# Patient Record
Sex: FEMALE | Race: WHITE | Employment: OTHER | URBAN - NONMETROPOLITAN AREA
[De-identification: names, ages, dates, MRNs, and addresses within clinical notes are randomized per-mention and may not be internally consistent; named-entity substitution may affect disease eponyms.]

---

## 2017-07-06 ENCOUNTER — HOSPITAL ENCOUNTER (OUTPATIENT)
Dept: NON INVASIVE DIAGNOSTICS | Age: 82
Discharge: HOME | End: 2017-07-06
Payer: MEDICARE

## 2017-07-06 DIAGNOSIS — I73.9 CLAUDICATION (HCC): Primary | ICD-10-CM

## 2017-07-06 PROCEDURE — 93923 UPR/LXTR ART STDY 3+ LVLS: CPT

## 2017-07-22 RX ORDER — SIMVASTATIN 20 MG
20 TABLET ORAL NIGHTLY
COMMUNITY

## 2017-07-22 RX ORDER — LEVOTHYROXINE SODIUM 112 UG/1
112 TABLET ORAL DAILY
COMMUNITY

## 2017-07-22 RX ORDER — TRIAMTERENE AND HYDROCHLOROTHIAZIDE 37.5; 25 MG/1; MG/1
1 CAPSULE ORAL EVERY MORNING
COMMUNITY
End: 2019-12-12

## 2017-07-22 ASSESSMENT — PAIN DESCRIPTION - LOCATION: LOCATION: LEG

## 2017-07-22 ASSESSMENT — PAIN SCALES - GENERAL: PAINLEVEL_OUTOF10: 8

## 2017-07-22 ASSESSMENT — PAIN DESCRIPTION - ORIENTATION: ORIENTATION: RIGHT

## 2017-07-22 ASSESSMENT — PAIN DESCRIPTION - PAIN TYPE: TYPE: ACUTE PAIN

## 2017-07-23 ENCOUNTER — HOSPITAL ENCOUNTER (INPATIENT)
Age: 82
LOS: 3 days | Discharge: HOME | DRG: 301 | End: 2017-07-26
Attending: EMERGENCY MEDICINE | Admitting: FAMILY MEDICINE
Payer: MEDICARE

## 2017-07-23 DIAGNOSIS — I82.411 ACUTE DEEP VEIN THROMBOSIS (DVT) OF FEMORAL VEIN OF RIGHT LOWER EXTREMITY (HCC): Primary | ICD-10-CM

## 2017-07-23 DIAGNOSIS — N18.9 ACUTE ON CHRONIC KIDNEY FAILURE (HCC): ICD-10-CM

## 2017-07-23 DIAGNOSIS — N17.9 ACUTE ON CHRONIC KIDNEY FAILURE (HCC): ICD-10-CM

## 2017-07-23 LAB
ALBUMIN SERPL-MCNC: 4.2 G/DL (ref 3.5–5.2)
ALP BLD-CCNC: 60 U/L (ref 35–104)
ALT SERPL-CCNC: 15 U/L (ref 5–33)
ANION GAP SERPL CALCULATED.3IONS-SCNC: 15 MMOL/L (ref 7–19)
AST SERPL-CCNC: 19 U/L (ref 5–32)
BASOPHILS ABSOLUTE: 0.1 K/UL (ref 0–0.2)
BASOPHILS RELATIVE PERCENT: 1 % (ref 0–1)
BILIRUB SERPL-MCNC: 0.3 MG/DL (ref 0.2–1.2)
BUN BLDV-MCNC: 52 MG/DL (ref 8–23)
CALCIUM SERPL-MCNC: 9.6 MG/DL (ref 8.8–10.2)
CHLORIDE BLD-SCNC: 103 MMOL/L (ref 98–111)
CO2: 23 MMOL/L (ref 22–29)
CREAT SERPL-MCNC: 1.7 MG/DL (ref 0.5–0.9)
D DIMER: 2.18 NG/ML DDU (ref 0–0.48)
EOSINOPHILS ABSOLUTE: 0.6 K/UL (ref 0–0.6)
EOSINOPHILS RELATIVE PERCENT: 6.5 % (ref 0–5)
GFR NON-AFRICAN AMERICAN: 29
GLUCOSE BLD-MCNC: 113 MG/DL (ref 74–109)
HCT VFR BLD CALC: 30.8 % (ref 37–47)
HEMOGLOBIN: 10.1 G/DL (ref 12–16)
INR BLD: 0.91 (ref 0.88–1.18)
LYMPHOCYTES ABSOLUTE: 1 K/UL (ref 1.1–4.5)
LYMPHOCYTES RELATIVE PERCENT: 11.5 % (ref 20–40)
MCH RBC QN AUTO: 33.2 PG (ref 27–31)
MCHC RBC AUTO-ENTMCNC: 32.8 G/DL (ref 33–37)
MCV RBC AUTO: 101.3 FL (ref 81–99)
MONOCYTES ABSOLUTE: 0.8 K/UL (ref 0–0.9)
MONOCYTES RELATIVE PERCENT: 9.2 % (ref 0–10)
NEUTROPHILS ABSOLUTE: 6.2 K/UL (ref 1.5–7.5)
NEUTROPHILS RELATIVE PERCENT: 71 % (ref 50–65)
PDW BLD-RTO: 12.6 % (ref 11.5–14.5)
PLATELET # BLD: 292 K/UL (ref 130–400)
PMV BLD AUTO: 10.8 FL (ref 9.4–12.3)
POTASSIUM SERPL-SCNC: 4.4 MMOL/L (ref 3.5–5)
PRO-BNP: 81 PG/ML (ref 0–1800)
PROTHROMBIN TIME: 12.1 SEC (ref 12–14.6)
RBC # BLD: 3.04 M/UL (ref 4.2–5.4)
SODIUM BLD-SCNC: 141 MMOL/L (ref 136–145)
TOTAL PROTEIN: 7.4 G/DL (ref 6.6–8.7)
WBC # BLD: 8.7 K/UL (ref 4.8–10.8)

## 2017-07-23 PROCEDURE — 93971 EXTREMITY STUDY: CPT

## 2017-07-23 PROCEDURE — 1210000000 HC MED SURG R&B

## 2017-07-23 PROCEDURE — 83880 ASSAY OF NATRIURETIC PEPTIDE: CPT

## 2017-07-23 PROCEDURE — 99285 EMERGENCY DEPT VISIT HI MDM: CPT

## 2017-07-23 PROCEDURE — 2580000003 HC RX 258: Performed by: FAMILY MEDICINE

## 2017-07-23 PROCEDURE — 6360000002 HC RX W HCPCS: Performed by: EMERGENCY MEDICINE

## 2017-07-23 PROCEDURE — 85610 PROTHROMBIN TIME: CPT

## 2017-07-23 PROCEDURE — 85025 COMPLETE CBC W/AUTO DIFF WBC: CPT

## 2017-07-23 PROCEDURE — 80053 COMPREHEN METABOLIC PANEL: CPT

## 2017-07-23 PROCEDURE — 6370000000 HC RX 637 (ALT 250 FOR IP): Performed by: FAMILY MEDICINE

## 2017-07-23 PROCEDURE — 36415 COLL VENOUS BLD VENIPUNCTURE: CPT

## 2017-07-23 PROCEDURE — 85379 FIBRIN DEGRADATION QUANT: CPT

## 2017-07-23 PROCEDURE — 6370000000 HC RX 637 (ALT 250 FOR IP): Performed by: EMERGENCY MEDICINE

## 2017-07-23 PROCEDURE — 99284 EMERGENCY DEPT VISIT MOD MDM: CPT | Performed by: EMERGENCY MEDICINE

## 2017-07-23 RX ORDER — ACETAMINOPHEN 325 MG/1
650 TABLET ORAL EVERY 4 HOURS PRN
Status: DISCONTINUED | OUTPATIENT
Start: 2017-07-23 | End: 2017-07-26 | Stop reason: HOSPADM

## 2017-07-23 RX ORDER — WARFARIN SODIUM 5 MG/1
10 TABLET ORAL
Status: DISCONTINUED | OUTPATIENT
Start: 2017-07-23 | End: 2017-07-23

## 2017-07-23 RX ORDER — SODIUM CHLORIDE 0.9 % (FLUSH) 0.9 %
10 SYRINGE (ML) INJECTION EVERY 12 HOURS SCHEDULED
Status: DISCONTINUED | OUTPATIENT
Start: 2017-07-23 | End: 2017-07-26 | Stop reason: HOSPADM

## 2017-07-23 RX ORDER — SIMVASTATIN 20 MG
20 TABLET ORAL NIGHTLY
Status: DISCONTINUED | OUTPATIENT
Start: 2017-07-23 | End: 2017-07-26 | Stop reason: HOSPADM

## 2017-07-23 RX ORDER — WARFARIN SODIUM 5 MG/1
5 TABLET ORAL
Status: COMPLETED | OUTPATIENT
Start: 2017-07-23 | End: 2017-07-23

## 2017-07-23 RX ORDER — LEVOTHYROXINE SODIUM 0.1 MG/1
100 TABLET ORAL DAILY
Status: DISCONTINUED | OUTPATIENT
Start: 2017-07-23 | End: 2017-07-26 | Stop reason: HOSPADM

## 2017-07-23 RX ORDER — SODIUM CHLORIDE 0.9 % (FLUSH) 0.9 %
10 SYRINGE (ML) INJECTION PRN
Status: DISCONTINUED | OUTPATIENT
Start: 2017-07-23 | End: 2017-07-26 | Stop reason: HOSPADM

## 2017-07-23 RX ADMIN — LEVOTHYROXINE SODIUM 100 MCG: 100 TABLET ORAL at 10:22

## 2017-07-23 RX ADMIN — WARFARIN SODIUM 5 MG: 5 TABLET ORAL at 05:21

## 2017-07-23 RX ADMIN — SODIUM CHLORIDE: 9 INJECTION, SOLUTION INTRAVENOUS at 16:24

## 2017-07-23 RX ADMIN — SIMVASTATIN 20 MG: 20 TABLET, FILM COATED ORAL at 20:39

## 2017-07-23 RX ADMIN — SODIUM CHLORIDE: 9 INJECTION, SOLUTION INTRAVENOUS at 06:33

## 2017-07-23 RX ADMIN — ENOXAPARIN SODIUM 80 MG: 80 INJECTION SUBCUTANEOUS at 05:21

## 2017-07-23 RX ADMIN — WARFARIN SODIUM 5 MG: 5 TABLET ORAL at 10:22

## 2017-07-23 ASSESSMENT — ENCOUNTER SYMPTOMS
SHORTNESS OF BREATH: 0
CHEST TIGHTNESS: 0
ABDOMINAL PAIN: 0

## 2017-07-23 ASSESSMENT — PAIN SCALES - GENERAL: PAINLEVEL_OUTOF10: 3

## 2017-07-24 LAB
ANION GAP SERPL CALCULATED.3IONS-SCNC: 14 MMOL/L (ref 7–19)
BUN BLDV-MCNC: 37 MG/DL (ref 8–23)
CALCIUM SERPL-MCNC: 8.6 MG/DL (ref 8.8–10.2)
CHLORIDE BLD-SCNC: 108 MMOL/L (ref 98–111)
CO2: 19 MMOL/L (ref 22–29)
CREAT SERPL-MCNC: 1.5 MG/DL (ref 0.5–0.9)
GFR NON-AFRICAN AMERICAN: 33
GLUCOSE BLD-MCNC: 103 MG/DL (ref 74–109)
INR BLD: 1.09 (ref 0.88–1.18)
POTASSIUM SERPL-SCNC: 4 MMOL/L (ref 3.5–5)
PROTHROMBIN TIME: 14 SEC (ref 12–14.6)
SODIUM BLD-SCNC: 141 MMOL/L (ref 136–145)

## 2017-07-24 PROCEDURE — 1210000000 HC MED SURG R&B

## 2017-07-24 PROCEDURE — 6360000002 HC RX W HCPCS: Performed by: FAMILY MEDICINE

## 2017-07-24 PROCEDURE — 6370000000 HC RX 637 (ALT 250 FOR IP): Performed by: FAMILY MEDICINE

## 2017-07-24 PROCEDURE — 36415 COLL VENOUS BLD VENIPUNCTURE: CPT

## 2017-07-24 PROCEDURE — 85610 PROTHROMBIN TIME: CPT

## 2017-07-24 PROCEDURE — 2580000003 HC RX 258: Performed by: FAMILY MEDICINE

## 2017-07-24 PROCEDURE — 80048 BASIC METABOLIC PNL TOTAL CA: CPT

## 2017-07-24 RX ORDER — TRIAMTERENE AND HYDROCHLOROTHIAZIDE 37.5; 25 MG/1; MG/1
1 TABLET ORAL DAILY
Status: DISCONTINUED | OUTPATIENT
Start: 2017-07-24 | End: 2017-07-26 | Stop reason: HOSPADM

## 2017-07-24 RX ORDER — CLONIDINE HYDROCHLORIDE 0.1 MG/1
0.1 TABLET ORAL EVERY 4 HOURS PRN
Status: DISCONTINUED | OUTPATIENT
Start: 2017-07-24 | End: 2017-07-26 | Stop reason: HOSPADM

## 2017-07-24 RX ORDER — WARFARIN SODIUM 5 MG/1
10 TABLET ORAL
Status: COMPLETED | OUTPATIENT
Start: 2017-07-24 | End: 2017-07-24

## 2017-07-24 RX ORDER — SODIUM CHLORIDE 9 MG/ML
INJECTION, SOLUTION INTRAVENOUS CONTINUOUS
Status: DISCONTINUED | OUTPATIENT
Start: 2017-07-24 | End: 2017-07-26 | Stop reason: HOSPADM

## 2017-07-24 RX ADMIN — SIMVASTATIN 20 MG: 20 TABLET, FILM COATED ORAL at 21:01

## 2017-07-24 RX ADMIN — SODIUM CHLORIDE: 9 INJECTION, SOLUTION INTRAVENOUS at 14:16

## 2017-07-24 RX ADMIN — SODIUM CHLORIDE: 9 INJECTION, SOLUTION INTRAVENOUS at 00:33

## 2017-07-24 RX ADMIN — ENOXAPARIN SODIUM 80 MG: 80 INJECTION SUBCUTANEOUS at 06:09

## 2017-07-24 RX ADMIN — WARFARIN SODIUM 10 MG: 5 TABLET ORAL at 18:27

## 2017-07-24 RX ADMIN — TRIAMTERENE AND HYDROCHLOROTHIAZIDE 1 TABLET: 37.5; 25 TABLET ORAL at 19:45

## 2017-07-24 RX ADMIN — Medication 10 ML: at 09:12

## 2017-07-24 RX ADMIN — Medication 10 ML: at 21:02

## 2017-07-24 RX ADMIN — LEVOTHYROXINE SODIUM 100 MCG: 100 TABLET ORAL at 06:09

## 2017-07-25 LAB
INR BLD: 1.3 (ref 0.88–1.18)
PROTHROMBIN TIME: 16.2 SEC (ref 12–14.6)

## 2017-07-25 PROCEDURE — 6370000000 HC RX 637 (ALT 250 FOR IP): Performed by: FAMILY MEDICINE

## 2017-07-25 PROCEDURE — 6360000002 HC RX W HCPCS: Performed by: FAMILY MEDICINE

## 2017-07-25 PROCEDURE — 1210000000 HC MED SURG R&B

## 2017-07-25 PROCEDURE — 2580000003 HC RX 258: Performed by: FAMILY MEDICINE

## 2017-07-25 PROCEDURE — 36415 COLL VENOUS BLD VENIPUNCTURE: CPT

## 2017-07-25 PROCEDURE — 85610 PROTHROMBIN TIME: CPT

## 2017-07-25 RX ORDER — WARFARIN SODIUM 5 MG/1
10 TABLET ORAL
Status: COMPLETED | OUTPATIENT
Start: 2017-07-25 | End: 2017-07-25

## 2017-07-25 RX ADMIN — LEVOTHYROXINE SODIUM 100 MCG: 100 TABLET ORAL at 06:11

## 2017-07-25 RX ADMIN — SIMVASTATIN 20 MG: 20 TABLET, FILM COATED ORAL at 21:25

## 2017-07-25 RX ADMIN — Medication 10 ML: at 21:25

## 2017-07-25 RX ADMIN — ENOXAPARIN SODIUM 80 MG: 80 INJECTION SUBCUTANEOUS at 06:11

## 2017-07-25 RX ADMIN — WARFARIN SODIUM 10 MG: 5 TABLET ORAL at 16:38

## 2017-07-25 RX ADMIN — TRIAMTERENE AND HYDROCHLOROTHIAZIDE 1 TABLET: 37.5; 25 TABLET ORAL at 21:25

## 2017-07-25 RX ADMIN — Medication 10 ML: at 08:50

## 2017-07-26 VITALS
OXYGEN SATURATION: 95 % | TEMPERATURE: 98.5 F | HEART RATE: 77 BPM | HEIGHT: 63 IN | BODY MASS INDEX: 32.78 KG/M2 | SYSTOLIC BLOOD PRESSURE: 142 MMHG | DIASTOLIC BLOOD PRESSURE: 72 MMHG | RESPIRATION RATE: 16 BRPM | WEIGHT: 185 LBS

## 2017-07-26 LAB
INR BLD: 2.01 (ref 0.88–1.18)
PROTHROMBIN TIME: 22.9 SEC (ref 12–14.6)

## 2017-07-26 PROCEDURE — 36415 COLL VENOUS BLD VENIPUNCTURE: CPT

## 2017-07-26 PROCEDURE — 6360000002 HC RX W HCPCS: Performed by: FAMILY MEDICINE

## 2017-07-26 PROCEDURE — 2580000003 HC RX 258: Performed by: FAMILY MEDICINE

## 2017-07-26 PROCEDURE — 85610 PROTHROMBIN TIME: CPT

## 2017-07-26 PROCEDURE — 6370000000 HC RX 637 (ALT 250 FOR IP): Performed by: FAMILY MEDICINE

## 2017-07-26 RX ORDER — WARFARIN SODIUM 5 MG/1
10 TABLET ORAL
Status: DISCONTINUED | OUTPATIENT
Start: 2017-07-26 | End: 2017-07-26 | Stop reason: SDUPTHER

## 2017-07-26 RX ORDER — WARFARIN SODIUM 5 MG/1
10 TABLET ORAL
Status: COMPLETED | OUTPATIENT
Start: 2017-07-26 | End: 2017-07-26

## 2017-07-26 RX ADMIN — Medication 10 ML: at 09:10

## 2017-07-26 RX ADMIN — TRIAMTERENE AND HYDROCHLOROTHIAZIDE 1 TABLET: 37.5; 25 TABLET ORAL at 09:10

## 2017-07-26 RX ADMIN — ENOXAPARIN SODIUM 80 MG: 80 INJECTION SUBCUTANEOUS at 06:06

## 2017-07-26 RX ADMIN — ACETAMINOPHEN 650 MG: 325 TABLET, FILM COATED ORAL at 00:28

## 2017-07-26 RX ADMIN — LEVOTHYROXINE SODIUM 100 MCG: 100 TABLET ORAL at 06:06

## 2017-07-26 RX ADMIN — WARFARIN SODIUM 10 MG: 5 TABLET ORAL at 10:18

## 2017-07-26 ASSESSMENT — PAIN SCALES - GENERAL
PAINLEVEL_OUTOF10: 8
PAINLEVEL_OUTOF10: 3

## 2017-07-28 LAB
INR BLD: 3.51 (ref 0.88–1.18)
PROTHROMBIN TIME: 35.8 SEC (ref 12–14.6)

## 2017-08-02 LAB
INR BLD: 3.75 (ref 0.88–1.18)
PROTHROMBIN TIME: 37.7 SEC (ref 12–14.6)

## 2017-08-11 LAB
INR BLD: 3.08 (ref 0.88–1.18)
PROTHROMBIN TIME: 32.2 SEC (ref 12–14.6)

## 2017-08-25 LAB
INR BLD: 3.58 (ref 0.88–1.18)
PROTHROMBIN TIME: 36.3 SEC (ref 12–14.6)

## 2017-09-08 LAB
INR BLD: 3.15 (ref 0.88–1.18)
PROTHROMBIN TIME: 32.8 SEC (ref 12–14.6)

## 2017-09-28 ENCOUNTER — HOSPITAL ENCOUNTER (OUTPATIENT)
Dept: MRI IMAGING | Age: 82
Discharge: HOME | End: 2017-09-28
Payer: MEDICARE

## 2017-09-28 DIAGNOSIS — M54.5 LOW BACK PAIN, UNSPECIFIED BACK PAIN LATERALITY, UNSPECIFIED CHRONICITY, WITH SCIATICA PRESENCE UNSPECIFIED: ICD-10-CM

## 2017-09-28 PROCEDURE — 72148 MRI LUMBAR SPINE W/O DYE: CPT

## 2017-10-09 LAB
INR BLD: 3.26 (ref 0.88–1.18)
PROTHROMBIN TIME: 33.7 SEC (ref 12–14.6)

## 2017-11-14 LAB
INR BLD: 2 (ref 0.88–1.18)
PROTHROMBIN TIME: 22.8 SEC (ref 12–14.6)

## 2017-12-04 LAB
CHOLESTEROL, TOTAL: 181 MG/DL (ref 160–199)
HDLC SERPL-MCNC: 56 MG/DL (ref 65–121)
INR BLD: 2.02 (ref 0.88–1.18)
LDL CHOLESTEROL CALCULATED: 98 MG/DL
PROTHROMBIN TIME: 23 SEC (ref 12–14.6)
T4 TOTAL: 8.2 UG/DL (ref 4.5–11.7)
TRIGL SERPL-MCNC: 133 MG/DL (ref 0–149)
TSH SERPL DL<=0.05 MIU/L-ACNC: 2.62 UIU/ML (ref 0.27–4.2)

## 2017-12-08 ENCOUNTER — APPOINTMENT (OUTPATIENT)
Dept: CT IMAGING | Age: 82
End: 2017-12-08
Payer: MEDICARE

## 2017-12-08 ENCOUNTER — HOSPITAL ENCOUNTER (EMERGENCY)
Age: 82
Discharge: HOME | End: 2017-12-08
Attending: FAMILY MEDICINE
Payer: MEDICARE

## 2017-12-08 VITALS
TEMPERATURE: 98.1 F | HEART RATE: 71 BPM | DIASTOLIC BLOOD PRESSURE: 56 MMHG | RESPIRATION RATE: 20 BRPM | SYSTOLIC BLOOD PRESSURE: 123 MMHG | BODY MASS INDEX: 34.04 KG/M2 | HEIGHT: 62 IN | OXYGEN SATURATION: 91 % | WEIGHT: 185 LBS

## 2017-12-08 DIAGNOSIS — N20.1 URETEROLITHIASIS: ICD-10-CM

## 2017-12-08 DIAGNOSIS — R10.9 FLANK PAIN: Primary | ICD-10-CM

## 2017-12-08 LAB
ALBUMIN SERPL-MCNC: 4.6 G/DL (ref 3.5–5.2)
ALP BLD-CCNC: 64 U/L (ref 35–104)
ALT SERPL-CCNC: 13 U/L (ref 5–33)
ANION GAP SERPL CALCULATED.3IONS-SCNC: 16 MMOL/L (ref 7–19)
AST SERPL-CCNC: 17 U/L (ref 5–32)
BACTERIA: NEGATIVE /HPF
BASOPHILS ABSOLUTE: 0.1 K/UL (ref 0–0.2)
BASOPHILS RELATIVE PERCENT: 0.5 % (ref 0–1)
BILIRUB SERPL-MCNC: 0.4 MG/DL (ref 0.2–1.2)
BILIRUBIN URINE: NEGATIVE
BLOOD, URINE: ABNORMAL
BUN BLDV-MCNC: 49 MG/DL (ref 8–23)
CALCIUM SERPL-MCNC: 9.7 MG/DL (ref 8.8–10.2)
CHLORIDE BLD-SCNC: 104 MMOL/L (ref 98–111)
CLARITY: CLEAR
CO2: 22 MMOL/L (ref 22–29)
COLOR: YELLOW
CREAT SERPL-MCNC: 2 MG/DL (ref 0.5–0.9)
EOSINOPHILS ABSOLUTE: 0 K/UL (ref 0–0.6)
EOSINOPHILS RELATIVE PERCENT: 0.2 % (ref 0–5)
EPITHELIAL CELLS, UA: 2 /HPF (ref 0–5)
GFR NON-AFRICAN AMERICAN: 24
GLUCOSE BLD-MCNC: 126 MG/DL (ref 74–109)
GLUCOSE URINE: NEGATIVE MG/DL
HCT VFR BLD CALC: 34.6 % (ref 37–47)
HEMOGLOBIN: 10.8 G/DL (ref 12–16)
HYALINE CASTS: 2 /HPF (ref 0–8)
KETONES, URINE: NEGATIVE MG/DL
LEUKOCYTE ESTERASE, URINE: ABNORMAL
LIPASE: 58 U/L (ref 13–60)
LYMPHOCYTES ABSOLUTE: 0.6 K/UL (ref 1.1–4.5)
LYMPHOCYTES RELATIVE PERCENT: 5.5 % (ref 20–40)
MCH RBC QN AUTO: 31.8 PG (ref 27–31)
MCHC RBC AUTO-ENTMCNC: 31.2 G/DL (ref 33–37)
MCV RBC AUTO: 101.8 FL (ref 81–99)
MONOCYTES ABSOLUTE: 0.5 K/UL (ref 0–0.9)
MONOCYTES RELATIVE PERCENT: 4.6 % (ref 0–10)
NEUTROPHILS ABSOLUTE: 9.3 K/UL (ref 1.5–7.5)
NEUTROPHILS RELATIVE PERCENT: 88.6 % (ref 50–65)
NITRITE, URINE: NEGATIVE
PDW BLD-RTO: 12.6 % (ref 11.5–14.5)
PH UA: 7
PLATELET # BLD: 277 K/UL (ref 130–400)
PMV BLD AUTO: 10.8 FL (ref 9.4–12.3)
POTASSIUM SERPL-SCNC: 4.8 MMOL/L (ref 3.5–5)
PROTEIN UA: ABNORMAL MG/DL
RBC # BLD: 3.4 M/UL (ref 4.2–5.4)
RBC UA: 183 /HPF (ref 0–4)
SODIUM BLD-SCNC: 142 MMOL/L (ref 136–145)
SPECIFIC GRAVITY UA: 1.01
TOTAL PROTEIN: 7.1 G/DL (ref 6.6–8.7)
UROBILINOGEN, URINE: 0.2 E.U./DL
WBC # BLD: 10.5 K/UL (ref 4.8–10.8)
WBC UA: 7 /HPF (ref 0–5)

## 2017-12-08 PROCEDURE — 83690 ASSAY OF LIPASE: CPT

## 2017-12-08 PROCEDURE — 99284 EMERGENCY DEPT VISIT MOD MDM: CPT

## 2017-12-08 PROCEDURE — 87086 URINE CULTURE/COLONY COUNT: CPT

## 2017-12-08 PROCEDURE — 80053 COMPREHEN METABOLIC PANEL: CPT

## 2017-12-08 PROCEDURE — 36415 COLL VENOUS BLD VENIPUNCTURE: CPT

## 2017-12-08 PROCEDURE — 85025 COMPLETE CBC W/AUTO DIFF WBC: CPT

## 2017-12-08 PROCEDURE — 74176 CT ABD & PELVIS W/O CONTRAST: CPT

## 2017-12-08 PROCEDURE — 81001 URINALYSIS AUTO W/SCOPE: CPT

## 2017-12-08 PROCEDURE — 99283 EMERGENCY DEPT VISIT LOW MDM: CPT | Performed by: FAMILY MEDICINE

## 2017-12-08 RX ORDER — ACETAMINOPHEN 500 MG
650 TABLET ORAL EVERY 6 HOURS PRN
COMMUNITY
End: 2019-12-12

## 2017-12-08 RX ORDER — ONDANSETRON 2 MG/ML
4 INJECTION INTRAMUSCULAR; INTRAVENOUS ONCE
Status: DISCONTINUED | OUTPATIENT
Start: 2017-12-08 | End: 2017-12-08 | Stop reason: HOSPADM

## 2017-12-08 RX ORDER — WARFARIN SODIUM 5 MG/1
5 TABLET ORAL DAILY
COMMUNITY
End: 2020-12-22

## 2017-12-08 RX ORDER — SODIUM CHLORIDE 9 MG/ML
INJECTION, SOLUTION INTRAVENOUS CONTINUOUS
Status: DISCONTINUED | OUTPATIENT
Start: 2017-12-08 | End: 2017-12-08 | Stop reason: HOSPADM

## 2017-12-08 ASSESSMENT — ENCOUNTER SYMPTOMS
CONSTIPATION: 0
ABDOMINAL PAIN: 1
WHEEZING: 0
SHORTNESS OF BREATH: 0
SORE THROAT: 0
COUGH: 0
APNEA: 0
DIARRHEA: 0
CHEST TIGHTNESS: 0
BACK PAIN: 0
VOMITING: 0
ABDOMINAL DISTENTION: 0
TROUBLE SWALLOWING: 0

## 2017-12-08 ASSESSMENT — PAIN SCALES - GENERAL
PAINLEVEL_OUTOF10: 3
PAINLEVEL_OUTOF10: 8

## 2017-12-09 NOTE — ED PROVIDER NOTES
Previous Medications    ACETAMINOPHEN (TYLENOL) 500 MG TABLET    Take 650 mg by mouth every 6 hours as needed for Pain    LEVOTHYROXINE (SYNTHROID) 112 MCG TABLET    Take 112 mcg by mouth Daily    SIMVASTATIN (ZOCOR) 20 MG TABLET    Take 20 mg by mouth nightly    TRIAMTERENE-HYDROCHLOROTHIAZIDE (DYAZIDE) 37.5-25 MG PER CAPSULE    Take 1 capsule by mouth every morning    WARFARIN (COUMADIN) 5 MG TABLET    Take 5 mg by mouth       ALLERGIES     Penicillins    FAMILY HISTORY     History reviewed. No pertinent family history. SOCIAL HISTORY       Social History     Social History    Marital status:      Spouse name: N/A    Number of children: N/A    Years of education: N/A     Social History Main Topics    Smoking status: Never Smoker    Smokeless tobacco: Never Used    Alcohol use No    Drug use: No    Sexual activity: Not Asked     Other Topics Concern    None     Social History Narrative    None       SCREENINGS             PHYSICAL EXAM    (up to 7 for level 4, 8 or more for level 5)     ED Triage Vitals [12/08/17 1404]   BP Temp Temp Source Pulse Resp SpO2 Height Weight   (!) 154/95 97.1 °F (36.2 °C) Temporal 87 20 92 % 5' 2\" (1.575 m) 185 lb (83.9 kg)       Physical Exam   Constitutional: She is oriented to person, place, and time. She appears well-developed and well-nourished. HENT:   Head: Normocephalic and atraumatic. Eyes: Conjunctivae and EOM are normal. Pupils are equal, round, and reactive to light. Neck: Normal range of motion. Neck supple. No tracheal deviation present. Cardiovascular: Normal rate, regular rhythm, normal heart sounds and intact distal pulses. Pulmonary/Chest: Effort normal and breath sounds normal. No respiratory distress. She has no wheezes. She has no rales. Abdominal: Soft. Bowel sounds are normal. There is tenderness. There is no rebound and no guarding. Left flank tenderness   Musculoskeletal: Normal range of motion.    Neurological: She is

## 2017-12-10 LAB — URINE CULTURE, ROUTINE: NORMAL

## 2017-12-18 ENCOUNTER — OFFICE VISIT (OUTPATIENT)
Dept: UROLOGY | Age: 82
End: 2017-12-18
Payer: MEDICARE

## 2017-12-18 VITALS
TEMPERATURE: 97.7 F | HEART RATE: 88 BPM | WEIGHT: 190 LBS | HEIGHT: 62 IN | SYSTOLIC BLOOD PRESSURE: 135 MMHG | DIASTOLIC BLOOD PRESSURE: 76 MMHG | BODY MASS INDEX: 34.96 KG/M2

## 2017-12-18 DIAGNOSIS — N20.1 LEFT URETERAL STONE: Primary | ICD-10-CM

## 2017-12-18 LAB
BACTERIA URINE, POC: ABNORMAL
BILIRUBIN URINE: 0 MG/DL
BLOOD, URINE: NEGATIVE
CASTS URINE, POC: ABNORMAL
CLARITY: CLEAR
COLOR: YELLOW
CRYSTALS URINE, POC: ABNORMAL
EPI CELLS URINE, POC: ABNORMAL
GLUCOSE URINE: ABNORMAL
KETONES, URINE: NEGATIVE
LEUKOCYTE EST, POC: ABNORMAL
NITRITE, URINE: NEGATIVE
PH UA: 6 (ref 4.5–8)
PROTEIN UA: NEGATIVE
RBC URINE, POC: 0
SPECIFIC GRAVITY UA: 1.01 (ref 1–1.03)
UROBILINOGEN, URINE: NORMAL
WBC URINE, POC: 4
YEAST URINE, POC: ABNORMAL

## 2017-12-18 PROCEDURE — G8400 PT W/DXA NO RESULTS DOC: HCPCS | Performed by: UROLOGY

## 2017-12-18 PROCEDURE — 1123F ACP DISCUSS/DSCN MKR DOCD: CPT | Performed by: UROLOGY

## 2017-12-18 PROCEDURE — 1036F TOBACCO NON-USER: CPT | Performed by: UROLOGY

## 2017-12-18 PROCEDURE — G8484 FLU IMMUNIZE NO ADMIN: HCPCS | Performed by: UROLOGY

## 2017-12-18 PROCEDURE — G8417 CALC BMI ABV UP PARAM F/U: HCPCS | Performed by: UROLOGY

## 2017-12-18 PROCEDURE — G8427 DOCREV CUR MEDS BY ELIG CLIN: HCPCS | Performed by: UROLOGY

## 2017-12-18 PROCEDURE — 4040F PNEUMOC VAC/ADMIN/RCVD: CPT | Performed by: UROLOGY

## 2017-12-18 PROCEDURE — 99204 OFFICE O/P NEW MOD 45 MIN: CPT | Performed by: UROLOGY

## 2017-12-18 PROCEDURE — 81000 URINALYSIS NONAUTO W/SCOPE: CPT | Performed by: UROLOGY

## 2017-12-18 PROCEDURE — 1090F PRES/ABSN URINE INCON ASSESS: CPT | Performed by: UROLOGY

## 2017-12-18 ASSESSMENT — ENCOUNTER SYMPTOMS
NAUSEA: 0
WHEEZING: 0
BLURRED VISION: 0
SHORTNESS OF BREATH: 0
DOUBLE VISION: 0
SORE THROAT: 0
HEARTBURN: 0

## 2017-12-18 NOTE — LETTER
[facility]  [address] [place] [address], [address]  [address] [place] [address] Road  Phone: [phone]  Fax: [phone]    Duran Abdullahi MD        December 18, 2017     Bairon Lyon MD  [address] [place] [address] [address] [id number]      Patient: Pam Mcclelland   MR Number: [id number]   YOB: 1934   Date of Visit: 12/18/2017       Dear Provider: Thank you for referring Foster Ades to me for evaluation. Below are the relevant portions of my assessment and plan of care. If you have questions, please do not hesitate to call me. I look forward to following Sara Hernandez along with you.     Sincerely,        Duran Abdullahi MD

## 2017-12-18 NOTE — PROGRESS NOTES
Ilya Kim is a 80 y.o. female who presents today   Chief Complaint   Patient presents with    New Patient     I am here today because of a kidney stone on my left side. Ureteral calculus:  Patient is here today for a ureteral calculus which was first noted on 12/08/17, 10 days ago. Location: left UVJ  Size: 1 mm  Recently the ureteral calculus symptoms: are improving. She had symptoms of left flank pain and renal colic the day she presented to the emergency room by that night she is pain-free she's not sure whether she passed the stone she's not had any further pain since being seen in the emergency room on December 8  Current medical Rx for ureteral calculus: none  Flank pain? No, left  Hematuria? None  Passed recent calculus? Maybe, more likely she has she's now completely pain free  Personal History of Kidney Stones: no  Stone composition: unknown  Family History of Kidney Stones: no    Lab Results   Component Value Date    CALCIUM 9.7 12/08/2017     Lab Results   Component Value Date     12/08/2017    K 4.8 12/08/2017     12/08/2017    CO2 22 12/08/2017         Past Medical History:   Diagnosis Date    Arthritis     DVT (deep vein thrombosis) in pregnancy ([facility] [address]     Hyperlipidemia     Thyroid disease        Past Surgical History:   Procedure Laterality Date    TOE AMPUTATION         Current Outpatient Prescriptions   Medication Sig Dispense Refill    warfarin (COUMADIN) 5 MG tablet Take 5 mg by mouth      acetaminophen (TYLENOL) 500 MG tablet Take 650 mg by mouth every 6 hours as needed for Pain      levothyroxine (SYNTHROID) 112 MCG tablet Take 112 mcg by mouth Daily      simvastatin (ZOCOR) 20 MG tablet Take 20 mg by mouth nightly      triamterene-hydrochlorothiazide (DYAZIDE) 37.5-25 MG per capsule Take 1 capsule by mouth every morning       No current facility-administered medications for this visit.         Allergies   Allergen Reactions    Penicillins        Social tenderness. Lymphadenopathy:     She has no cervical adenopathy. Neurological: She is alert and oriented to person, place, and time. Skin: Skin is warm and dry. Psychiatric: She has a normal mood and affect. Her behavior is normal.   Vitals reviewed. DATA:  CBC:   Lab Results   Component Value Date    WBC 10.5 12/08/2017    RBC 3.40 12/08/2017    HGB 10.8 12/08/2017    HCT 34.6 12/08/2017    .8 12/08/2017    MCH 31.8 12/08/2017    MCHC 31.2 12/08/2017    RDW 12.6 12/08/2017     12/08/2017    MPV 10.8 12/08/2017     BMP:    Lab Results   Component Value Date     12/08/2017    K 4.8 12/08/2017     12/08/2017    CO2 22 12/08/2017    BUN 49 12/08/2017    LABALBU 4.6 12/08/2017    CREATININE 2.0 12/08/2017    CALCIUM 9.7 12/08/2017    LABGLOM 24 12/08/2017    GLUCOSE 126 12/08/2017     Results for orders placed or performed in visit on 12/18/17   POC URINE with Microscopic   Result Value Ref Range    Color, UA Yellow     Clarity, UA Clear Clear    Glucose, Ur neg     Bilirubin Urine 0 mg/dL    Ketones, Urine Negative     Specific Gravity, UA 1.015 1.005 - 1.030    Blood, Urine Negative     pH, UA 6.0 4.5 - 8.0    Protein, UA Negative Negative    Nitrite, Urine Negative     Leukocytes, UA moderate     Urobilinogen, Urine Normal     rbc urine, poc 0     wbc urine, poc 4     bacteria urine, poc 1+     yeast urine, poc      casts urine, poc      epi cells urine, poc      crystals urine, poc         IMAGING:   CT scan per stone protocol done on 12/08/17 shows left hydronephrosis the dilated ureter down to a 1 mm left UVJ calculus    1. Left ureteral stone  She more than likely has passed a stone as she is now completely pain-free and was a small tiny stone but with her creatinine being elevated I think we need to make sure side ordered a follow-up CT scan and we'll repeat her creatinine. Her she has some chronic kidney disease with a baseline creatinine around 1.5-1.7.  Her creatinine was up to 2.0 when she was in the ER we'll recheck this  - POC URINE with Microscopic  - CT Kidney WO Contrast; Future  - Basic Metabolic Panel; Future      Orders Placed This Encounter   Procedures    CT Kidney WO Contrast     Renal stone protocol     Standing Status:   Future     Standing Expiration Date:   12/18/2018     Scheduling Instructions:      Renal stone protocol     Order Specific Question:   Reason for exam:     Answer:   left UVJ calclulus follow up    Basic Metabolic Panel     In 2 weeks     Standing Status:   Future     Standing Expiration Date:   12/18/2018    POC URINE with Microscopic        Return in about 2 weeks (around 1/1/2018) for office visit after xray study.

## 2018-01-02 ENCOUNTER — HOSPITAL ENCOUNTER (OUTPATIENT)
Dept: CT IMAGING | Age: 83
Discharge: HOME | End: 2018-01-02
Payer: MEDICARE

## 2018-01-02 DIAGNOSIS — N20.1 LEFT URETERAL STONE: ICD-10-CM

## 2018-01-02 PROCEDURE — 74150 CT ABDOMEN W/O CONTRAST: CPT

## 2018-01-10 ENCOUNTER — OFFICE VISIT (OUTPATIENT)
Dept: UROLOGY | Age: 83
End: 2018-01-10
Payer: MEDICARE

## 2018-01-10 VITALS
SYSTOLIC BLOOD PRESSURE: 132 MMHG | TEMPERATURE: 97.7 F | HEIGHT: 62 IN | BODY MASS INDEX: 34.78 KG/M2 | DIASTOLIC BLOOD PRESSURE: 78 MMHG | HEART RATE: 74 BPM | WEIGHT: 189 LBS

## 2018-01-10 DIAGNOSIS — N20.1 LEFT URETERAL STONE: Primary | ICD-10-CM

## 2018-01-10 LAB
BACTERIA URINE, POC: NORMAL
BILIRUBIN URINE: 0 MG/DL
BLOOD, URINE: NEGATIVE
CASTS URINE, POC: NORMAL
CLARITY: NORMAL
COLOR: NORMAL
CRYSTALS URINE, POC: NORMAL
EPI CELLS URINE, POC: NORMAL
GLUCOSE URINE: NORMAL
INR BLD: 1.96 (ref 0.88–1.18)
KETONES, URINE: NEGATIVE
LEUKOCYTE EST, POC: NORMAL
NITRITE, URINE: NEGATIVE
PH UA: 6 (ref 4.5–8)
PROTEIN UA: NEGATIVE
PROTHROMBIN TIME: 22.4 SEC (ref 12–14.6)
RBC URINE, POC: NORMAL
SPECIFIC GRAVITY UA: 1.01 (ref 1–1.03)
UROBILINOGEN, URINE: NORMAL
WBC URINE, POC: NORMAL
YEAST URINE, POC: NORMAL

## 2018-01-10 PROCEDURE — G8484 FLU IMMUNIZE NO ADMIN: HCPCS | Performed by: UROLOGY

## 2018-01-10 PROCEDURE — G8400 PT W/DXA NO RESULTS DOC: HCPCS | Performed by: UROLOGY

## 2018-01-10 PROCEDURE — 81000 URINALYSIS NONAUTO W/SCOPE: CPT | Performed by: UROLOGY

## 2018-01-10 PROCEDURE — G8427 DOCREV CUR MEDS BY ELIG CLIN: HCPCS | Performed by: UROLOGY

## 2018-01-10 PROCEDURE — G8417 CALC BMI ABV UP PARAM F/U: HCPCS | Performed by: UROLOGY

## 2018-01-10 PROCEDURE — 4040F PNEUMOC VAC/ADMIN/RCVD: CPT | Performed by: UROLOGY

## 2018-01-10 PROCEDURE — 1036F TOBACCO NON-USER: CPT | Performed by: UROLOGY

## 2018-01-10 PROCEDURE — 1090F PRES/ABSN URINE INCON ASSESS: CPT | Performed by: UROLOGY

## 2018-01-10 PROCEDURE — 99213 OFFICE O/P EST LOW 20 MIN: CPT | Performed by: UROLOGY

## 2018-01-10 PROCEDURE — 1123F ACP DISCUSS/DSCN MKR DOCD: CPT | Performed by: UROLOGY

## 2018-01-10 ASSESSMENT — ENCOUNTER SYMPTOMS
EYE DISCHARGE: 0
HEARTBURN: 0
WHEEZING: 0
SHORTNESS OF BREATH: 0
EYE REDNESS: 0
NAUSEA: 0

## 2018-01-10 NOTE — LETTER
[id number] [facility]  [address] [place] [address], [address] [place] [address] Road  [facility] [address] [id number]  Phone: [phone]  Fax: [phone]    Josmeanuel Chowdhury MD        January 10, 2018     Lydia Ziegler MD  [facility] [address] [facility] [address]      Patient: Brianna Simmons   MR Number: [id number]   YOB: 1934   Date of Visit: 1/10/2018       Dear Provider: Thank you for referring Isabel Wallace to me for evaluation. Below are the relevant portions of my assessment and plan of care. If you have questions, please do not hesitate to call me. I look forward to following Laura Grimm along with you.     Sincerely,        Josemanuel Chowdhury MD

## 2018-01-10 NOTE — PROGRESS NOTES
Klaudia Field is a 80 y.o. female who presents today   Chief Complaint   Patient presents with    Follow-up     I am here today for my follow up on left ureteral stone after my ct scan.  Nephrolithiasis       Ureteral calculus:  Patient is here today for a ureteral calculus which was first noted on 12/08/17, 10 days ago. Location: left UVJ  Size: 1 mm  Recently the ureteral calculus symptoms: are improving. She had symptoms of left flank pain and renal colic the day she presented to the emergency room by that night she is pain-free she's not sure whether she passed the stone she's not had any further pain since being seen in the emergency room on December 8. She comes in now for follow-up CT scan. Because of her elevated creatinine up to 2 for repeat lab tests. Current medical Rx for ureteral calculus: none  Flank pain? No, left  Hematuria? None  Passed recent calculus? She probably has passed a stone.   Personal History of Kidney Stones: no  Stone composition: unknown  Family History of Kidney Stones: no    Lab Results   Component Value Date    CALCIUM 9.7 12/08/2017     Lab Results   Component Value Date     12/08/2017    K 4.8 12/08/2017     12/08/2017    CO2 22 12/08/2017             Past Medical History:   Diagnosis Date    Arthritis     DVT (deep vein thrombosis) in pregnancy ([facility] [address]     Hyperlipidemia     Thyroid disease        Past Surgical History:   Procedure Laterality Date    TOE AMPUTATION         Current Outpatient Prescriptions   Medication Sig Dispense Refill    warfarin (COUMADIN) 5 MG tablet Take 5 mg by mouth      acetaminophen (TYLENOL) 500 MG tablet Take 650 mg by mouth every 6 hours as needed for Pain      levothyroxine (SYNTHROID) 112 MCG tablet Take 112 mcg by mouth Daily      simvastatin (ZOCOR) 20 MG tablet Take 20 mg by mouth nightly      triamterene-hydrochlorothiazide (DYAZIDE) 37.5-25 MG per capsule Take 1 capsule by mouth every morning       No current

## 2018-02-02 ENCOUNTER — HOSPITAL ENCOUNTER (OUTPATIENT)
Dept: ULTRASOUND IMAGING | Age: 83
Discharge: HOME | End: 2018-02-02
Payer: MEDICARE

## 2018-02-02 DIAGNOSIS — N18.4 CHRONIC KIDNEY DISEASE, STAGE IV (SEVERE) (HCC): ICD-10-CM

## 2018-02-02 PROCEDURE — 76770 US EXAM ABDO BACK WALL COMP: CPT

## 2018-02-14 LAB
INR BLD: 1.49 (ref 0.88–1.18)
PROTHROMBIN TIME: 18 SEC (ref 12–14.6)

## 2018-03-15 LAB
INR BLD: 1.83 (ref 0.88–1.18)
PROTHROMBIN TIME: 21.2 SEC (ref 12–14.6)

## 2018-04-18 LAB
INR BLD: 2.1 (ref 0.88–1.18)
PROTHROMBIN TIME: 23.6 SEC (ref 12–14.6)

## 2018-05-08 ENCOUNTER — HOSPITAL ENCOUNTER (OUTPATIENT)
Dept: GENERAL RADIOLOGY | Facility: HOSPITAL | Age: 83
Discharge: HOME | End: 2018-05-08
Attending: INTERNAL MEDICINE | Admitting: INTERNAL MEDICINE

## 2018-05-08 ENCOUNTER — TRANSCRIBE ORDERS (OUTPATIENT)
Dept: GENERAL RADIOLOGY | Facility: HOSPITAL | Age: 83
End: 2018-05-08

## 2018-05-08 DIAGNOSIS — M25.561 RIGHT KNEE PAIN, UNSPECIFIED CHRONICITY: ICD-10-CM

## 2018-05-08 DIAGNOSIS — M25.561 RIGHT KNEE PAIN, UNSPECIFIED CHRONICITY: Primary | ICD-10-CM

## 2018-05-08 PROCEDURE — 73562 X-RAY EXAM OF KNEE 3: CPT

## 2018-05-16 LAB
INR BLD: 1.8 (ref 0.88–1.18)
PROTHROMBIN TIME: 20.9 SEC (ref 12–14.6)

## 2018-06-11 LAB
ALBUMIN SERPL-MCNC: 4 G/DL (ref 3.5–5.2)
ALP BLD-CCNC: 68 U/L (ref 35–104)
ALT SERPL-CCNC: 26 U/L (ref 5–33)
ANION GAP SERPL CALCULATED.3IONS-SCNC: 17 MMOL/L (ref 7–19)
AST SERPL-CCNC: 26 U/L (ref 5–32)
ATYPICAL LYMPHOCYTE RELATIVE PERCENT: 3 % (ref 0–8)
BANDED NEUTROPHILS RELATIVE PERCENT: 3 % (ref 0–5)
BASOPHILS ABSOLUTE: 0.1 K/UL (ref 0–0.2)
BASOPHILS MANUAL: 2 %
BASOPHILS RELATIVE PERCENT: 2 % (ref 0–1)
BILIRUB SERPL-MCNC: 0.4 MG/DL (ref 0.2–1.2)
BUN BLDV-MCNC: 50 MG/DL (ref 8–23)
CALCIUM SERPL-MCNC: 9.6 MG/DL (ref 8.8–10.2)
CHLORIDE BLD-SCNC: 101 MMOL/L (ref 98–111)
CO2: 21 MMOL/L (ref 22–29)
CREAT SERPL-MCNC: 2.2 MG/DL (ref 0.5–0.9)
EOSINOPHILS ABSOLUTE: 0.08 K/UL (ref 0–0.6)
EOSINOPHILS RELATIVE PERCENT: 3 % (ref 0–5)
GFR NON-AFRICAN AMERICAN: 21
GLUCOSE BLD-MCNC: 134 MG/DL (ref 74–109)
HCT VFR BLD CALC: 29.1 % (ref 37–47)
HEMOGLOBIN: 9.4 G/DL (ref 12–16)
INR BLD: 1.86 (ref 0.88–1.18)
LYMPHOCYTES ABSOLUTE: 0.6 K/UL (ref 1.1–4.5)
LYMPHOCYTES RELATIVE PERCENT: 19 % (ref 20–40)
MCH RBC QN AUTO: 32.3 PG (ref 27–31)
MCHC RBC AUTO-ENTMCNC: 32.3 G/DL (ref 33–37)
MCV RBC AUTO: 100 FL (ref 81–99)
MONOCYTES ABSOLUTE: 0.4 K/UL (ref 0–0.9)
MONOCYTES RELATIVE PERCENT: 16 % (ref 0–10)
NEUTROPHILS ABSOLUTE: 1.5 K/UL (ref 1.5–7.5)
NEUTROPHILS MANUAL: 54 %
NEUTROPHILS RELATIVE PERCENT: 54 % (ref 50–65)
PDW BLD-RTO: 13.5 % (ref 11.5–14.5)
PLATELET # BLD: 230 K/UL (ref 130–400)
PMV BLD AUTO: 11.4 FL (ref 9.4–12.3)
POTASSIUM SERPL-SCNC: 3.9 MMOL/L (ref 3.5–5)
PROTHROMBIN TIME: 21.4 SEC (ref 12–14.6)
RBC # BLD: 2.91 M/UL (ref 4.2–5.4)
RBC # BLD: NORMAL 10*6/UL
SODIUM BLD-SCNC: 139 MMOL/L (ref 136–145)
TOTAL PROTEIN: 6.8 G/DL (ref 6.6–8.7)
TSH SERPL DL<=0.05 MIU/L-ACNC: 0.49 UIU/ML (ref 0.27–4.2)
WBC # BLD: 2.7 K/UL (ref 4.8–10.8)

## 2018-07-11 LAB
INR BLD: 1.82 (ref 0.88–1.18)
PROTHROMBIN TIME: 21.1 SEC (ref 12–14.6)

## 2018-08-03 LAB
BASOPHILS ABSOLUTE: 0.1 K/UL (ref 0–0.2)
BASOPHILS RELATIVE PERCENT: 0.8 % (ref 0–1)
EOSINOPHILS ABSOLUTE: 0.1 K/UL (ref 0–0.6)
EOSINOPHILS RELATIVE PERCENT: 0.7 % (ref 0–5)
HCT VFR BLD CALC: 30.3 % (ref 37–47)
HEMOGLOBIN: 9.7 G/DL (ref 12–16)
INR BLD: 3.09 (ref 0.88–1.18)
LYMPHOCYTES ABSOLUTE: 1 K/UL (ref 1.1–4.5)
LYMPHOCYTES RELATIVE PERCENT: 10.7 % (ref 20–40)
MCH RBC QN AUTO: 32.4 PG (ref 27–31)
MCHC RBC AUTO-ENTMCNC: 32 G/DL (ref 33–37)
MCV RBC AUTO: 101.3 FL (ref 81–99)
MONOCYTES ABSOLUTE: 0.7 K/UL (ref 0–0.9)
MONOCYTES RELATIVE PERCENT: 7.6 % (ref 0–10)
NEUTROPHILS ABSOLUTE: 7.6 K/UL (ref 1.5–7.5)
NEUTROPHILS RELATIVE PERCENT: 78.9 % (ref 50–65)
PDW BLD-RTO: 13.5 % (ref 11.5–14.5)
PLATELET # BLD: 341 K/UL (ref 130–400)
PMV BLD AUTO: 11.8 FL (ref 9.4–12.3)
PROTHROMBIN TIME: 32.1 SEC (ref 12–14.6)
RBC # BLD: 2.99 M/UL (ref 4.2–5.4)
WBC # BLD: 9.6 K/UL (ref 4.8–10.8)

## 2018-08-31 ENCOUNTER — HOSPITAL ENCOUNTER (OUTPATIENT)
Dept: WOMENS IMAGING | Age: 83
Discharge: HOME | End: 2018-08-31
Payer: MEDICARE

## 2018-08-31 DIAGNOSIS — Z12.31 ENCOUNTER FOR SCREENING MAMMOGRAM FOR MALIGNANT NEOPLASM OF BREAST: ICD-10-CM

## 2018-08-31 PROCEDURE — 77063 BREAST TOMOSYNTHESIS BI: CPT

## 2018-09-04 ENCOUNTER — TELEPHONE (OUTPATIENT)
Dept: WOMENS IMAGING | Age: 83
End: 2018-09-04

## 2018-09-07 ENCOUNTER — HOSPITAL ENCOUNTER (OUTPATIENT)
Dept: ULTRASOUND IMAGING | Age: 83
Discharge: HOME | End: 2018-09-07
Payer: MEDICARE

## 2018-09-07 ENCOUNTER — HOSPITAL ENCOUNTER (OUTPATIENT)
Dept: WOMENS IMAGING | Age: 83
Discharge: HOME | End: 2018-09-07
Payer: MEDICARE

## 2018-09-07 DIAGNOSIS — R92.8 ABNORMAL MAMMOGRAM: ICD-10-CM

## 2018-09-07 PROCEDURE — G0279 TOMOSYNTHESIS, MAMMO: HCPCS

## 2018-09-07 PROCEDURE — 76642 ULTRASOUND BREAST LIMITED: CPT

## 2018-09-14 RX ORDER — WARFARIN SODIUM 5 MG/1
5 TABLET ORAL
COMMUNITY

## 2018-09-14 RX ORDER — TRIAMTERENE AND HYDROCHLOROTHIAZIDE 37.5; 25 MG/1; MG/1
CAPSULE ORAL
COMMUNITY

## 2018-09-14 RX ORDER — LEVOTHYROXINE SODIUM 112 UG/1
TABLET ORAL
COMMUNITY

## 2018-09-14 RX ORDER — SIMVASTATIN 20 MG
20 TABLET ORAL
COMMUNITY

## 2018-09-14 RX ORDER — ACETAMINOPHEN 500 MG
650 TABLET ORAL
COMMUNITY

## 2018-09-17 ENCOUNTER — OFFICE VISIT (OUTPATIENT)
Dept: GASTROENTEROLOGY | Facility: CLINIC | Age: 83
End: 2018-09-17

## 2018-09-17 VITALS
BODY MASS INDEX: 32.43 KG/M2 | DIASTOLIC BLOOD PRESSURE: 76 MMHG | OXYGEN SATURATION: 97 % | SYSTOLIC BLOOD PRESSURE: 130 MMHG | WEIGHT: 183 LBS | TEMPERATURE: 97 F | HEART RATE: 76 BPM | HEIGHT: 63 IN

## 2018-09-17 DIAGNOSIS — R19.5 HEME POSITIVE STOOL: Primary | ICD-10-CM

## 2018-09-17 DIAGNOSIS — Z79.01 ANTICOAGULATED: ICD-10-CM

## 2018-09-17 PROCEDURE — 99214 OFFICE O/P EST MOD 30 MIN: CPT | Performed by: NURSE PRACTITIONER

## 2018-09-17 RX ORDER — POTASSIUM CHLORIDE 600 MG/1
8 CAPSULE, EXTENDED RELEASE ORAL 2 TIMES DAILY
COMMUNITY

## 2018-09-17 RX ORDER — ALLOPURINOL 100 MG/1
100 TABLET ORAL DAILY
COMMUNITY

## 2018-09-17 NOTE — PROGRESS NOTES
Chief Complaint   Patient presents with   • Rectal Bleeding     had blood in stool had 1 positive card per dr. penn       Subjective     HPI     Stool found to be heme positive on further evaluation of anemia.  She denies visible blood in stool.  Stool was heme pos x 1 out of 3 (per pt).  Denies abdominal pain.  No N/V.  No weight loss.  No heartburn or indigestion.  Bowels described as being constipated.  Denies use of NSAIDs.  Currently followed by Dr Penn for anemia.    Labs 8/3/18 (Care Everywhere)  Hgb 9.7, .3,     Labs 12/8/2017 (Care Everywhere)  Hgb 10.8    CScope (Dr Shepard) 2016-diverticulosis (eval for iron def anemia)  Colonoscopy (unsure of physician) - apx 2000 - normal    Past Medical History:   Diagnosis Date   • Disease of thyroid gland    • Hyperlipidemia    • Hypertension        Past Surgical History:   Procedure Laterality Date   • COLONOSCOPY  04/26/2016    diverticulosis       Outpatient Prescriptions Marked as Taking for the 9/17/18 encounter (Office Visit) with Helder Do APRN   Medication Sig Dispense Refill   • allopurinol (ZYLOPRIM) 100 MG tablet Take 100 mg by mouth Daily.     • levothyroxine (SYNTHROID, LEVOTHROID) 112 MCG tablet Take  by mouth.     • potassium chloride (MICRO-K) 8 MEQ CR capsule Take 8 mEq by mouth 2 (Two) Times a Day.     • simvastatin (ZOCOR) 20 MG tablet Take 20 mg by mouth.     • triamterene-hydrochlorothiazide (DYAZIDE) 37.5-25 MG per capsule Take  by mouth.     • warfarin (COUMADIN) 5 MG tablet Take 5 mg by mouth.         Allergies   Allergen Reactions   • Penicillins Other (See Comments)       Social History     Social History   • Marital status:      Spouse name: N/A   • Number of children: N/A   • Years of education: N/A     Occupational History   • Not on file.     Social History Main Topics   • Smoking status: Never Smoker   • Smokeless tobacco: Never Used   • Alcohol use No   • Drug use: No   • Sexual activity: Not on file  "    Other Topics Concern   • Not on file     Social History Narrative   • No narrative on file       Family History   Problem Relation Age of Onset   • Colon cancer Brother    • Esophageal cancer Neg Hx        Review of Systems   Constitutional: Negative for fatigue, fever and unexpected weight change.   HENT: Negative for hearing loss, sore throat and voice change.    Eyes: Negative for visual disturbance.   Respiratory: Negative for cough, shortness of breath and wheezing.    Cardiovascular: Negative for chest pain and palpitations.   Gastrointestinal: Positive for blood in stool. Negative for abdominal pain and vomiting.   Endocrine: Negative for polydipsia and polyuria.   Genitourinary: Negative for difficulty urinating, dysuria, hematuria and urgency.   Musculoskeletal: Negative for joint swelling and myalgias.   Skin: Negative for color change, rash and wound.   Neurological: Negative for dizziness, tremors, seizures and syncope.   Hematological: Does not bruise/bleed easily.   Psychiatric/Behavioral: Negative for agitation and confusion. The patient is not nervous/anxious.        Objective     Vitals:    09/17/18 1346   BP: 130/76   Pulse: 76   Temp: 97 °F (36.1 °C)   SpO2: 97%   Weight: 83 kg (183 lb)   Height: 160 cm (63\")     Body mass index is 32.42 kg/m².    Physical Exam   Constitutional: She is oriented to person, place, and time. She appears well-developed and well-nourished. She is cooperative.   HENT:   Head: Normocephalic and atraumatic.   Eyes: Pupils are equal, round, and reactive to light. Conjunctivae are normal. No scleral icterus.   Neck: Normal range of motion. Neck supple. No JVD present. No thyroid mass and no thyromegaly present.   Cardiovascular: Normal rate, regular rhythm and normal heart sounds.  Exam reveals no gallop and no friction rub.    No murmur heard.  Pulmonary/Chest: Effort normal and breath sounds normal. No accessory muscle usage. No respiratory distress. She has no " wheezes. She has no rales.   Abdominal: Soft. Normal appearance and bowel sounds are normal. She exhibits no distension, no ascites and no mass. There is no hepatosplenomegaly. There is no tenderness. There is no rebound and no guarding.   Musculoskeletal: Normal range of motion. She exhibits no edema or tenderness.     Vascular Status -  Her right foot exhibits normal foot vasculature  and no edema. Her left foot exhibits normal foot vasculature  and no edema.  Lymphadenopathy:     She has no cervical adenopathy.   Neurological: She is alert and oriented to person, place, and time. She has normal strength. Gait normal.   Skin: Skin is warm, dry and intact. No rash noted.       Imaging Results (most recent)     None          Body mass index is 32.42 kg/m².    Assessment/Plan     Celine was seen today for rectal bleeding.    Diagnoses and all orders for this visit:    Heme positive stool  -     Case Request; Standing  -     Case Request    Anticoagulated    Other orders  -     Follow Anesthesia Guidelines / Standing Orders; Future  -     Implement Anesthesia Orders Day of Procedure; Standing  -     Obtain Informed Consent; Standing        ESOPHAGOGASTRODUODENOSCOPY WITH ANESTHESIA (N/A)    Pt has never had EGD, therefor will proceed with EGD  Discussed dr gallegos may recommend further eval with cscope pending EGD results, will defer need for cscope to Dr Redd    Patient is to hold their anticoagulation medication per the direction of their prescribing physician, Dr Martins. This is to prevent any risk or complication from bleeding intra and post procedure. If they develop bleeding post procedure they are to go the emergency department for further evaluation and treatment immediately    Advised pt to stop ASA, use of NSAIDs, Fish Oil, and MV 5 days prior to procedure, per Dr Gallegos protocol.  Tylenol based products are ok to take.  Pt verbalized understanding.    The risk of the endoscopy were discussed in detail.  We  discussed the risk of perforation (one out of 8790-3670, riskier with dilation), bleeding (one out of 500), and the rare risks of infection, adverse reaction to anesthesia, respiratory failure, cardiac failure including MI and adverse reaction to medications, etc.  We discussed consequences that could occur if a risk were to develop such as the need for hospitalization, blood transfusion, surgical intervention, medications, pain and disability and death.  Alternatives include not doing anything, or pursuing an UGI series which only offers a diagnosis with potential less accuracy compared to egd.  The patient verbalizes understanding and agrees to proceed.      Patient's Body mass index is 32.42 kg/m². BMI is above normal parameters. Recommendations include: no follow-up required.      There are no Patient Instructions on file for this visit.

## 2018-09-18 ENCOUNTER — TELEPHONE (OUTPATIENT)
Dept: GASTROENTEROLOGY | Age: 83
End: 2018-09-18

## 2018-09-18 ENCOUNTER — HOSPITAL ENCOUNTER (OUTPATIENT)
Dept: NON INVASIVE DIAGNOSTICS | Age: 83
Discharge: HOME | End: 2018-09-18
Payer: MEDICARE

## 2018-09-18 ENCOUNTER — OFFICE VISIT (OUTPATIENT)
Dept: GASTROENTEROLOGY | Age: 83
End: 2018-09-18
Payer: MEDICARE

## 2018-09-18 VITALS
WEIGHT: 182 LBS | HEART RATE: 76 BPM | BODY MASS INDEX: 32.25 KG/M2 | SYSTOLIC BLOOD PRESSURE: 125 MMHG | DIASTOLIC BLOOD PRESSURE: 60 MMHG | OXYGEN SATURATION: 95 % | HEIGHT: 63 IN

## 2018-09-18 DIAGNOSIS — R19.5 HEME POSITIVE STOOL: Primary | ICD-10-CM

## 2018-09-18 PROCEDURE — 99211 OFF/OP EST MAY X REQ PHY/QHP: CPT | Performed by: NURSE PRACTITIONER

## 2018-09-18 PROCEDURE — G8417 CALC BMI ABV UP PARAM F/U: HCPCS | Performed by: NURSE PRACTITIONER

## 2018-09-18 PROCEDURE — 93970 EXTREMITY STUDY: CPT

## 2018-09-18 PROCEDURE — G8427 DOCREV CUR MEDS BY ELIG CLIN: HCPCS | Performed by: NURSE PRACTITIONER

## 2018-09-18 RX ORDER — POTASSIUM CHLORIDE 600 MG/1
8 CAPSULE, EXTENDED RELEASE ORAL DAILY
COMMUNITY
End: 2018-09-18

## 2018-09-18 RX ORDER — ALLOPURINOL 100 MG/1
100 TABLET ORAL DAILY
COMMUNITY

## 2018-09-18 ASSESSMENT — ENCOUNTER SYMPTOMS
DIARRHEA: 0
VOMITING: 0
SHORTNESS OF BREATH: 0
CONSTIPATION: 1
ABDOMINAL PAIN: 0
ABDOMINAL DISTENTION: 0
BLOOD IN STOOL: 0
NAUSEA: 0
COUGH: 0
TROUBLE SWALLOWING: 0
ANAL BLEEDING: 0
BACK PAIN: 1
VOICE CHANGE: 0
RECTAL PAIN: 0

## 2018-09-25 ENCOUNTER — TELEPHONE (OUTPATIENT)
Dept: GASTROENTEROLOGY | Facility: CLINIC | Age: 83
End: 2018-09-25

## 2018-09-25 NOTE — TELEPHONE ENCOUNTER
Spoke with pt - She can stop coumadin 5 day prior to procedure and she will start lovenox 09/28/2018.    She verbally understood.     Recd letter from Dr. Martins

## 2018-09-26 LAB
ALBUMIN SERPL-MCNC: 4.2 G/DL (ref 3.5–5.2)
ANION GAP SERPL CALCULATED.3IONS-SCNC: 13 MMOL/L (ref 7–19)
BACTERIA: NEGATIVE /HPF
BASOPHILS ABSOLUTE: 0.1 K/UL (ref 0–0.2)
BASOPHILS RELATIVE PERCENT: 1.4 % (ref 0–1)
BILIRUBIN URINE: NEGATIVE
BLOOD, URINE: NEGATIVE
BUN BLDV-MCNC: 54 MG/DL (ref 8–23)
CALCIUM SERPL-MCNC: 10.6 MG/DL (ref 8.8–10.2)
CHLORIDE BLD-SCNC: 105 MMOL/L (ref 98–111)
CLARITY: CLEAR
CO2: 23 MMOL/L (ref 22–29)
COLOR: YELLOW
CREAT SERPL-MCNC: 1.8 MG/DL (ref 0.5–0.9)
CREATININE URINE: 108.9 MG/DL (ref 4.2–622)
EOSINOPHILS ABSOLUTE: 0.3 K/UL (ref 0–0.6)
EOSINOPHILS RELATIVE PERCENT: 5.9 % (ref 0–5)
EPITHELIAL CELLS, UA: 2 /HPF (ref 0–5)
GFR NON-AFRICAN AMERICAN: 27
GLUCOSE BLD-MCNC: 92 MG/DL (ref 74–109)
GLUCOSE URINE: NEGATIVE MG/DL
HCT VFR BLD CALC: 30.8 % (ref 37–47)
HEMOGLOBIN: 9.3 G/DL (ref 12–16)
HYALINE CASTS: 2 /HPF (ref 0–8)
KETONES, URINE: NEGATIVE MG/DL
LEUKOCYTE ESTERASE, URINE: ABNORMAL
LYMPHOCYTES ABSOLUTE: 1 K/UL (ref 1.1–4.5)
LYMPHOCYTES RELATIVE PERCENT: 16.9 % (ref 20–40)
MAGNESIUM: 2 MG/DL (ref 1.6–2.4)
MCH RBC QN AUTO: 33.1 PG (ref 27–31)
MCHC RBC AUTO-ENTMCNC: 30.2 G/DL (ref 33–37)
MCV RBC AUTO: 109.6 FL (ref 81–99)
MONOCYTES ABSOLUTE: 0.6 K/UL (ref 0–0.9)
MONOCYTES RELATIVE PERCENT: 10.9 % (ref 0–10)
NEUTROPHILS ABSOLUTE: 3.7 K/UL (ref 1.5–7.5)
NEUTROPHILS RELATIVE PERCENT: 64.2 % (ref 50–65)
NITRITE, URINE: NEGATIVE
PARATHYROID HORMONE INTACT: 179.1 PG/ML (ref 15–65)
PDW BLD-RTO: 13.2 % (ref 11.5–14.5)
PH UA: 6
PHOSPHORUS: 3.7 MG/DL (ref 2.5–4.5)
PLATELET # BLD: 205 K/UL (ref 130–400)
PMV BLD AUTO: 12.7 FL (ref 9.4–12.3)
POTASSIUM SERPL-SCNC: 4.8 MMOL/L (ref 3.5–5)
PROTEIN PROTEIN: 23 MG/DL (ref 15–45)
PROTEIN UA: NEGATIVE MG/DL
RBC # BLD: 2.81 M/UL (ref 4.2–5.4)
RBC UA: 14 /HPF (ref 0–4)
SODIUM BLD-SCNC: 141 MMOL/L (ref 136–145)
SPECIFIC GRAVITY UA: 1.02
URIC ACID, SERUM: 6.3 MG/DL (ref 2.4–5.7)
UROBILINOGEN, URINE: 0.2 E.U./DL
VITAMIN D 25-HYDROXY: 30.8 NG/ML
WBC # BLD: 5.8 K/UL (ref 4.8–10.8)
WBC UA: 24 /HPF (ref 0–5)

## 2018-10-01 ENCOUNTER — TELEPHONE (OUTPATIENT)
Dept: GASTROENTEROLOGY | Facility: CLINIC | Age: 83
End: 2018-10-01

## 2018-10-01 NOTE — TELEPHONE ENCOUNTER
patient called and stated she had stopped her coumadin on the 26th and started taking the Lovenox injectiones. Her scope is not till the 3rd .what should she do she has 2 days no coumadin or Lovenox

## 2018-10-03 ENCOUNTER — PREP FOR SURGERY (OUTPATIENT)
Dept: OTHER | Facility: HOSPITAL | Age: 83
End: 2018-10-03

## 2018-10-03 ENCOUNTER — ANESTHESIA (OUTPATIENT)
Dept: GASTROENTEROLOGY | Facility: HOSPITAL | Age: 83
End: 2018-10-03

## 2018-10-03 ENCOUNTER — TELEPHONE (OUTPATIENT)
Dept: GASTROENTEROLOGY | Facility: CLINIC | Age: 83
End: 2018-10-03

## 2018-10-03 ENCOUNTER — HOSPITAL ENCOUNTER (OUTPATIENT)
Facility: HOSPITAL | Age: 83
Setting detail: HOSPITAL OUTPATIENT SURGERY
Discharge: HOME | End: 2018-10-03
Attending: INTERNAL MEDICINE | Admitting: INTERNAL MEDICINE

## 2018-10-03 ENCOUNTER — ANESTHESIA EVENT (OUTPATIENT)
Dept: GASTROENTEROLOGY | Facility: HOSPITAL | Age: 83
End: 2018-10-03

## 2018-10-03 VITALS
TEMPERATURE: 97.2 F | OXYGEN SATURATION: 98 % | HEIGHT: 63 IN | WEIGHT: 180 LBS | BODY MASS INDEX: 31.89 KG/M2 | RESPIRATION RATE: 17 BRPM | DIASTOLIC BLOOD PRESSURE: 69 MMHG | SYSTOLIC BLOOD PRESSURE: 117 MMHG | HEART RATE: 64 BPM

## 2018-10-03 DIAGNOSIS — K62.5 RECTAL BLEED: Primary | ICD-10-CM

## 2018-10-03 DIAGNOSIS — R19.5 HEME POSITIVE STOOL: ICD-10-CM

## 2018-10-03 PROCEDURE — 25010000002 PROPOFOL 10 MG/ML EMULSION: Performed by: NURSE ANESTHETIST, CERTIFIED REGISTERED

## 2018-10-03 PROCEDURE — 43239 EGD BIOPSY SINGLE/MULTIPLE: CPT | Performed by: INTERNAL MEDICINE

## 2018-10-03 PROCEDURE — 87081 CULTURE SCREEN ONLY: CPT | Performed by: INTERNAL MEDICINE

## 2018-10-03 PROCEDURE — 88305 TISSUE EXAM BY PATHOLOGIST: CPT | Performed by: INTERNAL MEDICINE

## 2018-10-03 RX ORDER — SODIUM CHLORIDE 9 MG/ML
500 INJECTION, SOLUTION INTRAVENOUS CONTINUOUS PRN
Status: DISCONTINUED | OUTPATIENT
Start: 2018-10-03 | End: 2018-10-03 | Stop reason: HOSPADM

## 2018-10-03 RX ORDER — PNV NO.95/FERROUS FUM/FOLIC AC 28MG-0.8MG
TABLET ORAL
COMMUNITY

## 2018-10-03 RX ORDER — SODIUM CHLORIDE 0.9 % (FLUSH) 0.9 %
3 SYRINGE (ML) INJECTION AS NEEDED
Status: DISCONTINUED | OUTPATIENT
Start: 2018-10-03 | End: 2018-10-03 | Stop reason: HOSPADM

## 2018-10-03 RX ORDER — PROPOFOL 10 MG/ML
VIAL (ML) INTRAVENOUS AS NEEDED
Status: DISCONTINUED | OUTPATIENT
Start: 2018-10-03 | End: 2018-10-03 | Stop reason: SURG

## 2018-10-03 RX ORDER — LIDOCAINE HYDROCHLORIDE 20 MG/ML
INJECTION, SOLUTION INFILTRATION; PERINEURAL AS NEEDED
Status: DISCONTINUED | OUTPATIENT
Start: 2018-10-03 | End: 2018-10-03 | Stop reason: SURG

## 2018-10-03 RX ADMIN — LIDOCAINE HYDROCHLORIDE 100 MG: 20 INJECTION, SOLUTION INFILTRATION; PERINEURAL at 07:37

## 2018-10-03 RX ADMIN — LIDOCAINE HYDROCHLORIDE 0.5 ML: 10 INJECTION, SOLUTION EPIDURAL; INFILTRATION; INTRACAUDAL; PERINEURAL at 07:08

## 2018-10-03 RX ADMIN — SODIUM CHLORIDE 500 ML: 9 INJECTION, SOLUTION INTRAVENOUS at 07:08

## 2018-10-03 RX ADMIN — PROPOFOL 150 MG: 10 INJECTION, EMULSION INTRAVENOUS at 07:37

## 2018-10-03 NOTE — ANESTHESIA POSTPROCEDURE EVALUATION
"Patient: Celine Ratliff    Procedure Summary     Date:  10/03/18 Room / Location:  [facility] ENDOSCOPY 5 / [facility]    Anesthesia Start:  0732 Anesthesia Stop:  0744    Procedure:  ESOPHAGOGASTRODUODENOSCOPY WITH ANESTHESIA (N/A ) Diagnosis:       Heme positive stool      (Heme positive stool [R19.5])    Surgeon:  Stanton Shepard DO Provider:  Robby Krueger CRNA    Anesthesia Type:  general ASA Status:  3          Anesthesia Type: general  Last vitals  BP   117/69 (10/03/18 0800)   Temp   97.2 °F (36.2 °C) (10/03/18 0653)   Pulse   64 (10/03/18 0800)   Resp   17 (10/03/18 0800)     SpO2   98 % (10/03/18 0800)     Post Anesthesia Care and Evaluation    Patient location during evaluation: PACU  Patient participation: complete - patient participated  Level of consciousness: awake and alert  Pain score: 0  Pain management: adequate  Airway patency: patent  Anesthetic complications: No anesthetic complications  PONV Status: none  Cardiovascular status: acceptable  Respiratory status: acceptable  Hydration status: acceptable    Comments: Blood pressure 117/69, pulse 64, temperature 97.2 °F (36.2 °C), temperature source Temporal Artery , resp. rate 17, height 160 cm (63\"), weight 81.6 kg (180 lb), SpO2 98 %.    Pt discharged from PACU based on maurisio score >8      "

## 2018-10-03 NOTE — ANESTHESIA PREPROCEDURE EVALUATION
Anesthesia Evaluation     Patient summary reviewed   no history of anesthetic complications:  NPO Solid Status: > 8 hours             Airway   Mallampati: II  TM distance: >3 FB  Neck ROM: full  Dental      Pulmonary - negative pulmonary ROS   Cardiovascular     (+) hypertension, DVT, hyperlipidemia,       Neuro/Psych- negative ROS  GI/Hepatic/Renal/Endo    (+) obesity,   renal disease CRI, hypothyroidism,     Musculoskeletal     Abdominal    Substance History      OB/GYN          Other                        Anesthesia Plan    ASA 3     general     intravenous induction   Anesthetic plan, all risks, benefits, and alternatives have been provided, discussed and informed consent has been obtained with: patient.

## 2018-10-03 NOTE — H&P (VIEW-ONLY)
Chief Complaint   Patient presents with   • Rectal Bleeding     had blood in stool had 1 positive card per dr. penn       Subjective     HPI     Stool found to be heme positive on further evaluation of anemia.  She denies visible blood in stool.  Stool was heme pos x 1 out of 3 (per pt).  Denies abdominal pain.  No N/V.  No weight loss.  No heartburn or indigestion.  Bowels described as being constipated.  Denies use of NSAIDs.  Currently followed by Dr Penn for anemia.    Labs 8/3/18 (Care Everywhere)  Hgb 9.7, .3,     Labs 12/8/2017 (Care Everywhere)  Hgb 10.8    CScope (Dr Shepard) 2016-diverticulosis (eval for iron def anemia)  Colonoscopy (unsure of physician) - apx 2000 - normal    Past Medical History:   Diagnosis Date   • Disease of thyroid gland    • Hyperlipidemia    • Hypertension        Past Surgical History:   Procedure Laterality Date   • COLONOSCOPY  04/26/2016    diverticulosis       Outpatient Prescriptions Marked as Taking for the 9/17/18 encounter (Office Visit) with Helder Do APRN   Medication Sig Dispense Refill   • allopurinol (ZYLOPRIM) 100 MG tablet Take 100 mg by mouth Daily.     • levothyroxine (SYNTHROID, LEVOTHROID) 112 MCG tablet Take  by mouth.     • potassium chloride (MICRO-K) 8 MEQ CR capsule Take 8 mEq by mouth 2 (Two) Times a Day.     • simvastatin (ZOCOR) 20 MG tablet Take 20 mg by mouth.     • triamterene-hydrochlorothiazide (DYAZIDE) 37.5-25 MG per capsule Take  by mouth.     • warfarin (COUMADIN) 5 MG tablet Take 5 mg by mouth.         Allergies   Allergen Reactions   • Penicillins Other (See Comments)       Social History     Social History   • Marital status:      Spouse name: N/A   • Number of children: N/A   • Years of education: N/A     Occupational History   • Not on file.     Social History Main Topics   • Smoking status: Never Smoker   • Smokeless tobacco: Never Used   • Alcohol use No   • Drug use: No   • Sexual activity: Not on file  "    Other Topics Concern   • Not on file     Social History Narrative   • No narrative on file       Family History   Problem Relation Age of Onset   • Colon cancer Brother    • Esophageal cancer Neg Hx        Review of Systems   Constitutional: Negative for fatigue, fever and unexpected weight change.   HENT: Negative for hearing loss, sore throat and voice change.    Eyes: Negative for visual disturbance.   Respiratory: Negative for cough, shortness of breath and wheezing.    Cardiovascular: Negative for chest pain and palpitations.   Gastrointestinal: Positive for blood in stool. Negative for abdominal pain and vomiting.   Endocrine: Negative for polydipsia and polyuria.   Genitourinary: Negative for difficulty urinating, dysuria, hematuria and urgency.   Musculoskeletal: Negative for joint swelling and myalgias.   Skin: Negative for color change, rash and wound.   Neurological: Negative for dizziness, tremors, seizures and syncope.   Hematological: Does not bruise/bleed easily.   Psychiatric/Behavioral: Negative for agitation and confusion. The patient is not nervous/anxious.        Objective     Vitals:    09/17/18 1346   BP: 130/76   Pulse: 76   Temp: 97 °F (36.1 °C)   SpO2: 97%   Weight: 83 kg (183 lb)   Height: 160 cm (63\")     Body mass index is 32.42 kg/m².    Physical Exam   Constitutional: She is oriented to person, place, and time. She appears well-developed and well-nourished. She is cooperative.   HENT:   Head: Normocephalic and atraumatic.   Eyes: Pupils are equal, round, and reactive to light. Conjunctivae are normal. No scleral icterus.   Neck: Normal range of motion. Neck supple. No JVD present. No thyroid mass and no thyromegaly present.   Cardiovascular: Normal rate, regular rhythm and normal heart sounds.  Exam reveals no gallop and no friction rub.    No murmur heard.  Pulmonary/Chest: Effort normal and breath sounds normal. No accessory muscle usage. No respiratory distress. She has no " wheezes. She has no rales.   Abdominal: Soft. Normal appearance and bowel sounds are normal. She exhibits no distension, no ascites and no mass. There is no hepatosplenomegaly. There is no tenderness. There is no rebound and no guarding.   Musculoskeletal: Normal range of motion. She exhibits no edema or tenderness.     Vascular Status -  Her right foot exhibits normal foot vasculature  and no edema. Her left foot exhibits normal foot vasculature  and no edema.  Lymphadenopathy:     She has no cervical adenopathy.   Neurological: She is alert and oriented to person, place, and time. She has normal strength. Gait normal.   Skin: Skin is warm, dry and intact. No rash noted.       Imaging Results (most recent)     None          Body mass index is 32.42 kg/m².    Assessment/Plan     Celine was seen today for rectal bleeding.    Diagnoses and all orders for this visit:    Heme positive stool  -     Case Request; Standing  -     Case Request    Anticoagulated    Other orders  -     Follow Anesthesia Guidelines / Standing Orders; Future  -     Implement Anesthesia Orders Day of Procedure; Standing  -     Obtain Informed Consent; Standing        ESOPHAGOGASTRODUODENOSCOPY WITH ANESTHESIA (N/A)    Pt has never had EGD, therefor will proceed with EGD  Discussed dr gallegos may recommend further eval with cscope pending EGD results, will defer need for cscope to Dr Redd    Patient is to hold their anticoagulation medication per the direction of their prescribing physician, Dr Martins. This is to prevent any risk or complication from bleeding intra and post procedure. If they develop bleeding post procedure they are to go the emergency department for further evaluation and treatment immediately    Advised pt to stop ASA, use of NSAIDs, Fish Oil, and MV 5 days prior to procedure, per Dr Gallegos protocol.  Tylenol based products are ok to take.  Pt verbalized understanding.    The risk of the endoscopy were discussed in detail.  We  discussed the risk of perforation (one out of 5915-9700, riskier with dilation), bleeding (one out of 500), and the rare risks of infection, adverse reaction to anesthesia, respiratory failure, cardiac failure including MI and adverse reaction to medications, etc.  We discussed consequences that could occur if a risk were to develop such as the need for hospitalization, blood transfusion, surgical intervention, medications, pain and disability and death.  Alternatives include not doing anything, or pursuing an UGI series which only offers a diagnosis with potential less accuracy compared to egd.  The patient verbalizes understanding and agrees to proceed.      Patient's Body mass index is 32.42 kg/m². BMI is above normal parameters. Recommendations include: no follow-up required.      There are no Patient Instructions on file for this visit.

## 2018-10-04 ENCOUNTER — HOSPITAL ENCOUNTER (OUTPATIENT)
Facility: HOSPITAL | Age: 83
Setting detail: HOSPITAL OUTPATIENT SURGERY
Discharge: HOME | End: 2018-10-04
Attending: INTERNAL MEDICINE | Admitting: INTERNAL MEDICINE

## 2018-10-04 ENCOUNTER — ANESTHESIA (OUTPATIENT)
Dept: GASTROENTEROLOGY | Facility: HOSPITAL | Age: 83
End: 2018-10-04

## 2018-10-04 ENCOUNTER — ANESTHESIA EVENT (OUTPATIENT)
Dept: GASTROENTEROLOGY | Facility: HOSPITAL | Age: 83
End: 2018-10-04

## 2018-10-04 VITALS
BODY MASS INDEX: 31.71 KG/M2 | TEMPERATURE: 97.7 F | HEIGHT: 63 IN | OXYGEN SATURATION: 99 % | DIASTOLIC BLOOD PRESSURE: 78 MMHG | HEART RATE: 94 BPM | RESPIRATION RATE: 19 BRPM | WEIGHT: 179 LBS | SYSTOLIC BLOOD PRESSURE: 145 MMHG

## 2018-10-04 DIAGNOSIS — K62.5 RECTAL BLEED: ICD-10-CM

## 2018-10-04 LAB
CYTO UR: NORMAL
LAB AP CASE REPORT: NORMAL
PATH REPORT.FINAL DX SPEC: NORMAL
PATH REPORT.GROSS SPEC: NORMAL
UREASE TISS QL: NEGATIVE

## 2018-10-04 PROCEDURE — 25010000002 PROPOFOL 10 MG/ML EMULSION: Performed by: NURSE ANESTHETIST, CERTIFIED REGISTERED

## 2018-10-04 PROCEDURE — 45385 COLONOSCOPY W/LESION REMOVAL: CPT | Performed by: INTERNAL MEDICINE

## 2018-10-04 PROCEDURE — 88305 TISSUE EXAM BY PATHOLOGIST: CPT | Performed by: INTERNAL MEDICINE

## 2018-10-04 RX ORDER — SODIUM CHLORIDE 0.9 % (FLUSH) 0.9 %
3 SYRINGE (ML) INJECTION AS NEEDED
Status: DISCONTINUED | OUTPATIENT
Start: 2018-10-04 | End: 2018-10-04 | Stop reason: HOSPADM

## 2018-10-04 RX ORDER — SODIUM CHLORIDE 9 MG/ML
500 INJECTION, SOLUTION INTRAVENOUS CONTINUOUS PRN
Status: DISCONTINUED | OUTPATIENT
Start: 2018-10-04 | End: 2018-10-04 | Stop reason: HOSPADM

## 2018-10-04 RX ORDER — PROPOFOL 10 MG/ML
VIAL (ML) INTRAVENOUS AS NEEDED
Status: DISCONTINUED | OUTPATIENT
Start: 2018-10-04 | End: 2018-10-04 | Stop reason: SURG

## 2018-10-04 RX ORDER — LIDOCAINE HYDROCHLORIDE 20 MG/ML
INJECTION, SOLUTION INFILTRATION; PERINEURAL AS NEEDED
Status: DISCONTINUED | OUTPATIENT
Start: 2018-10-04 | End: 2018-10-04 | Stop reason: SURG

## 2018-10-04 RX ADMIN — LIDOCAINE HYDROCHLORIDE 50 MG: 20 INJECTION, SOLUTION INFILTRATION; PERINEURAL at 11:55

## 2018-10-04 RX ADMIN — SODIUM CHLORIDE 500 ML: 9 INJECTION, SOLUTION INTRAVENOUS at 11:37

## 2018-10-04 RX ADMIN — PROPOFOL 20 MG: 10 INJECTION, EMULSION INTRAVENOUS at 12:04

## 2018-10-04 RX ADMIN — PROPOFOL 50 MG: 10 INJECTION, EMULSION INTRAVENOUS at 11:55

## 2018-10-04 RX ADMIN — PROPOFOL 20 MG: 10 INJECTION, EMULSION INTRAVENOUS at 11:56

## 2018-10-04 RX ADMIN — PROPOFOL 20 MG: 10 INJECTION, EMULSION INTRAVENOUS at 11:59

## 2018-10-04 RX ADMIN — PROPOFOL 20 MG: 10 INJECTION, EMULSION INTRAVENOUS at 12:02

## 2018-10-04 RX ADMIN — LIDOCAINE HYDROCHLORIDE 0.5 ML: 10 INJECTION, SOLUTION EPIDURAL; INFILTRATION; INTRACAUDAL; PERINEURAL at 11:37

## 2018-10-04 RX ADMIN — PROPOFOL 20 MG: 10 INJECTION, EMULSION INTRAVENOUS at 12:03

## 2018-10-04 NOTE — ANESTHESIA POSTPROCEDURE EVALUATION
Patient: Celine Ratliff    Procedure Summary     Date:  10/04/18 Room / Location:  [facility] ENDOSCOPY 6 / [facility] END[facility]    Anesthesia Start:  1148 Anesthesia Stop:  1209    Procedure:  COLONOSCOPY WITH ANESTHESIA (N/A ) Diagnosis:       Rectal bleed      (Rectal bleed [K62.5])    Surgeon:  Stanton Shepard DO Provider:  Sailaja Nieves CRNA    Anesthesia Type:  general ASA Status:  3          Anesthesia Type: general  Last vitals  BP   143/75 (10/04/18 1110)   Temp   97.7 °F (36.5 °C) (10/04/18 1110)   Pulse   79 (10/04/18 1110)   Resp   20 (10/04/18 1110)     SpO2   97 % (10/04/18 1110)     Post Anesthesia Care and Evaluation    Patient location during evaluation: PHASE II  Patient participation: complete - patient participated  Level of consciousness: awake and awake and alert  Pain score: 0  Pain management: adequate  Airway patency: patent  Anesthetic complications: No anesthetic complications    Cardiovascular status: acceptable and stable  Respiratory status: acceptable and unassisted  Hydration status: acceptable

## 2018-10-04 NOTE — H&P (VIEW-ONLY)
Chief Complaint   Patient presents with   • Rectal Bleeding     had blood in stool had 1 positive card per dr. penn       Subjective     HPI     Stool found to be heme positive on further evaluation of anemia.  She denies visible blood in stool.  Stool was heme pos x 1 out of 3 (per pt).  Denies abdominal pain.  No N/V.  No weight loss.  No heartburn or indigestion.  Bowels described as being constipated.  Denies use of NSAIDs.  Currently followed by Dr Penn for anemia.    Labs 8/3/18 (Care Everywhere)  Hgb 9.7, .3,     Labs 12/8/2017 (Care Everywhere)  Hgb 10.8    CScope (Dr Shepard) 2016-diverticulosis (eval for iron def anemia)  Colonoscopy (unsure of physician) - apx 2000 - normal    Past Medical History:   Diagnosis Date   • Disease of thyroid gland    • Hyperlipidemia    • Hypertension        Past Surgical History:   Procedure Laterality Date   • COLONOSCOPY  04/26/2016    diverticulosis       Outpatient Prescriptions Marked as Taking for the 9/17/18 encounter (Office Visit) with Helder Do APRN   Medication Sig Dispense Refill   • allopurinol (ZYLOPRIM) 100 MG tablet Take 100 mg by mouth Daily.     • levothyroxine (SYNTHROID, LEVOTHROID) 112 MCG tablet Take  by mouth.     • potassium chloride (MICRO-K) 8 MEQ CR capsule Take 8 mEq by mouth 2 (Two) Times a Day.     • simvastatin (ZOCOR) 20 MG tablet Take 20 mg by mouth.     • triamterene-hydrochlorothiazide (DYAZIDE) 37.5-25 MG per capsule Take  by mouth.     • warfarin (COUMADIN) 5 MG tablet Take 5 mg by mouth.         Allergies   Allergen Reactions   • Penicillins Other (See Comments)       Social History     Social History   • Marital status:      Spouse name: N/A   • Number of children: N/A   • Years of education: N/A     Occupational History   • Not on file.     Social History Main Topics   • Smoking status: Never Smoker   • Smokeless tobacco: Never Used   • Alcohol use No   • Drug use: No   • Sexual activity: Not on file  "    Other Topics Concern   • Not on file     Social History Narrative   • No narrative on file       Family History   Problem Relation Age of Onset   • Colon cancer Brother    • Esophageal cancer Neg Hx        Review of Systems   Constitutional: Negative for fatigue, fever and unexpected weight change.   HENT: Negative for hearing loss, sore throat and voice change.    Eyes: Negative for visual disturbance.   Respiratory: Negative for cough, shortness of breath and wheezing.    Cardiovascular: Negative for chest pain and palpitations.   Gastrointestinal: Positive for blood in stool. Negative for abdominal pain and vomiting.   Endocrine: Negative for polydipsia and polyuria.   Genitourinary: Negative for difficulty urinating, dysuria, hematuria and urgency.   Musculoskeletal: Negative for joint swelling and myalgias.   Skin: Negative for color change, rash and wound.   Neurological: Negative for dizziness, tremors, seizures and syncope.   Hematological: Does not bruise/bleed easily.   Psychiatric/Behavioral: Negative for agitation and confusion. The patient is not nervous/anxious.        Objective     Vitals:    09/17/18 1346   BP: 130/76   Pulse: 76   Temp: 97 °F (36.1 °C)   SpO2: 97%   Weight: 83 kg (183 lb)   Height: 160 cm (63\")     Body mass index is 32.42 kg/m².    Physical Exam   Constitutional: She is oriented to person, place, and time. She appears well-developed and well-nourished. She is cooperative.   HENT:   Head: Normocephalic and atraumatic.   Eyes: Pupils are equal, round, and reactive to light. Conjunctivae are normal. No scleral icterus.   Neck: Normal range of motion. Neck supple. No JVD present. No thyroid mass and no thyromegaly present.   Cardiovascular: Normal rate, regular rhythm and normal heart sounds.  Exam reveals no gallop and no friction rub.    No murmur heard.  Pulmonary/Chest: Effort normal and breath sounds normal. No accessory muscle usage. No respiratory distress. She has no " wheezes. She has no rales.   Abdominal: Soft. Normal appearance and bowel sounds are normal. She exhibits no distension, no ascites and no mass. There is no hepatosplenomegaly. There is no tenderness. There is no rebound and no guarding.   Musculoskeletal: Normal range of motion. She exhibits no edema or tenderness.     Vascular Status -  Her right foot exhibits normal foot vasculature  and no edema. Her left foot exhibits normal foot vasculature  and no edema.  Lymphadenopathy:     She has no cervical adenopathy.   Neurological: She is alert and oriented to person, place, and time. She has normal strength. Gait normal.   Skin: Skin is warm, dry and intact. No rash noted.       Imaging Results (most recent)     None          Body mass index is 32.42 kg/m².    Assessment/Plan     Celine was seen today for rectal bleeding.    Diagnoses and all orders for this visit:    Heme positive stool  -     Case Request; Standing  -     Case Request    Anticoagulated    Other orders  -     Follow Anesthesia Guidelines / Standing Orders; Future  -     Implement Anesthesia Orders Day of Procedure; Standing  -     Obtain Informed Consent; Standing        ESOPHAGOGASTRODUODENOSCOPY WITH ANESTHESIA (N/A)    Pt has never had EGD, therefor will proceed with EGD  Discussed dr gallegos may recommend further eval with cscope pending EGD results, will defer need for cscope to Dr Redd    Patient is to hold their anticoagulation medication per the direction of their prescribing physician, Dr Martins. This is to prevent any risk or complication from bleeding intra and post procedure. If they develop bleeding post procedure they are to go the emergency department for further evaluation and treatment immediately    Advised pt to stop ASA, use of NSAIDs, Fish Oil, and MV 5 days prior to procedure, per Dr Gallegos protocol.  Tylenol based products are ok to take.  Pt verbalized understanding.    The risk of the endoscopy were discussed in detail.  We  discussed the risk of perforation (one out of 3911-8120, riskier with dilation), bleeding (one out of 500), and the rare risks of infection, adverse reaction to anesthesia, respiratory failure, cardiac failure including MI and adverse reaction to medications, etc.  We discussed consequences that could occur if a risk were to develop such as the need for hospitalization, blood transfusion, surgical intervention, medications, pain and disability and death.  Alternatives include not doing anything, or pursuing an UGI series which only offers a diagnosis with potential less accuracy compared to egd.  The patient verbalizes understanding and agrees to proceed.      Patient's Body mass index is 32.42 kg/m². BMI is above normal parameters. Recommendations include: no follow-up required.      There are no Patient Instructions on file for this visit.

## 2018-10-05 ENCOUNTER — TELEPHONE (OUTPATIENT)
Dept: GASTROENTEROLOGY | Facility: CLINIC | Age: 83
End: 2018-10-05

## 2018-10-05 LAB
CYTO UR: NORMAL
LAB AP CASE REPORT: NORMAL
LAB AP CLINICAL INFORMATION: NORMAL
PATH REPORT.FINAL DX SPEC: NORMAL
PATH REPORT.GROSS SPEC: NORMAL

## 2019-01-11 LAB
INR BLD: 1.5 (ref 0.88–1.18)
PROTHROMBIN TIME: 17.4 SEC (ref 12–14.6)

## 2019-03-05 LAB
ANION GAP SERPL CALCULATED.3IONS-SCNC: 17 MMOL/L (ref 7–19)
BUN BLDV-MCNC: 39 MG/DL (ref 8–23)
CALCIUM SERPL-MCNC: 10.1 MG/DL (ref 8.8–10.2)
CHLORIDE BLD-SCNC: 108 MMOL/L (ref 98–111)
CHOLESTEROL, TOTAL: 144 MG/DL (ref 160–199)
CO2: 21 MMOL/L (ref 22–29)
CREAT SERPL-MCNC: 1.7 MG/DL (ref 0.5–0.9)
GFR NON-AFRICAN AMERICAN: 29
GLUCOSE BLD-MCNC: 91 MG/DL (ref 74–109)
HDLC SERPL-MCNC: 49 MG/DL (ref 65–121)
INR BLD: 1.82 (ref 0.88–1.18)
LDL CHOLESTEROL CALCULATED: 72 MG/DL
POTASSIUM SERPL-SCNC: 4.4 MMOL/L (ref 3.5–5)
PROTHROMBIN TIME: 20.3 SEC (ref 12–14.6)
SODIUM BLD-SCNC: 146 MMOL/L (ref 136–145)
TRIGL SERPL-MCNC: 113 MG/DL (ref 0–149)
TSH SERPL DL<=0.05 MIU/L-ACNC: 1.35 UIU/ML (ref 0.27–4.2)

## 2019-05-17 DIAGNOSIS — N18.4 ANEMIA OF CHRONIC KIDNEY FAILURE, STAGE 4 (SEVERE) (HCC): ICD-10-CM

## 2019-05-17 DIAGNOSIS — D63.1 ANEMIA OF CHRONIC KIDNEY FAILURE, STAGE 4 (SEVERE) (HCC): ICD-10-CM

## 2019-05-24 LAB
INR BLD: 1.32 (ref 0.88–1.18)
PROTHROMBIN TIME: 15.7 SEC (ref 12–14.6)

## 2019-06-03 ENCOUNTER — HOSPITAL ENCOUNTER (OUTPATIENT)
Dept: INFUSION THERAPY | Age: 84
Discharge: HOME | End: 2019-06-03
Payer: MEDICARE

## 2019-06-03 DIAGNOSIS — D63.1 ANEMIA OF CHRONIC KIDNEY FAILURE, STAGE 4 (SEVERE) (HCC): Primary | ICD-10-CM

## 2019-06-03 DIAGNOSIS — N18.4 ANEMIA OF CHRONIC KIDNEY FAILURE, STAGE 4 (SEVERE) (HCC): Primary | ICD-10-CM

## 2019-06-03 PROCEDURE — 6360000002 HC RX W HCPCS: Performed by: NURSE PRACTITIONER

## 2019-06-03 PROCEDURE — 85025 COMPLETE CBC W/AUTO DIFF WBC: CPT

## 2019-06-03 PROCEDURE — 96372 THER/PROPH/DIAG INJ SC/IM: CPT

## 2019-07-01 ENCOUNTER — HOSPITAL ENCOUNTER (OUTPATIENT)
Dept: INFUSION THERAPY | Age: 84
Discharge: HOME | End: 2019-07-01
Payer: MEDICARE

## 2019-07-01 DIAGNOSIS — N18.4 ANEMIA OF CHRONIC KIDNEY FAILURE, STAGE 4 (SEVERE) (HCC): Primary | ICD-10-CM

## 2019-07-01 DIAGNOSIS — D63.1 ANEMIA OF CHRONIC KIDNEY FAILURE, STAGE 4 (SEVERE) (HCC): Primary | ICD-10-CM

## 2019-07-01 PROCEDURE — 96372 THER/PROPH/DIAG INJ SC/IM: CPT

## 2019-07-01 PROCEDURE — 6360000002 HC RX W HCPCS: Performed by: NURSE PRACTITIONER

## 2019-07-01 PROCEDURE — 85025 COMPLETE CBC W/AUTO DIFF WBC: CPT

## 2019-08-29 ENCOUNTER — HOSPITAL ENCOUNTER (OUTPATIENT)
Dept: INFUSION THERAPY | Age: 84
Discharge: HOME | End: 2019-08-29
Payer: MEDICARE

## 2019-08-29 ENCOUNTER — OFFICE VISIT (OUTPATIENT)
Dept: HEMATOLOGY | Age: 84
End: 2019-08-29
Payer: MEDICARE

## 2019-08-29 VITALS
HEART RATE: 59 BPM | SYSTOLIC BLOOD PRESSURE: 154 MMHG | HEIGHT: 63 IN | DIASTOLIC BLOOD PRESSURE: 84 MMHG | WEIGHT: 178.2 LBS | BODY MASS INDEX: 31.57 KG/M2 | OXYGEN SATURATION: 95 %

## 2019-08-29 DIAGNOSIS — D63.1 ANEMIA OF CHRONIC KIDNEY FAILURE, STAGE 4 (SEVERE) (HCC): Primary | ICD-10-CM

## 2019-08-29 DIAGNOSIS — Z87.898 H/O ABNORMAL MAMMOGRAM: ICD-10-CM

## 2019-08-29 DIAGNOSIS — N18.4 ANEMIA OF CHRONIC KIDNEY FAILURE, STAGE 4 (SEVERE) (HCC): ICD-10-CM

## 2019-08-29 DIAGNOSIS — D50.8 IRON DEFICIENCY ANEMIA SECONDARY TO INADEQUATE DIETARY IRON INTAKE: ICD-10-CM

## 2019-08-29 DIAGNOSIS — D63.1 ANEMIA OF CHRONIC KIDNEY FAILURE, STAGE 4 (SEVERE) (HCC): ICD-10-CM

## 2019-08-29 DIAGNOSIS — N18.4 ANEMIA OF CHRONIC KIDNEY FAILURE, STAGE 4 (SEVERE) (HCC): Primary | ICD-10-CM

## 2019-08-29 PROCEDURE — 99213 OFFICE O/P EST LOW 20 MIN: CPT | Performed by: NURSE PRACTITIONER

## 2019-08-29 PROCEDURE — 83550 IRON BINDING TEST: CPT

## 2019-08-29 PROCEDURE — 85025 COMPLETE CBC W/AUTO DIFF WBC: CPT

## 2019-08-29 PROCEDURE — 99211 OFF/OP EST MAY X REQ PHY/QHP: CPT

## 2019-08-29 PROCEDURE — 83540 ASSAY OF IRON: CPT

## 2019-08-29 PROCEDURE — 36415 COLL VENOUS BLD VENIPUNCTURE: CPT

## 2019-08-29 PROCEDURE — 80053 COMPREHEN METABOLIC PANEL: CPT

## 2019-08-29 PROCEDURE — 82728 ASSAY OF FERRITIN: CPT

## 2019-09-08 PROBLEM — Z87.898 H/O ABNORMAL MAMMOGRAM: Status: ACTIVE | Noted: 2019-09-08

## 2019-09-08 PROBLEM — D50.8 IRON DEFICIENCY ANEMIA SECONDARY TO INADEQUATE DIETARY IRON INTAKE: Status: ACTIVE | Noted: 2019-09-08

## 2019-09-08 ASSESSMENT — ENCOUNTER SYMPTOMS
SHORTNESS OF BREATH: 0
CONSTIPATION: 0
COUGH: 0
SORE THROAT: 0
EYE REDNESS: 0
NAUSEA: 0
EYE DISCHARGE: 0
DIARRHEA: 0
ABDOMINAL PAIN: 0
RESPIRATORY NEGATIVE: 1
WHEEZING: 0
EYE PAIN: 0
BACK PAIN: 0
VOMITING: 0
EYES NEGATIVE: 1
BLOOD IN STOOL: 0
GASTROINTESTINAL NEGATIVE: 1

## 2019-09-10 LAB
INR BLD: 1.46 (ref 0.88–1.18)
PROTHROMBIN TIME: 17.1 SEC (ref 12–14.6)

## 2019-09-11 DIAGNOSIS — Z87.898 H/O ABNORMAL MAMMOGRAM: ICD-10-CM

## 2019-09-11 DIAGNOSIS — D63.1 ANEMIA OF CHRONIC KIDNEY FAILURE, STAGE 4 (SEVERE) (HCC): Primary | ICD-10-CM

## 2019-09-11 DIAGNOSIS — Z12.31 ENCOUNTER FOR SCREENING MAMMOGRAM FOR MALIGNANT NEOPLASM OF BREAST: ICD-10-CM

## 2019-09-11 DIAGNOSIS — N18.4 ANEMIA OF CHRONIC KIDNEY FAILURE, STAGE 4 (SEVERE) (HCC): Primary | ICD-10-CM

## 2019-09-13 RX ORDER — FERROUS SULFATE 325(65) MG
TABLET ORAL
Qty: 30 TABLET | Refills: 0 | Status: SHIPPED | OUTPATIENT
Start: 2019-09-13 | End: 2019-10-13 | Stop reason: SDUPTHER

## 2019-09-20 ENCOUNTER — HOSPITAL ENCOUNTER (OUTPATIENT)
Dept: WOMENS IMAGING | Age: 84
Discharge: HOME | End: 2019-09-20
Payer: MEDICARE

## 2019-09-20 DIAGNOSIS — D63.1 ANEMIA OF CHRONIC KIDNEY FAILURE, STAGE 4 (SEVERE) (HCC): ICD-10-CM

## 2019-09-20 DIAGNOSIS — Z12.31 ENCOUNTER FOR SCREENING MAMMOGRAM FOR MALIGNANT NEOPLASM OF BREAST: ICD-10-CM

## 2019-09-20 DIAGNOSIS — N18.4 ANEMIA OF CHRONIC KIDNEY FAILURE, STAGE 4 (SEVERE) (HCC): ICD-10-CM

## 2019-09-20 PROCEDURE — 77063 BREAST TOMOSYNTHESIS BI: CPT

## 2019-09-30 ENCOUNTER — HOSPITAL ENCOUNTER (OUTPATIENT)
Dept: INFUSION THERAPY | Age: 84
Discharge: HOME | End: 2019-09-30
Payer: MEDICARE

## 2019-09-30 VITALS
DIASTOLIC BLOOD PRESSURE: 70 MMHG | BODY MASS INDEX: 33.29 KG/M2 | HEART RATE: 52 BPM | OXYGEN SATURATION: 97 % | HEIGHT: 62 IN | WEIGHT: 180.9 LBS | SYSTOLIC BLOOD PRESSURE: 142 MMHG

## 2019-09-30 DIAGNOSIS — N18.4 ANEMIA OF CHRONIC KIDNEY FAILURE, STAGE 4 (SEVERE) (HCC): Primary | ICD-10-CM

## 2019-09-30 DIAGNOSIS — D63.1 ANEMIA OF CHRONIC KIDNEY FAILURE, STAGE 4 (SEVERE) (HCC): Primary | ICD-10-CM

## 2019-09-30 PROCEDURE — 96372 THER/PROPH/DIAG INJ SC/IM: CPT

## 2019-09-30 PROCEDURE — 6360000002 HC RX W HCPCS: Performed by: NURSE PRACTITIONER

## 2019-09-30 PROCEDURE — 85025 COMPLETE CBC W/AUTO DIFF WBC: CPT

## 2019-10-14 RX ORDER — FERROUS SULFATE 325(65) MG
TABLET ORAL
Qty: 30 TABLET | Refills: 0 | Status: SHIPPED | OUTPATIENT
Start: 2019-10-14 | End: 2019-11-12 | Stop reason: SDUPTHER

## 2019-10-23 LAB
BASOPHILS ABSOLUTE: 0.1 K/UL (ref 0–0.2)
BASOPHILS RELATIVE PERCENT: 1.3 % (ref 0–1)
CHOLESTEROL, TOTAL: 195 MG/DL (ref 160–199)
EOSINOPHILS ABSOLUTE: 0.3 K/UL (ref 0–0.6)
EOSINOPHILS RELATIVE PERCENT: 6.1 % (ref 0–5)
HCT VFR BLD CALC: 34.6 % (ref 37–47)
HDLC SERPL-MCNC: 59 MG/DL (ref 65–121)
HEMOGLOBIN: 10.7 G/DL (ref 12–16)
IMMATURE GRANULOCYTES #: 0 K/UL
INR BLD: 1.59 (ref 0.88–1.18)
LDL CHOLESTEROL CALCULATED: 111 MG/DL
LYMPHOCYTES ABSOLUTE: 0.7 K/UL (ref 1.1–4.5)
LYMPHOCYTES RELATIVE PERCENT: 13.1 % (ref 20–40)
MCH RBC QN AUTO: 32.7 PG (ref 27–31)
MCHC RBC AUTO-ENTMCNC: 30.9 G/DL (ref 33–37)
MCV RBC AUTO: 105.8 FL (ref 81–99)
MONOCYTES ABSOLUTE: 0.5 K/UL (ref 0–0.9)
MONOCYTES RELATIVE PERCENT: 9.4 % (ref 0–10)
NEUTROPHILS ABSOLUTE: 3.8 K/UL (ref 1.5–7.5)
NEUTROPHILS RELATIVE PERCENT: 69.5 % (ref 50–65)
PDW BLD-RTO: 14.4 % (ref 11.5–14.5)
PLATELET # BLD: 323 K/UL (ref 130–400)
PMV BLD AUTO: 11.9 FL (ref 9.4–12.3)
PROTHROMBIN TIME: 18.3 SEC (ref 12–14.6)
RBC # BLD: 3.27 M/UL (ref 4.2–5.4)
TRIGL SERPL-MCNC: 123 MG/DL (ref 0–149)
WBC # BLD: 5.4 K/UL (ref 4.8–10.8)

## 2019-10-28 ENCOUNTER — HOSPITAL ENCOUNTER (OUTPATIENT)
Dept: INFUSION THERAPY | Age: 84
Discharge: HOME | End: 2019-10-28
Payer: MEDICARE

## 2019-10-28 VITALS
OXYGEN SATURATION: 96 % | DIASTOLIC BLOOD PRESSURE: 98 MMHG | SYSTOLIC BLOOD PRESSURE: 160 MMHG | HEIGHT: 62 IN | HEART RATE: 74 BPM | WEIGHT: 182.8 LBS | BODY MASS INDEX: 33.64 KG/M2

## 2019-10-28 DIAGNOSIS — N18.4 ANEMIA OF CHRONIC KIDNEY FAILURE, STAGE 4 (SEVERE) (HCC): Primary | ICD-10-CM

## 2019-10-28 DIAGNOSIS — D63.1 ANEMIA OF CHRONIC KIDNEY FAILURE, STAGE 4 (SEVERE) (HCC): Primary | ICD-10-CM

## 2019-10-28 PROCEDURE — 96372 THER/PROPH/DIAG INJ SC/IM: CPT

## 2019-10-28 PROCEDURE — 85025 COMPLETE CBC W/AUTO DIFF WBC: CPT

## 2019-10-28 PROCEDURE — 6360000002 HC RX W HCPCS: Performed by: NURSE PRACTITIONER

## 2019-10-28 RX ADMIN — EPOETIN ALFA-EPBX 40000 UNITS: 40000 INJECTION, SOLUTION INTRAVENOUS; SUBCUTANEOUS at 11:42

## 2019-11-12 ENCOUNTER — HOSPITAL ENCOUNTER (OUTPATIENT)
Dept: CT IMAGING | Age: 84
Discharge: HOME | End: 2019-11-12
Payer: MEDICARE

## 2019-11-12 DIAGNOSIS — M25.561 RIGHT KNEE PAIN, UNSPECIFIED CHRONICITY: ICD-10-CM

## 2019-11-12 PROCEDURE — 73700 CT LOWER EXTREMITY W/O DYE: CPT

## 2019-11-12 RX ORDER — FERROUS SULFATE 325(65) MG
TABLET ORAL
Qty: 30 TABLET | Refills: 0 | Status: SHIPPED | OUTPATIENT
Start: 2019-11-12 | End: 2019-11-26

## 2019-11-26 RX ORDER — FERROUS SULFATE 325(65) MG
TABLET ORAL
Qty: 30 TABLET | Refills: 0 | Status: SHIPPED | OUTPATIENT
Start: 2019-11-26 | End: 2019-12-12

## 2019-12-05 ENCOUNTER — OFFICE VISIT (OUTPATIENT)
Dept: HEMATOLOGY | Age: 84
End: 2019-12-05
Payer: MEDICARE

## 2019-12-05 ENCOUNTER — HOSPITAL ENCOUNTER (OUTPATIENT)
Dept: INFUSION THERAPY | Age: 84
Discharge: HOME | End: 2019-12-05
Payer: MEDICARE

## 2019-12-05 VITALS
DIASTOLIC BLOOD PRESSURE: 90 MMHG | WEIGHT: 183.1 LBS | HEIGHT: 62 IN | OXYGEN SATURATION: 96 % | HEART RATE: 66 BPM | SYSTOLIC BLOOD PRESSURE: 138 MMHG | BODY MASS INDEX: 33.69 KG/M2

## 2019-12-05 DIAGNOSIS — D63.1 ANEMIA OF CHRONIC KIDNEY FAILURE, STAGE 4 (SEVERE) (HCC): ICD-10-CM

## 2019-12-05 DIAGNOSIS — D50.8 IRON DEFICIENCY ANEMIA SECONDARY TO INADEQUATE DIETARY IRON INTAKE: ICD-10-CM

## 2019-12-05 DIAGNOSIS — N18.4 ANEMIA OF CHRONIC KIDNEY FAILURE, STAGE 4 (SEVERE) (HCC): ICD-10-CM

## 2019-12-05 DIAGNOSIS — D63.1 ANEMIA OF CHRONIC KIDNEY FAILURE, STAGE 4 (SEVERE) (HCC): Primary | ICD-10-CM

## 2019-12-05 DIAGNOSIS — N18.4 ANEMIA OF CHRONIC KIDNEY FAILURE, STAGE 4 (SEVERE) (HCC): Primary | ICD-10-CM

## 2019-12-05 PROCEDURE — G8427 DOCREV CUR MEDS BY ELIG CLIN: HCPCS | Performed by: NURSE PRACTITIONER

## 2019-12-05 PROCEDURE — G8417 CALC BMI ABV UP PARAM F/U: HCPCS | Performed by: NURSE PRACTITIONER

## 2019-12-05 PROCEDURE — 85025 COMPLETE CBC W/AUTO DIFF WBC: CPT

## 2019-12-05 PROCEDURE — 1036F TOBACCO NON-USER: CPT | Performed by: NURSE PRACTITIONER

## 2019-12-05 PROCEDURE — 1123F ACP DISCUSS/DSCN MKR DOCD: CPT | Performed by: NURSE PRACTITIONER

## 2019-12-05 PROCEDURE — 36415 COLL VENOUS BLD VENIPUNCTURE: CPT

## 2019-12-05 PROCEDURE — 1090F PRES/ABSN URINE INCON ASSESS: CPT | Performed by: NURSE PRACTITIONER

## 2019-12-05 PROCEDURE — 83540 ASSAY OF IRON: CPT

## 2019-12-05 PROCEDURE — 99212 OFFICE O/P EST SF 10 MIN: CPT | Performed by: NURSE PRACTITIONER

## 2019-12-05 PROCEDURE — 82728 ASSAY OF FERRITIN: CPT

## 2019-12-05 PROCEDURE — 80053 COMPREHEN METABOLIC PANEL: CPT

## 2019-12-05 PROCEDURE — G8400 PT W/DXA NO RESULTS DOC: HCPCS | Performed by: NURSE PRACTITIONER

## 2019-12-05 PROCEDURE — 83550 IRON BINDING TEST: CPT

## 2019-12-05 PROCEDURE — 4040F PNEUMOC VAC/ADMIN/RCVD: CPT | Performed by: NURSE PRACTITIONER

## 2019-12-05 PROCEDURE — G8484 FLU IMMUNIZE NO ADMIN: HCPCS | Performed by: NURSE PRACTITIONER

## 2019-12-05 ASSESSMENT — ENCOUNTER SYMPTOMS
GASTROINTESTINAL NEGATIVE: 1
COUGH: 0
CONSTIPATION: 0
ABDOMINAL PAIN: 0
EYE REDNESS: 0
EYE DISCHARGE: 0
WHEEZING: 0
SORE THROAT: 0
BLOOD IN STOOL: 0
NAUSEA: 0
EYES NEGATIVE: 1
VOMITING: 0
SHORTNESS OF BREATH: 0
RESPIRATORY NEGATIVE: 1
BACK PAIN: 0
DIARRHEA: 0
EYE PAIN: 0

## 2019-12-10 LAB
INR BLD: 1.65 (ref 0.88–1.18)
PROTHROMBIN TIME: 18.8 SEC (ref 12–14.6)

## 2019-12-12 ENCOUNTER — HOSPITAL ENCOUNTER (OUTPATIENT)
Dept: GENERAL RADIOLOGY | Age: 84
Discharge: HOME | End: 2019-12-12
Payer: MEDICARE

## 2019-12-12 ENCOUNTER — HOSPITAL ENCOUNTER (OUTPATIENT)
Dept: PREADMISSION TESTING | Age: 84
Discharge: HOME | End: 2019-12-16
Payer: MEDICARE

## 2019-12-12 VITALS — HEIGHT: 62 IN | WEIGHT: 183 LBS | BODY MASS INDEX: 33.68 KG/M2

## 2019-12-12 LAB
ABO/RH: NORMAL
ALBUMIN SERPL-MCNC: 4.3 G/DL (ref 3.5–5.2)
ALP BLD-CCNC: 76 U/L (ref 35–104)
ALT SERPL-CCNC: 16 U/L (ref 5–33)
ANION GAP SERPL CALCULATED.3IONS-SCNC: 13 MMOL/L (ref 7–19)
ANTIBODY SCREEN: NORMAL
APTT: 30.2 SEC (ref 26–36.2)
AST SERPL-CCNC: 22 U/L (ref 5–32)
BACTERIA: NEGATIVE /HPF
BASOPHILS ABSOLUTE: 0.1 K/UL (ref 0–0.2)
BASOPHILS RELATIVE PERCENT: 1.1 % (ref 0–1)
BILIRUB SERPL-MCNC: 0.4 MG/DL (ref 0.2–1.2)
BILIRUBIN URINE: NEGATIVE
BLOOD, URINE: NEGATIVE
BUN BLDV-MCNC: 30 MG/DL (ref 8–23)
CALCIUM SERPL-MCNC: 10 MG/DL (ref 8.8–10.2)
CHLORIDE BLD-SCNC: 105 MMOL/L (ref 98–111)
CLARITY: CLEAR
CO2: 23 MMOL/L (ref 22–29)
COLOR: YELLOW
CREAT SERPL-MCNC: 1.3 MG/DL (ref 0.5–0.9)
EOSINOPHILS ABSOLUTE: 0.4 K/UL (ref 0–0.6)
EOSINOPHILS RELATIVE PERCENT: 5.7 % (ref 0–5)
EPITHELIAL CELLS, UA: 2 /HPF (ref 0–5)
GFR NON-AFRICAN AMERICAN: 39
GLUCOSE BLD-MCNC: 87 MG/DL (ref 74–109)
GLUCOSE URINE: NEGATIVE MG/DL
HCT VFR BLD CALC: 34.7 % (ref 37–47)
HEMOGLOBIN: 10.7 G/DL (ref 12–16)
HYALINE CASTS: 1 /HPF (ref 0–8)
IMMATURE GRANULOCYTES #: 0 K/UL
INR BLD: 1.59 (ref 0.88–1.18)
KETONES, URINE: NEGATIVE MG/DL
LEUKOCYTE ESTERASE, URINE: ABNORMAL
LYMPHOCYTES ABSOLUTE: 0.8 K/UL (ref 1.1–4.5)
LYMPHOCYTES RELATIVE PERCENT: 10.7 % (ref 20–40)
MCH RBC QN AUTO: 32.7 PG (ref 27–31)
MCHC RBC AUTO-ENTMCNC: 30.8 G/DL (ref 33–37)
MCV RBC AUTO: 106.1 FL (ref 81–99)
MONOCYTES ABSOLUTE: 0.7 K/UL (ref 0–0.9)
MONOCYTES RELATIVE PERCENT: 9.2 % (ref 0–10)
NEUTROPHILS ABSOLUTE: 5.4 K/UL (ref 1.5–7.5)
NEUTROPHILS RELATIVE PERCENT: 72.8 % (ref 50–65)
NITRITE, URINE: NEGATIVE
PDW BLD-RTO: 13.7 % (ref 11.5–14.5)
PH UA: 6 (ref 5–8)
PLATELET # BLD: 285 K/UL (ref 130–400)
PMV BLD AUTO: 11.5 FL (ref 9.4–12.3)
POTASSIUM SERPL-SCNC: 3.9 MMOL/L (ref 3.5–5)
PROTEIN UA: NEGATIVE MG/DL
PROTHROMBIN TIME: 18.3 SEC (ref 12–14.6)
RBC # BLD: 3.27 M/UL (ref 4.2–5.4)
RBC UA: 1 /HPF (ref 0–4)
SODIUM BLD-SCNC: 141 MMOL/L (ref 136–145)
SPECIFIC GRAVITY UA: 1.02 (ref 1–1.03)
TOTAL PROTEIN: 7.4 G/DL (ref 6.6–8.7)
URINE REFLEX TO CULTURE: YES
UROBILINOGEN, URINE: 0.2 E.U./DL
WBC # BLD: 7.4 K/UL (ref 4.8–10.8)
WBC UA: 7 /HPF (ref 0–5)

## 2019-12-12 PROCEDURE — 85025 COMPLETE CBC W/AUTO DIFF WBC: CPT

## 2019-12-12 PROCEDURE — 80053 COMPREHEN METABOLIC PANEL: CPT

## 2019-12-12 PROCEDURE — 85730 THROMBOPLASTIN TIME PARTIAL: CPT

## 2019-12-12 PROCEDURE — 85610 PROTHROMBIN TIME: CPT

## 2019-12-12 PROCEDURE — 87086 URINE CULTURE/COLONY COUNT: CPT

## 2019-12-12 PROCEDURE — 81001 URINALYSIS AUTO W/SCOPE: CPT

## 2019-12-12 PROCEDURE — 86850 RBC ANTIBODY SCREEN: CPT

## 2019-12-12 PROCEDURE — 93005 ELECTROCARDIOGRAM TRACING: CPT | Performed by: ORTHOPAEDIC SURGERY

## 2019-12-12 PROCEDURE — 71046 X-RAY EXAM CHEST 2 VIEWS: CPT

## 2019-12-12 PROCEDURE — 87081 CULTURE SCREEN ONLY: CPT

## 2019-12-12 PROCEDURE — 86900 BLOOD TYPING SEROLOGIC ABO: CPT

## 2019-12-12 PROCEDURE — 86901 BLOOD TYPING SEROLOGIC RH(D): CPT

## 2019-12-12 RX ORDER — LISINOPRIL 5 MG/1
5 TABLET ORAL DAILY
COMMUNITY
End: 2020-03-05

## 2019-12-12 RX ORDER — CALCITRIOL 0.25 UG/1
0.25 CAPSULE, LIQUID FILLED ORAL DAILY
Status: ON HOLD | COMMUNITY
End: 2021-05-18

## 2019-12-12 RX ORDER — FERROUS SULFATE 325(65) MG
325 TABLET ORAL
COMMUNITY
End: 2019-12-27

## 2019-12-13 LAB
EKG P AXIS: 48 DEGREES
EKG P-R INTERVAL: 236 MS
EKG Q-T INTERVAL: 442 MS
EKG QRS DURATION: 114 MS
EKG QTC CALCULATION (BAZETT): 457 MS
EKG T AXIS: 95 DEGREES

## 2019-12-14 LAB
MRSA CULTURE ONLY: NORMAL
URINE CULTURE, ROUTINE: NORMAL

## 2019-12-18 LAB
BACTERIA: NEGATIVE /HPF
BILIRUBIN URINE: NEGATIVE
BLOOD, URINE: NEGATIVE
CLARITY: CLEAR
COLOR: YELLOW
EPITHELIAL CELLS, UA: 3 /HPF (ref 0–5)
GLUCOSE URINE: NEGATIVE MG/DL
HYALINE CASTS: 2 /HPF (ref 0–8)
KETONES, URINE: NEGATIVE MG/DL
LEUKOCYTE ESTERASE, URINE: ABNORMAL
NITRITE, URINE: NEGATIVE
PH UA: 5.5 (ref 5–8)
PROTEIN UA: NEGATIVE MG/DL
RBC UA: 1 /HPF (ref 0–4)
SPECIFIC GRAVITY UA: 1.02 (ref 1–1.03)
URINE REFLEX TO CULTURE: YES
UROBILINOGEN, URINE: 0.2 E.U./DL
WBC UA: 13 /HPF (ref 0–5)

## 2019-12-20 LAB — URINE CULTURE, ROUTINE: NORMAL

## 2019-12-27 ENCOUNTER — CLINICAL DOCUMENTATION (OUTPATIENT)
Dept: HEMATOLOGY | Age: 84
End: 2019-12-27

## 2019-12-27 RX ORDER — FERROUS SULFATE 325(65) MG
TABLET ORAL
Qty: 30 TABLET | Refills: 3 | Status: SHIPPED | OUTPATIENT
Start: 2019-12-27 | End: 2020-04-10

## 2019-12-28 ENCOUNTER — LAB (OUTPATIENT)
Dept: LAB | Facility: HOSPITAL | Age: 84
End: 2019-12-28

## 2019-12-28 ENCOUNTER — TRANSCRIBE ORDERS (OUTPATIENT)
Dept: ADMINISTRATIVE | Facility: HOSPITAL | Age: 84
End: 2019-12-28

## 2019-12-28 DIAGNOSIS — R05.9 COUGH: ICD-10-CM

## 2019-12-28 DIAGNOSIS — R05.9 COUGH: Primary | ICD-10-CM

## 2019-12-28 LAB
FLUAV AG NPH QL: NEGATIVE
FLUBV AG NPH QL IA: NEGATIVE

## 2019-12-28 PROCEDURE — 87804 INFLUENZA ASSAY W/OPTIC: CPT

## 2019-12-30 ENCOUNTER — HOSPITAL ENCOUNTER (OUTPATIENT)
Dept: INFUSION THERAPY | Age: 84
Discharge: HOME | End: 2019-12-30
Payer: MEDICARE

## 2019-12-30 VITALS
OXYGEN SATURATION: 97 % | HEIGHT: 62 IN | HEART RATE: 77 BPM | WEIGHT: 184.6 LBS | DIASTOLIC BLOOD PRESSURE: 76 MMHG | BODY MASS INDEX: 33.97 KG/M2 | SYSTOLIC BLOOD PRESSURE: 126 MMHG

## 2019-12-30 DIAGNOSIS — D63.1 ANEMIA OF CHRONIC KIDNEY FAILURE, STAGE 4 (SEVERE) (HCC): Primary | ICD-10-CM

## 2019-12-30 DIAGNOSIS — D50.8 IRON DEFICIENCY ANEMIA SECONDARY TO INADEQUATE DIETARY IRON INTAKE: ICD-10-CM

## 2019-12-30 DIAGNOSIS — N18.4 ANEMIA OF CHRONIC KIDNEY FAILURE, STAGE 4 (SEVERE) (HCC): Primary | ICD-10-CM

## 2019-12-30 PROCEDURE — 85025 COMPLETE CBC W/AUTO DIFF WBC: CPT

## 2019-12-30 PROCEDURE — 96372 THER/PROPH/DIAG INJ SC/IM: CPT

## 2019-12-30 PROCEDURE — 6360000002 HC RX W HCPCS: Performed by: NURSE PRACTITIONER

## 2019-12-30 RX ADMIN — EPOETIN ALFA-EPBX 40000 UNITS: 40000 INJECTION, SOLUTION INTRAVENOUS; SUBCUTANEOUS at 15:40

## 2020-01-03 ENCOUNTER — ANESTHESIA EVENT (OUTPATIENT)
Dept: OPERATING ROOM | Age: 85
End: 2020-01-03
Payer: MEDICARE

## 2020-01-03 ENCOUNTER — APPOINTMENT (OUTPATIENT)
Dept: GENERAL RADIOLOGY | Age: 85
End: 2020-01-03
Attending: ORTHOPAEDIC SURGERY
Payer: MEDICARE

## 2020-01-03 ENCOUNTER — HOSPITAL ENCOUNTER (OUTPATIENT)
Age: 85
Setting detail: OBSERVATION
LOS: 1 days | Discharge: HOME | End: 2020-01-05
Attending: ORTHOPAEDIC SURGERY | Admitting: ORTHOPAEDIC SURGERY
Payer: MEDICARE

## 2020-01-03 ENCOUNTER — ANESTHESIA (OUTPATIENT)
Dept: OPERATING ROOM | Age: 85
End: 2020-01-03
Payer: MEDICARE

## 2020-01-03 VITALS
SYSTOLIC BLOOD PRESSURE: 136 MMHG | TEMPERATURE: 96.3 F | DIASTOLIC BLOOD PRESSURE: 55 MMHG | RESPIRATION RATE: 1 BRPM | OXYGEN SATURATION: 89 %

## 2020-01-03 PROBLEM — M17.11 UNILATERAL PRIMARY OSTEOARTHRITIS, RIGHT KNEE: Status: ACTIVE | Noted: 2020-01-03

## 2020-01-03 PROBLEM — Z96.651 STATUS POST TOTAL RIGHT KNEE REPLACEMENT: Status: ACTIVE | Noted: 2020-01-03

## 2020-01-03 PROBLEM — M17.11 PRIMARY OSTEOARTHRITIS OF RIGHT KNEE: Status: ACTIVE | Noted: 2020-01-03

## 2020-01-03 LAB
ABO/RH: NORMAL
ANTIBODY SCREEN: NORMAL
INR BLD: 1.04 (ref 0.88–1.18)
PROTHROMBIN TIME: 13 SEC (ref 12–14.6)
REASON FOR REJECTION: NORMAL
REJECTED TEST: NORMAL

## 2020-01-03 PROCEDURE — 86900 BLOOD TYPING SEROLOGIC ABO: CPT

## 2020-01-03 PROCEDURE — 6370000000 HC RX 637 (ALT 250 FOR IP): Performed by: FAMILY MEDICINE

## 2020-01-03 PROCEDURE — G0378 HOSPITAL OBSERVATION PER HR: HCPCS

## 2020-01-03 PROCEDURE — 6360000002 HC RX W HCPCS: Performed by: NURSE ANESTHETIST, CERTIFIED REGISTERED

## 2020-01-03 PROCEDURE — 6360000002 HC RX W HCPCS: Performed by: ORTHOPAEDIC SURGERY

## 2020-01-03 PROCEDURE — 85610 PROTHROMBIN TIME: CPT

## 2020-01-03 PROCEDURE — C1713 ANCHOR/SCREW BN/BN,TIS/BN: HCPCS | Performed by: ORTHOPAEDIC SURGERY

## 2020-01-03 PROCEDURE — 2709999900 HC NON-CHARGEABLE SUPPLY: Performed by: ORTHOPAEDIC SURGERY

## 2020-01-03 PROCEDURE — 2500000003 HC RX 250 WO HCPCS: Performed by: ORTHOPAEDIC SURGERY

## 2020-01-03 PROCEDURE — 97161 PT EVAL LOW COMPLEX 20 MIN: CPT

## 2020-01-03 PROCEDURE — 2500000003 HC RX 250 WO HCPCS: Performed by: NURSE ANESTHETIST, CERTIFIED REGISTERED

## 2020-01-03 PROCEDURE — 2700000000 HC OXYGEN THERAPY PER DAY

## 2020-01-03 PROCEDURE — 6360000002 HC RX W HCPCS: Performed by: ANESTHESIOLOGY

## 2020-01-03 PROCEDURE — 6370000000 HC RX 637 (ALT 250 FOR IP): Performed by: ORTHOPAEDIC SURGERY

## 2020-01-03 PROCEDURE — 36415 COLL VENOUS BLD VENIPUNCTURE: CPT

## 2020-01-03 PROCEDURE — 86901 BLOOD TYPING SEROLOGIC RH(D): CPT

## 2020-01-03 PROCEDURE — 2580000003 HC RX 258: Performed by: ORTHOPAEDIC SURGERY

## 2020-01-03 PROCEDURE — 86850 RBC ANTIBODY SCREEN: CPT

## 2020-01-03 PROCEDURE — C1776 JOINT DEVICE (IMPLANTABLE): HCPCS | Performed by: ORTHOPAEDIC SURGERY

## 2020-01-03 PROCEDURE — 51701 INSERT BLADDER CATHETER: CPT

## 2020-01-03 PROCEDURE — 3600000015 HC SURGERY LEVEL 5 ADDTL 15MIN: Performed by: ORTHOPAEDIC SURGERY

## 2020-01-03 PROCEDURE — C9290 INJ, BUPIVACAINE LIPOSOME: HCPCS | Performed by: ORTHOPAEDIC SURGERY

## 2020-01-03 PROCEDURE — 3700000001 HC ADD 15 MINUTES (ANESTHESIA): Performed by: ORTHOPAEDIC SURGERY

## 2020-01-03 PROCEDURE — 7100000001 HC PACU RECOVERY - ADDTL 15 MIN: Performed by: ORTHOPAEDIC SURGERY

## 2020-01-03 PROCEDURE — 97116 GAIT TRAINING THERAPY: CPT

## 2020-01-03 PROCEDURE — 3700000000 HC ANESTHESIA ATTENDED CARE: Performed by: ORTHOPAEDIC SURGERY

## 2020-01-03 PROCEDURE — 64447 NJX AA&/STRD FEMORAL NRV IMG: CPT | Performed by: NURSE ANESTHETIST, CERTIFIED REGISTERED

## 2020-01-03 PROCEDURE — 3600000005 HC SURGERY LEVEL 5 BASE: Performed by: ORTHOPAEDIC SURGERY

## 2020-01-03 PROCEDURE — 51798 US URINE CAPACITY MEASURE: CPT

## 2020-01-03 PROCEDURE — 2780000010 HC IMPLANT OTHER: Performed by: ORTHOPAEDIC SURGERY

## 2020-01-03 PROCEDURE — 2580000003 HC RX 258: Performed by: ANESTHESIOLOGY

## 2020-01-03 PROCEDURE — 7100000000 HC PACU RECOVERY - FIRST 15 MIN: Performed by: ORTHOPAEDIC SURGERY

## 2020-01-03 PROCEDURE — 73560 X-RAY EXAM OF KNEE 1 OR 2: CPT

## 2020-01-03 DEVICE — IMPLANTABLE DEVICE: Type: IMPLANTABLE DEVICE | Site: KNEE | Status: FUNCTIONAL

## 2020-01-03 DEVICE — COMPONENT FEM KNEE TOT: Type: IMPLANTABLE DEVICE | Site: KNEE | Status: FUNCTIONAL

## 2020-01-03 DEVICE — COMPONENT TOT KNEE CR TIB ITOTAL: Type: IMPLANTABLE DEVICE | Site: KNEE | Status: FUNCTIONAL

## 2020-01-03 DEVICE — CEMENT BNE 40GM HI VISC RADPQ FOR REV SURG: Type: IMPLANTABLE DEVICE | Site: KNEE | Status: FUNCTIONAL

## 2020-01-03 DEVICE — KIT TOT KNEE REPL CVR FEM IMPL CR IPOLY ITOT: Type: IMPLANTABLE DEVICE | Site: KNEE | Status: FUNCTIONAL

## 2020-01-03 RX ORDER — MORPHINE SULFATE 4 MG/ML
4 INJECTION, SOLUTION INTRAMUSCULAR; INTRAVENOUS EVERY 5 MIN PRN
Status: DISCONTINUED | OUTPATIENT
Start: 2020-01-03 | End: 2020-01-03 | Stop reason: HOSPADM

## 2020-01-03 RX ORDER — LABETALOL 20 MG/4 ML (5 MG/ML) INTRAVENOUS SYRINGE
5 EVERY 10 MIN PRN
Status: DISCONTINUED | OUTPATIENT
Start: 2020-01-03 | End: 2020-01-03 | Stop reason: HOSPADM

## 2020-01-03 RX ORDER — CALCITRIOL 0.25 UG/1
0.25 CAPSULE, LIQUID FILLED ORAL DAILY
Status: DISCONTINUED | OUTPATIENT
Start: 2020-01-03 | End: 2020-01-05 | Stop reason: HOSPADM

## 2020-01-03 RX ORDER — MIDAZOLAM HYDROCHLORIDE 1 MG/ML
2 INJECTION INTRAMUSCULAR; INTRAVENOUS
Status: DISCONTINUED | OUTPATIENT
Start: 2020-01-03 | End: 2020-01-03 | Stop reason: HOSPADM

## 2020-01-03 RX ORDER — DIPHENHYDRAMINE HYDROCHLORIDE 50 MG/ML
12.5 INJECTION INTRAMUSCULAR; INTRAVENOUS
Status: DISCONTINUED | OUTPATIENT
Start: 2020-01-03 | End: 2020-01-03 | Stop reason: HOSPADM

## 2020-01-03 RX ORDER — PROMETHAZINE HYDROCHLORIDE 25 MG/ML
6.25 INJECTION, SOLUTION INTRAMUSCULAR; INTRAVENOUS
Status: DISCONTINUED | OUTPATIENT
Start: 2020-01-03 | End: 2020-01-03 | Stop reason: HOSPADM

## 2020-01-03 RX ORDER — ONDANSETRON 2 MG/ML
INJECTION INTRAMUSCULAR; INTRAVENOUS PRN
Status: DISCONTINUED | OUTPATIENT
Start: 2020-01-03 | End: 2020-01-03 | Stop reason: SDUPTHER

## 2020-01-03 RX ORDER — PROPOFOL 10 MG/ML
INJECTION, EMULSION INTRAVENOUS PRN
Status: DISCONTINUED | OUTPATIENT
Start: 2020-01-03 | End: 2020-01-03 | Stop reason: SDUPTHER

## 2020-01-03 RX ORDER — MIDAZOLAM HYDROCHLORIDE 1 MG/ML
2 INJECTION INTRAMUSCULAR; INTRAVENOUS ONCE
Status: COMPLETED | OUTPATIENT
Start: 2020-01-03 | End: 2020-01-03

## 2020-01-03 RX ORDER — BISACODYL 10 MG
10 SUPPOSITORY, RECTAL RECTAL DAILY PRN
Status: DISCONTINUED | OUTPATIENT
Start: 2020-01-03 | End: 2020-01-05 | Stop reason: HOSPADM

## 2020-01-03 RX ORDER — SODIUM CHLORIDE 9 MG/ML
INJECTION, SOLUTION INTRAVENOUS CONTINUOUS
Status: DISCONTINUED | OUTPATIENT
Start: 2020-01-03 | End: 2020-01-05 | Stop reason: HOSPADM

## 2020-01-03 RX ORDER — OXYCODONE HYDROCHLORIDE 5 MG/1
5 TABLET ORAL EVERY 4 HOURS PRN
Status: DISCONTINUED | OUTPATIENT
Start: 2020-01-03 | End: 2020-01-05 | Stop reason: HOSPADM

## 2020-01-03 RX ORDER — ENALAPRILAT 2.5 MG/2ML
1.25 INJECTION INTRAVENOUS
Status: DISCONTINUED | OUTPATIENT
Start: 2020-01-03 | End: 2020-01-03 | Stop reason: HOSPADM

## 2020-01-03 RX ORDER — GLYCOPYRROLATE 1 MG/5 ML
SYRINGE (ML) INTRAVENOUS PRN
Status: DISCONTINUED | OUTPATIENT
Start: 2020-01-03 | End: 2020-01-03 | Stop reason: SDUPTHER

## 2020-01-03 RX ORDER — ONDANSETRON 2 MG/ML
4 INJECTION INTRAMUSCULAR; INTRAVENOUS EVERY 6 HOURS PRN
Status: DISCONTINUED | OUTPATIENT
Start: 2020-01-03 | End: 2020-01-05 | Stop reason: HOSPADM

## 2020-01-03 RX ORDER — CYCLOBENZAPRINE HCL 5 MG
5 TABLET ORAL 3 TIMES DAILY PRN
Qty: 30 TABLET | Refills: 0 | Status: SHIPPED | OUTPATIENT
Start: 2020-01-03 | End: 2020-01-13

## 2020-01-03 RX ORDER — LIDOCAINE HYDROCHLORIDE 10 MG/ML
INJECTION, SOLUTION INFILTRATION; PERINEURAL PRN
Status: DISCONTINUED | OUTPATIENT
Start: 2020-01-03 | End: 2020-01-03 | Stop reason: SDUPTHER

## 2020-01-03 RX ORDER — ACETAMINOPHEN 325 MG/1
650 TABLET ORAL EVERY 6 HOURS
Status: DISCONTINUED | OUTPATIENT
Start: 2020-01-03 | End: 2020-01-05 | Stop reason: HOSPADM

## 2020-01-03 RX ORDER — FENTANYL CITRATE 50 UG/ML
25 INJECTION, SOLUTION INTRAMUSCULAR; INTRAVENOUS
Status: DISCONTINUED | OUTPATIENT
Start: 2020-01-03 | End: 2020-01-03 | Stop reason: HOSPADM

## 2020-01-03 RX ORDER — LABETALOL 20 MG/4 ML (5 MG/ML) INTRAVENOUS SYRINGE
PRN
Status: DISCONTINUED | OUTPATIENT
Start: 2020-01-03 | End: 2020-01-03 | Stop reason: SDUPTHER

## 2020-01-03 RX ORDER — SODIUM CHLORIDE 9 MG/ML
INJECTION, SOLUTION INTRAVENOUS CONTINUOUS
Status: DISCONTINUED | OUTPATIENT
Start: 2020-01-03 | End: 2020-01-03

## 2020-01-03 RX ORDER — WARFARIN SODIUM 5 MG/1
5 TABLET ORAL
Status: COMPLETED | OUTPATIENT
Start: 2020-01-03 | End: 2020-01-03

## 2020-01-03 RX ORDER — VANCOMYCIN HYDROCHLORIDE 1 G/20ML
INJECTION, POWDER, LYOPHILIZED, FOR SOLUTION INTRAVENOUS PRN
Status: DISCONTINUED | OUTPATIENT
Start: 2020-01-03 | End: 2020-01-03 | Stop reason: HOSPADM

## 2020-01-03 RX ORDER — ROPIVACAINE HYDROCHLORIDE 5 MG/ML
INJECTION, SOLUTION EPIDURAL; INFILTRATION; PERINEURAL
Status: COMPLETED | OUTPATIENT
Start: 2020-01-03 | End: 2020-01-03

## 2020-01-03 RX ORDER — SENNA AND DOCUSATE SODIUM 50; 8.6 MG/1; MG/1
1 TABLET, FILM COATED ORAL 2 TIMES DAILY
Status: DISCONTINUED | OUTPATIENT
Start: 2020-01-03 | End: 2020-01-05 | Stop reason: HOSPADM

## 2020-01-03 RX ORDER — CYCLOBENZAPRINE HCL 5 MG
5 TABLET ORAL 3 TIMES DAILY PRN
Status: DISCONTINUED | OUTPATIENT
Start: 2020-01-03 | End: 2020-01-05 | Stop reason: HOSPADM

## 2020-01-03 RX ORDER — DOCUSATE SODIUM 100 MG/1
100 CAPSULE, LIQUID FILLED ORAL 2 TIMES DAILY
Status: DISCONTINUED | OUTPATIENT
Start: 2020-01-03 | End: 2020-01-05 | Stop reason: HOSPADM

## 2020-01-03 RX ORDER — LISINOPRIL 5 MG/1
5 TABLET ORAL DAILY
Status: DISCONTINUED | OUTPATIENT
Start: 2020-01-04 | End: 2020-01-05 | Stop reason: HOSPADM

## 2020-01-03 RX ORDER — SODIUM CHLORIDE 0.9 % (FLUSH) 0.9 %
10 SYRINGE (ML) INJECTION PRN
Status: DISCONTINUED | OUTPATIENT
Start: 2020-01-03 | End: 2020-01-05 | Stop reason: HOSPADM

## 2020-01-03 RX ORDER — FENTANYL CITRATE 50 UG/ML
INJECTION, SOLUTION INTRAMUSCULAR; INTRAVENOUS PRN
Status: DISCONTINUED | OUTPATIENT
Start: 2020-01-03 | End: 2020-01-03 | Stop reason: SDUPTHER

## 2020-01-03 RX ORDER — FENTANYL CITRATE 50 UG/ML
50 INJECTION, SOLUTION INTRAMUSCULAR; INTRAVENOUS
Status: DISCONTINUED | OUTPATIENT
Start: 2020-01-03 | End: 2020-01-03 | Stop reason: HOSPADM

## 2020-01-03 RX ORDER — MEPERIDINE HYDROCHLORIDE 50 MG/ML
12.5 INJECTION INTRAMUSCULAR; INTRAVENOUS; SUBCUTANEOUS EVERY 5 MIN PRN
Status: DISCONTINUED | OUTPATIENT
Start: 2020-01-03 | End: 2020-01-03 | Stop reason: HOSPADM

## 2020-01-03 RX ORDER — HYDRALAZINE HYDROCHLORIDE 20 MG/ML
5 INJECTION INTRAMUSCULAR; INTRAVENOUS EVERY 10 MIN PRN
Status: DISCONTINUED | OUTPATIENT
Start: 2020-01-03 | End: 2020-01-03 | Stop reason: HOSPADM

## 2020-01-03 RX ORDER — SODIUM CHLORIDE 0.9 % (FLUSH) 0.9 %
10 SYRINGE (ML) INJECTION EVERY 12 HOURS SCHEDULED
Status: DISCONTINUED | OUTPATIENT
Start: 2020-01-03 | End: 2020-01-03 | Stop reason: HOSPADM

## 2020-01-03 RX ORDER — BUPIVACAINE HYDROCHLORIDE 2.5 MG/ML
INJECTION, SOLUTION INFILTRATION; PERINEURAL PRN
Status: DISCONTINUED | OUTPATIENT
Start: 2020-01-03 | End: 2020-01-03 | Stop reason: HOSPADM

## 2020-01-03 RX ORDER — KETAMINE HYDROCHLORIDE 50 MG/ML
INJECTION, SOLUTION, CONCENTRATE INTRAMUSCULAR; INTRAVENOUS PRN
Status: DISCONTINUED | OUTPATIENT
Start: 2020-01-03 | End: 2020-01-03 | Stop reason: SDUPTHER

## 2020-01-03 RX ORDER — LEVOTHYROXINE SODIUM 112 UG/1
112 TABLET ORAL DAILY
Status: DISCONTINUED | OUTPATIENT
Start: 2020-01-03 | End: 2020-01-05 | Stop reason: HOSPADM

## 2020-01-03 RX ORDER — SODIUM CHLORIDE, SODIUM LACTATE, POTASSIUM CHLORIDE, CALCIUM CHLORIDE 600; 310; 30; 20 MG/100ML; MG/100ML; MG/100ML; MG/100ML
INJECTION, SOLUTION INTRAVENOUS CONTINUOUS
Status: DISCONTINUED | OUTPATIENT
Start: 2020-01-03 | End: 2020-01-03

## 2020-01-03 RX ORDER — OXYCODONE AND ACETAMINOPHEN 7.5; 325 MG/1; MG/1
1 TABLET ORAL EVERY 4 HOURS PRN
Qty: 70 TABLET | Refills: 0 | Status: SHIPPED | OUTPATIENT
Start: 2020-01-03 | End: 2020-01-31

## 2020-01-03 RX ORDER — SIMVASTATIN 20 MG
20 TABLET ORAL NIGHTLY
Status: DISCONTINUED | OUTPATIENT
Start: 2020-01-03 | End: 2020-01-05 | Stop reason: HOSPADM

## 2020-01-03 RX ORDER — SODIUM CHLORIDE 0.9 % (FLUSH) 0.9 %
10 SYRINGE (ML) INJECTION PRN
Status: DISCONTINUED | OUTPATIENT
Start: 2020-01-03 | End: 2020-01-03 | Stop reason: HOSPADM

## 2020-01-03 RX ORDER — SUCCINYLCHOLINE/SOD CL,ISO/PF 100 MG/5ML
SYRINGE (ML) INTRAVENOUS PRN
Status: DISCONTINUED | OUTPATIENT
Start: 2020-01-03 | End: 2020-01-03 | Stop reason: SDUPTHER

## 2020-01-03 RX ORDER — OXYCODONE HYDROCHLORIDE 5 MG/1
10 TABLET ORAL EVERY 4 HOURS PRN
Status: DISCONTINUED | OUTPATIENT
Start: 2020-01-03 | End: 2020-01-05 | Stop reason: HOSPADM

## 2020-01-03 RX ORDER — LISINOPRIL 2.5 MG/1
2.5 TABLET ORAL DAILY
Status: DISCONTINUED | OUTPATIENT
Start: 2020-01-04 | End: 2020-01-03

## 2020-01-03 RX ORDER — CLINDAMYCIN PHOSPHATE 900 MG/50ML
900 INJECTION INTRAVENOUS EVERY 8 HOURS
Status: COMPLETED | OUTPATIENT
Start: 2020-01-03 | End: 2020-01-03

## 2020-01-03 RX ORDER — MORPHINE SULFATE 4 MG/ML
2 INJECTION, SOLUTION INTRAMUSCULAR; INTRAVENOUS EVERY 5 MIN PRN
Status: DISCONTINUED | OUTPATIENT
Start: 2020-01-03 | End: 2020-01-03 | Stop reason: HOSPADM

## 2020-01-03 RX ORDER — DOCUSATE SODIUM 100 MG/1
100 CAPSULE, LIQUID FILLED ORAL 2 TIMES DAILY
Qty: 60 CAPSULE | Refills: 1 | Status: SHIPPED | OUTPATIENT
Start: 2020-01-03

## 2020-01-03 RX ORDER — ROCURONIUM BROMIDE 10 MG/ML
INJECTION, SOLUTION INTRAVENOUS PRN
Status: DISCONTINUED | OUTPATIENT
Start: 2020-01-03 | End: 2020-01-03 | Stop reason: SDUPTHER

## 2020-01-03 RX ORDER — LIDOCAINE HYDROCHLORIDE 10 MG/ML
1 INJECTION, SOLUTION EPIDURAL; INFILTRATION; INTRACAUDAL; PERINEURAL
Status: DISCONTINUED | OUTPATIENT
Start: 2020-01-03 | End: 2020-01-03 | Stop reason: HOSPADM

## 2020-01-03 RX ORDER — DEXAMETHASONE SODIUM PHOSPHATE 4 MG/ML
INJECTION, SOLUTION INTRA-ARTICULAR; INTRALESIONAL; INTRAMUSCULAR; INTRAVENOUS; SOFT TISSUE PRN
Status: DISCONTINUED | OUTPATIENT
Start: 2020-01-03 | End: 2020-01-03 | Stop reason: SDUPTHER

## 2020-01-03 RX ORDER — ONDANSETRON 4 MG/1
4 TABLET, FILM COATED ORAL EVERY 8 HOURS PRN
Qty: 20 TABLET | Refills: 1 | Status: SHIPPED | OUTPATIENT
Start: 2020-01-03 | End: 2020-03-07

## 2020-01-03 RX ORDER — METOCLOPRAMIDE HYDROCHLORIDE 5 MG/ML
10 INJECTION INTRAMUSCULAR; INTRAVENOUS
Status: DISCONTINUED | OUTPATIENT
Start: 2020-01-03 | End: 2020-01-03 | Stop reason: HOSPADM

## 2020-01-03 RX ORDER — WARFARIN SODIUM 5 MG/1
5 TABLET ORAL DAILY
Status: DISCONTINUED | OUTPATIENT
Start: 2020-01-03 | End: 2020-01-03

## 2020-01-03 RX ORDER — SODIUM CHLORIDE 0.9 % (FLUSH) 0.9 %
10 SYRINGE (ML) INJECTION EVERY 12 HOURS SCHEDULED
Status: DISCONTINUED | OUTPATIENT
Start: 2020-01-03 | End: 2020-01-05 | Stop reason: HOSPADM

## 2020-01-03 RX ADMIN — Medication 2 G: at 08:03

## 2020-01-03 RX ADMIN — ACETAMINOPHEN 650 MG: 325 TABLET ORAL at 21:52

## 2020-01-03 RX ADMIN — LABETALOL 20 MG/4 ML (5 MG/ML) INTRAVENOUS SYRINGE 10 MG: at 08:44

## 2020-01-03 RX ADMIN — ROCURONIUM BROMIDE 40 MG: 10 SOLUTION INTRAVENOUS at 08:11

## 2020-01-03 RX ADMIN — WARFARIN SODIUM 5 MG: 5 TABLET ORAL at 17:46

## 2020-01-03 RX ADMIN — ONDANSETRON HYDROCHLORIDE 4 MG: 2 INJECTION, SOLUTION INTRAMUSCULAR; INTRAVENOUS at 08:24

## 2020-01-03 RX ADMIN — DEXAMETHASONE SODIUM PHOSPHATE 4 MG: 4 INJECTION, SOLUTION INTRAMUSCULAR; INTRAVENOUS at 08:07

## 2020-01-03 RX ADMIN — FENTANYL CITRATE 50 MCG: 50 INJECTION INTRAMUSCULAR; INTRAVENOUS at 08:13

## 2020-01-03 RX ADMIN — FENTANYL CITRATE 50 MCG: 50 INJECTION INTRAMUSCULAR; INTRAVENOUS at 08:55

## 2020-01-03 RX ADMIN — Medication 30 MG: at 08:09

## 2020-01-03 RX ADMIN — SODIUM CHLORIDE, PRESERVATIVE FREE 10 ML: 5 INJECTION INTRAVENOUS at 21:53

## 2020-01-03 RX ADMIN — CLINDAMYCIN PHOSPHATE 900 MG: 900 INJECTION, SOLUTION INTRAVENOUS at 21:52

## 2020-01-03 RX ADMIN — CYCLOBENZAPRINE HYDROCHLORIDE 5 MG: 5 TABLET, FILM COATED ORAL at 23:33

## 2020-01-03 RX ADMIN — DOCUSATE SODIUM 100 MG: 100 CAPSULE, LIQUID FILLED ORAL at 13:57

## 2020-01-03 RX ADMIN — HYDROMORPHONE HYDROCHLORIDE 0.5 MG: 1 INJECTION, SOLUTION INTRAMUSCULAR; INTRAVENOUS; SUBCUTANEOUS at 09:20

## 2020-01-03 RX ADMIN — ACETAMINOPHEN 650 MG: 325 TABLET ORAL at 13:56

## 2020-01-03 RX ADMIN — Medication 0.2 MG: at 08:04

## 2020-01-03 RX ADMIN — HYDROMORPHONE HYDROCHLORIDE 0.5 MG: 1 INJECTION, SOLUTION INTRAMUSCULAR; INTRAVENOUS; SUBCUTANEOUS at 09:00

## 2020-01-03 RX ADMIN — LEVOTHYROXINE SODIUM 112 MCG: 112 TABLET ORAL at 13:57

## 2020-01-03 RX ADMIN — LIDOCAINE HYDROCHLORIDE 50 MG: 10 INJECTION, SOLUTION INFILTRATION; PERINEURAL at 08:04

## 2020-01-03 RX ADMIN — SENNOSIDES AND DOCUSATE SODIUM 1 TABLET: 8.6; 5 TABLET ORAL at 21:52

## 2020-01-03 RX ADMIN — SODIUM CHLORIDE, SODIUM LACTATE, POTASSIUM CHLORIDE, AND CALCIUM CHLORIDE: 600; 310; 30; 20 INJECTION, SOLUTION INTRAVENOUS at 07:50

## 2020-01-03 RX ADMIN — SODIUM CHLORIDE: 9 INJECTION, SOLUTION INTRAVENOUS at 12:37

## 2020-01-03 RX ADMIN — OXYCODONE HYDROCHLORIDE 5 MG: 5 TABLET ORAL at 21:52

## 2020-01-03 RX ADMIN — ROPIVACAINE HYDROCHLORIDE 20 ML: 5 INJECTION, SOLUTION EPIDURAL; INFILTRATION; PERINEURAL at 07:09

## 2020-01-03 RX ADMIN — SUGAMMADEX 200 MG: 100 INJECTION, SOLUTION INTRAVENOUS at 10:21

## 2020-01-03 RX ADMIN — MIDAZOLAM 1 MG: 1 INJECTION INTRAMUSCULAR; INTRAVENOUS at 07:07

## 2020-01-03 RX ADMIN — ROCURONIUM BROMIDE 5 MG: 10 SOLUTION INTRAVENOUS at 08:04

## 2020-01-03 RX ADMIN — Medication 20 MG: at 08:38

## 2020-01-03 RX ADMIN — Medication 120 MG: at 08:04

## 2020-01-03 RX ADMIN — SIMVASTATIN 20 MG: 20 TABLET, FILM COATED ORAL at 21:52

## 2020-01-03 RX ADMIN — PROPOFOL 80 MG: 10 INJECTION, EMULSION INTRAVENOUS at 08:04

## 2020-01-03 RX ADMIN — DOCUSATE SODIUM 100 MG: 100 CAPSULE, LIQUID FILLED ORAL at 21:52

## 2020-01-03 RX ADMIN — SODIUM CHLORIDE: 9 INJECTION, SOLUTION INTRAVENOUS at 17:46

## 2020-01-03 RX ADMIN — LABETALOL 20 MG/4 ML (5 MG/ML) INTRAVENOUS SYRINGE 10 MG: at 09:03

## 2020-01-03 RX ADMIN — CLINDAMYCIN PHOSPHATE 900 MG: 900 INJECTION, SOLUTION INTRAVENOUS at 13:58

## 2020-01-03 ASSESSMENT — PAIN SCALES - GENERAL
PAINLEVEL_OUTOF10: 6
PAINLEVEL_OUTOF10: 0
PAINLEVEL_OUTOF10: 6

## 2020-01-03 ASSESSMENT — ENCOUNTER SYMPTOMS
VOMITING: 0
COLOR CHANGE: 1
COUGH: 0
WHEEZING: 0
SORE THROAT: 0
NAUSEA: 0
EYE REDNESS: 0
TROUBLE SWALLOWING: 0
SHORTNESS OF BREATH: 0

## 2020-01-03 NOTE — ANESTHESIA PRE PROCEDURE
Department of Anesthesiology  Preprocedure Note       Name:  Krista Billings   Age:  80 y.o.  :  1934                                          MRN:  [id number]         Date:  1/3/2020      Surgeon: Emilie Ricardo):  Jem Cervantes MD    Procedure: RIGHT TOTAL KNEE REPLACEMENT (Right )    Medications prior to admission:   Prior to Admission medications    Medication Sig Start Date End Date Taking? Authorizing Provider   FEROSUL 325 (65 Fe) MG tablet TAKE 1 TABLET BY MOUTH EVERY DAY 19 Yes Zahida Vail MD   calcitRIOL (ROCALTROL) 0.25 MCG capsule Take 0.25 mcg by mouth daily   Yes Historical Provider, MD   lisinopril (PRINIVIL;ZESTRIL) 2.5 MG tablet Take 2.5 mg by mouth daily   Yes Historical Provider, MD   allopurinol (ZYLOPRIM) 100 MG tablet Take 100 mg by mouth daily   Yes Historical Provider, MD   levothyroxine (SYNTHROID) 112 MCG tablet Take 112 mcg by mouth Daily   Yes Historical Provider, MD   simvastatin (ZOCOR) 20 MG tablet Take 20 mg by mouth nightly   Yes Historical Provider, MD   warfarin (COUMADIN) 5 MG tablet Take 5 mg by mouth daily every Tuesday and Friday take one tablet and all other days take 1/2 tablet    Historical Provider, MD       Current medications:    Current Facility-Administered Medications   Medication Dose Route Frequency Provider Last Rate Last Dose    midazolam (VERSED) injection 2 mg  2 mg Intravenous Once Ailyn Freedman MD        ceFAZolin (ANCEF) 2 g in 0.9% sodium chloride 50 mL IVPB  2 g Intravenous Once Jem Cervantes MD           Allergies:     Allergies   Allergen Reactions    Penicillins        Problem List:    Patient Active Problem List   Diagnosis Code    Heme positive stool R19.5    Anemia of chronic kidney failure, stage 4 (severe) (HCC) N18.4, D63.1    Iron deficiency anemia secondary to inadequate dietary iron intake D50.8    H/O abnormal mammogram Z87.898       Past Medical History:        Diagnosis Date    Abdominal aortic aneurysm (AAA)

## 2020-01-03 NOTE — DISCHARGE SUMMARY
NAME: Nicolasa Nava  : 1934  MRN: [id number]      Admission Date: 1/3/2020    Discharge Date:     Final Diagnoses: Unilateral primary osteoarthritis, right knee [M17.11]  Primary osteoarthritis of right knee [M17.11]    Procedures: R TKA    Consultations: Medicine    Reason for Admission: Primary osteoarthritis of right knee [V94.79]    Hospital Course: The patient was admitted with the above named diagnosis, surgery was performed and tolerated well. At the time of discharge, the patient was afebrile, vitals stable, pain was controlled with oral medication, they were tolerating a by mouth diet, and voiding appropriately. Physical therapy and occupational therapy were consulted. Given these findings they were deemed suitable to be discharged. Disposition: Home    Activity: Weight bearing as tolerated right lower    Wound Instructions: see postop instructions    Diet: cardiac diet    Resume home meds:   Prior to Admission medications    Medication Sig Start Date End Date Taking? Authorizing Provider   oxyCODONE-acetaminophen (PERCOCET) 7.5-325 MG per tablet Take 1 tablet by mouth every 4 hours as needed for Pain for up to 28 days.  Intended supply: 28 days 1/3/20 1/31/20 Yes Favian Fenton MD   docusate sodium (COLACE) 100 MG capsule Take 1 capsule by mouth 2 times daily 1/3/20  Yes Favian Fenton MD   ondansetron [facility]) 4 MG tablet Take 1 tablet by mouth every 8 hours as needed for Nausea or Vomiting 1/3/20  Yes Favian Fenton MD   cyclobenzaprine (FLEXERIL) 5 MG tablet Take 1 tablet by mouth 3 times daily as needed for Muscle spasms 1/3/20 1/13/20 Yes Favian Fenton MD   FEROSUL 325 (65 Fe) MG tablet TAKE 1 TABLET BY MOUTH EVERY DAY 19 Yes Tim Israel MD   calcitRIOL (ROCALTROL) 0.25 MCG capsule Take 0.25 mcg by mouth daily   Yes Historical Provider, MD   lisinopril (PRINIVIL;ZESTRIL) 2.5 MG tablet Take 2.5 mg by mouth daily   Yes Historical Provider, MD   allopurinol (ZYLOPRIM) 100 MG

## 2020-01-03 NOTE — CONSULTS
tablet by mouth every 8 hours as needed for Nausea or Vomiting 1/3/20  Yes Bashir Jasso MD   cyclobenzaprine (FLEXERIL) 5 MG tablet Take 1 tablet by mouth 3 times daily as needed for Muscle spasms 1/3/20 1/13/20 Yes Bashir Jasso MD   FEROSUL 325 (65 Fe) MG tablet TAKE 1 TABLET BY MOUTH EVERY DAY 12/27/19 1/26/20 Yes Rachael Chadwick MD   calcitRIOL (ROCALTROL) 0.25 MCG capsule Take 0.25 mcg by mouth daily   Yes Historical Provider, MD   lisinopril (PRINIVIL;ZESTRIL) 5 MG tablet Take 5 mg by mouth daily    Yes Historical Provider, MD   allopurinol (ZYLOPRIM) 100 MG tablet Take 100 mg by mouth daily   Yes Historical Provider, MD   levothyroxine (SYNTHROID) 112 MCG tablet Take 112 mcg by mouth Daily   Yes Historical Provider, MD   simvastatin (ZOCOR) 20 MG tablet Take 20 mg by mouth nightly   Yes Historical Provider, MD   warfarin (COUMADIN) 5 MG tablet Take 5 mg by mouth daily every Tuesday and Friday take one tablet and all other days take 1/2 tablet    Historical Provider, MD        0.9 % sodium chloride infusion, Continuous  calcitRIOL (ROCALTROL) capsule 0.25 mcg, Daily  levothyroxine (SYNTHROID) tablet 112 mcg, Daily  simvastatin (ZOCOR) tablet 20 mg, Nightly  sodium chloride flush 0.9 % injection 10 mL, 2 times per day  sodium chloride flush 0.9 % injection 10 mL, PRN  acetaminophen (TYLENOL) tablet 650 mg, Q6H  docusate sodium (COLACE) capsule 100 mg, BID  sennosides-docusate sodium (SENOKOT-S) 8.6-50 MG tablet 1 tablet, BID  magnesium hydroxide (MILK OF MAGNESIA) 400 MG/5ML suspension 30 mL, Daily PRN  ondansetron (ZOFRAN) injection 4 mg, Q6H PRN  clindamycin (CLEOCIN) 900 mg in dextrose 5 % 50 mL IVPB, Q8H  oxyCODONE (ROXICODONE) immediate release tablet 5 mg, Q4H PRN    Or  oxyCODONE (ROXICODONE) immediate release tablet 10 mg, Q4H PRN  HYDROmorphone (DILAUDID) injection 0.25 mg, Q3H PRN    Or  HYDROmorphone (DILAUDID) injection 0.5 mg, Q3H PRN  bisacodyl (DULCOLAX) suppository 10 mg, Daily PRN  cyclobenzaprine (FLEXERIL) tablet 5 mg, TID PRN  warfarin (COUMADIN) daily dosing (placeholder), RX Placeholder  [START ON 1/4/2020] lisinopril (PRINIVIL;ZESTRIL) tablet 5 mg, Daily        Allergies   Penicillins    Social History     Social History     Tobacco History     Smoking Status  Never Smoker    Smokeless Tobacco Use  Never Used          Alcohol History     Alcohol Use Status  No          Drug Use     Drug Use Status  No          Sexual Activity     Sexually Active  Not Asked                Family History     Family History   Problem Relation Age of Onset    Colon Cancer Brother     Cancer Mother         Breast Cancer    Heart Attack Father     Colon Polyps Neg Hx     Esophageal Cancer Neg Hx     Liver Cancer Neg Hx     Liver Disease Neg Hx     Rectal Cancer Neg Hx     Stomach Cancer Neg Hx        Review of Systems   Review of Systems   Constitutional: Positive for activity change and fatigue. Negative for fever and unexpected weight change. HENT: Negative for sore throat and trouble swallowing. Eyes: Negative for redness and visual disturbance. Respiratory: Negative for cough, shortness of breath and wheezing. Cardiovascular: Negative for chest pain, palpitations and leg swelling. Gastrointestinal: Negative for nausea and vomiting. Musculoskeletal: Positive for arthralgias and joint swelling. Right knee   Skin: Positive for color change and wound. Neurological: Negative for light-headedness and headaches. Psychiatric/Behavioral: Negative for agitation. Physical Exam   BP (!) 140/78   Pulse 61   Temp 96.5 °F (35.8 °C) (Temporal)   Resp 18   Ht 5' 2\" (1.575 m)   Wt 183 lb (83 kg)   SpO2 90%   BMI 33.47 kg/m²      Physical Exam  Vitals signs and nursing note reviewed. Constitutional:       General: She is not in acute distress. Appearance: She is not ill-appearing, toxic-appearing or diaphoretic. HENT:      Head: Normocephalic and atraumatic. Nose: Nose normal.   Eyes:      General: No scleral icterus. Right eye: No discharge. Left eye: No discharge. Conjunctiva/sclera: Conjunctivae normal.   Neck:      Musculoskeletal: Normal range of motion. No neck rigidity. Cardiovascular:      Rate and Rhythm: Normal rate and regular rhythm. Pulses: Normal pulses. Heart sounds: No murmur. Pulmonary:      Effort: Pulmonary effort is normal.      Breath sounds: No wheezing or rhonchi. Abdominal:      Tenderness: There is no tenderness. There is no guarding or rebound. Musculoskeletal:         General: Swelling present. Right knee: She exhibits decreased range of motion and swelling. Tenderness found. Legs:    Neurological:      General: No focal deficit present. Mental Status: She is alert and oriented to person, place, and time. Mental status is at baseline. Cranial Nerves: No cranial nerve deficit. Psychiatric:         Mood and Affect: Mood normal.         Labs    CBC:No results for input(s): WBC, RBC, HGB, HCT, MCV, RDW, PLT in the last 72 hours. CHEMISTRIES:No results for input(s): NA, K, CL, CO2, BUN, CREATININE, GLUCOSE, PHOS, MG in the last 72 hours. Invalid input(s): CA  PT/INR:  Recent Labs     01/03/20  1221   PROTIME 13.0   INR 1.04     APTT:No results for input(s): APTT in the last 72 hours. LIVER PROFILE:No results for input(s): AST, ALT, BILIDIR, BILITOT, ALKPHOS in the last 72 hours. Imaging/Diagnostics   Xr Knee Right (1-2 Views)    Result Date: 1/3/2020  1. Changes from right knee arthroplasty.  Signed by Dr Elías Obregon on 1/3/2020 11:05 AM      Assessment      Hospital Problems           Last Modified POA    Primary osteoarthritis of right knee 1/3/2020 Yes    Status post total right knee replacement 1/3/2020 Yes          Plan   Postoperative day #0 status post right total knee arthroplasty- we will continue to monitor for pain control, incentive spirometry at the bedside patient explained risk versus benefits of use, assess for bowel function return, PT/OT, anticoagulation with Coumadin, pharmacy dosing, PT/INR daily, CBC/metabolic profile in the a.m. Hypertension-chronic condition, continue with home lisinopril, routine vitals    Hyperlipidemia-chronic condition, continue statin therapy    CKD stage IV -chronic condition, will renally dose medications, avoid nephrotoxic agents, daily metabolic profile    History of DVT -patient anticoagulated with home Coumadin    Patient's home medications have been reviewed and reconciled if deemed pertinent to inpatient course. Patient as well as family members at the bedside agree and understand the plan of care. Electronically signed by   Lucina Thompson   Internal Medicine Hospitalist  On 1/3/2020  At 2:04 PM    EMR Dragon/Transcription disclaimer:   Much of this encounter note is an electronic transcription/translation of spoken language to printed text.  The electronic translation of spoken language may permit erroneous, or at times, nonsensical words or phrases to be inadvertently transcribed; although attempts have made to review the note for such errors, some may still exist.

## 2020-01-03 NOTE — H&P
Pt Name: Albert Cespedes  MRN: [id number]  YOB: 1934  Date: 1/3/2020      HPI: 80 y.o. Year old female with chronic right knee pain due to documented primary osteoarthritis. Has previously failed conservative treatments including, but not limited to, NSAIDs, physical therapy, and inject ible corticosteroids. Past Medical/Surgical History:   Past Medical History:   Diagnosis Date    Abdominal aortic aneurysm (AAA) ([address]     Anemia     Arthritis     Chronic kidney disease     DVT (deep vein thrombosis) in pregnancy     Hyperlipidemia     Hypertension     Kidney stones     Osteoarthritis     Thyroid disease     Thyroid disorder      Past Surgical History:   Procedure Laterality Date    COLONOSCOPY  2016    dr sorto-no problems    TOE AMPUTATION      TOE AMPUTATION           Social History:    Smoking:  No Smoking History = 0   Alcohol:Rare social consumption    Medications:   Prior to Admission medications    Medication Sig Start Date End Date Taking? Authorizing Provider   FEROSUL 325 (65 Fe) MG tablet TAKE 1 TABLET BY MOUTH EVERY DAY 12/27/19 1/26/20 Yes Bernie Gan MD   calcitRIOL (ROCALTROL) 0.25 MCG capsule Take 0.25 mcg by mouth daily   Yes Historical Provider, MD   lisinopril (PRINIVIL;ZESTRIL) 2.5 MG tablet Take 2.5 mg by mouth daily   Yes Historical Provider, MD   allopurinol (ZYLOPRIM) 100 MG tablet Take 100 mg by mouth daily   Yes Historical Provider, MD   levothyroxine (SYNTHROID) 112 MCG tablet Take 112 mcg by mouth Daily   Yes Historical Provider, MD   simvastatin (ZOCOR) 20 MG tablet Take 20 mg by mouth nightly   Yes Historical Provider, MD   warfarin (COUMADIN) 5 MG tablet Take 5 mg by mouth daily every Tuesday and Friday take one tablet and all other days take 1/2 tablet    Historical Provider, MD       Allergies:    Allergies   Allergen Reactions    Penicillins        Review of systems:     * Constitutional: negative     * HEENT: negative     * Skin: negative

## 2020-01-03 NOTE — OP NOTE
OPERATIVE NOTE    PREOPERATIVE DIAGNOSIS: Right knee primary osteoarthritis     POSTOPERATIVE DIAGNOSIS:  Right knee primary osteoarthritis     PROCEDURE:  Right Total Knee Arthroplasty     SURGEON:  Oliver Watkins MD    ASSISTANT:  Sc[employer] Person [employer]: Akshat Mckeon; Tenzin Batista    The assistant aided in limb position as well as retractor placement.  The assistant was also critical in implantation of the prosthesis.  It was necessary to have the assistant present in order to complete the procedure. ANESTHESIA:  General    ESTIMATED BLOOD LOSS:  200 cc. COMPLICATIONS:  Bleeding. CONDITION:  Stable. IMPLANTS:    Implant Name Type Inv.  Item Serial No.  Lot No. LRB No. Used   CEMENT BONE R 1X40 US Cement CEMENT BONE R 1X40 US  [employer] [id number] Right 1   CEMENT BONE R 1X40 US Cement CEMENT BONE R 1X40 US  [employer] [id number] Right 1   IMPL KNEE CAP ITOTAL CR-IPOLY - [id number] Knee IMPL KNEE CAP ITOTAL CR-IPOLY [id number] [employer]  Right 1   IMPL KNEE ITOTAL TIB TRAY - [id number] Knee IMPL KNEE ITOTAL TIB TRAY [id number] [employer]  Right 1   IMPL KNEE ITOTAL LATERAL INSRT C - [id number] Knee IMPL KNEE ITOTAL LATERAL INSRT C [id number] Hil[employer] Dick  Right 1   IMPL KNEE ITOTAL FEM - [id number] Knee IMPL KNEE ITOTAL FEM [id number] [employer]  Right 1   IMPL KNEE PATELLA ITOTAL IPOLY 29X6MM Knee IMPL KNEE PATELLA ITOTAL IPOLY 29X6MM  [employer] [id number] Right 1   8mm medial insert    [id number]   Right 1        HISTORY: 84 yo female presents today for a right total knee arthroplasty for primary osteoarthritis.  The patient has failed conservative management including: time, activity modifications, NSAIDs and corticosteroids.     PROCEDURE:  The patient was interviewed in the pre-anesthesia area.  The operative right limb was then marked with a marking pen.  The patient was then administered a regional block per the anesthesia team.  The patient was then taken to the operative suite where liftoff and appropriate balancing in both flexion and extension.     Attention was then turned to the patella.  Using a patellar sizer, it was determined that a size 29 mm patella would be appropriate necessitating a 6 mm resection.  Using the patella osteotomy guide, the patella was prepared followed by drill hole preparation.     At this point in time, all trial components were removed.  The knee was copiously irrigated with pulsatile lavage.  We then cemented in our tibia component with placement of the 8 mm medial final poly insert and lateral C final poly insert and femoral components as well as our 29 mm patellar component.  After the cement was allowed to cure, meticulous cement clean up was carried out.  At this point, the knee achieved full extension and was stable in flexion, as well as being stable with varus/valgus stress testing.  The knee was once again copiously irrigated with pulsatile lavage.  We then injected the posterior capsule with a local cocktail solution, both posteromedially and posterolaterally.  Care was taken to make sure there was no remaining cement.  The knee was then brought into 30 degrees of flexion.  The tourniquet was deflated and meticulous hemostasis was maintained with electrocautery.       The extensor mechanism was then closed with a #1 Vicryl suture.  The skin was then approximated with a 3-0 Vicryl suture and closed with a Prineo wound closure system.  The patient was placed in a sterile dressing.  The patient was awakened from anesthesia without difficulty and was transferred to the PACU in stable condition.  All sponge, needle and instrument counts were correct at the end of the procedure.

## 2020-01-04 LAB
ANION GAP SERPL CALCULATED.3IONS-SCNC: 13 MMOL/L (ref 7–19)
BUN BLDV-MCNC: 27 MG/DL (ref 8–23)
CALCIUM SERPL-MCNC: 8.6 MG/DL (ref 8.8–10.2)
CHLORIDE BLD-SCNC: 104 MMOL/L (ref 98–111)
CO2: 19 MMOL/L (ref 22–29)
CREAT SERPL-MCNC: 1.3 MG/DL (ref 0.5–0.9)
GFR NON-AFRICAN AMERICAN: 39
GLUCOSE BLD-MCNC: 173 MG/DL (ref 74–109)
HCT VFR BLD CALC: 30.7 % (ref 37–47)
HEMOGLOBIN: 8.7 G/DL (ref 12–16)
INR BLD: 1.21 (ref 0.88–1.18)
MCH RBC QN AUTO: 32.6 PG (ref 27–31)
MCHC RBC AUTO-ENTMCNC: 28.3 G/DL (ref 33–37)
MCV RBC AUTO: 115 FL (ref 81–99)
PDW BLD-RTO: 14.3 % (ref 11.5–14.5)
PLATELET # BLD: 215 K/UL (ref 130–400)
PMV BLD AUTO: 10.6 FL (ref 9.4–12.3)
POTASSIUM SERPL-SCNC: 4 MMOL/L (ref 3.5–5)
PROTHROMBIN TIME: 14.7 SEC (ref 12–14.6)
RBC # BLD: 2.67 M/UL (ref 4.2–5.4)
SODIUM BLD-SCNC: 136 MMOL/L (ref 136–145)
WBC # BLD: 8 K/UL (ref 4.8–10.8)

## 2020-01-04 PROCEDURE — 6370000000 HC RX 637 (ALT 250 FOR IP): Performed by: ORTHOPAEDIC SURGERY

## 2020-01-04 PROCEDURE — 2700000000 HC OXYGEN THERAPY PER DAY

## 2020-01-04 PROCEDURE — 6370000000 HC RX 637 (ALT 250 FOR IP): Performed by: HOSPITALIST

## 2020-01-04 PROCEDURE — 51798 US URINE CAPACITY MEASURE: CPT

## 2020-01-04 PROCEDURE — 6360000002 HC RX W HCPCS: Performed by: ORTHOPAEDIC SURGERY

## 2020-01-04 PROCEDURE — G0378 HOSPITAL OBSERVATION PER HR: HCPCS

## 2020-01-04 PROCEDURE — 80048 BASIC METABOLIC PNL TOTAL CA: CPT

## 2020-01-04 PROCEDURE — 51701 INSERT BLADDER CATHETER: CPT

## 2020-01-04 PROCEDURE — 97530 THERAPEUTIC ACTIVITIES: CPT

## 2020-01-04 PROCEDURE — 85027 COMPLETE CBC AUTOMATED: CPT

## 2020-01-04 PROCEDURE — 97116 GAIT TRAINING THERAPY: CPT

## 2020-01-04 PROCEDURE — 2580000003 HC RX 258: Performed by: ORTHOPAEDIC SURGERY

## 2020-01-04 PROCEDURE — 36415 COLL VENOUS BLD VENIPUNCTURE: CPT

## 2020-01-04 PROCEDURE — 96374 THER/PROPH/DIAG INJ IV PUSH: CPT

## 2020-01-04 PROCEDURE — 85610 PROTHROMBIN TIME: CPT

## 2020-01-04 RX ORDER — WARFARIN SODIUM 5 MG/1
5 TABLET ORAL
Status: COMPLETED | OUTPATIENT
Start: 2020-01-04 | End: 2020-01-04

## 2020-01-04 RX ADMIN — OXYCODONE HYDROCHLORIDE 5 MG: 5 TABLET ORAL at 05:57

## 2020-01-04 RX ADMIN — ACETAMINOPHEN 650 MG: 325 TABLET ORAL at 04:00

## 2020-01-04 RX ADMIN — OXYCODONE HYDROCHLORIDE 5 MG: 5 TABLET ORAL at 11:22

## 2020-01-04 RX ADMIN — SENNOSIDES AND DOCUSATE SODIUM 1 TABLET: 8.6; 5 TABLET ORAL at 10:12

## 2020-01-04 RX ADMIN — DOCUSATE SODIUM 100 MG: 100 CAPSULE, LIQUID FILLED ORAL at 10:12

## 2020-01-04 RX ADMIN — ACETAMINOPHEN 650 MG: 325 TABLET ORAL at 18:52

## 2020-01-04 RX ADMIN — DOCUSATE SODIUM 100 MG: 100 CAPSULE, LIQUID FILLED ORAL at 22:32

## 2020-01-04 RX ADMIN — ACETAMINOPHEN 650 MG: 325 TABLET ORAL at 11:21

## 2020-01-04 RX ADMIN — LEVOTHYROXINE SODIUM 112 MCG: 112 TABLET ORAL at 05:58

## 2020-01-04 RX ADMIN — SODIUM CHLORIDE, PRESERVATIVE FREE 10 ML: 5 INJECTION INTRAVENOUS at 22:33

## 2020-01-04 RX ADMIN — CALCITRIOL 0.25 MCG: 0.25 CAPSULE ORAL at 10:12

## 2020-01-04 RX ADMIN — WARFARIN SODIUM 5 MG: 5 TABLET ORAL at 18:52

## 2020-01-04 RX ADMIN — SENNOSIDES AND DOCUSATE SODIUM 1 TABLET: 8.6; 5 TABLET ORAL at 22:32

## 2020-01-04 RX ADMIN — SIMVASTATIN 20 MG: 20 TABLET, FILM COATED ORAL at 22:32

## 2020-01-04 RX ADMIN — HYDROMORPHONE HYDROCHLORIDE 0.5 MG: 1 INJECTION, SOLUTION INTRAMUSCULAR; INTRAVENOUS; SUBCUTANEOUS at 00:32

## 2020-01-04 ASSESSMENT — PAIN SCALES - GENERAL
PAINLEVEL_OUTOF10: 2
PAINLEVEL_OUTOF10: 0
PAINLEVEL_OUTOF10: 5
PAINLEVEL_OUTOF10: 0
PAINLEVEL_OUTOF10: 5
PAINLEVEL_OUTOF10: 0
PAINLEVEL_OUTOF10: 10
PAINLEVEL_OUTOF10: 0
PAINLEVEL_OUTOF10: 6

## 2020-01-04 ASSESSMENT — PAIN DESCRIPTION - DESCRIPTORS
DESCRIPTORS: DISCOMFORT
DESCRIPTORS: ACHING

## 2020-01-04 ASSESSMENT — PAIN DESCRIPTION - ORIENTATION
ORIENTATION: RIGHT
ORIENTATION: RIGHT

## 2020-01-04 ASSESSMENT — PAIN DESCRIPTION - PROGRESSION
CLINICAL_PROGRESSION: GRADUALLY WORSENING
CLINICAL_PROGRESSION: GRADUALLY WORSENING
CLINICAL_PROGRESSION: GRADUALLY IMPROVING
CLINICAL_PROGRESSION: GRADUALLY WORSENING
CLINICAL_PROGRESSION: GRADUALLY WORSENING

## 2020-01-04 ASSESSMENT — PAIN DESCRIPTION - ONSET
ONSET: GRADUAL
ONSET: GRADUAL

## 2020-01-04 ASSESSMENT — PAIN DESCRIPTION - LOCATION
LOCATION: KNEE
LOCATION: KNEE

## 2020-01-04 ASSESSMENT — PAIN - FUNCTIONAL ASSESSMENT
PAIN_FUNCTIONAL_ASSESSMENT: PREVENTS OR INTERFERES SOME ACTIVE ACTIVITIES AND ADLS
PAIN_FUNCTIONAL_ASSESSMENT: PREVENTS OR INTERFERES SOME ACTIVE ACTIVITIES AND ADLS

## 2020-01-04 ASSESSMENT — PAIN DESCRIPTION - PAIN TYPE
TYPE: ACUTE PAIN;SURGICAL PAIN
TYPE: ACUTE PAIN;SURGICAL PAIN

## 2020-01-04 ASSESSMENT — PAIN DESCRIPTION - FREQUENCY
FREQUENCY: INTERMITTENT
FREQUENCY: INTERMITTENT

## 2020-01-04 ASSESSMENT — PAIN DESCRIPTION - DIRECTION
RADIATING_TOWARDS: LEG
RADIATING_TOWARDS: LEG

## 2020-01-05 VITALS
DIASTOLIC BLOOD PRESSURE: 89 MMHG | RESPIRATION RATE: 16 BRPM | SYSTOLIC BLOOD PRESSURE: 174 MMHG | WEIGHT: 183 LBS | HEIGHT: 62 IN | HEART RATE: 89 BPM | OXYGEN SATURATION: 95 % | TEMPERATURE: 98.3 F | BODY MASS INDEX: 33.68 KG/M2

## 2020-01-05 LAB
INR BLD: 1.39 (ref 0.88–1.18)
PROTHROMBIN TIME: 16.4 SEC (ref 12–14.6)

## 2020-01-05 PROCEDURE — G0378 HOSPITAL OBSERVATION PER HR: HCPCS

## 2020-01-05 PROCEDURE — 85610 PROTHROMBIN TIME: CPT

## 2020-01-05 PROCEDURE — 6370000000 HC RX 637 (ALT 250 FOR IP): Performed by: FAMILY MEDICINE

## 2020-01-05 PROCEDURE — 6370000000 HC RX 637 (ALT 250 FOR IP): Performed by: ORTHOPAEDIC SURGERY

## 2020-01-05 PROCEDURE — 97530 THERAPEUTIC ACTIVITIES: CPT

## 2020-01-05 PROCEDURE — 2580000003 HC RX 258: Performed by: ORTHOPAEDIC SURGERY

## 2020-01-05 PROCEDURE — 36415 COLL VENOUS BLD VENIPUNCTURE: CPT

## 2020-01-05 PROCEDURE — 97116 GAIT TRAINING THERAPY: CPT

## 2020-01-05 RX ORDER — WARFARIN SODIUM 5 MG/1
5 TABLET ORAL
Status: DISCONTINUED | OUTPATIENT
Start: 2020-01-05 | End: 2020-01-05 | Stop reason: HOSPADM

## 2020-01-05 RX ADMIN — ACETAMINOPHEN 650 MG: 325 TABLET ORAL at 06:38

## 2020-01-05 RX ADMIN — LISINOPRIL 5 MG: 5 TABLET ORAL at 08:28

## 2020-01-05 RX ADMIN — DOCUSATE SODIUM 100 MG: 100 CAPSULE, LIQUID FILLED ORAL at 08:28

## 2020-01-05 RX ADMIN — CALCITRIOL 0.25 MCG: 0.25 CAPSULE ORAL at 08:28

## 2020-01-05 RX ADMIN — LEVOTHYROXINE SODIUM 112 MCG: 112 TABLET ORAL at 06:37

## 2020-01-05 RX ADMIN — SODIUM CHLORIDE: 9 INJECTION, SOLUTION INTRAVENOUS at 08:28

## 2020-01-05 RX ADMIN — SENNOSIDES AND DOCUSATE SODIUM 1 TABLET: 8.6; 5 TABLET ORAL at 08:28

## 2020-01-05 RX ADMIN — ACETAMINOPHEN 650 MG: 325 TABLET ORAL at 12:31

## 2020-01-05 ASSESSMENT — PAIN SCALES - GENERAL
PAINLEVEL_OUTOF10: 5
PAINLEVEL_OUTOF10: 5

## 2020-01-05 ASSESSMENT — PAIN DESCRIPTION - PROGRESSION
CLINICAL_PROGRESSION: GRADUALLY WORSENING

## 2020-01-05 NOTE — PROGRESS NOTES
Orthopedic Surgery Right TKRA Progress Note    Judeth Heading  2020  [id number]/[id number]  POD# 1 Day Post-Op Procedure(s):  RIGHT TOTAL KNEE REPLACEMENT    Subjective: Interval History: Patient stating that pain is mild, but muscle spasms are prevalent    Systemic or Specific Complaints: Muscle spasms    Pain waxing and waning    Objective:     General: A&O x3, NAD, No signs or symptoms of PE. Wound: clean, dry, intact             Dressing: clean, dry, and intact   Extremity: Distal NVI, Soft throughout           DVT Exam: No evidence of DVT seen on physical exam.                   Data Review:  Vitals:  /61   Pulse 81   Temp 98.1 °F (36.7 °C) (Temporal)   Resp 18   Ht 5' 2\" (1.575 m)   Wt 183 lb (83 kg)   SpO2 92%   BMI 33.47 kg/m²   Temp (24hrs), Av.4 °F (36.3 °C), Min:96.3 °F (35.7 °C), Max:98.8 °F (37.1 °C)      I/O (24Hr):     Intake/Output Summary (Last 24 hours) at 2020 0891  Last data filed at 2020 0556  Gross per 24 hour   Intake 2801 ml   Output 2000 ml   Net 801 ml       Assessment:     <principal problem not specified>  POD 1 Day Post-Op Procedure(s):  RIGHT TOTAL KNEE REPLACEMENT      Plan:      1:  DVT prophylaxis, ICE, elevate  2:  Pain control  3:  Physical therapy/Occupation therapy  4:  Anticipate discharge today if pain well controlled  5: Weight bearing as tolerated operative extremity    Electronically signed by Bishop Soares on 2020 at 8:48 AM
Patient resting well vs stable and cms+ to rle and palpable pedal pulse noted to rle, acewrap noted, difficulty voiding today and I/o cath done, will monitor for urine retention tonight as well, no nausea , pain under control with oxy ir po, Electronically signed by Claudine Marvin RN on 1/4/2020 at 8:15 PM
Patient transferred from room 536 to 529 for staffing purposes. New armband placed on patient.     Electronically signed by Roni Milner RN on 1/5/2020 at 10:32 AM
Physical Therapy  Sanjeev Mean  [id number]     01/04/20 9092   Subjective   Subjective Patient up in chair after lunch and wanting to go back to bed. Bed Mobility   Sit to Supine Moderate assistance  (x2)   Transfers   Sit to Stand Minimal Assistance;2 Person Assistance   Stand to sit Minimal Assistance;2 Person Assistance   Ambulation   Ambulation? Yes   Ambulation 1   Surface level tile   Device Rolling Walker   Other Apparatus   (IV)   Assistance Minimal assistance;2 Person assistance   Distance 3'   Other Activities   Comment Patient did better with assist x2. Patient stepped from chair to bed, returned to supine, positioned for comfort with all needs in reach and family present. Short term goals   Time Frame for Short term goals 14 DAYS BEFORE RE EVAL   Short term goal 1 SUP<>SIT, SBA   Short term goal 2 SIT<>STAND, CGA   Short term goal 3  FT WITH RW AND CGA   Short term goal 4 STEPS, MIN/CGA 1   Activity Tolerance   Activity Tolerance Patient Tolerated treatment well   Safety Devices   Type of devices Bed alarm in place;Call light within reach; Left in bed   Electronically signed by Iraida Garces PTA on 1/4/2020 at 1:49 PM
History  Social/Functional History  Lives With: Son  Type of Home: House  Home Layout: One level  Home Access: Stairs to enter with rails  Entrance Stairs - Number of Steps: 1+1  Bathroom Equipment: 3-in-1 commode  Home Equipment: Rolling walker  ADL Assistance: Independent  Ambulation Assistance: Independent  Transfer Assistance: Independent  Cognition        Objective          AROM RLE (degrees)  RLE General AROM: ABLE TO FLEX KNEE TO GROSSLY 80 DEG  AROM LLE (degrees)  LLE AROM : WFL  Strength RLE  Comment: NOT ABLE TO FULLY EXTEND THRU FULL ROM DUE TO NN BLOCK  Strength LLE  Strength LLE: WFL        Bed mobility  Supine to Sit: Minimal assistance; Moderate assistance  Transfers  Sit to Stand: Minimal Assistance; Moderate Assistance  Stand to sit: Contact guard assistance;Minimal Assistance  Bed to Chair: Minimal assistance; Moderate assistance  Ambulation  Ambulation?: Yes  Ambulation 1  Surface: level tile  Device: Rolling Walker  Other Apparatus: O2(GT BELT)  Assistance: Minimal assistance; Moderate assistance  Quality of Gait: R KNEE MILDLY BUCKLING WITH WB  Gait Deviations: Slow Sarahi;Decreased step length;Decreased step height  Distance: TOOK STEPS FROM BED TO CHAIR. UNABLE TO AMB TO THE BR DUE TO WEAKNESS/UNSTEADY.  RN NOTIFIED TO USE BSC FOR TOILETING AND ASSIST OF TWO FOR TF'S DUE TO R KNEE BUCKLING              Plan   Plan  Times per week: 5-7  Plan weeks: 2  Current Treatment Recommendations: Strengthening, Gait Training, Patient/Caregiver Education & Training, ROM, Stair training, Pain Management, Functional Mobility Training, Positioning, Transfer Training, Safety Education & Training  Plan Comment: WBAT, MAY NEED ASSIST OF TWO INITIALLY  Safety Devices  Type of devices: Call light within reach, Gait belt, Left in chair(FAMILY PRESENT)    G-Code       OutComes Score                                                  AM-PAC Score             Goals  Short term goals  Time Frame for Short term goals: 14 DAYS

## 2020-01-06 ENCOUNTER — TELEPHONE (OUTPATIENT)
Dept: INPATIENT UNIT | Age: 85
End: 2020-01-06

## 2020-01-15 LAB
INR BLD: 1.69 (ref 0.88–1.18)
PROTHROMBIN TIME: 19.2 SEC (ref 12–14.6)

## 2020-01-29 ENCOUNTER — HOSPITAL ENCOUNTER (OUTPATIENT)
Dept: INFUSION THERAPY | Age: 85
Discharge: HOME | End: 2020-01-29
Payer: MEDICARE

## 2020-01-29 VITALS
OXYGEN SATURATION: 91 % | BODY MASS INDEX: 33.42 KG/M2 | SYSTOLIC BLOOD PRESSURE: 142 MMHG | HEIGHT: 62 IN | DIASTOLIC BLOOD PRESSURE: 80 MMHG | HEART RATE: 71 BPM | WEIGHT: 181.6 LBS

## 2020-01-29 DIAGNOSIS — D50.8 IRON DEFICIENCY ANEMIA SECONDARY TO INADEQUATE DIETARY IRON INTAKE: ICD-10-CM

## 2020-01-29 DIAGNOSIS — N18.4 ANEMIA OF CHRONIC KIDNEY FAILURE, STAGE 4 (SEVERE) (HCC): Primary | ICD-10-CM

## 2020-01-29 DIAGNOSIS — D63.1 ANEMIA OF CHRONIC KIDNEY FAILURE, STAGE 4 (SEVERE) (HCC): Primary | ICD-10-CM

## 2020-01-29 PROCEDURE — 96372 THER/PROPH/DIAG INJ SC/IM: CPT

## 2020-01-29 PROCEDURE — 85025 COMPLETE CBC W/AUTO DIFF WBC: CPT

## 2020-01-29 PROCEDURE — 6360000002 HC RX W HCPCS: Performed by: NURSE PRACTITIONER

## 2020-01-29 RX ADMIN — EPOETIN ALFA-EPBX 40000 UNITS: 40000 INJECTION, SOLUTION INTRAVENOUS; SUBCUTANEOUS at 14:42

## 2020-02-26 ENCOUNTER — HOSPITAL ENCOUNTER (OUTPATIENT)
Dept: INFUSION THERAPY | Age: 85
Discharge: HOME | End: 2020-02-26
Payer: MEDICARE

## 2020-02-26 VITALS
OXYGEN SATURATION: 95 % | HEIGHT: 62 IN | BODY MASS INDEX: 34.04 KG/M2 | WEIGHT: 185 LBS | DIASTOLIC BLOOD PRESSURE: 60 MMHG | SYSTOLIC BLOOD PRESSURE: 158 MMHG | HEART RATE: 86 BPM

## 2020-02-26 DIAGNOSIS — D50.8 IRON DEFICIENCY ANEMIA SECONDARY TO INADEQUATE DIETARY IRON INTAKE: ICD-10-CM

## 2020-02-26 PROCEDURE — 85025 COMPLETE CBC W/AUTO DIFF WBC: CPT

## 2020-03-02 LAB
INR BLD: 2 (ref 0.88–1.18)
PROTHROMBIN TIME: 23 SEC (ref 12–14.6)

## 2020-03-05 ENCOUNTER — HOSPITAL ENCOUNTER (OUTPATIENT)
Dept: INFUSION THERAPY | Age: 85
Discharge: HOME | End: 2020-03-05
Payer: MEDICARE

## 2020-03-05 ENCOUNTER — OFFICE VISIT (OUTPATIENT)
Dept: HEMATOLOGY | Age: 85
End: 2020-03-05
Payer: MEDICARE

## 2020-03-05 VITALS
SYSTOLIC BLOOD PRESSURE: 138 MMHG | HEART RATE: 84 BPM | WEIGHT: 187.8 LBS | BODY MASS INDEX: 34.56 KG/M2 | OXYGEN SATURATION: 99 % | HEIGHT: 62 IN | DIASTOLIC BLOOD PRESSURE: 78 MMHG

## 2020-03-05 DIAGNOSIS — N18.4 ANEMIA OF CHRONIC KIDNEY FAILURE, STAGE 4 (SEVERE) (HCC): Primary | ICD-10-CM

## 2020-03-05 DIAGNOSIS — D50.8 IRON DEFICIENCY ANEMIA SECONDARY TO INADEQUATE DIETARY IRON INTAKE: ICD-10-CM

## 2020-03-05 DIAGNOSIS — D63.1 ANEMIA OF CHRONIC KIDNEY FAILURE, STAGE 4 (SEVERE) (HCC): Primary | ICD-10-CM

## 2020-03-05 PROCEDURE — 83550 IRON BINDING TEST: CPT

## 2020-03-05 PROCEDURE — 96372 THER/PROPH/DIAG INJ SC/IM: CPT

## 2020-03-05 PROCEDURE — 36415 COLL VENOUS BLD VENIPUNCTURE: CPT

## 2020-03-05 PROCEDURE — G8484 FLU IMMUNIZE NO ADMIN: HCPCS | Performed by: NURSE PRACTITIONER

## 2020-03-05 PROCEDURE — 1036F TOBACCO NON-USER: CPT | Performed by: NURSE PRACTITIONER

## 2020-03-05 PROCEDURE — 99213 OFFICE O/P EST LOW 20 MIN: CPT | Performed by: NURSE PRACTITIONER

## 2020-03-05 PROCEDURE — 1090F PRES/ABSN URINE INCON ASSESS: CPT | Performed by: NURSE PRACTITIONER

## 2020-03-05 PROCEDURE — 80053 COMPREHEN METABOLIC PANEL: CPT

## 2020-03-05 PROCEDURE — 4040F PNEUMOC VAC/ADMIN/RCVD: CPT | Performed by: NURSE PRACTITIONER

## 2020-03-05 PROCEDURE — G8400 PT W/DXA NO RESULTS DOC: HCPCS | Performed by: NURSE PRACTITIONER

## 2020-03-05 PROCEDURE — 1123F ACP DISCUSS/DSCN MKR DOCD: CPT | Performed by: NURSE PRACTITIONER

## 2020-03-05 PROCEDURE — G8427 DOCREV CUR MEDS BY ELIG CLIN: HCPCS | Performed by: NURSE PRACTITIONER

## 2020-03-05 PROCEDURE — 6360000002 HC RX W HCPCS: Performed by: NURSE PRACTITIONER

## 2020-03-05 PROCEDURE — 85025 COMPLETE CBC W/AUTO DIFF WBC: CPT

## 2020-03-05 PROCEDURE — 82728 ASSAY OF FERRITIN: CPT

## 2020-03-05 PROCEDURE — 83540 ASSAY OF IRON: CPT

## 2020-03-05 PROCEDURE — G8417 CALC BMI ABV UP PARAM F/U: HCPCS | Performed by: NURSE PRACTITIONER

## 2020-03-05 RX ORDER — TRIAMTERENE AND HYDROCHLOROTHIAZIDE 37.5; 25 MG/1; MG/1
1 TABLET ORAL DAILY
Status: ON HOLD | COMMUNITY
End: 2020-12-05 | Stop reason: HOSPADM

## 2020-03-05 RX ADMIN — EPOETIN ALFA-EPBX 40000 UNITS: 40000 INJECTION, SOLUTION INTRAVENOUS; SUBCUTANEOUS at 12:30

## 2020-03-05 ASSESSMENT — ENCOUNTER SYMPTOMS
EYES NEGATIVE: 1
EYE DISCHARGE: 0
COUGH: 0
EYE PAIN: 0
VOMITING: 0
SHORTNESS OF BREATH: 0
GASTROINTESTINAL NEGATIVE: 1
WHEEZING: 0
BLOOD IN STOOL: 0
SORE THROAT: 0
DIARRHEA: 0
NAUSEA: 0
CONSTIPATION: 0
ABDOMINAL PAIN: 0
RESPIRATORY NEGATIVE: 1
BACK PAIN: 0
EYE REDNESS: 0

## 2020-03-05 NOTE — PROGRESS NOTES
Progress Note      Pt Name: Gissel Gallegos  YOB: 1934  MRN: [id number]    Date of evaluation: 3/5/2020  History Obtained From:  patient, electronic medical record    CHIEF COMPLAINT:    Chief Complaint   Patient presents with    Anemia     Anemia of chronic kidney failure, stage 4    Treatment    Follow-up     HISTORY OF PRESENT ILLNESS:    Gissel Gallegos is a 80 y.o.  female with significant PMH of iron deficiency anemia and chronic kidney disease stage IV. She receives BARBARA therapy with Procrit intermittently her chronic kidney disease stage IV, initiated 11/26/2018 last dose was delivered on 1/29/2020.  1 need to also take oral ferrous sulfate 325 mg daily and reports tolerating without difficulty. She returns today in scheduled 3-month follow-up for lab monitoring, evaluation of response to Procrit and for further treatment recommendations. She presents today with no new complaints, continues to have significant fatigue. Reports tolerating Procrit without difficulty. CBC today reveals a hemoglobin of 10.0 with an MCV of 111.2.,  She had a hemoglobin of 10.7 on 1/29/2020. Will reevaluate iron substrates today. She denies any bleeding tendencies. Harjit Starkey will receive Procrit today and will continue returning every 4 weeks for consideration for Procrit to be given for hemoglobin <11. HEMATOLOGY HISTORY:   Diagnosis:   Anemia of chronic disease   Chronic kidney disease stage IV    Treatment summary:  Ferrous sulfate 325 mg daily   11/26/2018 - Procrit 20,000 units for chronic kidney disease stage IV. Procrit to be given every 2 weeks ×4 and then monthly, dose to be titrated appropriately. Hold dose for hemoglobin >11    HEMATOLOGY HISTORY:  Harjit Starkey was seen in initial hematology consultation on 7/20/2018 for further evaluation and treatment recommendations for anemia. Harjit Starkey was referred from Dr. Anahy Gooden.    Harjit Starkey presented with no significant than 200 and an iron saturation of 25% for the Procrit to be beneficial. Hold Procrit dose for hemoglobin >11. Indications/rationale for Procrit was discussed with [place]. Risks, benefits and expectations were outlined. Risks including, but not limited to hypertension, stroke, MI, death were discussed and acknowledged by [place].     Serology studies on 3/8/2019:  Creatinine 1.7/GFR 30.4   Iron 80   Iron saturation 26.5%   TIBC 302   Ferritin 144   Vitamin B12 501   Folate 13.9    Past Medical History:    Past Medical History:   Diagnosis Date    Abdominal aortic aneurysm (AAA) (HCC)     Anemia     Arthritis     Chronic kidney disease     DVT of lower extremity (deep venous thrombosis) ([facility] [address]     twice 2010 and 2017    Hyperlipidemia     Hypertension     Kidney stones     Osteoarthritis     Thyroid disease     Thyroid disorder        Past Surgical History:    Past Surgical History:   Procedure Laterality Date    COLONOSCOPY  2016    dr sorto-no problems    TOE AMPUTATION      TOE AMPUTATION      TOTAL KNEE ARTHROPLASTY Right 1/3/2020    RIGHT TOTAL KNEE REPLACEMENT performed by Madonna Richey MD at [facility] OR       Current Medications:    Current Outpatient Medications   Medication Sig Dispense Refill    triamterene-hydrochlorothiazide (MAXZIDE-25) 37.5-25 MG per tablet Take 1 tablet by mouth daily      docusate sodium (COLACE) 100 MG capsule Take 1 capsule by mouth 2 times daily 60 capsule 1    FEROSUL 325 (65 Fe) MG tablet TAKE 1 TABLET BY MOUTH EVERY DAY 30 tablet 3    calcitRIOL (ROCALTROL) 0.25 MCG capsule Take 0.25 mcg by mouth daily      allopurinol (ZYLOPRIM) 100 MG tablet Take 100 mg by mouth daily      warfarin (COUMADIN) 5 MG tablet Take 5 mg by mouth daily every Tuesday and Friday take one tablet and all other days take 1/2 tablet      levothyroxine (SYNTHROID) 112 MCG tablet Take 112 mcg by mouth Daily      simvastatin (ZOCOR) 20 MG tablet Take 20 mg by mouth nightly       No current facility-administered medications for this visit. Allergies: Allergies   Allergen Reactions    Penicillins Rash       Social History:    Social History     Tobacco Use    Smoking status: Never Smoker    Smokeless tobacco: Never Used   Substance Use Topics    Alcohol use: No    Drug use: No       Family History:   Family History   Problem Relation Age of Onset    Colon Cancer Brother     Cancer Mother         Breast Cancer    Heart Attack Father     Colon Polyps Neg Hx     Esophageal Cancer Neg Hx     Liver Cancer Neg Hx     Liver Disease Neg Hx     Rectal Cancer Neg Hx     Stomach Cancer Neg Hx        Vitals:  Vitals:    03/05/20 1146   BP: 138/78   Pulse: 84   SpO2: 99%   Weight: 187 lb 12.8 oz (85.2 kg)   Height: 5' 2\" (1.575 m)        Subjective   REVIEW OF SYSTEMS:   Review of Systems   Constitutional: Positive for fatigue. Negative for chills, diaphoresis and fever. HENT: Negative. Negative for congestion, ear pain, hearing loss, nosebleeds, sore throat and tinnitus. Eyes: Negative. Negative for pain, discharge and redness. Respiratory: Negative. Negative for cough, shortness of breath and wheezing. Cardiovascular: Negative. Negative for chest pain, palpitations and leg swelling. Gastrointestinal: Negative. Negative for abdominal pain, blood in stool, constipation, diarrhea, nausea and vomiting. Endocrine: Negative for polydipsia. Genitourinary: Negative for dysuria, flank pain, frequency, hematuria and urgency. Musculoskeletal: Negative. Negative for back pain, myalgias and neck pain. Skin: Negative. Negative for rash. Neurological: Negative. Negative for dizziness, tremors, seizures, weakness and headaches. Hematological: Does not bruise/bleed easily. Psychiatric/Behavioral: Negative. The patient is not nervous/anxious. Objective   PHYSICAL EXAM:  Physical Exam  Vitals signs reviewed.    Constitutional:       General: She is not in acute distress. Appearance: She is well-developed. She is not diaphoretic. HENT:      Head: Normocephalic and atraumatic. Mouth/Throat:      Pharynx: Uvula midline. Tonsils: No tonsillar exudate. Eyes:      General: Lids are normal. No scleral icterus. Right eye: No discharge. Left eye: No discharge. Conjunctiva/sclera: Conjunctivae normal.      Pupils: Pupils are equal, round, and reactive to light. Neck:      Musculoskeletal: Normal range of motion and neck supple. Thyroid: No thyroid mass or thyromegaly. Vascular: No JVD. Trachea: Trachea normal. No tracheal deviation. Cardiovascular:      Rate and Rhythm: Normal rate and regular rhythm. Heart sounds: Normal heart sounds. No murmur. No friction rub. No gallop. Pulmonary:      Effort: Pulmonary effort is normal. No respiratory distress. Breath sounds: Normal breath sounds. No wheezing or rales. Chest:      Chest wall: No tenderness. Abdominal:      General: Bowel sounds are normal. There is no distension. Palpations: Abdomen is soft. There is no mass. Tenderness: There is no abdominal tenderness. There is no guarding or rebound. Hernia: No hernia is present. Musculoskeletal:         General: No tenderness or deformity. Comments: Range of motion within normal limits x4 extremities   Skin:     General: Skin is warm. Coloration: Skin is not pale. Findings: No erythema or rash. Neurological:      Mental Status: She is alert and oriented to person, place, and time. Cranial Nerves: No cranial nerve deficit. Coordination: Coordination normal.   Psychiatric:         Behavior: Behavior normal.         Thought Content: Thought content normal.         Labs:  CBC: WBC 7.78, ANC 5.02, hemoglobin 10.0, .2 and a platelet count of 378,960    ASSESSMENT/PLAN:      1. Anemia of chronic kidney failure, stage 4.  Currently receiving BARBARA therapy with Procrit 40,000 units monthly, when hemoglobin is less than 11. Last received Procrit on 1/29/2020. Hemoglobin 10.0 today, will receive Procrit 40,000 units.    - CBC Auto Differential; Future   - Comprehensive Metabolic Panel; Future      2. Iron deficiency anemia secondary to inadequate dietary iron intake. Continues to take oral ferrous sulfate daily. Continues to take ferrous sulfate 325 mg daily without difficulty.  -Continue ferrous sulfate daily  - Ferritin; Future  - Iron and TIBC; Future     3. Hypothyroidism presently being managed by Dr. Georeg Caruso and is taking Synthroid 112 µg daily. Encouraged to follow up with Dr. George Caruso to reevaluate TSH level. 4.  Routine health screening history of abnormal mammogram:  Annual routine follow-up screening mammogram on 9/20/2019 documented no mammographic evidence of malignancy, BI-RADS Category 2 benign findings. FOLLOW UP:  1. Return monthly for CBC monitoring and consideration for Procrit  2. Follow-up appointment given for 3 months  3. Follow-up with other medical providers as recommended     Over 50% of the total visit time of 15 minutes in face to face encounter with the patient, out of which more than 50% of the time was spent in counseling patient  and coordination of care. Counseling included but was not limited to time spent reviewing labs, imaging studies/ treatment plan and answering questions. This does not include charting.      Electronically signed by ALBERTO Edmond on 3/7/2020 at 3:16 PM

## 2020-03-07 PROBLEM — E03.8 OTHER SPECIFIED HYPOTHYROIDISM: Status: ACTIVE | Noted: 2020-03-07

## 2020-04-02 ENCOUNTER — HOSPITAL ENCOUNTER (OUTPATIENT)
Dept: INFUSION THERAPY | Age: 85
Discharge: HOME | End: 2020-04-02
Payer: MEDICARE

## 2020-04-02 VITALS
HEART RATE: 84 BPM | OXYGEN SATURATION: 90 % | WEIGHT: 183.4 LBS | DIASTOLIC BLOOD PRESSURE: 74 MMHG | SYSTOLIC BLOOD PRESSURE: 124 MMHG | TEMPERATURE: 98.7 F | BODY MASS INDEX: 33.75 KG/M2 | HEIGHT: 62 IN

## 2020-04-02 DIAGNOSIS — D50.8 IRON DEFICIENCY ANEMIA SECONDARY TO INADEQUATE DIETARY IRON INTAKE: ICD-10-CM

## 2020-04-02 LAB
INR BLD: 1.66 (ref 0.88–1.18)
PROTHROMBIN TIME: 19.8 SEC (ref 12–14.6)

## 2020-04-02 PROCEDURE — 85025 COMPLETE CBC W/AUTO DIFF WBC: CPT

## 2020-04-07 ENCOUNTER — HOSPITAL ENCOUNTER (OUTPATIENT)
Dept: GENERAL RADIOLOGY | Age: 85
Discharge: HOME | End: 2020-04-07
Payer: MEDICARE

## 2020-04-07 PROCEDURE — 73502 X-RAY EXAM HIP UNI 2-3 VIEWS: CPT

## 2020-04-10 RX ORDER — FERROUS SULFATE 325(65) MG
TABLET ORAL
Qty: 30 TABLET | Refills: 3 | Status: SHIPPED | OUTPATIENT
Start: 2020-04-10 | End: 2020-08-06 | Stop reason: SDUPTHER

## 2020-04-30 ENCOUNTER — HOSPITAL ENCOUNTER (OUTPATIENT)
Dept: INFUSION THERAPY | Age: 85
Discharge: HOME | End: 2020-04-30
Payer: MEDICARE

## 2020-04-30 VITALS
HEART RATE: 90 BPM | WEIGHT: 183.7 LBS | TEMPERATURE: 98.7 F | BODY MASS INDEX: 33.8 KG/M2 | OXYGEN SATURATION: 97 % | SYSTOLIC BLOOD PRESSURE: 150 MMHG | HEIGHT: 62 IN | DIASTOLIC BLOOD PRESSURE: 90 MMHG

## 2020-04-30 DIAGNOSIS — N18.4 ANEMIA OF CHRONIC KIDNEY FAILURE, STAGE 4 (SEVERE) (HCC): Primary | ICD-10-CM

## 2020-04-30 DIAGNOSIS — D50.8 IRON DEFICIENCY ANEMIA SECONDARY TO INADEQUATE DIETARY IRON INTAKE: ICD-10-CM

## 2020-04-30 DIAGNOSIS — D63.1 ANEMIA OF CHRONIC KIDNEY FAILURE, STAGE 4 (SEVERE) (HCC): Primary | ICD-10-CM

## 2020-04-30 LAB
INR BLD: 1.8 (ref 0.88–1.18)
PROTHROMBIN TIME: 21.1 SEC (ref 12–14.6)

## 2020-04-30 PROCEDURE — 6360000002 HC RX W HCPCS: Performed by: NURSE PRACTITIONER

## 2020-04-30 PROCEDURE — 85025 COMPLETE CBC W/AUTO DIFF WBC: CPT

## 2020-04-30 PROCEDURE — 96372 THER/PROPH/DIAG INJ SC/IM: CPT

## 2020-04-30 RX ADMIN — EPOETIN ALFA-EPBX 40000 UNITS: 40000 INJECTION, SOLUTION INTRAVENOUS; SUBCUTANEOUS at 14:45

## 2020-05-18 LAB
ALBUMIN SERPL-MCNC: 4.3 G/DL (ref 3.5–5.2)
ALP BLD-CCNC: 81 U/L (ref 35–104)
ALT SERPL-CCNC: 8 U/L (ref 5–33)
ANION GAP SERPL CALCULATED.3IONS-SCNC: 17 MMOL/L (ref 7–19)
AST SERPL-CCNC: 13 U/L (ref 5–32)
BASOPHILS ABSOLUTE: 0.1 K/UL (ref 0–0.2)
BASOPHILS RELATIVE PERCENT: 1.5 % (ref 0–1)
BILIRUB SERPL-MCNC: 0.6 MG/DL (ref 0.2–1.2)
BUN BLDV-MCNC: 44 MG/DL (ref 8–23)
CALCIUM SERPL-MCNC: 10.4 MG/DL (ref 8.8–10.2)
CHLORIDE BLD-SCNC: 104 MMOL/L (ref 98–111)
CHOLESTEROL, TOTAL: 221 MG/DL (ref 160–199)
CO2: 21 MMOL/L (ref 22–29)
CREAT SERPL-MCNC: 1.5 MG/DL (ref 0.5–0.9)
EOSINOPHILS ABSOLUTE: 0.3 K/UL (ref 0–0.6)
EOSINOPHILS RELATIVE PERCENT: 4.1 % (ref 0–5)
GFR NON-AFRICAN AMERICAN: 33
GLUCOSE BLD-MCNC: 107 MG/DL (ref 74–109)
HBA1C MFR BLD: 6.1 % (ref 4–6)
HCT VFR BLD CALC: 36.7 % (ref 37–47)
HDLC SERPL-MCNC: 57 MG/DL (ref 65–121)
HEMOGLOBIN: 12 G/DL (ref 12–16)
IMMATURE GRANULOCYTES #: 0 K/UL
INR BLD: 1.79 (ref 0.88–1.18)
LDL CHOLESTEROL CALCULATED: 143 MG/DL
LYMPHOCYTES ABSOLUTE: 1.1 K/UL (ref 1.1–4.5)
LYMPHOCYTES RELATIVE PERCENT: 17.2 % (ref 20–40)
MCH RBC QN AUTO: 34.1 PG (ref 27–31)
MCHC RBC AUTO-ENTMCNC: 32.7 G/DL (ref 33–37)
MCV RBC AUTO: 104.3 FL (ref 81–99)
MONOCYTES ABSOLUTE: 0.6 K/UL (ref 0–0.9)
MONOCYTES RELATIVE PERCENT: 9.5 % (ref 0–10)
NEUTROPHILS ABSOLUTE: 4.4 K/UL (ref 1.5–7.5)
NEUTROPHILS RELATIVE PERCENT: 67.4 % (ref 50–65)
PDW BLD-RTO: 14.1 % (ref 11.5–14.5)
PLATELET # BLD: 316 K/UL (ref 130–400)
PMV BLD AUTO: 11.7 FL (ref 9.4–12.3)
POTASSIUM SERPL-SCNC: 4.1 MMOL/L (ref 3.5–5)
PROTHROMBIN TIME: 21.1 SEC (ref 12–14.6)
RBC # BLD: 3.52 M/UL (ref 4.2–5.4)
SODIUM BLD-SCNC: 142 MMOL/L (ref 136–145)
TOTAL PROTEIN: 7.2 G/DL (ref 6.6–8.7)
TRIGL SERPL-MCNC: 104 MG/DL (ref 0–149)
TSH SERPL DL<=0.05 MIU/L-ACNC: 2.42 UIU/ML (ref 0.27–4.2)
WBC # BLD: 6.6 K/UL (ref 4.8–10.8)

## 2020-05-28 ENCOUNTER — HOSPITAL ENCOUNTER (OUTPATIENT)
Dept: INFUSION THERAPY | Age: 85
Discharge: HOME | End: 2020-05-28
Payer: MEDICARE

## 2020-05-28 VITALS
WEIGHT: 183.8 LBS | SYSTOLIC BLOOD PRESSURE: 124 MMHG | DIASTOLIC BLOOD PRESSURE: 82 MMHG | HEART RATE: 83 BPM | TEMPERATURE: 98.4 F | OXYGEN SATURATION: 97 % | HEIGHT: 62 IN | BODY MASS INDEX: 33.82 KG/M2

## 2020-05-28 DIAGNOSIS — D50.8 IRON DEFICIENCY ANEMIA SECONDARY TO INADEQUATE DIETARY IRON INTAKE: Primary | ICD-10-CM

## 2020-05-28 DIAGNOSIS — D63.1 ANEMIA OF CHRONIC KIDNEY FAILURE, STAGE 4 (SEVERE) (HCC): ICD-10-CM

## 2020-05-28 DIAGNOSIS — N18.4 ANEMIA OF CHRONIC KIDNEY FAILURE, STAGE 4 (SEVERE) (HCC): ICD-10-CM

## 2020-05-28 LAB
BASOPHILS ABSOLUTE: 0.07 K/UL (ref 0.01–0.08)
BASOPHILS RELATIVE PERCENT: 1 % (ref 0.1–1.2)
EOSINOPHILS ABSOLUTE: 0.36 K/UL (ref 0.04–0.54)
EOSINOPHILS RELATIVE PERCENT: 5.2 % (ref 0.7–7)
HCT VFR BLD CALC: 33.7 % (ref 34.1–44.9)
HEMOGLOBIN: 10.7 G/DL (ref 11.2–15.7)
LYMPHOCYTES ABSOLUTE: 1.04 K/UL (ref 1.18–3.74)
LYMPHOCYTES RELATIVE PERCENT: 14.9 % (ref 19.3–53.1)
MCH RBC QN AUTO: 34.9 PG (ref 25.6–32.2)
MCHC RBC AUTO-ENTMCNC: 31.8 G/DL (ref 32.3–35.5)
MCV RBC AUTO: 109.8 FL (ref 79.4–94.8)
MONOCYTES ABSOLUTE: 0.56 K/UL (ref 0.24–0.82)
MONOCYTES RELATIVE PERCENT: 8 % (ref 4.7–12.5)
NEUTROPHILS ABSOLUTE: 4.93 K/UL (ref 1.56–6.13)
NEUTROPHILS RELATIVE PERCENT: 70.9 % (ref 34–71.1)
PDW BLD-RTO: 13.7 % (ref 11.7–14.4)
PLATELET # BLD: 238 K/UL (ref 182–369)
PMV BLD AUTO: 11.3 FL (ref 7.4–10.4)
RBC # BLD: 3.07 M/UL (ref 3.93–5.22)
WBC # BLD: 6.96 K/UL (ref 3.98–10.04)

## 2020-05-28 PROCEDURE — 6360000002 HC RX W HCPCS: Performed by: NURSE PRACTITIONER

## 2020-05-28 PROCEDURE — 85025 COMPLETE CBC W/AUTO DIFF WBC: CPT

## 2020-05-28 PROCEDURE — 96372 THER/PROPH/DIAG INJ SC/IM: CPT

## 2020-05-28 RX ADMIN — EPOETIN ALFA-EPBX 40000 UNITS: 40000 INJECTION, SOLUTION INTRAVENOUS; SUBCUTANEOUS at 15:17

## 2020-06-15 ENCOUNTER — HOSPITAL ENCOUNTER (OUTPATIENT)
Dept: GENERAL RADIOLOGY | Facility: HOSPITAL | Age: 85
Discharge: HOME | End: 2020-06-15
Admitting: NURSE PRACTITIONER

## 2020-06-15 ENCOUNTER — TRANSCRIBE ORDERS (OUTPATIENT)
Dept: GENERAL RADIOLOGY | Facility: HOSPITAL | Age: 85
End: 2020-06-15

## 2020-06-15 DIAGNOSIS — M54.50 LOW BACK PAIN WITHOUT SCIATICA, UNSPECIFIED BACK PAIN LATERALITY, UNSPECIFIED CHRONICITY: Primary | ICD-10-CM

## 2020-06-15 DIAGNOSIS — M54.50 LOW BACK PAIN WITHOUT SCIATICA, UNSPECIFIED BACK PAIN LATERALITY, UNSPECIFIED CHRONICITY: ICD-10-CM

## 2020-06-15 PROCEDURE — 72110 X-RAY EXAM L-2 SPINE 4/>VWS: CPT

## 2020-06-25 ENCOUNTER — OFFICE VISIT (OUTPATIENT)
Dept: HEMATOLOGY | Age: 85
End: 2020-06-25
Payer: MEDICARE

## 2020-06-25 ENCOUNTER — HOSPITAL ENCOUNTER (OUTPATIENT)
Dept: INFUSION THERAPY | Age: 85
Discharge: HOME | End: 2020-06-25
Payer: MEDICARE

## 2020-06-25 VITALS
OXYGEN SATURATION: 96 % | DIASTOLIC BLOOD PRESSURE: 82 MMHG | TEMPERATURE: 98 F | WEIGHT: 182.6 LBS | SYSTOLIC BLOOD PRESSURE: 156 MMHG | HEART RATE: 77 BPM | BODY MASS INDEX: 33.6 KG/M2 | HEIGHT: 62 IN

## 2020-06-25 DIAGNOSIS — D50.8 IRON DEFICIENCY ANEMIA SECONDARY TO INADEQUATE DIETARY IRON INTAKE: Primary | ICD-10-CM

## 2020-06-25 DIAGNOSIS — N18.4 ANEMIA OF CHRONIC KIDNEY FAILURE, STAGE 4 (SEVERE) (HCC): ICD-10-CM

## 2020-06-25 DIAGNOSIS — D63.1 ANEMIA OF CHRONIC KIDNEY FAILURE, STAGE 4 (SEVERE) (HCC): ICD-10-CM

## 2020-06-25 LAB
ALBUMIN SERPL-MCNC: 3.5 G/DL (ref 3.5–5.2)
ALP BLD-CCNC: 74 U/L (ref 35–104)
ALT SERPL-CCNC: 15 U/L (ref 9–52)
ANION GAP SERPL CALCULATED.3IONS-SCNC: 10 MMOL/L (ref 7–19)
AST SERPL-CCNC: 23 U/L (ref 14–36)
BASOPHILS ABSOLUTE: 0.06 K/UL (ref 0.01–0.08)
BASOPHILS RELATIVE PERCENT: 0.7 % (ref 0.1–1.2)
BILIRUB SERPL-MCNC: 0.6 MG/DL (ref 0.2–1.3)
BUN BLDV-MCNC: 32 MG/DL (ref 7–17)
CALCIUM SERPL-MCNC: 9.2 MG/DL (ref 8.4–10.2)
CHLORIDE BLD-SCNC: 105 MMOL/L (ref 98–111)
CO2: 25 MMOL/L (ref 22–29)
CREAT SERPL-MCNC: 1.3 MG/DL (ref 0.5–1)
EOSINOPHILS ABSOLUTE: 0.17 K/UL (ref 0.04–0.54)
EOSINOPHILS RELATIVE PERCENT: 1.9 % (ref 0.7–7)
FERRITIN: 311 NG/ML (ref 11.1–264)
GFR NON-AFRICAN AMERICAN: 39
GLOBULIN: 2.3 G/DL
GLUCOSE BLD-MCNC: 195 MG/DL (ref 74–106)
HCT VFR BLD CALC: 35.2 % (ref 34.1–44.9)
HEMOGLOBIN: 10.9 G/DL (ref 11.2–15.7)
INR BLD: 1.83 (ref 0.88–1.18)
IRON SATURATION: 23 % (ref 14–50)
IRON: 76 UG/DL (ref 37–170)
LYMPHOCYTES ABSOLUTE: 1.24 K/UL (ref 1.18–3.74)
LYMPHOCYTES RELATIVE PERCENT: 13.9 % (ref 19.3–53.1)
MCH RBC QN AUTO: 34.4 PG (ref 25.6–32.2)
MCHC RBC AUTO-ENTMCNC: 31 G/DL (ref 32.3–35.5)
MCV RBC AUTO: 111 FL (ref 79.4–94.8)
MONOCYTES ABSOLUTE: 0.9 K/UL (ref 0.24–0.82)
MONOCYTES RELATIVE PERCENT: 10.1 % (ref 4.7–12.5)
NEUTROPHILS ABSOLUTE: 6.58 K/UL (ref 1.56–6.13)
NEUTROPHILS RELATIVE PERCENT: 73.4 % (ref 34–71.1)
PDW BLD-RTO: 14.3 % (ref 11.7–14.4)
PLATELET # BLD: 243 K/UL (ref 182–369)
PMV BLD AUTO: 11.1 FL (ref 7.4–10.4)
POTASSIUM SERPL-SCNC: 3.8 MMOL/L (ref 3.5–5.1)
PROTHROMBIN TIME: 21.4 SEC (ref 12–14.6)
RBC # BLD: 3.17 M/UL (ref 3.93–5.22)
SODIUM BLD-SCNC: 140 MMOL/L (ref 137–145)
TOTAL IRON BINDING CAPACITY: 326 UG/DL (ref 265–497)
TOTAL PROTEIN: 5.8 G/DL (ref 6.3–8.2)
WBC # BLD: 8.95 K/UL (ref 3.98–10.04)

## 2020-06-25 PROCEDURE — 1090F PRES/ABSN URINE INCON ASSESS: CPT | Performed by: NURSE PRACTITIONER

## 2020-06-25 PROCEDURE — 1036F TOBACCO NON-USER: CPT | Performed by: NURSE PRACTITIONER

## 2020-06-25 PROCEDURE — 4040F PNEUMOC VAC/ADMIN/RCVD: CPT | Performed by: NURSE PRACTITIONER

## 2020-06-25 PROCEDURE — G8400 PT W/DXA NO RESULTS DOC: HCPCS | Performed by: NURSE PRACTITIONER

## 2020-06-25 PROCEDURE — 96372 THER/PROPH/DIAG INJ SC/IM: CPT

## 2020-06-25 PROCEDURE — 83540 ASSAY OF IRON: CPT

## 2020-06-25 PROCEDURE — 6360000002 HC RX W HCPCS: Performed by: NURSE PRACTITIONER

## 2020-06-25 PROCEDURE — 85025 COMPLETE CBC W/AUTO DIFF WBC: CPT

## 2020-06-25 PROCEDURE — G8427 DOCREV CUR MEDS BY ELIG CLIN: HCPCS | Performed by: NURSE PRACTITIONER

## 2020-06-25 PROCEDURE — 99213 OFFICE O/P EST LOW 20 MIN: CPT | Performed by: NURSE PRACTITIONER

## 2020-06-25 PROCEDURE — 83550 IRON BINDING TEST: CPT

## 2020-06-25 PROCEDURE — 1123F ACP DISCUSS/DSCN MKR DOCD: CPT | Performed by: NURSE PRACTITIONER

## 2020-06-25 PROCEDURE — G8417 CALC BMI ABV UP PARAM F/U: HCPCS | Performed by: NURSE PRACTITIONER

## 2020-06-25 PROCEDURE — 82728 ASSAY OF FERRITIN: CPT

## 2020-06-25 PROCEDURE — 80053 COMPREHEN METABOLIC PANEL: CPT

## 2020-06-25 RX ADMIN — EPOETIN ALFA-EPBX 40000 UNITS: 40000 INJECTION, SOLUTION INTRAVENOUS; SUBCUTANEOUS at 14:46

## 2020-06-25 ASSESSMENT — ENCOUNTER SYMPTOMS
EYES NEGATIVE: 1
VOMITING: 0
BLOOD IN STOOL: 0
COUGH: 0
NAUSEA: 0
GASTROINTESTINAL NEGATIVE: 1
SHORTNESS OF BREATH: 0
SORE THROAT: 0
EYE PAIN: 0
ABDOMINAL PAIN: 0
EYE REDNESS: 0
WHEEZING: 0
EYE DISCHARGE: 0
BACK PAIN: 0
RESPIRATORY NEGATIVE: 1
DIARRHEA: 0
CONSTIPATION: 0

## 2020-06-25 NOTE — PROGRESS NOTES
questions. This does not include charting. Discussed precautions related to [address] and being at increased risk. Discussed proper handwashing to be done frequently, limit exposure to other individuals and maintain social distancing of 6 feet. Recommend contacting primary care provider if having respiratory symptoms for further recommendations and consideration for testing.     (Please note that portions of this note were completed with a voice recognition program. Efforts were made to edit the dictations but occasionally words are mis-transcribed.)     Electronically signed by ALBERTO Fernandez on 6/29/2020 at 9:34 AM

## 2020-06-29 PROBLEM — Z79.899 ENCOUNTER FOR ESA (ERYTHROPOIETIN STIMULATING AGENT) ANEMIA MANAGEMENT: Status: ACTIVE | Noted: 2020-06-29

## 2020-06-29 PROBLEM — D64.9 ENCOUNTER FOR ESA (ERYTHROPOIETIN STIMULATING AGENT) ANEMIA MANAGEMENT: Status: ACTIVE | Noted: 2020-06-29

## 2020-07-21 LAB
INR BLD: 1.88 (ref 0.88–1.18)
PROTHROMBIN TIME: 21.9 SEC (ref 12–14.6)

## 2020-07-23 ENCOUNTER — HOSPITAL ENCOUNTER (OUTPATIENT)
Dept: INFUSION THERAPY | Age: 85
Discharge: HOME | End: 2020-07-23
Payer: MEDICARE

## 2020-07-23 VITALS
HEIGHT: 62 IN | BODY MASS INDEX: 34.08 KG/M2 | SYSTOLIC BLOOD PRESSURE: 142 MMHG | HEART RATE: 82 BPM | TEMPERATURE: 98.3 F | WEIGHT: 185.2 LBS | DIASTOLIC BLOOD PRESSURE: 86 MMHG | OXYGEN SATURATION: 97 %

## 2020-07-23 DIAGNOSIS — N18.4 ANEMIA OF CHRONIC KIDNEY FAILURE, STAGE 4 (SEVERE) (HCC): ICD-10-CM

## 2020-07-23 DIAGNOSIS — D63.1 ANEMIA OF CHRONIC KIDNEY FAILURE, STAGE 4 (SEVERE) (HCC): ICD-10-CM

## 2020-07-23 DIAGNOSIS — D50.8 IRON DEFICIENCY ANEMIA SECONDARY TO INADEQUATE DIETARY IRON INTAKE: Primary | ICD-10-CM

## 2020-07-23 LAB
BASOPHILS ABSOLUTE: 0.07 K/UL (ref 0.01–0.08)
BASOPHILS RELATIVE PERCENT: 1.1 % (ref 0.1–1.2)
EOSINOPHILS ABSOLUTE: 0.18 K/UL (ref 0.04–0.54)
EOSINOPHILS RELATIVE PERCENT: 2.8 % (ref 0.7–7)
HCT VFR BLD CALC: 33.7 % (ref 34.1–44.9)
HEMOGLOBIN: 10.5 G/DL (ref 11.2–15.7)
LYMPHOCYTES ABSOLUTE: 1.23 K/UL (ref 1.18–3.74)
LYMPHOCYTES RELATIVE PERCENT: 18.8 % (ref 19.3–53.1)
MCH RBC QN AUTO: 34.7 PG (ref 25.6–32.2)
MCHC RBC AUTO-ENTMCNC: 31.2 G/DL (ref 32.3–35.5)
MCV RBC AUTO: 111.2 FL (ref 79.4–94.8)
MONOCYTES ABSOLUTE: 0.7 K/UL (ref 0.24–0.82)
MONOCYTES RELATIVE PERCENT: 10.7 % (ref 4.7–12.5)
NEUTROPHILS ABSOLUTE: 4.36 K/UL (ref 1.56–6.13)
NEUTROPHILS RELATIVE PERCENT: 66.6 % (ref 34–71.1)
PDW BLD-RTO: 14.7 % (ref 11.7–14.4)
PLATELET # BLD: 278 K/UL (ref 182–369)
PMV BLD AUTO: 11.5 FL (ref 7.4–10.4)
RBC # BLD: 3.03 M/UL (ref 3.93–5.22)
WBC # BLD: 6.54 K/UL (ref 3.98–10.04)

## 2020-07-23 PROCEDURE — 96372 THER/PROPH/DIAG INJ SC/IM: CPT

## 2020-07-23 PROCEDURE — 85025 COMPLETE CBC W/AUTO DIFF WBC: CPT

## 2020-07-23 PROCEDURE — 6360000002 HC RX W HCPCS: Performed by: NURSE PRACTITIONER

## 2020-07-23 RX ADMIN — EPOETIN ALFA-EPBX 40000 UNITS: 40000 INJECTION, SOLUTION INTRAVENOUS; SUBCUTANEOUS at 15:00

## 2020-08-06 RX ORDER — FERROUS SULFATE 325(65) MG
TABLET ORAL
Qty: 30 TABLET | Refills: 3 | Status: SHIPPED | OUTPATIENT
Start: 2020-08-06 | End: 2021-01-20 | Stop reason: SDUPTHER

## 2020-08-20 ENCOUNTER — HOSPITAL ENCOUNTER (OUTPATIENT)
Dept: INFUSION THERAPY | Age: 85
Discharge: HOME | End: 2020-08-20
Payer: MEDICARE

## 2020-08-20 VITALS
HEIGHT: 62 IN | WEIGHT: 184.9 LBS | OXYGEN SATURATION: 94 % | SYSTOLIC BLOOD PRESSURE: 140 MMHG | DIASTOLIC BLOOD PRESSURE: 78 MMHG | BODY MASS INDEX: 34.03 KG/M2 | HEART RATE: 76 BPM | TEMPERATURE: 97.5 F

## 2020-08-20 DIAGNOSIS — N18.4 ANEMIA OF CHRONIC KIDNEY FAILURE, STAGE 4 (SEVERE) (HCC): ICD-10-CM

## 2020-08-20 DIAGNOSIS — D63.1 ANEMIA OF CHRONIC KIDNEY FAILURE, STAGE 4 (SEVERE) (HCC): ICD-10-CM

## 2020-08-20 DIAGNOSIS — D50.8 IRON DEFICIENCY ANEMIA SECONDARY TO INADEQUATE DIETARY IRON INTAKE: Primary | ICD-10-CM

## 2020-08-20 LAB
INR BLD: 1.76 (ref 0.88–1.18)
PROTHROMBIN TIME: 20.7 SEC (ref 12–14.6)

## 2020-08-20 PROCEDURE — 96372 THER/PROPH/DIAG INJ SC/IM: CPT

## 2020-08-20 PROCEDURE — 6360000002 HC RX W HCPCS: Performed by: NURSE PRACTITIONER

## 2020-08-20 PROCEDURE — 85025 COMPLETE CBC W/AUTO DIFF WBC: CPT

## 2020-08-20 RX ADMIN — EPOETIN ALFA-EPBX 40000 UNITS: 40000 INJECTION, SOLUTION INTRAVENOUS; SUBCUTANEOUS at 15:16

## 2020-08-21 LAB
BASOPHILS ABSOLUTE: 0.06 K/UL (ref 0.01–0.08)
BASOPHILS RELATIVE PERCENT: 0.8 % (ref 0.1–1.2)
EOSINOPHILS ABSOLUTE: 0.43 K/UL (ref 0.04–0.54)
EOSINOPHILS RELATIVE PERCENT: 6 % (ref 0.7–7)
HCT VFR BLD CALC: 33.6 % (ref 34.1–44.9)
HEMOGLOBIN: 10.6 G/DL (ref 11.2–15.7)
LYMPHOCYTES ABSOLUTE: 1.15 K/UL (ref 1.18–3.74)
LYMPHOCYTES RELATIVE PERCENT: 16 % (ref 19.3–53.1)
MCH RBC QN AUTO: 35.2 PG (ref 25.6–32.2)
MCHC RBC AUTO-ENTMCNC: 31.5 G/DL (ref 32.3–35.5)
MCV RBC AUTO: 111.6 FL (ref 79.4–94.8)
MONOCYTES ABSOLUTE: 0.9 K/UL (ref 0.24–0.82)
MONOCYTES RELATIVE PERCENT: 12.5 % (ref 4.7–12.5)
NEUTROPHILS ABSOLUTE: 4.66 K/UL (ref 1.56–6.13)
NEUTROPHILS RELATIVE PERCENT: 64.7 % (ref 34–71.1)
PDW BLD-RTO: 14.8 % (ref 11.7–14.4)
PLATELET # BLD: 257 K/UL (ref 182–369)
PMV BLD AUTO: 11 FL (ref 7.4–10.4)
RBC # BLD: 3.01 M/UL (ref 3.93–5.22)
WBC # BLD: 7.2 K/UL (ref 3.98–10.04)

## 2020-09-24 ENCOUNTER — HOSPITAL ENCOUNTER (OUTPATIENT)
Dept: INFUSION THERAPY | Age: 85
Discharge: HOME | End: 2020-09-24
Payer: MEDICARE

## 2020-09-24 VITALS
DIASTOLIC BLOOD PRESSURE: 88 MMHG | BODY MASS INDEX: 34.56 KG/M2 | HEART RATE: 73 BPM | TEMPERATURE: 97.8 F | OXYGEN SATURATION: 93 % | SYSTOLIC BLOOD PRESSURE: 140 MMHG | WEIGHT: 187.8 LBS | HEIGHT: 62 IN

## 2020-09-24 DIAGNOSIS — D50.8 IRON DEFICIENCY ANEMIA SECONDARY TO INADEQUATE DIETARY IRON INTAKE: ICD-10-CM

## 2020-09-24 LAB
BASOPHILS ABSOLUTE: 0.07 K/UL (ref 0.01–0.08)
BASOPHILS RELATIVE PERCENT: 1 % (ref 0.1–1.2)
EOSINOPHILS ABSOLUTE: 0.45 K/UL (ref 0.04–0.54)
EOSINOPHILS RELATIVE PERCENT: 6.3 % (ref 0.7–7)
HCT VFR BLD CALC: 36 % (ref 34.1–44.9)
HEMOGLOBIN: 11.2 G/DL (ref 11.2–15.7)
INR BLD: 1.75 (ref 0.88–1.18)
LYMPHOCYTES ABSOLUTE: 1.07 K/UL (ref 1.18–3.74)
LYMPHOCYTES RELATIVE PERCENT: 15 % (ref 19.3–53.1)
MCH RBC QN AUTO: 34.7 PG (ref 25.6–32.2)
MCHC RBC AUTO-ENTMCNC: 31.1 G/DL (ref 32.3–35.5)
MCV RBC AUTO: 111.5 FL (ref 79.4–94.8)
MONOCYTES ABSOLUTE: 0.87 K/UL (ref 0.24–0.82)
MONOCYTES RELATIVE PERCENT: 12.2 % (ref 4.7–12.5)
NEUTROPHILS ABSOLUTE: 4.68 K/UL (ref 1.56–6.13)
NEUTROPHILS RELATIVE PERCENT: 65.5 % (ref 34–71.1)
PDW BLD-RTO: 14.5 % (ref 11.7–14.4)
PLATELET # BLD: 244 K/UL (ref 182–369)
PMV BLD AUTO: 10.9 FL (ref 7.4–10.4)
PROTHROMBIN TIME: 20.7 SEC (ref 12–14.6)
RBC # BLD: 3.23 M/UL (ref 3.93–5.22)
WBC # BLD: 7.14 K/UL (ref 3.98–10.04)

## 2020-09-24 PROCEDURE — 85025 COMPLETE CBC W/AUTO DIFF WBC: CPT

## 2020-10-23 ENCOUNTER — HOSPITAL ENCOUNTER (OUTPATIENT)
Dept: INFUSION THERAPY | Age: 85
Discharge: HOME | End: 2020-10-23
Payer: MEDICARE

## 2020-10-23 ENCOUNTER — OFFICE VISIT (OUTPATIENT)
Dept: HEMATOLOGY | Age: 85
End: 2020-10-23
Payer: MEDICARE

## 2020-10-23 VITALS
DIASTOLIC BLOOD PRESSURE: 90 MMHG | HEIGHT: 62 IN | BODY MASS INDEX: 34.16 KG/M2 | TEMPERATURE: 97.5 F | WEIGHT: 185.6 LBS | SYSTOLIC BLOOD PRESSURE: 160 MMHG | OXYGEN SATURATION: 97 % | HEART RATE: 73 BPM

## 2020-10-23 DIAGNOSIS — D50.8 IRON DEFICIENCY ANEMIA SECONDARY TO INADEQUATE DIETARY IRON INTAKE: Primary | ICD-10-CM

## 2020-10-23 DIAGNOSIS — N18.4 ANEMIA OF CHRONIC KIDNEY FAILURE, STAGE 4 (SEVERE) (HCC): ICD-10-CM

## 2020-10-23 DIAGNOSIS — D63.1 ANEMIA OF CHRONIC KIDNEY FAILURE, STAGE 4 (SEVERE) (HCC): ICD-10-CM

## 2020-10-23 LAB
ALBUMIN SERPL-MCNC: 4.6 G/DL (ref 3.5–5.2)
ALP BLD-CCNC: 72 U/L (ref 35–104)
ALT SERPL-CCNC: 12 U/L (ref 9–52)
ANION GAP SERPL CALCULATED.3IONS-SCNC: 10 MMOL/L (ref 7–19)
AST SERPL-CCNC: 20 U/L (ref 14–36)
BASOPHILS ABSOLUTE: 0.1 K/UL (ref 0.01–0.08)
BASOPHILS RELATIVE PERCENT: 1.3 % (ref 0.1–1.2)
BILIRUB SERPL-MCNC: 0.5 MG/DL (ref 0.2–1.3)
BUN BLDV-MCNC: 36 MG/DL (ref 7–17)
CALCIUM SERPL-MCNC: 10.4 MG/DL (ref 8.4–10.2)
CHLORIDE BLD-SCNC: 105 MMOL/L (ref 98–111)
CO2: 27 MMOL/L (ref 22–29)
CREAT SERPL-MCNC: 1.3 MG/DL (ref 0.5–1)
EOSINOPHILS ABSOLUTE: 0.33 K/UL (ref 0.04–0.54)
EOSINOPHILS RELATIVE PERCENT: 4.4 % (ref 0.7–7)
FERRITIN: 275 NG/ML (ref 11.1–264)
GFR NON-AFRICAN AMERICAN: 39
GLOBULIN: 2.3 G/DL
GLUCOSE BLD-MCNC: 84 MG/DL (ref 74–106)
HCT VFR BLD CALC: 34.3 % (ref 34.1–44.9)
HEMOGLOBIN: 10.6 G/DL (ref 11.2–15.7)
IRON SATURATION: 24 % (ref 14–50)
IRON: 94 UG/DL (ref 37–170)
LYMPHOCYTES ABSOLUTE: 1.14 K/UL (ref 1.18–3.74)
LYMPHOCYTES RELATIVE PERCENT: 15.3 % (ref 19.3–53.1)
MCH RBC QN AUTO: 34.6 PG (ref 25.6–32.2)
MCHC RBC AUTO-ENTMCNC: 30.9 G/DL (ref 32.3–35.5)
MCV RBC AUTO: 112.1 FL (ref 79.4–94.8)
MONOCYTES ABSOLUTE: 0.85 K/UL (ref 0.24–0.82)
MONOCYTES RELATIVE PERCENT: 11.4 % (ref 4.7–12.5)
NEUTROPHILS ABSOLUTE: 5.04 K/UL (ref 1.56–6.13)
NEUTROPHILS RELATIVE PERCENT: 67.6 % (ref 34–71.1)
PDW BLD-RTO: 14 % (ref 11.7–14.4)
PLATELET # BLD: 202 K/UL (ref 182–369)
PMV BLD AUTO: 11.4 FL (ref 7.4–10.4)
POTASSIUM SERPL-SCNC: 4.4 MMOL/L (ref 3.5–5.1)
RBC # BLD: 3.06 M/UL (ref 3.93–5.22)
SODIUM BLD-SCNC: 142 MMOL/L (ref 137–145)
TOTAL IRON BINDING CAPACITY: 387 UG/DL (ref 265–497)
TOTAL PROTEIN: 6.9 G/DL (ref 6.3–8.2)
WBC # BLD: 7.46 K/UL (ref 3.98–10.04)

## 2020-10-23 PROCEDURE — 83550 IRON BINDING TEST: CPT

## 2020-10-23 PROCEDURE — 96372 THER/PROPH/DIAG INJ SC/IM: CPT

## 2020-10-23 PROCEDURE — 4040F PNEUMOC VAC/ADMIN/RCVD: CPT | Performed by: NURSE PRACTITIONER

## 2020-10-23 PROCEDURE — 6360000002 HC RX W HCPCS: Performed by: NURSE PRACTITIONER

## 2020-10-23 PROCEDURE — G8417 CALC BMI ABV UP PARAM F/U: HCPCS | Performed by: NURSE PRACTITIONER

## 2020-10-23 PROCEDURE — 1090F PRES/ABSN URINE INCON ASSESS: CPT | Performed by: NURSE PRACTITIONER

## 2020-10-23 PROCEDURE — 85025 COMPLETE CBC W/AUTO DIFF WBC: CPT

## 2020-10-23 PROCEDURE — 82728 ASSAY OF FERRITIN: CPT

## 2020-10-23 PROCEDURE — 80053 COMPREHEN METABOLIC PANEL: CPT

## 2020-10-23 PROCEDURE — G8427 DOCREV CUR MEDS BY ELIG CLIN: HCPCS | Performed by: NURSE PRACTITIONER

## 2020-10-23 PROCEDURE — 1123F ACP DISCUSS/DSCN MKR DOCD: CPT | Performed by: NURSE PRACTITIONER

## 2020-10-23 PROCEDURE — 83540 ASSAY OF IRON: CPT

## 2020-10-23 PROCEDURE — 99213 OFFICE O/P EST LOW 20 MIN: CPT | Performed by: NURSE PRACTITIONER

## 2020-10-23 PROCEDURE — G8484 FLU IMMUNIZE NO ADMIN: HCPCS | Performed by: NURSE PRACTITIONER

## 2020-10-23 PROCEDURE — 1036F TOBACCO NON-USER: CPT | Performed by: NURSE PRACTITIONER

## 2020-10-23 RX ADMIN — EPOETIN ALFA-EPBX 40000 UNITS: 40000 INJECTION, SOLUTION INTRAVENOUS; SUBCUTANEOUS at 13:28

## 2020-10-23 ASSESSMENT — ENCOUNTER SYMPTOMS
SHORTNESS OF BREATH: 0
NAUSEA: 0
EYES NEGATIVE: 1
ABDOMINAL PAIN: 0
DIARRHEA: 0
EYE PAIN: 0
RESPIRATORY NEGATIVE: 1
WHEEZING: 0
BACK PAIN: 0
VOMITING: 0
BLOOD IN STOOL: 0
SORE THROAT: 0
EYE DISCHARGE: 0
COUGH: 0
EYE REDNESS: 0

## 2020-10-23 NOTE — PROGRESS NOTES
receiving growth factor support and will need to maintain a ferritin level greater than 200 and an iron saturation of 25% for the Procrit to be beneficial. Hold Procrit dose for hemoglobin >11. Indications/rationale for Procrit was discussed with Roberto Baker. Risks, benefits and expectations were outlined. Risks including, but not limited to hypertension, stroke, MI, death were discussed and acknowledged by Roberto Baker.     Serology studies on 3/8/2019:  Creatinine 1.7/GFR 30.4   Iron 80   Iron saturation 26.5%   TIBC 302   Ferritin 144   Vitamin B12 501   Folate 13.9    Serology studies on 3/5/2020:  · Creatinine 1.3/GFR 41.4  · Iron 84  · TIBC 390  · Iron saturation 21.5%  · Ferritin 342    Iron substrates on 6/25/2020  · Ferritin 311  · Iron 76  · Iron saturation 23%  · TIBC 326  · Creatinine 1.3/GFR 39      Past Medical History:    Past Medical History:   Diagnosis Date    [facility]AAA) ([facility] [address]     Anemia     Arthritis     Chronic kidney disease     DVT of lower extremity (deep venous thrombosis) ([facility] [address]     twice 2010 and 2017    Hyperlipidemia     Hypertension     Kidney stones     Osteoarthritis     Thyroid disease     Thyroid disorder        Past Surgical History:    Past Surgical History:   Procedure Laterality Date    COLONOSCOPY  2016    dr sorto-no problems    TOE AMPUTATION      TOE AMPUTATION      TOTAL KNEE ARTHROPLASTY Right 1/3/2020    RIGHT TOTAL KNEE REPLACEMENT performed by Jo Ann Contreras [facility] at [facility] OR       Current Medications:    Current Outpatient Medications   Medication Sig Dispense Refill    ferrous sulfate (FEROSUL) 325 (65 Fe) MG tablet TAKE 1 TABLET BY MOUTH EVERY DAY 30 tablet 3    triamterene-hydrochlorothiazide (MAXZIDE-25) 37.5-25 MG per tablet Take 1 tablet by mouth daily      docusate sodium (COLACE) 100 MG capsule Take 1 capsule by mouth 2 times daily 60 capsule 1    calcitRIOL (ROCALTROL) 0.25 MCG capsule Take 0.25 mcg by mouth daily      allopurinol (ZYLOPRIM) 100 MG tablet Take 100 mg by mouth daily      warfarin (COUMADIN) 5 MG tablet Take 5 mg by mouth daily every Tuesday and Friday take one tablet and all other days take 1/2 tablet      levothyroxine (SYNTHROID) 112 MCG tablet Take 112 mcg by mouth Daily      simvastatin (ZOCOR) 20 MG tablet Take 20 mg by mouth nightly       No current facility-administered medications for this visit. Allergies: Allergies   Allergen Reactions    Penicillins Rash       Social History:    Social History     Tobacco Use    Smoking status: Never Smoker    Smokeless tobacco: Never Used   Substance Use Topics    Alcohol use: No    Drug use: No       Family History:   Family History   Problem Relation Age of Onset    Colon Cancer Brother     Cancer Mother         Breast Cancer    Heart Attack Father     Colon Polyps Neg Hx     Esophageal Cancer Neg Hx     Liver Cancer Neg Hx     Liver Disease Neg Hx     Rectal Cancer Neg Hx     Stomach Cancer Neg Hx        Vitals:  Vitals:    10/23/20 1226   BP: (!) 160/90   Pulse: 73   Temp: 97.5 °F (36.4 °C)   SpO2: 97%   Weight: 185 lb 9.6 oz (84.2 kg)   Height: 5' 2\" (1.575 m)        Subjective   REVIEW OF SYSTEMS:   Review of Systems   Constitutional: Positive for fatigue. Negative for chills, diaphoresis and fever. HENT: Negative. Negative for congestion, ear pain, hearing loss, nosebleeds, sore throat and tinnitus. Eyes: Negative. Negative for pain, discharge and redness. Respiratory: Negative. Negative for cough, shortness of breath and wheezing. Cardiovascular: Negative. Negative for chest pain, palpitations and leg swelling. Gastrointestinal: Positive for constipation. Negative for abdominal pain, blood in stool, diarrhea, nausea and vomiting. Endocrine: Negative for polydipsia. Genitourinary: Negative for dysuria, flank pain, frequency, hematuria and urgency. Musculoskeletal: Negative. Negative for back pain, myalgias and neck pain. Skin: Negative. Negative for rash. Neurological: Negative. Negative for dizziness, tremors, seizures, weakness and headaches. Hematological: Does not bruise/bleed easily. Psychiatric/Behavioral: Negative. The patient is not nervous/anxious. Objective   PHYSICAL EXAM:  Physical Exam  Vitals signs reviewed. Constitutional:       General: She is not in acute distress. Appearance: She is well-developed. She is not diaphoretic. HENT:      Head: Normocephalic and atraumatic. Mouth/Throat:      Pharynx: Uvula midline. Tonsils: No tonsillar exudate. Eyes:      General: Lids are normal. No scleral icterus. Right eye: No discharge. Left eye: No discharge. Conjunctiva/sclera: Conjunctivae normal.      Pupils: Pupils are equal, round, and reactive to light. Neck:      Musculoskeletal: Normal range of motion and neck supple. Thyroid: No thyroid mass or thyromegaly. Vascular: No JVD. Trachea: Trachea normal. No tracheal deviation. Cardiovascular:      Rate and Rhythm: Normal rate and regular rhythm. Heart sounds: Normal heart sounds. No murmur. No friction rub. No gallop. Pulmonary:      Effort: Pulmonary effort is normal. No respiratory distress. Breath sounds: Normal breath sounds. No wheezing or rales. Chest:      Chest wall: No tenderness. Abdominal:      General: Bowel sounds are normal. There is no distension. Palpations: Abdomen is soft. There is no mass. Tenderness: There is no abdominal tenderness. There is no guarding or rebound. Hernia: No hernia is present. Musculoskeletal:         General: No tenderness or deformity. Comments: Range of motion within normal limits x4 extremities   Skin:     General: Skin is warm. Coloration: Skin is not pale. Findings: No erythema or rash. Neurological:      Mental Status: She is alert and oriented to person, place, and time.       Cranial Nerves: No cranial nerve deficit. Coordination: Coordination normal.   Psychiatric:         Behavior: Behavior normal.         Thought Content: Thought content normal.         Labs:  CBC: WBC 7.46, ANC 5.04, hemoglobin 10.6, .1 and a platelet count of 473,794    ASSESSMENT/PLAN:      1. Anemia of chronic kidney failure, stage 4. Currently receiving BARBARA therapy with Retacrit 40,000 units subcu monthly when hemoglobin is less than 11. Last received dosing on 8/20/2020. Iron substrates were within acceptable limits in June, will be repeated today. Hemoglobin 10.6, will receive dosing today. -CBC, CMP, iron panel and ferritin today  -Retacrit 40,000 units subcu today      2. Iron deficiency anemia secondary to inadequate dietary iron intake. Continues to tolerate oral ferrous sulfate 325 mg without significant difficulty other than occasional constipation.     -Continue ferrous sulfate 325 mg daily  -Encourage use of over-the-counter stool softener and increase hydration  -Repeat labs today     3. Hypothyroidism presently being managed by Dr. Julisa Doty and is taking Synthroid 112 µg daily.   -Follow-up with Dr. Julisa Doty for monitoring of TSH and Synthroid dosing    4. Routine health screening history of abnormal mammogram:  Annual routine follow-up screening mammogram on 9/20/2019 documented no mammographic evidence of malignancy, BI-RADS Category 2 benign findings. FOLLOW UP:  1. Return monthly for CBC monitoring and consideration for Retacrit  2. Follow-up appointment given for 3 months  3. Continue to follow with other medical providers as recommended    Over 50% of the total visit time of 15 minutes in face to face encounter with the patient, out of which more than 50% of the time was spent in counseling patient  and coordination of care. Counseling included but was not limited to time spent reviewing labs, imaging studies/ treatment plan and answering questions. This does not include charting.     Discussed

## 2020-10-28 ASSESSMENT — ENCOUNTER SYMPTOMS: CONSTIPATION: 1

## 2020-11-04 LAB
CHOLESTEROL, TOTAL: 161 MG/DL (ref 160–199)
HDLC SERPL-MCNC: 53 MG/DL (ref 65–121)
INR BLD: 2.5 (ref 0.88–1.18)
LDL CHOLESTEROL CALCULATED: 85 MG/DL
PROTHROMBIN TIME: 27.6 SEC (ref 12–14.6)
TRIGL SERPL-MCNC: 113 MG/DL (ref 0–149)
TSH SERPL DL<=0.05 MIU/L-ACNC: 4.08 UIU/ML (ref 0.27–4.2)

## 2020-11-20 ENCOUNTER — HOSPITAL ENCOUNTER (OUTPATIENT)
Dept: INFUSION THERAPY | Age: 85
Discharge: HOME | End: 2020-11-20
Payer: MEDICARE

## 2020-11-20 VITALS
WEIGHT: 185.3 LBS | DIASTOLIC BLOOD PRESSURE: 68 MMHG | SYSTOLIC BLOOD PRESSURE: 140 MMHG | OXYGEN SATURATION: 94 % | HEIGHT: 62 IN | TEMPERATURE: 97.5 F | BODY MASS INDEX: 34.1 KG/M2 | HEART RATE: 109 BPM

## 2020-11-20 DIAGNOSIS — N18.4 ANEMIA OF CHRONIC KIDNEY FAILURE, STAGE 4 (SEVERE) (HCC): ICD-10-CM

## 2020-11-20 DIAGNOSIS — D50.8 IRON DEFICIENCY ANEMIA SECONDARY TO INADEQUATE DIETARY IRON INTAKE: Primary | ICD-10-CM

## 2020-11-20 DIAGNOSIS — D63.1 ANEMIA OF CHRONIC KIDNEY FAILURE, STAGE 4 (SEVERE) (HCC): ICD-10-CM

## 2020-11-20 LAB
BASOPHILS ABSOLUTE: 0.07 K/UL (ref 0.01–0.08)
BASOPHILS RELATIVE PERCENT: 0.8 % (ref 0.1–1.2)
EOSINOPHILS ABSOLUTE: 0.27 K/UL (ref 0.04–0.54)
EOSINOPHILS RELATIVE PERCENT: 2.9 % (ref 0.7–7)
HCT VFR BLD CALC: 33.1 % (ref 34.1–44.9)
HEMOGLOBIN: 10.1 G/DL (ref 11.2–15.7)
LYMPHOCYTES ABSOLUTE: 0.88 K/UL (ref 1.18–3.74)
LYMPHOCYTES RELATIVE PERCENT: 9.5 % (ref 19.3–53.1)
MCH RBC QN AUTO: 34.4 PG (ref 25.6–32.2)
MCHC RBC AUTO-ENTMCNC: 30.5 G/DL (ref 32.3–35.5)
MCV RBC AUTO: 112.6 FL (ref 79.4–94.8)
MONOCYTES ABSOLUTE: 1.11 K/UL (ref 0.24–0.82)
MONOCYTES RELATIVE PERCENT: 12 % (ref 4.7–12.5)
NEUTROPHILS ABSOLUTE: 6.94 K/UL (ref 1.56–6.13)
NEUTROPHILS RELATIVE PERCENT: 74.8 % (ref 34–71.1)
PDW BLD-RTO: 14.8 % (ref 11.7–14.4)
PLATELET # BLD: 282 K/UL (ref 182–369)
PMV BLD AUTO: 11.5 FL (ref 7.4–10.4)
RBC # BLD: 2.94 M/UL (ref 3.93–5.22)
WBC # BLD: 9.27 K/UL (ref 3.98–10.04)

## 2020-11-20 PROCEDURE — 85025 COMPLETE CBC W/AUTO DIFF WBC: CPT

## 2020-11-20 PROCEDURE — 96372 THER/PROPH/DIAG INJ SC/IM: CPT

## 2020-11-20 PROCEDURE — 6360000002 HC RX W HCPCS: Performed by: NURSE PRACTITIONER

## 2020-11-20 RX ADMIN — EPOETIN ALFA-EPBX 40000 UNITS: 40000 INJECTION, SOLUTION INTRAVENOUS; SUBCUTANEOUS at 14:08

## 2020-12-01 ENCOUNTER — HOSPITAL ENCOUNTER (INPATIENT)
Age: 85
LOS: 4 days | Discharge: ANOTHER HOSPITAL | DRG: 435 | End: 2020-12-05
Attending: PEDIATRICS | Admitting: STUDENT IN AN ORGANIZED HEALTH CARE EDUCATION/TRAINING PROGRAM
Payer: MEDICARE

## 2020-12-01 ENCOUNTER — APPOINTMENT (OUTPATIENT)
Dept: GENERAL RADIOLOGY | Age: 85
DRG: 435 | End: 2020-12-01
Payer: MEDICARE

## 2020-12-01 ENCOUNTER — APPOINTMENT (OUTPATIENT)
Dept: CT IMAGING | Age: 85
DRG: 435 | End: 2020-12-01
Payer: MEDICARE

## 2020-12-01 PROBLEM — R79.1 SUPRATHERAPEUTIC INR: Status: ACTIVE | Noted: 2020-12-01

## 2020-12-01 PROBLEM — N18.9 ACUTE ON CHRONIC RENAL INSUFFICIENCY: Status: ACTIVE | Noted: 2020-12-01

## 2020-12-01 PROBLEM — N28.9 ACUTE ON CHRONIC RENAL INSUFFICIENCY: Status: ACTIVE | Noted: 2020-12-01

## 2020-12-01 PROBLEM — R77.8 ELEVATED TROPONIN: Status: ACTIVE | Noted: 2020-12-01

## 2020-12-01 PROBLEM — R31.9 HEMATURIA: Status: ACTIVE | Noted: 2020-12-01

## 2020-12-01 PROBLEM — R79.89 ELEVATED TROPONIN: Status: ACTIVE | Noted: 2020-01-01

## 2020-12-01 PROBLEM — D62 ACUTE BLOOD LOSS ANEMIA: Status: ACTIVE | Noted: 2020-12-01

## 2020-12-01 LAB
ALBUMIN SERPL-MCNC: 3.8 G/DL (ref 3.5–5.2)
ALP BLD-CCNC: 666 U/L (ref 35–104)
ALT SERPL-CCNC: 184 U/L (ref 5–33)
ANION GAP SERPL CALCULATED.3IONS-SCNC: 12 MMOL/L (ref 7–19)
APTT: 146.2 SEC (ref 26–36.2)
AST SERPL-CCNC: 87 U/L (ref 5–32)
BACTERIA: ABNORMAL /HPF
BASOPHILS ABSOLUTE: 0.1 K/UL (ref 0–0.2)
BASOPHILS RELATIVE PERCENT: 0.4 % (ref 0–1)
BILIRUB SERPL-MCNC: 1.7 MG/DL (ref 0.2–1.2)
BILIRUBIN URINE: ABNORMAL
BLOOD, URINE: ABNORMAL
BUN BLDV-MCNC: 75 MG/DL (ref 8–23)
CALCIUM SERPL-MCNC: 9.3 MG/DL (ref 8.8–10.2)
CHLORIDE BLD-SCNC: 105 MMOL/L (ref 98–111)
CLARITY: ABNORMAL
CO2: 23 MMOL/L (ref 22–29)
COLOR: ABNORMAL
CREAT SERPL-MCNC: 1.7 MG/DL (ref 0.5–0.9)
EOSINOPHILS ABSOLUTE: 0.4 K/UL (ref 0–0.6)
EOSINOPHILS RELATIVE PERCENT: 3 % (ref 0–5)
GFR AFRICAN AMERICAN: 34
GFR NON-AFRICAN AMERICAN: 28
GLUCOSE BLD-MCNC: 168 MG/DL (ref 74–109)
GLUCOSE URINE: NEGATIVE MG/DL
HCT VFR BLD CALC: 29.6 % (ref 37–47)
HEMOGLOBIN: 9.4 G/DL (ref 12–16)
IMMATURE GRANULOCYTES #: 0.2 K/UL
INR BLD: >18.8 (ref 0.88–1.18)
KETONES, URINE: NEGATIVE MG/DL
LEUKOCYTE ESTERASE, URINE: ABNORMAL
LYMPHOCYTES ABSOLUTE: 0.9 K/UL (ref 1.1–4.5)
LYMPHOCYTES RELATIVE PERCENT: 6.4 % (ref 20–40)
MCH RBC QN AUTO: 33.3 PG (ref 27–31)
MCHC RBC AUTO-ENTMCNC: 31.8 G/DL (ref 33–37)
MCV RBC AUTO: 105 FL (ref 81–99)
MONOCYTES ABSOLUTE: 1.5 K/UL (ref 0–0.9)
MONOCYTES RELATIVE PERCENT: 11 % (ref 0–10)
NEUTROPHILS ABSOLUTE: 10.5 K/UL (ref 1.5–7.5)
NEUTROPHILS RELATIVE PERCENT: 78 % (ref 50–65)
NITRITE, URINE: POSITIVE
PDW BLD-RTO: 17 % (ref 11.5–14.5)
PH UA: 5 (ref 5–8)
PLATELET # BLD: 363 K/UL (ref 130–400)
PMV BLD AUTO: 11.2 FL (ref 9.4–12.3)
POTASSIUM REFLEX MAGNESIUM: 4.4 MMOL/L (ref 3.5–5)
PRO-BNP: 1083 PG/ML (ref 0–1800)
PROTEIN UA: 100 MG/DL
PROTHROMBIN TIME: >120 SEC (ref 12–14.6)
RBC # BLD: 2.82 M/UL (ref 4.2–5.4)
RBC UA: ABNORMAL /HPF (ref 0–2)
SODIUM BLD-SCNC: 140 MMOL/L (ref 136–145)
SPECIFIC GRAVITY UA: 1.02 (ref 1–1.03)
TOTAL PROTEIN: 6.4 G/DL (ref 6.6–8.7)
TROPONIN: 0.04 NG/ML (ref 0–0.03)
UROBILINOGEN, URINE: 0.2 E.U./DL
WBC # BLD: 13.5 K/UL (ref 4.8–10.8)
WBC UA: ABNORMAL /HPF (ref 0–5)

## 2020-12-01 PROCEDURE — 71045 X-RAY EXAM CHEST 1 VIEW: CPT

## 2020-12-01 PROCEDURE — 85730 THROMBOPLASTIN TIME PARTIAL: CPT

## 2020-12-01 PROCEDURE — 2580000003 HC RX 258: Performed by: PHYSICIAN ASSISTANT

## 2020-12-01 PROCEDURE — 81001 URINALYSIS AUTO W/SCOPE: CPT

## 2020-12-01 PROCEDURE — 1210000000 HC MED SURG R&B

## 2020-12-01 PROCEDURE — 83880 ASSAY OF NATRIURETIC PEPTIDE: CPT

## 2020-12-01 PROCEDURE — 74176 CT ABD & PELVIS W/O CONTRAST: CPT

## 2020-12-01 PROCEDURE — 85610 PROTHROMBIN TIME: CPT

## 2020-12-01 PROCEDURE — 93005 ELECTROCARDIOGRAM TRACING: CPT | Performed by: NURSE PRACTITIONER

## 2020-12-01 PROCEDURE — 85025 COMPLETE CBC W/AUTO DIFF WBC: CPT

## 2020-12-01 PROCEDURE — 96374 THER/PROPH/DIAG INJ IV PUSH: CPT

## 2020-12-01 PROCEDURE — 80053 COMPREHEN METABOLIC PANEL: CPT

## 2020-12-01 PROCEDURE — 99284 EMERGENCY DEPT VISIT MOD MDM: CPT

## 2020-12-01 PROCEDURE — 30233K1 TRANSFUSION OF NONAUTOLOGOUS FROZEN PLASMA INTO PERIPHERAL VEIN, PERCUTANEOUS APPROACH: ICD-10-PCS | Performed by: INTERNAL MEDICINE

## 2020-12-01 PROCEDURE — 70450 CT HEAD/BRAIN W/O DYE: CPT

## 2020-12-01 PROCEDURE — 36415 COLL VENOUS BLD VENIPUNCTURE: CPT

## 2020-12-01 PROCEDURE — 84484 ASSAY OF TROPONIN QUANT: CPT

## 2020-12-01 PROCEDURE — 6360000002 HC RX W HCPCS: Performed by: PEDIATRICS

## 2020-12-01 PROCEDURE — 6360000002 HC RX W HCPCS: Performed by: NURSE PRACTITIONER

## 2020-12-01 PROCEDURE — 87086 URINE CULTURE/COLONY COUNT: CPT

## 2020-12-01 PROCEDURE — 96372 THER/PROPH/DIAG INJ SC/IM: CPT

## 2020-12-01 RX ORDER — LISINOPRIL 10 MG/1
5 TABLET ORAL DAILY
Status: DISCONTINUED | OUTPATIENT
Start: 2020-12-02 | End: 2020-12-03

## 2020-12-01 RX ORDER — PROMETHAZINE HYDROCHLORIDE 25 MG/1
12.5 TABLET ORAL EVERY 6 HOURS PRN
Status: DISCONTINUED | OUTPATIENT
Start: 2020-12-01 | End: 2020-12-05 | Stop reason: HOSPADM

## 2020-12-01 RX ORDER — SODIUM CHLORIDE 0.9 % (FLUSH) 0.9 %
10 SYRINGE (ML) INJECTION EVERY 12 HOURS SCHEDULED
Status: DISCONTINUED | OUTPATIENT
Start: 2020-12-01 | End: 2020-12-05 | Stop reason: HOSPADM

## 2020-12-01 RX ORDER — FERROUS SULFATE 325(65) MG
325 TABLET ORAL DAILY
Status: DISCONTINUED | OUTPATIENT
Start: 2020-12-02 | End: 2020-12-05 | Stop reason: HOSPADM

## 2020-12-01 RX ORDER — CALCITRIOL 0.25 UG/1
0.25 CAPSULE, LIQUID FILLED ORAL DAILY
Status: DISCONTINUED | OUTPATIENT
Start: 2020-12-02 | End: 2020-12-05 | Stop reason: HOSPADM

## 2020-12-01 RX ORDER — PHYTONADIONE 10 MG/ML
5 INJECTION, EMULSION INTRAMUSCULAR; INTRAVENOUS; SUBCUTANEOUS ONCE
Status: COMPLETED | OUTPATIENT
Start: 2020-12-01 | End: 2020-12-01

## 2020-12-01 RX ORDER — POTASSIUM CHLORIDE 20 MEQ/1
40 TABLET, EXTENDED RELEASE ORAL PRN
Status: DISCONTINUED | OUTPATIENT
Start: 2020-12-01 | End: 2020-12-05 | Stop reason: HOSPADM

## 2020-12-01 RX ORDER — DOCUSATE SODIUM 100 MG/1
100 CAPSULE, LIQUID FILLED ORAL 2 TIMES DAILY
Status: DISCONTINUED | OUTPATIENT
Start: 2020-12-01 | End: 2020-12-05 | Stop reason: HOSPADM

## 2020-12-01 RX ORDER — ATORVASTATIN CALCIUM 10 MG/1
10 TABLET, FILM COATED ORAL DAILY
Status: DISCONTINUED | OUTPATIENT
Start: 2020-12-02 | End: 2020-12-05 | Stop reason: HOSPADM

## 2020-12-01 RX ORDER — LISINOPRIL 5 MG/1
5 TABLET ORAL DAILY
Status: ON HOLD | COMMUNITY
Start: 2020-09-19 | End: 2020-12-05 | Stop reason: SDUPTHER

## 2020-12-01 RX ORDER — POLYETHYLENE GLYCOL 3350 17 G/17G
17 POWDER, FOR SOLUTION ORAL DAILY PRN
Status: DISCONTINUED | OUTPATIENT
Start: 2020-12-01 | End: 2020-12-05 | Stop reason: HOSPADM

## 2020-12-01 RX ORDER — ACETAMINOPHEN 325 MG/1
650 TABLET ORAL EVERY 6 HOURS PRN
Status: DISCONTINUED | OUTPATIENT
Start: 2020-12-01 | End: 2020-12-05 | Stop reason: HOSPADM

## 2020-12-01 RX ORDER — MAGNESIUM SULFATE IN WATER 40 MG/ML
2 INJECTION, SOLUTION INTRAVENOUS PRN
Status: DISCONTINUED | OUTPATIENT
Start: 2020-12-01 | End: 2020-12-05 | Stop reason: HOSPADM

## 2020-12-01 RX ORDER — SODIUM CHLORIDE 0.9 % (FLUSH) 0.9 %
10 SYRINGE (ML) INJECTION PRN
Status: DISCONTINUED | OUTPATIENT
Start: 2020-12-01 | End: 2020-12-05 | Stop reason: HOSPADM

## 2020-12-01 RX ORDER — ALLOPURINOL 100 MG/1
100 TABLET ORAL DAILY
Status: DISCONTINUED | OUTPATIENT
Start: 2020-12-02 | End: 2020-12-05 | Stop reason: HOSPADM

## 2020-12-01 RX ORDER — POTASSIUM CHLORIDE 7.45 MG/ML
10 INJECTION INTRAVENOUS PRN
Status: DISCONTINUED | OUTPATIENT
Start: 2020-12-01 | End: 2020-12-05 | Stop reason: HOSPADM

## 2020-12-01 RX ORDER — ACETAMINOPHEN 650 MG/1
650 SUPPOSITORY RECTAL EVERY 6 HOURS PRN
Status: DISCONTINUED | OUTPATIENT
Start: 2020-12-01 | End: 2020-12-05 | Stop reason: HOSPADM

## 2020-12-01 RX ORDER — ONDANSETRON 2 MG/ML
4 INJECTION INTRAMUSCULAR; INTRAVENOUS ONCE
Status: COMPLETED | OUTPATIENT
Start: 2020-12-01 | End: 2020-12-01

## 2020-12-01 RX ORDER — SODIUM CHLORIDE 9 MG/ML
INJECTION, SOLUTION INTRAVENOUS CONTINUOUS
Status: DISCONTINUED | OUTPATIENT
Start: 2020-12-01 | End: 2020-12-05 | Stop reason: HOSPADM

## 2020-12-01 RX ORDER — ONDANSETRON 2 MG/ML
4 INJECTION INTRAMUSCULAR; INTRAVENOUS EVERY 6 HOURS PRN
Status: DISCONTINUED | OUTPATIENT
Start: 2020-12-01 | End: 2020-12-05 | Stop reason: HOSPADM

## 2020-12-01 RX ORDER — LEVOTHYROXINE SODIUM 112 UG/1
112 TABLET ORAL DAILY
Status: DISCONTINUED | OUTPATIENT
Start: 2020-12-02 | End: 2020-12-05 | Stop reason: HOSPADM

## 2020-12-01 RX ADMIN — ONDANSETRON 4 MG: 2 INJECTION INTRAMUSCULAR; INTRAVENOUS at 20:44

## 2020-12-01 RX ADMIN — SODIUM CHLORIDE, PRESERVATIVE FREE 10 ML: 5 INJECTION INTRAVENOUS at 23:51

## 2020-12-01 RX ADMIN — SODIUM CHLORIDE: 9 INJECTION, SOLUTION INTRAVENOUS at 23:51

## 2020-12-01 RX ADMIN — PHYTONADIONE 5 MG: 10 INJECTION, EMULSION INTRAMUSCULAR; INTRAVENOUS; SUBCUTANEOUS at 20:44

## 2020-12-01 ASSESSMENT — ENCOUNTER SYMPTOMS
NAUSEA: 0
WHEEZING: 0
COUGH: 0
BLOOD IN STOOL: 0
ABDOMINAL PAIN: 0
ANAL BLEEDING: 0
SHORTNESS OF BREATH: 1
VOMITING: 0

## 2020-12-02 PROBLEM — K86.89 PANCREATIC MASS: Status: ACTIVE | Noted: 2020-12-02

## 2020-12-02 PROBLEM — Z51.5 PALLIATIVE CARE PATIENT: Status: ACTIVE | Noted: 2020-12-02

## 2020-12-02 LAB
ABO/RH: NORMAL
ALBUMIN SERPL-MCNC: 3.1 G/DL (ref 3.5–5.2)
ALP BLD-CCNC: 568 U/L (ref 35–104)
ALT SERPL-CCNC: 152 U/L (ref 5–33)
ANION GAP SERPL CALCULATED.3IONS-SCNC: 12 MMOL/L (ref 7–19)
ANTIBODY SCREEN: NORMAL
APTT: 162.5 SEC (ref 26–36.2)
APTT: 26.3 SEC (ref 26–36.2)
AST SERPL-CCNC: 64 U/L (ref 5–32)
BASOPHILS ABSOLUTE: 0.1 K/UL (ref 0–0.2)
BASOPHILS RELATIVE PERCENT: 0.5 % (ref 0–1)
BILIRUB SERPL-MCNC: 1 MG/DL (ref 0.2–1.2)
BILIRUBIN DIRECT: 0.7 MG/DL (ref 0–0.3)
BILIRUBIN, INDIRECT: 0.3 MG/DL (ref 0.1–1)
BLOOD BANK DISPENSE STATUS: NORMAL
BLOOD BANK DISPENSE STATUS: NORMAL
BLOOD BANK PRODUCT CODE: NORMAL
BLOOD BANK PRODUCT CODE: NORMAL
BPU ID: NORMAL
BPU ID: NORMAL
BUN BLDV-MCNC: 78 MG/DL (ref 8–23)
CA 19-9: 133 U/ML (ref 0–35)
CALCIUM SERPL-MCNC: 8.9 MG/DL (ref 8.8–10.2)
CHLORIDE BLD-SCNC: 108 MMOL/L (ref 98–111)
CO2: 20 MMOL/L (ref 22–29)
CREAT SERPL-MCNC: 1.7 MG/DL (ref 0.5–0.9)
DESCRIPTION BLOOD BANK: NORMAL
DESCRIPTION BLOOD BANK: NORMAL
EKG P AXIS: 47 DEGREES
EKG P-R INTERVAL: 204 MS
EKG Q-T INTERVAL: 420 MS
EKG QRS DURATION: 116 MS
EKG QTC CALCULATION (BAZETT): 449 MS
EKG T AXIS: 154 DEGREES
EOSINOPHILS ABSOLUTE: 0.5 K/UL (ref 0–0.6)
EOSINOPHILS RELATIVE PERCENT: 4.5 % (ref 0–5)
FERRITIN: 480.2 NG/ML (ref 13–150)
GFR AFRICAN AMERICAN: 34
GFR NON-AFRICAN AMERICAN: 28
GLUCOSE BLD-MCNC: 140 MG/DL (ref 74–109)
HAPTOGLOBIN: 175 MG/DL (ref 30–200)
HAV IGM SER IA-ACNC: NORMAL
HCT VFR BLD CALC: 22.8 % (ref 37–47)
HCT VFR BLD CALC: 24.3 % (ref 37–47)
HCT VFR BLD CALC: 26.6 % (ref 37–47)
HCT VFR BLD CALC: 27.2 % (ref 37–47)
HEMOGLOBIN: 7 G/DL (ref 12–16)
HEMOGLOBIN: 7.5 G/DL (ref 12–16)
HEMOGLOBIN: 8.3 G/DL (ref 12–16)
HEPATITIS B CORE IGM ANTIBODY: NORMAL
HEPATITIS B SURFACE ANTIGEN INTERPRETATION: NORMAL
HEPATITIS C ANTIBODY INTERPRETATION: NORMAL
IMMATURE GRANULOCYTES #: 0.2 K/UL
INR BLD: 1.05 (ref 0.88–1.18)
INR BLD: >18.8 (ref 0.88–1.18)
IRON SATURATION: 16 % (ref 14–50)
IRON: 35 UG/DL (ref 37–145)
LYMPHOCYTES ABSOLUTE: 0.8 K/UL (ref 1.1–4.5)
LYMPHOCYTES RELATIVE PERCENT: 6.8 % (ref 20–40)
MCH RBC QN AUTO: 33.5 PG (ref 27–31)
MCHC RBC AUTO-ENTMCNC: 31.2 G/DL (ref 33–37)
MCV RBC AUTO: 107.3 FL (ref 81–99)
MONOCYTES ABSOLUTE: 1.2 K/UL (ref 0–0.9)
MONOCYTES RELATIVE PERCENT: 10.6 % (ref 0–10)
NEUTROPHILS ABSOLUTE: 8.9 K/UL (ref 1.5–7.5)
NEUTROPHILS RELATIVE PERCENT: 76.2 % (ref 50–65)
PDW BLD-RTO: 17.1 % (ref 11.5–14.5)
PLATELET # BLD: 307 K/UL (ref 130–400)
PMV BLD AUTO: 11.6 FL (ref 9.4–12.3)
POTASSIUM REFLEX MAGNESIUM: 4.3 MMOL/L (ref 3.5–5)
PROTHROMBIN TIME: 13.6 SEC (ref 12–14.6)
PROTHROMBIN TIME: >120 SEC (ref 12–14.6)
RBC # BLD: 2.48 M/UL (ref 4.2–5.4)
RETICULOCYTE ABSOLUTE COUNT: 0.09 M/UL (ref 0.03–0.12)
RETICULOCYTE COUNT PCT: 3.79 % (ref 0.5–1.5)
SODIUM BLD-SCNC: 140 MMOL/L (ref 136–145)
TOTAL IRON BINDING CAPACITY: 213 UG/DL (ref 250–400)
TOTAL PROTEIN: 5.4 G/DL (ref 6.6–8.7)
TROPONIN: 0.02 NG/ML (ref 0–0.03)
TROPONIN: 0.03 NG/ML (ref 0–0.03)
TROPONIN: 0.03 NG/ML (ref 0–0.03)
TSH SERPL DL<=0.05 MIU/L-ACNC: 3.55 UIU/ML (ref 0.27–4.2)
VITAMIN B-12: 483 PG/ML (ref 211–946)
WBC # BLD: 11.6 K/UL (ref 4.8–10.8)

## 2020-12-02 PROCEDURE — 36415 COLL VENOUS BLD VENIPUNCTURE: CPT

## 2020-12-02 PROCEDURE — 85014 HEMATOCRIT: CPT

## 2020-12-02 PROCEDURE — 85025 COMPLETE CBC W/AUTO DIFF WBC: CPT

## 2020-12-02 PROCEDURE — 6360000002 HC RX W HCPCS: Performed by: PHYSICIAN ASSISTANT

## 2020-12-02 PROCEDURE — 85018 HEMOGLOBIN: CPT

## 2020-12-02 PROCEDURE — 86301 IMMUNOASSAY TUMOR CA 19-9: CPT

## 2020-12-02 PROCEDURE — 85610 PROTHROMBIN TIME: CPT

## 2020-12-02 PROCEDURE — C9132 KCENTRA, PER I.U.: HCPCS | Performed by: INTERNAL MEDICINE

## 2020-12-02 PROCEDURE — 6360000002 HC RX W HCPCS: Performed by: INTERNAL MEDICINE

## 2020-12-02 PROCEDURE — 86900 BLOOD TYPING SEROLOGIC ABO: CPT

## 2020-12-02 PROCEDURE — 80076 HEPATIC FUNCTION PANEL: CPT

## 2020-12-02 PROCEDURE — 84484 ASSAY OF TROPONIN QUANT: CPT

## 2020-12-02 PROCEDURE — P9017 PLASMA 1 DONOR FRZ W/IN 8 HR: HCPCS

## 2020-12-02 PROCEDURE — 99222 1ST HOSP IP/OBS MODERATE 55: CPT | Performed by: NURSE PRACTITIONER

## 2020-12-02 PROCEDURE — 36430 TRANSFUSION BLD/BLD COMPNT: CPT

## 2020-12-02 PROCEDURE — 93010 ELECTROCARDIOGRAM REPORT: CPT | Performed by: INTERNAL MEDICINE

## 2020-12-02 PROCEDURE — 84443 ASSAY THYROID STIM HORMONE: CPT

## 2020-12-02 PROCEDURE — 80048 BASIC METABOLIC PNL TOTAL CA: CPT

## 2020-12-02 PROCEDURE — P9016 RBC LEUKOCYTES REDUCED: HCPCS

## 2020-12-02 PROCEDURE — 99223 1ST HOSP IP/OBS HIGH 75: CPT | Performed by: INTERNAL MEDICINE

## 2020-12-02 PROCEDURE — 2580000003 HC RX 258: Performed by: STUDENT IN AN ORGANIZED HEALTH CARE EDUCATION/TRAINING PROGRAM

## 2020-12-02 PROCEDURE — 82607 VITAMIN B-12: CPT

## 2020-12-02 PROCEDURE — P9059 PLASMA, FRZ BETWEEN 8-24HOUR: HCPCS

## 2020-12-02 PROCEDURE — 80074 ACUTE HEPATITIS PANEL: CPT

## 2020-12-02 PROCEDURE — 2580000003 HC RX 258: Performed by: INTERNAL MEDICINE

## 2020-12-02 PROCEDURE — 86850 RBC ANTIBODY SCREEN: CPT

## 2020-12-02 PROCEDURE — 82728 ASSAY OF FERRITIN: CPT

## 2020-12-02 PROCEDURE — 99221 1ST HOSP IP/OBS SF/LOW 40: CPT | Performed by: INTERNAL MEDICINE

## 2020-12-02 PROCEDURE — 1210000000 HC MED SURG R&B

## 2020-12-02 PROCEDURE — 83010 ASSAY OF HAPTOGLOBIN QUANT: CPT

## 2020-12-02 PROCEDURE — 85045 AUTOMATED RETICULOCYTE COUNT: CPT

## 2020-12-02 PROCEDURE — 83550 IRON BINDING TEST: CPT

## 2020-12-02 PROCEDURE — 85730 THROMBOPLASTIN TIME PARTIAL: CPT

## 2020-12-02 PROCEDURE — 2580000003 HC RX 258: Performed by: PHYSICIAN ASSISTANT

## 2020-12-02 PROCEDURE — 86901 BLOOD TYPING SEROLOGIC RH(D): CPT

## 2020-12-02 PROCEDURE — 86923 COMPATIBILITY TEST ELECTRIC: CPT

## 2020-12-02 PROCEDURE — 83540 ASSAY OF IRON: CPT

## 2020-12-02 PROCEDURE — 6370000000 HC RX 637 (ALT 250 FOR IP): Performed by: PHYSICIAN ASSISTANT

## 2020-12-02 RX ORDER — SODIUM CHLORIDE 9 MG/ML
50 INJECTION, SOLUTION INTRAVENOUS ONCE
Status: COMPLETED | OUTPATIENT
Start: 2020-12-02 | End: 2020-12-02

## 2020-12-02 RX ORDER — 0.9 % SODIUM CHLORIDE 0.9 %
20 INTRAVENOUS SOLUTION INTRAVENOUS ONCE
Status: COMPLETED | OUTPATIENT
Start: 2020-12-02 | End: 2020-12-02

## 2020-12-02 RX ADMIN — PROTHROMBIN, COAGULATION FACTOR VII HUMAN, COAGULATION FACTOR IX HUMAN, COAGULATION FACTOR X HUMAN, PROTEIN C, PROTEIN S HUMAN, AND WATER 3985 UNITS: KIT at 08:43

## 2020-12-02 RX ADMIN — SODIUM CHLORIDE, PRESERVATIVE FREE 10 ML: 5 INJECTION INTRAVENOUS at 08:34

## 2020-12-02 RX ADMIN — CALCITRIOL CAPSULES 0.25 MCG 0.25 MCG: 0.25 CAPSULE ORAL at 08:34

## 2020-12-02 RX ADMIN — ALLOPURINOL 100 MG: 100 TABLET ORAL at 08:34

## 2020-12-02 RX ADMIN — LEVOTHYROXINE SODIUM 112 MCG: 112 TABLET ORAL at 06:43

## 2020-12-02 RX ADMIN — SODIUM CHLORIDE, PRESERVATIVE FREE 1 G: 5 INJECTION INTRAVENOUS at 00:15

## 2020-12-02 RX ADMIN — SODIUM CHLORIDE 50 ML: 9 INJECTION, SOLUTION INTRAVENOUS at 10:35

## 2020-12-02 RX ADMIN — SODIUM CHLORIDE: 9 INJECTION, SOLUTION INTRAVENOUS at 20:17

## 2020-12-02 RX ADMIN — SODIUM CHLORIDE 20 ML: 9 INJECTION, SOLUTION INTRAVENOUS at 04:01

## 2020-12-02 RX ADMIN — DOCUSATE SODIUM 100 MG: 100 CAPSULE ORAL at 20:16

## 2020-12-02 RX ADMIN — PHYTONADIONE 10 MG: 10 INJECTION, EMULSION INTRAMUSCULAR; INTRAVENOUS; SUBCUTANEOUS at 10:33

## 2020-12-02 RX ADMIN — FERROUS SULFATE TAB 325 MG (65 MG ELEMENTAL FE) 325 MG: 325 (65 FE) TAB at 08:34

## 2020-12-02 RX ADMIN — ATORVASTATIN CALCIUM 10 MG: 10 TABLET, FILM COATED ORAL at 20:16

## 2020-12-02 ASSESSMENT — ENCOUNTER SYMPTOMS
RESPIRATORY NEGATIVE: 1
GASTROINTESTINAL NEGATIVE: 1
EYES NEGATIVE: 1

## 2020-12-02 NOTE — CONSULTS
MEDICAL ONCOLOGY CONSULTATION    Pt Name: Jose Kapoor  MRN: [id number]  [place]: 1934  Date of evaluation: 12/2/2020    REASON FOR CONSULTATION: Pancreatic mass, supratherapeutic INR, anemia  REQUESTING PHYSICIAN: Hospitalist    History Obtained From:    patient, electronic medical record    HISTORY OF PRESENT ILLNESS:      Diagnosis addressed by me  · Pancreatic head mass  · Chronic anticoagulation  · Supratherapeutic INR  · History of iron deficiency      Maplewoodkaitlynn Nicholson was first seen by me on 12/2/2020 as an inpatient consultation at [facility] for findings of a pancreatic mass and bile duct obstruction. 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 She presents to the ER department with complaints of hematuria. The hematuria started a few days ago. She denies any dysuria, back pain, no nausea no vomiting. She also noticed easy bruising and ecchymotic areas in her back, abdomen and flank. Initial laboratory work-up showed INR above 18. She was given vitamin K subcutaneously. She was found to have elevated LFTs. A CT of the abdomen was performed  · 12/1/2020-CT abdomen pelvis without contrast showed a pancreatic head mass measuring 3.3 x 3.7 cm in size and associated, bile duct dilatation above the level of the ampulla. Associated moderate pancreatic ductal dilatation.     Past Medical History:    Past Medical History:   Diagnosis Date    Abdominal aortic aneurysm (AAA) ([address]     Anemia     Arthritis     Chronic kidney disease     DVT of lower extremity (deep venous thrombosis) ([address]     twice 2010 and 2017    Hyperlipidemia     Hypertension     Kidney stones     Osteoarthritis     Thyroid disease     Thyroid disorder        Past Surgical History:    Past Surgical History: Procedure Laterality Date    COLONOSCOPY  2016    dr sorto-no problems    TOE AMPUTATION      TOE AMPUTATION      TOTAL KNEE ARTHROPLASTY Right 1/3/2020    RIGHT TOTAL KNEE REPLACEMENT performed by Milton Galarza MD at [place] [address] History:    Social History     Socioeconomic History    Marital status:       Spouse name: Not on file    Number of children: Not on file    Years of education: Not on file    Highest education level: Not on file   Occupational History    Not on file   Social Needs    Financial resource strain: Not on file    Food insecurity     Worry: Not on file     Inability: Not on file    Transportation needs     Medical: Not on file     Non-medical: Not on file   Tobacco Use    Smoking status: Never Smoker    Smokeless tobacco: Never Used   Substance and Sexual Activity    Alcohol use: No    Drug use: No    Sexual activity: Not on file   Lifestyle    Physical activity     Days per week: Not on file     Minutes per session: Not on file    Stress: Not on file   Relationships    Social connections     Talks on phone: Not on file     Gets together: Not on file     Attends Sikh service: Not on file     Active member of club or organization: Not on file     Attends meetings of clubs or organizations: Not on file     Relationship status: Not on file    Intimate partner violence     Fear of current or ex partner: Not on file     Emotionally abused: Not on file     Physically abused: Not on file     Forced sexual activity: Not on file   Other Topics Concern    Not on file   Social History Narrative    Not on file       Family History:   Family History   Problem Relation Age of Onset    Colon Cancer Brother     Cancer Mother         Breast Cancer    Heart Attack Father     Colon Polyps Neg Hx     Esophageal Cancer Neg Hx     Liver Cancer Neg Hx     Liver Disease Neg Hx     Rectal Cancer Neg Hx     Stomach Cancer Neg Hx        Current Hospital Medications: Current Facility-Administered Medications: 0.9 % sodium chloride bolus, 20 mL, Intravenous, Once  sodium chloride flush 0.9 % injection 10 mL, 10 mL, Intravenous, 2 times per day  sodium chloride flush 0.9 % injection 10 mL, 10 mL, Intravenous, PRN  acetaminophen (TYLENOL) tablet 650 mg, 650 mg, Oral, Q6H PRN **OR** acetaminophen (TYLENOL) suppository 650 mg, 650 mg, Rectal, Q6H PRN  polyethylene glycol (GLYCOLAX) packet 17 g, 17 g, Oral, Daily PRN  promethazine (PHENERGAN) tablet 12.5 mg, 12.5 mg, Oral, Q6H PRN **OR** ondansetron (ZOFRAN) injection 4 mg, 4 mg, Intravenous, Q6H PRN  0.9 % sodium chloride infusion, , Intravenous, Continuous  potassium chloride (KLOR-CON M) extended release tablet 40 mEq, 40 mEq, Oral, PRN **OR** potassium bicarb-citric acid (EFFER-K) effervescent tablet 40 mEq, 40 mEq, Oral, PRN **OR** potassium chloride 10 mEq/100 mL IVPB (Peripheral Line), 10 mEq, Intravenous, PRN  magnesium sulfate 2 g in 50 mL IVPB premix, 2 g, Intravenous, PRN  cefTRIAXone (ROCEPHIN) 1 g in sodium chloride (PF) 10 mL IV syringe, 1 g, Intravenous, Q24H  allopurinol (ZYLOPRIM) tablet 100 mg, 100 mg, Oral, Daily  calcitRIOL (ROCALTROL) capsule 0.25 mcg, 0.25 mcg, Oral, Daily  docusate sodium (COLACE) capsule 100 mg, 100 mg, Oral, BID  ferrous sulfate (IRON 325) tablet 325 mg, 325 mg, Oral, Daily  levothyroxine (SYNTHROID) tablet 112 mcg, 112 mcg, Oral, Daily  lisinopril (PRINIVIL;ZESTRIL) tablet 5 mg, 5 mg, Oral, Daily  atorvastatin (LIPITOR) tablet 10 mg, 10 mg, Oral, Daily    Allergies:    Allergies   Allergen Reactions    Penicillins Rash         Subjective   REVIEW OF SYSTEMS:   CONSTITUTIONAL: no fever, no night sweats, fatigue;  HEENT: no blurring of vision, no double vision, no hearing difficulty, no tinnitus, no ulceration, no dysplasia, no epistaxis;  LUNGS: no cough, no hemoptysis, no wheeze,  no shortness of breath;  CARDIOVASCULAR: no palpitation, no chest pain, no shortness of breath;  GI: Abdominal pain, no nausea, no vomiting, no diarrhea, no constipation;  MOUSTAPHA: Hematuria, no dysuria, no frequency or urgency, no nephrolithiasis;  MUSCULOSKELETAL: Generalized weakness, no joint pain, no swelling, no stiffness;  ENDOCRINE: no polyuria, no polydipsia, no cold or heat intolerance;  HEMATOLOGY: no easy bruising or bleeding, no history of clotting disorder;  DERMATOLOGY: no skin rash, no eczema, no pruritus;  PSYCHIATRY: no depression, no anxiety, no panic attacks, no suicidal ideation, no homicidal ideation;  NEUROLOGY: no syncope, no seizures, no numbness or tingling of hands, no numbness or tingling of feet, no paresis;    Objective   BP (!) 92/45   Pulse 66   Temp 97.3 °F (36.3 °C) (Temporal)   Resp 20   Ht 5' 2\" (1.575 m)   Wt 175 lb 9.6 oz (79.7 kg)   SpO2 95%   BMI 32.12 kg/m²     PHYSICAL EXAM:  CONSTITUTIONAL: Alert, appropriate, no acute distress  EYES: Non icteric, EOM intact, pupils equal round   ENT: Mucus membranes moist, no oral pharyngeal lesions, external inspection of ears and nose are normal  NECK: Supple, no masses. No palpable thyroid mass  CHEST/LUNGS: CTA bilaterally, normal respiratory effort   CARDIOVASCULAR: RRR, no murmurs. No lower extremity edema  ABDOMEN: soft non-tender, active bowel sounds, no HSM. No palpable masses  EXTREMITIES: warm, full ROM in all 4 extremities, no focal weakness. SKIN: warm, dry with no rashes or lesions  LYMPH: No cervical, clavicular, axillary, or inguinal lymphadenopathy  NEUROLOGIC: follows commands, non focal   PSYCH: mood and affect appropriate.   Alert and oriented to time, place, person        LABORATORY RESULTS REVIEWED BY ME:  Recent Labs     12/02/20  0158 12/01/20  1839 11/20/20  1358   WBC 11.6* 13.5* 9.27   HGB 8.3* 9.4* 10.1*   HCT 26.6* 29.6* 33.1*   .3* 105.0* 112.6*    363 282       Lab Results   Component Value Date     12/02/2020    K 4.3 12/02/2020     12/02/2020    CO2 20 (L) 12/02/2020    BUN 78 (H) gadolinium administration for better characterization and staging of this suspected neoplasm. 2. Bibasilar atelectasis. There is mild cardiomegaly. 3. Infrarenal abdominal aortic aneurysm. There is also aneurysmal dilatation of both common iliac arteries. No evidence of retroperitoneal hematoma or adenopathy. 4. Mild constipation. There is diverticulosis of the descending and sigmoid colon as well as other scattered diverticula. No evidence of diverticulitis or mechanical obstruction. 5. Small fat-containing periumbilical hernia. . 6. There are hyperdense cystic changes in the upper pole of the right kidney and lower pole of the left kidney which are hyperdense. This may represent hemorrhagic peripelvic cysts. Bilateral calyceal diverticular are also a differential. These can be followed up on subsequent imaging postcontrast 2 better characterize the findings. Both ureters are decompressed and normal in appearance. Signed by Dr Sruthi Daniels on 12/1/2020 9:55 PM    Ct Head Wo Contrast    Result Date: 12/1/2020  CT BRAIN without contrast 12/1/2020 7:22 PM HISTORY: Elevated INR. Fatigue. COMPARISON: None DLP: 2081 mGy cm Automated exposure control was utilized to diminish patient radiation dose. TECHNIQUE: Serial axial tomographic images of the brain were obtained without the use of intravenous contrast. FINDINGS: The midline structures are nondisplaced. There is moderate cerebral and cerebellar volume loss, with an associated increase in the prominence of the ventricles and sulci. The basilar cisterns are normal in size and configuration. There is no evidence of intracranial hemorrhage or mass-effect. There is low attenuation in the periventricular white matter, consistent with chronic ischemic change. There are no abnormal extra-axial fluid collections. There is no evidence of tonsillar herniation. The included orbits and their contents are unremarkable.  The visualized paranasal sinuses, mastoid air cells and middle ear cavities are clear. The visualized osseous structures and overlying soft tissues of the skull and face are intact. 1. Moderate cerebral and cerebellar volume loss with chronic microvascular disease but no evidence of acute intracranial process. Signed by Dr Camilo Salcido on 12/1/2020 9:17 PM    Xr Chest Portable    Result Date: 12/1/2020  EXAMINATION: Chest one view 12/1/2020 HISTORY: Shortness of breath FINDINGS: Today's exam is compared to a previous study of 12/12/2019. There is mild cardiomegaly. The thoracic aorta is mildly ectatic. There is arthritic change of both shoulders as well as an old poorly healed fracture of the distal right clavicle. The lungs are clear. There is no evidence of lobar pneumonia or effusion. 1. No acute disease. Signed by Dr Camilo Salcido on 12/1/2020 6:30 PM      My interpretation: Significant/intrahepatic biliary dilatation. Pancreatic mass. ASSESSMENT:  #Pancreatic mass-tail of the pancreas  CT abdomen reviewed. I agree with GI consult. She will likely need ERCP with FNA biopsy of pancreatic head mass if she is interested in diagnosis and possible treatment      #Extrahepatic/intrahepatic biliary dilatation  Normal bilirubin. The patient is at high risk for obstruction. The patient may need a stent placed. She is at high risk for obstruction. #Supratherapeutic INR-the patient received vitamin K 5 mg in the emergency. INR>18. Status post vitamin K 5 mg  No improvement on INR. Kcentra- 4000 u  Vitamin K-administer 10 mg vitamin K by slow intravenous infusion (eg, over 20 to 60 minutes).        #Macrocytic anemia  Differential diagnosis include anemia of chronic disease, inflammation, primary bone marrow process, nutritional deficiency  Anemia work-up to include iron profile, ferritin, B12, TSH, haptoglobin    #Chronic kidney disease stage IV  #Eelevated troponin  #Hypothyroidism   #Hypertension    PLAN:  CA 19-9  Consult GI  Iron profile, B12, reticulocyte count, haptoglobin  Diagnostic occult blood  Kcentra 4000 units now  Vitamin K-administer 10 mg vitamin K by slow intravenous infusion (eg, over 20 to 60 minutes). I have seen, examined and reviewed this patient medication list, appropriate labs and imaging studies. I reviewed relevant medical records and others physicians notes. I discussed the plans of care with the patient. I answered all the questions to the patients satisfaction.    (Please note that portions of this note were completed with a voice recognition program. Efforts were made to edit the dictations but occasionally words are mis-transcribed.)    Charlie Jackson MD    12/02/20  5:47 AM

## 2020-12-02 NOTE — CONSULTS
ANGIE [facility] OF [facility] BILL [address] [facility]                                  CONSULTATION    PATIENT NAME: Darnell Chandra                  :        1934  MED REC NO:   [id number]                              ROOM:       [facility]  ACCOUNT NO:   [de-identified]                           ADMIT DATE: 2020  PROVIDER:     Kya Oscar MD    CONSULT DATE:  2020    HISTORY OF PRESENT ILLNESS:  This is a very pleasant 80-year-old white  female, who was admitted to the hospital for gross hematuria and noted  to have a markedly supratherapeutic INR of greater than 18. She takes  Coumadin for history of AAA repair as well as DVT with recent DVT in  2017. During the course of her evaluation, she was noted to have  elevated liver function tests and a subsequent CAT scan of the abdomen  revealed a pancreas mass causing obstruction of both pancreas duct and  biliary duct. GI was asked to see for obstructive jaundice with  pancreas mass. In the interim since her admission, a  was  obtained that is mildly elevated at 133. The patient states that she is  not having any abdominal discomfort other than occasional rumbling. She  denies nausea or vomiting. She denies a significant weight loss. PAST MEDICAL HISTORY:  Her past medical history is otherwise remarkable  for abdominal aneurysm, anemia, arthritis, chronic kidney disease, DVT  of the lower extremity in , with recurrence in 2017, and subsequent  long-term Coumadin anticoagulation, hyperlipidemia, hypertension, kidney  stones, osteoarthritis, thyroid disease, thyroid disorder. PAST SURGICAL HISTORY:  Remarkable for colonoscopy, last one being in  , by Dr. Melvin Frazier, toe amputation, total knee arthroscopy. HOME MEDICATIONS:  Include Zestril, ferrous sulfate, Maxzide, Colace,  Rocaltrol, Zyloprim, Coumadin, Synthroid, and Zocor. ALLERGIES:  Include PENICILLIN.     SOCIAL AND FAMILY HISTORY:  Well outlined in [address] and reviewed. REVIEW OF SYSTEMS:  CONSTITUTIONAL:  Increased fatigue. EYES:  Denies eye pain, visual changes, double vision, blurred vision. EAR, NOSE, AND THROAT:  Denies ringing in ears, nose bleeds, sore  throat, pain with swallowing. CARDIOVASCULAR:  Denies chest pain, pain with exertion, palpitations,  fainting. RESPIRATORY:  Denies cough, wheezing, shortness of breath, haemoptysis. GASTROINTESTINAL:  Vague abdominal discomfort/rumbling. Denies nausea  and vomiting. Denies melena or hematochezia. GENITOURINARY:  Blood in urine. MUSCULOSKELETAL:  Denies joint pain, stiffness, joint redness or  swelling. NEUROLOGICAL:  Denies seizure, focal paralysis, numbness or tingling. HEMATOLOGIC/LYMPHATIC:  Denies known history of anemia, easy bleeding or  bruising, petechia. SKIN:  Denies itching, rashes, nodules, eczema. LABORATORY STUDIES:  Not mentioned in HPI, her bilirubin is 1.0, her AST  is 64, her ALT is 152, her alkaline phos is 568. Efrain Santamaria Her INR is  supratherapeutic at greater than 18. Her white count is 11,600 with a  hemoglobin and hematocrit of 8.3 and 26.6 respectively, mean cell volume  is elevated at 107. Platelet count is normal at 27. IMAGING STUDIES:  Include a CAT scan of the abdomen that was obtained in  the emergency room that revealed moderate intrahepatic biliary dilation  with common bile duct measuring 2.3 cm in its more distal aspect. There  were no discrete hepatic masses noted. There was an irregular mass  noted at the level of the pancreas head measuring 3.3 x 3.7 cm in size. Radiology impression was pancreas neoplasm could not be ruled out. As  mentioned in previous medical history, there was an infrarenal abdominal  aortic aneurysm. There was also aneurysmal dilation of both common  iliac arteries. (See radiology report for full dictation).     PHYSICAL EXAMINATION:  GENERAL:  On exam, she is a well-appearing 71-year-old white female, in  no acute distress. VITAL SIGNS:  Her vital signs are stable. She is afebrile with a  temperature of 97, pulse 76, respirations 22, /64. HEENT:  Head is normocephalic and atraumatic. Pupils are equal,  reactive to light and accommodation. EOMs are intact. Conjunctivae are  somewhat pale. Sclerae are nonicteric. NECK:  Supple without thyromegaly. No carotid bruits are appreciated. Trachea is in the midline. LUNGS:  Clear to auscultation bilaterally. Respiratory excursion is  equal bilaterally. No rales are appreciated. HEART:  Cardiovascular reveals a regular rate and rhythm. ABDOMEN:  Soft with active bowel sounds. There is minimal tenderness in  the epigastrium without rebound or guarding. No masses are appreciated. No hepatosplenomegaly is noted. EXTREMITIES:  Without edema. IMPRESSION:  1. Biliary obstruction potentially due to pancreas head mass  demonstrated on CAT scan, cannot rule out pancreas neoplasm. 2.  Elevated liver function tests most likely secondary to #1. 3.  Previous DVT, on long-term Coumadin anticoagulation with  supratherapeutic INR at this time. 4.  Gross hematuria. PLAN:  1. Correct coagulopathy. I note that Hematology is following. 2.  Potential ERCP versus EUS. We  will discuss and review with Dr. Carmita Hastings. 3.  Further recommendations pending clinical course and correction of  coagulopathy. Thank you for asking us to see the patient.         Jerrell Grijalva MD    D: 12/02/2020 12:53:23      T: 12/02/2020 13:01:39     [email or web address]  Job#: [id number]     Doc#: [id number]    CC:

## 2020-12-02 NOTE — PROGRESS NOTES
[facility] Hospitalists    Patient:  Chuck Arellano  YOB: 1934  Date of Service: 12/2/2020  MRN: [id number]   Acct: [de-identified]   Primary Care Physician: Christen Mancia MD  Advance Directive: Full Code  Admit Date: 12/1/2020       Hospital Day: 1  Portions of this note have been copied forward, however, changed to reflect the most current clinical status of this patient. CHIEF COMPLAINT Hematuria    SUBJECTIVE: Patient states she feels tired. Continues to complain of hematuria. Denies urinary retention or dysuria. Describes urine as blood tinged. Was nauseated this AM but that has resolved. Does endorse mild abdominal pain. Cumulative Hospital course: The patient is an 80 y.o. female with PMH iron deficiency anemia, CKD IV,recurrent DVT on Coumadin therapy, HTN, HLD, hypothyroidism who presented to [facility] ED complaining of hematuria. Denied dysuria, back pain, nausea or vomiting. Stated this has never happened before. Takes Coumadin. Denies changes to her dosing or her diet. Additionally has noticed ecchymotic areas on her back, abdomen and flank areas. Denies trauma. States abdomen is mildly tender. Work up in ED reveals INR >18.80 with [facility] on urinalysis. She denies confusion, chest pain, heart palpitations or history of GI bleeding. She was admitted to Hospitalist service. CT head unremarkable. CT abdomen/pelvis concerning for pancreatic mass. Oncology and Gastroenterology were consulted. She received 5 units Vitamin K in ED as well as 2 units FFP. Received an additional 10 units Vitamin K on 12/02/2020 along with Kcentra 4000 units. Review of Systems:   14 point review of systems is negative except as specifically addressed above.     Objective:   VITALS:  BP (!) 152/72   Pulse 75   Temp 98.8 °F (37.1 °C)   Resp 16   Ht 5' 2\" (1.575 m)   Wt 175 lb 9.6 oz (79.7 kg)   SpO2 97%   BMI 32.12 kg/m²   24HR INTAKE/OUTPUT:      Intake/Output Summary (Last 24 hours) at 12/2/2020 1400  Last data filed at 12/2/2020 1039  Gross per 24 hour   Intake 1292.75 ml   Output 300 ml   Net 992.75 ml     General appearance: alert, appears stated age, cooperative and no distress, resting comfortably in bed   Head: Normocephalic, without obvious abnormality, atraumatic  Eyes: conjunctivae/corneas clear. PERRL, EOM's intact. Ears: normal external ears and nose, throat without exudate  Neck: no adenopathy, no carotid bruit, no JVD, supple, symmetrical, trachea midline   Lungs: clear to auscultation bilaterally,no rales or wheezes   Heart: regular rate and rhythm, S1, S2 normal, no murmur  Abdomen:soft, mild epigastric tenderness; non-distended, normal bowel sounds no masses, no organomegaly  Extremities:No lower extremity edema,  No erythema, no tenderness to palpation  Skin: Pale, warm, dry, scattered ecchymotic areas to abdomen/back  Lymphatic: No palpable lymph node enlargment  Neurologic: Alert and oriented X 3, generalized weakness and normal tone.  No focal deficits  Psychiatric: Alert and oriented, thought content appropriate, normal insight, mood appropriate    Medications:      sodium chloride 125 mL/hr at 12/02/20 0448      sodium chloride flush  10 mL Intravenous 2 times per day    allopurinol  100 mg Oral Daily    calcitRIOL  0.25 mcg Oral Daily    docusate sodium  100 mg Oral BID    ferrous sulfate  325 mg Oral Daily    levothyroxine  112 mcg Oral Daily    lisinopril  5 mg Oral Daily    atorvastatin  10 mg Oral Daily     sodium chloride flush, acetaminophen **OR** acetaminophen, polyethylene glycol, promethazine **OR** ondansetron, potassium chloride **OR** potassium alternative oral replacement **OR** potassium chloride, magnesium sulfate  DIET CLEAR LIQUID;     Lab and other Data:     Recent Labs     12/01/20 1839 12/02/20 0158 12/02/20  0938   WBC 13.5* 11.6*  --    HGB 9.4* 8.3* 7.5*    307  --      Recent Labs     12/01/20 1839 12/02/20  0158    140   K 4.4 4.3  108   CO2 23 20*   BUN 75* 78*   CREATININE 1.7* 1.7*   GLUCOSE 168* 140*     Recent Labs     12/01/20  1839 12/02/20  0158   AST 87* 64*   * 152*   BILITOT 1.7* 1.0   ALKPHOS 666* 568*     Troponin T:   Recent Labs     12/02/20  0158 12/02/20  0615 12/02/20  0938   TROPONINI 0.03 0.03 0.02     INR:   Recent Labs     12/01/20  1839 12/02/20  0158   INR >18.80* >18.80*     UA:  Recent Labs     12/01/20  1823   COLORU RED*   PHUR 5.0   WBCUA TNTC*   RBCUA TNTC*   BACTERIA Rare*   CLARITYU TURBID*   SPECGRAV 1.019   LEUKOCYTESUR MODERATE*   UROBILINOGEN 0.2   BILIRUBINUR SMALL*   BLOODU MODERATE*   GLUCOSEU Negative     RAD:   Ct Abdomen Pelvis Wo Contrast Additional Contrast? None  1. There is moderate to severe intrahepatic and extrahepatic biliary dilatation. There is also dilatation of the pancreatic duct. There is a heterogeneous mass at the level of the pancreatic head and also involving the uncinate process which I feel should be considered a pancreatic neoplasm until otherwise proven. This patient will need follow-up imaging with either repeat CT imaging with IV and oral contrast or MRI with and without gadolinium administration for better characterization and staging of this suspected neoplasm. 2. Bibasilar atelectasis. There is mild cardiomegaly. 3. Infrarenal abdominal aortic aneurysm. There is also aneurysmal dilatation of both common iliac arteries. No evidence of retroperitoneal hematoma or adenopathy. 4. Mild constipation. There is diverticulosis of the descending and sigmoid colon as well as other scattered diverticula. No evidence of diverticulitis or mechanical obstruction. 5. Small fat-containing periumbilical hernia. . 6. There are hyperdense cystic changes in the upper pole of the right kidney and lower pole of the left kidney which are hyperdense. This may represent hemorrhagic peripelvic cysts.  Bilateral calyceal diverticular are also a differential. These can be followed up on subsequent imaging postcontrast 2 better characterize the findings. Both ureters are decompressed and normal in appearance. Signed by Dr Siria Kruse on 12/1/2020 9:55 PM    Ct Head Wo Contrast  1. Moderate cerebral and cerebellar volume loss with chronic microvascular disease but no evidence of acute intracranial process. Signed by Dr Siria Kruse on 12/1/2020 9:17 PM    Xr Chest Portable  1. No acute disease. Signed by Dr Siria Kruse on 12/1/2020 6:30 PM    Assessment/Plan   Principal Problem:    Hematuria- 2/2 coagulopathy, no evidence of urinary retention/dysuria. No iqbal for now, mildly improved per patient      Active Problems:    Pancreatic mass-new problem, GI following, correct coagulopathy       Supratherapeutic INR-received 5 mg subcutaneous vitamin K 12/01,2 units FFP12/01, 10 mg vitamin k 12/02, Kcentra 4000 units 12/02, Hold Coumadin, monitor closely. Repeat INR this afternoon        Acute blood loss anemia/Anemia of iron deficiency and chronic kidney disease-followed as OP by Hematology receiving Retacrit intermittently for Hgb <11, monitor closely with serial H/H.  Transfuse if Hgb <7.0       Elevated LFT's-CT concerning for pancreatic mass, GI following, improving       Anemia of chronic kidney failure, stage 4 (severe) (HCC)-monitor closely with daily labs, stable       Other specified hypothyroidism-resume synthroid       Elevated troponin-no complaint of chest pain or dyspnea, suspect related to CKD, has been flat and without concern for ACS       Hypertension-continue Zestril and monitor closely    DVT Prophylaxis: Held 2/2 markedly elevated INR    Jaiden Oconnell PA-C

## 2020-12-02 NOTE — ED PROVIDER NOTES
Negative for nosebleeds. Respiratory: Positive for shortness of breath. Negative for cough and wheezing. Cardiovascular: Negative for chest pain. Gastrointestinal: Negative for abdominal pain, anal bleeding, blood in stool, nausea and vomiting. Genitourinary: Positive for hematuria. Negative for dysuria and flank pain. Except as noted above the remainder of the review of systems was reviewed and negative.        PAST MEDICAL HISTORY     Past Medical History:   Diagnosis Date    Abdominal aortic aneurysm (AAA) (HCC)     Anemia     Arthritis     Chronic kidney disease     DVT of lower extremity (deep venous thrombosis) ([facility] [address]     twice 2010 and 2017    Hyperlipidemia     Hypertension     Kidney stones     Osteoarthritis     Thyroid disease     Thyroid disorder          SURGICALHISTORY       Past Surgical History:   Procedure Laterality Date    COLONOSCOPY  2016    dr sorto-no problems    TOE AMPUTATION      TOE AMPUTATION      TOTAL KNEE ARTHROPLASTY Right 1/3/2020    RIGHT TOTAL KNEE REPLACEMENT performed by Jo Ann Contreras MD at [address] [place] [address]       Current Discharge Medication List      CONTINUE these medications which have NOT CHANGED    Details   warfarin (COUMADIN) 5 MG tablet Take 5 mg by mouth daily every Tuesday and Friday take one tablet and all other days take 1/2 tablet      simvastatin (ZOCOR) 20 MG tablet Take 20 mg by mouth nightly      lisinopril (PRINIVIL;ZESTRIL) 5 MG tablet Take 5 mg by mouth daily      ferrous sulfate (FEROSUL) 325 (65 Fe) MG tablet TAKE 1 TABLET BY MOUTH EVERY DAY  Qty: 30 tablet, Refills: 3      triamterene-hydrochlorothiazide (MAXZIDE-25) 37.5-25 MG per tablet Take 1 tablet by mouth daily      docusate sodium (COLACE) 100 MG capsule Take 1 capsule by mouth 2 times daily  Qty: 60 capsule, Refills: 1      calcitRIOL (ROCALTROL) 0.25 MCG capsule Take 0.25 mcg by mouth daily      allopurinol (ZYLOPRIM) 100 MG tablet Take 100 mg by mouth daily      levothyroxine (SYNTHROID) 112 MCG tablet Take 112 mcg by mouth Daily             ALLERGIES     Penicillins    FAMILY HISTORY       Family History   Problem Relation Age of Onset    Colon Cancer Brother     Cancer Mother         Breast Cancer    Heart Attack Father     Colon Polyps Neg Hx     Esophageal Cancer Neg Hx     Liver Cancer Neg Hx     Liver Disease Neg Hx     Rectal Cancer Neg Hx     Stomach Cancer Neg Hx           SOCIAL HISTORY       Social History     Socioeconomic History    Marital status:       Spouse name: None    Number of children: None    Years of education: None    Highest education level: None   Occupational History    None   Social Needs    Financial resource strain: None    Food insecurity     Worry: None     Inability: None    Transportation needs     Medical: None     Non-medical: None   Tobacco Use    Smoking status: Never Smoker    Smokeless tobacco: Never Used   Substance and Sexual Activity    Alcohol use: No    Drug use: No    Sexual activity: None   Lifestyle    Physical activity     Days per week: None     Minutes per session: None    Stress: None   Relationships    Social connections     Talks on phone: None     Gets together: None     Attends Restoration service: None     Active member of club or organization: None     Attends meetings of clubs or organizations: None     Relationship status: None    Intimate partner violence     Fear of current or ex partner: None     Emotionally abused: None     Physically abused: None     Forced sexual activity: None   Other Topics Concern    None   Social History Narrative    None       SCREENINGS    [place] Coma Scale  Eye Opening: Spontaneous  Best Verbal Response: Oriented  Best Motor Response: Obeys commands  [place] Coma Scale Score: 15 @FLOW([id number])@      PHYSICAL EXAM    (up to 7 for level 4, 8 or more for level 5)     ED Triage Vitals [12/01/20 1757]   BP Temp Temp Source Pulse Resp SpO2 Height Weight   130/65 98.6 °F (37 °C) Oral 83 18 98 % 5' 2\" (1.575 m) 175 lb (79.4 kg)       Physical Exam  Vitals signs and nursing note reviewed. Constitutional:       Appearance: She is well-developed. HENT:      Head: Normocephalic and atraumatic. Eyes:      General: No scleral icterus. Right eye: No discharge. Left eye: No discharge. Neck:      Musculoskeletal: Normal range of motion and neck supple. Cardiovascular:      Rate and Rhythm: Normal rate. Pulmonary:      Effort: No respiratory distress. Abdominal:      General: Abdomen is protuberant. Bowel sounds are normal.      Palpations: Abdomen is soft. Tenderness: There is no abdominal tenderness. Skin:     Findings: Bruising present. Comments: Scattered patches of bruising to abd of various colors and shapes   Neurological:      Mental Status: She is alert. Psychiatric:         Behavior: Behavior normal.         DIAGNOSTIC RESULTS     EKG: All EKG's are interpreted by the Emergency Department Physician who either signs or Co-signsthis chart in the absence of a cardiologist.  78 sinus rhythm with a left bundle branch block [facility] 1902 [facility] E[facility] December 2019 showed a first-degree AV block. RADIOLOGY:   Non-plain filmimages such as CT, Ultrasound and MRI are read by the radiologist. Plain radiographic images are visualized and preliminarily interpreted by the emergency physician with the below findings:      Interpretation per the Radiologist below, if available at the time of this note:    CT ABDOMEN PELVIS WO CONTRAST Additional Contrast? None   Final Result   1. There is moderate to severe intrahepatic and extrahepatic biliary   dilatation. There is also dilatation of the pancreatic duct. There is   a heterogeneous mass at the level of the pancreatic head and also   involving the uncinate process which I feel should be considered a   pancreatic neoplasm until otherwise proven.  This patient will need follow-up imaging with either repeat CT imaging with IV and oral   contrast or MRI with and without gadolinium administration for better   characterization and staging of this suspected neoplasm. 2. Bibasilar atelectasis. There is mild cardiomegaly. 3. Infrarenal abdominal aortic aneurysm. There is also aneurysmal   dilatation of both common iliac arteries. No evidence of   retroperitoneal hematoma or adenopathy. 4. Mild constipation. There is diverticulosis of the descending and   sigmoid colon as well as other scattered diverticula. No evidence of   diverticulitis or mechanical obstruction. 5. Small fat-containing periumbilical hernia. .   6. There are hyperdense cystic changes in the upper pole of the right   kidney and lower pole of the left kidney which are hyperdense. This   may represent hemorrhagic peripelvic cysts. Bilateral calyceal   diverticular are also a differential. These can be followed up on   subsequent imaging postcontrast 2 better characterize the findings. Both ureters are decompressed and normal in appearance. Signed by Dr Mechelle Richardson on 12/1/2020 9:55 PM      CT HEAD WO CONTRAST   Final Result   1. Moderate cerebral and cerebellar volume loss with chronic   microvascular disease but no evidence of acute intracranial process. Signed by Dr Mechelle Richardson on 12/1/2020 9:17 PM      XR CHEST PORTABLE   Final Result   1. No acute disease.    Signed by Dr Mechelle Richardson on 12/1/2020 6:30 PM            ED BEDSIDEULTRASOUND:   Performed by ED Physician -none    LABS:  Labs Reviewed   CBC WITH AUTO DIFFERENTIAL - Abnormal; Notable for the following components:       Result Value    WBC 13.5 (*)     RBC 2.82 (*)     Hemoglobin 9.4 (*)     Hematocrit 29.6 (*)     .0 (*)     MCH 33.3 (*)     MCHC 31.8 (*)     RDW 17.0 (*)     Neutrophils % 78.0 (*)     Lymphocytes % 6.4 (*)     Monocytes % 11.0 (*)     Neutrophils Absolute 10.5 (*)     Lymphocytes Absolute 0.9 (*)     Monocytes Absolute 1.50 (*)     All other components within normal limits   COMPREHENSIVE METABOLIC PANEL W/ REFLEX TO MG FOR LOW K - Abnormal; Notable for the following components:    Glucose 168 (*)     BUN 75 (*)     CREATININE 1.7 (*)     GFR Non- 28 (*)     GFR  34 (*)     Total Protein 6.4 (*)     Total Bilirubin 1.7 (*)     Alkaline Phosphatase 666 (*)      (*)     AST 87 (*)     All other components within normal limits   PROTIME-INR - Abnormal; Notable for the following components:    Protime >120.0 (*)     INR >18.80 (*)     All other components within normal limits    Narrative:     CALL  Huang  [employer] ,  Hematology results called to and read back by peyton burton er, 12/01/2020 19:13,  by MILEY   APTT - Abnormal; Notable for the following components:    aPTT 146.2 (*)     All other components within normal limits    Narrative:     CALL  Huang  [employer] ,  Hematology results called to and read back by peyton burton er, 12/01/2020 19:13,  by [facility]   URINE RT REFLEX TO CULTURE - Abnormal; Notable for the following components:    Color, UA RED (*)     Clarity, UA TURBID (*)     Bilirubin Urine SMALL (*)     Blood, Urine MODERATE (*)     Protein,  (*)     Nitrite, Urine POSITIVE (*)     Leukocyte Esterase, Urine MODERATE (*)     All other components within normal limits   TROPONIN - Abnormal; Notable for the following components:    Troponin 0.04 (*)     All other components within normal limits   MICROSCOPIC URINALYSIS - Abnormal; Notable for the following components:    WBC, UA TNTC (*)     RBC, UA TNTC (*)     Bacteria, UA Rare (*)     All other components within normal limits   CULTURE, URINE   BRAIN NATRIURETIC PEPTIDE   CBC WITH AUTO DIFFERENTIAL   HEMOGLOBIN AND HEMATOCRIT, BLOOD   PROTIME-INR   APTT   TROPONIN   TROPONIN   TROPONIN   HEPATITIS PANEL, ACUTE   HEMOGLOBIN AND HEMATOCRIT, BLOOD   TROPONIN   BASIC METABOLIC PANEL W/ REFLEX TO MG FOR LOW K   HEPATIC FUNCTION PANEL       All other labs were within normal range or not returned as of this dictation. EMERGENCY DEPARTMENT COURSE and DIFFERENTIALDIAGNOSIS/MDM:   Vitals:    Vitals:    12/01/20 1757 12/01/20 2304   BP: 130/65 110/65   Pulse: 83 71   Resp: 18 16   Temp: 98.6 °F (37 °C) 97 °F (36.1 °C)   TempSrc: Oral Temporal   SpO2: 98% 95%   Weight: 175 lb (79.4 kg) 175 lb 9.6 oz (79.7 kg)   Height: 5' 2\" (1.575 m) 5' 2\" (1.575 m)           MDM      CONSULTS:  IP CONSULT TO HEM/ONC  IP CONSULT TO GI    PROCEDURES:  Unless otherwise noted below, none     Procedures    FINAL IMPRESSION      1. Elevated INR    2. Pancreatic mass    3. Gross hematuria    4. Fatigue, unspecified type    5. Acute kidney injury superimposed on chronic kidney disease ([facility] [address]    6. Elevated liver enzymes        DISPOSITION/PLAN   DISPOSITION Admitted 12/01/2020 09:14:47 PM      PATIENT REFERRED TO:  No follow-up provider specified.     DISCHARGE MEDICATIONS:  Current Discharge Medication List             (Please note that portions of this note were completed with a voice recognitionprogram.  Efforts were made to edit the dictations but occasionally words are mis-transcribed.)    ALBERTO Langston (electronically signed)         ALBERTO Langston  12/02/20 [address] [place] [address], ALBERTO  12/02/20 573

## 2020-12-02 NOTE — CONSULTS
Palliative Care Consult Note  12/2/2020 11:53 AM  Subjective:   Admit Date: 12/1/2020  PCP: Amalia Sanchez MD    Reason For Consult: Goals of care, Code status, Family Support    Requesting Physician: Dr. Brian Palumbo    History Obtained From: EMR, patient    Chief Complaint: Hematuria    History of Present Illness: Patient is an 68-year-old female with a PMH of CKD stage IV, DVT on chronic anticoagulation, HTN, HLD, iron deficiency anemia (receives Retacrit), that presented to the ED on 12/1/2020 due to gross hematuria that patient reported started that day. She had initially presented to urgent care but was sent to ED for further evaluation. Patient was noted to have areas of bruising on her back, abdomen, and flank areas. Work-up in the ED reveals INR > 18, CR 1.7, LFTs elevated. She was administered vitamin K in the ED and was admitted for further evaluation and treatment. CT of the abdomen completed with findings of pancreatic mass and bile duct obstruction, oncology and GI consulted and following. [id number]. She has received 2 units FFP and continues to receive vitamin K for elevated INR. Due to concern for possible malignancy, palliative care was consulted to assist with discussions regarding goals of care, code status, and provide patient/family support.     Past Medical History:        Diagnosis Date    Abdominal aortic aneurysm (AAA) ([address]     Anemia     Arthritis     Chronic kidney disease     DVT of lower extremity (deep venous thrombosis) ([address]     twice 2010 and 2017    Hyperlipidemia     Hypertension     Kidney stones     Osteoarthritis     Palliative care patient 12/02/2020    Thyroid disease     Thyroid disorder        Past Surgical History:        Procedure Laterality Date    COLONOSCOPY  2016    dr sorto-no problems    TOE AMPUTATION      TOE AMPUTATION      TOTAL KNEE ARTHROPLASTY Right 1/3/2020    RIGHT TOTAL KNEE REPLACEMENT performed by Irma Stewart MD at [address] [place] [address] Tim Dill, PA-C   10 mL at 12/02/20 0834    sodium chloride flush 0.9 % injection 10 mL  10 mL Intravenous PRN Shikha Goodpasture, PA-C        acetaminophen (TYLENOL) tablet 650 mg  650 mg Oral Q6H PRN Shikha Goodpasture, PA-C        Or    acetaminophen (TYLENOL) suppository 650 mg  650 mg Rectal Q6H PRN Shikha Goodpasture, PA-C        polyethylene glycol (GLYCOLAX) packet 17 g  17 g Oral Daily PRN Shikha Goodpasture, PA-C        promethazine (PHENERGAN) tablet 12.5 mg  12.5 mg Oral Q6H PRN Shikha Goodpasture, PA-C        Or    ondansetron [facility]) injection 4 mg  4 mg Intravenous Q6H PRN Shikha Goodpasture, PA-C        0.9 % sodium chloride infusion   Intravenous Continuous Laura Ozuna  mL/hr at 12/02/20 0448      potassium chloride (KLOR-CON M) extended release tablet 40 mEq  40 mEq Oral PRN Shikha Goodpasture, PA-C        Or    potassium bicarb-citric acid (EFFER-K) effervescent tablet 40 mEq  40 mEq Oral PRN Shikha Goodpasture, PA-C        Or    potassium chloride 10 mEq/100 mL IVPB (Peripheral Line)  10 mEq Intravenous PRN Shikha Goodpasture, PA-C        magnesium sulfate 2 g in 50 mL IVPB premix  2 g Intravenous PRN Shikha Goodpasture, PA-C        allopurinol (ZYLOPRIM) tablet 100 mg  100 mg Oral Daily Shikha Goodpasture, PA-C   100 mg at 12/02/20 7609    calcitRIOL (ROCALTROL) capsule 0.25 mcg  0.25 mcg Oral Daily Shikha Goodpasture, PA-C   0.25 mcg at 12/02/20 9114    docusate sodium (COLACE) capsule 100 mg  100 mg Oral BID Shikha Goodpasture, PA-C        ferrous sulfate (IRON 325) tablet 325 mg  325 mg Oral Daily Shikha Goodpasture, PA-C   325 mg at 12/02/20 5700    levothyroxine (SYNTHROID) tablet 112 mcg  112 mcg Oral Daily Shikha Goodpasture, PA-C   112 mcg at 12/02/20 1413    lisinopril (PRINIVIL;ZESTRIL) tablet 5 mg  5 mg Oral Daily Vita Goodpasture, PA-C   Stopped at 12/02/20 7268    atorvastatin (LIPITOR) tablet 10 mg  10 mg Oral Daily Vita Goodpasture, PA-C            Labs:     Recent Labs     12/01/20  2821 12/02/20 0158 12/02/20  0938   WBC 13.5* 11.6*  --    RBC 2.82* 2.48*  --    HGB 9.4* 8.3* 7.5*   HCT 29.6* 27.2*  26.6* 24.3*   .0* 107.3*  --    MCH 33.3* 33.5*  --    MCHC 31.8* 31.2*  --     307  --      Recent Labs     12/01/20 1839 12/02/20 0158    140   K 4.4 4.3   ANIONGAP 12 12    108   CO2 23 20*   BUN 75* 78*   CREATININE 1.7* 1.7*   GLUCOSE 168* 140*   CALCIUM 9.3 8.9     No results for input(s): MG, PHOS in the last 72 hours. Recent Labs     12/01/20 1839 12/02/20 0158   AST 87* 64*   * 152*   BILITOT 1.7* 1.0   ALKPHOS 666* 568*     ABGs:No results for input(s): PHART, GUF3VUC, PO2ART, CFX6OWE, BEART, HGBAE, Q8ZKSGJY, CARBOXHGBART, 02THERAPY in the last 72 hours. HgBA1c: No results for input(s): LABA1C in the last 72 hours.   FLP:    Lab Results   Component Value Date    TRIG 113 11/04/2020    HDL 53 11/04/2020    LDLCALC 85 11/04/2020     TSH:    Lab Results   Component Value Date    TSH 3.550 12/02/2020     Troponin T:   Recent Labs     12/01/20 1839 12/02/20 0158 12/02/20  0615   TROPONINI 0.04* 0.03 0.03     INR:   Recent Labs     12/01/20 1839 12/02/20 0158   INR >18.80* >18.80*       Objective:   Vitals: /64   Pulse 76   Temp 97 °F (36.1 °C)   Resp 22   Ht 5' 2\" (1.575 m)   Wt 175 lb 9.6 oz (79.7 kg)   SpO2 99%   BMI 32.12 kg/m²   24HR INTAKE/OUTPUT:      Intake/Output Summary (Last 24 hours) at 12/2/2020 1153  Last data filed at 12/2/2020 1039  Gross per 24 hour   Intake 1292.75 ml   Output 300 ml   Net 992.75 ml     Physical Exam    General appearance: alert and cooperative with exam, appears stated age, no acute distress  HEENT: atraumatic, eyes with clear conjunctiva and normal lids, pupils and irises normal, external ears and nose are normal,lips normal.  Neck: without masses, supple   Lungs: no increased work of breathing, clear to auscultation bilaterally  Heart: regular rate and rhythm and S1, S2 normal  Abdomen: soft, non-tender; bowel sounds normal; no masses,  no organomegaly  Genitourinary: No bladder fullness, masses, or tenderness  Extremities: extremities normal, atraumatic, no cyanosis or edema  Neurologic: No focal neurologic deficits, normal sensation, no tremor, alert and oriented  Psychiatric: Alert and oriented, no recent or remote memory deficits, good judgement, mood and affect appropriate  Skin: warm, dry    Assessment and Plan:   Principal Problem:    Hematuria  Active Problems:    Acute blood loss anemia    Acute on chronic renal insufficiency    Anemia of chronic kidney failure, stage 4 (severe) (HCC)    Iron deficiency anemia secondary to inadequate dietary iron intake    Other specified hypothyroidism    Supratherapeutic INR    Elevated troponin    Palliative care patient    Pancreatic mass  Resolved Problems:    * No resolved hospital problems. *      Visit Summary: I saw the patient at the bedside with no family present. Patient is currently resting comfortably in bed and I assisted to help her reposition and set up her lunch tray. I reviewed palliative care services with the patient and reason for consult. Patient appears to have a fair understanding of concerns found on CT scan and plan for possible ERCP and biopsy. She currently has no symptoms to address, denies discomfort. Patient does acknowledge that she is fatigued and weaker, may need PT or in-home services to help support patient. She does plan to undergo intervention with GI when she is stable to have the procedure. She plans to follow with oncology after to discuss options. I plan to explore this further with her and discuss possible options for care/support at discharge. Voluntary ACP discussion conducted and patient does have 2 adult children, states that her daughter would be her primary decision maker, and I have offered assistance with living will documentation.   I discussed information contained within living will and encouraged the patient to be specific in regards to her wishes. Patient states that she would not want to be kept on prolonged intubation, would not want trach/PEG, currently wants to have full CODE STATUS but may reconsider at some point. Patient would like to fill out a living will and the palliative care  was notified and will assist with completion of documentation. Patient was appreciative of the visit and I will continue to follow and further discuss goals of care. Recommendations:   1. Patient planning for ERCP and biopsy. Further goals dependent on pathology but currently plans to follow with Oncology. 2. Patient would like to complete a living will. Palliative Care  will assist with documents. 3. Code status should be an ongoing discussion. She would like to remain a full code but has stated that she would not want to be kept on prolonged ventilation or have trach/peg. I have encouraged her to indicate this in her living will as well. 4. Patient would benefit from home supports, level may be dependent on patient's progress and goals. HH, Outpatient supportive care, SNF?, vs. Hospice     Thank you for consulting palliative care and allowing us to participate in the care of the patient. CounselingTopics: Goals of care, Code Status, Disease process education, pt/family support    Time Spent Counseling > 50%:  Yes                                       Total Time Spent: 52 min    Electronically signed by ALBERTO Badillo on 12/2/2020 at 11:53 AM    (Please note that portions of this note were completed with a voice recognition program.  Efforts were made to edit the dictations but occasionally words are mis-transcribed.)

## 2020-12-02 NOTE — PROGRESS NOTES
4 Eyes Skin Assessment    Kuldip Herzog is being assessed upon: Admission    I agree that Tiana Keepers, along with [employer] (either 2 RN's or 1 LPN and 1 RN) have performed a thorough Head to Toe Skin Assessment on the patient. ALL assessment sites listed below have been assessed. Areas assessed by both nurses:     [x]   Head, Face, and Ears   [x]   Shoulders, Back, and Chest   [x]   Arms, Elbows, and Hands   [x]   Coccyx, Sacrum, and Ischium  [x]   Legs, Feet, and Heels    Does the Patient have Skin Breakdown?  No    Kilo Prevention initiated: No  Wound Care Orders initiated: No    [facility] nurse consulted for Pressure Injury (Stage 3,4, Unstageable, DTI, NWPT, and Complex wounds) and New or Established Ostomies: No        Primary Nurse eSignature: Kylah Vidal RN on 12/2/2020 at 7:48 AM      Co-Signer eSignature: {Esignature:[id number]}

## 2020-12-02 NOTE — CARE COORDINATION
Date / Time of Evaluation: 12/2/2020 3:04 PM  Assessment Completed by: Ho Armas    Patient Admission Status: Inpatient [101]      Current PCP:  Mark Vigil MD  PCP verified? yes    Initial Assessment Completed at bedside with:  Lives with her Son    Emergency Contacts:  Extended Emergency Contact Information  Primary Emergency Contact: Ryan Arevalo  Address: [place] [facility], [address] Phone: [phone]  Relation: Child  Secondary Emergency Contact: Nieves Myers  Address: [address] [facility]           [address] Phone: [phone]  Mobile Phone: [phone]  Relation: Child    Advance Directives: Code Status:  Full Code    Financial:  Payor: Misha Bland / Plan: MEDICARE PART A AND B / Product Type: *No Product type* /     Pre-Cert required for SNF:  na    [address]:      Pharmacy:   [address]  [address] [place] [address]  Phone: [phone] Fax: [phone]      Potential assistance purchasing medications? no    ADLS:  Support System:  Children, Family Members    Current Home Environment:  Home with son  Steps:  no    Plans to RETURN to current housing: yes  Barriers to RETURNING to current housing:  yes    Currently ACTIVE with Home Health CARE:  no  [address] [place] [address]:  Pt would like [facility] upon dc. DME Provider:  Has a cane at home and walker, uses as needed    Had [address] [facility] prior to admission:  no  Oxygen Company:    Informed of need to bring portable home O2 tank to hospital on day of DISCHARGE:    Name of person committed to bringing portable tank at discharge:       Active with HD/PD prior to admission: no  Nephrologist:    HD Center:      Transition Plan:  home with family support    Transportation PLAN for Discharge:  son    Factors facilitating achievement of predicted outcomes: resoluiton of bleeding     Barriers to discharge: Additional CM/SW Notes: pt lives at home with her son, goes to outpt lab 1 x a month to have inr checked. Pt states there have been no changes to meds and is not sure why her INR was so high. Pt is interested in [facility] services upon dc. No other needs noted at this time  Electronically signed by Anita Ramirez RN on 12/2/2020 at 3:08 PM            Shelby Ashley and/or her family were provided with choice of provider.         Anita Ramirez RN  [facility]  Care Management Department  Ph:  [phone]   Fax:

## 2020-12-02 NOTE — ED NOTES
Called Fayette Kanner  (daughter) to give update.      Jesús Jay RN  12/01/20 2133       Jesús Jay RN  12/01/20 2135

## 2020-12-02 NOTE — H&P
[facility]  Hospitalist - History & Physical      PCP: Marguerite Bear MD    Date of Admission: 12/1/2020    Date of Service: 12/1/2020    Chief Complaint:  Hematuria    History Of Present Illness: The patient is a 80 y.o. female with PMH iron deficiency anemia, CKD IV,recurrent DVT on Coumadin therapy, HTN, HLD, hypothyroidism who presented to [address] [place] [address] ED complaining of hematuria. She questions if it started a few days ago but noticed bright red blood today. She describes it as [place] lot\" filling up the toilet. She describes the appearance of the water as blood tinged. Denies dysuria, back pain, nausea or vomiting. States this has never happened before. Takes Coumadin. Denies changes to her dosing or her diet. Additionally has noticed ecchymotic areas on her back, abdomen and flank areas. Denies trauma. States abdomen is mildly tender. Work up in ED reveals INR >18.80 with [facility] on urinalysis. She denies confusion, chest pain, heart palpitations or history of GI bleeding. Past Medical History:        Diagnosis Date    [facility] ([facility]) ([facility]     Anemia     Arthritis     Chronic kidney disease     DVT of lower extremity (deep venous thrombosis) ([facility]     twice 2010 and 2017    Hyperlipidemia     Hypertension     Kidney stones     Osteoarthritis     Thyroid disease     Thyroid disorder      Past Surgical History:        Procedure Laterality Date    COLONOSCOPY  2016    dr sorto-no problems    TOE AMPUTATION      TOE AMPUTATION      TOTAL KNEE ARTHROPLASTY Right 1/3/2020    RIGHT TOTAL KNEE REPLACEMENT performed [facility] Lucía Cheng MD at [facility]  [facility] Medications:  Prior to Admission medications    Medication Sig Start Date End Date Taking?  Authorizing Provider   lisinopril (PRINIVIL;ZESTRIL) 5 MG tablet Take 5 mg by mouth daily 9/19/20   Historical Provider, MD   ferrous sulfate (FEROSUL) 325 (65 Fe) MG tablet TAKE 1 TABLET BY MOUTH EVERY DAY 8/6/20   Shanell Ferrer, APRN triamterene-hydrochlorothiazide (MAXZIDE-25) 37.5-25 MG per tablet Take 1 tablet by mouth daily    Historical Provider, MD   docusate sodium (COLACE) 100 MG capsule Take 1 capsule by mouth 2 times daily 1/3/20   Roxana Canada MD   calcitRIOL (ROCALTROL) 0.25 MCG capsule Take 0.25 mcg by mouth daily    Historical Provider, MD   allopurinol (ZYLOPRIM) 100 MG tablet Take 100 mg by mouth daily    Historical Provider, MD   warfarin (COUMADIN) 5 MG tablet Take 5 mg by mouth daily every Tuesday and Friday take one tablet and all other days take 1/2 tablet    Historical Provider, MD   levothyroxine (SYNTHROID) 112 MCG tablet Take 112 mcg by mouth Daily    Historical Provider, MD   simvastatin (ZOCOR) 20 MG tablet Take 20 mg by mouth nightly    Historical Provider, MD     Allergies:    Penicillins    Social History:    The patient currently lives at home  Tobacco:   reports that she has never smoked. She has never used smokeless tobacco.  Alcohol:   reports no history of alcohol use.   Illicit Drugs: denies    Family History:      Problem Relation Age of Onset    Colon Cancer Brother     Cancer Mother         Breast Cancer    Heart Attack Father     Colon Polyps Neg Hx     Esophageal Cancer Neg Hx     Liver Cancer Neg Hx     Liver Disease Neg Hx     Rectal Cancer Neg Hx     Stomach Cancer Neg Hx      Review of Systems:   Constitutional / general:  Denies fever / chills / sweats +fatigue  Head:  Denies headache / neck stiffness / trauma / visual change  Eyes:  Denies blurry vision / acute visual change or loss / itching / redness  ENT: Denies sore throat / hoarseness / nasal drainage / ear pain  CV:  Denies chest pain / palpitations/ orthopnea   Respiratory:  Denies cough / shortness of breath / sputum / hemoptysis  GI: Denies nausea / vomiting / +abdominal pain / diarrhea / constipation  :  Denies dysuria / hesitancy / urgency / hematuria   Neuro: Denies paralysis / syncope / seizure / dysphagia / headache / paresthesias  Musculoskeletal:  +muscle weakness /joint stiffness / pain  Vascular: Denies edema / claudication / varicosities  Heme / endocrine: Denies easy bruising / bleeding / excessive sweating / heat or cold intolerance  Psychiatric:  Denies depression / anxiety / insomnia / mood changes  Skin:  Denies new rashes / lesions / skin hair or nail changes    14 point review of systems is negative except as specifically addressed above. Physical Examination:  /65   Pulse 83   Temp 98.6 °F (37 °C) (Oral)   Resp 18   Ht 5' 2\" (1.575 m)   Wt 175 lb (79.4 kg)   SpO2 98%   BMI 32.01 kg/m²   General appearance: alert, appears stated age, cooperative and no distress  Head: Normocephalic, without obvious abnormality, atraumatic  Eyes: conjunctivae/corneas clear. PERRL, EOM's intact. Ears: normal external ears and nose, throat without exudate  Neck: no adenopathy, no carotid bruit, no JVD, supple, symmetrical, trachea midline and thyroid not enlarged, symmetric, no tenderness/mass/nodules  Lungs: clear to auscultation bilaterally,no rales or wheezes   Heart: diminished heart tones, regular rate and rhythm, S1, S2 normal, no murmur  Abdomen:soft, mild diffuse tenderness with scattered ecchymotic areas; non-distended, normal bowel sounds no masses, no organomegaly  Extremities: trace lower extremity edema,  No erythema, no tenderness to palpation  Skin: Pale, warm and dry with appropriate turgor/texture. Small scattered areas of echy  Lymphatic: No palpable lymph node enlargment  Neurologic: Alert and oriented X 3, normal strength and tone. Normal symmetric reflexes.  Mental status: Alert, oriented, thought content appropriate  Cranial nerves:II-XII Grossly intact Sensory: normal Motor:grossly normal  Psychiatric: Alert and oriented, thought content appropriate, normal insight, mood appropriate     Diagnostic Data:  CBC:  Recent Labs     12/01/20  1839   WBC 13.5*   HGB 9.4*   HCT 29.6*    BMP:  Recent Labs     12/01/20  1839      K 4.4      CO2 23   BUN 75*   CREATININE 1.7*   CALCIUM 9.3     Recent Labs     12/01/20  1839   AST 87*   *   BILITOT 1.7*   ALKPHOS 666*     Coag Panel:   Recent Labs     12/01/20 1839   INR >18.80*   PROTIME >120.0*   APTT 146.2*     Cardiac Enzymes:   Recent Labs     12/01/20  1839   TROPONINI 0.04*     Urinalysis:  Lab Results   Component Value Date    NITRU POSITIVE 12/01/2020    WBCUA TNTC 12/01/2020    BACTERIA Rare 12/01/2020    RBCUA TNTC 12/01/2020    BLOODU MODERATE 12/01/2020    SPECGRAV 1.019 12/01/2020    GLUCOSEU Negative 12/01/2020     Xr Chest Portable  1. No acute disease. Signed by Dr Alexis Clement on 12/1/2020 6:30 PM    Assessment/Plan:  Principal Problem:    Hematuria-consider 2/2 coagulopathy, imaging pending at this time. D/w attending who agrees to follow up CT . Patient denies s/s urinary retention at this time. Will hold off on catheter placement to avoid trauma.  If retention develops will place iqbal and start CBI    Active Problems:    Supratherapeutic INR-received 5 mg subcutaneous vitamin K, monitor closely, stop Coumadin for now, if Hgb declines or further episodes will consider FFP      Acute blood loss anemia/Anemia of iron deficiency and chronic kidney disease-followed as OP by Hematology receiving Retacrit intermittently for Hgb <11, monitor closely with serial H/H      Elevated LFT's-new problem, CT abdomen/pelvis pending, check acute hepatitis panel, repeat CMP in AM      Anemia of chronic kidney failure, stage 4 (severe) (HCC)-monitor closely with daily labs      Other specified hypothyroidism-resume synthroid      Elevated troponin-no complaint of chest pain or dyspnea, suspect related to CKD, will trend      Hypertension-continue Zestril and monitor closely    Further orders per clinical course/attending    Signed:  Jefferson Olivier PA-C

## 2020-12-03 ENCOUNTER — APPOINTMENT (OUTPATIENT)
Dept: GENERAL RADIOLOGY | Age: 85
DRG: 435 | End: 2020-12-03
Payer: MEDICARE

## 2020-12-03 ENCOUNTER — ANESTHESIA (OUTPATIENT)
Dept: ENDOSCOPY | Age: 85
DRG: 435 | End: 2020-12-03
Payer: MEDICARE

## 2020-12-03 ENCOUNTER — ANESTHESIA EVENT (OUTPATIENT)
Dept: ENDOSCOPY | Age: 85
DRG: 435 | End: 2020-12-03
Payer: MEDICARE

## 2020-12-03 VITALS
SYSTOLIC BLOOD PRESSURE: 159 MMHG | TEMPERATURE: 96.8 F | OXYGEN SATURATION: 96 % | DIASTOLIC BLOOD PRESSURE: 75 MMHG | RESPIRATION RATE: 14 BRPM

## 2020-12-03 LAB
ALBUMIN SERPL-MCNC: 3.1 G/DL (ref 3.5–5.2)
ALP BLD-CCNC: 441 U/L (ref 35–104)
ALT SERPL-CCNC: 111 U/L (ref 5–33)
ANION GAP SERPL CALCULATED.3IONS-SCNC: 7 MMOL/L (ref 7–19)
AST SERPL-CCNC: 55 U/L (ref 5–32)
BASOPHILS ABSOLUTE: 0.1 K/UL (ref 0–0.2)
BASOPHILS RELATIVE PERCENT: 0.6 % (ref 0–1)
BILIRUB SERPL-MCNC: 2 MG/DL (ref 0.2–1.2)
BILIRUBIN DIRECT: 1.2 MG/DL (ref 0–0.3)
BILIRUBIN, INDIRECT: 0.8 MG/DL (ref 0.1–1)
BLOOD BANK DISPENSE STATUS: NORMAL
BLOOD BANK DISPENSE STATUS: NORMAL
BLOOD BANK PRODUCT CODE: NORMAL
BLOOD BANK PRODUCT CODE: NORMAL
BPU ID: NORMAL
BPU ID: NORMAL
BUN BLDV-MCNC: 58 MG/DL (ref 8–23)
CALCIUM SERPL-MCNC: 9 MG/DL (ref 8.8–10.2)
CHLORIDE BLD-SCNC: 110 MMOL/L (ref 98–111)
CO2: 22 MMOL/L (ref 22–29)
CREAT SERPL-MCNC: 1.4 MG/DL (ref 0.5–0.9)
DESCRIPTION BLOOD BANK: NORMAL
DESCRIPTION BLOOD BANK: NORMAL
EOSINOPHILS ABSOLUTE: 0.7 K/UL (ref 0–0.6)
EOSINOPHILS RELATIVE PERCENT: 6.6 % (ref 0–5)
FOLATE: 10.6 NG/ML (ref 4.8–37.3)
GFR AFRICAN AMERICAN: 43
GFR NON-AFRICAN AMERICAN: 36
GLUCOSE BLD-MCNC: 119 MG/DL (ref 74–109)
HCT VFR BLD CALC: 21 % (ref 37–47)
HCT VFR BLD CALC: 30.5 % (ref 37–47)
HCT VFR BLD CALC: 32.4 % (ref 37–47)
HEMOGLOBIN: 10 G/DL (ref 12–16)
HEMOGLOBIN: 10.8 G/DL (ref 12–16)
HEMOGLOBIN: 6.4 G/DL (ref 12–16)
IMMATURE GRANULOCYTES #: 0.1 K/UL
INR BLD: 1.07 (ref 0.88–1.18)
LYMPHOCYTES ABSOLUTE: 0.7 K/UL (ref 1.1–4.5)
LYMPHOCYTES RELATIVE PERCENT: 7 % (ref 20–40)
MCH RBC QN AUTO: 33.5 PG (ref 27–31)
MCHC RBC AUTO-ENTMCNC: 30.5 G/DL (ref 33–37)
MCV RBC AUTO: 109.9 FL (ref 81–99)
MONOCYTES ABSOLUTE: 1 K/UL (ref 0–0.9)
MONOCYTES RELATIVE PERCENT: 10.2 % (ref 0–10)
NEUTROPHILS ABSOLUTE: 7.4 K/UL (ref 1.5–7.5)
NEUTROPHILS RELATIVE PERCENT: 74.7 % (ref 50–65)
PDW BLD-RTO: 16.5 % (ref 11.5–14.5)
PLATELET # BLD: 211 K/UL (ref 130–400)
PMV BLD AUTO: 12 FL (ref 9.4–12.3)
POTASSIUM REFLEX MAGNESIUM: 3.9 MMOL/L (ref 3.5–5)
PROTHROMBIN TIME: 13.8 SEC (ref 12–14.6)
RBC # BLD: 1.91 M/UL (ref 4.2–5.4)
SARS-COV-2, NAAT: NOT DETECTED
SODIUM BLD-SCNC: 139 MMOL/L (ref 136–145)
TOTAL PROTEIN: 5.4 G/DL (ref 6.6–8.7)
TROPONIN: 0.01 NG/ML (ref 0–0.03)
TROPONIN: 0.01 NG/ML (ref 0–0.03)
TROPONIN: 0.02 NG/ML (ref 0–0.03)
URINE CULTURE, ROUTINE: NORMAL
VITAMIN B-12: 575 PG/ML (ref 211–946)
WBC # BLD: 9.9 K/UL (ref 4.8–10.8)

## 2020-12-03 PROCEDURE — P9016 RBC LEUKOCYTES REDUCED: HCPCS

## 2020-12-03 PROCEDURE — 80076 HEPATIC FUNCTION PANEL: CPT

## 2020-12-03 PROCEDURE — 3609018800 HC ERCP DX COLLECTION SPECIMEN BRUSHING/WASHING: Performed by: INTERNAL MEDICINE

## 2020-12-03 PROCEDURE — 43242 EGD US FINE NEEDLE BX/ASPIR: CPT | Performed by: INTERNAL MEDICINE

## 2020-12-03 PROCEDURE — 6360000002 HC RX W HCPCS: Performed by: INTERNAL MEDICINE

## 2020-12-03 PROCEDURE — 2580000003 HC RX 258: Performed by: ANESTHESIOLOGY

## 2020-12-03 PROCEDURE — 2709999900 HC NON-CHARGEABLE SUPPLY: Performed by: INTERNAL MEDICINE

## 2020-12-03 PROCEDURE — 7100000000 HC PACU RECOVERY - FIRST 15 MIN: Performed by: INTERNAL MEDICINE

## 2020-12-03 PROCEDURE — 6370000000 HC RX 637 (ALT 250 FOR IP): Performed by: INTERNAL MEDICINE

## 2020-12-03 PROCEDURE — 43274 ERCP DUCT STENT PLACEMENT: CPT | Performed by: INTERNAL MEDICINE

## 2020-12-03 PROCEDURE — 2580000003 HC RX 258: Performed by: INTERNAL MEDICINE

## 2020-12-03 PROCEDURE — 0FDG8ZX EXTRACTION OF PANCREAS, VIA NATURAL OR ARTIFICIAL OPENING ENDOSCOPIC, DIAGNOSTIC: ICD-10-PCS | Performed by: INTERNAL MEDICINE

## 2020-12-03 PROCEDURE — 36430 TRANSFUSION BLD/BLD COMPNT: CPT

## 2020-12-03 PROCEDURE — 2580000003 HC RX 258: Performed by: NURSE ANESTHETIST, CERTIFIED REGISTERED

## 2020-12-03 PROCEDURE — 74330 X-RAY BILE/PANC ENDOSCOPY: CPT | Performed by: INTERNAL MEDICINE

## 2020-12-03 PROCEDURE — 2500000003 HC RX 250 WO HCPCS: Performed by: NURSE ANESTHETIST, CERTIFIED REGISTERED

## 2020-12-03 PROCEDURE — 80048 BASIC METABOLIC PNL TOTAL CA: CPT

## 2020-12-03 PROCEDURE — C9113 INJ PANTOPRAZOLE SODIUM, VIA: HCPCS | Performed by: INTERNAL MEDICINE

## 2020-12-03 PROCEDURE — 84484 ASSAY OF TROPONIN QUANT: CPT

## 2020-12-03 PROCEDURE — C1876 STENT, NON-COA/NON-COV W/DEL: HCPCS | Performed by: INTERNAL MEDICINE

## 2020-12-03 PROCEDURE — 82746 ASSAY OF FOLIC ACID SERUM: CPT

## 2020-12-03 PROCEDURE — 74328 X-RAY BILE DUCT ENDOSCOPY: CPT

## 2020-12-03 PROCEDURE — 3209999900 FLUORO FOR SURGICAL PROCEDURES

## 2020-12-03 PROCEDURE — 85025 COMPLETE CBC W/AUTO DIFF WBC: CPT

## 2020-12-03 PROCEDURE — 99231 SBSQ HOSP IP/OBS SF/LOW 25: CPT | Performed by: INTERNAL MEDICINE

## 2020-12-03 PROCEDURE — 85018 HEMOGLOBIN: CPT

## 2020-12-03 PROCEDURE — 1210000000 HC MED SURG R&B

## 2020-12-03 PROCEDURE — C1769 GUIDE WIRE: HCPCS | Performed by: INTERNAL MEDICINE

## 2020-12-03 PROCEDURE — 99232 SBSQ HOSP IP/OBS MODERATE 35: CPT | Performed by: INTERNAL MEDICINE

## 2020-12-03 PROCEDURE — 2720000010 HC SURG SUPPLY STERILE: Performed by: INTERNAL MEDICINE

## 2020-12-03 PROCEDURE — 36415 COLL VENOUS BLD VENIPUNCTURE: CPT

## 2020-12-03 PROCEDURE — 3700000001 HC ADD 15 MINUTES (ANESTHESIA): Performed by: INTERNAL MEDICINE

## 2020-12-03 PROCEDURE — U0002 COVID-19 LAB TEST NON-CDC: HCPCS

## 2020-12-03 PROCEDURE — 82607 VITAMIN B-12: CPT

## 2020-12-03 PROCEDURE — 6370000000 HC RX 637 (ALT 250 FOR IP): Performed by: HOSPITALIST

## 2020-12-03 PROCEDURE — 6370000000 HC RX 637 (ALT 250 FOR IP): Performed by: PHYSICIAN ASSISTANT

## 2020-12-03 PROCEDURE — 85610 PROTHROMBIN TIME: CPT

## 2020-12-03 PROCEDURE — 7100000001 HC PACU RECOVERY - ADDTL 15 MIN: Performed by: INTERNAL MEDICINE

## 2020-12-03 PROCEDURE — 3700000000 HC ANESTHESIA ATTENDED CARE: Performed by: INTERNAL MEDICINE

## 2020-12-03 PROCEDURE — 6360000002 HC RX W HCPCS: Performed by: NURSE ANESTHETIST, CERTIFIED REGISTERED

## 2020-12-03 PROCEDURE — 3609018500 HC EGD US SCOPE W/ADJACENT STRUCTURES: Performed by: INTERNAL MEDICINE

## 2020-12-03 PROCEDURE — 85014 HEMATOCRIT: CPT

## 2020-12-03 DEVICE — PREMOUNTED STENT SYSTEM
Type: IMPLANTABLE DEVICE | Site: PANCREAS | Status: FUNCTIONAL
Brand: EXPRESS® LD BILIARY

## 2020-12-03 RX ORDER — PANTOPRAZOLE SODIUM 40 MG/10ML
40 INJECTION, POWDER, LYOPHILIZED, FOR SOLUTION INTRAVENOUS 2 TIMES DAILY
Status: DISCONTINUED | OUTPATIENT
Start: 2020-12-03 | End: 2020-12-05 | Stop reason: HOSPADM

## 2020-12-03 RX ORDER — HYDROMORPHONE HYDROCHLORIDE 1 MG/ML
0.5 INJECTION, SOLUTION INTRAMUSCULAR; INTRAVENOUS; SUBCUTANEOUS EVERY 5 MIN PRN
Status: DISCONTINUED | OUTPATIENT
Start: 2020-12-03 | End: 2020-12-03

## 2020-12-03 RX ORDER — FENTANYL CITRATE 50 UG/ML
50 INJECTION, SOLUTION INTRAMUSCULAR; INTRAVENOUS EVERY 5 MIN PRN
Status: DISCONTINUED | OUTPATIENT
Start: 2020-12-03 | End: 2020-12-03

## 2020-12-03 RX ORDER — LABETALOL HYDROCHLORIDE 5 MG/ML
5 INJECTION, SOLUTION INTRAVENOUS EVERY 10 MIN PRN
Status: DISCONTINUED | OUTPATIENT
Start: 2020-12-03 | End: 2020-12-03

## 2020-12-03 RX ORDER — DIPHENHYDRAMINE HYDROCHLORIDE 50 MG/ML
12.5 INJECTION INTRAMUSCULAR; INTRAVENOUS
Status: DISCONTINUED | OUTPATIENT
Start: 2020-12-03 | End: 2020-12-03

## 2020-12-03 RX ORDER — SODIUM CHLORIDE, SODIUM LACTATE, POTASSIUM CHLORIDE, CALCIUM CHLORIDE 600; 310; 30; 20 MG/100ML; MG/100ML; MG/100ML; MG/100ML
INJECTION, SOLUTION INTRAVENOUS CONTINUOUS
Status: DISCONTINUED | OUTPATIENT
Start: 2020-12-03 | End: 2020-12-03

## 2020-12-03 RX ORDER — HYDROMORPHONE HYDROCHLORIDE 1 MG/ML
0.25 INJECTION, SOLUTION INTRAMUSCULAR; INTRAVENOUS; SUBCUTANEOUS EVERY 5 MIN PRN
Status: DISCONTINUED | OUTPATIENT
Start: 2020-12-03 | End: 2020-12-03

## 2020-12-03 RX ORDER — METOCLOPRAMIDE HYDROCHLORIDE 5 MG/ML
10 INJECTION INTRAMUSCULAR; INTRAVENOUS
Status: DISCONTINUED | OUTPATIENT
Start: 2020-12-03 | End: 2020-12-03

## 2020-12-03 RX ORDER — SUCCINYLCHOLINE CHLORIDE 20 MG/ML
INJECTION INTRAMUSCULAR; INTRAVENOUS PRN
Status: DISCONTINUED | OUTPATIENT
Start: 2020-12-03 | End: 2020-12-03 | Stop reason: SDUPTHER

## 2020-12-03 RX ORDER — HYDRALAZINE HYDROCHLORIDE 25 MG/1
25 TABLET, FILM COATED ORAL EVERY 6 HOURS PRN
Status: DISCONTINUED | OUTPATIENT
Start: 2020-12-03 | End: 2020-12-03

## 2020-12-03 RX ORDER — ONDANSETRON 2 MG/ML
4 INJECTION INTRAMUSCULAR; INTRAVENOUS
Status: DISCONTINUED | OUTPATIENT
Start: 2020-12-03 | End: 2020-12-03

## 2020-12-03 RX ORDER — ONDANSETRON 2 MG/ML
INJECTION INTRAMUSCULAR; INTRAVENOUS PRN
Status: DISCONTINUED | OUTPATIENT
Start: 2020-12-03 | End: 2020-12-03 | Stop reason: SDUPTHER

## 2020-12-03 RX ORDER — FENTANYL CITRATE 50 UG/ML
INJECTION, SOLUTION INTRAMUSCULAR; INTRAVENOUS PRN
Status: DISCONTINUED | OUTPATIENT
Start: 2020-12-03 | End: 2020-12-03 | Stop reason: SDUPTHER

## 2020-12-03 RX ORDER — ENALAPRILAT 2.5 MG/2ML
1.25 INJECTION INTRAVENOUS
Status: DISCONTINUED | OUTPATIENT
Start: 2020-12-03 | End: 2020-12-03

## 2020-12-03 RX ORDER — ROCURONIUM BROMIDE 10 MG/ML
INJECTION, SOLUTION INTRAVENOUS PRN
Status: DISCONTINUED | OUTPATIENT
Start: 2020-12-03 | End: 2020-12-03 | Stop reason: SDUPTHER

## 2020-12-03 RX ORDER — PROPOFOL 10 MG/ML
INJECTION, EMULSION INTRAVENOUS PRN
Status: DISCONTINUED | OUTPATIENT
Start: 2020-12-03 | End: 2020-12-03 | Stop reason: SDUPTHER

## 2020-12-03 RX ORDER — 0.9 % SODIUM CHLORIDE 0.9 %
250 INTRAVENOUS SOLUTION INTRAVENOUS ONCE
Status: DISCONTINUED | OUTPATIENT
Start: 2020-12-03 | End: 2020-12-03

## 2020-12-03 RX ORDER — LIDOCAINE HYDROCHLORIDE 10 MG/ML
INJECTION, SOLUTION INFILTRATION; PERINEURAL PRN
Status: DISCONTINUED | OUTPATIENT
Start: 2020-12-03 | End: 2020-12-03 | Stop reason: SDUPTHER

## 2020-12-03 RX ORDER — MORPHINE SULFATE 4 MG/ML
2 INJECTION, SOLUTION INTRAMUSCULAR; INTRAVENOUS EVERY 5 MIN PRN
Status: DISCONTINUED | OUTPATIENT
Start: 2020-12-03 | End: 2020-12-03

## 2020-12-03 RX ORDER — SODIUM CHLORIDE 9 MG/ML
10 INJECTION INTRAVENOUS DAILY
Status: DISCONTINUED | OUTPATIENT
Start: 2020-12-03 | End: 2020-12-05 | Stop reason: HOSPADM

## 2020-12-03 RX ORDER — SODIUM CHLORIDE 0.9 % (FLUSH) 0.9 %
10 SYRINGE (ML) INJECTION EVERY 12 HOURS SCHEDULED
Status: DISCONTINUED | OUTPATIENT
Start: 2020-12-03 | End: 2020-12-03

## 2020-12-03 RX ORDER — HYDRALAZINE HYDROCHLORIDE 20 MG/ML
5 INJECTION INTRAMUSCULAR; INTRAVENOUS EVERY 10 MIN PRN
Status: DISCONTINUED | OUTPATIENT
Start: 2020-12-03 | End: 2020-12-03

## 2020-12-03 RX ORDER — LISINOPRIL 2.5 MG/1
5 TABLET ORAL 2 TIMES DAILY
Status: DISCONTINUED | OUTPATIENT
Start: 2020-12-03 | End: 2020-12-05 | Stop reason: HOSPADM

## 2020-12-03 RX ORDER — PROMETHAZINE HYDROCHLORIDE 25 MG/ML
6.25 INJECTION, SOLUTION INTRAMUSCULAR; INTRAVENOUS
Status: DISCONTINUED | OUTPATIENT
Start: 2020-12-03 | End: 2020-12-03

## 2020-12-03 RX ORDER — 0.9 % SODIUM CHLORIDE 0.9 %
20 INTRAVENOUS SOLUTION INTRAVENOUS ONCE
Status: DISCONTINUED | OUTPATIENT
Start: 2020-12-03 | End: 2020-12-03

## 2020-12-03 RX ORDER — MORPHINE SULFATE 4 MG/ML
4 INJECTION, SOLUTION INTRAMUSCULAR; INTRAVENOUS EVERY 5 MIN PRN
Status: DISCONTINUED | OUTPATIENT
Start: 2020-12-03 | End: 2020-12-03

## 2020-12-03 RX ORDER — SODIUM CHLORIDE 0.9 % (FLUSH) 0.9 %
10 SYRINGE (ML) INJECTION PRN
Status: DISCONTINUED | OUTPATIENT
Start: 2020-12-03 | End: 2020-12-03

## 2020-12-03 RX ORDER — HYDRALAZINE HYDROCHLORIDE 20 MG/ML
5 INJECTION INTRAMUSCULAR; INTRAVENOUS EVERY 4 HOURS PRN
Status: DISCONTINUED | OUTPATIENT
Start: 2020-12-03 | End: 2020-12-05 | Stop reason: HOSPADM

## 2020-12-03 RX ADMIN — LEVOTHYROXINE SODIUM 112 MCG: 112 TABLET ORAL at 06:10

## 2020-12-03 RX ADMIN — HYDRALAZINE HYDROCHLORIDE 25 MG: 25 TABLET, FILM COATED ORAL at 19:50

## 2020-12-03 RX ADMIN — FENTANYL CITRATE 100 MCG: 50 INJECTION INTRAMUSCULAR; INTRAVENOUS at 13:16

## 2020-12-03 RX ADMIN — PHENYLEPHRINE HYDROCHLORIDE 50 MCG/MIN: 10 INJECTION INTRAVENOUS at 14:45

## 2020-12-03 RX ADMIN — PHENYLEPHRINE HYDROCHLORIDE 100 MCG: 10 INJECTION INTRAVENOUS at 14:30

## 2020-12-03 RX ADMIN — SODIUM CHLORIDE, PRESERVATIVE FREE 10 ML: 5 INJECTION INTRAVENOUS at 19:51

## 2020-12-03 RX ADMIN — PHENYLEPHRINE HYDROCHLORIDE 100 MCG: 10 INJECTION INTRAVENOUS at 14:18

## 2020-12-03 RX ADMIN — PANTOPRAZOLE SODIUM 40 MG: 40 INJECTION, POWDER, FOR SOLUTION INTRAVENOUS at 19:50

## 2020-12-03 RX ADMIN — SODIUM CHLORIDE, SODIUM LACTATE, POTASSIUM CHLORIDE, AND CALCIUM CHLORIDE: 600; 310; 30; 20 INJECTION, SOLUTION INTRAVENOUS at 13:15

## 2020-12-03 RX ADMIN — PROPOFOL 100 MG: 10 INJECTION, EMULSION INTRAVENOUS at 13:16

## 2020-12-03 RX ADMIN — ONDANSETRON HYDROCHLORIDE 4 MG: 2 INJECTION, SOLUTION INTRAMUSCULAR; INTRAVENOUS at 13:56

## 2020-12-03 RX ADMIN — ATORVASTATIN CALCIUM 10 MG: 10 TABLET, FILM COATED ORAL at 19:50

## 2020-12-03 RX ADMIN — PHENYLEPHRINE HYDROCHLORIDE 100 MCG: 10 INJECTION INTRAVENOUS at 14:35

## 2020-12-03 RX ADMIN — ROCURONIUM BROMIDE 5 MG: 10 INJECTION, SOLUTION INTRAVENOUS at 13:15

## 2020-12-03 RX ADMIN — FENTANYL CITRATE 50 MCG: 50 INJECTION INTRAMUSCULAR; INTRAVENOUS at 15:03

## 2020-12-03 RX ADMIN — SUGAMMADEX 200 MG: 100 INJECTION, SOLUTION INTRAVENOUS at 15:20

## 2020-12-03 RX ADMIN — ROCURONIUM BROMIDE 30 MG: 10 INJECTION, SOLUTION INTRAVENOUS at 13:20

## 2020-12-03 RX ADMIN — LISINOPRIL 5 MG: 10 TABLET ORAL at 11:02

## 2020-12-03 RX ADMIN — PHENYLEPHRINE HYDROCHLORIDE 100 MCG: 10 INJECTION INTRAVENOUS at 14:25

## 2020-12-03 RX ADMIN — SUCCINYLCHOLINE CHLORIDE 100 MG: 20 INJECTION, SOLUTION INTRAMUSCULAR; INTRAVENOUS at 13:16

## 2020-12-03 RX ADMIN — LIDOCAINE HYDROCHLORIDE 50 MG: 10 INJECTION, SOLUTION INFILTRATION; PERINEURAL at 13:15

## 2020-12-03 RX ADMIN — FENTANYL CITRATE 50 MCG: 50 INJECTION INTRAMUSCULAR; INTRAVENOUS at 15:21

## 2020-12-03 RX ADMIN — PHENYLEPHRINE HYDROCHLORIDE 100 MCG: 10 INJECTION INTRAVENOUS at 14:21

## 2020-12-03 RX ADMIN — DOCUSATE SODIUM 100 MG: 100 CAPSULE ORAL at 19:50

## 2020-12-03 RX ADMIN — LISINOPRIL 5 MG: 2.5 TABLET ORAL at 23:03

## 2020-12-03 ASSESSMENT — LIFESTYLE VARIABLES: SMOKING_STATUS: 0

## 2020-12-03 ASSESSMENT — PAIN SCALES - GENERAL: PAINLEVEL_OUTOF10: 0

## 2020-12-03 NOTE — PROGRESS NOTES
PROGRESS NOTE    Pt Name: Suma Browning  MRN: [id number]  YOB: 1934  Date of evaluation: 12/3/2020    Subjective Afebrile. She was seen by GI yesterday. Plan for ERCP/FNA biopsy. Diagnosis addressed by me  · Pancreatic head mass  · Chronic anticoagulation  · Supratherapeutic INR  · History of iron deficiency        Megha Moreland was first seen by me on 12/2/2020 as an inpatient consultation at [facility] for findings of a pancreatic mass and bile duct obstruction. 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 She presents to the ER department with complaints of hematuria. The hematuria started a few days ago. She denies any dysuria, back pain, no nausea no vomiting. She also noticed easy bruising and ecchymotic areas in her back, abdomen and flank. Initial laboratory work-up showed INR above 18. She was given vitamin K subcutaneously. She was found to have elevated LFTs. A CT of the abdomen was performed  · 12/1/2020-CT abdomen pelvis without contrast showed a pancreatic head mass measuring 3.3 x 3.7 cm in size and associated, bile duct dilatation above the level of the ampulla. Associated moderate pancreatic ductal dilatation.       REVIEW OF SYSTEMS:   CONSTITUTIONAL: no fever, no night sweats,  fatigue;  HEENT: no blurring of vision, no double vision, no hearing difficulty, no tinnitus, no ulceration, no dysplasia, no epistaxis;  LUNGS: no cough, no hemoptysis, no wheeze,  no shortness of breath;  CARDIOVASCULAR: no palpitation, no chest pain, no shortness of breath;  GI: abdominal pain, no nausea, no vomiting, no diarrhea, no constipation;  MOUSTAPHA: no dysuria, no hematuria, no frequency or urgency, no nephrolithiasis;  MUSCULOSKELETAL: no joint pain, no swelling, no stiffness;  ENDOCRINE: no polyuria, no polydipsia, no cold or heat intolerance;  HEMATOLOGY: no easy bruising or bleeding, no history of clotting disorder;  DERMATOLOGY: no skin rash, no eczema, no pruritus;  PSYCHIATRY: no depression, no anxiety, no panic attacks, no suicidal ideation, no homicidal ideation;  NEUROLOGY: no syncope, no seizures, no numbness or tingling of hands, no numbness or tingling of feet, no paresis;    Objective   BP (!) 112/55   Pulse 76   Temp 97.5 °F (36.4 °C) (Temporal)   Resp 16   Ht 5' 2\" (1.575 m)   Wt 177 lb 6 oz (80.5 kg)   SpO2 96%   BMI 32.44 kg/m²     PHYSICAL EXAM:  CONSTITUTIONAL: Alert, appropriate, no acute distress  EYES: Non icteric, EOM intact, pupils equal round   ENT: Mucus membranes moist, no oral pharyngeal lesions, external inspection of ears and nose are normal  NECK: Supple, no masses. No palpable thyroid mass  CHEST/LUNGS: CTA bilaterally, normal respiratory effort   CARDIOVASCULAR: RRR, no murmurs. No lower extremity edema  ABDOMEN: soft non-tender, active bowel sounds, no HSM. No palpable masses  EXTREMITIES: warm, full ROM in all 4 extremities, no focal weakness. SKIN: warm, dry with no rashes or lesions  LYMPH: No cervical, clavicular, axillary, or inguinal lymphadenopathy  NEUROLOGIC: follows commands, non focal   PSYCH: mood and affect appropriate.   Alert and oriented to time, place, person        LABORATORY RESULTS REVIEWED BY ME:  Recent Labs     12/03/20  0253 12/02/20  2256 12/02/20  0938 12/02/20  0158 12/01/20  1839   WBC 9.9  --   --  11.6* 13.5*   HGB 6.4* 7.0* 7.5* 8.3* 9.4*   HCT 21.0* 22.8* 24.3* 27.2*  26.6* 29.6*   .9*  --   --  107.3* 105.0*     --   --  307 363       Lab Results   Component Value Date     12/02/2020    K 4.3 12/02/2020     12/02/2020    CO2 20 (L) 12/02/2020    BUN 78 (H) 12/02/2020    CREATININE 1.7 (H) 12/02/2020    GLUCOSE 140 (H) 12/02/2020    CALCIUM 8.9 12/02/2020    PROT 5.4 (L) 12/02/2020    LABALBU 3.1 (L) 12/02/2020    BILITOT 1.0 12/02/2020    ALKPHOS 568 (H) 12/02/2020    AST 64 (H) 12/02/2020     (H) 12/02/2020    LABGLOM 28 (A) 12/02/2020    GFRAA 34 (L) 12/02/2020    GLOB 2.3 10/23/2020       Lab Results   Component Value Date    INR 1.07 12/03/2020    INR 1.05 12/02/2020    INR >18.80 (HH) 12/02/2020    PROTIME 13.8 12/03/2020    PROTIME 13.6 12/02/2020    PROTIME >120.0 (HH) 12/02/2020       RADIOLOGY STUDIES REVIEWED BY ME:  Ct Abdomen Pelvis Wo Contrast Additional Contrast? None    Result Date: 12/1/2020  CT ABDOMEN PELVIS WO CONTRAST 12/1/2020 7:22 PM HISTORY: Nausea elevated INR COMPARISON: 1/2/2018 DLP: 2081 mGy cm. Automated exposure control was utilized to diminish patient radiation dose. TECHNIQUE: Noncontrast enhanced images of the abdomen and pelvis obtained without oral contrast. FINDINGS: Mild bibasilar atelectasis is present. There is moderate cardiomegaly. No pericardial effusion is demonstrated. Maritza Cozmik Body LIVER: There is moderate intrahepatic biliary dilatation. The common bile duct measures 2.3 cm in diameter in its more distal aspect. No discrete hepatic mass is appreciated. [facility] Cozmik Body BILIARY SYSTEM: The gallbladder is mildly distended. No pericholecystic inflammatory changes are observed. The common bile duct is dilated to just above the level the ampulla. There is associated moderate pancreatic ductal dilatation. There is an irregular mass noted at the level of the pancreatic head measuring 3.3 x 3.7 cm in size. I feel this should be considered a pancreatic neoplasm until otherwise proven. Maritza Cozmik Body PANCREAS: There is a heterogeneous mass involving the pancreatic head and uncinate process. There is associated pancreatic ductal and common bile duct obstruction. Follow-up imaging with either MRI or CT with IV and oral contrast be recommended to better characterize this lesion. SPLEEN: Splenic granulomas are present. There is no evidence of splenomegaly. Maritza Cozmik Body  KIDNEYS AND ADRENALS: The adrenals are unremarkable. Hyperdense cystic changes are noted involving the upper pole the right kidney and lower pole the left kidney. The ureters are not dilated and this these may represent either pyelocalyceal diverticulum or peripelvic cyst containing some proteinaceous or hemorrhagic fluid. Follow-up imaging will be recommended on postcontrast studies. There is no evidence of nephrolithiasis. .  The ureters are decompressed and normal in appearance. RETROPERITONEUM: There is a 4.2 x 4.4 cm infrarenal dominant aortic aneurysm. Both common iliac arteries are also aneurysmal with the right common iliac measuring 2.3 cm in its proximal segment and the left common iliac measuring 1.8 cm. There is no evidence of retroperitoneal hematoma or adenopathy. Marnell Eligio GI TRACT: There is mild constipation. Diverticulosis is noted the descending and sigmoid colon with no radiographic evidence of acute diverticulitis. Nodes of mechanical obstruction. . The appendix is not well visualized and may be surgically absent. . OTHER: There is no mesenteric mass, lymphadenopathy or fluid collection. The osseous structures and soft tissues demonstrate no worrisome lesions. A small fat-containing periumbilical hernia is present. PELVIS: The uterus and adnexa are unremarkable. There is no free fluid present. . The urinary bladder is normal in appearance. 1. There is moderate to severe intrahepatic and extrahepatic biliary dilatation. There is also dilatation of the pancreatic duct. There is a heterogeneous mass at the level of the pancreatic head and also involving the uncinate process which I feel should be considered a pancreatic neoplasm until otherwise proven. This patient will need follow-up imaging with either repeat CT imaging with IV and oral contrast or MRI with and without gadolinium administration for better characterization and staging of this suspected neoplasm. 2. Bibasilar atelectasis. There is mild cardiomegaly.  3. Infrarenal volume loss with chronic microvascular disease but no evidence of acute intracranial process. Signed by Dr Paul Saleem on 12/1/2020 9:17 PM    Xr Chest Portable    Result Date: 12/1/2020  EXAMINATION: Chest one view 12/1/2020 HISTORY: Shortness of breath FINDINGS: Today's exam is compared to a previous study of 12/12/2019. There is mild cardiomegaly. The thoracic aorta is mildly ectatic. There is arthritic change of both shoulders as well as an old poorly healed fracture of the distal right clavicle. The lungs are clear. There is no evidence of lobar pneumonia or effusion. 1. No acute disease. Signed by Dr Paul Saleem on 12/1/2020 6:30 PM       1. No acute disease. Signed by Dr Paul Saleem on 12/1/2020 6:30 PM       My interpretation: Significant/intrahepatic biliary dilatation. Pancreatic mass.        ASSESSMENT:  #Pancreatic mass-tail of the pancreas. Concern for pancreatic malignancy  CT abdomen reviewed. =648 (elevated)  I agree with GI consult. She will likely need ERCP with FNA biopsy of pancreatic head mass if she is interested in diagnosis and possible treatment       #Extrahepatic/intrahepatic biliary dilatation  Normal bilirubin. The patient is at high risk for obstruction. The patient may need a stent placed. She is at high risk for obstruction.     #Supratherapeutic INR-the patient received vitamin K 5 mg in the emergency. INR>18.   Status post vitamin K 5 mg  Kcentra- 4000 u and Vitamin K-10 mg   INR corrected:1.07     #Macrocytic anemia-likely anemia of chronic disease/inflammation  Differential diagnosis include anemia of chronic disease, inflammation, primary bone marrow process, nutritional deficiency  TSH normal 3.55  Haptoglobin-175  Reticulocyte count absolute-0.0948 (inappropriate normal)  Ferritin 480, iron saturation 16%, TIBC 213, vitamin B12 483     Hemoglobin 10.1 on November 20, 2020  Hemoglobin 9.4 on December 1, 2020  Hemoglobin 6.4 on December 3, 2020    Diagnosis occult blood-not performed yet    #Chronic kidney disease stage IV  Creatinine 1.7/GFR 28    #Elevated troponin    #Hypothyroidism -TSH 3.55    #Hypertension-trending low. #Palliative care patient-palliative care following.     PLAN:  Diagnostic occult blood  Continue current supportive care  Awaiting ERCP and FNA biopsy of pancreatic mass      I have seen, examined and reviewed this patient medication list, appropriate labs and imaging studies. I reviewed relevant medical records and others physicians notes. I discussed the plans of care with the patient. I answered all the questions to the patients satisfaction.      (Please note that portions of this note were completed with a voice recognition program. Efforts were made to edit the dictations but occasionally words are mis-transcribed.)      Janet Wallace MD    12/03/20  6:27 AM

## 2020-12-03 NOTE — PROGRESS NOTES
[id number] [facility]    Patient:  Lenny Roblero  YOB: 1934  Date of Service: 12/3/2020  MRN: [id number]   Acct: [de-identified]   Primary Care Physician: Bambi Sanchez MD  Advance Directive: Full Code  Admit Date: 12/1/2020       Hospital Day: 2  Portions of this note have been copied forward, however, changed to reflect the most current clinical status of this patient. CHIEF COMPLAINT: Hematuria    SUBJECTIVE: Patient has no new complaints. Denies N/V. Hematuria has resolved. Awaiting ERCP. No new questions or concerns     Cumulative Hospital course: The patient is an 80 y.o. female with PMH iron deficiency anemia, CKD IV,recurrent DVT on Coumadin therapy, HTN, HLD, hypothyroidism who presented to [address] [place] [address] ED complaining of hematuria. Denied dysuria, back pain, nausea or vomiting. Stated this has never happened before. Takes Coumadin. Denies changes to her dosing or her diet. Additionally has noticed ecchymotic areas on her back, abdomen and flank areas. Denies trauma. States abdomen is mildly tender. Work up in ED reveals INR >18.80 with [facility] on urinalysis. She denies confusion, chest pain, heart palpitations or history of GI bleeding. She was admitted to Hospitalist service. CT head unremarkable. CT abdomen/pelvis concerning for pancreatic mass. Oncology and Gastroenterology were consulted. She received 5 units Vitamin K in ED as well as 2 units FFP. Received an additional 10 units Vitamin K on 12/02/2020 along with Kcentra 4000 units. On 12/03/2020 coagulopathy corrected. Hematuria resolved. ERCP planned. Review of Systems:   14 point review of systems is negative except as specifically addressed above.     Objective:   VITALS:  BP (!) 171/75   Pulse 73   Temp 97 °F (36.1 °C) (Temporal)   Resp 18   Ht 5' 2\" (1.575 m)   Wt 177 lb 6 oz (80.5 kg)   SpO2 97%   BMI 32.44 kg/m²   24HR INTAKE/OUTPUT:      Intake/Output Summary (Last 24 hours) at 12/3/2020 0731  Last data filed at 12/3/2020 1040  Gross per 24 hour   Intake 3547 ml   Output 2100 ml   Net 1447 ml     General appearance: alert, appears stated age, cooperative and no distress, resting comfortably in bed  Head: Normocephalic, without obvious abnormality, atraumatic  Eyes: conjunctivae/corneas clear. PERRL, EOM's intact. Ears: normal external ears and nose, throat without exudate  Neck: no adenopathy, no carotid bruit, no JVD, supple, symmetrical, trachea midline   Lungs: clear to auscultation bilaterally,no rales or wheezes   Heart: RRR, S1, S2 normal, no murmur  Abdomen:soft, mild epigastric tenderness; non-distended, normal bowel sounds no masses, no organomegaly  Extremities:No lower extremity edema,  No erythema, no tenderness to palpation  Skin: Pale, warm, dry,scattered ecchymotic areas to abdomen/back in various stages of healing  Lymphatic: No palpable lymph node enlargment  Neurologic: Alert and oriented X 3, generalized weakness and normal tone.  No focal deficits  Psychiatric: Alert and oriented, thought content appropriate, normal insight, mood appropriate    Medications:      lactated ringers      sodium chloride 75 mL/hr at 12/03/20 7110      sodium chloride  20 mL Intravenous Once    pantoprazole  40 mg Intravenous BID    And    sodium chloride (PF)  10 mL Intravenous Daily    indomethacin  100 mg Rectal Once    sodium chloride flush  10 mL Intravenous 2 times per day    sodium chloride flush  10 mL Intravenous 2 times per day    allopurinol  100 mg Oral Daily    calcitRIOL  0.25 mcg Oral Daily    docusate sodium  100 mg Oral BID    ferrous sulfate  325 mg Oral Daily    levothyroxine  112 mcg Oral Daily    lisinopril  5 mg Oral Daily    atorvastatin  10 mg Oral Daily     sodium chloride flush, HYDROmorphone, fentanNYL, ondansetron, indomethacin, sodium chloride flush, acetaminophen **OR** acetaminophen, polyethylene glycol, promethazine **OR** ondansetron, potassium chloride **OR** potassium alternative oral replacement **OR** potassium chloride, magnesium sulfate  Diet NPO Time Specified     Lab and other Data:     Recent Labs     12/01/20  1839 12/02/20  0158 12/02/20  0938 12/02/20  2256 12/03/20  0253   WBC 13.5* 11.6*  --   --  9.9   HGB 9.4* 8.3* 7.5* 7.0* 6.4*    307  --   --  211     Recent Labs     12/01/20  1839 12/02/20  0158 12/03/20  0650    140 139   K 4.4 4.3 3.9    108 110   CO2 23 20* 22   BUN 75* 78* 58*   CREATININE 1.7* 1.7* 1.4*   GLUCOSE 168* 140* 119*     Recent Labs     12/01/20  1839 12/02/20  0158 12/03/20  0650   AST 87* 64* 55*   * 152* 111*   BILITOT 1.7* 1.0 2.0*   ALKPHOS 666* 568* 441*     Troponin T:   Recent Labs     12/02/20  1757 12/02/20  2256 12/03/20  0650   TROPONINI 0.02 0.02 0.01     INR:   Recent Labs     12/02/20  0158 12/02/20  1428 12/03/20  0253   INR >18.80* 1.05 1.07     UA:  Recent Labs     12/01/20  1823   COLORU RED*   PHUR 5.0   WBCUA TNTC*   RBCUA TNTC*   BACTERIA Rare*   CLARITYU TURBID*   SPECGRAV 1.019   LEUKOCYTESUR MODERATE*   UROBILINOGEN 0.2   BILIRUBINUR SMALL*   BLOODU MODERATE*   GLUCOSEU Negative     RAD:   Ct Abdomen Pelvis Wo Contrast Additional Contrast? None  1. There is moderate to severe intrahepatic and extrahepatic biliary dilatation. There is also dilatation of the pancreatic duct. There is a heterogeneous mass at the level of the pancreatic head and also involving the uncinate process which I feel should be considered a pancreatic neoplasm until otherwise proven. This patient will need follow-up imaging with either repeat CT imaging with IV and oral contrast or MRI with and without gadolinium administration for better characterization and staging of this suspected neoplasm. 2. Bibasilar atelectasis. There is mild cardiomegaly. 3. Infrarenal abdominal aortic aneurysm. There is also aneurysmal dilatation of both common iliac arteries. No evidence of retroperitoneal hematoma or adenopathy. 4. Mild constipation.  There is diverticulosis of the descending and sigmoid colon as well as other scattered diverticula. No evidence of diverticulitis or mechanical obstruction. 5. Small fat-containing periumbilical hernia. . 6. There are hyperdense cystic changes in the upper pole of the right kidney and lower pole of the left kidney which are hyperdense. This may represent hemorrhagic peripelvic cysts. Bilateral calyceal diverticular are also a differential. These can be followed up on subsequent imaging postcontrast 2 better characterize the findings. Both ureters are decompressed and normal in appearance. Signed by Dr Narcisa Ozuna on 12/1/2020 9:55 PM    Ct Head Wo Contrast  1. Moderate cerebral and cerebellar volume loss with chronic microvascular disease but no evidence of acute intracranial process. Signed by Dr Narcisa Ozuna on 12/1/2020 9:17 PM    Xr Chest Portable  1. No acute disease. Signed by Dr Narcisa Ozuna on 12/1/2020 6:30 PM    Assessment/Plan   Principal Problem:    Hematuria- 2/2 coagulopathy. Resolved     Active Problems:    Pancreatic mass-new problem, GI following, ERCP today       Supratherapeutic INR-received 5 mg subcutaneous vitamin K 12/01,2 units FFP12/01, 10 mg vitamin k 12/02, Kcentra 4000 units 12/02, resolved        Acute blood loss anemia/Anemia of iron deficiency and chronic kidney disease-followed as OP by Hematology receiving Retacrit intermittently for Hgb <11, monitor closely with serial H/H. Hgb 6.4.  Received PRBC        Elevated LFT's-CT concerning for pancreatic mass, GI following, bilirubin 2.0 today       Other specified hypothyroidism-resume synthroid       Elevated troponin-no complaint of chest pain or dyspnea, suspect related to CKD, has been flat and without concern for ACS, resolved       Hypertension-continue Zestril, may need increase in antihypertensive, continue to monitor overnight    DVT Prophylaxis: SCD    Cori Signs, PAOlgaC

## 2020-12-03 NOTE — PROGRESS NOTES
Spoke with Dr. Macarena He, stated that he was unable to place a stent in the biliary duct, but was able to obtain a biopsy of the mass. Provided him with patient's son and daughter's phone numbers to call with updates.  Electronically signed by Artuor Dow RN on 12/3/2020 at 4:26 PM

## 2020-12-03 NOTE — PROGRESS NOTES
Visited with pt per Palliative care nurse practitioner Isabel Amezcua. Pt wanted to complete an AD/LW. This  provided the documents for pt to review and discussed the decisions with pt. Pt chose her daughter as primary decision maker and her son as secondary. Pt made her advance care planning decisions and initialed and signed the document. This  witnessed the pt initial and sign and notarized the document. Original and copy were given to pt, copy went into pt's soft chart, and copy goes to MR. Also provided sustaining presence, and prayer. Pt expressed gratitude for spiritual care.     Electronically signed by Erick Olsen on 12/3/2020 at 10:23 AM

## 2020-12-03 NOTE — PROGRESS NOTES
GI  - PROGRESS NOTE    Admit Date: 12/1/2020             Chief Complaint: feels well, No specific C/O Hgb. Drifting down. PRBC infusing. No clinical bleeding    Patient being seen for:       Diet NPO Time Specified                         Bowel movement: no black or bloody stools    Pain:Mild  Nausea:None    Intake/Output Summary (Last 24 hours) at 12/3/2020 1135  Last data filed at 12/3/2020 1040  Gross per 24 hour   Intake 3967 ml   Output 2100 ml   Net 1867 ml               Lab /Radiology:   Scheduled Meds: Reviewed          -----------------------------------------------------------------          Recent Labs     12/01/20 1839 12/02/20 0158 12/02/20  0938 12/02/20 2256 12/03/20  0253   WBC 13.5* 11.6*  --   --  9.9   RBC 2.82* 2.48*  --   --  1.91*   HGB 9.4* 8.3* 7.5* 7.0* 6.4*   HCT 29.6* 27.2*  26.6* 24.3* 22.8* 21.0*    307  --   --  211     Recent Labs     12/01/20 1839 12/02/20  0158 12/03/20  0650    140 139   K 4.4 4.3 3.9   ANIONGAP 12 12 7    108 110   CO2 23 20* 22   BUN 75* 78* 58*   CREATININE 1.7* 1.7* 1.4*   GLUCOSE 168* 140* 119*   CALCIUM 9.3 8.9 9.0     Recent Labs     12/01/20  1839 12/02/20  0158 12/03/20  0650   AST 87* 64* 55*   * 152* 111*   BILITOT 1.7* 1.0 2.0*   ALKPHOS 666* 568* 441*     No results for input(s): AMYLASE, LIPASE in the last 72 hours. Troponin T:   Recent Labs     12/02/20  1757 12/02/20 2256 12/03/20  0650   TROPONINI 0.02 0.02 0.01     Pro-BNP: No results for input(s): BNP in the last 72 hours. INR:   Recent Labs     12/02/20  0158 12/02/20  1428 12/03/20  0253   INR >18.80* 1.05 1.07             Physical Exam:   Vitals: BP (!) 171/75   Pulse 73   Temp 97 °F (36.1 °C) (Temporal)   Resp 18   Ht 5' 2\" (1.575 m)   Wt 177 lb 6 oz (80.5 kg)   SpO2 97%   BMI 32.44 kg/m²     HEENT: Pupils equal, round, reactive to light       Neck supple. Trachea midline. No crepitus  Lungs: breath sounds bilaterally.   Normal excursion  Heart[de-identified]

## 2020-12-03 NOTE — ACP (ADVANCE CARE PLANNING)
Advance Care Planning     Advance Care Planning Activator (Inpatient)  Conversation Note      Date of ACP Conversation: 12/3/220    Conversation Conducted with: Patient    ACP Activator: Marty Hernandez      [facility] Decision Maker:   Primary Decision Maker: Larry Hdz - Child - [phone]    Secondary Decision Maker: Ryan Arevalo - Child - [phone]    Care Preferences    Ventilation: \"If you were in your present state of health and suddenly became very ill and were unable to breathe on your own, what would your preference be about the use of a ventilator (breathing machine) if it were available to you? \"      Would the patient desire the use of ventilator (breathing machine)?: Yes    \"If your health worsens and it becomes clear that your chance of recovery is unlikely, what would your preference be about the use of a ventilator (breathing machine) if it were available to you? \"     Would the patient desire the use of ventilator (breathing machine)?: Yes      Resuscitation  \"CPR works best to restart the heart when there is a sudden event, like a heart attack, in someone who is otherwise healthy. Unfortunately, CPR does not typically restart the heart for people who have serious health conditions or who are very sick. \"    \"In the event your heart stopped as a result of an underlying serious health condition, would you want attempts to be made to restart your heart (answer \"yes\" for attempt to resuscitate) or would you prefer a natural death (answer \"no\" for do not attempt to resuscitate)? \" Yes         [] Yes   [x] No   Educated Patient / Otto Su regarding differences between Advance Directives and portable DNR orders.        Conversation Outcomes:  [x] ACP discussion completed  [] Existing advance directive reviewed with patient; no changes to patient's previously recorded wishes  [x] New Advance Directive completed    Electronically signed by Marty Hernandez on 12/3/2020 at 10:32 AM

## 2020-12-03 NOTE — ANESTHESIA PRE PROCEDURE
Department of Anesthesiology  Preprocedure Note       Name:  Torri Zhong   Age:  80 y.o.  :  1934                                          MRN:  [id number]         Date:  12/3/2020      Surgeon: Edwin Stewart):  Yanely Pardo MD    Procedure: Procedure(s):  ercp/eus    Medications prior to admission:   Prior to Admission medications    Medication Sig Start Date End Date Taking?  Authorizing Provider   warfarin (COUMADIN) 5 MG tablet Take 5 mg by mouth daily every Tuesday and Friday take one tablet and all other days take 1/2 tablet   Yes Historical Provider, MD   simvastatin (ZOCOR) 20 MG tablet Take 20 mg by mouth nightly   Yes Historical Provider, MD   lisinopril (PRINIVIL;ZESTRIL) 5 MG tablet Take 5 mg by mouth daily 20   Historical Provider, MD   ferrous sulfate (FEROSUL) 325 (65 Fe) MG tablet TAKE 1 TABLET BY MOUTH EVERY DAY 20   ALBERTO Patel   triamterene-hydrochlorothiazide (MAXZIDE-25) 37.5-25 MG per tablet Take 1 tablet by mouth daily    Historical Provider, MD   docusate sodium (COLACE) 100 MG capsule Take 1 capsule by mouth 2 times daily 1/3/20   Karina Valentine MD   calcitRIOL (ROCALTROL) 0.25 MCG capsule Take 0.25 mcg by mouth daily    Historical Provider, MD   allopurinol (ZYLOPRIM) 100 MG tablet Take 100 mg by mouth daily    Historical Provider, MD   levothyroxine (SYNTHROID) 112 MCG tablet Take 112 mcg by mouth Daily    Historical Provider, MD       Current medications:    Current Facility-Administered Medications   Medication Dose Route Frequency Provider Last Rate Last Dose    0.9 % sodium chloride bolus  20 mL Intravenous Once Any Zamora MD        pantoprazole (PROTONIX) injection 40 mg  40 mg Intravenous BID Any Zamora MD        And    sodium chloride (PF) 0.9 % injection 10 mL  10 mL Intravenous Daily Any Zamora MD        indomethacin (INDOCIN) 50 MG suppository 100 mg  100 mg Rectal Once Yanely Pardo MD        sodium chloride flush 0.9 % injection 10 mL  10 mL Intravenous 2 times per day Corie Bouillon, PA-C   10 mL at 12/02/20 9690    sodium chloride flush 0.9 % injection 10 mL  10 mL Intravenous PRN Corie Bouillon, PA-C        acetaminophen (TYLENOL) tablet 650 mg  650 mg Oral Q6H PRN Corie Bouillon, PA-C        Or    acetaminophen (TYLENOL) suppository 650 mg  650 mg Rectal Q6H PRN Corie Bouillon, PA-C        polyethylene glycol (GLYCOLAX) packet 17 g  17 g Oral Daily PRN Corie Bouillon, PA-C        promethazine (PHENERGAN) tablet 12.5 mg  12.5 mg Oral Q6H PRN Corie Priyaon, PA-C        Or    ondansetron [facility]) injection 4 mg  4 mg Intravenous Q6H PRN Corie Bouillon, PA-C        0.9 % sodium chloride infusion   Intravenous Continuous Ghassan Rosenberg MD 75 mL/hr at 12/03/20 6699      potassium chloride (KLOR-CON M) extended release tablet 40 mEq  40 mEq Oral PRN Corie Bouillon, PA-C        Or    potassium bicarb-citric acid (EFFER-K) effervescent tablet 40 mEq  40 mEq Oral PRN Corie Bouillon, PA-C        Or    potassium chloride 10 mEq/100 mL IVPB (Peripheral Line)  10 mEq Intravenous PRN Corie Bouillon, PA-C        magnesium sulfate 2 g in 50 mL IVPB premix  2 g Intravenous PRN Corie Bouillon, PA-C        allopurinol (ZYLOPRIM) tablet 100 mg  100 mg Oral Daily Corie Bouillon, PA-C   100 mg at 12/02/20 2730    calcitRIOL (ROCALTROL) capsule 0.25 mcg  0.25 mcg Oral Daily Corie Bouillon, PA-C   0.25 mcg at 12/02/20 4972    docusate sodium (COLACE) capsule 100 mg  100 mg Oral BID Corie Bouillon, PA-C   100 mg at 12/02/20 2016    ferrous sulfate (IRON 325) tablet 325 mg  325 mg Oral Daily Corie Bouillon, PA-C   325 mg at 12/02/20 1932    levothyroxine (SYNTHROID) tablet 112 mcg  112 mcg Oral Daily Corie Caballero PA-C   112 mcg at 12/03/20 0610    lisinopril (PRINIVIL;ZESTRIL) tablet 5 mg  5 mg Oral Daily Corie Caballero PA-C   5 mg at 12/03/20 1102    atorvastatin (LIPITOR) tablet 10 mg  10 mg Oral Daily Oscar Razo PA-C   10 mg at 12/02/20 2016       Allergies: Allergies   Allergen Reactions    Penicillins Rash       Problem List:    Patient Active Problem List   Diagnosis Code    Heme positive stool R19.5    Anemia of chronic kidney failure, stage 4 (severe) (HCC) N18.4, D63.1    Iron deficiency anemia secondary to inadequate dietary iron intake D50.8    H/O abnormal mammogram Z87.898    Primary osteoarthritis of right knee M17.11    Status post total right knee replacement Z96.651    Unilateral primary osteoarthritis, right knee M17.11    Other specified hypothyroidism E03.8    Encounter for BARBARA (erythropoietin stimulating agent) anemia management D64.9, Z79.899    Elevated INR R79.1    Elevated troponin R77.8    Hematuria R31.9    Acute blood loss anemia D62    Acute on chronic renal insufficiency N28.9, N18.9    Palliative care patient Z51.5    Pancreatic mass K86.89    Fatigue R53.83       Past Medical History:        Diagnosis Date    Abdominal aortic aneurysm (AAA) (HCC)     Anemia     Arthritis     Chronic kidney disease     DVT of lower extremity (deep venous thrombosis) ([facility] [address]     twice 2010 and 2017    Hyperlipidemia     Hypertension     Kidney stones     Osteoarthritis     Palliative care patient 12/02/2020    Thyroid disease     Thyroid disorder        Past Surgical History:        Procedure Laterality Date    COLONOSCOPY  2016    dr sorto-no problems    TOE AMPUTATION      TOE AMPUTATION      TOTAL KNEE ARTHROPLASTY Right 1/3/2020    RIGHT TOTAL KNEE REPLACEMENT performed by Navid Sher MD at [facility] OR       Social History:    Social History     Tobacco Use    Smoking status: Never Smoker    Smokeless tobacco: Never Used   Substance Use Topics    Alcohol use:  No                                Counseling given: Not Answered      Vital Signs (Current):   Vitals:    12/03/20 0741 12/03/20 0804 12/03/20 1039 12/03/20 1115   BP: 135/62 (!) 140/69 (!) 172/78 (!) 171/75   Pulse: 77 74 77 73   Resp: 14 18 20 18   Temp: 98.3 °F (36.8 °C) 98.7 °F (37.1 °C) 98.7 °F (37.1 °C) 97 °F (36.1 °C)   TempSrc: Temporal Temporal Temporal Temporal   SpO2: 96% 98% 99% 97%   Weight:       Height:                                                  BP Readings from Last 3 Encounters:   12/03/20 (!) 171/75   11/20/20 (!) 140/68   10/23/20 (!) 160/90       NPO Status:                                                                                 BMI:   Wt Readings from Last 3 Encounters:   12/03/20 177 lb 6 oz (80.5 kg)   11/20/20 185 lb 4.8 oz (84.1 kg)   10/23/20 185 lb 9.6 oz (84.2 kg)     Body mass index is 32.44 kg/m². CBC:   Lab Results   Component Value Date    WBC 9.9 12/03/2020    RBC 1.91 12/03/2020    HGB 6.4 12/03/2020    HCT 21.0 12/03/2020    .9 12/03/2020    RDW 16.5 12/03/2020     12/03/2020       CMP:   Lab Results   Component Value Date     12/03/2020    K 3.9 12/03/2020     12/03/2020    CO2 22 12/03/2020    BUN 58 12/03/2020    CREATININE 1.4 12/03/2020    GFRAA 43 12/03/2020    LABGLOM 36 12/03/2020    GLUCOSE 119 12/03/2020    PROT 5.4 12/03/2020    PROT 7.2 10/25/2012    CALCIUM 9.0 12/03/2020    BILITOT 2.0 12/03/2020    ALKPHOS 441 12/03/2020    AST 55 12/03/2020     12/03/2020       POC Tests: No results for input(s): POCGLU, POCNA, POCK, POCCL, POCBUN, POCHEMO, POCHCT in the last 72 hours.     Coags:   Lab Results   Component Value Date    PROTIME 13.8 12/03/2020    INR 1.07 12/03/2020    APTT 26.3 12/02/2020       HCG (If Applicable): No results found for: PREGTESTUR, PREGSERUM, HCG, HCGQUANT     ABGs: No results found for: PHART, PO2ART, OXV8KNH, EGH9WKB, BEART, C9EOQGCV     Type & Screen (If Applicable):  No results found for: LABABO, LABRH    Drug/Infectious Status (If Applicable):  No results found for: HIV, HEPCAB    COVID-19 Screening (If Applicable):   Lab Results   Component Value Date    COVID19 Not Detected 12/03/2020 Anesthesia Evaluation  Patient summary reviewed no history of anesthetic complications:   Airway: Mallampati: III  TM distance: >3 FB   Neck ROM: full  Mouth opening: > = 3 FB Dental:          Pulmonary:normal exam  breath sounds clear to auscultation      (-) asthma, recent URI, sleep apnea and not a current smoker          Patient did not smoke on day of surgery. Cardiovascular:  Exercise tolerance: good (>4 METS),   (+) hypertension:,     (-) pacemaker, past MI, CABG/stent and  angina      Rhythm: regular  Rate: normal           Beta Blocker:  Dose within 24 Hrs         Neuro/Psych:      (-) seizures, TIA and CVA           GI/Hepatic/Renal:        (-) GERD, liver disease and no renal disease       Endo/Other:    (+) hypothyroidism::., .    (-) diabetes mellitus, hyperthyroidism               Abdominal:           Vascular:   + DVT (takes warfarin), . Anesthesia Plan      general     ASA 3       Induction: intravenous and inhalational.    MIPS: Postoperative opioids intended and Prophylactic antiemetics administered. Anesthetic plan and risks discussed with patient. Use of blood products discussed with patient whom consented to blood products. Plan discussed with CRNA.                   Pb Pyle MD   12/3/2020

## 2020-12-03 NOTE — BRIEF OP NOTE
Brief Postoperative Note      Patient: Keysha Kim  YOB: 1934  MRN: [id number]    Date of Procedure: 12/3/2020    Pre-Op Diagnosis: pancrease mass with biliary obstruction    Post-Op Diagnosis: Same       Procedure(s):  ENDOSCOPIC ULTRASOUND with FNA    Failed ERCP cannulation of common bile duct. Surgeon(s):  Filomena Ornelas MD    Anesthesia: Monitor Anesthesia Care    Estimated Blood Loss (mL): Minimal    Complications: None    Specimens:   * No specimens in log *    Implants:  Implant Name Type Inv. Item Serial No.  Lot No. LRB No. Used Action   Advanix Pancreatic Stent/ Straight with leading JACINDA 5F x 5 cm    [employer]: ENDOSCOPY-PMM [id number] N/A 1 Implanted     Findings:   - s/p FNA of pancreatic mass  - unsuccessful biliary cannulation despite attempts at 2-wire cannulation and proph pancreatic stenting with cannulation around pancreatic stenting. Plan:  - Await pathology results  - If bilirubin continues to rise, patient will need referral to tertiary care center for repeat attempts at cannulation.      Electronically signed by Filomena Ornelas MD on 12/3/2020 at 4:04 PM

## 2020-12-04 LAB
ALBUMIN SERPL-MCNC: 3 G/DL (ref 3.5–5.2)
ALBUMIN SERPL-MCNC: 3 G/DL (ref 3.5–5.2)
ALP BLD-CCNC: 442 U/L (ref 35–104)
ALP BLD-CCNC: 448 U/L (ref 35–104)
ALT SERPL-CCNC: 100 U/L (ref 5–33)
ALT SERPL-CCNC: 97 U/L (ref 5–33)
ANION GAP SERPL CALCULATED.3IONS-SCNC: 10 MMOL/L (ref 7–19)
ANION GAP SERPL CALCULATED.3IONS-SCNC: 12 MMOL/L (ref 7–19)
AST SERPL-CCNC: 49 U/L (ref 5–32)
AST SERPL-CCNC: 50 U/L (ref 5–32)
BASOPHILS ABSOLUTE: 0 K/UL (ref 0–0.2)
BASOPHILS RELATIVE PERCENT: 0.3 % (ref 0–1)
BILIRUB SERPL-MCNC: 2.4 MG/DL (ref 0.2–1.2)
BILIRUB SERPL-MCNC: 2.4 MG/DL (ref 0.2–1.2)
BILIRUBIN DIRECT: 1.4 MG/DL (ref 0–0.3)
BILIRUBIN, INDIRECT: 1 MG/DL (ref 0.1–1)
BUN BLDV-MCNC: 51 MG/DL (ref 8–23)
BUN BLDV-MCNC: 52 MG/DL (ref 8–23)
CALCIUM SERPL-MCNC: 8.9 MG/DL (ref 8.8–10.2)
CALCIUM SERPL-MCNC: 9 MG/DL (ref 8.8–10.2)
CHLORIDE BLD-SCNC: 111 MMOL/L (ref 98–111)
CHLORIDE BLD-SCNC: 112 MMOL/L (ref 98–111)
CO2: 21 MMOL/L (ref 22–29)
CO2: 22 MMOL/L (ref 22–29)
CREAT SERPL-MCNC: 1.4 MG/DL (ref 0.5–0.9)
CREAT SERPL-MCNC: 1.4 MG/DL (ref 0.5–0.9)
EOSINOPHILS ABSOLUTE: 0.3 K/UL (ref 0–0.6)
EOSINOPHILS RELATIVE PERCENT: 3 % (ref 0–5)
GFR AFRICAN AMERICAN: 43
GFR AFRICAN AMERICAN: 43
GFR NON-AFRICAN AMERICAN: 36
GFR NON-AFRICAN AMERICAN: 36
GLUCOSE BLD-MCNC: 100 MG/DL (ref 74–109)
GLUCOSE BLD-MCNC: 98 MG/DL (ref 74–109)
HCT VFR BLD CALC: 28.5 % (ref 37–47)
HCT VFR BLD CALC: 30.8 % (ref 37–47)
HCT VFR BLD CALC: 33.1 % (ref 37–47)
HEMOGLOBIN: 10.6 G/DL (ref 12–16)
HEMOGLOBIN: 9.5 G/DL (ref 12–16)
HEMOGLOBIN: 9.8 G/DL (ref 12–16)
IMMATURE GRANULOCYTES #: 0.1 K/UL
INR BLD: 1.04 (ref 0.88–1.18)
LYMPHOCYTES ABSOLUTE: 0.4 K/UL (ref 1.1–4.5)
LYMPHOCYTES RELATIVE PERCENT: 4.3 % (ref 20–40)
MCH RBC QN AUTO: 33.2 PG (ref 27–31)
MCHC RBC AUTO-ENTMCNC: 33.3 G/DL (ref 33–37)
MCV RBC AUTO: 99.7 FL (ref 81–99)
MONOCYTES ABSOLUTE: 1 K/UL (ref 0–0.9)
MONOCYTES RELATIVE PERCENT: 9.4 % (ref 0–10)
NEUTROPHILS ABSOLUTE: 8.4 K/UL (ref 1.5–7.5)
NEUTROPHILS RELATIVE PERCENT: 82.3 % (ref 50–65)
PDW BLD-RTO: 18.2 % (ref 11.5–14.5)
PLATELET # BLD: 218 K/UL (ref 130–400)
PMV BLD AUTO: 11.8 FL (ref 9.4–12.3)
POTASSIUM REFLEX MAGNESIUM: 4.4 MMOL/L (ref 3.5–5)
POTASSIUM SERPL-SCNC: 4.3 MMOL/L (ref 3.5–5)
PROTHROMBIN TIME: 13.5 SEC (ref 12–14.6)
RBC # BLD: 2.86 M/UL (ref 4.2–5.4)
SODIUM BLD-SCNC: 143 MMOL/L (ref 136–145)
SODIUM BLD-SCNC: 145 MMOL/L (ref 136–145)
TOTAL PROTEIN: 5.3 G/DL (ref 6.6–8.7)
TOTAL PROTEIN: 5.3 G/DL (ref 6.6–8.7)
TROPONIN: 0.01 NG/ML (ref 0–0.03)
TROPONIN: 0.02 NG/ML (ref 0–0.03)
TROPONIN: 0.03 NG/ML (ref 0–0.03)
WBC # BLD: 10.2 K/UL (ref 4.8–10.8)

## 2020-12-04 PROCEDURE — 2580000003 HC RX 258: Performed by: INTERNAL MEDICINE

## 2020-12-04 PROCEDURE — 6370000000 HC RX 637 (ALT 250 FOR IP): Performed by: INTERNAL MEDICINE

## 2020-12-04 PROCEDURE — 84484 ASSAY OF TROPONIN QUANT: CPT

## 2020-12-04 PROCEDURE — 99232 SBSQ HOSP IP/OBS MODERATE 35: CPT | Performed by: NURSE PRACTITIONER

## 2020-12-04 PROCEDURE — 1210000000 HC MED SURG R&B

## 2020-12-04 PROCEDURE — 85025 COMPLETE CBC W/AUTO DIFF WBC: CPT

## 2020-12-04 PROCEDURE — 85610 PROTHROMBIN TIME: CPT

## 2020-12-04 PROCEDURE — 6370000000 HC RX 637 (ALT 250 FOR IP): Performed by: HOSPITALIST

## 2020-12-04 PROCEDURE — 80053 COMPREHEN METABOLIC PANEL: CPT

## 2020-12-04 PROCEDURE — 6360000002 HC RX W HCPCS: Performed by: INTERNAL MEDICINE

## 2020-12-04 PROCEDURE — 99232 SBSQ HOSP IP/OBS MODERATE 35: CPT | Performed by: INTERNAL MEDICINE

## 2020-12-04 PROCEDURE — 85014 HEMATOCRIT: CPT

## 2020-12-04 PROCEDURE — C9113 INJ PANTOPRAZOLE SODIUM, VIA: HCPCS | Performed by: INTERNAL MEDICINE

## 2020-12-04 PROCEDURE — 36415 COLL VENOUS BLD VENIPUNCTURE: CPT

## 2020-12-04 PROCEDURE — 85018 HEMOGLOBIN: CPT

## 2020-12-04 RX ADMIN — PANTOPRAZOLE SODIUM 40 MG: 40 INJECTION, POWDER, FOR SOLUTION INTRAVENOUS at 23:00

## 2020-12-04 RX ADMIN — SODIUM CHLORIDE, PRESERVATIVE FREE 10 ML: 5 INJECTION INTRAVENOUS at 09:01

## 2020-12-04 RX ADMIN — PANTOPRAZOLE SODIUM 40 MG: 40 INJECTION, POWDER, FOR SOLUTION INTRAVENOUS at 09:00

## 2020-12-04 RX ADMIN — DOCUSATE SODIUM 100 MG: 100 CAPSULE ORAL at 21:03

## 2020-12-04 RX ADMIN — ATORVASTATIN CALCIUM 10 MG: 10 TABLET, FILM COATED ORAL at 21:04

## 2020-12-04 RX ADMIN — LEVOTHYROXINE SODIUM 112 MCG: 112 TABLET ORAL at 06:00

## 2020-12-04 RX ADMIN — LISINOPRIL 5 MG: 2.5 TABLET ORAL at 09:00

## 2020-12-04 RX ADMIN — CALCITRIOL CAPSULES 0.25 MCG 0.25 MCG: 0.25 CAPSULE ORAL at 09:00

## 2020-12-04 RX ADMIN — DOCUSATE SODIUM 100 MG: 100 CAPSULE ORAL at 09:00

## 2020-12-04 RX ADMIN — LISINOPRIL 5 MG: 2.5 TABLET ORAL at 21:03

## 2020-12-04 RX ADMIN — ALLOPURINOL 100 MG: 100 TABLET ORAL at 09:00

## 2020-12-04 RX ADMIN — FERROUS SULFATE TAB 325 MG (65 MG ELEMENTAL FE) 325 MG: 325 (65 FE) TAB at 09:00

## 2020-12-04 ASSESSMENT — ENCOUNTER SYMPTOMS
RESPIRATORY NEGATIVE: 1
EYES NEGATIVE: 1
GASTROINTESTINAL NEGATIVE: 1

## 2020-12-04 ASSESSMENT — PAIN SCALES - GENERAL
PAINLEVEL_OUTOF10: 0
PAINLEVEL_OUTOF10: 1

## 2020-12-04 NOTE — PROGRESS NOTES
GI  - PROGRESS NOTE    Admit Date: 12/1/2020             Chief Complaint: doing well post EUS/ERCP. Mild abd. discomfort  Patient being seen for:  Pancr. mass     DIET GENERAL;                         Bowel movement: no black or bloody stools    Pain:Mild  Nausea:Mild    Intake/Output Summary (Last 24 hours) at 12/4/2020 1534  Last data filed at 12/4/2020 1414  Gross per 24 hour   Intake 1450 ml   Output 1050 ml   Net 400 ml               Lab /Radiology:   Scheduled Meds: Reviewed          -----------------------------------------------------------------          Recent Labs     12/02/20 0158 12/03/20  0253  12/03/20  2207 12/04/20  0228 12/04/20  1001   WBC 11.6*  --  9.9  --   --  10.2  --    RBC 2.48*  --  1.91*  --   --  2.86*  --    HGB 8.3*   < > 6.4*   < > 10.0* 9.5* 10.6*   HCT 27.2*  26.6*   < > 21.0*   < > 30.5* 28.5* 33.1*     --  211  --   --  218  --     < > = values in this interval not displayed. Recent Labs     12/02/20 0158 12/03/20  0650 12/04/20  0639    139 145  143   K 4.3 3.9 4.3  4.4   ANIONGAP 12 7 12  10    110 112*  111   CO2 20* 22 21*  22   BUN 78* 58* 52*  51*   CREATININE 1.7* 1.4* 1.4*  1.4*   GLUCOSE 140* 119* 100  98   CALCIUM 8.9 9.0 9.0  8.9     Recent Labs     12/02/20 0158 12/03/20  0650 12/04/20  0639   AST 64* 55* 50*  49*   * 111* 100*  97*   BILITOT 1.0 2.0* 2.4*  2.4*   ALKPHOS 568* 441* 442*  448*     No results for input(s): AMYLASE, LIPASE in the last 72 hours. Troponin T:   Recent Labs     12/04/20  0639 12/04/20  1001 12/04/20  1406   TROPONINI 0.02 0.02 0.02     Pro-BNP: No results for input(s): BNP in the last 72 hours.   INR:   Recent Labs     12/02/20  1428 12/03/20  0253 12/04/20  0228   INR 1.05 1.07 1.04             Physical Exam:   Vitals: BP (!) 142/61   Pulse 66   Temp 97.3 °F (36.3 °C) (Temporal)   Resp 18   Ht 5' 2\" (1.575 m)   Wt 176 lb 7 oz (80 kg)   SpO2 94%   BMI 32.27 kg/m²     HEENT: Pupils equal, round, reactive to light       Neck supple. Trachea midline. No crepitus  Lungs: breath sounds bilaterally. Normal excursion  Heart[de-identified]    RRR without heave or thrill  Abdomen: abnormal findings:  tenderness moderate in the epigastrium. No encarcerated hernia detected. No organomegaly detected  Extremities: no cyanosis or clubbing  Lymphatic: No significant lymph node enlargement papable in the neck , groin or umbilicus  Neurologic: alert. oriented        Impression: 1. Pancr. mass. suspect neoplasm. EUS FNA pending     2. Biliary obstruction due to # 1      Plan: 1. Await path . Monitor bili .agree with tertiary referral for re attempt stent placement or external drainage if bili increases      Jose Chamberlain M.D.   Gastroenterology

## 2020-12-04 NOTE — PROGRESS NOTES
MD        sodium chloride flush 0.9 % injection 10 mL  10 mL Intravenous 2 times per day Ival Lines, MD   10 mL at 12/04/20 0901    sodium chloride flush 0.9 % injection 10 mL  10 mL Intravenous PRN Ival Lines, MD        acetaminophen (TYLENOL) tablet 650 mg  650 mg Oral Q6H PRN Ival Lines, MD        Or    acetaminophen (TYLENOL) suppository 650 mg  650 mg Rectal Q6H PRN Ival Lines, MD        polyethylene glycol [facility]) packet 17 g  17 g Oral Daily PRN Ival Lines, MD        promethazine (PHENERGAN) tablet 12.5 mg  12.5 mg Oral Q6H PRN Ival Lines, MD        Or    ondansetron [facility]) injection 4 mg  4 mg Intravenous Q6H PRN Ival Lines, MD        0.9 % sodium chloride infusion   Intravenous Continuous Raúl Holland MD 50 mL/hr at 12/03/20 2230      potassium chloride (KLOR-CON M) extended release tablet 40 mEq  40 mEq Oral PRN Ival Lines, MD        Or    potassium bicarb-citric acid (EFFER-K) effervescent tablet 40 mEq  40 mEq Oral PRN Ival Lines, MD        Or    potassium chloride 10 mEq/100 mL IVPB (Peripheral Line)  10 mEq Intravenous PRN Ival Lines, MD        magnesium sulfate 2 g in 50 mL IVPB premix  2 g Intravenous PRN Ival Lines, MD        allopurinol (ZYLOPRIM) tablet 100 mg  100 mg Oral Daily Ival Lines, MD   100 mg at 12/04/20 0900    calcitRIOL (ROCALTROL) capsule 0.25 mcg  0.25 mcg Oral Daily Ival Lines, MD   0.25 mcg at 12/04/20 0900    docusate sodium (COLACE) capsule 100 mg  100 mg Oral BID Ival Lines, MD   100 mg at 12/04/20 0900    ferrous sulfate (IRON 325) tablet 325 mg  325 mg Oral Daily Ival Lines, MD   325 mg at 12/04/20 0900    levothyroxine (SYNTHROID) tablet 112 mcg  112 mcg Oral Daily Ival Lines, MD   112 mcg at 12/04/20 0600    atorvastatin (LIPITOR) tablet 10 mg  10 mg Oral Daily Ival Lines, MD   10 mg at 12/03/20 1950        Labs:     Recent Labs     12/02/20  0158  12/03/20  0253  12/03/20  2207 12/04/20  3065 12/04/20  1001   WBC 11.6*  --  9.9  --   --  10.2  --    RBC 2.48*  --  1.91*  --   --  2.86*  --    HGB 8.3*   < > 6.4*   < > 10.0* 9.5* 10.6*   HCT 27.2*  26.6*   < > 21.0*   < > 30.5* 28.5* 33.1*   .3*  --  109.9*  --   --  99.7*  --    MCH 33.5*  --  33.5*  --   --  33.2*  --    MCHC 31.2*  --  30.5*  --   --  33.3  --      --  211  --   --  218  --     < > = values in this interval not displayed. Recent Labs     12/02/20  0158 12/03/20  0650 12/04/20  0639    139 145  143   K 4.3 3.9 4.3  4.4   ANIONGAP 12 7 12  10    110 112*  111   CO2 20* 22 21*  22   BUN 78* 58* 52*  51*   CREATININE 1.7* 1.4* 1.4*  1.4*   GLUCOSE 140* 119* 100  98   CALCIUM 8.9 9.0 9.0  8.9     No results for input(s): MG, PHOS in the last 72 hours. Recent Labs     12/02/20  0158 12/03/20  0650 12/04/20  0639   AST 64* 55* 50*  49*   * 111* 100*  97*   BILITOT 1.0 2.0* 2.4*  2.4*   ALKPHOS 568* 441* 442*  448*     ABGs:No results for input(s): PHART, KBP3RCO, PO2ART, DFL4DJQ, BEART, HGBAE, S5MKCYHX, CARBOXHGBART, 02THERAPY in the last 72 hours. HgBA1c: No results for input(s): LABA1C in the last 72 hours.   FLP:    Lab Results   Component Value Date    TRIG 113 11/04/2020    HDL 53 11/04/2020    LDLCALC 85 11/04/2020     TSH:    Lab Results   Component Value Date    TSH 3.550 12/02/2020     Troponin T:   Recent Labs     12/04/20  0228 12/04/20  0639 12/04/20  1001   TROPONINI 0.02 0.02 0.02     INR:   Recent Labs     12/02/20  1428 12/03/20  0253 12/04/20  0228   INR 1.05 1.07 1.04       Objective:   Vitals: BP (!) 142/61   Pulse 66   Temp 97.3 °F (36.3 °C) (Temporal)   Resp 18   Ht 5' 2\" (1.575 m)   Wt 176 lb 7 oz (80 kg)   SpO2 94%   BMI 32.27 kg/m²   24HR INTAKE/OUTPUT:      Intake/Output Summary (Last 24 hours) at 12/4/2020 1256  Last data filed at 12/4/2020 0947  Gross per 24 hour   Intake 1270 ml   Output 1050 ml   Net 220 ml     Physical Exam  General appearance: alert and cooperative with exam, appears stated age, no acute distress  HEENT: atraumatic, eyes with clear conjunctiva and normal lids, pupils and irises normal, external ears and nose are normal,lips normal.  Neck: without masses, supple   Lungs: no increased work of breathing, clear to auscultation bilaterally  Heart: regular rate and rhythm and S1, S2 normal  Abdomen: soft, non-tender; bowel sounds normal; no masses,  no organomegaly  Genitourinary: No bladder fullness, masses, or tenderness  Extremities: extremities normal, atraumatic, no cyanosis or edema  Neurologic: No focal neurologic deficits, normal sensation, no tremor, alert and oriented  Psychiatric: Alert and oriented, no recent or remote memory deficits, good judgement, mood and affect appropriate  Skin: warm, dry    Assessment and Plan:   Principal Problem:    Hematuria  Active Problems:    Acute blood loss anemia    Acute on chronic renal insufficiency    Anemia of chronic kidney failure, stage 4 (severe) (HCC)    Iron deficiency anemia secondary to inadequate dietary iron intake    Other specified hypothyroidism    Elevated INR    Elevated troponin    Palliative care patient    Pancreatic mass    Fatigue    Elevated liver enzymes    Biliary obstruction  Resolved Problems:    * No resolved hospital problems. *        Visit Summary: I saw the patient at the bedside with no family present. She is currently sitting up in bed and eating lunch. She reviewed visits from her providers earlier this morning, discussed the outcome of her procedure yesterday with Dr. Ivan Romero and stated she may need to go to [facility] for stenting if that becomes a problem, remains open to that. I did talk with her about her plan to return home with [facility] services, which she confirms. We talked about what her consideration is for treatment if offered, patient stated she had not really thought about that yet.   We briefly talked about benefit versus burden and encouraged her to speak further with oncology once pathology is back and they are able to make recommendations. Patient does appear to indicate a quality versus quantity outlook on life and would likely attempt treatment if offered. I did discuss the ability to be evaluated for outpatient supportive care as an extra level of support while she is pursuing work-up and treatment. Patient was very accepting of this service and I will discuss further with the outpatient supportive care team.  Patient will require an outpatient supportive care consult with discharge orders so that she can be followed. Patient currently denies any pain, discomfort, nausea. Very pleasant lady and I provided encouragement. Palliative care will follow and assist as needed. Recommendations:   1. GOC is for patient to return home with [facility] services. She is also agreeable to be followed by Outpatient Supportive Care. Plans to follow up with Oncology to discuss possible treatment options and is open to transfer to tertiary center for stenting if needed. Thank you for consulting Palliative Care and allowing us to participate in the care of this patient.        Electronically signed by ALBERTO Beckett on 12/4/2020 at 12:56 PM    (Please note that portions of this note were completed with a voice recognition program.  Jefferson Alas made to edit the dictations but occasionally words are mis-transcribed.)

## 2020-12-04 NOTE — PROGRESS NOTES
[facility] Hospitalists    Patient:  Keysha Kim  YOB: 1934  Date of Service: 12/4/2020  MRN: [id number]   Acct: [de-identified]   Primary Care Physician: Sonali oRdrigez MD  Advance Directive: Full Code  Admit Date: 12/1/2020       Hospital Day: 3  Portions of this note have been copied forward, however, changed to reflect the most current clinical status of this patient. CHIEF COMPLAINT: Hematuria    SUBJECTIVE: Patient states she feels better today. Denies N/V, diarrhea. Hematuria remains resolved. Cumulative Hospital course: The patient is an 80 y.o. female with PMH iron deficiency anemia, CKD IV,recurrent DVT on Coumadin therapy, HTN, HLD, hypothyroidism who presented to [address] [place] [address] ED complaining of hematuria. Denied dysuria, back pain, nausea or vomiting. Stated this has never happened before. Takes Coumadin. Denies changes to her dosing or her diet. Additionally has noticed ecchymotic areas on her back, abdomen and flank areas. Denies trauma. States abdomen is mildly tender. Work up in ED reveals INR >18.80 with [facility] on urinalysis. She denies confusion, chest pain, heart palpitations or history of GI bleeding. She was admitted to Hospitalist service. CT head unremarkable. CT abdomen/pelvis concerning for pancreatic mass. Oncology and Gastroenterology were consulted. She received 5 units Vitamin K in ED as well as 2 units FFP. Received an additional 10 units Vitamin K on 12/02/2020 along with Kcentra 4000 units. On 12/03/2020 coagulopathy corrected. Hematuria resolved. ERCP performed 12/03/2020 with unsuccessful attempt at stenting. Biopsies were taken. Review of Systems:   14 point review of systems is negative except as specifically addressed above.     Objective:   VITALS:  BP (!) 142/61   Pulse 66   Temp 97.3 °F (36.3 °C) (Temporal)   Resp 18   Ht 5' 2\" (1.575 m)   Wt 176 lb 7 oz (80 kg)   SpO2 94%   BMI 32.27 kg/m²   24HR INTAKE/OUTPUT:      Intake/Output Summary (Last 24 Labs     12/02/20  0158  12/03/20  0253  12/03/20  2207 12/04/20  0228 12/04/20  1001   WBC 11.6*  --  9.9  --   --  10.2  --    HGB 8.3*   < > 6.4*   < > 10.0* 9.5* 10.6*     --  211  --   --  218  --     < > = values in this interval not displayed. Recent Labs     12/02/20  0158 12/03/20  0650 12/04/20  0639    139 145  143   K 4.3 3.9 4.3  4.4    110 112*  111   CO2 20* 22 21*  22   BUN 78* 58* 52*  51*   CREATININE 1.7* 1.4* 1.4*  1.4*   GLUCOSE 140* 119* 100  98     Recent Labs     12/02/20  0158 12/03/20  0650 12/04/20  0639   AST 64* 55* 50*  49*   * 111* 100*  97*   BILITOT 1.0 2.0* 2.4*  2.4*   ALKPHOS 568* 441* 442*  448*     Troponin T:   Recent Labs     12/04/20  0228 12/04/20  0639 12/04/20  1001   TROPONINI 0.02 0.02 0.02     INR:   Recent Labs     12/02/20  1428 12/03/20  0253 12/04/20  0228   INR 1.05 1.07 1.04     UA:  Recent Labs     12/01/20  1823   COLORU RED*   PHUR 5.0   WBCUA TNTC*   RBCUA TNTC*   BACTERIA Rare*   CLARITYU TURBID*   SPECGRAV 1.019   LEUKOCYTESUR MODERATE*   UROBILINOGEN 0.2   BILIRUBINUR SMALL*   BLOODU MODERATE*   GLUCOSEU Negative     RAD:   ERCP 12/03/2020  \"Findings:   - s/p FNA of pancreatic mass  - unsuccessful biliary cannulation despite attempts at 2-wire cannulation and proph pancreatic stenting with cannulation around pancreatic stenting.      Plan:  - Await pathology results  - If bilirubin continues to rise, patient will need referral to tertiary care center for repeat attempts at cannulation. \"    Ct Abdomen Pelvis Wo Contrast Additional Contrast? None  1. There is moderate to severe intrahepatic and extrahepatic biliary dilatation. There is also dilatation of the pancreatic duct. There is a heterogeneous mass at the level of the pancreatic head and also involving the uncinate process which I feel should be considered a pancreatic neoplasm until otherwise proven.  This patient will need follow-up imaging with either repeat CT imaging with IV and oral contrast or MRI with and without gadolinium administration for better characterization and staging of this suspected neoplasm. 2. Bibasilar atelectasis. There is mild cardiomegaly. 3. Infrarenal abdominal aortic aneurysm. There is also aneurysmal dilatation of both common iliac arteries. No evidence of retroperitoneal hematoma or adenopathy. 4. Mild constipation. There is diverticulosis of the descending and sigmoid colon as well as other scattered diverticula. No evidence of diverticulitis or mechanical obstruction. 5. Small fat-containing periumbilical hernia. . 6. There are hyperdense cystic changes in the upper pole of the right kidney and lower pole of the left kidney which are hyperdense. This may represent hemorrhagic peripelvic cysts. Bilateral calyceal diverticular are also a differential. These can be followed up on subsequent imaging postcontrast 2 better characterize the findings. Both ureters are decompressed and normal in appearance. Signed by Dr Ameena Pierre on 12/1/2020 9:55 PM    Ct Head Wo Contrast  1. Moderate cerebral and cerebellar volume loss with chronic microvascular disease but no evidence of acute intracranial process. Signed by Dr Ameena Pierre on 12/1/2020 9:17 PM    Xr Chest Portable  1. No acute disease. Signed by Dr Ameena Pierre on 12/1/2020 6:30 PM    Assessment/Plan   Principal Problem:    Hematuria- 2/2 coagulopathy. resolved     Active Problems:    Pancreatic mass-new problem, GI following, ERCP with unsuccessful attempt at stenting 12/03/2020. Await bx results. Monitor bilirubin closely. Consider transfer to tertiary center.        Supratherapeutic INR-received 5 mg subcutaneous vitamin K 12/01,2 units FFP12/01, 10 mg vitamin k 12/02, Kcentra 4000 units 12/02, resolved       Acute blood loss anemia/Anemia of iron deficiency and chronic kidney disease-followed as OP by Hematology receiving Retacrit intermittently for Hgb <11, monitor closely with serial H/H. Hgb much improved to 10.6       Elevated LFT's-CT concerning for pancreatic mass, GI following, bilirubin 2.4 today.  Continue IVF's, monitor overnight.        Other specified hypothyroidism-resume synthroid       Elevated troponin-no complaint of chest pain or dyspnea, suspect related to CKD, has been flat and without concern for ACS, resolved       Hypertension-Zestril increased, Hydralazine prn, monitor    DVT Prophylaxis: SCD    Magda Flowers PA-C

## 2020-12-04 NOTE — PROGRESS NOTES
PROGRESS NOTE    Pt Name: Lauryn Parish  MRN: [id number]  YOB: 1934  Date of evaluation: 12/4/2020    Subjective Afebrile. No new complaints this morning. Diagnosis addressed by me  · Pancreatic head mass  · Chronic anticoagulation  · Supratherapeutic INR  · History of iron deficiency        Lucy Triplett was first seen by me on 12/2/2020 as an inpatient consultation at [facility] for findings of a pancreatic mass and bile duct obstruction. 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 She presents to the ER department with complaints of hematuria. The hematuria started a few days ago. She denies any dysuria, back pain, no nausea no vomiting. She also noticed easy bruising and ecchymotic areas in her back, abdomen and flank. Initial laboratory work-up showed INR above 18. She was given vitamin K subcutaneously. She was found to have elevated LFTs. A CT of the abdomen was performed  · 12/1/2020-CT abdomen pelvis without contrast showed a pancreatic head mass measuring 3.3 x 3.7 cm in size and associated, bile duct dilatation above the level of the ampulla. Associated moderate pancreatic ductal dilatation. · 12/3/2020-ERCP with FNA biopsy pancreatic head mass. Unfortunately, stent could not be placed.       REVIEW OF SYSTEMS:   CONSTITUTIONAL: no fever, no night sweats,  fatigue;  HEENT: no blurring of vision, no double vision, no hearing difficulty, no tinnitus, no ulceration, no dysplasia, no epistaxis;  LUNGS: no cough, no hemoptysis, no wheeze,  no shortness of breath;  CARDIOVASCULAR: no palpitation, no chest pain, no shortness of breath;  GI: abdominal pain, no nausea, no vomiting, no diarrhea, no constipation;  MOUSTAPHA: no dysuria, no hematuria, no frequency or urgency, no nephrolithiasis;  MUSCULOSKELETAL: no joint pain, no swelling, no stiffness;  ENDOCRINE: no polyuria, no polydipsia, no cold or heat intolerance;  HEMATOLOGY: no easy bruising or bleeding, no history of clotting disorder;  DERMATOLOGY: no skin rash, no eczema, no pruritus;  PSYCHIATRY: no depression, no anxiety, no panic attacks, no suicidal ideation, no homicidal ideation;  NEUROLOGY: no syncope, no seizures, no numbness or tingling of hands, no numbness or tingling of feet, no paresis;    Objective   /60   Pulse 58   Temp 97.9 °F (36.6 °C) (Temporal)   Resp 16   Ht 5' 2\" (1.575 m)   Wt 176 lb 7 oz (80 kg)   SpO2 97%   BMI 32.27 kg/m²     PHYSICAL EXAM:  CONSTITUTIONAL: Alert, appropriate, no acute distress  EYES: Non icteric, EOM intact, pupils equal round   ENT: Mucus membranes moist, no oral pharyngeal lesions, external inspection of ears and nose are normal  NECK: Supple, no masses. No palpable thyroid mass  CHEST/LUNGS: CTA bilaterally, normal respiratory effort   CARDIOVASCULAR: RRR, no murmurs. No lower extremity edema  ABDOMEN: soft non-tender, active bowel sounds, no HSM. No palpable masses  EXTREMITIES: warm, full ROM in all 4 extremities, no focal weakness. SKIN: warm, dry with no rashes or lesions  LYMPH: No cervical, clavicular, axillary, or inguinal lymphadenopathy  NEUROLOGIC: follows commands, non focal   PSYCH: mood and affect appropriate. Alert and oriented to time, place, person        LABORATORY RESULTS REVIEWED BY ME:  Recent Labs     12/04/20  0228 12/03/20  2207 12/03/20 2016 12/03/20  0253  12/02/20  0158   WBC 10.2  --   --  9.9  --  11.6*   HGB 9.5* 10.0* 10.8* 6.4*   < > 8.3*   HCT 28.5* 30.5* 32.4* 21.0*   < > 27.2*  26.6*   MCV 99.7*  --   --  109.9*  --  107.3*     --   --  211  --  307    < > = values in this interval not displayed.        Lab Results   Component Value Date     12/03/2020    K 3.9 12/03/2020     12/03/2020    CO2 22 12/03/2020 BUN 58 (H) 12/03/2020    CREATININE 1.4 (H) 12/03/2020    GLUCOSE 119 (H) 12/03/2020    CALCIUM 9.0 12/03/2020    PROT 5.4 (L) 12/03/2020    LABALBU 3.1 (L) 12/03/2020    BILITOT 2.0 (H) 12/03/2020    ALKPHOS 441 (H) 12/03/2020    AST 55 (H) 12/03/2020     (H) 12/03/2020    LABGLOM 36 (A) 12/03/2020    GFRAA 43 (L) 12/03/2020    GLOB 2.3 10/23/2020       Lab Results   Component Value Date    INR 1.04 12/04/2020    INR 1.07 12/03/2020    INR 1.05 12/02/2020    PROTIME 13.5 12/04/2020    PROTIME 13.8 12/03/2020    PROTIME 13.6 12/02/2020       RADIOLOGY STUDIES REVIEWED BY ME:  Ct Abdomen Pelvis Wo Contrast Additional Contrast? None    Result Date: 12/1/2020  CT ABDOMEN PELVIS WO CONTRAST 12/1/2020 7:22 PM HISTORY: Nausea elevated INR COMPARISON: 1/2/2018 DLP: 2081 mGy cm. Automated exposure control was utilized to diminish patient radiation dose. TECHNIQUE: Noncontrast enhanced images of the abdomen and pelvis obtained without oral contrast. FINDINGS: Mild bibasilar atelectasis is present. There is moderate cardiomegaly. No pericardial effusion is demonstrated. [place] LIVER: There is moderate intrahepatic biliary dilatation. The common bile duct measures 2.3 cm in diameter in its more distal aspect. No discrete hepatic mass is appreciated. [place] BILIARY SYSTEM: The gallbladder is mildly distended. No pericholecystic inflammatory changes are observed. The common bile duct is dilated to just above the level the ampulla. There is associated moderate pancreatic ductal dilatation. There is an irregular mass noted at the level of the pancreatic head measuring 3.3 x 3.7 cm in size. I feel this should be considered a pancreatic neoplasm until otherwise proven. [place] PANCREAS: There is a heterogeneous mass involving the pancreatic head and uncinate process. There is associated pancreatic ductal and common bile duct obstruction.  Follow-up imaging with either MRI or CT with IV and oral contrast be recommended to better characterize this lesion. SPLEEN: Splenic granulomas are present. There is no evidence of splenomegaly. [place] KIDNEYS AND ADRENALS: The adrenals are unremarkable. Hyperdense cystic changes are noted involving the upper pole the right kidney and lower pole the left kidney. The ureters are not dilated and this these may represent either pyelocalyceal diverticulum or peripelvic cyst containing some proteinaceous or hemorrhagic fluid. Follow-up imaging will be recommended on postcontrast studies. There is no evidence of nephrolithiasis. .  The ureters are decompressed and normal in appearance. RETROPERITONEUM: There is a 4.2 x 4.4 cm infrarenal dominant aortic aneurysm. Both common iliac arteries are also aneurysmal with the right common iliac measuring 2.3 cm in its proximal segment and the left common iliac measuring 1.8 cm. There is no evidence of retroperitoneal hematoma or adenopathy. [place] GI TRACT: There is mild constipation. Diverticulosis is noted the descending and sigmoid colon with no radiographic evidence of acute diverticulitis. Nodes of mechanical obstruction. . The appendix is not well visualized and may be surgically absent. . OTHER: There is no mesenteric mass, lymphadenopathy or fluid collection. The osseous structures and soft tissues demonstrate no worrisome lesions. A small fat-containing periumbilical hernia is present. PELVIS: The uterus and adnexa are unremarkable. There is no free fluid present. . The urinary bladder is normal in appearance. 1. There is moderate to severe intrahepatic and extrahepatic biliary dilatation. There is also dilatation of the pancreatic duct. There is a heterogeneous mass at the level of the pancreatic head and also involving the uncinate process which I feel should be considered a pancreatic neoplasm until otherwise proven.  This patient will need follow-up imaging with either repeat CT imaging with IV and oral contrast or MRI with and without gadolinium administration for better characterization and staging of this suspected neoplasm. 2. Bibasilar atelectasis. There is mild cardiomegaly. 3. Infrarenal abdominal aortic aneurysm. There is also aneurysmal dilatation of both common iliac arteries. No evidence of retroperitoneal hematoma or adenopathy. 4. Mild constipation. There is diverticulosis of the descending and sigmoid colon as well as other scattered diverticula. No evidence of diverticulitis or mechanical obstruction. 5. Small fat-containing periumbilical hernia. . 6. There are hyperdense cystic changes in the upper pole of the right kidney and lower pole of the left kidney which are hyperdense. This may represent hemorrhagic peripelvic cysts. Bilateral calyceal diverticular are also a differential. These can be followed up on subsequent imaging postcontrast 2 better characterize the findings. Both ureters are decompressed and normal in appearance. Signed by Dr Miri Hancock on 12/1/2020 9:55 PM    Ct Head Wo Contrast    Result Date: 12/1/2020  CT BRAIN without contrast 12/1/2020 7:22 PM HISTORY: Elevated INR. Fatigue. COMPARISON: None DLP: 2081 mGy cm Automated exposure control was utilized to diminish patient radiation dose. TECHNIQUE: Serial axial tomographic images of the brain were obtained without the use of intravenous contrast. FINDINGS: The midline structures are nondisplaced. There is moderate cerebral and cerebellar volume loss, with an associated increase in the prominence of the ventricles and sulci. The basilar cisterns are normal in size and configuration. There is no evidence of intracranial hemorrhage or mass-effect. There is low attenuation in the periventricular white matter, consistent with chronic ischemic change. There are no abnormal extra-axial fluid collections. There is no evidence of tonsillar herniation. The included orbits and their contents are unremarkable. The visualized paranasal sinuses, mastoid air cells and middle ear cavities are clear.  The visualized osseous structures and overlying soft tissues of the skull and face are intact. 1. Moderate cerebral and cerebellar volume loss with chronic microvascular disease but no evidence of acute intracranial process. Signed by Dr Martine Young on 12/1/2020 9:17 PM    Xr Chest Portable    Result Date: 12/1/2020  EXAMINATION: Chest one view 12/1/2020 HISTORY: Shortness of breath FINDINGS: Today's exam is compared to a previous study of 12/12/2019. There is mild cardiomegaly. The thoracic aorta is mildly ectatic. There is arthritic change of both shoulders as well as an old poorly healed fracture of the distal right clavicle. The lungs are clear. There is no evidence of lobar pneumonia or effusion. 1. No acute disease. Signed by Dr Martine Young on 12/1/2020 6:30 PM       1. No acute disease. Signed by Dr Martine Young on 12/1/2020 6:30 PM       My interpretation: Significant/intrahepatic biliary dilatation. Pancreatic mass.        ASSESSMENT:  #Pancreatic mass-tail of the pancreas. Concern for pancreatic malignancy  CT abdomen reviewed. =481 (elevated)  Status post ERCP/FNA biopsy  Failed cannulation of common bile duct    #Extrahepatic/intrahepatic biliary dilatation  ERCP on 12/4/2020-failed cannulation of common bile duct. Status post FNA biopsy pancreatic head mass  Continue to trend bilirubin. Patient may need to be transferred to a tertiary center  Total bilirubin 2.0 on 12/3/2020     #Supratherapeutic INR-the patient received vitamin K 5 mg in the emergency. INR>18.   Status post vitamin K 5 mg  Kcentra- 4000 u and Vitamin K-10 mg   INR corrected:1.07     #Macrocytic anemia-likely anemia of chronic disease/inflammation  Differential diagnosis include anemia of chronic disease, inflammation, primary bone marrow process, nutritional deficiency  TSH normal 3.55  Haptoglobin-175  Reticulocyte count absolute-0.0948 (inappropriate normal)  Ferritin 480, iron saturation 16%, TIBC 213, vitamin B12 483     Hemoglobin 10.1 on November 20, 2020  Hemoglobin 9.4 on December 1, 2020  Hemoglobin 6.4 on December 3, 2020  Hemoglobin 9.5 on December 4, 2020 status post 2 units PRBCs    #Chronic kidney disease stage IV  Creatinine 1.7/GFR 28  Creatinine 1.4 on 12/3/2020. #Elevated troponin    #Hypothyroidism -TSH 3.55    #Hypertension-trending low. #Palliative care patient-palliative care following.     PLAN:  Diagnostic occult blood  Continue current supportive care  Awaiting ERCP and FNA biopsy of pancreatic mass  May need to be transferred for stent placement tertiary center    I have seen, examined and reviewed this patient medication list, appropriate labs and imaging studies. I reviewed relevant medical records and others physicians notes. I discussed the plans of care with the patient. I answered all the questions to the patients satisfaction.      (Please note that portions of this note were completed with a voice recognition program. Efforts were made to edit the dictations but occasionally words are mis-transcribed.)      Rajani Christiansen MD    12/04/20  5:58 AM

## 2020-12-05 VITALS
HEIGHT: 62 IN | BODY MASS INDEX: 33.68 KG/M2 | HEART RATE: 77 BPM | TEMPERATURE: 99.6 F | WEIGHT: 183 LBS | OXYGEN SATURATION: 97 % | SYSTOLIC BLOOD PRESSURE: 154 MMHG | DIASTOLIC BLOOD PRESSURE: 82 MMHG | RESPIRATION RATE: 18 BRPM

## 2020-12-05 LAB
ALBUMIN SERPL-MCNC: 2.9 G/DL (ref 3.5–5.2)
ALP BLD-CCNC: 673 U/L (ref 35–104)
ALT SERPL-CCNC: 140 U/L (ref 5–33)
ANION GAP SERPL CALCULATED.3IONS-SCNC: 9 MMOL/L (ref 7–19)
AST SERPL-CCNC: 141 U/L (ref 5–32)
BASOPHILS ABSOLUTE: 0.1 K/UL (ref 0–0.2)
BASOPHILS RELATIVE PERCENT: 0.7 % (ref 0–1)
BILIRUB SERPL-MCNC: 2.8 MG/DL (ref 0.2–1.2)
BILIRUBIN DIRECT: 2.1 MG/DL (ref 0–0.3)
BILIRUBIN, INDIRECT: 0.7 MG/DL (ref 0.1–1)
BUN BLDV-MCNC: 45 MG/DL (ref 8–23)
CALCIUM SERPL-MCNC: 8.9 MG/DL (ref 8.8–10.2)
CHLORIDE BLD-SCNC: 110 MMOL/L (ref 98–111)
CO2: 21 MMOL/L (ref 22–29)
CREAT SERPL-MCNC: 1.5 MG/DL (ref 0.5–0.9)
EOSINOPHILS ABSOLUTE: 0.6 K/UL (ref 0–0.6)
EOSINOPHILS RELATIVE PERCENT: 6.5 % (ref 0–5)
GFR AFRICAN AMERICAN: 40
GFR NON-AFRICAN AMERICAN: 33
GLUCOSE BLD-MCNC: 149 MG/DL (ref 74–109)
HCT VFR BLD CALC: 31.5 % (ref 37–47)
HEMOGLOBIN: 9.6 G/DL (ref 12–16)
IMMATURE GRANULOCYTES #: 0.1 K/UL
INR BLD: 1.03 (ref 0.88–1.18)
LYMPHOCYTES ABSOLUTE: 0.6 K/UL (ref 1.1–4.5)
LYMPHOCYTES RELATIVE PERCENT: 6.5 % (ref 20–40)
MCH RBC QN AUTO: 32.7 PG (ref 27–31)
MCHC RBC AUTO-ENTMCNC: 30.5 G/DL (ref 33–37)
MCV RBC AUTO: 107.1 FL (ref 81–99)
MONOCYTES ABSOLUTE: 1 K/UL (ref 0–0.9)
MONOCYTES RELATIVE PERCENT: 11 % (ref 0–10)
NEUTROPHILS ABSOLUTE: 6.5 K/UL (ref 1.5–7.5)
NEUTROPHILS RELATIVE PERCENT: 74.5 % (ref 50–65)
PDW BLD-RTO: 17.9 % (ref 11.5–14.5)
PLATELET # BLD: 257 K/UL (ref 130–400)
PMV BLD AUTO: 12.1 FL (ref 9.4–12.3)
POTASSIUM REFLEX MAGNESIUM: 4 MMOL/L (ref 3.5–5)
PROTHROMBIN TIME: 13.4 SEC (ref 12–14.6)
RBC # BLD: 2.94 M/UL (ref 4.2–5.4)
SODIUM BLD-SCNC: 140 MMOL/L (ref 136–145)
TOTAL PROTEIN: 5.3 G/DL (ref 6.6–8.7)
TROPONIN: 0.01 NG/ML (ref 0–0.03)
TROPONIN: 0.02 NG/ML (ref 0–0.03)
WBC # BLD: 8.7 K/UL (ref 4.8–10.8)

## 2020-12-05 PROCEDURE — C9113 INJ PANTOPRAZOLE SODIUM, VIA: HCPCS | Performed by: INTERNAL MEDICINE

## 2020-12-05 PROCEDURE — 85610 PROTHROMBIN TIME: CPT

## 2020-12-05 PROCEDURE — 36415 COLL VENOUS BLD VENIPUNCTURE: CPT

## 2020-12-05 PROCEDURE — 6370000000 HC RX 637 (ALT 250 FOR IP): Performed by: INTERNAL MEDICINE

## 2020-12-05 PROCEDURE — 80053 COMPREHEN METABOLIC PANEL: CPT

## 2020-12-05 PROCEDURE — 6370000000 HC RX 637 (ALT 250 FOR IP): Performed by: HOSPITALIST

## 2020-12-05 PROCEDURE — 6360000002 HC RX W HCPCS: Performed by: INTERNAL MEDICINE

## 2020-12-05 PROCEDURE — 85025 COMPLETE CBC W/AUTO DIFF WBC: CPT

## 2020-12-05 PROCEDURE — 99233 SBSQ HOSP IP/OBS HIGH 50: CPT | Performed by: INTERNAL MEDICINE

## 2020-12-05 PROCEDURE — 2580000003 HC RX 258: Performed by: INTERNAL MEDICINE

## 2020-12-05 PROCEDURE — 84484 ASSAY OF TROPONIN QUANT: CPT

## 2020-12-05 PROCEDURE — 2580000003 HC RX 258: Performed by: HOSPITALIST

## 2020-12-05 RX ORDER — LISINOPRIL 5 MG/1
5 TABLET ORAL 2 TIMES DAILY
Qty: 60 TABLET | Refills: 0 | Status: ON HOLD | DISCHARGE
Start: 2020-12-05 | End: 2021-05-26 | Stop reason: HOSPADM

## 2020-12-05 RX ADMIN — ALLOPURINOL 100 MG: 100 TABLET ORAL at 08:30

## 2020-12-05 RX ADMIN — LEVOTHYROXINE SODIUM 112 MCG: 112 TABLET ORAL at 05:06

## 2020-12-05 RX ADMIN — PROMETHAZINE HYDROCHLORIDE 12.5 MG: 25 TABLET ORAL at 16:00

## 2020-12-05 RX ADMIN — SODIUM CHLORIDE, PRESERVATIVE FREE 10 ML: 5 INJECTION INTRAVENOUS at 08:30

## 2020-12-05 RX ADMIN — FERROUS SULFATE TAB 325 MG (65 MG ELEMENTAL FE) 325 MG: 325 (65 FE) TAB at 08:30

## 2020-12-05 RX ADMIN — LISINOPRIL 5 MG: 2.5 TABLET ORAL at 08:33

## 2020-12-05 RX ADMIN — SODIUM CHLORIDE: 9 INJECTION, SOLUTION INTRAVENOUS at 05:04

## 2020-12-05 RX ADMIN — CALCITRIOL CAPSULES 0.25 MCG 0.25 MCG: 0.25 CAPSULE ORAL at 08:30

## 2020-12-05 RX ADMIN — PANTOPRAZOLE SODIUM 40 MG: 40 INJECTION, POWDER, FOR SOLUTION INTRAVENOUS at 08:30

## 2020-12-05 RX ADMIN — DOCUSATE SODIUM 100 MG: 100 CAPSULE ORAL at 08:30

## 2020-12-05 ASSESSMENT — ENCOUNTER SYMPTOMS
EYE ITCHING: 0
ABDOMINAL PAIN: 0
EYE DISCHARGE: 0
SORE THROAT: 0
WHEEZING: 0
NAUSEA: 0
CONSTIPATION: 0
COUGH: 0
SHORTNESS OF BREATH: 0
DIARRHEA: 0
VOMITING: 0
TROUBLE SWALLOWING: 0

## 2020-12-05 ASSESSMENT — PAIN SCALES - GENERAL: PAINLEVEL_OUTOF10: 0

## 2020-12-05 NOTE — DISCHARGE SUMMARY
Kelton Stringer  :  1934  MRN:  [id number]    Admit date:  2020  Discharge date:  2020    Discharging Physician:  Chuck Enriquez MD    Advance Directive: Full Code    Consults: Dr. Ben Khanna. Shyam-Gastroenterology; Dr. Eugenia Dixon-Oncology     Primary Care Physician:  Adrian Cardona MD    Discharge Diagnoses:    Principal Problem:    Hematuria  Active Problems:    Elevated INR    Acute blood loss anemia    Acute on chronic renal insufficiency    Anemia of chronic kidney failure, stage 4 (severe) (HCC)    Iron deficiency anemia secondary to inadequate dietary iron intake    Other specified hypothyroidism    Elevated troponin    Palliative care patient    Pancreatic mass    Fatigue    Elevated liver enzymes    Biliary obstruction  Resolved Problems:    * No resolved hospital problems. *    Portions of this note have been copied forward, however, changed to reflect the most current clinical status of this patient. Hospital Course: The patient is an 80 y.o. 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 Denied dysuria, back pain, nausea or vomiting. Stated this has never happened before. Takes Coumadin. Denies changes to her dosing or her diet. Additionally has noticed ecchymotic areas on her back, abdomen and flank areas. Denies trauma. States abdomen is mildly tender. Work up in ED reveals INR >18.80 with [facility] on urinalysis. She denies confusion, chest pain, heart palpitations or history of GI bleeding. She was admitted to Hospitalist service. CT head unremarkable. CT abdomen/pelvis concerning for pancreatic head mass measuring 3.3 x 3.7 cm in size with intra/extra hepatic biliary dilatation. Oncology and Gastroenterology were consulted. She received 5 units Vitamin K in ED as well as 2 units FFP.  Received an additional 10 units Vitamin K on 2020 along with Kcentra 4000 units. On 12/03/2020 coagulopathy corrected. She is also noted to have macrocytic anemia/anemia of chronic disease as well as blood loss and did receive 2 units PRBC this admission. Hemoglobin has remained stable. Elevated troponin on admission remained flat and has normalized. No concerns for ACS. Hematuria resolved. COVID 19 negative on 12/03/2020. ERCP performed 12/03/2020 with unsuccessful attempt at stent placement. FNA biopsies were taken that reveal high grade pancreatic adenocarcinoma. Bilirubin has continued to climb and is now 2.8. Gastroenterology/Oncology with recommendation for transfer to tertiary center for repeat attempt to cannulate CBD with repeat ERCP or PTCA with stent. Once obstruction relieved Oncology with consideration to proceed with palliative XRT with radiosensitizing Xeloda or systemic chemotherapy. At this time Mrs. Kaitlin Courtney is stable for transfer to [facility] for higher level of care. Patient does have history of recurrent thromboembolism. Anticoagulation will need to be re-addressed once procedure has taken place. She remains off anticoagulation at this time due to severe hematuria, anemia requiring PRBC this admission. Significant Diagnostic Studies:   RAD:   ERCP 12/03/2020  \"Findings:   - s/p FNA of pancreatic mass  - unsuccessful biliary cannulation despite attempts at 2-wire cannulation and proph pancreatic stenting with cannulation around pancreatic stenting.      Plan:  - Await pathology results  - If bilirubin continues to rise, patient will need referral to tertiary care center for repeat attempts at cannulation. \"     Ct Abdomen Pelvis Wo Contrast Additional Contrast? None  1. There is moderate to severe intrahepatic and extrahepatic biliary dilatation. There is also dilatation of the pancreatic duct.  There is a heterogeneous mass at the level of the pancreatic head and also involving the uncinate process which I feel should be considered a pancreatic neoplasm until otherwise proven. This patient will need follow-up imaging with either repeat CT imaging with IV and oral contrast or MRI with and without gadolinium administration for better characterization and staging of this suspected neoplasm. 2. Bibasilar atelectasis. There is mild cardiomegaly. 3. Infrarenal abdominal aortic aneurysm. There is also aneurysmal dilatation of both common iliac arteries. No evidence of retroperitoneal hematoma or adenopathy. 4. Mild constipation. There is diverticulosis of the descending and sigmoid colon as well as other scattered diverticula. No evidence of diverticulitis or mechanical obstruction. 5. Small fat-containing periumbilical hernia. . 6. There are hyperdense cystic changes in the upper pole of the right kidney and lower pole of the left kidney which are hyperdense. This may represent hemorrhagic peripelvic cysts. Bilateral calyceal diverticular are also a differential. These can be followed up on subsequent imaging postcontrast 2 better characterize the findings. Both ureters are decompressed and normal in appearance. Signed by Dr Antione Bill on 12/1/2020 9:55 PM     Ct Head Wo Contrast  1. Moderate cerebral and cerebellar volume loss with chronic microvascular disease but no evidence of acute intracranial process. Signed by Dr Antione Bill on 12/1/2020 9:17 PM     Xr Chest Portable  1. No acute disease. Signed by Dr Antione Bill on 12/1/2020 6:30 PM  Pertinent Labs:   CBC:   Recent Labs     12/03/20  0253  12/04/20  0228 12/04/20  1001 12/04/20  2212 12/05/20  0213   WBC 9.9  --  10.2  --   --  8.7   HGB 6.4*   < > 9.5* 10.6* 9.8* 9.6*     --  218  --   --  257    < > = values in this interval not displayed.      BMP:    Recent Labs     12/03/20  0650 12/04/20  0639 12/05/20  0541    145  143 140   K 3.9 4.3  4.4 4.0    112*  111 110   CO2 22 21*  22 21*   BUN 58* 52*  51* 45*   CREATININE 1.4* 1.4*  1.4* 1.5*   GLUCOSE 119* 100  98 149* INR:   Recent Labs     12/03/20  0253 12/04/20  0228 12/05/20  0213   INR 1.07 1.04 1.03     Physical Exam:  Vital Signs: BP (!) 154/82   Pulse 77   Temp 99.6 °F (37.6 °C) (Temporal)   Resp 18   Ht 5' 2\" (1.575 m)   Wt 183 lb (83 kg)   SpO2 97%   BMI 33.47 kg/m²   General appearance:. Alert and Cooperative   HEENT: Normocephalic. Chest: clear to auscultation bilaterally without wheezes or rhonchi. Cardiac: Normal heart tones with regular rate and rhythm, S1, S2 normal. No murmurs, gallops, or rubs auscultated. Abdomen:soft, mild epigastric tenderness; non-distended normal bowel sounds no masses, no organomegaly. Extremities: No clubbing or cyanosis. No peripheral edema. Peripheral pulses palpable. Neurologic: Generalized weakness otherwise Grossly intact.     Discharge Medications:       [facility] Medication Instructions [id number]:[id number]    Printed on:12/05/20 1130   Medication Information                      allopurinol (ZYLOPRIM) 100 MG tablet  Take 100 mg by mouth daily             calcitRIOL (ROCALTROL) 0.25 MCG capsule  Take 0.25 mcg by mouth daily             docusate sodium (COLACE) 100 MG capsule  Take 1 capsule by mouth 2 times daily             ferrous sulfate (FEROSUL) 325 (65 Fe) MG tablet  TAKE 1 TABLET BY MOUTH EVERY DAY             levothyroxine (SYNTHROID) 112 MCG tablet  Take 112 mcg by mouth Daily             lisinopril (PRINIVIL;ZESTRIL) 5 MG tablet  Take 1 tablet by mouth 2 times daily             simvastatin (ZOCOR) 20 MG tablet  Take 20 mg by mouth nightly             warfarin (COUMADIN) 5 MG tablet  Take 5 mg by mouth daily every Tuesday and Friday take one tablet and all other days take 1/2 tablet               Current Facility-Administered Medications   Medication Dose Route Frequency Provider Last Rate Last Dose    pantoprazole (PROTONIX) injection 40 mg  40 mg Intravenous BID Amelia Ewing MD   40 mg at 12/05/20 0830    And    sodium chloride (PF) 0.9 % injection 10 mL  10 mL Intravenous Daily Moon Zavaleta MD   10 mL at 12/05/20 0830    indomethacin (INDOCIN) 50 MG suppository 100 mg  100 mg Rectal Once Moon Zavaleta MD        indomethacin (INDOCIN) 50 MG suppository    PRN Moon Zavaleta MD   100 mg at 12/03/20 1414    lisinopril (PRINIVIL;ZESTRIL) tablet 5 mg  5 mg Oral BID Brandi Miller MD   5 mg at 12/05/20 9520    hydrALAZINE (APRESOLINE) injection 5 mg  5 mg Intravenous Q4H PRN Brandi Miller MD        sodium chloride flush 0.9 % injection 10 mL  10 mL Intravenous 2 times per day Moon Zavaleta MD   10 mL at 12/04/20 0901    sodium chloride flush 0.9 % injection 10 mL  10 mL Intravenous PRN Moon Zavaleta MD        acetaminophen (TYLENOL) tablet 650 mg  650 mg Oral Q6H PRN Moon Zavaleta MD        Or   [facility] acetaminophen (TYLENOL) suppository 650 mg  650 mg Rectal Q6H PRN Moon Zavaleta MD        polyethylene glycol [facility]) packet 17 g  17 g Oral Daily PRN Moon Zavaleta MD        promethazine (PHENERGAN) tablet 12.5 mg  12.5 mg Oral Q6H PRN Moon Zavaleta MD        Or    ondansetron [facility]) injection 4 mg  4 mg Intravenous Q6H PRN Moon Zavaleta MD        0.9 % sodium chloride infusion   Intravenous Continuous Brandi Miller MD 50 mL/hr at 12/05/20 0504      potassium chloride (KLOR-CON M) extended release tablet 40 mEq  40 mEq Oral PRN Moon Zavaleta MD        Or    potassium bicarb-citric acid (EFFER-K) effervescent tablet 40 mEq  40 mEq Oral PRN Moon Zavaleta MD        Or    potassium chloride 10 mEq/100 mL IVPB (Peripheral Line)  10 mEq Intravenous PRN Moon Zavaleta MD        magnesium sulfate 2 g in 50 mL IVPB premix  2 g Intravenous PRN Moon Zavaleta MD        allopurinol (ZYLOPRIM) tablet 100 mg  100 mg Oral Daily Moon Zavaleta MD   100 mg at 12/05/20 0830    calcitRIOL (ROCALTROL) capsule 0.25 mcg  0.25 mcg Oral Daily Moon Zavaleta MD   0.25 mcg at 12/05/20 0830    docusate sodium (COLACE) capsule 100 mg  100 mg Oral BID Christophe Mo MD   100 mg at 12/05/20 0830    ferrous sulfate (IRON 325) tablet 325 mg  325 mg Oral Daily Christophe Mo MD   325 mg at 12/05/20 0830    levothyroxine (SYNTHROID) tablet 112 mcg  112 mcg Oral Daily Christophe Mo MD   112 mcg at 12/05/20 0506    atorvastatin (LIPITOR) tablet 10 mg  10 mg Oral Daily Christophe Mo MD   10 mg at 12/04/20 2104     Discharge Instructions: Follow up with admitting provider upon arrival.  Take medications as directed. Resume activity as tolerated. Diet: DIET GENERAL;     Disposition: Patient is medically stable and will be discharged to [facility] for higher level of care. Time spent on discharge 50 minutes spent in assessing patient, reviewing medications, discussion with nursing, confirming safe discharge plan and preparation of discharge summary.     Signed:  Owen Charlton PA-C

## 2020-12-05 NOTE — PROGRESS NOTES
Progress Note    Patient name: Sheyla Clement  Patient : 1934  MR #[id number]  Room: 311    Subjective: feeling better. s/p FNA pancreatic mass    HISTORY OF PRESENT ILLNESS:  Diagnosis addressed by me  · Pancreatic head mass  · Chronic anticoagulation  · Supratherapeutic INR  · History of iron deficiency        Flakita Arevalo was first seen by me on 2020 as an inpatient consultation at [facility] for findings of a pancreatic mass and bile duct obstruction.  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  She presents to the ER department with complaints of hematuria.  The hematuria started a few days ago. Bladimir Krishnan denies any dysuria, back pain, no nausea no vomiting.  She also noticed easy bruising and ecchymotic areas in her back, abdomen and flank.  Initial laboratory work-up showed INR above 18.  She was given vitamin K subcutaneously. Bladimir Krishnan was found to have elevated LFTs.  A CT of the abdomen was performed  · 2020-CT abdomen pelvis without contrast showed a pancreatic head mass measuring 3.3 x 3.7 cm in size and associated, bile duct dilatation above the level of the ampulla.  Associated moderate pancreatic ductal dilatation. · 12/3/2020-ERCP with FNA biopsy pancreatic head mass. Unfortunately, stent could not be placed. Subjective   REVIEW OF SYSTEMS:   Review of Systems   Constitutional: Negative for fatigue and fever. No night sweats   HENT: Negative for dental problem, hearing loss, mouth sores, nosebleeds, sore throat and trouble swallowing. Eyes: Negative for discharge and itching. Respiratory: Negative for cough, shortness of breath and wheezing. No hemoptysis   Cardiovascular: Negative for chest pain, palpitations and leg swelling.    Gastrointestinal: adenopathy. Right cervical: No superficial cervical adenopathy. Left cervical: No superficial cervical adenopathy. Upper Body:      Right upper body: No supraclavicular adenopathy. Left upper body: No supraclavicular adenopathy. Comments:      Skin:     General: Skin is warm and dry. Findings: No rash. Neurological:      Mental Status: She is alert and oriented to person, place, and time. Comments: follows commands, non-focal   Psychiatric:         Behavior: Behavior normal. Behavior is cooperative. Thought Content: Thought content normal.         Judgment: Judgment normal.      Comments: Alert and oriented to person, place and time. Vital Signs  BP (!) 147/74   Pulse 73   Temp 97.9 °F (36.6 °C) (Temporal)   Resp 18   Ht 5' 2\" (1.575 m)   Wt 183 lb (83 kg)   SpO2 99%   BMI 33.47 kg/m²     Intake/Output Summary (Last 24 hours) at 12/5/2020 0735  Last data filed at 12/5/2020 0507  Gross per 24 hour   Intake 2932.09 ml   Output 1500 ml   Net 1432.09 ml     Labs:  CBC:   Recent Labs     12/03/20  0253  12/04/20  0228 12/04/20  1001 12/04/20  2212 12/05/20  0213   WBC 9.9  --  10.2  --   --  8.7   HGB 6.4*   < > 9.5* 10.6* 9.8* 9.6*     --  218  --   --  257    < > = values in this interval not displayed. CMP:   Recent Labs     12/03/20  0650 12/04/20  0639 12/05/20  0541   GLUCOSE 119* 100  98 149*   BUN 58* 52*  51* 45*   CREATININE 1.4* 1.4*  1.4* 1.5*   CO2 22 21*  22 21*   CALCIUM 9.0 9.0  8.9 8.9   ALKPHOS 441* 442*  448* 673*   AST 55* 50*  49* 141*   * 100*  97* 140*     Hepatic:   Recent Labs     12/03/20  0650 12/04/20  0639 12/05/20  0541   AST 55* 50*  49* 141*   * 100*  97* 140*   BILITOT 2.0* 2.4*  2.4* 2.8*   ALKPHOS 441* 442*  448* 673*     Troponin:   Recent Labs     12/04/20  2212 12/05/20  0213 12/05/20  0541   TROPONINI 0.02 0.02 0.01     BNP: No results for input(s): BNP in the last 72 hours.   INR:   Recent Labs     12/03/20  0253 12/04/20  0228 12/05/20  0213   INR 1.07 1.04 1.03     ABG: No results for input(s): PHART, TQX7RAD, PO2ART, JZZ8EXS, B1IGMNPC, BEART in the last 72 hours. 30 Day lookback of cultures:    Blood Culture Recent: No results for input(s): BC in the last 720 hours. Gram Stain Recent: No results for input(s): LABGRAM in the last 720 hours. Resp Culture Recent: No results for input(s): CULTRESP in the last 720 hours. Body Fluid Recent : No results for input(s): BFCX in the last 720 hours. MRSA Recent : No results for input(s): [address] in the last 720 hours. Urine Culture Recent :   Recent Labs     12/01/20  1823   LABURIN No growth at 36-48 hours     Organism Recent : No results for input(s): ORG in the last 720 hours. ASSESSMENT:  #Pancreatic adenocarcinoma  =975 (elevated)  3.3 x 3.7 cm irregular mass at the level of the pancreatic head noted on CT scan 12/1/2020  Status post ERCP/FNA biopsy 12/3/2020, pathology positive for high-grade adenocarcinoma  Failed cannulation of common bile duct    Diagnosis discussed with patient per Dr. Reshma Farley      #Extrahepatic/intrahepatic biliary dilatation  ERCP on 12/4/2020-failed cannulation of common bile duct. Continue to trend bilirubin.   Patient may need to be transferred to a tertiary center      #Supratherapeutic INR  patient received vitamin K 5 mg in the emergency for INR>18  Status post vitamin K 5 mg  Kcentra- 4000 u and Vitamin K-10 mg   INR corrected: 1.03     #Macrocytic anemia-likely anemia of chronic disease/inflammation  Differential diagnosis include anemia of chronic disease, inflammation, primary bone marrow process, nutritional deficiency  · TSH normal 3.55  · Haptoglobin-175  · Reticulocyte count absolute-0.0948 (inappropriate normal)  · Ferritin 480, iron saturation 16%, TIBC 213, vitamin B12 483     Hemoglobin 10.1 on November 20, 2020  Hemoglobin 9.4 on December 1, 2020  Hemoglobin 6.4 on December 3, 2020  Hemoglobin 9.5 on December 4, 2020 status post 2 units PRBCs  Hgb 9.6 today, 12/5/2020     #Chronic kidney disease stage IV  Creatinine 1.7/GFR 28  Creatinine 1.5 today, 12/5/2020.     #Elevated troponin     #Hypothyroidism -TSH 3.55     #Hypertension- improving     #Palliative care patient-palliative care following     PLAN:  [address] blood  Continue current supportive care  Status post ERCP/FNA biopsy 12/3/2020, pathology positive for high-grade adenocarcinoma  Bili increasing, 2.8. Recommend tertiary center for atempt re: stent placement          ALBERTO Lord  12/05/20  7:35 AM    Physician's attestation and contribution:  I, Dr Sven Kim, personally and independently performed an evaluation on  Hilary Padilla        I have reviewed relevant medical information/data to include but not limited to the medication list, relevant appropriate lab work and imaging when applicable. I reviewed other physician's notes, ancillary services and nurses assessments. I have reviewed the above documentation completed by Tanner DOMINGUEZ   Please see my additional addended and/or modified contributions to the history of present illness, physical examination, and assessment/medical decision-making and plan that reflects my findings and impressions. I discussed essential elements of the care plan with Tanner DOMINGUEZ and the patient. I have encouraged and answered all the questions raised to the patient's understanding and satisfaction. I concur with the above stated.      Subjective-    Objective-     Assessment/plan:  Continue current supportive care  =574 (elevated)  3.3 x 3.7 cm irregular mass at the level of the pancreatic head noted on CT scan 12/1/2020  Status post ERCP/FNA biopsy 12/3/2020, pathology positive for high-grade adenocarcinoma  Failed cannulation of common bile duct    Bili increasing, 2.8, increasing daily    RECOMMEND:  Transfer to tertiary center for further attempts to recannulate CBD either via repeat ERCP or PTCA with stent prior to proceeding with palliative XRT with radiosensitizing Xeloda or systemic chemotherapy. My choice of chemotherapy would be Gemzar Abraxane given cautiously at some point after obstruction is relieved.       Electronically signed by Roxana Jordan MD on 12/5/20 at 9:08 AM CST

## 2020-12-09 ENCOUNTER — TELEPHONE (OUTPATIENT)
Dept: INFUSION THERAPY | Age: 85
End: 2020-12-09

## 2020-12-09 NOTE — TELEPHONE ENCOUNTER
Spoke with pt per ALBERTO Cartagena's request to let her know that Shanell has ordered at CT of the chest at [facility] and that they will be calling her to schedule it. Pt states \"that is fine\" and verbalizes understanding.

## 2020-12-18 ENCOUNTER — TELEPHONE (OUTPATIENT)
Dept: HEMATOLOGY | Age: 85
End: 2020-12-18

## 2020-12-18 ENCOUNTER — HOSPITAL ENCOUNTER (OUTPATIENT)
Dept: INFUSION THERAPY | Age: 85
End: 2020-12-18
Payer: MEDICARE

## 2020-12-18 PROBLEM — D64.9 ANEMIA: Status: ACTIVE | Noted: 2020-12-18

## 2020-12-21 ENCOUNTER — HOSPITAL ENCOUNTER (OUTPATIENT)
Dept: CT IMAGING | Age: 85
Discharge: HOME | End: 2020-12-21
Payer: MEDICARE

## 2020-12-21 LAB
GFR AFRICAN AMERICAN: 30
GFR NON-AFRICAN AMERICAN: 25
PERFORMED ON: ABNORMAL
POC CREATININE: 1.9 MG/DL (ref 0.3–1.3)
POC SAMPLE TYPE: ABNORMAL

## 2020-12-21 PROCEDURE — 71260 CT THORAX DX C+: CPT

## 2020-12-21 PROCEDURE — 82565 ASSAY OF CREATININE: CPT

## 2020-12-21 PROCEDURE — 6360000004 HC RX CONTRAST MEDICATION: Performed by: NURSE PRACTITIONER

## 2020-12-21 RX ADMIN — IOPAMIDOL 50 ML: 755 INJECTION, SOLUTION INTRAVENOUS at 12:11

## 2020-12-21 NOTE — PROGRESS NOTES
Yvan aEtonars   1934 12/22/2020     Chief Complaint   Patient presents with    Follow-up     Pancreatic cancer        INTERVAL HISTORY/HISTORY OF PRESENT ILLNESS:  Diagnosis   · Pancreatic adenocarcinoma  · Chronic anticoagulation due to prior DVTs  · Chronic kidney disease stage III  · Anemia related to chronic kidney disease     The patient was seen in the hospital.  She has a new diagnosis of pancreatic adenocarcinoma. She had a biliary stent placed in [place]. FNA biopsy of pancreatic lesion consists of pancreatic adenocarcinoma. The patient returns today accompanied by her son. She is frail-appearing. She has had a hard time getting around at home. She feels quite weak. She overall does not feel well. She denies any pain. She denies any nausea vomiting diarrhea. She was participating with physical therapy before her diagnosis. She feels that she could benefit from further physical therapy.       Cancer history  Flakita Arevalo was first seen by me on 12/2/2020 as an inpatient consultation at [facility] for findings of a pancreatic mass and bile duct obstruction.  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  She presents to the ER department with complaints of hematuria.  The hematuria started a few days ago. Akiko Weiss denies any dysuria, back pain, no nausea no vomiting.  She also noticed easy bruising and ecchymotic areas in her back, abdomen and flank.  Initial laboratory work-up showed INR above 18.  She was given vitamin K subcutaneously.  She was found to have elevated LFTs.  A CT of the abdomen was performed · 12/01/2020CT abdomen pelvis without contrast showed a pancreatic head mass measuring 3.3 x 3.7 cm in size and associated, bile duct dilatation above the level of the ampulla.  Associated moderate pancreatic ductal dilatation. · 12/03/2020ERCP with FNA biopsy Positive for high-grade adenocarcinoma. Unfortunately, stent could not be placed. · 12/07/2020- Cholangipancreatography Retrograde Endo ERCP with sphincterotomy, balloon sweep and placement of 10x60 PC BS metal stent  · 12/21/2020- Ct Chest W Contrast No evidence of intrathoracic neoplastic process/metastatic disease. Evidence of pneumobilia. The ectasia of the pancreatic duct, similar to the previous study. An incompletely visualized and evaluated heterogeneous mass in the head of the pancreas measuring 3.3 x 3.7cm. A biliary stent in place. Moderate persistent dilatation of the proximal common bile duct. A stable small low-density nodule in the left adrenal gland  · 12/22/2020discussed the results of CT chest and pathology. Essentially, she has advanced pancreatic cancer. This is not amenable to surgery. Patient is quite weak and has poor performance. Quite frail. We discussed about palliative chemotherapy but will hold at this time until improvement of her physical conditioning. Recommended physical therapy as outpatient and reassess fitness for palliative chemotherapy in about 4 weeks.     PAST MEDICAL HISTORY:   Past Medical History:   Diagnosis Date    Abdominal aortic aneurysm (AAA) ([address]     Anemia     Arthritis     Chronic kidney disease     DVT of lower extremity (deep venous thrombosis) ([address]     twice 2010 and 2017    Hyperlipidemia     Hypertension     Kidney stones     Osteoarthritis     Palliative care patient 12/02/2020    Thyroid disease     Thyroid disorder           PAST SURGICAL HISTORY:  Past Surgical History:   Procedure Laterality Date    COLONOSCOPY  2016    dr sorto-no problems  ENDOSCOPIC ULTRASOUND (LOWER) N/A 12/3/2020    Dr JONNY Grijalva/unsuccessful biliary cannulation despite attempts at 2-wire cannulation and proph pancreatic stenting with cannulation around pancreatic stenting-Positive for high-grade adenocarcinoma, pancreatic head    ERCP N/A 12/3/2020    Dr JONNY Grijalva/unsuccessful biliary cannulation despite attempts at 2-wire cannulation and proph pancreatic stenting with cannulation around pancreatic stenting-Positive for high-grade adenocarcinoma, pancreatic head    TOE AMPUTATION      TOE AMPUTATION      TOTAL KNEE ARTHROPLASTY Right 1/3/2020    RIGHT TOTAL KNEE REPLACEMENT performed by Denver Fields, MD at [facility] OR        SOCIAL HISTORY:  Social History     Socioeconomic History    Marital status:       Spouse name: None    Number of children: None    Years of education: None    Highest education level: None   Occupational History    None   Social Needs    Financial resource strain: None    Food insecurity     Worry: None     Inability: None    Transportation needs     Medical: None     Non-medical: None   Tobacco Use    Smoking status: Never Smoker    Smokeless tobacco: Never Used   Substance and Sexual Activity    Alcohol use: No    Drug use: No    Sexual activity: None   Lifestyle    Physical activity     Days per week: None     Minutes per session: None    Stress: None   Relationships    Social connections     Talks on phone: None     Gets together: None     Attends Christian service: None     Active member of club or organization: None     Attends meetings of clubs or organizations: None     Relationship status: None    Intimate partner violence     Fear of current or ex partner: None     Emotionally abused: None     Physically abused: None     Forced sexual activity: None   Other Topics Concern    None   Social History Narrative    None       FAMILY HISTORY:  Family History   Problem Relation Age of Onset    Colon Cancer Brother No evidence of intrathoracic neoplastic process/metastatic disease. Evidence of pneumobilia. The ectasia of the pancreatic duct, similar to the previous study. An incompletely visualized and evaluated heterogeneous mass in the head of the pancreas. A biliary stent in place. Moderate persistent dilatation of the proximal common bile duct. A stable small low-density nodule in the left adrenal gland. Signed by Dr Sridhar An on 12/21/2020 1:29 PM    ASSESSMENT    Orders Placed This Encounter   Procedures    Comprehensive Metabolic Panel     Standing Status:   Future     Standing Expiration Date:   12/22/2021    Cancer Antigen 19-9     Standing Status:   Future     Standing Expiration Date:   12/22/2021    External Referral To Physical Therapy     Referral Priority:   Routine     Referral Type:   Eval and Treat     Referral Reason:   Specialty Services Required     Requested Specialty:   Physical Therapy     Number of Visits Requested:   1      Smita Nicole was seen today for follow-up. Diagnoses and all orders for this visit:    Pancreatic carcinoma ([facility] [address]  -     Comprehensive Metabolic Panel; Future  -     Cancer Antigen 19-9; Future  -     External Referral To Physical Therapy    Care plan discussed with patient    Weakness generalized  -     External Referral To Physical Therapy    Physical deconditioning  -     External Referral To Physical Therapy       #Locally advanced pancreatic adenocarcinoma  =361 (elevated)  3.3 x 3.7 cm irregular mass at the level of the pancreatic head noted on CT scan 12/1/2020  Status post ERCP/FNA biopsy 12/3/2020, pathology positive for high-grade adenocarcinoma  Failed cannulation of common bile duct at [facility] but had stent placed at [place], [place]  CT chest showed no evidence of pulmonary metastasis     #Extrahepatic/intrahepatic biliary dilatation  ERCP on 12/4/2020failed cannulation of common bile duct.    Biliary stent placed in [place]  Repeat CMP today    #Macrocytic anemia-likely anemia of chronic disease/inflammation  Differential diagnosis include anemia of chronic disease, inflammation, primary bone marrow process, nutritional deficiency  · TSH normal 3.55  · Haptoglobin-175  · Reticulocyte count absolute0.0948 (inappropriate normal)  · Ferritin 480, iron saturation 16%, TIBC 213, vitamin B12 483     Hemoglobin 10.7 12/22/2020.       #Chronic kidney disease stage IIIB  Creatinine 1.5/EGFR 40      #Genetic assessmentI discussed NCCN recommendation for genetic test.  [facility] today. We will follow-up during next visit. PLAN:  · CMP, CA 19-9, and [facility] today  · Continue current supportive care  · Treatment on hold at this time  · Referral for physical therapy  · RTC 1 month with ALBERTO De La O    Follow Up:     Return in about 1 month (around 1/22/2021) for Appointment with ALBERTO De La O. Charlie Wilcoxe Araceli am scribing for Afia Arias MD. Electronically signed by Rivera Davis RN on 12/22/2020 at 4:56 PM CST. I, Dr Galina Yadav, personally performed the services described in this documentation as scribed by Rivera Davis RN in my presence and is both accurate and complete. I have seen, examined and reviewed this patient medication list, appropriate labs and imaging studies. I reviewed relevant medical records and others physicians notes. I discussed the plans of care with the patient. I answered all the questions to the patients satisfaction. I have also reviewed the chief complaint (CC) and part of the history (History of Present Illness (HPI), Past Family Social History [facility]), or Review of Systems (ROS) and made changes when appropriated.        (Please note that portions of this note were completed with a voice recognition program. Efforts were made to edit the dictations but occasionally words are mis-transcribed.)

## 2020-12-22 ENCOUNTER — OFFICE VISIT (OUTPATIENT)
Dept: HEMATOLOGY | Age: 85
End: 2020-12-22
Payer: MEDICARE

## 2020-12-22 ENCOUNTER — HOSPITAL ENCOUNTER (OUTPATIENT)
Dept: INFUSION THERAPY | Age: 85
Discharge: HOME | End: 2020-12-22
Payer: MEDICARE

## 2020-12-22 VITALS
DIASTOLIC BLOOD PRESSURE: 74 MMHG | WEIGHT: 179.2 LBS | OXYGEN SATURATION: 97 % | HEIGHT: 62 IN | TEMPERATURE: 97.3 F | BODY MASS INDEX: 32.97 KG/M2 | HEART RATE: 91 BPM | SYSTOLIC BLOOD PRESSURE: 124 MMHG

## 2020-12-22 DIAGNOSIS — C25.9 PANCREATIC CARCINOMA (HCC): ICD-10-CM

## 2020-12-22 LAB
BASOPHILS ABSOLUTE: 0.06 K/UL (ref 0.01–0.08)
BASOPHILS RELATIVE PERCENT: 0.5 % (ref 0.1–1.2)
EOSINOPHILS ABSOLUTE: 0.62 K/UL (ref 0.04–0.54)
EOSINOPHILS RELATIVE PERCENT: 5.3 % (ref 0.7–7)
HCT VFR BLD CALC: 34 % (ref 34.1–44.9)
HEMOGLOBIN: 10.7 G/DL (ref 11.2–15.7)
LYMPHOCYTES ABSOLUTE: 1.14 K/UL (ref 1.18–3.74)
LYMPHOCYTES RELATIVE PERCENT: 9.8 % (ref 19.3–53.1)
MCH RBC QN AUTO: 34.6 PG (ref 25.6–32.2)
MCHC RBC AUTO-ENTMCNC: 31.5 G/DL (ref 32.3–35.5)
MCV RBC AUTO: 110 FL (ref 79.4–94.8)
MONOCYTES ABSOLUTE: 1.07 K/UL (ref 0.24–0.82)
MONOCYTES RELATIVE PERCENT: 9.2 % (ref 4.7–12.5)
NEUTROPHILS ABSOLUTE: 8.74 K/UL (ref 1.56–6.13)
NEUTROPHILS RELATIVE PERCENT: 75.2 % (ref 34–71.1)
PDW BLD-RTO: 14.8 % (ref 11.7–14.4)
PLATELET # BLD: 235 K/UL (ref 182–369)
PMV BLD AUTO: 11.7 FL (ref 7.4–10.4)
RBC # BLD: 3.09 M/UL (ref 3.93–5.22)
WBC # BLD: 11.63 K/UL (ref 3.98–10.04)

## 2020-12-22 PROCEDURE — G8484 FLU IMMUNIZE NO ADMIN: HCPCS | Performed by: INTERNAL MEDICINE

## 2020-12-22 PROCEDURE — 1036F TOBACCO NON-USER: CPT | Performed by: INTERNAL MEDICINE

## 2020-12-22 PROCEDURE — 4040F PNEUMOC VAC/ADMIN/RCVD: CPT | Performed by: INTERNAL MEDICINE

## 2020-12-22 PROCEDURE — 1111F DSCHRG MED/CURRENT MED MERGE: CPT | Performed by: INTERNAL MEDICINE

## 2020-12-22 PROCEDURE — 1090F PRES/ABSN URINE INCON ASSESS: CPT | Performed by: INTERNAL MEDICINE

## 2020-12-22 PROCEDURE — G8427 DOCREV CUR MEDS BY ELIG CLIN: HCPCS | Performed by: INTERNAL MEDICINE

## 2020-12-22 PROCEDURE — 85025 COMPLETE CBC W/AUTO DIFF WBC: CPT

## 2020-12-22 PROCEDURE — 99213 OFFICE O/P EST LOW 20 MIN: CPT | Performed by: INTERNAL MEDICINE

## 2020-12-22 PROCEDURE — G8417 CALC BMI ABV UP PARAM F/U: HCPCS | Performed by: INTERNAL MEDICINE

## 2020-12-22 PROCEDURE — 1123F ACP DISCUSS/DSCN MKR DOCD: CPT | Performed by: INTERNAL MEDICINE

## 2020-12-22 PROCEDURE — 99212 OFFICE O/P EST SF 10 MIN: CPT

## 2020-12-28 ENCOUNTER — TELEPHONE (OUTPATIENT)
Dept: GASTROENTEROLOGY | Age: 85
End: 2020-12-28

## 2020-12-28 NOTE — TELEPHONE ENCOUNTER
I called patient's daughter, Silvana Nolasco back and advised that we have the records from [facility]. I will have Dr Megan Walden review and advise if he feels comfortable removing the stent for patient and/or if he recommends anything else we need to do for the patient. They advised at [facility] she would need it removed, but did not say when. It was a 10x60 partially covered metal biliary stent, placed by Dr Rolo Preston on 12/7/20. I will send Dr Megan Walden a message and ask for him to advise. Silvana Nolasco is aware he is out this week.

## 2020-12-28 NOTE — TELEPHONE ENCOUNTER
Flakita's daughter called today to advise that the patient was seen at [facility] and then transferred to [facility] for a procedure. She called to check if the patient is to have a follow up appt with the office. Please contact Nieves to discuss. Thank you.

## 2020-12-31 PROBLEM — R79.89 ELEVATED TROPONIN: Status: RESOLVED | Noted: 2020-01-01 | Resolved: 2020-01-01

## 2020-12-31 PROBLEM — R77.8 ELEVATED TROPONIN: Status: RESOLVED | Noted: 2020-12-01 | Resolved: 2020-12-31

## 2021-01-04 NOTE — TELEPHONE ENCOUNTER
Per Dr Telly Rodriguez note:    I should be able to take care of the stent however needed. She can have an OV with me in 4-6 weeks if needed to discuss things and check her symptoms. Her Metal Stent will be open and working for usually 6-12 months. I contacted patient's daughter, Bert Oconnell and advised what Dr Dung Rios stated. Apt made with him for 2/16/21.

## 2021-01-08 NOTE — OP NOTE
Endoscopic Procedure Note    Patient: Alina Hebert : 1934  [facility] Rec#: [id number] Acc#: [id number]     Primary Care Provider Olga Perez MD  Referring Provider: Loise Phalen MD    Endoscopist: Shea Barrera MD    Date of Procedure:  12/3/2020    Procedure:   1. ERCP with prophylactic pancreatic stenting  2. Intraprocedure direction of fluoroscopy, pancreatic and biliary - 30565    Indications:   1. Pancreatic mass with noted CBD dilation. Anesthesia:  General     Estimated Blood Loss: minimal    Procedure:   Prior to the procedure the patient's chart was reviewed and informed consent was obtained. Risk and Benefits (Risks including but not limited to bleeding, perforation, infection, 8 to 10% risk of post ERCP pancreatitis, and even death) were discussed with the patient. They were agreeable to continue. Patient was brought to the operating room, underwent general anesthesia, and placed in the prone position. A side-viewing duodenoscope was advanced from the oropharynx down to the distal duodenum and reduced into a short position opposite the ampulla. A  film was obtained which appeared normal.     Using a 0.035\" x 260 cm straight Dreamwire multiple attempts were made at biliary cannulation. Contrast was injected with very distal biliary cannulation. There did appear to be an obstructing lesion or stricture in the common bile duct that prevented deep biliary cannulation. Contrast was seen filling portions of a very dilated proximal biliary tree. There was a high-grade stricture that prevented deep cannulation with the wire. Given patient's known history this was likely due to malignant obstruction. With initial attempts at biliary cannulation there was pancreatic ductal cannulation x2. A 5 Wolof by 5 cm prophylactic pancreatic stent was placed to further help stabilize the ampulla and hopefully help with biliary cannulation.   Again despite multiple attempts and wire exchanges and manipulation of the sphincterotome I was unable to fully cannulate the proximal biliary tree proximal to the mass itself. I personally interpreted and directed the fluoroscopy for biliary and pancreatic injection and stenting. Due to prolonged procedure time of diminishing returns, the procedure was aborted without successful biliary cannulation. The pancreatic stent was in proper position. IMPRESSION:  1. Successful placement of prophylactic pancreatic stent  2. Unsuccessful biliary cannulation  3. Intraprocedure direction of biliary and pancreatic fluoroscopy     RECOMMENDATIONS:    1. Clear liquid diet today with advancing diet tomorrow as tolerated. 2.  I would recommend continued monitoring of patient's LFTs. If there is further increase in her bilirubin, she will likely require repeat ERCP with biliary stenting at a tertiary care center. 3.  Await cytology results from EUS portion of the exam today. The results were discussed with the patient and family. A copy of the images obtained were given to the patient.      Rajesh Tompkins MD

## 2021-01-08 NOTE — OP NOTE
Referring/Primary Care Provider: Arnold Lunsford MD, Xiomara Herzog     Date of Procedure: 12/3/20    Procedure:   1. EGD with Endoscopic Ultrasound and FNA of pancreatic mass    Indications:   1. Pancreatic mass on imaging,     Anesthesia:  Sedation was administered by anesthesia who monitored the patient during the procedure. Procedure:   After reviewing the patient's chart, H&P, medications, obtaining informed consent, and discussing risks benefits and alternatives to the procedure the patient was placed in the left lateral decubitus position. For the EUS, an oblique viewing Olympus 140  EUS scope was lubricated and inserted through the mouth into the oropharynx. Under indirect visualization, the upper esophagus was intubated. The scope was advanced to the level of the third portion of duodenum with limited views of the esophageal mucosa. Findings and maneuvers are listed in impression below. The patient tolerated the procedure well. There were no immediate complications. Findings:   Endoscopic Finding:   - limited endoscopic views of the upper GI tract were unremarkable. Endosonographic Findings:  - The celiac axis and associated vascular structures was identified and examined. No concerning or malignant lymphadenopathy was identified.     - Limited views of the left lobe of the liver revealed noted intrahepatic ductal dilation.     - The EUS scope was advanced to the duodenal bulb. The CBD appeared dilated consistent with imaging results. - Pancreas: the distal pancreas appeared normal. There was noted MPD dilation up to 7.8 mm in the genu of the increase. An abnormal appearing irregular hypoechoic mass was noted in the head of the pancreas. This lesion measured 3.5cm in size. There was associated MPD dilation and proximal biliary dilation. FNA was pursed with a 22G FNA needle. A stylette was used as well as slow pull capillary suction technique. A total of 5 passes were made. Bedside cytologist was present and confirmed abnormal cells. Final results are pending. Estimated Blood Loss: minimal    IMPRESSION:  1. Pancreatic head mass 3.5cm in size s/p FNA as above. RECOMMENDATIONS:   - Await pathology results  - ERCP today for planned biliary stenting  - CA 19-9  - Oncology referral.     The results were discussed with the patient and family. A copy of the images obtained were given to the patient.      Daquan Monroe MD

## 2021-01-20 ENCOUNTER — TELEPHONE (OUTPATIENT)
Dept: INFUSION THERAPY | Age: 86
End: 2021-01-20

## 2021-01-20 ENCOUNTER — HOSPITAL ENCOUNTER (OUTPATIENT)
Dept: INFUSION THERAPY | Age: 86
Discharge: HOME | End: 2021-01-20
Payer: MEDICARE

## 2021-01-20 ENCOUNTER — OFFICE VISIT (OUTPATIENT)
Dept: HEMATOLOGY | Age: 86
End: 2021-01-20
Payer: MEDICARE

## 2021-01-20 ENCOUNTER — CLINICAL DOCUMENTATION (OUTPATIENT)
Dept: HEMATOLOGY | Age: 86
End: 2021-01-20

## 2021-01-20 VITALS
HEART RATE: 91 BPM | WEIGHT: 180.1 LBS | SYSTOLIC BLOOD PRESSURE: 140 MMHG | OXYGEN SATURATION: 93 % | BODY MASS INDEX: 33.14 KG/M2 | OXYGEN SATURATION: 93 % | HEIGHT: 62 IN | TEMPERATURE: 97.1 F | BODY MASS INDEX: 33.14 KG/M2 | DIASTOLIC BLOOD PRESSURE: 84 MMHG | HEIGHT: 62 IN | HEART RATE: 91 BPM | TEMPERATURE: 97.1 F | WEIGHT: 180.1 LBS

## 2021-01-20 DIAGNOSIS — N18.4 ANEMIA OF CHRONIC KIDNEY FAILURE, STAGE 4 (SEVERE) (HCC): ICD-10-CM

## 2021-01-20 DIAGNOSIS — R53.1 WEAKNESS GENERALIZED: ICD-10-CM

## 2021-01-20 DIAGNOSIS — D63.1 ANEMIA OF CHRONIC KIDNEY FAILURE, STAGE 4 (SEVERE) (HCC): ICD-10-CM

## 2021-01-20 DIAGNOSIS — D50.8 IRON DEFICIENCY ANEMIA SECONDARY TO INADEQUATE DIETARY IRON INTAKE: ICD-10-CM

## 2021-01-20 DIAGNOSIS — D64.9 ANEMIA, UNSPECIFIED TYPE: ICD-10-CM

## 2021-01-20 DIAGNOSIS — K83.8 DILATION OF BILIARY TRACT: ICD-10-CM

## 2021-01-20 DIAGNOSIS — C25.9 PANCREATIC CARCINOMA (HCC): Primary | ICD-10-CM

## 2021-01-20 DIAGNOSIS — D64.9 ENCOUNTER FOR ESA (ERYTHROPOIETIN STIMULATING AGENT) ANEMIA MANAGEMENT: ICD-10-CM

## 2021-01-20 DIAGNOSIS — C25.7 MALIGNANT NEOPLASM OF OTHER PARTS OF PANCREAS (HCC): ICD-10-CM

## 2021-01-20 DIAGNOSIS — Z79.899 ENCOUNTER FOR ESA (ERYTHROPOIETIN STIMULATING AGENT) ANEMIA MANAGEMENT: ICD-10-CM

## 2021-01-20 DIAGNOSIS — R53.81 PHYSICAL DECONDITIONING: ICD-10-CM

## 2021-01-20 DIAGNOSIS — Z71.89 CARE PLAN DISCUSSED WITH PATIENT: ICD-10-CM

## 2021-01-20 LAB
BASOPHILS ABSOLUTE: 0.08 K/UL (ref 0.01–0.08)
BASOPHILS RELATIVE PERCENT: 1 % (ref 0.1–1.2)
EOSINOPHILS ABSOLUTE: 0.56 K/UL (ref 0.04–0.54)
EOSINOPHILS RELATIVE PERCENT: 7.3 % (ref 0.7–7)
HCT VFR BLD CALC: 31.5 % (ref 34.1–44.9)
HEMOGLOBIN: 10 G/DL (ref 11.2–15.7)
LYMPHOCYTES ABSOLUTE: 1.1 K/UL (ref 1.18–3.74)
LYMPHOCYTES RELATIVE PERCENT: 14.3 % (ref 19.3–53.1)
MCH RBC QN AUTO: 34.8 PG (ref 25.6–32.2)
MCHC RBC AUTO-ENTMCNC: 31.7 G/DL (ref 32.3–35.5)
MCV RBC AUTO: 109.8 FL (ref 79.4–94.8)
MONOCYTES ABSOLUTE: 0.91 K/UL (ref 0.24–0.82)
MONOCYTES RELATIVE PERCENT: 11.9 % (ref 4.7–12.5)
NEUTROPHILS ABSOLUTE: 5.02 K/UL (ref 1.56–6.13)
NEUTROPHILS RELATIVE PERCENT: 65.5 % (ref 34–71.1)
PDW BLD-RTO: 14.5 % (ref 11.7–14.4)
PLATELET # BLD: 281 K/UL (ref 182–369)
PMV BLD AUTO: 11.1 FL (ref 7.4–10.4)
RBC # BLD: 2.87 M/UL (ref 3.93–5.22)
WBC # BLD: 7.67 K/UL (ref 3.98–10.04)

## 2021-01-20 PROCEDURE — 1123F ACP DISCUSS/DSCN MKR DOCD: CPT | Performed by: NURSE PRACTITIONER

## 2021-01-20 PROCEDURE — 1090F PRES/ABSN URINE INCON ASSESS: CPT | Performed by: NURSE PRACTITIONER

## 2021-01-20 PROCEDURE — 85025 COMPLETE CBC W/AUTO DIFF WBC: CPT

## 2021-01-20 PROCEDURE — 99215 OFFICE O/P EST HI 40 MIN: CPT | Performed by: NURSE PRACTITIONER

## 2021-01-20 PROCEDURE — 4040F PNEUMOC VAC/ADMIN/RCVD: CPT | Performed by: NURSE PRACTITIONER

## 2021-01-20 PROCEDURE — G8427 DOCREV CUR MEDS BY ELIG CLIN: HCPCS | Performed by: NURSE PRACTITIONER

## 2021-01-20 PROCEDURE — 1036F TOBACCO NON-USER: CPT | Performed by: NURSE PRACTITIONER

## 2021-01-20 PROCEDURE — G8417 CALC BMI ABV UP PARAM F/U: HCPCS | Performed by: NURSE PRACTITIONER

## 2021-01-20 PROCEDURE — G8484 FLU IMMUNIZE NO ADMIN: HCPCS | Performed by: NURSE PRACTITIONER

## 2021-01-20 PROCEDURE — 6360000002 HC RX W HCPCS: Performed by: NURSE PRACTITIONER

## 2021-01-20 PROCEDURE — 96372 THER/PROPH/DIAG INJ SC/IM: CPT

## 2021-01-20 RX ORDER — FERROUS SULFATE 325(65) MG
TABLET ORAL
Qty: 90 TABLET | Refills: 3 | Status: ON HOLD | OUTPATIENT
Start: 2021-01-20 | End: 2021-05-18

## 2021-01-20 RX ADMIN — EPOETIN ALFA-EPBX 40000 UNITS: 40000 INJECTION, SOLUTION INTRAVENOUS; SUBCUTANEOUS at 15:03

## 2021-01-20 ASSESSMENT — ENCOUNTER SYMPTOMS
SORE THROAT: 0
COUGH: 0
BLOOD IN STOOL: 0
EYE DISCHARGE: 0
RESPIRATORY NEGATIVE: 1
EYE PAIN: 0
ABDOMINAL PAIN: 0
VOMITING: 0
DIARRHEA: 0
BACK PAIN: 0
SHORTNESS OF BREATH: 0
WHEEZING: 0
NAUSEA: 0
EYE REDNESS: 0
EYES NEGATIVE: 1

## 2021-01-20 NOTE — TELEPHONE ENCOUNTER
Spoke with Inderjit Wilkinson and told her that pt's chemo and appt with Shanell will be on 2/8/21 at 1 PM. She verbalized understanding.

## 2021-01-20 NOTE — PROGRESS NOTES
Progress Note      Pt Name: Oswaldo Talbert  YOB: 1934  MRN: [id number]    Date of evaluation: 1/20/2021   History Obtained From:  patient, electronic medical record    CHIEF COMPLAINT:    Chief Complaint   Patient presents with    Cancer     Pancreatic    Anemia     Chronic kidney disease/BARBARA therapy    Results     HISTORY OF PRESENT ILLNESS:    Oswaldo Talbert is a 80 y.o.  female with significant PMH of iron deficiency anemia, chronic kidney disease stage IV, on anticoagulation with Eliquis for history of DVT and new diagnosis of pancreatic adenocarcinoma (12/3/2020). She was last seen in clinic on 12/22/2020 by Dr. Kianna Skaggs with recommendation for further strengthening with a referral to outpatient physical therapy and to continue with supportive care at that time. Pratibha returns today in follow-up to for evaluation, review genetic testing results ([facility]), hospital follow-up, lab monitoring and further treatment recommendations to include considering palliative chemotherapy. Pratibha presents today with her daughter Janki Alonso. She indicates that she is beginning to feel somewhat better and stronger. She reports her appetite is improving, weight is stable at 180 pounds. She has completed 3 of 6 weeks of physical therapy. I discussed the following related to her diagnosis of pancreatic cancer with Dr. Kianna Skaggs and then with Pratibha and her daughter. [facility] diagnostic testing on 12/22/2020 identified an uncertain significance gene/variant, AXIN2 heterozygous. Continued improvement of overall condition. Dr. Kianna Skaggs recommended moving forward with palliative chemotherapy with Gemzar and Abraxane every 2 weeks, both chemo's with a 25% dose reduction based on her age and performance status.   · Gemzar 750 mg/m² and Abraxane 90 mg/m² every 2 weeks, this is a dose reduction by 25% Jaelyn Fernandez would like to proceed with palliative chemotherapy and a Port-A-Cath placement, referral made to [facility] for outpatient port placement. I provided written information on Gemzar and Abraxane. I discussed potential side effects to include but not limited to nausea, vomiting, increased fatigue, increased risk for infection, bone marrow suppression, and loss of appetite. In relation to her anemia of chronic kidney disease, she has been receiving BARBARA therapy with Retacrit intermittently for hemoglobin <11, last dose of 40,000 units given on 11/20/2020. She also continues to take ferrous sulfate 325 mg 1 tablet daily without significant difficulty. CBC today reveals a hemoglobin of 10 with an MCV of 109.8, will move forward with a dose of Retacrit today. ONCOLOGIC/HEMATOLOGIC HISTORY:   Diagnosis:   Anemia of chronic kidney disease   Chronic kidney disease stage lll  Pancreatic adenocarcinoma  Chronic anticoagulation due to prior DVTs    Treatment summary:  Ferrous sulfate 325 mg daily   11/26/2018 - Procrit 20,000 units for chronic kidney disease stage IV. Procrit to be given every 2 weeks ×4 and then monthly, dose to be titrated appropriately.  Hold dose for hemoglobin >11   Currently receiving Retacrit 40,000 units every 4 weeks  Anticipate initiating palliative chemotherapy on 2/8/2021 with Gemzar 750 mg/m² and Abraxane 90 mg/m² every 2 weeks, this is a dose reduction by 25%      Tumor monitoring:  · 12/2/2020-CA 19-9 of 133  · 12/23/2020-CA 19-9 of 217  · 1/20/2021-CA 19-9 of 236    CANCER HISTORY Mike Vasquez seen by Dr. Martir Alvarado on 12/2/2020 as an inpatient consultation at [facility]) of [facility] for findings of a pancreatic mass and bile duct obstruction. She presented to the ER department with complaints of hematuria that began a few days prior to presentation. She denied any dysuria, back pain, no nausea or vomiting.  She also reported easily bruising and ecchymotic areas in her back, abdomen and flank.  Initial laboratory work-up showed INR above 18.  She was given vitamin K subcutaneously.  She was found to have elevated LFTs and the following are events that occurred. · 12/01/2020CT abdomen pelvis without contrast showed a pancreatic head mass measuring 3.3 x 3.7 cm in size and associated, bile duct dilatation above the level of the ampulla.  Associated moderate pancreatic ductal dilatation. · 12/2/2020-CA 19-9 133  · 12/03/2020ERCP with FNA biopsy Positive for high-grade adenocarcinoma.  Unfortunately, stent could not be placed. · 12/5/2020-transferred from [facility]) to [address] [place] [address] and discharged home on 12/8/2020  · 12/07/2020- Cholangipancreatography Retrograde Endo ERCP with sphincterotomy, balloon sweep and placement of 10x60 PC BS metal stent at [facility] in [place]. · 12/21/2020- CT chest with contrast documented no evidence of intrathoracic neoplastic process/metastatic disease. Evidence of pneumobilia. The ectasia of the pancreatic duct, similar to the previous study. An incompletely visualized and evaluated heterogeneous mass in the head of the pancreas measuring 3.3 x 3.7cm. A biliary stent in place. Moderate persistent dilatation of the proximal common bile duct.  A stable small low-density nodule in the left adrenal gland · 12/22/2020Dr. Lambert discussed the results of CT chest and pathology that suggest advanced pancreatic cancer, unfortunately it is not amenable to surgery. She was quite weak and had poor performance, appeared very frail. Discussion was made about palliative chemotherapy but the decision to hold was made until improvement of her physical conditioning occurred. Recommended physical therapy as outpatient and reassess fitness for palliative chemotherapy in approximately 4 weeks. 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 · Anticipate initiating palliative chemotherapy on 2/8/2021 with Gemzar 750 mg/m² and Abraxane 90 mg/m² every 2 weeks, this is a dose reduction by 25%    HEMATOLOGY HISTORY:  Aissatou Rodgers was seen in initial hematology consultation on 7/20/2018 for further evaluation and treatment recommendations for anemia. Aissatou Rodgers was referred from Dr. Magdalena Pope. Aissatou Rodgers presented with no significant complaints other than chronic fatigue and constipation. She had been taking ferrous sulfate 325 mg [facility] Aissatou Rodgers reported significant constipation and some GI upset with the oral iron. She denied any bleeding tendencies to include hematuria, melena, hematochezia and epistaxis. Aissatou Rodgers had a colonoscopy on 4/26/2016 by Dr. Raul Amador for further evaluation of iron deficiency. The only abnormal finding was diverticulosis. Aissatou Rodgers is routinely followed by Dr. Kerri Patel for her chronic kidney disease stage IV. CMP on 6/11/2018 documented a creatinine of 2.2 and GFR 21. CBC review from 2/2/2018- 6/11/2018 documented hemoglobin ranging from 9.4- 10.1, RBC 2.91- 3.22, MCV 94- 100 with a normal WBC and platelet count  CBC at initial consultation on 7/20/2018 documented a WBC of 7.33, ANC 4.74, RBC 3.04, hemoglobin 10.3, .6 and a platelet count of 143,219.     Serology studies on 7/20/2018: Creatinine 1.98   GFR 23   Calcium 10.3   IgG 700, IgA 180, and IgM 52   M spike not observed   Immunofixation: No monoclonality detected. Iron 74   Iron saturation 27%   TIBC 278   Vitamin B12 437   Folate 15.7   Ferritin 299   Reticulocyte count 1.5%   Erythropoietin 11.6      Beta-2 microglobulin 6.2     Occult stool positive 1 of 3 samples on 7/25/2018. Colonoscopy on 10/4/2018 by Dr. Ritika Boyd was documented as removal of 2 polyps with benign pathology. No evidence of bleeding. Serology studies on 10/15/2018:  Iron 76   TIBC 287   Iron saturation 26%   Ferritin 321   Hemoglobin 10.1   Creatinine 2.47   GFR 17    11/26/2018 - Initiated Procrit 20,000 units for chronic kidney disease stage IV, to be given every 2 weeks ×4 and then monthly, dose to be titrated appropriately. Hemoglobin 9.6. All has anemia of chronic disease to include chronic kidney disease stage IV. She will began receiving growth factor support and will need to maintain a ferritin level greater than 200 and an iron saturation of 25% for the Procrit to be beneficial. Hold Procrit dose for hemoglobin >11. Indications/rationale for Procrit was discussed with [place]. Risks, benefits and expectations were outlined. Risks including, but not limited to hypertension, stroke, MI, death were discussed and acknowledged by [place].     Serology studies on 3/8/2019:  Creatinine 1.7/GFR 30.4   Iron 80   Iron saturation 26.5%   TIBC 302   Ferritin 144   Vitamin B12 501   Folate 13.9    Serology studies on 3/5/2020:  · Creatinine 1.3/GFR 41.4  · Iron 84  · TIBC 390  · Iron saturation 21.5%  · Ferritin 342    Iron substrates on 6/25/2020  · Ferritin 311  · Iron 76  · Iron saturation 23%  · TIBC 326  · Creatinine 1.3/GFR 39    Labs on 12/2/2020 and 12/3/2020:  · Iron 35  · TIBC 213  · Iron saturation 16%  · Vitamin B12 483  · Folate 10.6  · Ferritin 480    Past Medical History:    Past Medical History:   Diagnosis Date  Abdominal aortic aneurysm (AAA) (HCC)     Anemia     Arthritis     Chronic kidney disease     DVT of lower extremity (deep venous thrombosis) (HCC)     twice 2010 and 2017    Hyperlipidemia     Hypertension     Kidney stones     Osteoarthritis     Palliative care patient 12/02/2020    Thyroid disease     Thyroid disorder        Past Surgical History:    Past Surgical History:   Procedure Laterality Date    COLONOSCOPY  2016    Dr Rachael Sheffield problems    ENDOSCOPIC ULTRASOUND (LOWER) N/A 12/03/2020    Dr JONNY Grijalva/unsuccessful biliary cannulation despite attempts at 2-wire cannulation and proph pancreatic stenting with cannulation around pancreatic stenting-Positive for high-grade adenocarcinoma, pancreatic head    ERCP N/A 12/03/2020    Dr JONNY Grijalva/unsuccessful biliary cannulation despite attempts at 2-wire cannulation and proph pancreatic stenting with cannulation around pancreatic stenting-Positive for high-grade adenocarcinoma, pancreatic head    ERCP  12/07/2020    Dr Emily Kelly/sphincterotomy, placement of a 10 x 60 partially covered biliary stent    TOE AMPUTATION      TOE AMPUTATION      TOTAL KNEE ARTHROPLASTY Right 01/03/2020    RIGHT TOTAL KNEE REPLACEMENT performed by Kerwin Miller MD at [facility] OR       Current Medications:    Current Outpatient Medications   Medication Sig Dispense Refill    ondansetron (ZOFRAN ODT) 4 MG disintegrating tablet Take 2 tablets by mouth every 8 hours as needed for Nausea or Vomiting 60 tablet 3    promethazine (PHENERGAN) 25 MG tablet Take 1 tablet by mouth every 6 hours as needed for Nausea 45 tablet 2    apixaban (ELIQUIS) 2.5 MG TABS tablet Take 2.5 mg by mouth 2 times daily      ferrous sulfate (FEROSUL) 325 (65 Fe) MG tablet TAKE 1 TABLET BY MOUTH EVERY DAY 90 tablet 3    lisinopril (PRINIVIL;ZESTRIL) 5 MG tablet Take 1 tablet by mouth 2 times daily 60 tablet 0  docusate sodium (COLACE) 100 MG capsule Take 1 capsule by mouth 2 times daily 60 capsule 1    calcitRIOL (ROCALTROL) 0.25 MCG capsule Take 0.25 mcg by mouth daily      allopurinol (ZYLOPRIM) 100 MG tablet Take 100 mg by mouth daily      levothyroxine (SYNTHROID) 112 MCG tablet Take 112 mcg by mouth Daily      simvastatin (ZOCOR) 20 MG tablet Take 20 mg by mouth nightly       No current facility-administered medications for this visit. Allergies: Allergies   Allergen Reactions    Penicillins Rash       Social History:    Social History     Tobacco Use    Smoking status: Never Smoker    Smokeless tobacco: Never Used   Substance Use Topics    Alcohol use: No    Drug use: No       Family History:   Family History   Problem Relation Age of Onset    Colon Cancer Brother     Cancer Mother         Breast Cancer    Heart Attack Father     Colon Polyps Neg Hx     Esophageal Cancer Neg Hx     Liver Cancer Neg Hx     Liver Disease Neg Hx     Rectal Cancer Neg Hx     Stomach Cancer Neg Hx        Vitals:  Vitals:    01/20/21 1356   BP: (!) 140/84   Pulse: 91   Temp: 97.1 °F (36.2 °C)   TempSrc: Temporal   SpO2: 93%   Weight: 180 lb 1.6 oz (81.7 kg)   Height: 5' 2\" (1.575 m)        Subjective   REVIEW OF SYSTEMS:   Review of Systems   Constitutional: Positive for activity change (Improving, no longer requiring walker with ambulation for stability), appetite change (Improving) and fatigue. Negative for chills, diaphoresis and fever. HENT: Negative. Negative for congestion, ear pain, hearing loss, nosebleeds, sore throat and tinnitus. Eyes: Negative. Negative for pain, discharge and redness. Respiratory: Negative. Negative for cough, shortness of breath and wheezing. Cardiovascular: Negative. Negative for chest pain, palpitations and leg swelling. Gastrointestinal: Negative. Negative for abdominal pain, blood in stool, constipation, diarrhea, nausea and vomiting. Endocrine: Negative for polydipsia. Genitourinary: Negative for dysuria, flank pain, frequency, hematuria and urgency. Musculoskeletal: Negative. Negative for back pain, myalgias and neck pain. Skin: Negative. Negative for rash. Neurological: Positive for weakness. Negative for dizziness, tremors, seizures and headaches. Hematological: Does not bruise/bleed easily. Psychiatric/Behavioral: Negative. The patient is not nervous/anxious. Objective   PHYSICAL EXAM:  Physical Exam  Vitals signs reviewed. Constitutional:       General: She is not in acute distress. Appearance: She is well-developed. She is not diaphoretic. HENT:      Head: Normocephalic and atraumatic. Mouth/Throat:      Pharynx: Uvula midline. Tonsils: No tonsillar exudate. Eyes:      General: Lids are normal. No scleral icterus. Right eye: No discharge. Left eye: No discharge. Conjunctiva/sclera: Conjunctivae normal.      Pupils: Pupils are equal, round, and reactive to light. Neck:      Musculoskeletal: Normal range of motion and neck supple. Thyroid: No thyroid mass or thyromegaly. Vascular: No JVD. Trachea: Trachea normal. No tracheal deviation. Cardiovascular:      Rate and Rhythm: Normal rate and regular rhythm. Heart sounds: Normal heart sounds. No murmur. No friction rub. No gallop. Pulmonary:      Effort: Pulmonary effort is normal. No respiratory distress. Breath sounds: Normal breath sounds. No wheezing or rales. Chest:      Chest wall: No tenderness. Abdominal:      General: Bowel sounds are normal. There is no distension. Palpations: Abdomen is soft. There is no mass. Tenderness: There is no abdominal tenderness. There is no guarding or rebound. Hernia: No hernia is present. Musculoskeletal:         General: No tenderness or deformity.       Comments: Range of motion within normal limits x4 extremities  Generalized weakness Skin:     General: Skin is warm. Coloration: Skin is not pale. Findings: No erythema or rash. Neurological:      Mental Status: She is alert and oriented to person, place, and time. Cranial Nerves: No cranial nerve deficit. Coordination: Coordination normal.   Psychiatric:         Behavior: Behavior normal.         Thought Content: Thought content normal.         Labs:  Lab Results   Component Value Date    WBC 7.67 01/20/2021    HGB 10.0 (L) 01/20/2021    HCT 31.5 (L) 01/20/2021    .8 (H) 01/20/2021     01/20/2021     Lab Results   Component Value Date    NEUTROABS 5.02 01/20/2021       ASSESSMENT/PLAN:      1. Locally advanced pancreatic adenocarcinoma, initiation of systemic palliative treatment has been delayed due to poor performance status and deconditioning. Has completed 3 of 6 weeks of outpatient physical therapy with significant improvement. Planning to initiate palliative chemotherapy on 2/8/2021 with Gemzar 750 mg/m² and Abraxane 90 mg/m² every 2 weeks, this is a dose reduction by 25%. [facility] diagnostic testing on 12/22/2020 identified an uncertain significance gene/variant, AXIN2 heterozygous. CA 19 9 of 236 today, 1/20/2021, continues to increase. Will need Port-A-Cath placement, referral made to [facility] surgery for outpatient port placement. I provided written information on Gemzar and Abraxane. I discussed potential side effects to include but not limited to nausea, vomiting, increased fatigue, increased risk for infection, bone marrow suppression, and loss of appetite.    -Electronic prescription for Zofran and Phenergan sent  -Continue outpatient physical therapy, has 3 weeks left    2. Extra hepatic/intrahepatic biliary dilation, status post ERCP with metal stent placement on 12/7/2020 at [facility]. Liver enzymes today have significantly improved and almost completely normalized with an alkaline phosphatase of 137, total bilirubin 0.5, ALT 12 and AST 15.    -CMP today  -Follow-up with Dr. Olivia Tierney as scheduled on 2/16/2021      3. Anemia of chronic kidney failure, stage 3B. Receives BARBARA therapy with Retacrit intermittently for hemoglobin <11, last dose of 40,000 units given on 11/20/2020. She also continues to take ferrous sulfate 325 mg 1 tablet daily without significant difficulty. Labs on 12/2/2020 and 12/3/2020:  · Iron 35  · TIBC 213  · Iron saturation 16%  · Vitamin B12 483  · Folate 10.6  · Ferritin 480    CBC today reveals a hemoglobin of 10 with an MCV of 109.8, will move forward with a dose of Retacrit today. CMP revealed creatinine of 1.6 with a GFR of 29 today. -Refill for ferrous sulfate sent electronically  -Retacrit 40,000 units given today    4. Hypothyroidism presently being managed by Dr. Navin Hart and is taking Synthroid 112 µg daily. Recent TSH 3.55  -Follow-up with Dr. Navin Hart for monitoring of TSH and Synthroid dosing    5. Routine health screening history of abnormal mammogram:  Annual routine follow-up screening mammogram on 9/20/2019 documented no mammographic evidence of malignancy, BI-RADS Category 2 benign findings.  -Annual screening mammogram is overdue, will address in the near future once treatment for her pancreatic adenocarcinoma has been initiated    FOLLOW UP:  1. Plan to return to clinic on 2/8/2021 to initiate palliative chemotherapy  2. Anticipating consultation with Dr. Elo Dodd for Port-A-Cath placement  3. Continue to follow with other medical providers as recommended    Discussed precautions related to [address] and being at increased risk. Discussed proper handwashing to be done frequently, limit exposure to other individuals and maintain social distancing of 6 feet. Recommend contacting primary care provider if having respiratory symptoms for further recommendations and consideration for testing.     (Please note that portions of this note were completed with a voice recognition program. Efforts were made to edit the dictations but occasionally words are mis-transcribed,  Also, portions of this note have been copied forward, however, changed to reflect the most current clinical status of this patient.)     Electronically signed by ALBERTO Butterfield on 1/23/2021 at 11:44 AM

## 2021-01-23 RX ORDER — ONDANSETRON 4 MG/1
8 TABLET, ORALLY DISINTEGRATING ORAL EVERY 8 HOURS PRN
Qty: 60 TABLET | Refills: 3 | Status: SHIPPED | OUTPATIENT
Start: 2021-01-23 | End: 2021-05-13 | Stop reason: SDUPTHER

## 2021-01-23 RX ORDER — PROMETHAZINE HYDROCHLORIDE 25 MG/1
25 TABLET ORAL EVERY 6 HOURS PRN
Qty: 45 TABLET | Refills: 2 | Status: SHIPPED | OUTPATIENT
Start: 2021-01-23 | End: 2021-02-22

## 2021-01-23 ASSESSMENT — ENCOUNTER SYMPTOMS
GASTROINTESTINAL NEGATIVE: 1
CONSTIPATION: 0

## 2021-01-26 ENCOUNTER — OFFICE VISIT (OUTPATIENT)
Dept: SURGERY | Age: 86
End: 2021-01-26
Payer: MEDICARE

## 2021-01-26 VITALS
DIASTOLIC BLOOD PRESSURE: 80 MMHG | BODY MASS INDEX: 32.9 KG/M2 | WEIGHT: 178.8 LBS | HEIGHT: 62 IN | SYSTOLIC BLOOD PRESSURE: 138 MMHG | TEMPERATURE: 99 F

## 2021-01-26 DIAGNOSIS — C25.7 MALIGNANT NEOPLASM OF OTHER PARTS OF PANCREAS (HCC): Primary | ICD-10-CM

## 2021-01-26 PROCEDURE — 1090F PRES/ABSN URINE INCON ASSESS: CPT | Performed by: SURGERY

## 2021-01-26 PROCEDURE — G8427 DOCREV CUR MEDS BY ELIG CLIN: HCPCS | Performed by: SURGERY

## 2021-01-26 PROCEDURE — 1036F TOBACCO NON-USER: CPT | Performed by: SURGERY

## 2021-01-26 PROCEDURE — 99202 OFFICE O/P NEW SF 15 MIN: CPT | Performed by: SURGERY

## 2021-01-26 PROCEDURE — 1123F ACP DISCUSS/DSCN MKR DOCD: CPT | Performed by: SURGERY

## 2021-01-26 PROCEDURE — 4040F PNEUMOC VAC/ADMIN/RCVD: CPT | Performed by: SURGERY

## 2021-01-26 PROCEDURE — G8417 CALC BMI ABV UP PARAM F/U: HCPCS | Performed by: SURGERY

## 2021-01-26 PROCEDURE — G8484 FLU IMMUNIZE NO ADMIN: HCPCS | Performed by: SURGERY

## 2021-01-26 ASSESSMENT — ENCOUNTER SYMPTOMS
ABDOMINAL DISTENTION: 0
EYE REDNESS: 0
EYE PAIN: 0
ABDOMINAL PAIN: 0
COUGH: 0
SHORTNESS OF BREATH: 0

## 2021-01-26 NOTE — LETTER
Preop Orders:     Patient: Eulalio Cotter  : 1934    Hospital: [facility]    Admitting Physician:  Dr. Arabella Thayer    Diagnosis: pancreatic cancer    Procedure: single lumen port placement with ultrasound and fluoro    Time: 1 hour    Anesthesia: MAC    Admission: Outpatient     Date: to be scheduled    Labs: per anesthesia     Special Instructions:  none    Cardiac Clearance:  none    Special Equipment:  none    Pre-Op Meds:  clinda    Latex Allergy: no    Beta Blocker: no    Medication Instructions: hold eliquis 48 hours before surgery    Post op visit: 2 weeks post op      Electronically signed by Lj Caro MD   On 21   @ 2:42 PM

## 2021-01-26 NOTE — PROGRESS NOTES
Ms. Marley Sacks is an 80year old female who presents with a complaint of a diagnosis of pancreatic cancer. She is now in need of a port in order to undergo chemo therapy. The patient reports that she has never had a central line. She is on eliquis for history of DVT. She is scheduled to start chemo on February 8.      Past Medical History:   Diagnosis Date    [facility]dominal aortic aneurysm (AAA) ([facility]     Anemia     Arthritis     Cancer ([facility]     pancreatic    Chronic kidney disease     DVT of lower extremity (deep venous thrombosis) (HCC)     twice 2010 and 2017    Hyperlipidemia     Hypertension     Kidney stones     Osteoarthritis     Palliative care patient 12/02/2020    Thyroid disease     Thyroid disorder      Past Surgical History:   Procedure Laterality Date    COLONOSCOPY  2016    Dr Ruthann Higgins problems    ENDOSCOPIC ULTRASOUND (LOWER) N/A 12/03/2020    Dr JONNY Grijalva/unsuccessful biliary cannulation despite attempts at 2-wire cannulation and proph pancreatic stenting with cannulation around pancreatic stenting-Positive for high-grade adenocarcinoma, pancreatic head    ERCP N/A 12/03/2020    Dr JONNY Grijalva/unsuccessful biliary cannulation despite attempts at 2-wire cannulation and proph pancreatic stenting with cannulation around pancreatic stenting-Positive for high-grade adenocarcinoma, pancreatic head    ERCP  12/07/2020    Dr Ellis Kelly/sphincterotomy, placement of a 10 x 60 partially covered biliary stent    TOE AMPUTATION Bilateral     pinky toes removed    TOTAL KNEE ARTHROPLASTY Right 01/03/2020    RIGHT TOTAL KNEE REPLACEMENT performed by Angelica Mitchell MD at [address] [place] [address] OR     Current Outpatient Medications   Medication Sig Dispense Refill    ondansetron (ZOFRAN ODT) 4 MG disintegrating tablet Take 2 tablets by mouth every 8 hours as needed for Nausea or Vomiting 60 tablet 3  promethazine (PHENERGAN) 25 MG tablet Take 1 tablet by mouth every 6 hours as needed for Nausea 45 tablet 2    apixaban (ELIQUIS) 2.5 MG TABS tablet Take 2.5 mg by mouth 2 times daily      ferrous sulfate (FEROSUL) 325 (65 Fe) MG tablet TAKE 1 TABLET BY MOUTH EVERY DAY 90 tablet 3    lisinopril (PRINIVIL;ZESTRIL) 5 MG tablet Take 1 tablet by mouth 2 times daily 60 tablet 0    docusate sodium (COLACE) 100 MG capsule Take 1 capsule by mouth 2 times daily 60 capsule 1    calcitRIOL (ROCALTROL) 0.25 MCG capsule Take 0.25 mcg by mouth daily      allopurinol (ZYLOPRIM) 100 MG tablet Take 100 mg by mouth daily      levothyroxine (SYNTHROID) 112 MCG tablet Take 112 mcg by mouth Daily      simvastatin (ZOCOR) 20 MG tablet Take 20 mg by mouth nightly       No current facility-administered medications for this visit. Allergies: Penicillins    Family History   Problem Relation Age of Onset    Colon Cancer Brother     Cancer Mother         Breast Cancer    Heart Attack Father     Colon Polyps Neg Hx     Esophageal Cancer Neg Hx     Liver Cancer Neg Hx     Liver Disease Neg Hx     Rectal Cancer Neg Hx     Stomach Cancer Neg Hx        Social History     Tobacco Use    Smoking status: Never Smoker    Smokeless tobacco: Never Used   Substance Use Topics    Alcohol use: No       Review of Systems   Constitutional: Positive for activity change. Negative for fever. HENT: Positive for hearing loss. Negative for tinnitus. Eyes: Negative for pain and redness. Respiratory: Negative for cough and shortness of breath. Cardiovascular: Negative for chest pain and palpitations. Gastrointestinal: Negative for abdominal distention and abdominal pain. Endocrine: Negative for polydipsia and polyphagia. Genitourinary: Negative for dysuria and hematuria. Musculoskeletal: Positive for gait problem. Negative for arthralgias. Skin: Negative for rash and wound.

## 2021-02-01 ENCOUNTER — OFFICE VISIT (OUTPATIENT)
Age: 86
End: 2021-02-01

## 2021-02-01 VITALS — OXYGEN SATURATION: 97 % | HEART RATE: 91 BPM

## 2021-02-01 DIAGNOSIS — Z11.59 SCREENING FOR VIRAL DISEASE: Primary | ICD-10-CM

## 2021-02-01 PROCEDURE — 99999 PR OFFICE/OUTPT VISIT,PROCEDURE ONLY: CPT | Performed by: NURSE PRACTITIONER

## 2021-02-02 LAB — SARS-COV-2, NAA: NOT DETECTED

## 2021-02-05 ENCOUNTER — PREP FOR PROCEDURE (OUTPATIENT)
Dept: SURGERY | Age: 86
End: 2021-02-05

## 2021-02-05 ENCOUNTER — ANESTHESIA (OUTPATIENT)
Dept: OPERATING ROOM | Age: 86
End: 2021-02-05
Payer: MEDICARE

## 2021-02-05 ENCOUNTER — APPOINTMENT (OUTPATIENT)
Dept: GENERAL RADIOLOGY | Age: 86
End: 2021-02-05
Attending: SURGERY
Payer: MEDICARE

## 2021-02-05 ENCOUNTER — HOSPITAL ENCOUNTER (OUTPATIENT)
Age: 86
Setting detail: OUTPATIENT SURGERY
Discharge: HOME | End: 2021-02-05
Attending: SURGERY | Admitting: SURGERY
Payer: MEDICARE

## 2021-02-05 ENCOUNTER — ANESTHESIA EVENT (OUTPATIENT)
Dept: OPERATING ROOM | Age: 86
End: 2021-02-05
Payer: MEDICARE

## 2021-02-05 VITALS
HEART RATE: 71 BPM | OXYGEN SATURATION: 94 % | TEMPERATURE: 97 F | DIASTOLIC BLOOD PRESSURE: 89 MMHG | BODY MASS INDEX: 33.13 KG/M2 | WEIGHT: 180 LBS | HEIGHT: 62 IN | RESPIRATION RATE: 16 BRPM | SYSTOLIC BLOOD PRESSURE: 169 MMHG

## 2021-02-05 VITALS
RESPIRATION RATE: 17 BRPM | DIASTOLIC BLOOD PRESSURE: 63 MMHG | SYSTOLIC BLOOD PRESSURE: 150 MMHG | OXYGEN SATURATION: 95 %

## 2021-02-05 DIAGNOSIS — G89.18 POST-OP PAIN: Primary | ICD-10-CM

## 2021-02-05 PROCEDURE — 3209999900 FLUORO FOR SURGICAL PROCEDURES

## 2021-02-05 PROCEDURE — C1788 PORT, INDWELLING, IMP: HCPCS | Performed by: SURGERY

## 2021-02-05 PROCEDURE — 3600000013 HC SURGERY LEVEL 3 ADDTL 15MIN: Performed by: SURGERY

## 2021-02-05 PROCEDURE — 77001 FLUOROGUIDE FOR VEIN DEVICE: CPT | Performed by: SURGERY

## 2021-02-05 PROCEDURE — 6360000002 HC RX W HCPCS: Performed by: NURSE ANESTHETIST, CERTIFIED REGISTERED

## 2021-02-05 PROCEDURE — 3700000001 HC ADD 15 MINUTES (ANESTHESIA): Performed by: SURGERY

## 2021-02-05 PROCEDURE — 7100000010 HC PHASE II RECOVERY - FIRST 15 MIN: Performed by: SURGERY

## 2021-02-05 PROCEDURE — 76937 US GUIDE VASCULAR ACCESS: CPT | Performed by: SURGERY

## 2021-02-05 PROCEDURE — 36561 INSERT TUNNELED CV CATH: CPT | Performed by: SURGERY

## 2021-02-05 PROCEDURE — 2580000003 HC RX 258: Performed by: ANESTHESIOLOGY

## 2021-02-05 PROCEDURE — 71045 X-RAY EXAM CHEST 1 VIEW: CPT

## 2021-02-05 PROCEDURE — 2500000003 HC RX 250 WO HCPCS: Performed by: SURGERY

## 2021-02-05 PROCEDURE — 3700000000 HC ANESTHESIA ATTENDED CARE: Performed by: SURGERY

## 2021-02-05 PROCEDURE — 3600000003 HC SURGERY LEVEL 3 BASE: Performed by: SURGERY

## 2021-02-05 PROCEDURE — 6360000002 HC RX W HCPCS: Performed by: SURGERY

## 2021-02-05 PROCEDURE — 2709999900 HC NON-CHARGEABLE SUPPLY: Performed by: SURGERY

## 2021-02-05 DEVICE — PORT INFUS PLAS SGL LUMN W/ 9.6FR SIL CATH AIRGUARD VLV: Type: IMPLANTABLE DEVICE | Site: CHEST | Status: FUNCTIONAL

## 2021-02-05 RX ORDER — PROPOFOL 10 MG/ML
INJECTION, EMULSION INTRAVENOUS CONTINUOUS PRN
Status: DISCONTINUED | OUTPATIENT
Start: 2021-02-05 | End: 2021-02-05 | Stop reason: SDUPTHER

## 2021-02-05 RX ORDER — CLINDAMYCIN PHOSPHATE 600 MG/50ML
600 INJECTION INTRAVENOUS ONCE
Status: COMPLETED | OUTPATIENT
Start: 2021-02-05 | End: 2021-02-05

## 2021-02-05 RX ORDER — SCOLOPAMINE TRANSDERMAL SYSTEM 1 MG/1
1 PATCH, EXTENDED RELEASE TRANSDERMAL ONCE
Status: DISCONTINUED | OUTPATIENT
Start: 2021-02-05 | End: 2021-02-05 | Stop reason: HOSPADM

## 2021-02-05 RX ORDER — LIDOCAINE HYDROCHLORIDE 10 MG/ML
1 INJECTION, SOLUTION EPIDURAL; INFILTRATION; INTRACAUDAL; PERINEURAL
Status: DISCONTINUED | OUTPATIENT
Start: 2021-02-05 | End: 2021-02-05 | Stop reason: HOSPADM

## 2021-02-05 RX ORDER — HYDROMORPHONE HYDROCHLORIDE 1 MG/ML
0.25 INJECTION, SOLUTION INTRAMUSCULAR; INTRAVENOUS; SUBCUTANEOUS EVERY 5 MIN PRN
Status: DISCONTINUED | OUTPATIENT
Start: 2021-02-05 | End: 2021-02-05 | Stop reason: HOSPADM

## 2021-02-05 RX ORDER — SODIUM CHLORIDE 0.9 % (FLUSH) 0.9 %
10 SYRINGE (ML) INJECTION EVERY 12 HOURS SCHEDULED
Status: DISCONTINUED | OUTPATIENT
Start: 2021-02-05 | End: 2021-02-05 | Stop reason: HOSPADM

## 2021-02-05 RX ORDER — HYDROCODONE BITARTRATE AND ACETAMINOPHEN 5; 325 MG/1; MG/1
1 TABLET ORAL EVERY 8 HOURS PRN
Qty: 5 TABLET | Refills: 0 | Status: SHIPPED | OUTPATIENT
Start: 2021-02-05 | End: 2021-02-08

## 2021-02-05 RX ORDER — LABETALOL HYDROCHLORIDE 5 MG/ML
5 INJECTION, SOLUTION INTRAVENOUS EVERY 10 MIN PRN
Status: DISCONTINUED | OUTPATIENT
Start: 2021-02-05 | End: 2021-02-05 | Stop reason: HOSPADM

## 2021-02-05 RX ORDER — LIDOCAINE AND PRILOCAINE 25; 25 MG/G; MG/G
CREAM TOPICAL
Qty: 1 TUBE | Refills: 1 | Status: SHIPPED | OUTPATIENT
Start: 2021-02-05 | End: 2021-04-19

## 2021-02-05 RX ORDER — SODIUM CHLORIDE, SODIUM LACTATE, POTASSIUM CHLORIDE, CALCIUM CHLORIDE 600; 310; 30; 20 MG/100ML; MG/100ML; MG/100ML; MG/100ML
INJECTION, SOLUTION INTRAVENOUS CONTINUOUS
Status: DISCONTINUED | OUTPATIENT
Start: 2021-02-05 | End: 2021-02-05 | Stop reason: HOSPADM

## 2021-02-05 RX ORDER — DIPHENHYDRAMINE HYDROCHLORIDE 50 MG/ML
12.5 INJECTION INTRAMUSCULAR; INTRAVENOUS
Status: DISCONTINUED | OUTPATIENT
Start: 2021-02-05 | End: 2021-02-05 | Stop reason: HOSPADM

## 2021-02-05 RX ORDER — SODIUM CHLORIDE 0.9 % (FLUSH) 0.9 %
10 SYRINGE (ML) INJECTION PRN
Status: CANCELLED | OUTPATIENT
Start: 2021-02-05

## 2021-02-05 RX ORDER — HYDROMORPHONE HYDROCHLORIDE 1 MG/ML
0.5 INJECTION, SOLUTION INTRAMUSCULAR; INTRAVENOUS; SUBCUTANEOUS EVERY 5 MIN PRN
Status: DISCONTINUED | OUTPATIENT
Start: 2021-02-05 | End: 2021-02-05 | Stop reason: HOSPADM

## 2021-02-05 RX ORDER — SODIUM CHLORIDE 0.9 % (FLUSH) 0.9 %
10 SYRINGE (ML) INJECTION PRN
Status: DISCONTINUED | OUTPATIENT
Start: 2021-02-05 | End: 2021-02-05 | Stop reason: HOSPADM

## 2021-02-05 RX ORDER — BUPIVACAINE HYDROCHLORIDE 2.5 MG/ML
INJECTION, SOLUTION INFILTRATION; PERINEURAL PRN
Status: DISCONTINUED | OUTPATIENT
Start: 2021-02-05 | End: 2021-02-05 | Stop reason: ALTCHOICE

## 2021-02-05 RX ORDER — MEPERIDINE HYDROCHLORIDE 50 MG/ML
12.5 INJECTION INTRAMUSCULAR; INTRAVENOUS; SUBCUTANEOUS EVERY 5 MIN PRN
Status: DISCONTINUED | OUTPATIENT
Start: 2021-02-05 | End: 2021-02-05 | Stop reason: HOSPADM

## 2021-02-05 RX ORDER — METOCLOPRAMIDE HYDROCHLORIDE 5 MG/ML
10 INJECTION INTRAMUSCULAR; INTRAVENOUS
Status: DISCONTINUED | OUTPATIENT
Start: 2021-02-05 | End: 2021-02-05 | Stop reason: HOSPADM

## 2021-02-05 RX ORDER — HYDROCODONE BITARTRATE AND ACETAMINOPHEN 5; 325 MG/1; MG/1
1 TABLET ORAL EVERY 4 HOURS PRN
Status: DISCONTINUED | OUTPATIENT
Start: 2021-02-05 | End: 2021-02-05 | Stop reason: HOSPADM

## 2021-02-05 RX ORDER — MIDAZOLAM HYDROCHLORIDE 1 MG/ML
2 INJECTION INTRAMUSCULAR; INTRAVENOUS
Status: DISCONTINUED | OUTPATIENT
Start: 2021-02-05 | End: 2021-02-05 | Stop reason: HOSPADM

## 2021-02-05 RX ORDER — MORPHINE SULFATE 4 MG/ML
4 INJECTION, SOLUTION INTRAMUSCULAR; INTRAVENOUS
Status: DISCONTINUED | OUTPATIENT
Start: 2021-02-05 | End: 2021-02-05 | Stop reason: HOSPADM

## 2021-02-05 RX ORDER — HEPARIN SODIUM,PORCINE 10 UNIT/ML
VIAL (ML) INTRAVENOUS PRN
Status: DISCONTINUED | OUTPATIENT
Start: 2021-02-05 | End: 2021-02-05 | Stop reason: ALTCHOICE

## 2021-02-05 RX ORDER — MORPHINE SULFATE 4 MG/ML
2 INJECTION, SOLUTION INTRAMUSCULAR; INTRAVENOUS
Status: DISCONTINUED | OUTPATIENT
Start: 2021-02-05 | End: 2021-02-05 | Stop reason: HOSPADM

## 2021-02-05 RX ORDER — HYDROCODONE BITARTRATE AND ACETAMINOPHEN 5; 325 MG/1; MG/1
2 TABLET ORAL EVERY 4 HOURS PRN
Status: DISCONTINUED | OUTPATIENT
Start: 2021-02-05 | End: 2021-02-05 | Stop reason: HOSPADM

## 2021-02-05 RX ORDER — SODIUM CHLORIDE 0.9 % (FLUSH) 0.9 %
10 SYRINGE (ML) INJECTION EVERY 12 HOURS SCHEDULED
Status: CANCELLED | OUTPATIENT
Start: 2021-02-05

## 2021-02-05 RX ORDER — MORPHINE SULFATE 4 MG/ML
2 INJECTION, SOLUTION INTRAMUSCULAR; INTRAVENOUS EVERY 5 MIN PRN
Status: DISCONTINUED | OUTPATIENT
Start: 2021-02-05 | End: 2021-02-05 | Stop reason: HOSPADM

## 2021-02-05 RX ORDER — PROMETHAZINE HYDROCHLORIDE 25 MG/ML
6.25 INJECTION, SOLUTION INTRAMUSCULAR; INTRAVENOUS
Status: DISCONTINUED | OUTPATIENT
Start: 2021-02-05 | End: 2021-02-05 | Stop reason: HOSPADM

## 2021-02-05 RX ORDER — FENTANYL CITRATE 50 UG/ML
50 INJECTION, SOLUTION INTRAMUSCULAR; INTRAVENOUS
Status: DISCONTINUED | OUTPATIENT
Start: 2021-02-05 | End: 2021-02-05 | Stop reason: HOSPADM

## 2021-02-05 RX ORDER — PROMETHAZINE HYDROCHLORIDE 25 MG/1
12.5 TABLET ORAL EVERY 6 HOURS PRN
Status: CANCELLED | OUTPATIENT
Start: 2021-02-05

## 2021-02-05 RX ORDER — MORPHINE SULFATE 4 MG/ML
4 INJECTION, SOLUTION INTRAMUSCULAR; INTRAVENOUS EVERY 5 MIN PRN
Status: DISCONTINUED | OUTPATIENT
Start: 2021-02-05 | End: 2021-02-05 | Stop reason: HOSPADM

## 2021-02-05 RX ORDER — HYDRALAZINE HYDROCHLORIDE 20 MG/ML
5 INJECTION INTRAMUSCULAR; INTRAVENOUS EVERY 10 MIN PRN
Status: DISCONTINUED | OUTPATIENT
Start: 2021-02-05 | End: 2021-02-05 | Stop reason: HOSPADM

## 2021-02-05 RX ORDER — FENTANYL CITRATE 50 UG/ML
INJECTION, SOLUTION INTRAMUSCULAR; INTRAVENOUS PRN
Status: DISCONTINUED | OUTPATIENT
Start: 2021-02-05 | End: 2021-02-05 | Stop reason: SDUPTHER

## 2021-02-05 RX ORDER — CLINDAMYCIN PHOSPHATE 900 MG/50ML
900 INJECTION INTRAVENOUS
Status: CANCELLED | OUTPATIENT
Start: 2021-02-05 | End: 2021-02-05

## 2021-02-05 RX ORDER — ONDANSETRON 2 MG/ML
4 INJECTION INTRAMUSCULAR; INTRAVENOUS EVERY 6 HOURS PRN
Status: CANCELLED | OUTPATIENT
Start: 2021-02-05

## 2021-02-05 RX ADMIN — FENTANYL CITRATE 25 MCG: 50 INJECTION, SOLUTION INTRAMUSCULAR; INTRAVENOUS at 08:14

## 2021-02-05 RX ADMIN — FENTANYL CITRATE 25 MCG: 50 INJECTION, SOLUTION INTRAMUSCULAR; INTRAVENOUS at 08:20

## 2021-02-05 RX ADMIN — CLINDAMYCIN PHOSPHATE 600 MG: 600 INJECTION, SOLUTION INTRAVENOUS at 08:21

## 2021-02-05 RX ADMIN — SODIUM CHLORIDE, SODIUM LACTATE, POTASSIUM CHLORIDE, AND CALCIUM CHLORIDE: 600; 310; 30; 20 INJECTION, SOLUTION INTRAVENOUS at 06:57

## 2021-02-05 RX ADMIN — PROPOFOL 120 MCG/KG/MIN: 10 INJECTION, EMULSION INTRAVENOUS at 08:14

## 2021-02-05 ASSESSMENT — LIFESTYLE VARIABLES: SMOKING_STATUS: 0

## 2021-02-05 ASSESSMENT — PAIN SCALES - GENERAL: PAINLEVEL_OUTOF10: 0

## 2021-02-05 ASSESSMENT — ENCOUNTER SYMPTOMS: SHORTNESS OF BREATH: 0

## 2021-02-05 NOTE — DISCHARGE INSTR - ACTIVITY
Activity as tolerated. Okay to shower over incisions, but do not submerge under water like a tub or pool for 10 days. Watch for redness, drainage, or fever, and call for any questions or concerns.

## 2021-02-05 NOTE — INTERVAL H&P NOTE
Update History & Physical    The patient's History and Physical of January 26, 2021 was reviewed with the patient and I examined the patient. There was no change. The surgical site was confirmed by the patient and me. Plan: The risks, benefits, expected outcome, and alternative to the recommended procedure have been discussed with the patient. Patient understands and wants to proceed with the procedure.      Electronically signed by Janice Ozuna MD on 2/5/2021 at 8:06 AM

## 2021-02-05 NOTE — H&P (VIEW-ONLY)
Ms. Kelvin Haynes is an 80year old female who presents with a complaint of a diagnosis of pancreatic cancer. She is now in need of a port in order to undergo chemo therapy. The patient reports that she has never had a central line. She is on eliquis for history of DVT.  She is scheduled to start chemo on February 8.      Past Medical History        Past Medical History:   Diagnosis Date    Abdominal aortic aneurysm (AAA) ([facility] [address]      Anemia      Arthritis      Cancer ([facility] [address]       pancreatic    Chronic kidney disease      DVT of lower extremity (deep venous thrombosis) (HCC)       twice 2010 and 2017    Hyperlipidemia      Hypertension      Kidney stones      Osteoarthritis      Palliative care patient 12/02/2020    Thyroid disease      Thyroid disorder           Past Surgical History         Past Surgical History:   Procedure Laterality Date    COLONOSCOPY   2016     Dr Adkins Platt problems    ENDOSCOPIC ULTRASOUND (LOWER) N/A 12/03/2020     Dr JONNY Grijalva/unsuccessful biliary cannulation despite attempts at 2-wire cannulation and proph pancreatic stenting with cannulation around pancreatic stenting-Positive for high-grade adenocarcinoma, pancreatic head    ERCP N/A 12/03/2020     Dr JONNY Grijalva/unsuccessful biliary cannulation despite attempts at 2-wire cannulation and proph pancreatic stenting with cannulation around pancreatic stenting-Positive for high-grade adenocarcinoma, pancreatic head    ERCP   12/07/2020     Dr Rosibel Kelly/sphincterotomy, placement of a 10 x 60 partially covered biliary stent    TOE AMPUTATION Bilateral       pinky toes removed    TOTAL KNEE ARTHROPLASTY Right 01/03/2020     RIGHT TOTAL KNEE REPLACEMENT performed by Stefani Hamm MD at [facility] OR         Current Facility-Administered Medications          Current Outpatient Medications   Medication Sig Dispense Refill  ondansetron (ZOFRAN ODT) 4 MG disintegrating tablet Take 2 tablets by mouth every 8 hours as needed for Nausea or Vomiting 60 tablet 3    promethazine (PHENERGAN) 25 MG tablet Take 1 tablet by mouth every 6 hours as needed for Nausea 45 tablet 2    apixaban (ELIQUIS) 2.5 MG TABS tablet Take 2.5 mg by mouth 2 times daily        ferrous sulfate (FEROSUL) 325 (65 Fe) MG tablet TAKE 1 TABLET BY MOUTH EVERY DAY 90 tablet 3    lisinopril (PRINIVIL;ZESTRIL) 5 MG tablet Take 1 tablet by mouth 2 times daily 60 tablet 0    docusate sodium (COLACE) 100 MG capsule Take 1 capsule by mouth 2 times daily 60 capsule 1    calcitRIOL (ROCALTROL) 0.25 MCG capsule Take 0.25 mcg by mouth daily        allopurinol (ZYLOPRIM) 100 MG tablet Take 100 mg by mouth daily        levothyroxine (SYNTHROID) 112 MCG tablet Take 112 mcg by mouth Daily        simvastatin (ZOCOR) 20 MG tablet Take 20 mg by mouth nightly          No current facility-administered medications for this visit.          Allergies: Penicillins     Family History         Family History   Problem Relation Age of Onset    Colon Cancer Brother      Cancer Mother           Breast Cancer    Heart Attack Father      Colon Polyps Neg Hx      Esophageal Cancer Neg Hx      Liver Cancer Neg Hx      Liver Disease Neg Hx      Rectal Cancer Neg Hx      Stomach Cancer Neg Hx              Social History      Tobacco Use    Smoking status: Never Smoker    Smokeless tobacco: Never Used   Substance Use Topics    Alcohol use: No         Review of Systems   Constitutional: Positive for activity change. Negative for fever. HENT: Positive for hearing loss. Negative for tinnitus. Eyes: Negative for pain and redness. Respiratory: Negative for cough and shortness of breath. Cardiovascular: Negative for chest pain and palpitations. Gastrointestinal: Negative for abdominal distention and abdominal pain. Endocrine: Negative for polydipsia and polyphagia. Genitourinary: Negative for dysuria and hematuria. Musculoskeletal: Positive for gait problem. Negative for arthralgias. Skin: Negative for rash and wound. Neurological: Negative for dizziness and seizures. Hematological: Bruises/bleeds easily. Psychiatric/Behavioral: Negative for agitation. The patient is not nervous/anxious.          Physical Exam  Vitals signs reviewed. Constitutional:       General: She is not in acute distress. HENT:      Head: Normocephalic and atraumatic. Nose:      Comments: Face mask on  Eyes:      Extraocular Movements: Extraocular movements intact. Pupils: Pupils are equal, round, and reactive to light. Neck:      Musculoskeletal: Neck supple. No neck rigidity. Cardiovascular:      Rate and Rhythm: Normal rate and regular rhythm. Pulmonary:      Effort: Pulmonary effort is normal. No respiratory distress. Abdominal:      General: There is no distension. Palpations: Abdomen is soft. Musculoskeletal: Normal range of motion. General: No swelling. Skin:     General: Skin is warm and dry. Neurological:      General: No focal deficit present. Mental Status: She is alert. Cranial Nerves: No cranial nerve deficit. Psychiatric:         Mood and Affect: Mood normal.         Behavior: Behavior normal.            Assessment and plan:  80year old female with pancreatic cancer  The risks and benefits of port placement with ultrasound and fluoro, including but not limited to, bleeding, infection, and pneumothorax. The patient expressed understanding and would like to proceed with surgery. The need for pre op covid testing was also discussed.  The patient was instructed to hold her eliquis for 48 hours before surgery.     Haja Dooley MD  1/26/2021  3:22 PM

## 2021-02-05 NOTE — BRIEF OP NOTE
Brief Postoperative Note      Patient: Ousmane Lorenzana  YOB: 1934  MRN: [id number]    Date of Procedure: 2/5/2021    Pre-Op Diagnosis: PANCREATIC CANCER    Post-Op Diagnosis: Same       Procedure(s):  SINGLE LUMEN PORT PLACEMENT WITH ULTRASOUND AND FLUORO Right IJ    Surgeon(s):  Darvin Cordero MD    Assistant:  * No surgical staff found *    Anesthesia: Monitor Anesthesia Care    Estimated Blood Loss (mL): Minimal    Complications: None    Specimens:   * No specimens in log *    Implants:  Implant Name Type Inv.  Item Serial No.  Lot No. LRB No. Used Action   PORT INFUS PLAS SGL LUMN W/ 9.6FR RAS CATH AIRGUARD VLV  PORT INFUS PLAS SGL LUMN W/ 9.6FR RAS CATH AIRGUARD VLV  [employer] [id number] Right 1 Implanted         Drains: * No LDAs found *    Findings: patent right IJ    Electronically signed by Darvin Cordero MD on 2/5/2021 at 8:57 AM

## 2021-02-05 NOTE — H&P
Ms. Nayely Barboza is an 80year old female who presents with a complaint of a diagnosis of pancreatic cancer. She is now in need of a port in order to undergo chemo therapy. The patient reports that she has never had a central line. She is on eliquis for history of DVT.  She is scheduled to start chemo on February 8.      Past Medical History        Past Medical History:   Diagnosis Date    Abdominal aortic aneurysm (AAA) ([facility] [address]      Anemia      Arthritis      Cancer ([facility] [address]       pancreatic    Chronic kidney disease      DVT of lower extremity (deep venous thrombosis) (HCC)       twice 2010 and 2017    Hyperlipidemia      Hypertension      Kidney stones      Osteoarthritis      Palliative care patient 12/02/2020    Thyroid disease      Thyroid disorder           Past Surgical History         Past Surgical History:   Procedure Laterality Date    COLONOSCOPY   2016     Dr Florence Carrillo problems    ENDOSCOPIC ULTRASOUND (LOWER) N/A 12/03/2020     Dr JONNY Grijalva/unsuccessful biliary cannulation despite attempts at 2-wire cannulation and proph pancreatic stenting with cannulation around pancreatic stenting-Positive for high-grade adenocarcinoma, pancreatic head    ERCP N/A 12/03/2020     Dr JONNY Grijalva/unsuccessful biliary cannulation despite attempts at 2-wire cannulation and proph pancreatic stenting with cannulation around pancreatic stenting-Positive for high-grade adenocarcinoma, pancreatic head    ERCP   12/07/2020     Dr Lynne Kelly/sphincterotomy, placement of a 10 x 60 partially covered biliary stent    TOE AMPUTATION Bilateral       pinky toes removed    TOTAL KNEE ARTHROPLASTY Right 01/03/2020     RIGHT TOTAL KNEE REPLACEMENT performed by Janusz Gallego MD at [facility] OR         Current Facility-Administered Medications          Current Outpatient Medications   Medication Sig Dispense Refill    ondansetron (ZOFRAN ODT) 4 MG disintegrating tablet Take 2 tablets by mouth every 8 hours as needed for Nausea or Vomiting 60 tablet 3    promethazine (PHENERGAN) 25 MG tablet Take 1 tablet by mouth every 6 hours as needed for Nausea 45 tablet 2    apixaban (ELIQUIS) 2.5 MG TABS tablet Take 2.5 mg by mouth 2 times daily        ferrous sulfate (FEROSUL) 325 (65 Fe) MG tablet TAKE 1 TABLET BY MOUTH EVERY DAY 90 tablet 3    lisinopril (PRINIVIL;ZESTRIL) 5 MG tablet Take 1 tablet by mouth 2 times daily 60 tablet 0    docusate sodium (COLACE) 100 MG capsule Take 1 capsule by mouth 2 times daily 60 capsule 1    calcitRIOL (ROCALTROL) 0.25 MCG capsule Take 0.25 mcg by mouth daily        allopurinol (ZYLOPRIM) 100 MG tablet Take 100 mg by mouth daily        levothyroxine (SYNTHROID) 112 MCG tablet Take 112 mcg by mouth Daily        simvastatin (ZOCOR) 20 MG tablet Take 20 mg by mouth nightly          No current facility-administered medications for this visit.          Allergies: Penicillins     Family History         Family History   Problem Relation Age of Onset    Colon Cancer Brother      Cancer Mother           Breast Cancer    Heart Attack Father      Colon Polyps Neg Hx      Esophageal Cancer Neg Hx      Liver Cancer Neg Hx      Liver Disease Neg Hx      Rectal Cancer Neg Hx      Stomach Cancer Neg Hx              Social History      Tobacco Use    Smoking status: Never Smoker    Smokeless tobacco: Never Used   Substance Use Topics    Alcohol use: No         Review of Systems   Constitutional: Positive for activity change. Negative for fever. HENT: Positive for hearing loss. Negative for tinnitus. Eyes: Negative for pain and redness. Respiratory: Negative for cough and shortness of breath. Cardiovascular: Negative for chest pain and palpitations. Gastrointestinal: Negative for abdominal distention and abdominal pain. Endocrine: Negative for polydipsia and polyphagia. Genitourinary: Negative for dysuria and hematuria. Musculoskeletal: Positive for gait problem.  Negative for

## 2021-02-05 NOTE — ANESTHESIA PRE PROCEDURE
Department of Anesthesiology  Preprocedure Note       Name:  Emi Garcia   Age:  80 y.o.  :  1934                                          MRN:  [id number]         Date:  2021      Surgeon: Kareem Peck):  Darwin Dance, MD    Procedure: Procedure(s):  SINGLE LUMEN PORT PLACEMENT WITH ULTRASOUND AND FLUORO    Medications prior to admission:   Prior to Admission medications    Medication Sig Start Date End Date Taking?  Authorizing Provider   ondansetron (ZOFRAN ODT) 4 MG disintegrating tablet Take 2 tablets by mouth every 8 hours as needed for Nausea or Vomiting 21  Yes ALBERTO Patel   promethazine (PHENERGAN) 25 MG tablet Take 1 tablet by mouth every 6 hours as needed for Nausea 21 Yes ALBERTO Patel   apixaban (ELIQUIS) 2.5 MG TABS tablet Take 2.5 mg by mouth 2 times daily   Yes Historical Provider, MD   ferrous sulfate (FEROSUL) 325 (65 Fe) MG tablet TAKE 1 TABLET BY MOUTH EVERY DAY  Patient taking differently: Take by mouth daily (with breakfast) TAKE 1 TABLET BY MOUTH EVERY DAY 21  Yes ALBERTO Patel   lisinopril (PRINIVIL;ZESTRIL) 5 MG tablet Take 1 tablet by mouth 2 times daily 20  Yes Citlaly Alex PA-C   docusate sodium (COLACE) 100 MG capsule Take 1 capsule by mouth 2 times daily 1/3/20  Yes Cornell Blount MD   calcitRIOL (ROCALTROL) 0.25 MCG capsule Take 0.25 mcg by mouth daily   Yes Historical Provider, MD   allopurinol (ZYLOPRIM) 100 MG tablet Take 100 mg by mouth daily   Yes Historical Provider, MD   levothyroxine (SYNTHROID) 112 MCG tablet Take 112 mcg by mouth Daily   Yes Historical Provider, MD   simvastatin (ZOCOR) 20 MG tablet Take 20 mg by mouth nightly   Yes Historical Provider, MD       Current medications:    Current Facility-Administered Medications   Medication Dose Route Frequency Provider Last Rate Last Admin    clindamycin (CLEOCIN) 600 mg in dextrose 5 % 50 mL IVPB  600 mg Intravenous Once Darwin Dance, MD           Allergies: Allergies   Allergen Reactions    Penicillins Rash       Problem List:    Patient Active Problem List   Diagnosis Code    Heme positive stool R19.5    Anemia of chronic kidney failure, stage 4 (severe) (HCC) N18.4, D63.1    Iron deficiency anemia secondary to inadequate dietary iron intake D50.8    H/O abnormal mammogram Z87.898    Primary osteoarthritis of right knee M17.11    Status post total right knee replacement Z96.651    Unilateral primary osteoarthritis, right knee M17.11    Other specified hypothyroidism E03.8    Encounter for BARBARA (erythropoietin stimulating agent) anemia management D64.9, Z79.899    Elevated INR R79.1    Hematuria R31.9    Acute blood loss anemia D62    Acute on chronic renal insufficiency N28.9, N18.9    Palliative care patient Z51.5    Pancreatic mass K86.89    Fatigue R53.83    Elevated liver enzymes R74.8    Biliary obstruction K83.1    Anemia D64.9    Malignant neoplasm of other parts of pancreas (HCC) C25.7       Past Medical History:        Diagnosis Date    Abdominal aortic aneurysm (AAA) (HCC)     Anemia     Arthritis     Cancer (HCC)     pancreatic    Chronic kidney disease     DVT of lower extremity (deep venous thrombosis) (HCC)     twice 2010 and 2017    Hyperlipidemia     Hypertension     Kidney stones     Osteoarthritis     Palliative care patient 12/02/2020    Thyroid disease     Thyroid disorder        Past Surgical History:        Procedure Laterality Date    COLONOSCOPY  2016    Dr Adkins Platt problems    ENDOSCOPIC ULTRASOUND (LOWER) N/A 12/03/2020    Dr JONNY Grijalva/unsuccessful biliary cannulation despite attempts at 2-wire cannulation and proph pancreatic stenting with cannulation around pancreatic stenting-Positive for high-grade adenocarcinoma, pancreatic head    ERCP N/A 12/03/2020    Dr JONNY Grijalva/unsuccessful biliary cannulation despite attempts at 2-wire cannulation and proph pancreatic stenting with cannulation around pancreatic stenting-Positive for high-grade adenocarcinoma, pancreatic head    ERCP  12/07/2020    Dr Tamara Hernandez-w/sphincterotomy, placement of a 10 x 60 partially covered biliary stent    TOE AMPUTATION Bilateral     pinky toes removed    TOTAL KNEE ARTHROPLASTY Right 01/03/2020    RIGHT TOTAL KNEE REPLACEMENT performed by Curt Macias MD at [facility] History:    Social History     Tobacco Use    Smoking status: Never Smoker    Smokeless tobacco: Never Used   Substance Use Topics    Alcohol use: No                                Counseling given: Not Answered      Vital Signs (Current):   Vitals:    02/01/21 0904   Weight: 180 lb (81.6 kg)   Height: 5' 2\" (1.575 m)                                              BP Readings from Last 3 Encounters:   01/26/21 138/80   01/20/21 (!) 140/84   12/22/20 124/74       NPO Status:                                                                                 BMI:   Wt Readings from Last 3 Encounters:   02/01/21 180 lb (81.6 kg)   01/26/21 178 lb 12.8 oz (81.1 kg)   01/20/21 180 lb 1.6 oz (81.7 kg)     Body mass index is 32.92 kg/m². CBC:   Lab Results   Component Value Date    WBC 7.67 01/20/2021    RBC 2.87 01/20/2021    HGB 10.0 01/20/2021    HCT 31.5 01/20/2021    .8 01/20/2021    RDW 14.5 01/20/2021     01/20/2021       CMP:   Lab Results   Component Value Date     01/20/2021    K 4.2 01/20/2021    K 4.0 12/05/2020     01/20/2021    CO2 22 01/20/2021    BUN 27 01/20/2021    CREATININE 1.60 01/20/2021    GFRAA 33 01/20/2021    AGRATIO 1.9 01/20/2021    LABGLOM 29 01/20/2021    GLUCOSE 87 01/20/2021    PROT 6.4 01/20/2021    PROT 7.2 10/25/2012    CALCIUM 10.2 01/20/2021    BILITOT 0.5 01/20/2021    ALKPHOS 137 01/20/2021    AST 15 01/20/2021    ALT 12 01/20/2021       POC Tests: No results for input(s): POCGLU, POCNA, POCK, POCCL, POCBUN, POCHEMO, POCHCT in the last 72 hours.     Coags:   Lab Results   Component Value Date    PROTIME 13.4 12/05/2020    INR 1.03 12/05/2020    APTT 26.3 12/02/2020       HCG (If Applicable): No results found for: PREGTESTUR, PREGSERUM, HCG, HCGQUANT     ABGs: No results found for: PHART, PO2ART, ELY3BXI, SCI8YXC, BEART, Q2EZNKUW     Type & Screen (If Applicable):  No results found for: LABABO, LABRH    Drug/Infectious Status (If Applicable):  No results found for: HIV, HEPCAB    COVID-19 Screening (If Applicable):   Lab Results   Component Value Date    COVID19 NOT DETECTED 02/01/2021         Anesthesia Evaluation  Patient summary reviewed and Nursing notes reviewed no history of anesthetic complications:   Airway: Mallampati: II  TM distance: >3 FB   Neck ROM: full  Mouth opening: > = 3 FB Dental: normal exam         Pulmonary:Negative Pulmonary ROS and normal exam  breath sounds clear to auscultation      (-) shortness of breath and not a current smoker          Patient did not smoke on day of surgery. Cardiovascular:    (+) hypertension:,     (-) CAD,  angina and  CHF    NYHA Classification: I  ECG reviewed  Rhythm: regular  Rate: normal           Beta Blocker:  Not on Beta Blocker         Neuro/Psych:   Negative Neuro/Psych ROS     (-) seizures, CVA and depression/anxiety            GI/Hepatic/Renal: Neg GI/Hepatic/Renal ROS  (+) renal disease: kidney stones and CRI,      (-) hiatal hernia and GERD       Endo/Other: Negative Endo/Other ROS   (+) hypothyroidism, blood dyscrasia: anemia:., malignancy/cancer (pancreatic ca ). Pt had PAT visit. Abdominal:       Abdomen: soft. Vascular:                                        Anesthesia Plan      MAC     ASA 3     (Iv zofran within 30 min of closing )  Induction: intravenous. BIS  MIPS: Postoperative opioids intended and Prophylactic antiemetics administered. Anesthetic plan and risks discussed with patient. Use of blood products discussed with patient whom. Plan discussed with CRNA.     Attending anesthesiologist reviewed and agrees with Pre Eval content              Justine Jernigan MD   2/5/2021

## 2021-02-06 NOTE — OP NOTE
Operative Report    PATIENT NAME: [facility] Brenda Flores RECORD NO. [id number]  SURGEON: Rosalinda Lama MD    Primary Care Physician: Max Herman MD  Date: 2/5/2021        PROCEDURE PERFORMED: Right IJ single lumen PAC with US and fluoro  PREOPERATIVE DIAGNOSIS: pancreatic cancer  POSTOPERATIVE DIAGNOSIS: Same  SURGEON:  Herrera Ji MD   ANESTHESIA:  Monitored Local Anesthesia with Sedation and local  ESTIMATED BLOOD LOSS:  minimal  SPECIMEN:  none  COMPLICATIONS:  None; patient tolerated the procedure well. FINDINGS: patent right IJ  DISPOSITION: PACU - hemodynamically stable. CONDITION: stable      Indications: The patient presented with a recent diagnosis of pancreatic cancer, now in need of port placement. Procedure Details     After the risks and benefits of the procedure were explained, informed consent was obtained, and the patient was taken to the operating room. The patient was placed in supine position and sedation was begun. The neck and chest were prepped and draped in normal sterile fashion. Preoperative antibiotics had been given. A time out was performed. The patient was placed in head down position. An ultrasound was used to visualize the right IJ. Local anesthetic was injected and an 18 gauge cook needle was used to cannulate the vein. Dark red, non-pulsatile blood was returned. The guide wire was inserted through the needle and advanced without difficulty. Fluoro was used to insure correct placement of the guidewire. A location was then chosen on the chest for creating a pocket. Local anesthetic was injected at this location, as well as along the path for tunneling the catheter. A 3 cm incision was made, and cautery was used to dissect down to the chest wall. A pocket was created inferior to this. A small incision was then made in the skin of the neck at the level of the guidewire. The catheter and port were assembled and flushed.  The catheter was then tunneled from the chest incision out the small neck incision. The port was secured to the chest wall using PDS. Fluoro was used to guide trimming the catheter to the appropriate length. The sheath dilator was then placed over the guidewire and advanced. The dilator and guidewire were removed. The catheter was inserted in the sheath, the sheath was broken in half, and the catheter was advanced. Fluoro was used to insure appropriate positioning of the catheter tip. There were no signs of pneumothorax. The port was then aspirated and flushed with heparin solution. The small neck incision was closed with 4-0 monocryl subcuticular stitch. The chest incision was re approximated with 3-0 vicryl and then the skin was closed using 4-0 monocryl subcuticular stitches. Sterile dressings with dermabond were applied. The patient tolerated the procedure well, was removed from anesthesia, and taken to the recovery room in stable condition.                 Cj Domingo MD    Electronically signed 02/06/21 at 2:58 PM

## 2021-02-08 ENCOUNTER — OFFICE VISIT (OUTPATIENT)
Dept: HEMATOLOGY | Age: 86
End: 2021-02-08
Payer: MEDICARE

## 2021-02-08 ENCOUNTER — HOSPITAL ENCOUNTER (OUTPATIENT)
Dept: INFUSION THERAPY | Age: 86
Discharge: HOME | End: 2021-02-08
Payer: MEDICARE

## 2021-02-08 VITALS
HEIGHT: 62 IN | HEART RATE: 91 BPM | WEIGHT: 184 LBS | TEMPERATURE: 97.5 F | BODY MASS INDEX: 33.86 KG/M2 | SYSTOLIC BLOOD PRESSURE: 150 MMHG | DIASTOLIC BLOOD PRESSURE: 82 MMHG

## 2021-02-08 DIAGNOSIS — N18.4 ANEMIA OF CHRONIC KIDNEY FAILURE, STAGE 4 (SEVERE) (HCC): ICD-10-CM

## 2021-02-08 DIAGNOSIS — R53.1 WEAKNESS GENERALIZED: ICD-10-CM

## 2021-02-08 DIAGNOSIS — D63.1 ANEMIA OF CHRONIC KIDNEY FAILURE, STAGE 4 (SEVERE) (HCC): ICD-10-CM

## 2021-02-08 DIAGNOSIS — D50.8 IRON DEFICIENCY ANEMIA SECONDARY TO INADEQUATE DIETARY IRON INTAKE: ICD-10-CM

## 2021-02-08 DIAGNOSIS — C25.9 PANCREATIC CARCINOMA (HCC): Primary | ICD-10-CM

## 2021-02-08 DIAGNOSIS — Z71.89 CARE PLAN DISCUSSED WITH PATIENT: ICD-10-CM

## 2021-02-08 DIAGNOSIS — E03.8 OTHER SPECIFIED HYPOTHYROIDISM: ICD-10-CM

## 2021-02-08 DIAGNOSIS — K83.8 DILATION OF BILIARY TRACT: ICD-10-CM

## 2021-02-08 DIAGNOSIS — C25.7 MALIGNANT NEOPLASM OF OTHER PARTS OF PANCREAS (HCC): ICD-10-CM

## 2021-02-08 DIAGNOSIS — D64.9 ANEMIA, UNSPECIFIED TYPE: ICD-10-CM

## 2021-02-08 LAB
BASOPHILS ABSOLUTE: 0.07 K/UL (ref 0.01–0.08)
BASOPHILS RELATIVE PERCENT: 1 % (ref 0.1–1.2)
EOSINOPHILS ABSOLUTE: 0.36 K/UL (ref 0.04–0.54)
EOSINOPHILS RELATIVE PERCENT: 4.9 % (ref 0.7–7)
HCT VFR BLD CALC: 30.5 % (ref 34.1–44.9)
HEMOGLOBIN: 10 G/DL (ref 11.2–15.7)
LYMPHOCYTES ABSOLUTE: 1.07 K/UL (ref 1.18–3.74)
LYMPHOCYTES RELATIVE PERCENT: 14.6 % (ref 19.3–53.1)
MCH RBC QN AUTO: 34.4 PG (ref 25.6–32.2)
MCHC RBC AUTO-ENTMCNC: 32.8 G/DL (ref 32.3–35.5)
MCV RBC AUTO: 104.8 FL (ref 79.4–94.8)
MONOCYTES ABSOLUTE: 0.75 K/UL (ref 0.24–0.82)
MONOCYTES RELATIVE PERCENT: 10.3 % (ref 4.7–12.5)
NEUTROPHILS ABSOLUTE: 5.06 K/UL (ref 1.56–6.13)
NEUTROPHILS RELATIVE PERCENT: 69.2 % (ref 34–71.1)
PDW BLD-RTO: 13.9 % (ref 11.7–14.4)
PLATELET # BLD: 292 K/UL (ref 182–369)
PMV BLD AUTO: 10.9 FL (ref 7.4–10.4)
RBC # BLD: 2.91 M/UL (ref 3.93–5.22)
WBC # BLD: 7.31 K/UL (ref 3.98–10.04)

## 2021-02-08 PROCEDURE — 99214 OFFICE O/P EST MOD 30 MIN: CPT | Performed by: NURSE PRACTITIONER

## 2021-02-08 PROCEDURE — 85025 COMPLETE CBC W/AUTO DIFF WBC: CPT

## 2021-02-08 PROCEDURE — G8417 CALC BMI ABV UP PARAM F/U: HCPCS | Performed by: NURSE PRACTITIONER

## 2021-02-08 PROCEDURE — 4040F PNEUMOC VAC/ADMIN/RCVD: CPT | Performed by: NURSE PRACTITIONER

## 2021-02-08 PROCEDURE — 96367 TX/PROPH/DG ADDL SEQ IV INF: CPT

## 2021-02-08 PROCEDURE — 96417 CHEMO IV INFUS EACH ADDL SEQ: CPT

## 2021-02-08 PROCEDURE — 96372 THER/PROPH/DIAG INJ SC/IM: CPT

## 2021-02-08 PROCEDURE — 96413 CHEMO IV INFUSION 1 HR: CPT

## 2021-02-08 PROCEDURE — 1090F PRES/ABSN URINE INCON ASSESS: CPT | Performed by: NURSE PRACTITIONER

## 2021-02-08 PROCEDURE — G8484 FLU IMMUNIZE NO ADMIN: HCPCS | Performed by: NURSE PRACTITIONER

## 2021-02-08 PROCEDURE — 6360000002 HC RX W HCPCS: Performed by: NURSE PRACTITIONER

## 2021-02-08 PROCEDURE — 1123F ACP DISCUSS/DSCN MKR DOCD: CPT | Performed by: NURSE PRACTITIONER

## 2021-02-08 PROCEDURE — 1036F TOBACCO NON-USER: CPT | Performed by: NURSE PRACTITIONER

## 2021-02-08 PROCEDURE — G8427 DOCREV CUR MEDS BY ELIG CLIN: HCPCS | Performed by: NURSE PRACTITIONER

## 2021-02-08 PROCEDURE — 2580000003 HC RX 258: Performed by: NURSE PRACTITIONER

## 2021-02-08 RX ORDER — EPINEPHRINE 1 MG/ML
0.3 INJECTION, SOLUTION, CONCENTRATE INTRAVENOUS PRN
Status: CANCELLED | OUTPATIENT
Start: 2021-02-23

## 2021-02-08 RX ORDER — METHYLPREDNISOLONE SODIUM SUCCINATE 125 MG/2ML
125 INJECTION, POWDER, LYOPHILIZED, FOR SOLUTION INTRAMUSCULAR; INTRAVENOUS ONCE
Status: CANCELLED | OUTPATIENT
Start: 2021-02-23 | End: 2021-02-22

## 2021-02-08 RX ORDER — DIPHENHYDRAMINE HYDROCHLORIDE 50 MG/ML
50 INJECTION INTRAMUSCULAR; INTRAVENOUS ONCE
Status: CANCELLED | OUTPATIENT
Start: 2021-02-08 | End: 2021-02-08

## 2021-02-08 RX ORDER — SODIUM CHLORIDE 0.9 % (FLUSH) 0.9 %
5 SYRINGE (ML) INJECTION PRN
Status: CANCELLED | OUTPATIENT
Start: 2021-02-08

## 2021-02-08 RX ORDER — SODIUM CHLORIDE 9 MG/ML
INJECTION, SOLUTION INTRAVENOUS CONTINUOUS
Status: CANCELLED | OUTPATIENT
Start: 2021-02-08

## 2021-02-08 RX ORDER — SODIUM CHLORIDE 0.9 % (FLUSH) 0.9 %
10 SYRINGE (ML) INJECTION PRN
Status: CANCELLED | OUTPATIENT
Start: 2021-02-08

## 2021-02-08 RX ORDER — PACLITAXEL 100 MG/20ML
90 INJECTION, POWDER, LYOPHILIZED, FOR SUSPENSION INTRAVENOUS ONCE
Status: CANCELLED | OUTPATIENT
Start: 2021-02-23

## 2021-02-08 RX ORDER — SODIUM CHLORIDE 0.9 % (FLUSH) 0.9 %
10 SYRINGE (ML) INJECTION PRN
Status: DISCONTINUED | OUTPATIENT
Start: 2021-02-08 | End: 2021-02-09 | Stop reason: HOSPADM

## 2021-02-08 RX ORDER — SODIUM CHLORIDE 0.9 % (FLUSH) 0.9 %
10 SYRINGE (ML) INJECTION PRN
Status: CANCELLED | OUTPATIENT
Start: 2021-02-23

## 2021-02-08 RX ORDER — SODIUM CHLORIDE 9 MG/ML
INJECTION, SOLUTION INTRAVENOUS CONTINUOUS
Status: CANCELLED | OUTPATIENT
Start: 2021-02-23

## 2021-02-08 RX ORDER — HEPARIN SODIUM (PORCINE) LOCK FLUSH IV SOLN 100 UNIT/ML 100 UNIT/ML
500 SOLUTION INTRAVENOUS PRN
Status: DISCONTINUED | OUTPATIENT
Start: 2021-02-08 | End: 2021-02-09 | Stop reason: HOSPADM

## 2021-02-08 RX ORDER — MEPERIDINE HYDROCHLORIDE 50 MG/ML
12.5 INJECTION INTRAMUSCULAR; INTRAVENOUS; SUBCUTANEOUS ONCE
Status: CANCELLED | OUTPATIENT
Start: 2021-02-08 | End: 2021-02-08

## 2021-02-08 RX ORDER — MEPERIDINE HYDROCHLORIDE 50 MG/ML
12.5 INJECTION INTRAMUSCULAR; INTRAVENOUS; SUBCUTANEOUS ONCE
Status: CANCELLED | OUTPATIENT
Start: 2021-02-23 | End: 2021-02-22

## 2021-02-08 RX ORDER — SODIUM CHLORIDE 9 MG/ML
20 INJECTION, SOLUTION INTRAVENOUS ONCE
Status: CANCELLED | OUTPATIENT
Start: 2021-02-08 | End: 2021-02-08

## 2021-02-08 RX ORDER — EPINEPHRINE 1 MG/ML
0.3 INJECTION, SOLUTION, CONCENTRATE INTRAVENOUS PRN
Status: CANCELLED | OUTPATIENT
Start: 2021-02-08

## 2021-02-08 RX ORDER — SODIUM CHLORIDE 9 MG/ML
20 INJECTION, SOLUTION INTRAVENOUS ONCE
Status: CANCELLED | OUTPATIENT
Start: 2021-02-23 | End: 2021-02-22

## 2021-02-08 RX ORDER — SODIUM CHLORIDE 0.9 % (FLUSH) 0.9 %
5 SYRINGE (ML) INJECTION PRN
Status: CANCELLED | OUTPATIENT
Start: 2021-02-23

## 2021-02-08 RX ORDER — HEPARIN SODIUM (PORCINE) LOCK FLUSH IV SOLN 100 UNIT/ML 100 UNIT/ML
500 SOLUTION INTRAVENOUS PRN
Status: CANCELLED | OUTPATIENT
Start: 2021-02-08

## 2021-02-08 RX ORDER — METHYLPREDNISOLONE SODIUM SUCCINATE 125 MG/2ML
125 INJECTION, POWDER, LYOPHILIZED, FOR SOLUTION INTRAMUSCULAR; INTRAVENOUS ONCE
Status: CANCELLED | OUTPATIENT
Start: 2021-02-08 | End: 2021-02-08

## 2021-02-08 RX ORDER — DIPHENHYDRAMINE HYDROCHLORIDE 50 MG/ML
50 INJECTION INTRAMUSCULAR; INTRAVENOUS ONCE
Status: CANCELLED | OUTPATIENT
Start: 2021-02-23 | End: 2021-02-22

## 2021-02-08 RX ORDER — PACLITAXEL 100 MG/20ML
90 INJECTION, POWDER, LYOPHILIZED, FOR SUSPENSION INTRAVENOUS ONCE
Status: CANCELLED | OUTPATIENT
Start: 2021-02-08

## 2021-02-08 RX ORDER — HEPARIN SODIUM (PORCINE) LOCK FLUSH IV SOLN 100 UNIT/ML 100 UNIT/ML
500 SOLUTION INTRAVENOUS PRN
Status: CANCELLED | OUTPATIENT
Start: 2021-02-23

## 2021-02-08 RX ORDER — PACLITAXEL 100 MG/20ML
90 INJECTION, POWDER, LYOPHILIZED, FOR SUSPENSION INTRAVENOUS ONCE
Status: COMPLETED | OUTPATIENT
Start: 2021-02-08 | End: 2021-02-08

## 2021-02-08 RX ADMIN — GEMCITABINE 1400 MG: 38 INJECTION, SOLUTION INTRAVENOUS at 16:24

## 2021-02-08 RX ADMIN — HEPARIN 500 UNITS: 100 SYRINGE at 16:59

## 2021-02-08 RX ADMIN — EPOETIN ALFA-EPBX 40000 UNITS: 40000 INJECTION, SOLUTION INTRAVENOUS; SUBCUTANEOUS at 17:00

## 2021-02-08 RX ADMIN — PACLITAXEL 170 MG: 100 INJECTION, POWDER, LYOPHILIZED, FOR SUSPENSION INTRAVENOUS at 15:21

## 2021-02-08 RX ADMIN — DEXAMETHASONE SODIUM PHOSPHATE: 10 INJECTION, SOLUTION INTRAMUSCULAR; INTRAVENOUS at 14:46

## 2021-02-08 RX ADMIN — SODIUM CHLORIDE, PRESERVATIVE FREE 10 ML: 5 INJECTION INTRAVENOUS at 16:59

## 2021-02-08 ASSESSMENT — ENCOUNTER SYMPTOMS
EYE DISCHARGE: 0
SHORTNESS OF BREATH: 0
WHEEZING: 0
RESPIRATORY NEGATIVE: 1
DIARRHEA: 0
COUGH: 0
VOMITING: 0
EYE REDNESS: 0
BACK PAIN: 0
CONSTIPATION: 0
ABDOMINAL PAIN: 0
EYE PAIN: 0
GASTROINTESTINAL NEGATIVE: 1
BLOOD IN STOOL: 0
EYES NEGATIVE: 1
SORE THROAT: 0
NAUSEA: 0

## 2021-02-08 NOTE — PROGRESS NOTES
Progress Note      Pt Name: Juan Israel  YOB: 1934  MRN: [id number]    Date of evaluation: 1/20/2021   History Obtained From:  patient, electronic medical record    CHIEF COMPLAINT:    Chief Complaint   Patient presents with    Pancreatic Cancer    Treatment    Anemia    Fatigue     HISTORY OF PRESENT ILLNESS:    Juan Israel is a 80 y.o.  female with significant PMH of iron deficiency anemia, chronic kidney disease stage IV, on anticoagulation with Eliquis for history of DVT and new diagnosis of pancreatic adenocarcinoma (12/3/2020). Initiation of palliative treatment was deferred due to deconditioning/poor performance status. She was last seen on 1/20/2021 and was progressing well with physical therapy and having significant improvement in overall performance status, she has completed 6 weeks of physical therapy. Palliative chemotherapy with Gemzar 750 mg/m² and Abraxane 90 mg/m² every 2 weeks (this is a 25% dose reduction) was recommended and Earnest Keith returns today to initiate treatment. Today, she presents with overall significant improvement and is eager to move forward with treatment. She continues to have weight increase with an additional 4 pounds and reports her appetite is good. Earnest Keith was seen in consultation by Dr. Ladarius Evans on 1/26/2021 and a Port-A-Cath was placed on 2/5/2021 to the right chest wall. Incision site appears to be healing well, mild ecchymosis is noted. Again today I discussed potential side effects to include but not limited to nausea, vomiting, increased fatigue, increased risk for infection, bone marrow suppression and loss of appetite. In relation to her anemia of chronic kidney disease, she has been receiving BARBARA therapy with Retacrit intermittently for hemoglobin <11, last dose of 40,000 units given on 1/20/2021 for hemoglobin of 10.0.  She also continues to take ferrous sulfate 325 mg 1 tablet daily without significant difficulty. CBC is appropriate with forward with chemotherapy, has a hemoglobin of 10.0 today. ONCOLOGIC/HEMATOLOGIC HISTORY:   Diagnosis:   Anemia of chronic kidney disease   Chronic kidney disease stage lll  Pancreatic adenocarcinoma  Chronic anticoagulation due to prior DVTs    Treatment summary:  Ferrous sulfate 325 mg daily   11/26/2018 - Procrit 20,000 units for chronic kidney disease stage IV. Procrit to be given every 2 weeks ×4 and then monthly, dose to be titrated appropriately. Hold dose for hemoglobin >11   Currently receiving Retacrit 40,000 units every 4 weeks  2/8/2021 palliative chemotherapy on 2/8/2021 with Gemzar 750 mg/m² and Abraxane 90 mg/m² every 2 weeks, this is a dose reduction by 25%      Tumor monitoring:  · 12/2/2020-CA 19-9 of 133  · 12/23/2020-CA 19-9 of 217  · 1/20/2021-CA 19-9 of 414 [place] seen by Dr. Molli Barthel on 12/2/2020 as an inpatient consultation at [facility]) of [facility] for findings of a pancreatic mass and bile duct obstruction. She presented to the ER department with complaints of hematuria that began a few days prior to presentation. She denied any dysuria, back pain, no nausea or vomiting.  She also reported easily bruising and ecchymotic areas in her back, abdomen and flank.  Initial laboratory work-up showed INR above 18.  She was given vitamin K subcutaneously.  She was found to have elevated LFTs and the following are events that occurred. · 12/01/2020CT abdomen pelvis without contrast showed a pancreatic head mass measuring 3.3 x 3.7 cm in size and associated, bile duct dilatation above the level of the ampulla.  Associated moderate pancreatic ductal dilatation. · 12/2/2020-CA 19-9 133  · 12/03/2020ERCP with FNA biopsy Positive for high-grade adenocarcinoma.  Unfortunately, stent could not be placed.    · 12/5/2020-transferred from [facility]) to [address] [place] [address] and discharged home on 12/8/2020  · 12/07/2020- Cholangipancreatography Retrograde Endo ERCP with sphincterotomy, balloon sweep and placement of 10x60 PC BS metal stent at [facility] in [place]. · 12/21/2020- CT chest with contrast documented no evidence of intrathoracic neoplastic process/metastatic disease. Evidence of pneumobilia. The ectasia of the pancreatic duct, similar to the previous study. An incompletely visualized and evaluated heterogeneous mass in the head of the pancreas measuring 3.3 x 3.7cm. A biliary stent in place. Moderate persistent dilatation of the proximal common bile duct. A stable small low-density nodule in the left adrenal gland  · 12/22/2020Dr. Lambert discussed the results of CT chest and pathology that suggest advanced pancreatic cancer, unfortunately it is not amenable to surgery. She was quite weak and had poor performance, appeared very frail. Discussion was made about palliative chemotherapy but the decision to hold was made until improvement of her physical conditioning occurred. Recommended physical therapy as outpatient and reassess fitness for palliative chemotherapy in approximately 4 weeks. 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 · 2/8/2021-initiated palliative chemotherapy with Gemzar 750 mg/m² and Abraxane 90 mg/m² every 2 weeks, this is a dose reduction by 25%    HEMATOLOGY HISTORY:  Jennifer Pena was seen in initial hematology consultation on 7/20/2018 for further evaluation and treatment recommendations for anemia. Jennifer Pena was referred from Dr. Yani Anton. Jennifer Pena presented with no significant complaints other than chronic fatigue and constipation. She had been taking ferrous sulfate 325 mg daily under the direction of Dr. Martell Elaine. Jennifer Pena reported significant constipation and some GI upset with the oral iron. She denied any bleeding tendencies to include hematuria, melena, hematochezia and epistaxis.    Jennifer Pena had a colonoscopy on 4/26/2016 by Dr. Bozena Pride for further evaluation of iron deficiency. The only abnormal finding was diverticulosis. Elaine Rutledge is routinely followed by Dr. Zoe Whitlock for her chronic kidney disease stage IV. CMP on 6/11/2018 documented a creatinine of 2.2 and GFR 21. CBC review from 2/2/2018- 6/11/2018 documented hemoglobin ranging from 9.4- 10.1, RBC 2.91- 3.22, MCV 94- 100 with a normal WBC and platelet count  CBC at initial consultation on 7/20/2018 documented a WBC of 7.33, ANC 4.74, RBC 3.04, hemoglobin 10.3, .6 and a platelet count of 027,277. Serology studies on 7/20/2018:  Creatinine 1.98   GFR 23   Calcium 10.3   IgG 700, IgA 180, and IgM 52   M spike not observed   Immunofixation: No monoclonality detected. Iron 74   Iron saturation 27%   TIBC 278   Vitamin B12 437   Folate 15.7   Ferritin 299   Reticulocyte count 1.5%   Erythropoietin 11.6      Beta-2 microglobulin 6.2     Occult stool positive 1 of 3 samples on 7/25/2018. Colonoscopy on 10/4/2018 by Dr. Bozena Pride was documented as removal of 2 polyps with benign pathology. No evidence of bleeding. Serology studies on 10/15/2018:  Iron 76   TIBC 287   Iron saturation 26%   Ferritin 321   Hemoglobin 10.1   Creatinine 2.47   GFR 17    11/26/2018 - Initiated Procrit 20,000 units for chronic kidney disease stage IV, to be given every 2 weeks ×4 and then monthly, dose to be titrated appropriately. Hemoglobin 9.6. All has anemia of chronic disease to include chronic kidney disease stage IV. She will began receiving growth factor support and will need to maintain a ferritin level greater than 200 and an iron saturation of 25% for the Procrit to be beneficial. Hold Procrit dose for hemoglobin >11. Indications/rationale for Procrit was discussed with Elaine Rutledge. Risks, benefits and expectations were outlined.  Risks including, but not limited to hypertension, stroke, MI, death were discussed and acknowledged by [place].     Serology studies on 3/8/2019:  Creatinine 1.7/GFR 30.4   Iron 80   Iron saturation 26.5%   TIBC 302   Ferritin 144   Vitamin B12 501   Folate 13.9    Serology studies on 3/5/2020:  · Creatinine 1.3/GFR 41.4  · Iron 84  · TIBC 390  · Iron saturation 21.5%  · Ferritin 342    Iron substrates on 6/25/2020  · Ferritin 311  · Iron 76  · Iron saturation 23%  · TIBC 326  · Creatinine 1.3/GFR 39    Labs on 12/2/2020 and 12/3/2020:  · Iron 35  · TIBC 213  · Iron saturation 16%  · Vitamin B12 483  · Folate 10.6  · Ferritin 480    Past Medical History:    Past Medical History:   Diagnosis Date    Abdominal aortic aneurysm (AAA) ([facility]     Anemia     Arthritis     Cancer ([facility] 12/01/2020    pancreatic    Chronic kidney disease     DVT of lower extremity (deep venous thrombosis) (HCC)     twice 2010 and 2017    Hyperlipidemia     Hypertension     Kidney stones     Osteoarthritis     Palliative care patient 12/02/2020    Thyroid disease     Thyroid disorder        Past Surgical History:    Past Surgical History:   Procedure Laterality Date    COLONOSCOPY  2016    Dr Rachael Sheffield problems    ENDOSCOPIC ULTRASOUND (LOWER) N/A 12/03/2020    Dr JONNY Grijalva/unsuccessful biliary cannulation despite attempts at 2-wire cannulation and proph pancreatic stenting with cannulation around pancreatic stenting-Positive for high-grade adenocarcinoma, pancreatic head    ERCP N/A 12/03/2020    Dr JONNY Grijalva/unsuccessful biliary cannulation despite attempts at 2-wire cannulation and proph pancreatic stenting with cannulation around pancreatic stenting-Positive for high-grade adenocarcinoma, pancreatic head    ERCP  12/07/2020    Dr Emily Kelly/sphincterotomy, placement of a 10 x 60 partially covered biliary stent    PORT SURGERY Right 2/5/2021    SINGLE LUMEN PORT PLACEMENT WITH ULTRASOUND AND FLUORO performed by Alexia Angel MD at [facility] TOE AMPUTATION Bilateral     pinky toes removed    TOTAL KNEE ARTHROPLASTY Right 01/03/2020    RIGHT TOTAL KNEE REPLACEMENT performed by Jaqui Walker MD at [address] [facility] OR       Current Medications:    Current Outpatient Medications   Medication Sig Dispense Refill    lidocaine-prilocaine (EMLA) 2.5-2.5 % cream Apply to port area and cover with plastic wrap one hour prior to treatment 1 Tube 1    ondansetron (ZOFRAN ODT) 4 MG disintegrating tablet Take 2 tablets by mouth every 8 hours as needed for Nausea or Vomiting 60 tablet 3    promethazine (PHENERGAN) 25 MG tablet Take 1 tablet by mouth every 6 hours as needed for Nausea 45 tablet 2    apixaban (ELIQUIS) 2.5 MG TABS tablet Take 2.5 mg by mouth 2 times daily      ferrous sulfate (FEROSUL) 325 (65 Fe) MG tablet TAKE 1 TABLET BY MOUTH EVERY DAY (Patient taking differently: Take 325 mg by mouth daily (with breakfast) TAKE 1 TABLET BY MOUTH EVERY DAY) 90 tablet 3    lisinopril (PRINIVIL;ZESTRIL) 5 MG tablet Take 1 tablet by mouth 2 times daily 60 tablet 0    docusate sodium (COLACE) 100 MG capsule Take 1 capsule by mouth 2 times daily 60 capsule 1    calcitRIOL (ROCALTROL) 0.25 MCG capsule Take 0.25 mcg by mouth daily      allopurinol (ZYLOPRIM) 100 MG tablet Take 100 mg by mouth daily      levothyroxine (SYNTHROID) 112 MCG tablet Take 112 mcg by mouth Daily      simvastatin (ZOCOR) 20 MG tablet Take 20 mg by mouth nightly       No current facility-administered medications for this visit. Allergies:    Allergies   Allergen Reactions    Penicillins Rash     Childhood        Social History:    Social History     Tobacco Use    Smoking status: Never Smoker    Smokeless tobacco: Never Used   Substance Use Topics    Alcohol use: No    Drug use: No       Family History:   Family History   Problem Relation Age of Onset    Colon Cancer Brother     Cancer Mother         Breast Cancer    Heart Attack Father     Colon Polyps Neg Hx     Esophageal Cancer Neg Hx     Liver Cancer Neg Hx     Liver Disease Neg Hx     Rectal Cancer Neg Hx     Stomach Cancer Neg Hx        Vitals:  Vitals:    02/08/21 1355   BP: (!) 150/82   Pulse: 91   Temp: 97.5 °F (36.4 °C)   Weight: 184 lb (83.5 kg)   Height: 5' 2\" (1.575 m)        Subjective   REVIEW OF SYSTEMS:   Review of Systems   Constitutional: Positive for fatigue (Improved). Negative for chills, diaphoresis and fever. HENT: Negative. Negative for congestion, ear pain, hearing loss, nosebleeds, sore throat and tinnitus. Eyes: Negative. Negative for pain, discharge and redness. Respiratory: Negative. Negative for cough, shortness of breath and wheezing. Cardiovascular: Negative. Negative for chest pain, palpitations and leg swelling. Gastrointestinal: Negative. Negative for abdominal pain, blood in stool, constipation, diarrhea, nausea and vomiting. Endocrine: Negative for polydipsia. Genitourinary: Negative for dysuria, flank pain, frequency, hematuria and urgency. Musculoskeletal: Negative. Negative for back pain, myalgias and neck pain. Skin: Negative. Negative for rash. Neurological: Positive for weakness (Generalized/improved). Negative for dizziness, tremors, seizures and headaches. Hematological: Does not bruise/bleed easily. Psychiatric/Behavioral: Negative. The patient is not nervous/anxious. Objective   PHYSICAL EXAM:  Physical Exam  Vitals signs reviewed. Constitutional:       General: She is not in acute distress. Appearance: She is well-developed. She is not diaphoretic. HENT:      Head: Normocephalic and atraumatic. Mouth/Throat:      Pharynx: Uvula midline. Tonsils: No tonsillar exudate. Eyes:      General: Lids are normal. No scleral icterus. Right eye: No discharge. Left eye: No discharge. Conjunctiva/sclera: Conjunctivae normal.      Pupils: Pupils are equal, round, and reactive to light. Neck:      Musculoskeletal: Normal range of motion and neck supple.       Thyroid: No thyroid mass or 01/20/2021    GLOB 2.2 01/20/2021         ASSESSMENT/PLAN:      1. Locally advanced pancreatic adenocarcinoma, initiating palliative chemotherapy today with Gemzar 750 mg/m² and Abraxane 90 mg/m² every 2 weeks (a dose reduction by 25%). Pretreatment CA 19 9 of 236 on 1/20/2021    Daniela Rowe was seen in consultation by Dr. Ladarius Rutherford on 1/26/2021 and a Port-A-Cath was placed on 2/5/2021 to the right chest wall. Incision site appears to be healing well, mild ecchymosis is noted. Again today I discussed potential side effects to include but not limited to nausea, vomiting, increased fatigue, increased risk for infection, bone marrow suppression and loss of appetite.    -Has prescriptions for Zofran and Phenergan for nausea as needed  -Encouraged having Imodium on hand for diarrhea as needed  -CBC and CMP today    2. Extra hepatic/intrahepatic biliary dilation, status post ERCP with metal stent placement on 12/7/2020 at [facility]. Liver enzymes on 1/20/2021 indicated significantly improvement and almost completely normalized with an alkaline phosphatase of 137, total bilirubin 0.5, ALT 12 and AST 15.    -CMP today  -Follow-up with Dr. Teresia Osgood as scheduled on 2/16/2021      3. Anemia of chronic kidney failure, stage 3B. Receives BARBARA therapy with Retacrit intermittently for hemoglobin <11, last dose of 40,000 units given on 1/20/2021. She also continues to take ferrous sulfate 325 mg 1 tablet daily without significant difficulty. Labs on 12/2/2020 and 12/3/2020:  · Iron 35  · TIBC 213  · Iron saturation 16%  · Vitamin B12 483  · Folate 10.6  · Ferritin 480    -Continue ferrous sulfate  -Next dose of Retacrit 40,000 units to be considered on 10/23/2021 at follow-up appointment    4. Hypothyroidism presently being managed by Dr. Rosendo Spicer and is taking Synthroid 112 µg daily. Recent TSH 3.55  -Follow-up with Dr. Rosendo Spicer for monitoring of TSH and Synthroid dosing    5.   Routine health screening history of abnormal mammogram:  Annual routine follow-up screening mammogram on 9/20/2019 documented no mammographic evidence of malignancy, BI-RADS Category 2 benign findings.  -Annual screening mammogram is overdue, will address in the near future once treatment for her pancreatic adenocarcinoma has been initiated    I discussed all of the above findings included in the assessment and plan with the patient and the patient is in agreement to move forward with current recommendations/treatment. I have addressed all of their questions and concerns that were verbalized. FOLLOW UP:  1. Plan to return to clinic on 2/23/2021 for cycle #2  2. Follow-up appointment given for 4 weeks  3. Continue to follow with other medical providers as recommended    Discussed precautions related to [address] and being at increased risk. Discussed proper handwashing to be done frequently, limit exposure to other individuals and maintain social distancing of 6 feet. Recommend contacting primary care provider if having respiratory symptoms for further recommendations and consideration for testing. (Please note that portions of this note were completed with a voice recognition program. Efforts were made to edit the dictations but occasionally words are mis-transcribed,  Also, portions of this note have been copied forward, however, changed to reflect the most current clinical status of this patient.)       Alyssa GARDNER am scribing for ALBERTO Liz. Electronically signed by Alyssa Domingo RN on 2/8/2021 at 11:36 AM CST. Greg GARDNER APRN personally performed the services described in this documentation as scribed by Alyssa Domingo RN in my presence and is both accurate and complete.   Electronically signed by ALBERTO Liz on 2/18/2021 at 10:17 AM

## 2021-02-16 ENCOUNTER — VIRTUAL VISIT (OUTPATIENT)
Dept: GASTROENTEROLOGY | Age: 86
End: 2021-02-16
Payer: MEDICARE

## 2021-02-16 DIAGNOSIS — K83.1 BILIARY OBSTRUCTION: ICD-10-CM

## 2021-02-16 DIAGNOSIS — C25.7 MALIGNANT NEOPLASM OF OTHER PARTS OF PANCREAS (HCC): Primary | ICD-10-CM

## 2021-02-16 PROCEDURE — 1123F ACP DISCUSS/DSCN MKR DOCD: CPT | Performed by: INTERNAL MEDICINE

## 2021-02-16 PROCEDURE — 4040F PNEUMOC VAC/ADMIN/RCVD: CPT | Performed by: INTERNAL MEDICINE

## 2021-02-16 PROCEDURE — 1036F TOBACCO NON-USER: CPT | Performed by: INTERNAL MEDICINE

## 2021-02-16 PROCEDURE — 1090F PRES/ABSN URINE INCON ASSESS: CPT | Performed by: INTERNAL MEDICINE

## 2021-02-16 PROCEDURE — G8428 CUR MEDS NOT DOCUMENT: HCPCS | Performed by: INTERNAL MEDICINE

## 2021-02-16 PROCEDURE — 99213 OFFICE O/P EST LOW 20 MIN: CPT | Performed by: INTERNAL MEDICINE

## 2021-02-16 PROCEDURE — G8417 CALC BMI ABV UP PARAM F/U: HCPCS | Performed by: INTERNAL MEDICINE

## 2021-02-16 PROCEDURE — G8484 FLU IMMUNIZE NO ADMIN: HCPCS | Performed by: INTERNAL MEDICINE

## 2021-02-16 NOTE — PROGRESS NOTES
David-w/sphincterotomy, placement of a 10 x 60 partially covered biliary stent    PORT SURGERY Right 2/5/2021    SINGLE LUMEN PORT PLACEMENT WITH ULTRASOUND AND FLUORO performed by Smooth Mendiola MD at [address] TOE AMPUTATION Bilateral     pinky toes removed    TOTAL KNEE ARTHROPLASTY Right 01/03/2020    RIGHT TOTAL KNEE REPLACEMENT performed by Claudia Garcia MD at [facility] OR      Family History   Problem Relation Age of Onset    Colon Cancer Brother     Cancer Mother         Breast Cancer    Heart Attack Father     Colon Polyps Neg Hx     Esophageal Cancer Neg Hx     Liver Cancer Neg Hx     Liver Disease Neg Hx     Rectal Cancer Neg Hx     Stomach Cancer Neg Hx       Current Outpatient Medications   Medication Sig Dispense Refill    lidocaine-prilocaine (EMLA) 2.5-2.5 % cream Apply to port area and cover with plastic wrap one hour prior to treatment 1 Tube 1    ondansetron (ZOFRAN ODT) 4 MG disintegrating tablet Take 2 tablets by mouth every 8 hours as needed for Nausea or Vomiting 60 tablet 3    promethazine (PHENERGAN) 25 MG tablet Take 1 tablet by mouth every 6 hours as needed for Nausea 45 tablet 2    apixaban (ELIQUIS) 2.5 MG TABS tablet Take 2.5 mg by mouth 2 times daily      ferrous sulfate (FEROSUL) 325 (65 Fe) MG tablet TAKE 1 TABLET BY MOUTH EVERY DAY (Patient taking differently: Take 325 mg by mouth daily (with breakfast) TAKE 1 TABLET BY MOUTH EVERY DAY) 90 tablet 3    lisinopril (PRINIVIL;ZESTRIL) 5 MG tablet Take 1 tablet by mouth 2 times daily 60 tablet 0    docusate sodium (COLACE) 100 MG capsule Take 1 capsule by mouth 2 times daily 60 capsule 1    calcitRIOL (ROCALTROL) 0.25 MCG capsule Take 0.25 mcg by mouth daily      allopurinol (ZYLOPRIM) 100 MG tablet Take 100 mg by mouth daily      levothyroxine (SYNTHROID) 112 MCG tablet Take 112 mcg by mouth Daily      simvastatin (ZOCOR) 20 MG tablet Take 20 mg by mouth nightly       No current facility-administered medications for this visit. Allergies   Allergen Reactions    Penicillins Rash     Childhood          Review of Systems   Constitutional: Positive for activity change, fatigue and unexpected weight change. Negative for appetite change and fever. HENT: Negative for trouble swallowing. Respiratory: Negative for cough and chest tightness. Cardiovascular: Negative for chest pain and palpitations. Gastrointestinal: Positive for nausea. Negative for abdominal pain, blood in stool, constipation, diarrhea, rectal pain and vomiting.          Labs:  Hospital Outpatient Visit on 02/08/2021   Component Date Value Ref Range Status    WBC 02/08/2021 7.31  3.98 - 10.04 K/uL Final    RBC 02/08/2021 2.91* 3.93 - 5.22 M/uL Final    Hemoglobin 02/08/2021 10.0* 11.2 - 15.7 g/dL Final    Hematocrit 02/08/2021 30.5* 34.1 - 44.9 % Final    MCV 02/08/2021 104.8* 79.4 - 94.8 fL Final    MCH 02/08/2021 34.4* 25.6 - 32.2 pg Final    MCHC 02/08/2021 32.8  32.3 - 35.5 g/dL Final    RDW 02/08/2021 13.9  11.7 - 14.4 % Final    Platelets 70/67/4245 292  182 - 369 K/uL Final    MPV 02/08/2021 10.9* 7.4 - 10.4 fL Final    Neutrophils % 02/08/2021 69.2  34.0 - 71.1 % Final    Lymphocytes % 02/08/2021 14.6* 19.3 - 53.1 % Final    Monocytes % 02/08/2021 10.3  4.7 - 12.5 % Final    Eosinophils % 02/08/2021 4.9  0.7 - 7.0 % Final    Basophils % 02/08/2021 1.0  0.1 - 1.2 % Final    Neutrophils Absolute 02/08/2021 5.06  1.56 - 6.13 K/uL Final    Lymphocytes Absolute 02/08/2021 1.07* 1.18 - 3.74 K/uL Final    Monocytes Absolute 02/08/2021 0.75  0.24 - 0.82 K/uL Final    Eosinophils Absolute 02/08/2021 0.36  0.04 - 0.54 K/uL Final    Basophils Absolute 02/08/2021 0.07  0.01 - 0.08 K/uL Final   Office Visit on 02/01/2021   Component Date Value Ref Range Status    SARS-CoV-2, RUTH ANN 02/01/2021 NOT DETECTED  NOT DETECTED Final   Hospital Outpatient Visit on 01/20/2021   Component Date Value Ref Range Status    WBC 01/20/2021 7.67  3.98 - 10.04 K/uL Final    RBC 01/20/2021 2.87* 3.93 - 5.22 M/uL Final    Hemoglobin 01/20/2021 10.0* 11.2 - 15.7 g/dL Final    Hematocrit 01/20/2021 31.5* 34.1 - 44.9 % Final    MCV 01/20/2021 109.8* 79.4 - 94.8 fL Final    MCH 01/20/2021 34.8* 25.6 - 32.2 pg Final    MCHC 01/20/2021 31.7* 32.3 - 35.5 g/dL Final    RDW 01/20/2021 14.5* 11.7 - 14.4 % Final    Platelets 92/26/4300 281  182 - 369 K/uL Final    MPV 01/20/2021 11.1* 7.4 - 10.4 fL Final    Neutrophils % 01/20/2021 65.5  34.0 - 71.1 % Final    Lymphocytes % 01/20/2021 14.3* 19.3 - 53.1 % Final    Monocytes % 01/20/2021 11.9  4.7 - 12.5 % Final    Eosinophils % 01/20/2021 7.3* 0.7 - 7.0 % Final    Basophils % 01/20/2021 1.0  0.1 - 1.2 % Final    Neutrophils Absolute 01/20/2021 5.02  1.56 - 6.13 K/uL Final    Lymphocytes Absolute 01/20/2021 1.10* 1.18 - 3.74 K/uL Final    Monocytes Absolute 01/20/2021 0.91* 0.24 - 0.82 K/uL Final    Eosinophils Absolute 01/20/2021 0.56* 0.04 - 0.54 K/uL Final    Basophils Absolute 01/20/2021 0.08  0.01 - 0.08 K/uL Final   Orders Only on 01/20/2021   Component Date Value Ref Range Status    CA 19-9 01/20/2021 236* 0 - 35 U/mL Final   Orders Only on 01/20/2021   Component Date Value Ref Range Status    Glucose 01/20/2021 87  65 - 99 mg/dL Final    BUN 01/20/2021 27  8 - 27 mg/dL Final    CREATININE 01/20/2021 1.60* 0.57 - 1.00 mg/dL Final    GFR Non- 01/20/2021 29* >59 mL/min/1.73 Final    GFR  01/20/2021 33* >59 mL/min/1.73 Final    BUN/Creatinine Ratio 01/20/2021 17  12 - 28 Final    Sodium 01/20/2021 143  134 - 144 mmol/L Final    Potassium 01/20/2021 4.2  3.5 - 5.2 mmol/L Final    Chloride 01/20/2021 104  96 - 106 mmol/L Final    CO2 01/20/2021 22  20 - 29 mmol/L Final    Calcium 01/20/2021 10.2  8.7 - 10.3 mg/dL Final    Total Protein 01/20/2021 6.4  6.0 - 8.5 g/dL Final    Albumin 01/20/2021 4.2  3.6 - 4.6 g/dL Final    Globulin 01/20/2021 2.2  1.5 - 4.5 g/dL Final    Albumin/Globulin Ratio 01/20/2021 1.9  1.2 - 2.2 Final    Total Bilirubin 01/20/2021 0.5  0.0 - 1.2 mg/dL Final    Alkaline Phosphatase 01/20/2021 137* 39 - 117 IU/L Final    AST 01/20/2021 15  0 - 40 IU/L Final    ALT 01/20/2021 12  0 - 32 IU/L Final         PHYSICAL EXAMINATION:  [ INSTRUCTIONS:  \"[x]\" Indicates a positive item  \"[]\" Indicates a negative item  -- DELETE ALL ITEMS NOT EXAMINED]  [x] Alert  [x] Oriented to person/place/time    [x] No apparent distress  [] Toxic appearing    [] Face flushed appearing [x] Sclera clear  [] Lips are cyanotic      [x] Breathing appears normal  [] Appears tachypneic      [] Rash on visible skin    [x] Cranial Nerves II-XII grossly intact    [x] Motor grossly intact in visible upper extremities    [x] Motor grossly intact in visible lower extremities    [x] Normal Mood  [] Anxious appearing    [] Depressed appearing  [] Confused appearing      [] Poor short term memory  [] Poor long term memory    [] OTHER:      Due to this being a TeleHealth encounter, evaluation of the following organ systems is limited: Vitals/Constitutional/EENT/Resp/CV/GI//MS/Neuro/Skin/Heme-Lymph-Imm. ASSESSMENT/PLAN:     Diagnosis Orders   1. Malignant neoplasm of other parts of pancreas ([facility] [address]     2. Biliary obstruction       - Continue f/u with Dr Rosendo Mae as planned. - I will allow her to be followed expectantly. If LFT increase or signs of biliary obstruction develop, I an reassess the stent at that time her locally. An  electronic signature was used to authenticate this note.     --Akhil Aldridge MD on 2/16/2021 at 3:30 PM        Pursuant to the emergency declaration under the [facility] waiver authority and the [employer] and [employer] Act, this Virtual  Visit was conducted, with patient's consent, to reduce the patient's risk of exposure to COVID-19 and provide continuity of care for an established patient. Services were provided through a video synchronous discussion virtually to substitute for in-person clinic visit.

## 2021-02-23 ENCOUNTER — HOSPITAL ENCOUNTER (OUTPATIENT)
Dept: INFUSION THERAPY | Age: 86
Discharge: HOME | End: 2021-02-23
Payer: MEDICARE

## 2021-02-23 VITALS
TEMPERATURE: 97.3 F | WEIGHT: 183.4 LBS | BODY MASS INDEX: 33.54 KG/M2 | DIASTOLIC BLOOD PRESSURE: 75 MMHG | SYSTOLIC BLOOD PRESSURE: 145 MMHG | RESPIRATION RATE: 17 BRPM | HEART RATE: 78 BPM | OXYGEN SATURATION: 94 %

## 2021-02-23 DIAGNOSIS — C25.7 MALIGNANT NEOPLASM OF OTHER PARTS OF PANCREAS (HCC): ICD-10-CM

## 2021-02-23 DIAGNOSIS — C25.9 PANCREATIC CARCINOMA (HCC): Primary | ICD-10-CM

## 2021-02-23 LAB
ALBUMIN SERPL-MCNC: 3.6 G/DL (ref 3.5–5.2)
ALP BLD-CCNC: 118 U/L (ref 35–104)
ALT SERPL-CCNC: 21 U/L (ref 9–52)
ANION GAP SERPL CALCULATED.3IONS-SCNC: 9 MMOL/L (ref 7–19)
AST SERPL-CCNC: 89 U/L (ref 14–36)
BASOPHILS ABSOLUTE: 0.06 K/UL (ref 0.01–0.08)
BASOPHILS RELATIVE PERCENT: 0.8 % (ref 0.1–1.2)
BILIRUB SERPL-MCNC: 0.3 MG/DL (ref 0.2–1.3)
BUN BLDV-MCNC: 28 MG/DL (ref 7–17)
CALCIUM SERPL-MCNC: 9.6 MG/DL (ref 8.4–10.2)
CHLORIDE BLD-SCNC: 102 MMOL/L (ref 98–111)
CO2: 30 MMOL/L (ref 22–29)
CREAT SERPL-MCNC: 1.7 MG/DL (ref 0.5–1)
EOSINOPHILS ABSOLUTE: 0.39 K/UL (ref 0.04–0.54)
EOSINOPHILS RELATIVE PERCENT: 5 % (ref 0.7–7)
GFR NON-AFRICAN AMERICAN: 28
GLOBULIN: 2.7 G/DL
GLUCOSE BLD-MCNC: 179 MG/DL (ref 74–106)
HCT VFR BLD CALC: 27.1 % (ref 34.1–44.9)
HEMOGLOBIN: 8.9 G/DL (ref 11.2–15.7)
LYMPHOCYTES ABSOLUTE: 0.49 K/UL (ref 1.18–3.74)
LYMPHOCYTES RELATIVE PERCENT: 6.3 % (ref 19.3–53.1)
MCH RBC QN AUTO: 34.8 PG (ref 25.6–32.2)
MCHC RBC AUTO-ENTMCNC: 32.8 G/DL (ref 32.3–35.5)
MCV RBC AUTO: 105.9 FL (ref 79.4–94.8)
MONOCYTES ABSOLUTE: 0.86 K/UL (ref 0.24–0.82)
MONOCYTES RELATIVE PERCENT: 11.1 % (ref 4.7–12.5)
NEUTROPHILS ABSOLUTE: 5.96 K/UL (ref 1.56–6.13)
NEUTROPHILS RELATIVE PERCENT: 76.8 % (ref 34–71.1)
PDW BLD-RTO: 14.4 % (ref 11.7–14.4)
PLATELET # BLD: 508 K/UL (ref 182–369)
PMV BLD AUTO: 10 FL (ref 7.4–10.4)
POTASSIUM SERPL-SCNC: 3.9 MMOL/L (ref 3.5–5.1)
RBC # BLD: 2.56 M/UL (ref 3.93–5.22)
SODIUM BLD-SCNC: 141 MMOL/L (ref 137–145)
TOTAL PROTEIN: 6.3 G/DL (ref 6.3–8.2)
WBC # BLD: 7.76 K/UL (ref 3.98–10.04)

## 2021-02-23 PROCEDURE — 2580000003 HC RX 258: Performed by: NURSE PRACTITIONER

## 2021-02-23 PROCEDURE — 96417 CHEMO IV INFUS EACH ADDL SEQ: CPT

## 2021-02-23 PROCEDURE — 36415 COLL VENOUS BLD VENIPUNCTURE: CPT

## 2021-02-23 PROCEDURE — 80053 COMPREHEN METABOLIC PANEL: CPT

## 2021-02-23 PROCEDURE — 85025 COMPLETE CBC W/AUTO DIFF WBC: CPT

## 2021-02-23 PROCEDURE — 96413 CHEMO IV INFUSION 1 HR: CPT

## 2021-02-23 PROCEDURE — 6360000002 HC RX W HCPCS: Performed by: NURSE PRACTITIONER

## 2021-02-23 PROCEDURE — 96367 TX/PROPH/DG ADDL SEQ IV INF: CPT

## 2021-02-23 RX ORDER — PACLITAXEL 100 MG/20ML
90 INJECTION, POWDER, LYOPHILIZED, FOR SUSPENSION INTRAVENOUS ONCE
Status: COMPLETED | OUTPATIENT
Start: 2021-02-23 | End: 2021-02-23

## 2021-02-23 RX ORDER — HEPARIN SODIUM (PORCINE) LOCK FLUSH IV SOLN 100 UNIT/ML 100 UNIT/ML
500 SOLUTION INTRAVENOUS PRN
Status: DISCONTINUED | OUTPATIENT
Start: 2021-02-23 | End: 2021-02-24 | Stop reason: HOSPADM

## 2021-02-23 RX ORDER — SODIUM CHLORIDE 9 MG/ML
20 INJECTION, SOLUTION INTRAVENOUS ONCE
Status: COMPLETED | OUTPATIENT
Start: 2021-02-23 | End: 2021-02-24

## 2021-02-23 RX ORDER — SODIUM CHLORIDE 0.9 % (FLUSH) 0.9 %
10 SYRINGE (ML) INJECTION PRN
Status: DISCONTINUED | OUTPATIENT
Start: 2021-02-23 | End: 2021-02-24 | Stop reason: HOSPADM

## 2021-02-23 RX ADMIN — PACLITAXEL 170 MG: 100 INJECTION, POWDER, LYOPHILIZED, FOR SUSPENSION INTRAVENOUS at 14:41

## 2021-02-23 RX ADMIN — SODIUM CHLORIDE 20 ML/HR: 9 INJECTION, SOLUTION INTRAVENOUS at 14:04

## 2021-02-23 RX ADMIN — HEPARIN 500 UNITS: 100 SYRINGE at 16:16

## 2021-02-23 RX ADMIN — DEXAMETHASONE SODIUM PHOSPHATE: 10 INJECTION, SOLUTION INTRAMUSCULAR; INTRAVENOUS at 14:08

## 2021-02-23 RX ADMIN — SODIUM CHLORIDE, PRESERVATIVE FREE 10 ML: 5 INJECTION INTRAVENOUS at 16:16

## 2021-02-23 RX ADMIN — GEMCITABINE 1417 MG: 38 INJECTION, SOLUTION INTRAVENOUS at 15:38

## 2021-03-05 RX ORDER — SODIUM CHLORIDE 9 MG/ML
INJECTION, SOLUTION INTRAVENOUS CONTINUOUS
Status: CANCELLED | OUTPATIENT
Start: 2021-03-08

## 2021-03-05 RX ORDER — DIPHENHYDRAMINE HYDROCHLORIDE 50 MG/ML
50 INJECTION INTRAMUSCULAR; INTRAVENOUS ONCE
Status: CANCELLED | OUTPATIENT
Start: 2021-03-23 | End: 2021-03-22

## 2021-03-05 RX ORDER — MEPERIDINE HYDROCHLORIDE 50 MG/ML
12.5 INJECTION INTRAMUSCULAR; INTRAVENOUS; SUBCUTANEOUS ONCE
Status: CANCELLED | OUTPATIENT
Start: 2021-03-08 | End: 2021-03-08

## 2021-03-05 RX ORDER — EPINEPHRINE 1 MG/ML
0.3 INJECTION, SOLUTION, CONCENTRATE INTRAVENOUS PRN
Status: CANCELLED | OUTPATIENT
Start: 2021-03-23

## 2021-03-05 RX ORDER — SODIUM CHLORIDE 0.9 % (FLUSH) 0.9 %
5 SYRINGE (ML) INJECTION PRN
Status: CANCELLED | OUTPATIENT
Start: 2021-03-23

## 2021-03-05 RX ORDER — DIPHENHYDRAMINE HYDROCHLORIDE 50 MG/ML
50 INJECTION INTRAMUSCULAR; INTRAVENOUS ONCE
Status: CANCELLED | OUTPATIENT
Start: 2021-03-08 | End: 2021-03-08

## 2021-03-05 RX ORDER — SODIUM CHLORIDE 0.9 % (FLUSH) 0.9 %
10 SYRINGE (ML) INJECTION PRN
Status: CANCELLED | OUTPATIENT
Start: 2021-03-08

## 2021-03-05 RX ORDER — PACLITAXEL 100 MG/20ML
90 INJECTION, POWDER, LYOPHILIZED, FOR SUSPENSION INTRAVENOUS ONCE
Status: CANCELLED | OUTPATIENT
Start: 2021-03-08

## 2021-03-05 RX ORDER — EPINEPHRINE 1 MG/ML
0.3 INJECTION, SOLUTION, CONCENTRATE INTRAVENOUS PRN
Status: CANCELLED | OUTPATIENT
Start: 2021-03-08

## 2021-03-05 RX ORDER — SODIUM CHLORIDE 0.9 % (FLUSH) 0.9 %
5 SYRINGE (ML) INJECTION PRN
Status: CANCELLED | OUTPATIENT
Start: 2021-03-08

## 2021-03-05 RX ORDER — METHYLPREDNISOLONE SODIUM SUCCINATE 125 MG/2ML
125 INJECTION, POWDER, LYOPHILIZED, FOR SOLUTION INTRAMUSCULAR; INTRAVENOUS ONCE
Status: CANCELLED | OUTPATIENT
Start: 2021-03-08 | End: 2021-03-08

## 2021-03-05 RX ORDER — SODIUM CHLORIDE 0.9 % (FLUSH) 0.9 %
10 SYRINGE (ML) INJECTION PRN
Status: CANCELLED | OUTPATIENT
Start: 2021-03-23

## 2021-03-05 RX ORDER — SODIUM CHLORIDE 9 MG/ML
INJECTION, SOLUTION INTRAVENOUS CONTINUOUS
Status: CANCELLED | OUTPATIENT
Start: 2021-03-23

## 2021-03-05 RX ORDER — MEPERIDINE HYDROCHLORIDE 50 MG/ML
12.5 INJECTION INTRAMUSCULAR; INTRAVENOUS; SUBCUTANEOUS ONCE
Status: CANCELLED | OUTPATIENT
Start: 2021-03-23 | End: 2021-03-22

## 2021-03-05 RX ORDER — HEPARIN SODIUM (PORCINE) LOCK FLUSH IV SOLN 100 UNIT/ML 100 UNIT/ML
500 SOLUTION INTRAVENOUS PRN
Status: CANCELLED | OUTPATIENT
Start: 2021-03-08

## 2021-03-05 RX ORDER — PACLITAXEL 100 MG/20ML
90 INJECTION, POWDER, LYOPHILIZED, FOR SUSPENSION INTRAVENOUS ONCE
Status: CANCELLED | OUTPATIENT
Start: 2021-03-23

## 2021-03-05 RX ORDER — HEPARIN SODIUM (PORCINE) LOCK FLUSH IV SOLN 100 UNIT/ML 100 UNIT/ML
500 SOLUTION INTRAVENOUS PRN
Status: CANCELLED | OUTPATIENT
Start: 2021-03-23

## 2021-03-05 RX ORDER — SODIUM CHLORIDE 9 MG/ML
20 INJECTION, SOLUTION INTRAVENOUS ONCE
Status: CANCELLED | OUTPATIENT
Start: 2021-03-08 | End: 2021-03-08

## 2021-03-05 RX ORDER — METHYLPREDNISOLONE SODIUM SUCCINATE 125 MG/2ML
125 INJECTION, POWDER, LYOPHILIZED, FOR SOLUTION INTRAMUSCULAR; INTRAVENOUS ONCE
Status: CANCELLED | OUTPATIENT
Start: 2021-03-23 | End: 2021-03-22

## 2021-03-05 RX ORDER — SODIUM CHLORIDE 9 MG/ML
20 INJECTION, SOLUTION INTRAVENOUS ONCE
Status: CANCELLED | OUTPATIENT
Start: 2021-03-23 | End: 2021-03-22

## 2021-03-08 ENCOUNTER — HOSPITAL ENCOUNTER (OUTPATIENT)
Dept: INFUSION THERAPY | Age: 86
Discharge: HOME | End: 2021-03-08
Payer: MEDICARE

## 2021-03-08 ENCOUNTER — OFFICE VISIT (OUTPATIENT)
Dept: HEMATOLOGY | Age: 86
End: 2021-03-08
Payer: MEDICARE

## 2021-03-08 VITALS
WEIGHT: 179.7 LBS | OXYGEN SATURATION: 97 % | DIASTOLIC BLOOD PRESSURE: 68 MMHG | TEMPERATURE: 97.5 F | HEIGHT: 62 IN | BODY MASS INDEX: 33.07 KG/M2 | HEART RATE: 73 BPM | SYSTOLIC BLOOD PRESSURE: 130 MMHG

## 2021-03-08 DIAGNOSIS — Z51.11 CHEMOTHERAPY MANAGEMENT, ENCOUNTER FOR: ICD-10-CM

## 2021-03-08 DIAGNOSIS — N18.4 ANEMIA OF CHRONIC KIDNEY FAILURE, STAGE 4 (SEVERE) (HCC): ICD-10-CM

## 2021-03-08 DIAGNOSIS — E03.8 OTHER SPECIFIED HYPOTHYROIDISM: ICD-10-CM

## 2021-03-08 DIAGNOSIS — R11.0 CHEMOTHERAPY-INDUCED NAUSEA: ICD-10-CM

## 2021-03-08 DIAGNOSIS — D50.8 IRON DEFICIENCY ANEMIA SECONDARY TO INADEQUATE DIETARY IRON INTAKE: ICD-10-CM

## 2021-03-08 DIAGNOSIS — D63.1 ANEMIA OF CHRONIC KIDNEY FAILURE, STAGE 4 (SEVERE) (HCC): ICD-10-CM

## 2021-03-08 DIAGNOSIS — C25.7 MALIGNANT NEOPLASM OF OTHER PARTS OF PANCREAS (HCC): ICD-10-CM

## 2021-03-08 DIAGNOSIS — R53.1 WEAKNESS GENERALIZED: ICD-10-CM

## 2021-03-08 DIAGNOSIS — D64.9 ENCOUNTER FOR ESA (ERYTHROPOIETIN STIMULATING AGENT) ANEMIA MANAGEMENT: ICD-10-CM

## 2021-03-08 DIAGNOSIS — Z71.89 CARE PLAN DISCUSSED WITH PATIENT: ICD-10-CM

## 2021-03-08 DIAGNOSIS — R19.7 DIARRHEA, UNSPECIFIED TYPE: ICD-10-CM

## 2021-03-08 DIAGNOSIS — T45.1X5A CHEMOTHERAPY-INDUCED NAUSEA: ICD-10-CM

## 2021-03-08 DIAGNOSIS — K83.8 DILATION OF BILIARY TRACT: ICD-10-CM

## 2021-03-08 DIAGNOSIS — Z79.899 ENCOUNTER FOR ESA (ERYTHROPOIETIN STIMULATING AGENT) ANEMIA MANAGEMENT: ICD-10-CM

## 2021-03-08 DIAGNOSIS — C25.9 PANCREATIC CARCINOMA (HCC): Primary | ICD-10-CM

## 2021-03-08 DIAGNOSIS — D64.9 ANEMIA, UNSPECIFIED TYPE: ICD-10-CM

## 2021-03-08 LAB
ALBUMIN SERPL-MCNC: 3.6 G/DL (ref 3.5–5.2)
ALP BLD-CCNC: 166 U/L (ref 35–104)
ALT SERPL-CCNC: 41 U/L (ref 9–52)
ANION GAP SERPL CALCULATED.3IONS-SCNC: 10 MMOL/L (ref 7–19)
AST SERPL-CCNC: 58 U/L (ref 14–36)
BASOPHILS ABSOLUTE: 0.05 K/UL (ref 0.01–0.08)
BASOPHILS RELATIVE PERCENT: 0.7 % (ref 0.1–1.2)
BILIRUB SERPL-MCNC: 0.4 MG/DL (ref 0.2–1.3)
BUN BLDV-MCNC: 18 MG/DL (ref 7–17)
CALCIUM SERPL-MCNC: 9.7 MG/DL (ref 8.4–10.2)
CHLORIDE BLD-SCNC: 101 MMOL/L (ref 98–111)
CO2: 29 MMOL/L (ref 22–29)
CREAT SERPL-MCNC: 1.5 MG/DL (ref 0.5–1)
EOSINOPHILS ABSOLUTE: 0.16 K/UL (ref 0.04–0.54)
EOSINOPHILS RELATIVE PERCENT: 2.3 % (ref 0.7–7)
GFR NON-AFRICAN AMERICAN: 33
GLOBULIN: 2.7 G/DL
GLUCOSE BLD-MCNC: 187 MG/DL (ref 74–106)
HCT VFR BLD CALC: 27.6 % (ref 34.1–44.9)
HEMOGLOBIN: 8.9 G/DL (ref 11.2–15.7)
LYMPHOCYTES ABSOLUTE: 0.78 K/UL (ref 1.18–3.74)
LYMPHOCYTES RELATIVE PERCENT: 11.1 % (ref 19.3–53.1)
MAGNESIUM: 1.7 MG/DL (ref 1.6–2.3)
MCH RBC QN AUTO: 33.6 PG (ref 25.6–32.2)
MCHC RBC AUTO-ENTMCNC: 32.2 G/DL (ref 32.3–35.5)
MCV RBC AUTO: 104.2 FL (ref 79.4–94.8)
MONOCYTES ABSOLUTE: 0.97 K/UL (ref 0.24–0.82)
MONOCYTES RELATIVE PERCENT: 13.8 % (ref 4.7–12.5)
NEUTROPHILS ABSOLUTE: 5.09 K/UL (ref 1.56–6.13)
NEUTROPHILS RELATIVE PERCENT: 72.1 % (ref 34–71.1)
PDW BLD-RTO: 14.3 % (ref 11.7–14.4)
PHOSPHORUS: 4.2 MG/DL (ref 2.5–4.5)
PLATELET # BLD: 363 K/UL (ref 182–369)
PMV BLD AUTO: 10.2 FL (ref 7.4–10.4)
POTASSIUM SERPL-SCNC: 3.9 MMOL/L (ref 3.5–5.1)
RBC # BLD: 2.65 M/UL (ref 3.93–5.22)
SODIUM BLD-SCNC: 140 MMOL/L (ref 137–145)
TOTAL PROTEIN: 6.4 G/DL (ref 6.3–8.2)
WBC # BLD: 7.05 K/UL (ref 3.98–10.04)

## 2021-03-08 PROCEDURE — 1090F PRES/ABSN URINE INCON ASSESS: CPT | Performed by: NURSE PRACTITIONER

## 2021-03-08 PROCEDURE — 96415 CHEMO IV INFUSION ADDL HR: CPT

## 2021-03-08 PROCEDURE — 84100 ASSAY OF PHOSPHORUS: CPT

## 2021-03-08 PROCEDURE — 96372 THER/PROPH/DIAG INJ SC/IM: CPT

## 2021-03-08 PROCEDURE — G8484 FLU IMMUNIZE NO ADMIN: HCPCS | Performed by: NURSE PRACTITIONER

## 2021-03-08 PROCEDURE — G8417 CALC BMI ABV UP PARAM F/U: HCPCS | Performed by: NURSE PRACTITIONER

## 2021-03-08 PROCEDURE — 1036F TOBACCO NON-USER: CPT | Performed by: NURSE PRACTITIONER

## 2021-03-08 PROCEDURE — 96413 CHEMO IV INFUSION 1 HR: CPT

## 2021-03-08 PROCEDURE — 96367 TX/PROPH/DG ADDL SEQ IV INF: CPT

## 2021-03-08 PROCEDURE — 85025 COMPLETE CBC W/AUTO DIFF WBC: CPT

## 2021-03-08 PROCEDURE — 6360000002 HC RX W HCPCS: Performed by: NURSE PRACTITIONER

## 2021-03-08 PROCEDURE — 4040F PNEUMOC VAC/ADMIN/RCVD: CPT | Performed by: NURSE PRACTITIONER

## 2021-03-08 PROCEDURE — 96417 CHEMO IV INFUS EACH ADDL SEQ: CPT

## 2021-03-08 PROCEDURE — 2580000003 HC RX 258: Performed by: NURSE PRACTITIONER

## 2021-03-08 PROCEDURE — 99215 OFFICE O/P EST HI 40 MIN: CPT | Performed by: NURSE PRACTITIONER

## 2021-03-08 PROCEDURE — 83735 ASSAY OF MAGNESIUM: CPT

## 2021-03-08 PROCEDURE — G8427 DOCREV CUR MEDS BY ELIG CLIN: HCPCS | Performed by: NURSE PRACTITIONER

## 2021-03-08 PROCEDURE — 1123F ACP DISCUSS/DSCN MKR DOCD: CPT | Performed by: NURSE PRACTITIONER

## 2021-03-08 PROCEDURE — 80053 COMPREHEN METABOLIC PANEL: CPT

## 2021-03-08 RX ORDER — PACLITAXEL 100 MG/20ML
90 INJECTION, POWDER, LYOPHILIZED, FOR SUSPENSION INTRAVENOUS ONCE
Status: COMPLETED | OUTPATIENT
Start: 2021-03-08 | End: 2021-03-08

## 2021-03-08 RX ORDER — HEPARIN SODIUM (PORCINE) LOCK FLUSH IV SOLN 100 UNIT/ML 100 UNIT/ML
500 SOLUTION INTRAVENOUS PRN
Status: DISCONTINUED | OUTPATIENT
Start: 2021-03-08 | End: 2021-03-09 | Stop reason: HOSPADM

## 2021-03-08 RX ORDER — SODIUM CHLORIDE 0.9 % (FLUSH) 0.9 %
10 SYRINGE (ML) INJECTION PRN
Status: DISCONTINUED | OUTPATIENT
Start: 2021-03-08 | End: 2021-03-09 | Stop reason: HOSPADM

## 2021-03-08 RX ADMIN — EPOETIN ALFA-EPBX 40000 UNITS: 40000 INJECTION, SOLUTION INTRAVENOUS; SUBCUTANEOUS at 15:32

## 2021-03-08 RX ADMIN — SODIUM CHLORIDE, PRESERVATIVE FREE 10 ML: 5 INJECTION INTRAVENOUS at 15:32

## 2021-03-08 RX ADMIN — PACLITAXEL 170 MG: 100 INJECTION, POWDER, LYOPHILIZED, FOR SUSPENSION INTRAVENOUS at 15:01

## 2021-03-08 RX ADMIN — GEMCITABINE HYDROCHLORIDE 1400 MG: 1 INJECTION, SOLUTION INTRAVENOUS at 14:20

## 2021-03-08 RX ADMIN — DEXAMETHASONE SODIUM PHOSPHATE: 10 INJECTION, SOLUTION INTRAMUSCULAR; INTRAVENOUS at 13:47

## 2021-03-08 RX ADMIN — HEPARIN 500 UNITS: 100 SYRINGE at 15:31

## 2021-03-08 ASSESSMENT — ENCOUNTER SYMPTOMS
ABDOMINAL PAIN: 0
EYE DISCHARGE: 0
SORE THROAT: 0
BACK PAIN: 0
WHEEZING: 0
BLOOD IN STOOL: 0
COUGH: 0
EYE REDNESS: 0
CONSTIPATION: 0
VOMITING: 0
EYES NEGATIVE: 1
SHORTNESS OF BREATH: 0
EYE PAIN: 0
RESPIRATORY NEGATIVE: 1

## 2021-03-08 NOTE — PROGRESS NOTES
Progress Note      Pt Name: Bhavna Badillo  YOB: 1934  MRN: [id number]    Date of evaluation: 3/8/2021  History Obtained From:  patient, electronic medical record    CHIEF COMPLAINT:    Chief Complaint   Patient presents with    Chemotherapy     Locally advanced pancreatic cancer    Anemia    Fatigue     HISTORY OF PRESENT ILLNESS:    Bhavna Badillo is a 80 y.o.  female with significant PMH of iron deficiency anemia, chronic kidney disease stage IV,  and diagnosis of pancreatic adenocarcinoma. She is currently receiving palliative chemotherapy with Gemzar 750 mg/m² and Abraxane 90 mg/m² every 2 weeks (this is a 25% dose reduction). General Hones returns today for evaluation, side effect monitoring, lab monitoring and consideration to receive cycle #2, day 1 of Gemzar and Abraxane. She reports that overall she feels she tolerated treatment fairly well although did experience increase in weakness and nausea after day 3 of each treatment. She has complaint of new onset of diarrhea that has been present for the past 4 to 5 days and having 4-5 episodes daily. In relation to her anemia of chronic kidney disease, she has been receiving BARBARA therapy with Retacrit intermittently for hemoglobin <11, last dose of 40,000 units given on 2/8/2021 for hemoglobin of 10.0. She also continues to take ferrous sulfate 325 mg 1 tablet daily without significant difficulty. CBC is appropriate with forward with chemotherapy, has a hemoglobin of 8.9 today. She denies any bleeding tendencies. ONCOLOGIC/HEMATOLOGIC HISTORY:   Diagnosis:   Anemia of chronic kidney disease   Chronic kidney disease stage lll  Pancreatic adenocarcinoma  Chronic anticoagulation due to prior DVTs    Treatment summary:  Ferrous sulfate 325 mg daily   11/26/2018 - Procrit 20,000 units for chronic kidney disease stage IV. Procrit to be given every 2 weeks ×4 and then monthly, dose to be titrated appropriately.  Hold dose for hemoglobin >11   Currently receiving Retacrit 40,000 units every 4 weeks  2/8/2021 palliative chemotherapy on 2/8/2021 with Gemzar 750 mg/m² and Abraxane 90 mg/m² every 2 weeks, this is a dose reduction by 25%      Tumor monitoring:  · 12/2/2020-CA 19-9 of 133  · 12/23/2020-CA 19-9 of 217  · 1/20/2021-CA 19-9 of 414 Rony King seen by Dr. Torri Mckeon on 12/2/2020 as an inpatient consultation at [facility] for findings of a pancreatic mass and bile duct obstruction. She presented to the ER department with complaints of hematuria that began a few days prior to presentation. She denied any dysuria, back pain, no nausea or vomiting.  She also reported easily bruising and ecchymotic areas in her back, abdomen and flank.  Initial laboratory work-up showed INR above 18.  She was given vitamin K subcutaneously.  She was found to have elevated LFTs and the following are events that occurred. · 12/01/2020CT abdomen pelvis without contrast showed a pancreatic head mass measuring 3.3 x 3.7 cm in size and associated, bile duct dilatation above the level of the ampulla.  Associated moderate pancreatic ductal dilatation. · 12/2/2020-CA 19-9 133  · 12/03/2020ERCP with FNA biopsy Positive for high-grade adenocarcinoma.  Unfortunately, stent could not be placed. · 12/5/2020-transferred from [facility] to [address] [place] [address] and discharged home on 12/8/2020  · 12/07/2020- Cholangipancreatography Retrograde Endo ERCP with sphincterotomy, balloon sweep and placement of 10x60 PC BS metal stent at [facility] in [place]. · 12/21/2020- CT chest with contrast documented no evidence of intrathoracic neoplastic process/metastatic disease. Evidence of pneumobilia. The ectasia of the pancreatic duct, similar to the previous study. An incompletely visualized and evaluated heterogeneous mass in the head of the pancreas measuring 3.3 x 3.7cm. A biliary stent in place. Moderate persistent dilatation of the proximal common bile duct. A stable small low-density nodule in the left adrenal gland  · 12/22/2020DrTalib Lambert discussed the results of CT chest and pathology that suggest advanced pancreatic cancer, unfortunately it is not amenable to surgery. She was quite weak and had poor performance, appeared very frail. Discussion was made about palliative chemotherapy but the decision to hold was made until improvement of her physical conditioning occurred. Recommended physical therapy as outpatient and reassess fitness for palliative chemotherapy in approximately 4 weeks. 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 · 12/7/2020 ERCP at [address] [place] [address] Dr by Dr. Dami Wise revealed a single severe biliary stricture in the lower third of the main bile duct with severe upstream biliary dilation, stricture was malignant appearing. Biliary sphincterectomy was performed. 1 partially covered metal stent was placed into the common bile duct. · 2/8/2021-initiated palliative chemotherapy with Gemzar 750 mg/m² and Abraxane 90 mg/m² every 2 weeks, this is a dose reduction by 25%    HEMATOLOGY HISTORY:  Ida Fontanez was seen in initial hematology consultation on 7/20/2018 for further evaluation and treatment recommendations for anemia. Ida Fontanez was referred from Dr. Carmenza Mireles. Ida Fontanez presented with no significant complaints other than chronic fatigue and constipation. She had been taking ferrous sulfate 325 mg daily under the direction of Dr. Trudy St. Ida Fontanez reported significant constipation and some GI upset with the oral iron. She denied any bleeding tendencies to include hematuria, melena, hematochezia and epistaxis. Ida Fontanez had a colonoscopy on 4/26/2016 by Dr. Марина Horne for further evaluation of iron deficiency. The only abnormal finding was diverticulosis.   Ida Fontanez is routinely followed by  Kenia Short for her chronic kidney disease stage IV. CMP on 6/11/2018 documented a creatinine of 2.2 and GFR 21. CBC review from 2/2/2018- 6/11/2018 documented hemoglobin ranging from 9.4- 10.1, RBC 2.91- 3.22, MCV 94- 100 with a normal WBC and platelet count  CBC at initial consultation on 7/20/2018 documented a WBC of 7.33, ANC 4.74, RBC 3.04, hemoglobin 10.3, .6 and a platelet count of 161,525. Serology studies on 7/20/2018:  Creatinine 1.98   GFR 23   Calcium 10.3   IgG 700, IgA 180, and IgM 52   M spike not observed   Immunofixation: No monoclonality detected. Iron 74   Iron saturation 27%   TIBC 278   Vitamin B12 437   Folate 15.7   Ferritin 299   Reticulocyte count 1.5%   Erythropoietin 11.6      Beta-2 microglobulin 6.2     Occult stool positive 1 of 3 samples on 7/25/2018. Colonoscopy on 10/4/2018 by Dr. Jaylan Boo was documented as removal of 2 polyps with benign pathology. No evidence of bleeding. Serology studies on 10/15/2018:  Iron 76   TIBC 287   Iron saturation 26%   Ferritin 321   Hemoglobin 10.1   Creatinine 2.47   GFR 17    11/26/2018 - Initiated Procrit 20,000 units for chronic kidney disease stage IV, to be given every 2 weeks ×4 and then monthly, dose to be titrated appropriately. Hemoglobin 9.6. All has anemia of chronic disease to include chronic kidney disease stage IV. She will began receiving growth factor support and will need to maintain a ferritin level greater than 200 and an iron saturation of 25% for the Procrit to be beneficial. Hold Procrit dose for hemoglobin >11. Indications/rationale for Procrit was discussed with [place]. Risks, benefits and expectations were outlined. Risks including, but not limited to hypertension, stroke, MI, death were discussed and acknowledged by [place].     Serology studies on 3/8/2019:  Creatinine 1.7/GFR 30.4   Iron 80   Iron saturation 26.5%   TIBC 302   Ferritin 144   Vitamin B12 501   Folate 13.9    Serology studies on 3/5/2020:  · Creatinine 1.3/GFR 41.4  · Iron 84  · TIBC 390  · Iron saturation 21.5%  · Ferritin 342    Iron substrates on 6/25/2020  · Ferritin 311  · Iron 76  · Iron saturation 23%  · TIBC 326  · Creatinine 1.3/GFR 39    Labs on 12/2/2020 and 12/3/2020:  · Iron 35  · TIBC 213  · Iron saturation 16%  · Vitamin B12 483  · Folate 10.6  · Ferritin 480    Past Medical History:    Past Medical History:   Diagnosis Date    Abdominal aortic aneurysm (AAA) ([facility]     Anemia     Arthritis     Cancer ([facility] 12/01/2020    pancreatic    Chronic kidney disease     DVT of lower extremity (deep venous thrombosis) (HCC)     twice 2010 and 2017    Hyperlipidemia     Hypertension     Kidney stones     Osteoarthritis     Palliative care patient 12/02/2020    Thyroid disease     Thyroid disorder        Past Surgical History:    Past Surgical History:   Procedure Laterality Date    COLONOSCOPY  2016    Dr [place] problems    ENDOSCOPIC ULTRASOUND (LOWER) N/A 12/03/2020    Dr JONNY Grijalva/unsuccessful biliary cannulation despite attempts at 2-wire cannulation and proph pancreatic stenting with cannulation around pancreatic stenting-Positive for high-grade adenocarcinoma, pancreatic head    ERCP N/A 12/03/2020    Dr JONNY Grijalva/unsuccessful biliary cannulation despite attempts at 2-wire cannulation and proph pancreatic stenting with cannulation around pancreatic stenting-Positive for high-grade adenocarcinoma, pancreatic head    ERCP  12/07/2020    Dr Rigo Kelly/sphincterotomy, placement of a 10 x 60 partially covered biliary stent    PORT SURGERY Right 2/5/2021    SINGLE LUMEN PORT PLACEMENT WITH ULTRASOUND AND FLUORO performed by Chetna Mills MD at [address] TOE AMPUTATION Bilateral     pinky toes removed    TOTAL KNEE ARTHROPLASTY Right 01/03/2020    RIGHT TOTAL KNEE REPLACEMENT performed by Roberto Carlos Baker MD at [address] OR       Current Medications:    Current Outpatient Medications   Medication Sig Dispense Refill    ondansetron (ZOFRAN) 4 MG tablet Take 2 tablets by mouth every 8 hours as needed for Nausea or Vomiting 30 tablet 2    lidocaine-prilocaine (EMLA) 2.5-2.5 % cream Apply to port area and cover with plastic wrap one hour prior to treatment 1 Tube 1    ondansetron (ZOFRAN ODT) 4 MG disintegrating tablet Take 2 tablets by mouth every 8 hours as needed for Nausea or Vomiting 60 tablet 3    apixaban (ELIQUIS) 2.5 MG TABS tablet Take 2.5 mg by mouth 2 times daily      ferrous sulfate (FEROSUL) 325 (65 Fe) MG tablet TAKE 1 TABLET BY MOUTH EVERY DAY (Patient taking differently: Take 325 mg by mouth daily (with breakfast) TAKE 1 TABLET BY MOUTH EVERY DAY) 90 tablet 3    lisinopril (PRINIVIL;ZESTRIL) 5 MG tablet Take 1 tablet by mouth 2 times daily (Patient taking differently: Take 10 mg by mouth daily ) 60 tablet 0    docusate sodium (COLACE) 100 MG capsule Take 1 capsule by mouth 2 times daily 60 capsule 1    calcitRIOL (ROCALTROL) 0.25 MCG capsule Take 0.25 mcg by mouth daily      levothyroxine (SYNTHROID) 112 MCG tablet Take 112 mcg by mouth Daily      simvastatin (ZOCOR) 20 MG tablet Take 20 mg by mouth nightly      allopurinol (ZYLOPRIM) 100 MG tablet Take 100 mg by mouth daily       No current facility-administered medications for this visit. Allergies:    Allergies   Allergen Reactions    Penicillins Rash     Childhood        Social History:    Social History     Tobacco Use    Smoking status: Never Smoker    Smokeless tobacco: Never Used   Substance Use Topics    Alcohol use: No    Drug use: No       Family History:   Family History   Problem Relation Age of Onset    Colon Cancer Brother     Cancer Mother         Breast Cancer    Heart Attack Father     Colon Polyps Neg Hx     Esophageal Cancer Neg Hx     Liver Cancer Neg Hx     Liver Disease Neg Hx     Rectal Cancer Neg Hx     Stomach Cancer Neg Hx        Vitals:  Vitals:    03/08/21 1315   BP: 130/68   Pulse: 73 Temp: 97.5 °F (36.4 °C)   SpO2: 97%   Weight: 179 lb 11.2 oz (81.5 kg)   Height: 5' 2\" (1.575 m)        Subjective   REVIEW OF SYSTEMS:   Review of Systems   Constitutional: Positive for fatigue and unexpected weight change. Negative for chills, diaphoresis and fever. HENT: Negative. Negative for congestion, ear pain, hearing loss, nosebleeds, sore throat and tinnitus. Eyes: Negative. Negative for pain, discharge and redness. Respiratory: Negative. Negative for cough, shortness of breath and wheezing. Cardiovascular: Negative. Negative for chest pain, palpitations and leg swelling. Gastrointestinal: Positive for diarrhea and nausea. Negative for abdominal pain, blood in stool, constipation and vomiting. Endocrine: Negative for polydipsia. Genitourinary: Negative for dysuria, flank pain, frequency, hematuria and urgency. Musculoskeletal: Negative. Negative for back pain, myalgias and neck pain. Skin: Negative. Negative for rash. Neurological: Positive for weakness. Negative for dizziness, tremors, seizures and headaches. Hematological: Does not bruise/bleed easily. Psychiatric/Behavioral: Negative. The patient is not nervous/anxious. Objective   PHYSICAL EXAM:  Physical Exam  Vitals signs reviewed. Constitutional:       General: She is not in acute distress. Appearance: She is well-developed. She is not diaphoretic. HENT:      Head: Normocephalic and atraumatic. Mouth/Throat:      Pharynx: Uvula midline. Tonsils: No tonsillar exudate. Eyes:      General: Lids are normal. No scleral icterus. Right eye: No discharge. Left eye: No discharge. Conjunctiva/sclera: Conjunctivae normal.      Pupils: Pupils are equal, round, and reactive to light. Neck:      Musculoskeletal: Normal range of motion and neck supple. Thyroid: No thyroid mass or thyromegaly. Vascular: No JVD. Trachea: Trachea normal. No tracheal deviation. Cardiovascular:      Rate and Rhythm: Normal rate and regular rhythm. Heart sounds: Normal heart sounds. No murmur. No friction rub. No gallop. Pulmonary:      Effort: Pulmonary effort is normal. No respiratory distress. Breath sounds: Normal breath sounds. No wheezing or rales. Chest:      Chest wall: No tenderness. Abdominal:      General: Bowel sounds are normal. There is no distension. Palpations: Abdomen is soft. There is no mass. Tenderness: There is no abdominal tenderness. There is no guarding or rebound. Hernia: No hernia is present. Musculoskeletal:         General: No tenderness or deformity. Comments: Range of motion within normal limits x4 extremities   Skin:     General: Skin is warm. Coloration: Skin is not pale. Findings: No erythema or rash. Neurological:      Mental Status: She is alert and oriented to person, place, and time. Cranial Nerves: No cranial nerve deficit. Coordination: Coordination normal.   Psychiatric:         Behavior: Behavior normal.         Thought Content: Thought content normal.         Labs:  Lab Results   Component Value Date    WBC 7.05 03/08/2021    HGB 8.9 (L) 03/08/2021    HCT 27.6 (L) 03/08/2021    .2 (H) 03/08/2021     03/08/2021     Lab Results   Component Value Date    NEUTROABS 5.09 03/08/2021     Lab Results   Component Value Date     03/08/2021    K 3.9 03/08/2021     03/08/2021    CO2 29 03/08/2021    BUN 18 (H) 03/08/2021    CREATININE 1.5 (H) 03/08/2021    GLUCOSE 187 (H) 03/08/2021    CALCIUM 9.7 03/08/2021    PROT 6.4 03/08/2021    LABALBU 3.6 03/08/2021    BILITOT 0.4 03/08/2021    ALKPHOS 166 (H) 03/08/2021    AST 58 (H) 03/08/2021    ALT 41 03/08/2021    LABGLOM 33 (A) 03/08/2021    GFRAA 33 (L) 01/20/2021    AGRATIO 1.9 01/20/2021    GLOB 2.7 03/08/2021         ASSESSMENT/PLAN:      1.    Locally advanced pancreatic adenocarcinoma, initiating palliative chemotherapy today with Gemzar 750 mg/m² and Abraxane 90 mg/m² every 2 weeks (a dose reduction by 25%). Pretreatment CA 19 9 of 236 on 1/20/2021. Tolerating treatment fairly well with the exception of increased fatigue, intermittent nausea and recent onset of diarrhea. -Cycle number 2-day #1 of Gemzar and Abraxane delivered  -CBC and CMP today    2. Management of chemotherapy side effects:  -Nausea-maintain hydration and electronic prescription was sent for Zofran and Phenergan for nausea as needed  -Diarrhea-we will obtain evaluation of stool for C. Difficile, once resulted can begin use of Imodium  -Fatigue-grade 1-2, monitor closely and may need to adjust regimen if needed  -Weight loss with decreased appetite-monitor closely, may need to initiate appetite enhancement with Megace or Marinol    2. Extra hepatic/intrahepatic biliary dilation, status post ERCP with metal stent placement on 12/7/2020 at [facility]. Liver enzymes initially improved and now tend to be trending back up, will monitor closely. -CMP today  -Virtual visit with Dr. Merrill Longoria on 2/16/2021 and documented that a stent adjustment may be needed if LFTs continue to increase    3. Anemia of chronic kidney failure, stage 3B. Receives BARBARA therapy with Retacrit intermittently for hemoglobin <11, last dose of 40,000 units given on 2/8/2021. She also continues to take ferrous sulfate 325 mg 1 tablet daily without significant difficulty, iron substrates on 12/3/2020 revealed a ferritin of 480 and iron saturation of 16%. -Continue ferrous sulfate  -Retacrit 40,000 units given today  -Repeat iron substrates at return appointment    4. Hypothyroidism presently being managed by Dr. Lala Johnson and is taking Synthroid 112 µg daily. TSH 3.55 on 12/2/2020  -Follow-up with Dr. Lala Johnson for monitoring of TSH and Synthroid dosing    5.   Routine health screening history of abnormal mammogram:  Annual routine follow-up screening mammogram on 9/20/2019 documented no mammographic evidence of malignancy, BI-RADS Category 2 benign findings.  -Annual screening mammogram is overdue, will address in the near future once treatment for her pancreatic adenocarcinoma has been initiated    I discussed all of the above findings included in the assessment and plan with the patient and the patient is in agreement to move forward with current recommendations/treatment. I have addressed all of their questions and concerns that were verbalized. FOLLOW UP:  1. Plan to return to clinic on 3/23/2021 for cycle #2, day 15  2. Follow-up appointment given for 4 weeks  3. Continue to follow with other medical providers as recommended    Discussed precautions related to [address] and being at increased risk. Discussed proper handwashing to be done frequently, limit exposure to other individuals and maintain social distancing of 6 feet. Recommend contacting primary care provider if having respiratory symptoms for further recommendations and consideration for testing.     (Please note that portions of this note were completed with a voice recognition program. Efforts were made to edit the dictations but occasionally words are mis-transcribed,  Also, portions of this note have been copied forward, however, changed to reflect the most current clinical status of this patient.)    Electronically signed by ALBERTO Seth on 3/19/2021 at 4:51 PM

## 2021-03-09 RX ORDER — ONDANSETRON 4 MG/1
8 TABLET, FILM COATED ORAL EVERY 8 HOURS PRN
Qty: 30 TABLET | Refills: 2 | Status: SHIPPED | OUTPATIENT
Start: 2021-03-09 | End: 2021-04-06 | Stop reason: SDUPTHER

## 2021-03-09 RX ORDER — PROMETHAZINE HYDROCHLORIDE 25 MG/1
25 TABLET ORAL 4 TIMES DAILY PRN
Qty: 20 TABLET | Refills: 1 | Status: SHIPPED | OUTPATIENT
Start: 2021-03-09 | End: 2021-04-13

## 2021-03-19 ASSESSMENT — ENCOUNTER SYMPTOMS
NAUSEA: 1
DIARRHEA: 1

## 2021-03-23 ENCOUNTER — HOSPITAL ENCOUNTER (OUTPATIENT)
Dept: INFUSION THERAPY | Age: 86
Discharge: HOME | End: 2021-03-23
Payer: MEDICARE

## 2021-03-23 VITALS
SYSTOLIC BLOOD PRESSURE: 134 MMHG | TEMPERATURE: 96.8 F | HEART RATE: 90 BPM | DIASTOLIC BLOOD PRESSURE: 72 MMHG | OXYGEN SATURATION: 98 % | BODY MASS INDEX: 32.57 KG/M2 | WEIGHT: 177 LBS | RESPIRATION RATE: 18 BRPM | HEIGHT: 62 IN

## 2021-03-23 DIAGNOSIS — C25.7 MALIGNANT NEOPLASM OF OTHER PARTS OF PANCREAS (HCC): ICD-10-CM

## 2021-03-23 DIAGNOSIS — N18.4 ANEMIA OF CHRONIC KIDNEY FAILURE, STAGE 4 (SEVERE) (HCC): ICD-10-CM

## 2021-03-23 DIAGNOSIS — D50.8 IRON DEFICIENCY ANEMIA SECONDARY TO INADEQUATE DIETARY IRON INTAKE: Primary | ICD-10-CM

## 2021-03-23 DIAGNOSIS — D64.9 ANEMIA, UNSPECIFIED TYPE: ICD-10-CM

## 2021-03-23 DIAGNOSIS — C25.9 PANCREATIC CARCINOMA (HCC): ICD-10-CM

## 2021-03-23 DIAGNOSIS — D63.1 ANEMIA OF CHRONIC KIDNEY FAILURE, STAGE 4 (SEVERE) (HCC): ICD-10-CM

## 2021-03-23 LAB
ALBUMIN SERPL-MCNC: 3.1 G/DL (ref 3.5–5.2)
ALP BLD-CCNC: 133 U/L (ref 35–104)
ALT SERPL-CCNC: 16 U/L (ref 9–52)
ANION GAP SERPL CALCULATED.3IONS-SCNC: 12 MMOL/L (ref 7–19)
AST SERPL-CCNC: 25 U/L (ref 14–36)
BASOPHILS ABSOLUTE: 0.04 K/UL (ref 0.01–0.08)
BASOPHILS RELATIVE PERCENT: 0.5 % (ref 0.1–1.2)
BILIRUB SERPL-MCNC: 0.4 MG/DL (ref 0.2–1.3)
BUN BLDV-MCNC: 26 MG/DL (ref 7–17)
CA 19-9: 108 U/ML (ref 0–37)
CALCIUM SERPL-MCNC: 9.5 MG/DL (ref 8.4–10.2)
CHLORIDE BLD-SCNC: 104 MMOL/L (ref 98–111)
CO2: 25 MMOL/L (ref 22–29)
CREAT SERPL-MCNC: 1.4 MG/DL (ref 0.5–1)
EOSINOPHILS ABSOLUTE: 0.35 K/UL (ref 0.04–0.54)
EOSINOPHILS RELATIVE PERCENT: 4.5 % (ref 0.7–7)
FERRITIN: 530 NG/ML (ref 11.1–264)
GFR NON-AFRICAN AMERICAN: 36
GLOBULIN: 2.4 G/DL
GLUCOSE BLD-MCNC: 174 MG/DL (ref 74–106)
HCT VFR BLD CALC: 25.8 % (ref 34.1–44.9)
HEMOGLOBIN: 8.4 G/DL (ref 11.2–15.7)
IRON SATURATION: 15 % (ref 14–50)
IRON: 54 UG/DL (ref 37–170)
LYMPHOCYTES ABSOLUTE: 0.53 K/UL (ref 1.18–3.74)
LYMPHOCYTES RELATIVE PERCENT: 6.9 % (ref 19.3–53.1)
MCH RBC QN AUTO: 34.3 PG (ref 25.6–32.2)
MCHC RBC AUTO-ENTMCNC: 32.6 G/DL (ref 32.3–35.5)
MCV RBC AUTO: 105.3 FL (ref 79.4–94.8)
MONOCYTES ABSOLUTE: 0.89 K/UL (ref 0.24–0.82)
MONOCYTES RELATIVE PERCENT: 11.5 % (ref 4.7–12.5)
NEUTROPHILS ABSOLUTE: 5.92 K/UL (ref 1.56–6.13)
NEUTROPHILS RELATIVE PERCENT: 76.6 % (ref 34–71.1)
PDW BLD-RTO: 14.9 % (ref 11.7–14.4)
PLATELET # BLD: 569 K/UL (ref 182–369)
PMV BLD AUTO: 9.9 FL (ref 7.4–10.4)
POTASSIUM SERPL-SCNC: 4.4 MMOL/L (ref 3.5–5.1)
RBC # BLD: 2.45 M/UL (ref 3.93–5.22)
SODIUM BLD-SCNC: 141 MMOL/L (ref 137–145)
TOTAL IRON BINDING CAPACITY: 366 UG/DL (ref 265–497)
TOTAL PROTEIN: 5.5 G/DL (ref 6.3–8.2)
WBC # BLD: 7.73 K/UL (ref 3.98–10.04)

## 2021-03-23 PROCEDURE — 85025 COMPLETE CBC W/AUTO DIFF WBC: CPT

## 2021-03-23 PROCEDURE — 6360000002 HC RX W HCPCS: Performed by: NURSE PRACTITIONER

## 2021-03-23 PROCEDURE — 83550 IRON BINDING TEST: CPT

## 2021-03-23 PROCEDURE — 83540 ASSAY OF IRON: CPT

## 2021-03-23 PROCEDURE — 82728 ASSAY OF FERRITIN: CPT

## 2021-03-23 PROCEDURE — 96417 CHEMO IV INFUS EACH ADDL SEQ: CPT

## 2021-03-23 PROCEDURE — 86301 IMMUNOASSAY TUMOR CA 19-9: CPT

## 2021-03-23 PROCEDURE — 96413 CHEMO IV INFUSION 1 HR: CPT

## 2021-03-23 PROCEDURE — 2580000003 HC RX 258: Performed by: NURSE PRACTITIONER

## 2021-03-23 PROCEDURE — 80053 COMPREHEN METABOLIC PANEL: CPT

## 2021-03-23 PROCEDURE — 96367 TX/PROPH/DG ADDL SEQ IV INF: CPT

## 2021-03-23 RX ORDER — SODIUM CHLORIDE 9 MG/ML
20 INJECTION, SOLUTION INTRAVENOUS ONCE
Status: COMPLETED | OUTPATIENT
Start: 2021-03-23 | End: 2021-03-23

## 2021-03-23 RX ORDER — SODIUM CHLORIDE 0.9 % (FLUSH) 0.9 %
10 SYRINGE (ML) INJECTION PRN
Status: DISCONTINUED | OUTPATIENT
Start: 2021-03-23 | End: 2021-03-24 | Stop reason: HOSPADM

## 2021-03-23 RX ORDER — PACLITAXEL 100 MG/20ML
90 INJECTION, POWDER, LYOPHILIZED, FOR SUSPENSION INTRAVENOUS ONCE
Status: COMPLETED | OUTPATIENT
Start: 2021-03-23 | End: 2021-03-23

## 2021-03-23 RX ORDER — HEPARIN SODIUM (PORCINE) LOCK FLUSH IV SOLN 100 UNIT/ML 100 UNIT/ML
500 SOLUTION INTRAVENOUS PRN
Status: DISCONTINUED | OUTPATIENT
Start: 2021-03-23 | End: 2021-03-24 | Stop reason: HOSPADM

## 2021-03-23 RX ADMIN — SODIUM CHLORIDE, PRESERVATIVE FREE 10 ML: 5 INJECTION INTRAVENOUS at 16:19

## 2021-03-23 RX ADMIN — DEXAMETHASONE SODIUM PHOSPHATE: 10 INJECTION, SOLUTION INTRAMUSCULAR; INTRAVENOUS at 14:18

## 2021-03-23 RX ADMIN — HEPARIN 500 UNITS: 100 SYRINGE at 16:18

## 2021-03-23 RX ADMIN — PACLITAXEL 170 MG: 100 INJECTION, POWDER, LYOPHILIZED, FOR SUSPENSION INTRAVENOUS at 14:47

## 2021-03-23 RX ADMIN — SODIUM CHLORIDE 20 ML/HR: 9 INJECTION, SOLUTION INTRAVENOUS at 14:19

## 2021-03-23 RX ADMIN — GEMCITABINE 1400 MG: 38 INJECTION, SOLUTION INTRAVENOUS at 15:46

## 2021-04-06 ENCOUNTER — OFFICE VISIT (OUTPATIENT)
Dept: HEMATOLOGY | Age: 86
End: 2021-04-06
Payer: MEDICARE

## 2021-04-06 ENCOUNTER — HOSPITAL ENCOUNTER (OUTPATIENT)
Dept: INFUSION THERAPY | Age: 86
Discharge: HOME | End: 2021-04-06
Payer: MEDICARE

## 2021-04-06 VITALS
HEART RATE: 101 BPM | OXYGEN SATURATION: 96 % | HEIGHT: 62 IN | WEIGHT: 174.7 LBS | BODY MASS INDEX: 32.15 KG/M2 | SYSTOLIC BLOOD PRESSURE: 139 MMHG | DIASTOLIC BLOOD PRESSURE: 77 MMHG | TEMPERATURE: 98 F

## 2021-04-06 DIAGNOSIS — N18.4 ANEMIA OF CHRONIC KIDNEY FAILURE, STAGE 4 (SEVERE) (HCC): ICD-10-CM

## 2021-04-06 DIAGNOSIS — K83.8 DILATION OF BILIARY TRACT: ICD-10-CM

## 2021-04-06 DIAGNOSIS — D64.9 ENCOUNTER FOR ESA (ERYTHROPOIETIN STIMULATING AGENT) ANEMIA MANAGEMENT: ICD-10-CM

## 2021-04-06 DIAGNOSIS — Z51.11 CHEMOTHERAPY MANAGEMENT, ENCOUNTER FOR: ICD-10-CM

## 2021-04-06 DIAGNOSIS — R11.0 CHEMOTHERAPY-INDUCED NAUSEA: ICD-10-CM

## 2021-04-06 DIAGNOSIS — Z71.89 CARE PLAN DISCUSSED WITH PATIENT: ICD-10-CM

## 2021-04-06 DIAGNOSIS — C25.7 MALIGNANT NEOPLASM OF OTHER PARTS OF PANCREAS (HCC): ICD-10-CM

## 2021-04-06 DIAGNOSIS — R19.7 DIARRHEA, UNSPECIFIED TYPE: ICD-10-CM

## 2021-04-06 DIAGNOSIS — C25.9 PANCREATIC CARCINOMA (HCC): Primary | ICD-10-CM

## 2021-04-06 DIAGNOSIS — Z79.899 ENCOUNTER FOR ESA (ERYTHROPOIETIN STIMULATING AGENT) ANEMIA MANAGEMENT: ICD-10-CM

## 2021-04-06 DIAGNOSIS — D63.1 ANEMIA OF CHRONIC KIDNEY FAILURE, STAGE 4 (SEVERE) (HCC): ICD-10-CM

## 2021-04-06 DIAGNOSIS — D50.8 IRON DEFICIENCY ANEMIA SECONDARY TO INADEQUATE DIETARY IRON INTAKE: ICD-10-CM

## 2021-04-06 DIAGNOSIS — R53.1 WEAKNESS GENERALIZED: ICD-10-CM

## 2021-04-06 DIAGNOSIS — T45.1X5A CHEMOTHERAPY-INDUCED NAUSEA: ICD-10-CM

## 2021-04-06 DIAGNOSIS — D64.9 ANEMIA, UNSPECIFIED TYPE: ICD-10-CM

## 2021-04-06 LAB
ALBUMIN SERPL-MCNC: 3.4 G/DL (ref 3.5–5.2)
ALP BLD-CCNC: 197 U/L (ref 35–104)
ALT SERPL-CCNC: 32 U/L (ref 5–33)
ANION GAP SERPL CALCULATED.3IONS-SCNC: 13 MMOL/L (ref 7–19)
AST SERPL-CCNC: 42 U/L (ref 5–32)
BASOPHILS ABSOLUTE: 0.04 K/UL (ref 0.01–0.08)
BASOPHILS RELATIVE PERCENT: 0.3 % (ref 0.1–1.2)
BILIRUB SERPL-MCNC: 0.6 MG/DL (ref 0.2–1.2)
BUN BLDV-MCNC: 21 MG/DL (ref 8–23)
CALCIUM SERPL-MCNC: 9.7 MG/DL (ref 8.8–10.2)
CHLORIDE BLD-SCNC: 101 MMOL/L (ref 98–111)
CO2: 25 MMOL/L (ref 22–29)
CREAT SERPL-MCNC: 1.3 MG/DL (ref 0.5–0.9)
EOSINOPHILS ABSOLUTE: 0.09 K/UL (ref 0.04–0.54)
EOSINOPHILS RELATIVE PERCENT: 0.6 % (ref 0.7–7)
FERRITIN: 1088 NG/ML (ref 13–150)
GFR AFRICAN AMERICAN: 47
GFR NON-AFRICAN AMERICAN: 39
GLUCOSE BLD-MCNC: 160 MG/DL (ref 74–109)
HCT VFR BLD CALC: 26.4 % (ref 34.1–44.9)
HEMOGLOBIN: 8.6 G/DL (ref 11.2–15.7)
IRON SATURATION: 19 % (ref 14–50)
IRON: 43 UG/DL (ref 37–145)
LYMPHOCYTES ABSOLUTE: 0.48 K/UL (ref 1.18–3.74)
LYMPHOCYTES RELATIVE PERCENT: 3.3 % (ref 19.3–53.1)
MAGNESIUM: 1.5 MG/DL (ref 1.6–2.4)
MCH RBC QN AUTO: 35 PG (ref 25.6–32.2)
MCHC RBC AUTO-ENTMCNC: 32.6 G/DL (ref 32.3–35.5)
MCV RBC AUTO: 107.3 FL (ref 79.4–94.8)
MONOCYTES ABSOLUTE: 1.76 K/UL (ref 0.24–0.82)
MONOCYTES RELATIVE PERCENT: 12.2 % (ref 4.7–12.5)
NEUTROPHILS ABSOLUTE: 12.03 K/UL (ref 1.56–6.13)
NEUTROPHILS RELATIVE PERCENT: 83.6 % (ref 34–71.1)
PDW BLD-RTO: 16.7 % (ref 11.7–14.4)
PHOSPHORUS: 2.4 MG/DL (ref 2.5–4.5)
PLATELET # BLD: 397 K/UL (ref 182–369)
PMV BLD AUTO: 10.3 FL (ref 7.4–10.4)
POTASSIUM SERPL-SCNC: 4 MMOL/L (ref 3.5–5)
RBC # BLD: 2.46 M/UL (ref 3.93–5.22)
SODIUM BLD-SCNC: 139 MMOL/L (ref 136–145)
TOTAL IRON BINDING CAPACITY: 226 UG/DL (ref 250–400)
TOTAL PROTEIN: 5.8 G/DL (ref 6.6–8.7)
VITAMIN B-12: 781 PG/ML (ref 211–946)
WBC # BLD: 14.4 K/UL (ref 3.98–10.04)

## 2021-04-06 PROCEDURE — 96417 CHEMO IV INFUS EACH ADDL SEQ: CPT

## 2021-04-06 PROCEDURE — 83550 IRON BINDING TEST: CPT

## 2021-04-06 PROCEDURE — 96361 HYDRATE IV INFUSION ADD-ON: CPT

## 2021-04-06 PROCEDURE — G8417 CALC BMI ABV UP PARAM F/U: HCPCS | Performed by: NURSE PRACTITIONER

## 2021-04-06 PROCEDURE — 1090F PRES/ABSN URINE INCON ASSESS: CPT | Performed by: NURSE PRACTITIONER

## 2021-04-06 PROCEDURE — 1123F ACP DISCUSS/DSCN MKR DOCD: CPT | Performed by: NURSE PRACTITIONER

## 2021-04-06 PROCEDURE — 83540 ASSAY OF IRON: CPT

## 2021-04-06 PROCEDURE — 85025 COMPLETE CBC W/AUTO DIFF WBC: CPT

## 2021-04-06 PROCEDURE — 2580000003 HC RX 258: Performed by: NURSE PRACTITIONER

## 2021-04-06 PROCEDURE — 83735 ASSAY OF MAGNESIUM: CPT

## 2021-04-06 PROCEDURE — 96372 THER/PROPH/DIAG INJ SC/IM: CPT

## 2021-04-06 PROCEDURE — 96367 TX/PROPH/DG ADDL SEQ IV INF: CPT

## 2021-04-06 PROCEDURE — 1036F TOBACCO NON-USER: CPT | Performed by: NURSE PRACTITIONER

## 2021-04-06 PROCEDURE — 99214 OFFICE O/P EST MOD 30 MIN: CPT | Performed by: NURSE PRACTITIONER

## 2021-04-06 PROCEDURE — 80053 COMPREHEN METABOLIC PANEL: CPT

## 2021-04-06 PROCEDURE — G8428 CUR MEDS NOT DOCUMENT: HCPCS | Performed by: NURSE PRACTITIONER

## 2021-04-06 PROCEDURE — 6360000002 HC RX W HCPCS: Performed by: NURSE PRACTITIONER

## 2021-04-06 PROCEDURE — 82607 VITAMIN B-12: CPT

## 2021-04-06 PROCEDURE — 84100 ASSAY OF PHOSPHORUS: CPT

## 2021-04-06 PROCEDURE — 4040F PNEUMOC VAC/ADMIN/RCVD: CPT | Performed by: NURSE PRACTITIONER

## 2021-04-06 PROCEDURE — 96413 CHEMO IV INFUSION 1 HR: CPT

## 2021-04-06 PROCEDURE — 82728 ASSAY OF FERRITIN: CPT

## 2021-04-06 RX ORDER — MEPERIDINE HYDROCHLORIDE 50 MG/ML
12.5 INJECTION INTRAMUSCULAR; INTRAVENOUS; SUBCUTANEOUS ONCE
Status: CANCELLED | OUTPATIENT
Start: 2021-04-20 | End: 2021-04-20

## 2021-04-06 RX ORDER — HEPARIN SODIUM (PORCINE) LOCK FLUSH IV SOLN 100 UNIT/ML 100 UNIT/ML
500 SOLUTION INTRAVENOUS PRN
Status: CANCELLED | OUTPATIENT
Start: 2021-04-06

## 2021-04-06 RX ORDER — METHYLPREDNISOLONE SODIUM SUCCINATE 125 MG/2ML
125 INJECTION, POWDER, LYOPHILIZED, FOR SOLUTION INTRAMUSCULAR; INTRAVENOUS ONCE
Status: CANCELLED | OUTPATIENT
Start: 2021-04-06 | End: 2021-04-06

## 2021-04-06 RX ORDER — EPINEPHRINE 1 MG/ML
0.3 INJECTION, SOLUTION, CONCENTRATE INTRAVENOUS PRN
Status: CANCELLED | OUTPATIENT
Start: 2021-04-06

## 2021-04-06 RX ORDER — SODIUM CHLORIDE 9 MG/ML
INJECTION, SOLUTION INTRAVENOUS CONTINUOUS
Status: CANCELLED | OUTPATIENT
Start: 2021-04-20

## 2021-04-06 RX ORDER — SODIUM CHLORIDE 0.9 % (FLUSH) 0.9 %
10 SYRINGE (ML) INJECTION PRN
Status: CANCELLED | OUTPATIENT
Start: 2021-04-20

## 2021-04-06 RX ORDER — MEPERIDINE HYDROCHLORIDE 50 MG/ML
12.5 INJECTION INTRAMUSCULAR; INTRAVENOUS; SUBCUTANEOUS ONCE
Status: CANCELLED | OUTPATIENT
Start: 2021-04-06 | End: 2021-04-06

## 2021-04-06 RX ORDER — PACLITAXEL 100 MG/20ML
90 INJECTION, POWDER, LYOPHILIZED, FOR SUSPENSION INTRAVENOUS ONCE
Status: CANCELLED | OUTPATIENT
Start: 2021-04-06

## 2021-04-06 RX ORDER — SODIUM CHLORIDE 0.9 % (FLUSH) 0.9 %
5 SYRINGE (ML) INJECTION PRN
Status: CANCELLED | OUTPATIENT
Start: 2021-04-06

## 2021-04-06 RX ORDER — METHYLPREDNISOLONE SODIUM SUCCINATE 125 MG/2ML
125 INJECTION, POWDER, LYOPHILIZED, FOR SOLUTION INTRAMUSCULAR; INTRAVENOUS ONCE
Status: CANCELLED | OUTPATIENT
Start: 2021-04-20 | End: 2021-04-20

## 2021-04-06 RX ORDER — PACLITAXEL 100 MG/20ML
90 INJECTION, POWDER, LYOPHILIZED, FOR SUSPENSION INTRAVENOUS ONCE
Status: COMPLETED | OUTPATIENT
Start: 2021-04-06 | End: 2021-04-06

## 2021-04-06 RX ORDER — SODIUM CHLORIDE 0.9 % (FLUSH) 0.9 %
10 SYRINGE (ML) INJECTION PRN
Status: DISCONTINUED | OUTPATIENT
Start: 2021-04-06 | End: 2021-04-07 | Stop reason: HOSPADM

## 2021-04-06 RX ORDER — SODIUM CHLORIDE 9 MG/ML
20 INJECTION, SOLUTION INTRAVENOUS ONCE
Status: CANCELLED | OUTPATIENT
Start: 2021-04-20 | End: 2021-04-20

## 2021-04-06 RX ORDER — DIPHENHYDRAMINE HYDROCHLORIDE 50 MG/ML
50 INJECTION INTRAMUSCULAR; INTRAVENOUS ONCE
Status: CANCELLED | OUTPATIENT
Start: 2021-04-20 | End: 2021-04-20

## 2021-04-06 RX ORDER — HEPARIN SODIUM (PORCINE) LOCK FLUSH IV SOLN 100 UNIT/ML 100 UNIT/ML
500 SOLUTION INTRAVENOUS PRN
Status: DISCONTINUED | OUTPATIENT
Start: 2021-04-06 | End: 2021-04-07 | Stop reason: HOSPADM

## 2021-04-06 RX ORDER — SODIUM CHLORIDE 9 MG/ML
20 INJECTION, SOLUTION INTRAVENOUS ONCE
Status: CANCELLED | OUTPATIENT
Start: 2021-04-06 | End: 2021-04-06

## 2021-04-06 RX ORDER — DIPHENHYDRAMINE HYDROCHLORIDE 50 MG/ML
50 INJECTION INTRAMUSCULAR; INTRAVENOUS ONCE
Status: CANCELLED | OUTPATIENT
Start: 2021-04-06 | End: 2021-04-06

## 2021-04-06 RX ORDER — PACLITAXEL 100 MG/20ML
90 INJECTION, POWDER, LYOPHILIZED, FOR SUSPENSION INTRAVENOUS ONCE
Status: CANCELLED | OUTPATIENT
Start: 2021-04-20

## 2021-04-06 RX ORDER — MAGNESIUM SULFATE IN WATER 40 MG/ML
2000 INJECTION, SOLUTION INTRAVENOUS ONCE
Status: COMPLETED | OUTPATIENT
Start: 2021-04-06 | End: 2021-04-06

## 2021-04-06 RX ORDER — SODIUM CHLORIDE 9 MG/ML
20 INJECTION, SOLUTION INTRAVENOUS ONCE
Status: COMPLETED | OUTPATIENT
Start: 2021-04-06 | End: 2021-04-07

## 2021-04-06 RX ORDER — SODIUM CHLORIDE 0.9 % (FLUSH) 0.9 %
5 SYRINGE (ML) INJECTION PRN
Status: CANCELLED | OUTPATIENT
Start: 2021-04-20

## 2021-04-06 RX ORDER — HEPARIN SODIUM (PORCINE) LOCK FLUSH IV SOLN 100 UNIT/ML 100 UNIT/ML
500 SOLUTION INTRAVENOUS PRN
Status: CANCELLED | OUTPATIENT
Start: 2021-04-20

## 2021-04-06 RX ORDER — SODIUM CHLORIDE 9 MG/ML
INJECTION, SOLUTION INTRAVENOUS CONTINUOUS
Status: CANCELLED | OUTPATIENT
Start: 2021-04-06

## 2021-04-06 RX ORDER — SODIUM CHLORIDE 0.9 % (FLUSH) 0.9 %
10 SYRINGE (ML) INJECTION PRN
Status: CANCELLED | OUTPATIENT
Start: 2021-04-06

## 2021-04-06 RX ORDER — EPINEPHRINE 1 MG/ML
0.3 INJECTION, SOLUTION, CONCENTRATE INTRAVENOUS PRN
Status: CANCELLED | OUTPATIENT
Start: 2021-04-20

## 2021-04-06 RX ADMIN — DEXAMETHASONE SODIUM PHOSPHATE: 10 INJECTION, SOLUTION INTRAMUSCULAR; INTRAVENOUS at 14:13

## 2021-04-06 RX ADMIN — HEPARIN 500 UNITS: 100 SYRINGE at 17:23

## 2021-04-06 RX ADMIN — GEMCITABINE 1400 MG: 38 INJECTION, SOLUTION INTRAVENOUS at 15:37

## 2021-04-06 RX ADMIN — SODIUM CHLORIDE, PRESERVATIVE FREE 10 ML: 5 INJECTION INTRAVENOUS at 17:23

## 2021-04-06 RX ADMIN — MAGNESIUM SULFATE HEPTAHYDRATE 2000 MG: 40 INJECTION, SOLUTION INTRAVENOUS at 15:52

## 2021-04-06 RX ADMIN — EPOETIN ALFA-EPBX 40000 UNITS: 40000 INJECTION, SOLUTION INTRAVENOUS; SUBCUTANEOUS at 17:22

## 2021-04-06 RX ADMIN — SODIUM CHLORIDE 20 ML/HR: 9 INJECTION, SOLUTION INTRAVENOUS at 14:14

## 2021-04-06 RX ADMIN — PACLITAXEL 170 MG: 100 INJECTION, POWDER, LYOPHILIZED, FOR SUSPENSION INTRAVENOUS at 14:47

## 2021-04-06 ASSESSMENT — ENCOUNTER SYMPTOMS
BLOOD IN STOOL: 0
RESPIRATORY NEGATIVE: 1
BACK PAIN: 0
COUGH: 0
VOMITING: 0
WHEEZING: 0
SHORTNESS OF BREATH: 0
NAUSEA: 1
SORE THROAT: 0
EYE DISCHARGE: 0
EYES NEGATIVE: 1
ABDOMINAL PAIN: 0
EYE REDNESS: 0
EYE PAIN: 0

## 2021-04-06 NOTE — PROGRESS NOTES
Progress Note      Pt Name: Zenon Hastings  YOB: 1934  MRN: [id number]    Date of evaluation: 4/6/2021  History Obtained From:  patient, electronic medical record    CHIEF COMPLAINT:    Chief Complaint   Patient presents with    Chemotherapy    Cancer     Pancreatic    Anemia    Other     Nausea     HISTORY OF PRESENT ILLNESS:    Zenon Hastings is a 80 y.o.  female with significant PMH of iron deficiency anemia, chronic kidney disease stage IV,  and diagnosis of pancreatic adenocarcinoma. She is currently receiving palliative chemotherapy with Gemzar 750 mg/m² and Abraxane 90 mg/m² every 2 weeks (this is a 25% dose reduction). Dat Pierce returns today for evaluation, side effect monitoring, lab monitoring and consideration to receive cycle #3, day 1 of Gemzar and Abraxane. She presents today indicating that overall she is tolerating treatment well with the exception of increased fatigue and intermittent nausea. She denies any diarrhea and reports occasional constipation. In relation to her anemia of chronic kidney disease she has been receiving BARBARA therapy with Retacrit for hemoglobin <11. She last received Retacrit on 3/8/2021 and will receive a dose today, she has a hemoglobin of 8.6. She reports she continues to take her ferrous sulfate 325 mg 1 tablet daily without difficulty. Iron substrates repeated today, 4/6/2021 revealed a ferritin of 1088, iron 43, iron saturation 19%, TIBC 226 and a vitamin B12 level of 781. CA 199 108 on 3/23/2021, improved compared to pretreatment value of 236 on 1/20/2021. Suggesting positive response to treatment, anticipating restaging scans. CBC was reviewed today, is within acceptable limits to continue with treatment and is documented below.     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 Procrit to be given every 2 weeks ×4 and then monthly, dose to be titrated appropriately. Hold dose for hemoglobin >11   Currently receiving Retacrit 40,000 units every 4 weeks  2/8/2021 palliative chemotherapy on 2/8/2021 with Gemzar 750 mg/m² and Abraxane 90 mg/m² every 2 weeks, this is a dose reduction by 25%      Tumor monitoring:  · 12/2/2020-CA 19-9 of 133  · 12/23/2020-CA 19-9 of 217  · 1/20/2021-CA 19-9 of 236  · 3/23/2021- CA 19-9 of Rosaura Chirinos 69 seen by Dr. Katelyn Moraes on 12/2/2020 as an inpatient consultation at [facility]) of [facility] for findings of a pancreatic mass and bile duct obstruction. She presented to the ER department with complaints of hematuria that began a few days prior to presentation. She denied any dysuria, back pain, no nausea or vomiting.  She also reported easily bruising and ecchymotic areas in her back, abdomen and flank.  Initial laboratory work-up showed INR above 18.  She was given vitamin K subcutaneously.  She was found to have elevated LFTs and the following are events that occurred. · 12/01/2020CT abdomen pelvis without contrast showed a pancreatic head mass measuring 3.3 x 3.7 cm in size and associated, bile duct dilatation above the level of the ampulla.  Associated moderate pancreatic ductal dilatation. · 12/2/2020-CA 19-9 133  · 12/03/2020ERCP with FNA biopsy Positive for high-grade adenocarcinoma.  Unfortunately, stent could not be placed. · 12/5/2020-transferred from [facility]) to [address] [place] [address] and discharged home on 12/8/2020  · 12/07/2020- Cholangipancreatography Retrograde Endo ERCP with sphincterotomy, balloon sweep and placement of 10x60 PC BS metal stent at [facility] in [place].   · 12/21/2020- CT chest with contrast documented no evidence of intrathoracic neoplastic tendencies to include hematuria, melena, hematochezia and epistaxis. Gio Valladares had a colonoscopy on 4/26/2016 by Dr. Malathi Damico for further evaluation of iron deficiency. The only abnormal finding was diverticulosis. Gio Valladares is routinely followed by Dr. Vasyl Weaver for her chronic kidney disease stage IV. CMP on 6/11/2018 documented a creatinine of 2.2 and GFR 21. CBC review from 2/2/2018- 6/11/2018 documented hemoglobin ranging from 9.4- 10.1, RBC 2.91- 3.22, MCV 94- 100 with a normal WBC and platelet count  CBC at initial consultation on 7/20/2018 documented a WBC of 7.33, ANC 4.74, RBC 3.04, hemoglobin 10.3, .6 and a platelet count of 842,150. Serology studies on 7/20/2018:  Creatinine 1.98   GFR 23   Calcium 10.3   IgG 700, IgA 180, and IgM 52   M spike not observed   Immunofixation: No monoclonality detected. Iron 74   Iron saturation 27%   TIBC 278   Vitamin B12 437   Folate 15.7   Ferritin 299   Reticulocyte count 1.5%   Erythropoietin 11.6      Beta-2 microglobulin 6.2     Occult stool positive 1 of 3 samples on 7/25/2018. Colonoscopy on 10/4/2018 by Dr. Malathi Damico was documented as removal of 2 polyps with benign pathology. No evidence of bleeding. Serology studies on 10/15/2018:  Iron 76   TIBC 287   Iron saturation 26%   Ferritin 321   Hemoglobin 10.1   Creatinine 2.47   GFR 17    11/26/2018 - Initiated Procrit 20,000 units for chronic kidney disease stage IV, to be given every 2 weeks ×4 and then monthly, dose to be titrated appropriately. Hemoglobin 9.6. All has anemia of chronic disease to include chronic kidney disease stage IV. She will began receiving growth factor support and will need to maintain a ferritin level greater than 200 and an iron saturation of 25% for the Procrit to be beneficial. Hold Procrit dose for hemoglobin >11. Indications/rationale for Procrit was discussed with Gio Valladares. Risks, benefits and expectations were outlined.  Risks including, but not limited to hypertension, stroke, MI, death were discussed and acknowledged by [place]. Serology studies on 3/8/2019:  Creatinine 1.7/GFR 30.4   Iron 80   Iron saturation 26.5%   TIBC 302   Ferritin 144   Vitamin B12 501   Folate 13.9    Serology studies on 3/5/2020:  · Creatinine 1.3/GFR 41.4  · Iron 84  · TIBC 390  · Iron saturation 21.5%  · Ferritin 342    Iron substrates on 6/25/2020  · Ferritin 311  · Iron 76  · Iron saturation 23%  · TIBC 326  · Creatinine 1.3/GFR 39    Labs on 12/2/2020 and 12/3/2020:  · Iron 35  · TIBC 213  · Iron saturation 16%  · Vitamin B12 483  · Folate 10.6  · Ferritin 480    Labs on 3/23/2021  · Iron 54  · TIBC 366  · Iron saturation 15  · Ferritin 530  · Reticulocytes 2.1      Age-appropriate health screening:  · Colonoscopy on 10/4/2018 by Dr. Hussain Evans was documented as removal of 2 polyps with benign pathology. No evidence of bleeding. · 9/20/2019 Bilateral mammogram at [address] [place] [address] documented no mammographic evidence of malignancy.   · No bone mineral density on file     Past Medical History:    Past Medical History:   Diagnosis Date    Abdominal aortic aneurysm (AAA) ([facility] [address]     Anemia     Arthritis     Cancer ([address] 12/01/2020    pancreatic    Chronic kidney disease     DVT of lower extremity (deep venous thrombosis) (HCC)     twice 2010 and 2017    Hyperlipidemia     Hypertension     Kidney stones     Osteoarthritis     Palliative care patient 12/02/2020    Thyroid disease     Thyroid disorder        Past Surgical History:    Past Surgical History:   Procedure Laterality Date    COLONOSCOPY  2016    Dr White Rm problems    ENDOSCOPIC ULTRASOUND (LOWER) N/A 12/03/2020    Dr JONNY Grijalva/unsuccessful biliary cannulation despite attempts at 2-wire cannulation and proph pancreatic stenting with cannulation around pancreatic stenting-Positive for high-grade adenocarcinoma, pancreatic head    ERCP N/A 12/03/2020    Dr JONNY Grijalva/unsuccessful biliary cannulation despite attempts at 2-wire cannulation and proph pancreatic stenting with cannulation around pancreatic stenting-Positive for high-grade adenocarcinoma, pancreatic head    ERCP  12/07/2020    Dr Victor Manuel Hernandez-w/sphincterotomy, placement of a 10 x 60 partially covered biliary stent    PORT SURGERY Right 2/5/2021    SINGLE LUMEN PORT PLACEMENT WITH ULTRASOUND AND FLUORO performed by Anthony Burns MD at [address] TOE AMPUTATION Bilateral     pinky toes removed    TOTAL KNEE ARTHROPLASTY Right 01/03/2020    RIGHT TOTAL KNEE REPLACEMENT performed by Yrn Toledo MD at [address] [facility] OR       Current Medications:    Current Outpatient Medications   Medication Sig Dispense Refill    lidocaine-prilocaine (EMLA) 2.5-2.5 % cream Apply to port area and cover with plastic wrap one hour prior to treatment 1 Tube 1    ondansetron (ZOFRAN ODT) 4 MG disintegrating tablet Take 2 tablets by mouth every 8 hours as needed for Nausea or Vomiting 60 tablet 3    apixaban (ELIQUIS) 2.5 MG TABS tablet Take 2.5 mg by mouth 2 times daily      ferrous sulfate (FEROSUL) 325 (65 Fe) MG tablet TAKE 1 TABLET BY MOUTH EVERY DAY (Patient taking differently: Take 325 mg by mouth daily (with breakfast) TAKE 1 TABLET BY MOUTH EVERY DAY) 90 tablet 3    lisinopril (PRINIVIL;ZESTRIL) 5 MG tablet Take 1 tablet by mouth 2 times daily (Patient taking differently: Take 10 mg by mouth daily ) 60 tablet 0    docusate sodium (COLACE) 100 MG capsule Take 1 capsule by mouth 2 times daily 60 capsule 1    calcitRIOL (ROCALTROL) 0.25 MCG capsule Take 0.25 mcg by mouth daily      allopurinol (ZYLOPRIM) 100 MG tablet Take 100 mg by mouth daily      levothyroxine (SYNTHROID) 112 MCG tablet Take 112 mcg by mouth Daily      simvastatin (ZOCOR) 20 MG tablet Take 20 mg by mouth nightly      promethazine (PHENERGAN) 25 MG tablet TAKE 1 TABLET BY MOUTH FOUR TIMES DAILY AS NEEDED FOR NAUSEA 20 tablet 1     No current facility-administered medications for this distress. Appearance: She is well-developed. She is not diaphoretic. Comments: Fatigued appearing   HENT:      Head: Normocephalic and atraumatic. Mouth/Throat:      Pharynx: Uvula midline. Tonsils: No tonsillar exudate. Eyes:      General: Lids are normal. No scleral icterus. Right eye: No discharge. Left eye: No discharge. Conjunctiva/sclera: Conjunctivae normal.      Pupils: Pupils are equal, round, and reactive to light. Neck:      Musculoskeletal: Normal range of motion and neck supple. Thyroid: No thyroid mass or thyromegaly. Vascular: No JVD. Trachea: Trachea normal. No tracheal deviation. Cardiovascular:      Rate and Rhythm: Normal rate and regular rhythm. Heart sounds: Normal heart sounds. No murmur. No friction rub. No gallop. Pulmonary:      Effort: Pulmonary effort is normal. No respiratory distress. Breath sounds: Normal breath sounds. No wheezing or rales. Chest:      Chest wall: No tenderness. Abdominal:      General: Bowel sounds are normal. There is no distension. Palpations: Abdomen is soft. There is no mass. Tenderness: There is no abdominal tenderness. There is no guarding or rebound. Hernia: No hernia is present. Musculoskeletal:         General: No tenderness or deformity. Comments: Range of motion within normal limits x4 extremities   Skin:     General: Skin is warm. Coloration: Skin is not pale. Findings: No erythema or rash. Neurological:      Mental Status: She is alert and oriented to person, place, and time. Cranial Nerves: No cranial nerve deficit. Coordination: Coordination normal.   Psychiatric:         Behavior: Behavior normal.         Thought Content: Thought content normal.         Labs:  WBC 14.40, ANC 12.03, hemoglobin 8.6, hematocrit 26.4, .3 and a platelet count of 595,225      ASSESSMENT/PLAN:      1.    Locally advanced pancreatic adenocarcinoma, initiating palliative chemotherapy today with Gemzar 750 mg/m² and Abraxane 90 mg/m² every 2 weeks (a dose reduction by 25%). Pretreatment CA 19 9 of 236 on 1/20/2021 with repeat CA 199 of 108 on 3/23/2021. Tolerating treatment fairly well with the exception of increased fatigue and intermittent nausea. -Cycle number 3-day #1 of Gemzar and Abraxane delivered  -CBC and CMP today  -Schedule restaging CT scan of the abdomen and pelvis    2. Management of chemotherapy side effects:  -Nausea-maintain hydration and rotate between Zofran and Phenergan every 4 hours for nausea as needed  -Diarrhea-currently resolved  -Fatigue-grade 1-2, monitor closely and may need to adjust regimen if needed  -Weight loss with decreased appetite-monitor closely, additional weight loss of 5 pounds, may need to initiate appetite enhancement with Megace or Marinol    2. Extra hepatic/intrahepatic biliary dilation, status post ERCP with metal stent placement on 12/7/2020 at [facility]. Liver enzymes initially improved and now tend to be trending back up, will monitor closely. -CMP today  -Virtual visit with Dr. Carmine Brand on 2/16/2021 and documented that a stent adjustment may be needed if LFTs continue to increase    3. Anemia of chronic kidney failure, stage 3B. Receives BARBARA therapy with Retacrit intermittently for hemoglobin <11, last dose of 40,000 units given on 3/8/2021 and will receive a dose today, she has a hemoglobin of 8.6. She reports she continues to take her ferrous sulfate 325 mg 1 tablet daily without difficulty. Labs on 3/23/2021  · Iron 54  · TIBC 366  · Iron saturation 15  · Ferritin 530  · Reticulocytes 2.1      Iron substrates repeated today, 4/6/2021 revealed a ferritin of 1088, iron 43, iron saturation 19%, TIBC 226 and a vitamin B12 level of 781.2/8/2021.     -Continue ferrous sulfate  -Retacrit 40,000 units given today  -Repeat iron substrates today    4.  Hypothyroidism presently being managed by Dr. Yasmin Stuart and is taking Synthroid 112 µg daily. TSH 3.55 on 12/2/2020  -Follow-up with Dr. Yasmin Stuart for monitoring of TSH and Synthroid dosing    5. Routine health screening history of abnormal mammogram:  Annual routine follow-up screening mammogram on 9/20/2019 documented no mammographic evidence of malignancy, BI-RADS Category 2 benign findings.  -Annual screening mammogram is overdue, will address in the near future once treatment for her pancreatic adenocarcinoma has been initiated    I discussed all of the above findings included in the assessment and plan with the patient and the patient is in agreement to move forward with current recommendations/treatment. I have addressed all of their questions and concerns that were verbalized. FOLLOW UP:  1. Plan to return to clinic on 4/20/2021 for cycle #3, day 15  2. Follow-up appointment given for 4 weeks  3. Continue to follow with other medical providers as recommended    Discussed precautions related to [address] and being at increased risk. Discussed proper handwashing to be done frequently, limit exposure to other individuals and maintain social distancing of 6 feet. Recommend contacting primary care provider if having respiratory symptoms for further recommendations and consideration for testing. (Please note that portions of this note were completed with a voice recognition program. Efforts were made to edit the dictations but occasionally words are mis-transcribed,  Also, portions of this note have been copied forward, however, changed to reflect the most current clinical status of this patient.)    Emily GARDNER, kylah scribing for ALBERTO Barajas. Electronically signed by Emily Wells RN on 4/6/2021 at 1300. Yelitza GARDNER APRN personally performed the services described in this documentation as scribed by Emily Wells RN in my presence and is both accurate and complete.     Electronically signed by ALBERTO Barajas on 4/14/2021 at 9:46 AM

## 2021-04-10 DIAGNOSIS — R11.0 CHEMOTHERAPY-INDUCED NAUSEA: ICD-10-CM

## 2021-04-10 DIAGNOSIS — T45.1X5A CHEMOTHERAPY-INDUCED NAUSEA: ICD-10-CM

## 2021-04-10 DIAGNOSIS — C25.9 PANCREATIC CARCINOMA (HCC): ICD-10-CM

## 2021-04-13 ENCOUNTER — HOSPITAL ENCOUNTER (OUTPATIENT)
Dept: INFUSION THERAPY | Age: 86
Discharge: HOME | End: 2021-04-13
Payer: MEDICARE

## 2021-04-13 ENCOUNTER — HOSPITAL ENCOUNTER (OUTPATIENT)
Dept: CT IMAGING | Age: 86
Discharge: HOME | End: 2021-04-13
Payer: MEDICARE

## 2021-04-13 DIAGNOSIS — C25.9 PANCREATIC CARCINOMA (HCC): Primary | ICD-10-CM

## 2021-04-13 DIAGNOSIS — C25.7 MALIGNANT NEOPLASM OF OTHER PARTS OF PANCREAS (HCC): ICD-10-CM

## 2021-04-13 DIAGNOSIS — N18.9 ACUTE ON CHRONIC RENAL INSUFFICIENCY: ICD-10-CM

## 2021-04-13 DIAGNOSIS — N28.9 ACUTE ON CHRONIC RENAL INSUFFICIENCY: ICD-10-CM

## 2021-04-13 DIAGNOSIS — C25.9 PANCREATIC CARCINOMA (HCC): ICD-10-CM

## 2021-04-13 LAB
ALBUMIN SERPL-MCNC: 3.3 G/DL (ref 3.5–5.2)
ALP BLD-CCNC: 128 U/L (ref 35–104)
ALT SERPL-CCNC: 39 U/L (ref 5–33)
ANION GAP SERPL CALCULATED.3IONS-SCNC: 10 MMOL/L (ref 7–19)
AST SERPL-CCNC: 40 U/L (ref 5–32)
BASOPHILS ABSOLUTE: 0.01 K/UL (ref 0.01–0.08)
BASOPHILS RELATIVE PERCENT: 0.3 % (ref 0.1–1.2)
BILIRUB SERPL-MCNC: 0.4 MG/DL (ref 0.2–1.2)
BUN BLDV-MCNC: 22 MG/DL (ref 8–23)
CALCIUM SERPL-MCNC: 9.3 MG/DL (ref 8.8–10.2)
CHLORIDE BLD-SCNC: 102 MMOL/L (ref 98–111)
CO2: 25 MMOL/L (ref 22–29)
CREAT SERPL-MCNC: 1.3 MG/DL (ref 0.5–0.9)
EOSINOPHILS ABSOLUTE: 0.09 K/UL (ref 0.04–0.54)
EOSINOPHILS RELATIVE PERCENT: 2.5 % (ref 0.7–7)
GFR AFRICAN AMERICAN: 47
GFR NON-AFRICAN AMERICAN: 39
GLUCOSE BLD-MCNC: 133 MG/DL (ref 74–109)
HCT VFR BLD CALC: 25.3 % (ref 34.1–44.9)
HEMOGLOBIN: 7.6 G/DL (ref 11.2–15.7)
LYMPHOCYTES ABSOLUTE: 0.42 K/UL (ref 1.18–3.74)
LYMPHOCYTES RELATIVE PERCENT: 11.9 % (ref 19.3–53.1)
MAGNESIUM: 1.7 MG/DL (ref 1.6–2.4)
MCH RBC QN AUTO: 34.5 PG (ref 25.6–32.2)
MCHC RBC AUTO-ENTMCNC: 30 G/DL (ref 32.3–35.5)
MCV RBC AUTO: 115 FL (ref 79.4–94.8)
MONOCYTES ABSOLUTE: 0.29 K/UL (ref 0.24–0.82)
MONOCYTES RELATIVE PERCENT: 8.2 % (ref 4.7–12.5)
NEUTROPHILS ABSOLUTE: 2.72 K/UL (ref 1.56–6.13)
NEUTROPHILS RELATIVE PERCENT: 77.1 % (ref 34–71.1)
PDW BLD-RTO: 18.1 % (ref 11.7–14.4)
PHOSPHORUS: 3 MG/DL (ref 2.5–4.5)
PLATELET # BLD: 195 K/UL (ref 182–369)
PMV BLD AUTO: 10.6 FL (ref 7.4–10.4)
POTASSIUM SERPL-SCNC: 4.2 MMOL/L (ref 3.5–5)
RBC # BLD: 2.2 M/UL (ref 3.93–5.22)
SODIUM BLD-SCNC: 137 MMOL/L (ref 136–145)
TOTAL PROTEIN: 5.2 G/DL (ref 6.6–8.7)
WBC # BLD: 3.53 K/UL (ref 3.98–10.04)

## 2021-04-13 PROCEDURE — 2580000003 HC RX 258: Performed by: NURSE PRACTITIONER

## 2021-04-13 PROCEDURE — 36415 COLL VENOUS BLD VENIPUNCTURE: CPT

## 2021-04-13 PROCEDURE — 6360000004 HC RX CONTRAST MEDICATION: Performed by: NURSE PRACTITIONER

## 2021-04-13 PROCEDURE — 6360000002 HC RX W HCPCS: Performed by: NURSE PRACTITIONER

## 2021-04-13 PROCEDURE — 85025 COMPLETE CBC W/AUTO DIFF WBC: CPT

## 2021-04-13 PROCEDURE — 83735 ASSAY OF MAGNESIUM: CPT

## 2021-04-13 PROCEDURE — 96360 HYDRATION IV INFUSION INIT: CPT

## 2021-04-13 PROCEDURE — 84100 ASSAY OF PHOSPHORUS: CPT

## 2021-04-13 PROCEDURE — 74177 CT ABD & PELVIS W/CONTRAST: CPT

## 2021-04-13 PROCEDURE — 96361 HYDRATE IV INFUSION ADD-ON: CPT

## 2021-04-13 PROCEDURE — 96374 THER/PROPH/DIAG INJ IV PUSH: CPT

## 2021-04-13 PROCEDURE — 80053 COMPREHEN METABOLIC PANEL: CPT

## 2021-04-13 RX ORDER — ONDANSETRON 2 MG/ML
8 INJECTION INTRAMUSCULAR; INTRAVENOUS ONCE
Status: COMPLETED | OUTPATIENT
Start: 2021-04-13 | End: 2021-04-13

## 2021-04-13 RX ORDER — SODIUM CHLORIDE 0.9 % (FLUSH) 0.9 %
5-40 SYRINGE (ML) INJECTION PRN
Status: DISCONTINUED | OUTPATIENT
Start: 2021-04-13 | End: 2021-04-14 | Stop reason: HOSPADM

## 2021-04-13 RX ORDER — HEPARIN SODIUM (PORCINE) LOCK FLUSH IV SOLN 100 UNIT/ML 100 UNIT/ML
500 SOLUTION INTRAVENOUS PRN
Status: CANCELLED | OUTPATIENT
Start: 2021-04-13

## 2021-04-13 RX ORDER — 0.9 % SODIUM CHLORIDE 0.9 %
1000 INTRAVENOUS SOLUTION INTRAVENOUS ONCE
Status: COMPLETED | OUTPATIENT
Start: 2021-04-13 | End: 2021-04-13

## 2021-04-13 RX ORDER — 0.9 % SODIUM CHLORIDE 0.9 %
1000 INTRAVENOUS SOLUTION INTRAVENOUS ONCE
Status: CANCELLED | OUTPATIENT
Start: 2021-04-13 | End: 2021-04-13

## 2021-04-13 RX ORDER — HEPARIN SODIUM (PORCINE) LOCK FLUSH IV SOLN 100 UNIT/ML 100 UNIT/ML
500 SOLUTION INTRAVENOUS PRN
Status: DISCONTINUED | OUTPATIENT
Start: 2021-04-13 | End: 2021-04-14 | Stop reason: HOSPADM

## 2021-04-13 RX ORDER — PROMETHAZINE HYDROCHLORIDE 25 MG/1
TABLET ORAL
Qty: 20 TABLET | Refills: 1 | Status: SHIPPED | OUTPATIENT
Start: 2021-04-13 | End: 2021-09-30

## 2021-04-13 RX ORDER — SODIUM CHLORIDE 9 MG/ML
25 INJECTION, SOLUTION INTRAVENOUS PRN
Status: CANCELLED | OUTPATIENT
Start: 2021-04-13

## 2021-04-13 RX ORDER — SODIUM CHLORIDE 0.9 % (FLUSH) 0.9 %
5-40 SYRINGE (ML) INJECTION PRN
Status: CANCELLED | OUTPATIENT
Start: 2021-04-13

## 2021-04-13 RX ORDER — SODIUM CHLORIDE 9 MG/ML
25 INJECTION, SOLUTION INTRAVENOUS PRN
Status: DISCONTINUED | OUTPATIENT
Start: 2021-04-13 | End: 2021-04-14 | Stop reason: HOSPADM

## 2021-04-13 RX ADMIN — IOPAMIDOL 90 ML: 755 INJECTION, SOLUTION INTRAVENOUS at 11:09

## 2021-04-13 RX ADMIN — ONDANSETRON 8 MG: 2 INJECTION INTRAMUSCULAR; INTRAVENOUS at 12:53

## 2021-04-13 RX ADMIN — SODIUM CHLORIDE 1000 ML: 900 INJECTION, SOLUTION INTRAVENOUS at 11:57

## 2021-04-14 ENCOUNTER — CLINICAL DOCUMENTATION (OUTPATIENT)
Dept: HEMATOLOGY | Age: 86
End: 2021-04-14

## 2021-04-14 ASSESSMENT — ENCOUNTER SYMPTOMS
CONSTIPATION: 1
DIARRHEA: 0

## 2021-04-19 RX ORDER — LIDOCAINE AND PRILOCAINE 25; 25 MG/G; MG/G
CREAM TOPICAL
Qty: 30 G | Refills: 1 | Status: SHIPPED | OUTPATIENT
Start: 2021-04-19

## 2021-04-20 ENCOUNTER — HOSPITAL ENCOUNTER (OUTPATIENT)
Dept: INFUSION THERAPY | Age: 86
Discharge: HOME | End: 2021-04-20
Payer: MEDICARE

## 2021-04-20 VITALS
SYSTOLIC BLOOD PRESSURE: 136 MMHG | BODY MASS INDEX: 31.15 KG/M2 | RESPIRATION RATE: 17 BRPM | WEIGHT: 170.3 LBS | OXYGEN SATURATION: 98 % | DIASTOLIC BLOOD PRESSURE: 73 MMHG | TEMPERATURE: 97.3 F | HEART RATE: 65 BPM

## 2021-04-20 DIAGNOSIS — C25.9 PANCREATIC CARCINOMA (HCC): Primary | ICD-10-CM

## 2021-04-20 DIAGNOSIS — C25.7 MALIGNANT NEOPLASM OF OTHER PARTS OF PANCREAS (HCC): ICD-10-CM

## 2021-04-20 LAB
ALBUMIN SERPL-MCNC: 3.4 G/DL (ref 3.5–5.2)
ALP BLD-CCNC: 121 U/L (ref 35–104)
ALT SERPL-CCNC: 18 U/L (ref 9–52)
ANION GAP SERPL CALCULATED.3IONS-SCNC: 10 MMOL/L (ref 7–19)
AST SERPL-CCNC: 96 U/L (ref 14–36)
BASOPHILS ABSOLUTE: 0.04 K/UL (ref 0.01–0.08)
BASOPHILS RELATIVE PERCENT: 1.2 % (ref 0.1–1.2)
BILIRUB SERPL-MCNC: 0.6 MG/DL (ref 0.2–1.3)
BUN BLDV-MCNC: 22 MG/DL (ref 7–17)
CALCIUM SERPL-MCNC: 9.3 MG/DL (ref 8.4–10.2)
CHLORIDE BLD-SCNC: 102 MMOL/L (ref 98–111)
CO2: 29 MMOL/L (ref 22–29)
CREAT SERPL-MCNC: 1.6 MG/DL (ref 0.5–1)
EOSINOPHILS ABSOLUTE: 0.34 K/UL (ref 0.04–0.54)
EOSINOPHILS RELATIVE PERCENT: 9.9 % (ref 0.7–7)
GFR NON-AFRICAN AMERICAN: 31
GLUCOSE BLD-MCNC: 153 MG/DL (ref 74–106)
HCT VFR BLD CALC: 23.9 % (ref 34.1–44.9)
HEMOGLOBIN: 7.5 G/DL (ref 11.2–15.7)
LYMPHOCYTES ABSOLUTE: 0.34 K/UL (ref 1.18–3.74)
LYMPHOCYTES RELATIVE PERCENT: 9.9 % (ref 19.3–53.1)
MAGNESIUM: 1.9 MG/DL (ref 1.6–2.3)
MCH RBC QN AUTO: 34.2 PG (ref 25.6–32.2)
MCHC RBC AUTO-ENTMCNC: 31.4 G/DL (ref 32.3–35.5)
MCV RBC AUTO: 109.1 FL (ref 79.4–94.8)
MONOCYTES ABSOLUTE: 1.09 K/UL (ref 0.24–0.82)
MONOCYTES RELATIVE PERCENT: 31.7 % (ref 4.7–12.5)
NEUTROPHILS ABSOLUTE: 1.63 K/UL (ref 1.56–6.13)
NEUTROPHILS RELATIVE PERCENT: 47.3 % (ref 34–71.1)
PDW BLD-RTO: 18.1 % (ref 11.7–14.4)
PHOSPHORUS: 3.7 MG/DL (ref 2.5–4.5)
PLATELET # BLD: 494 K/UL (ref 182–369)
PMV BLD AUTO: 9.7 FL (ref 7.4–10.4)
POTASSIUM SERPL-SCNC: 4.1 MMOL/L (ref 3.5–5.1)
RBC # BLD: 2.19 M/UL (ref 3.93–5.22)
SODIUM BLD-SCNC: 141 MMOL/L (ref 137–145)
TOTAL PROTEIN: 6.1 G/DL (ref 6.3–8.2)
WBC # BLD: 3.44 K/UL (ref 3.98–10.04)

## 2021-04-20 PROCEDURE — 96417 CHEMO IV INFUS EACH ADDL SEQ: CPT

## 2021-04-20 PROCEDURE — 83735 ASSAY OF MAGNESIUM: CPT

## 2021-04-20 PROCEDURE — 96413 CHEMO IV INFUSION 1 HR: CPT

## 2021-04-20 PROCEDURE — 85025 COMPLETE CBC W/AUTO DIFF WBC: CPT

## 2021-04-20 PROCEDURE — 96367 TX/PROPH/DG ADDL SEQ IV INF: CPT

## 2021-04-20 PROCEDURE — 6360000002 HC RX W HCPCS: Performed by: NURSE PRACTITIONER

## 2021-04-20 PROCEDURE — 2580000003 HC RX 258: Performed by: NURSE PRACTITIONER

## 2021-04-20 PROCEDURE — 84100 ASSAY OF PHOSPHORUS: CPT

## 2021-04-20 PROCEDURE — 80053 COMPREHEN METABOLIC PANEL: CPT

## 2021-04-20 PROCEDURE — 36593 DECLOT VASCULAR DEVICE: CPT

## 2021-04-20 RX ORDER — SODIUM CHLORIDE 0.9 % (FLUSH) 0.9 %
10 SYRINGE (ML) INJECTION PRN
Status: DISCONTINUED | OUTPATIENT
Start: 2021-04-20 | End: 2021-04-21 | Stop reason: HOSPADM

## 2021-04-20 RX ORDER — PACLITAXEL 100 MG/20ML
90 INJECTION, POWDER, LYOPHILIZED, FOR SUSPENSION INTRAVENOUS ONCE
Status: COMPLETED | OUTPATIENT
Start: 2021-04-20 | End: 2021-04-20

## 2021-04-20 RX ORDER — HEPARIN SODIUM (PORCINE) LOCK FLUSH IV SOLN 100 UNIT/ML 100 UNIT/ML
500 SOLUTION INTRAVENOUS PRN
Status: DISCONTINUED | OUTPATIENT
Start: 2021-04-20 | End: 2021-04-21 | Stop reason: HOSPADM

## 2021-04-20 RX ORDER — SODIUM CHLORIDE 9 MG/ML
20 INJECTION, SOLUTION INTRAVENOUS ONCE
Status: COMPLETED | OUTPATIENT
Start: 2021-04-20 | End: 2021-04-21

## 2021-04-20 RX ADMIN — SODIUM CHLORIDE 20 ML/HR: 9 INJECTION, SOLUTION INTRAVENOUS at 14:19

## 2021-04-20 RX ADMIN — HEPARIN 500 UNITS: 100 SYRINGE at 16:32

## 2021-04-20 RX ADMIN — WATER 2.2 ML: 1 INJECTION INTRAMUSCULAR; INTRAVENOUS; SUBCUTANEOUS at 13:40

## 2021-04-20 RX ADMIN — ALTEPLASE 2 MG: 2.2 INJECTION, POWDER, LYOPHILIZED, FOR SOLUTION INTRAVENOUS at 13:40

## 2021-04-20 RX ADMIN — DEXAMETHASONE SODIUM PHOSPHATE: 10 INJECTION, SOLUTION INTRAMUSCULAR; INTRAVENOUS at 14:24

## 2021-04-20 RX ADMIN — SODIUM CHLORIDE, PRESERVATIVE FREE 10 ML: 5 INJECTION INTRAVENOUS at 16:32

## 2021-04-20 RX ADMIN — GEMCITABINE 1400 MG: 38 INJECTION, SOLUTION INTRAVENOUS at 15:46

## 2021-04-20 RX ADMIN — PACLITAXEL 170 MG: 100 INJECTION, POWDER, LYOPHILIZED, FOR SUSPENSION INTRAVENOUS at 14:53

## 2021-05-04 ENCOUNTER — HOSPITAL ENCOUNTER (OUTPATIENT)
Dept: GENERAL RADIOLOGY | Age: 86
Discharge: HOME | End: 2021-05-04
Payer: MEDICARE

## 2021-05-04 ENCOUNTER — OFFICE VISIT (OUTPATIENT)
Dept: HEMATOLOGY | Age: 86
End: 2021-05-04
Payer: MEDICARE

## 2021-05-04 ENCOUNTER — HOSPITAL ENCOUNTER (OUTPATIENT)
Dept: INFUSION THERAPY | Age: 86
Discharge: HOME | End: 2021-05-04
Payer: MEDICARE

## 2021-05-04 VITALS
HEIGHT: 62 IN | RESPIRATION RATE: 18 BRPM | TEMPERATURE: 98 F | OXYGEN SATURATION: 92 % | BODY MASS INDEX: 30.4 KG/M2 | HEART RATE: 94 BPM | SYSTOLIC BLOOD PRESSURE: 130 MMHG | DIASTOLIC BLOOD PRESSURE: 74 MMHG | WEIGHT: 165.2 LBS

## 2021-05-04 DIAGNOSIS — R53.81 PHYSICAL DECONDITIONING: ICD-10-CM

## 2021-05-04 DIAGNOSIS — C25.9 PANCREATIC CARCINOMA (HCC): Primary | ICD-10-CM

## 2021-05-04 DIAGNOSIS — K83.8 DILATION OF BILIARY TRACT: ICD-10-CM

## 2021-05-04 DIAGNOSIS — R11.0 CHEMOTHERAPY-INDUCED NAUSEA: ICD-10-CM

## 2021-05-04 DIAGNOSIS — D64.9 ANEMIA, UNSPECIFIED TYPE: Primary | ICD-10-CM

## 2021-05-04 DIAGNOSIS — R05.9 COUGH: ICD-10-CM

## 2021-05-04 DIAGNOSIS — C25.7 MALIGNANT NEOPLASM OF OTHER PARTS OF PANCREAS (HCC): ICD-10-CM

## 2021-05-04 DIAGNOSIS — D50.8 IRON DEFICIENCY ANEMIA SECONDARY TO INADEQUATE DIETARY IRON INTAKE: ICD-10-CM

## 2021-05-04 DIAGNOSIS — Z51.11 CHEMOTHERAPY MANAGEMENT, ENCOUNTER FOR: ICD-10-CM

## 2021-05-04 DIAGNOSIS — Z71.89 CARE PLAN DISCUSSED WITH PATIENT: ICD-10-CM

## 2021-05-04 DIAGNOSIS — N18.4 ANEMIA OF CHRONIC KIDNEY FAILURE, STAGE 4 (SEVERE) (HCC): ICD-10-CM

## 2021-05-04 DIAGNOSIS — R19.7 DIARRHEA, UNSPECIFIED TYPE: ICD-10-CM

## 2021-05-04 DIAGNOSIS — R53.1 WEAKNESS GENERALIZED: ICD-10-CM

## 2021-05-04 DIAGNOSIS — T45.1X5A CHEMOTHERAPY-INDUCED NAUSEA: ICD-10-CM

## 2021-05-04 DIAGNOSIS — D63.1 ANEMIA OF CHRONIC KIDNEY FAILURE, STAGE 4 (SEVERE) (HCC): ICD-10-CM

## 2021-05-04 LAB
ALBUMIN SERPL-MCNC: 3.4 G/DL (ref 3.5–5.2)
ALP BLD-CCNC: 130 U/L (ref 35–104)
ALT SERPL-CCNC: 26 U/L (ref 9–52)
ANION GAP SERPL CALCULATED.3IONS-SCNC: 10 MMOL/L (ref 7–19)
AST SERPL-CCNC: 54 U/L (ref 14–36)
BILIRUB SERPL-MCNC: 0.7 MG/DL (ref 0.2–1.3)
BUN BLDV-MCNC: 32 MG/DL (ref 7–17)
CALCIUM SERPL-MCNC: 9.6 MG/DL (ref 8.4–10.2)
CHLORIDE BLD-SCNC: 103 MMOL/L (ref 98–111)
CO2: 30 MMOL/L (ref 22–29)
CREAT SERPL-MCNC: 2 MG/DL (ref 0.5–1)
GFR NON-AFRICAN AMERICAN: 24
GLOBULIN: 2.9 G/DL
GLUCOSE BLD-MCNC: 127 MG/DL (ref 74–106)
MAGNESIUM: 2.2 MG/DL (ref 1.6–2.3)
PHOSPHORUS: 3.6 MG/DL (ref 2.5–4.5)
POTASSIUM SERPL-SCNC: 3.9 MMOL/L (ref 3.5–5.1)
SODIUM BLD-SCNC: 143 MMOL/L (ref 137–145)
TOTAL PROTEIN: 6.4 G/DL (ref 6.3–8.2)

## 2021-05-04 PROCEDURE — 85025 COMPLETE CBC W/AUTO DIFF WBC: CPT

## 2021-05-04 PROCEDURE — 1036F TOBACCO NON-USER: CPT | Performed by: NURSE PRACTITIONER

## 2021-05-04 PROCEDURE — 4040F PNEUMOC VAC/ADMIN/RCVD: CPT | Performed by: NURSE PRACTITIONER

## 2021-05-04 PROCEDURE — G8417 CALC BMI ABV UP PARAM F/U: HCPCS | Performed by: NURSE PRACTITIONER

## 2021-05-04 PROCEDURE — 1123F ACP DISCUSS/DSCN MKR DOCD: CPT | Performed by: NURSE PRACTITIONER

## 2021-05-04 PROCEDURE — 99214 OFFICE O/P EST MOD 30 MIN: CPT | Performed by: NURSE PRACTITIONER

## 2021-05-04 PROCEDURE — 1090F PRES/ABSN URINE INCON ASSESS: CPT | Performed by: NURSE PRACTITIONER

## 2021-05-04 PROCEDURE — 6360000002 HC RX W HCPCS: Performed by: NURSE PRACTITIONER

## 2021-05-04 PROCEDURE — 2580000003 HC RX 258: Performed by: NURSE PRACTITIONER

## 2021-05-04 PROCEDURE — 71046 X-RAY EXAM CHEST 2 VIEWS: CPT

## 2021-05-04 PROCEDURE — 96360 HYDRATION IV INFUSION INIT: CPT

## 2021-05-04 PROCEDURE — G8427 DOCREV CUR MEDS BY ELIG CLIN: HCPCS | Performed by: NURSE PRACTITIONER

## 2021-05-04 PROCEDURE — 84100 ASSAY OF PHOSPHORUS: CPT

## 2021-05-04 PROCEDURE — 96361 HYDRATE IV INFUSION ADD-ON: CPT

## 2021-05-04 PROCEDURE — 83735 ASSAY OF MAGNESIUM: CPT

## 2021-05-04 PROCEDURE — 80053 COMPREHEN METABOLIC PANEL: CPT

## 2021-05-04 RX ORDER — SODIUM CHLORIDE 0.9 % (FLUSH) 0.9 %
5-40 SYRINGE (ML) INJECTION PRN
Status: CANCELLED | OUTPATIENT
Start: 2021-05-04

## 2021-05-04 RX ORDER — METHYLPREDNISOLONE SODIUM SUCCINATE 125 MG/2ML
125 INJECTION, POWDER, LYOPHILIZED, FOR SOLUTION INTRAMUSCULAR; INTRAVENOUS ONCE
Status: CANCELLED | OUTPATIENT
Start: 2021-05-05 | End: 2021-05-05

## 2021-05-04 RX ORDER — SODIUM CHLORIDE 9 MG/ML
25 INJECTION, SOLUTION INTRAVENOUS PRN
Status: CANCELLED | OUTPATIENT
Start: 2021-05-04

## 2021-05-04 RX ORDER — DIPHENHYDRAMINE HYDROCHLORIDE 50 MG/ML
50 INJECTION INTRAMUSCULAR; INTRAVENOUS ONCE
Status: CANCELLED | OUTPATIENT
Start: 2021-05-05 | End: 2021-05-05

## 2021-05-04 RX ORDER — SODIUM CHLORIDE 0.9 % (FLUSH) 0.9 %
5-40 SYRINGE (ML) INJECTION PRN
Status: CANCELLED | OUTPATIENT
Start: 2021-05-05

## 2021-05-04 RX ORDER — HEPARIN SODIUM (PORCINE) LOCK FLUSH IV SOLN 100 UNIT/ML 100 UNIT/ML
500 SOLUTION INTRAVENOUS PRN
Status: DISCONTINUED | OUTPATIENT
Start: 2021-05-04 | End: 2021-05-05 | Stop reason: HOSPADM

## 2021-05-04 RX ORDER — 0.9 % SODIUM CHLORIDE 0.9 %
1000 INTRAVENOUS SOLUTION INTRAVENOUS ONCE
Status: COMPLETED | OUTPATIENT
Start: 2021-05-04 | End: 2021-05-04

## 2021-05-04 RX ORDER — SODIUM CHLORIDE 9 MG/ML
25 INJECTION, SOLUTION INTRAVENOUS PRN
Status: CANCELLED | OUTPATIENT
Start: 2021-05-05

## 2021-05-04 RX ORDER — HEPARIN SODIUM (PORCINE) LOCK FLUSH IV SOLN 100 UNIT/ML 100 UNIT/ML
500 SOLUTION INTRAVENOUS PRN
Status: CANCELLED | OUTPATIENT
Start: 2021-05-04

## 2021-05-04 RX ORDER — SODIUM CHLORIDE 0.9 % (FLUSH) 0.9 %
5-40 SYRINGE (ML) INJECTION PRN
Status: DISCONTINUED | OUTPATIENT
Start: 2021-05-04 | End: 2021-05-05 | Stop reason: HOSPADM

## 2021-05-04 RX ORDER — SODIUM CHLORIDE 9 MG/ML
INJECTION, SOLUTION INTRAVENOUS CONTINUOUS
Status: CANCELLED | OUTPATIENT
Start: 2021-05-05

## 2021-05-04 RX ORDER — 0.9 % SODIUM CHLORIDE 0.9 %
1000 INTRAVENOUS SOLUTION INTRAVENOUS ONCE
Status: CANCELLED | OUTPATIENT
Start: 2021-05-04 | End: 2021-05-04

## 2021-05-04 RX ORDER — EPINEPHRINE 1 MG/ML
0.3 INJECTION, SOLUTION, CONCENTRATE INTRAVENOUS PRN
Status: CANCELLED | OUTPATIENT
Start: 2021-05-05

## 2021-05-04 RX ADMIN — SODIUM CHLORIDE 1000 ML: 900 INJECTION, SOLUTION INTRAVENOUS at 14:32

## 2021-05-04 RX ADMIN — HEPARIN 500 UNITS: 100 SYRINGE at 16:40

## 2021-05-04 RX ADMIN — SODIUM CHLORIDE, PRESERVATIVE FREE 10 ML: 5 INJECTION INTRAVENOUS at 16:40

## 2021-05-04 ASSESSMENT — ENCOUNTER SYMPTOMS
COUGH: 0
SORE THROAT: 0
BACK PAIN: 0
EYES NEGATIVE: 1
ABDOMINAL PAIN: 0
SHORTNESS OF BREATH: 0
BLOOD IN STOOL: 0
EYE PAIN: 0
WHEEZING: 0
NAUSEA: 1
DIARRHEA: 0
EYE DISCHARGE: 0
VOMITING: 0
EYE REDNESS: 0
RESPIRATORY NEGATIVE: 1

## 2021-05-04 NOTE — PROGRESS NOTES
Post-IVF hgb 6.7. Patient typed & crossed [facility] [address] [facility]

## 2021-05-04 NOTE — PROGRESS NOTES
Hgb 7.4 today. Treatment held today and deferred for one week. Patient states she had had N/V at home and has has an approximate 5 lb. Weight loss. Patient to receive IVF bolus today and recheck CBC after IVFs. Patient to have CXR today.

## 2021-05-04 NOTE — PROGRESS NOTES
Progress Note      Pt Name: Eliza Beard  YOB: 1934  MRN: [id number]    Date of evaluation: 5/4/2021  History Obtained From:  patient, electronic medical record    CHIEF COMPLAINT:    Chief Complaint   Patient presents with    Cancer     Advanced pancreatic    Chemotherapy    Anemia    Discuss Labs    Fatigue     HISTORY OF PRESENT ILLNESS:    Eliza Beard is a 80 y.o.  female with significant PMH of iron deficiency anemia, chronic kidney disease stage IV,  and diagnosis of pancreatic adenocarcinoma. She is currently receiving palliative chemotherapy with Gemzar 750 mg/m² and Abraxane 90 mg/m² every 2 weeks (this is a 25% dose reduction). Kelsie Paulino returns today for evaluation, side effect monitoring, lab monitoring, review CT T scan results and consideration to receive cycle #4, day 1 of Gemzar and Abraxane. She presents today with severe fatigue, intermittent nausea that has improved with antiemetics and a weight loss of 5 pounds with a poor appetite. CBC today revealed a hemoglobin of 7.4 with an MCV of 111.7. Due to her severe fatigue treatment was today, give 1 L of fluids and then repeat CBC. Repeat CBC after 1 L of fluids revealed a hemoglobin of 6.7 and plans were arranged for when he did receive 1 unit of PRBCs in the outpatient setting on 5/6/2021. Iron substrates repeated today, 4/6/2021 revealed a ferritin of 1088, iron 43, iron saturation 19%, TIBC 226 and a vitamin B12 level of 781. Will administer Retacrit at follow-up appointment if hemoglobin remains less than 11. I reviewed the CT scan of the abdomen and pelvis with Kelsie Paulino and her daughter:  4/13/2021 CT Abdomen/Pelvis with contrast documented a poorly defined complex mass in the head of the pancreas. It appears to have decreased in size when remeasured at the same level and in same dimension as in the previous study (3 cm x 2.5 cm, compared to 3.7 cm x 3.3 cm).   Persistent moderate dilatation of the pancreatic duct. There are 2 additional small cystic nodules in the body of the pancreas measuring 11 mm and 9 mm. A biliary stent in place and decompression of the intrahepatic biliary ducts since the previous study. 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 The diverticulosis of the distal colon with no evidence for diverticulitis. CA 199 108 on 3/23/2021, improved compared to pretreatment value of 236 on 1/20/2021. The CT scan and the tumor marker suggest a positive response to treatment. With auscultation of the lungs today noted some fine crackles in the left lower lobe, will request a chest x-ray. ONCOLOGIC/HEMATOLOGIC HISTORY:   Diagnosis:   Anemia of chronic kidney disease   Chronic kidney disease stage lll  Pancreatic adenocarcinoma  Chronic anticoagulation due to prior DVTs    Treatment summary:  Ferrous sulfate 325 mg daily   11/26/2018 - Procrit 20,000 units for chronic kidney disease stage IV. Procrit to be given every 2 weeks ×4 and then monthly, dose to be titrated appropriately. Hold dose for hemoglobin >11   Currently receiving Retacrit 40,000 units every 4 weeks  2/8/2021 palliative chemotherapy on 2/8/2021 with Gemzar 750 mg/m² and Abraxane 90 mg/m² every 2 weeks, this is a dose reduction by 25%      Tumor monitoring:  · 12/2/2020-CA 19-9 of 133  · 12/23/2020-CA 19-9 of 217  · 1/20/2021-CA 19-9 of 236  · 3/23/2021- CA 19-9 of Rosaura Chirinos 69 seen by Dr. Darrius Wick on 12/2/2020 as an inpatient consultation at [facility] for findings of a pancreatic mass and bile duct obstruction. She presented to the ER department with complaints of hematuria that began a few days prior to presentation.   She denied any dysuria, back pain, no nausea or vomiting.  She also reported easily bruising and ecchymotic areas in her back, abdomen and flank.  Initial laboratory work-up showed INR above 18.  She was given vitamin K subcutaneously.  She was found to have elevated LFTs and the following are events that occurred. · 12/01/2020CT abdomen pelvis without contrast showed a pancreatic head mass measuring 3.3 x 3.7 cm in size and associated, bile duct dilatation above the level of the ampulla.  Associated moderate pancreatic ductal dilatation. · 12/2/2020-CA 19-9 133  · 12/03/2020ERCP with FNA biopsy Positive for high-grade adenocarcinoma.  Unfortunately, stent could not be placed. · 12/5/2020-transferred from [facility] to [facility] and discharged home on 12/8/2020  · 12/07/2020- Cholangipancreatography Retrograde Endo ERCP with sphincterotomy, balloon sweep and placement of 10x60 PC BS metal stent at [facility] in [place]. · 12/21/2020- CT chest with contrast documented no evidence of intrathoracic neoplastic process/metastatic disease. Evidence of pneumobilia. The ectasia of the pancreatic duct, similar to the previous study. An incompletely visualized and evaluated heterogeneous mass in the head of the pancreas measuring 3.3 x 3.7cm. A biliary stent in place. Moderate persistent dilatation of the proximal common bile duct. A stable small low-density nodule in the left adrenal gland  · 12/22/2020Dr. Lambert discussed the results of CT chest and pathology that suggest advanced pancreatic cancer, unfortunately it is not amenable to surgery. She was quite weak and had poor performance, appeared very frail. Discussion was made about palliative chemotherapy but the decision to hold was made until improvement of her physical conditioning occurred. Recommended physical therapy as outpatient and reassess fitness for palliative chemotherapy in approximately 4 weeks. 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   · 12/7/2020 ERCP at [address] [place] [address]  by Dr. Chris Carranza revealed a single severe biliary stricture in the lower third of the main bile duct with severe upstream biliary dilation, stricture was malignant appearing. Biliary sphincterectomy was performed. 1 partially covered metal stent was placed into the common bile duct. · 2/8/2021-initiated palliative chemotherapy with Gemzar 750 mg/m² and Abraxane 90 mg/m² every 2 weeks, this is a dose reduction by 25%    HEMATOLOGY HISTORY:  Chun Yancey was seen in initial hematology consultation on 7/20/2018 for further evaluation and treatment recommendations for anemia. Chun Yancey was referred from Dr. Fox Gabriel. Chun Yancey presented with no significant complaints other than chronic fatigue and constipation. She had been taking ferrous sulfate 325 mg daily under the direction of Dr. Josselin Mayorga. Chun Yancey reported significant constipation and some GI upset with the oral iron. She denied any bleeding tendencies to include hematuria, melena, hematochezia and epistaxis. Chun Yancey had a colonoscopy on 4/26/2016 by Dr. Chela Jovel for further evaluation of iron deficiency. The only abnormal finding was diverticulosis. Chun Yancey is routinely followed by Dr. Dionne Brice for her chronic kidney disease stage IV. CMP on 6/11/2018 documented a creatinine of 2.2 and GFR 21. CBC review from 2/2/2018- 6/11/2018 documented hemoglobin ranging from 9.4- 10.1, RBC 2.91- 3.22, MCV 94- 100 with a normal WBC and platelet count  CBC at initial consultation on 7/20/2018 documented a WBC of 7.33, ANC 4.74, RBC 3.04, hemoglobin 10.3, .6 and a platelet count of 997,302. Serology studies on 7/20/2018:  Creatinine 1.98   GFR 23   Calcium 10.3   IgG 700, IgA 180, and IgM 52   M spike not observed   Immunofixation: No monoclonality detected.    Iron 74   Iron saturation 27%   TIBC 278   Vitamin B12 437   Folate 15.7   Ferritin 299   Reticulocyte count 1.5%   Erythropoietin 11.6      Beta-2 microglobulin 6.2     Occult stool positive 1 of 3 samples on 7/25/2018. Colonoscopy on 10/4/2018 by Dr. Richie An was documented as removal of 2 polyps with benign pathology. No evidence of bleeding. Serology studies on 10/15/2018:  Iron 76   TIBC 287   Iron saturation 26%   Ferritin 321   Hemoglobin 10.1   Creatinine 2.47   GFR 17    11/26/2018 - Initiated Procrit 20,000 units for chronic kidney disease stage IV, to be given every 2 weeks ×4 and then monthly, dose to be titrated appropriately. Hemoglobin 9.6. All has anemia of chronic disease to include chronic kidney disease stage IV. She will began receiving growth factor support and will need to maintain a ferritin level greater than 200 and an iron saturation of 25% for the Procrit to be beneficial. Hold Procrit dose for hemoglobin >11. Indications/rationale for Procrit was discussed with [place]. Risks, benefits and expectations were outlined. Risks including, but not limited to hypertension, stroke, MI, death were discussed and acknowledged by [place].     Serology studies on 3/8/2019:  Creatinine 1.7/GFR 30.4   Iron 80   Iron saturation 26.5%   TIBC 302   Ferritin 144   Vitamin B12 501   Folate 13.9    Serology studies on 3/5/2020:  · Creatinine 1.3/GFR 41.4  · Iron 84  · TIBC 390  · Iron saturation 21.5%  · Ferritin 342    Iron substrates on 6/25/2020  · Ferritin 311  · Iron 76  · Iron saturation 23%  · TIBC 326  · Creatinine 1.3/GFR 39    Labs on 12/2/2020 and 12/3/2020:  · Iron 35  · TIBC 213  · Iron saturation 16%  · Vitamin B12 483  · Folate 10.6  · Ferritin 480    Labs on 3/23/2021  · Iron 54  · TIBC 366  · Iron saturation 15  · Ferritin 530  · Reticulocytes 2.1    Labs on 4/6/2021  · WBC 14.40  · RBC 2.46  · HGB 8.6  · HCT 26.4  · .3  · MCH 35  · MCHC 32.6  · ANC 12.03  · ,000  ·   · Iron 43   · TIBC 226  · Iron saturation 19%   · Ferritin 1088.0  · B-12= 781  · GFR 39  · BUN 21  · Creatinine 1.3  · Phosphorus 2.4  · Magnesium 1.5    4/13/2021 CT Abdomen/Pelvis with contrast documented a poorly defined complex mass in the head of the pancreas. It appears to have decreased in size when remeasured at the same level and in same dimension as in the previous study (3 cm x 2.5 cm, compared to 3.7 cm x 3.3 cm). Persistent moderate dilatation of the pancreatic duct. There are 2 additional small cystic nodules in the body of the pancreas measuring 11 mm and 9 mm. A biliary stent in place and decompression of the intrahepatic biliary ducts since the previous study. 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 The diverticulosis of the distal colon with no evidence for diverticulitis. Age-appropriate health screening:  · Colonoscopy on 10/4/2018 by Dr. Jonathan Ronquillo was documented as removal of 2 polyps with benign pathology. No evidence of bleeding. · 9/20/2019 Bilateral mammogram at [facility] documented no mammographic evidence of malignancy.   · No bone mineral density on file     Past Medical History:    Past Medical History:   Diagnosis Date    Abdominal aortic aneurysm (AAA) ([facility]     Anemia     Arthritis     Cancer ([facility] 12/01/2020    pancreatic    Chronic kidney disease     DVT of lower extremity (deep venous thrombosis) (HCC)     twice 2010 and 2017    Hyperlipidemia     Hypertension     Kidney stones     Osteoarthritis     Palliative care patient 12/02/2020    Thyroid disease     Thyroid disorder        Past Surgical History:    Past Surgical History:   Procedure Laterality Date    COLONOSCOPY  2016    Dr Caitlin Canela problems    ENDOSCOPIC ULTRASOUND (LOWER) N/A 12/03/2020    Dr JONNY Grijalva/unsuccessful biliary cannulation despite attempts at 2-wire cannulation and proph pancreatic stenting with cannulation around pancreatic stenting-Positive for high-grade adenocarcinoma, pancreatic head    ERCP N/A 12/03/2020    Dr JONNY Grijalva/unsuccessful biliary cannulation despite attempts at 2-wire cannulation and proph pancreatic stenting with cannulation around pancreatic stenting-Positive for high-grade adenocarcinoma, pancreatic head    ERCP  12/07/2020    Dr Nina Hernandez-w/sphincterotomy, placement of a 10 x 60 partially covered biliary stent    PORT SURGERY Right 2/5/2021    SINGLE LUMEN PORT PLACEMENT WITH ULTRASOUND AND FLUORO performed by Tucker Hernandez MD at [address] TOE AMPUTATION Bilateral     pinky toes removed    TOTAL KNEE ARTHROPLASTY Right 01/03/2020    RIGHT TOTAL KNEE REPLACEMENT performed by Phoenix Chang MD at [facility] OR       Current Medications:    Current Outpatient Medications   Medication Sig Dispense Refill    lidocaine-prilocaine (EMLA) 2.5-2.5 % cream APPLY TO PORT AREA AND COVERWITH PLASTIC WRAP ONE HOUR PRIOR TO TREATMENT 30 g 1    promethazine (PHENERGAN) 25 MG tablet TAKE 1 TABLET BY MOUTH FOUR TIMES DAILY AS NEEDED FOR NAUSEA 20 tablet 1    ondansetron (ZOFRAN ODT) 4 MG disintegrating tablet Take 2 tablets by mouth every 8 hours as needed for Nausea or Vomiting 60 tablet 3    apixaban (ELIQUIS) 2.5 MG TABS tablet Take 2.5 mg by mouth 2 times daily      ferrous sulfate (FEROSUL) 325 (65 Fe) MG tablet TAKE 1 TABLET BY MOUTH EVERY DAY (Patient taking differently: Take 325 mg by mouth daily (with breakfast) TAKE 1 TABLET BY MOUTH EVERY DAY) 90 tablet 3    lisinopril (PRINIVIL;ZESTRIL) 5 MG tablet Take 1 tablet by mouth 2 times daily (Patient taking differently: Take 10 mg by mouth daily ) 60 tablet 0    docusate sodium (COLACE) 100 MG capsule Take 1 capsule by mouth 2 times daily 60 capsule 1    calcitRIOL (ROCALTROL) 0.25 MCG capsule Take 0.25 mcg by mouth daily      allopurinol (ZYLOPRIM) 100 MG tablet Take 100 mg by mouth daily      levothyroxine (SYNTHROID) 112 MCG tablet Take 112 mcg by mouth Daily      simvastatin (ZOCOR) 20 MG tablet Take 20 mg by mouth nightly       No current facility-administered medications for this visit.       Facility-Administered Medications neck pain. Skin: Negative. Negative for rash. Neurological: Positive for weakness. Negative for dizziness, tremors, seizures and headaches. Hematological: Does not bruise/bleed easily. Psychiatric/Behavioral: Negative. The patient is not nervous/anxious. Objective   PHYSICAL EXAM:  Physical Exam  Vitals signs reviewed. Constitutional:       General: She is not in acute distress. Appearance: She is well-developed. She is ill-appearing. She is not diaphoretic. HENT:      Head: Normocephalic and atraumatic. Mouth/Throat:      Pharynx: Uvula midline. Tonsils: No tonsillar exudate. Eyes:      General: Lids are normal. No scleral icterus. Right eye: No discharge. Left eye: No discharge. Conjunctiva/sclera: Conjunctivae normal.      Pupils: Pupils are equal, round, and reactive to light. Neck:      Musculoskeletal: Normal range of motion and neck supple. Thyroid: No thyroid mass or thyromegaly. Vascular: No JVD. Trachea: Trachea normal. No tracheal deviation. Cardiovascular:      Rate and Rhythm: Normal rate and regular rhythm. Heart sounds: Normal heart sounds. No murmur. No friction rub. No gallop. Pulmonary:      Effort: Pulmonary effort is normal. No respiratory distress. Breath sounds: Normal breath sounds. No wheezing or rales. Chest:      Chest wall: No tenderness. Abdominal:      General: Bowel sounds are normal. There is no distension. Palpations: Abdomen is soft. There is no mass. Tenderness: There is no abdominal tenderness. There is no guarding or rebound. Hernia: No hernia is present. Musculoskeletal:         General: No tenderness or deformity. Comments: Range of motion within normal limits x4 extremities   Skin:     General: Skin is warm. Coloration: Skin is not pale. Findings: No erythema or rash.    Neurological:      Mental Status: She is alert and oriented to person, place, and time.      Cranial Nerves: No cranial nerve deficit. Motor: Weakness present. Coordination: Coordination normal.   Psychiatric:         Behavior: Behavior normal.         Thought Content: Thought content normal.         Labs:  Lab Results   Component Value Date    NEUTROABS 4.13 05/04/2021     Lab Results   Component Value Date    WBC 6.61 05/04/2021    HGB 7.4 (L) 05/04/2021    HCT 23.9 (LL) 05/04/2021    .7 (H) 05/04/2021     (H) 05/04/2021     Repeat CBC after hydration revealed a hemoglobin of 6.7    ASSESSMENT/PLAN:      1. Locally advanced pancreatic adenocarcinoma, initiating palliative chemotherapy today with Gemzar 750 mg/m² and Abraxane 90 mg/m² every 2 weeks (a dose reduction by 25%). Pretreatment CA 19 9 of 236 on 1/20/2021 with repeat CA 199 of 108 on 3/23/2021. CT scan of the abdomen pelvis on 4/13/2021 suggested positive response to treatment, the poorly defined complex mass in the head of the pancreas, now measures 3 cm x 2.5 cm, compared to 3.7 cm x 3.3 cm. Elier Ross is experiencing significant fatigue, will hold treatment and dose reduced regimen by 25% of Gemzar and Abraxane beginning with cycle #4, will reevaluate in 1 week for consideration to receive treatment. Gemzar will be given at 600 mg/m² versus 750 mg/m²  Abraxane will be given at 75 mg/m² versus 90 mg/m²    -CBC, magnesium and phosphorus and CMP today    Will request a chest x-ray for nonproductive cough and faint crackles noted in left lower lobe with auscultation. 2.  Management of chemotherapy side effects:  -Nausea-maintain hydration and continue to rotate between Zofran and Phenergan every 4 hours for nausea as needed.   Give 1 L of fluids today  -Diarrhea-currently resolved  -Fatigue-grade 2, hold treatment and reevaluate in 1 week to resume at a 25% dose reduction  -Weight loss with decreased appetite-monitor closely, additional weight loss of 5 pounds, may need to initiate appetite enhancement with Megace or Marinol    2. Extra hepatic/intrahepatic biliary dilation, status post ERCP with metal stent placement on 12/7/2020 at [facility]. Liver enzymes initially improved and now tend to be trending back up, will monitor closely. -CMP today  -Virtual visit with Dr. Joanna Muro on 2/16/2021 and documented that a stent adjustment may be needed if LFTs continue to increase    3. Anemia of chronic kidney failure, stage 3B. Receives BARBARA therapy with Retacrit intermittently for hemoglobin <11, last dose of 40,000 units given on 4/6/2021. After hydration she had a hemoglobin of 6.7 today. She continues to take the ferrous sulfate without difficulty once daily. Labs on 4/6/2021  · Iron 43   · TIBC 226  · Iron saturation 19%   · Ferritin 1088.0  · B-12= 781    -Continue ferrous sulfate  -Retacrit 40,000 units will be given at return appointment if hemoglobin <11  -Arrange for 1 unit of PRBCs tomorrow in the outpatient setting    4. Hypothyroidism presently being managed by Dr. Cecy Mcdowell and is taking Synthroid 112 µg daily. TSH 3.55 on 12/2/2020  -Follow-up with Dr. Cecy Mcdowell for monitoring of TSH and Synthroid dosing    5. Routine health screening history of abnormal mammogram:  Annual routine follow-up screening mammogram on 9/20/2019 documented no mammographic evidence of malignancy, BI-RADS Category 2 benign findings.  -Annual screening mammogram is overdue, will address in the near future once treatment for her pancreatic adenocarcinoma has been initiated    I discussed all of the above findings included in the assessment and plan with the patient and the patient is in agreement to move forward with current recommendations/treatment. I have addressed all of their questions and concerns that were verbalized. FOLLOW UP:  1. Return in 1 week for consideration of cycle #4 at a dose reduction by 25%  2.  Continue to follow with other medical providers as recommended    Discussed precautions related to [address] and being at increased risk. Discussed proper handwashing to be done frequently, limit exposure to other individuals and maintain social distancing of 6 feet. Recommend contacting primary care provider if having respiratory symptoms for further recommendations and consideration for testing. (Please note that portions of this note were completed with a voice recognition program. Efforts were made to edit the dictations but occasionally words are mis-transcribed,  Also, portions of this note have been copied forward, however, changed to reflect the most current clinical status of this patient.)    Preeti GARDNER, kylah scribing for ALBERTO Peng. Electronically signed by Preeti Wesley RN on 5/4/2021 at 1330. Walter GARDNER APRN personally performed the services described in this documentation as scribed by Preeti Wesley RN in my presence and is both accurate and complete.     Electronically signed by ALBERTO Peng on 5/5/2021 at 4:44 PM

## 2021-05-05 ENCOUNTER — CLINICAL DOCUMENTATION (OUTPATIENT)
Dept: HEMATOLOGY | Age: 86
End: 2021-05-05

## 2021-05-05 ENCOUNTER — HOSPITAL ENCOUNTER (OUTPATIENT)
Dept: INFUSION THERAPY | Age: 86
Setting detail: INFUSION SERIES
Discharge: HOME | End: 2021-05-05
Payer: MEDICARE

## 2021-05-05 ENCOUNTER — HOSPITAL ENCOUNTER (OUTPATIENT)
Dept: GENERAL RADIOLOGY | Age: 86
Discharge: HOME | End: 2021-05-05
Payer: MEDICARE

## 2021-05-05 VITALS
RESPIRATION RATE: 18 BRPM | SYSTOLIC BLOOD PRESSURE: 152 MMHG | OXYGEN SATURATION: 94 % | DIASTOLIC BLOOD PRESSURE: 73 MMHG | TEMPERATURE: 96.6 F | HEART RATE: 86 BPM

## 2021-05-05 DIAGNOSIS — C25.7 MALIGNANT NEOPLASM OF OTHER PARTS OF PANCREAS (HCC): ICD-10-CM
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
## 2021-05-05 DIAGNOSIS — D62 ACUTE BLOOD LOSS ANEMIA: Primary | ICD-10-CM

## 2021-05-05 DIAGNOSIS — M79.672 LEFT FOOT PAIN: ICD-10-CM

## 2021-05-05 LAB
ABO/RH: NORMAL
BLOOD BANK DISPENSE STATUS: NORMAL
BLOOD BANK PRODUCT CODE: NORMAL
BPU ID: NORMAL
DESCRIPTION BLOOD BANK: NORMAL

## 2021-05-05 PROCEDURE — P9016 RBC LEUKOCYTES REDUCED: HCPCS

## 2021-05-05 PROCEDURE — 36430 TRANSFUSION BLD/BLD COMPNT: CPT

## 2021-05-05 PROCEDURE — 2580000003 HC RX 258: Performed by: NURSE PRACTITIONER

## 2021-05-05 PROCEDURE — 86901 BLOOD TYPING SEROLOGIC RH(D): CPT

## 2021-05-05 PROCEDURE — 6360000002 HC RX W HCPCS: Performed by: NURSE PRACTITIONER

## 2021-05-05 PROCEDURE — 86850 RBC ANTIBODY SCREEN: CPT

## 2021-05-05 PROCEDURE — 86923 COMPATIBILITY TEST ELECTRIC: CPT

## 2021-05-05 PROCEDURE — 86900 BLOOD TYPING SEROLOGIC ABO: CPT

## 2021-05-05 PROCEDURE — 73630 X-RAY EXAM OF FOOT: CPT

## 2021-05-05 RX ORDER — METHYLPREDNISOLONE SODIUM SUCCINATE 125 MG/2ML
125 INJECTION, POWDER, LYOPHILIZED, FOR SOLUTION INTRAMUSCULAR; INTRAVENOUS ONCE
Status: CANCELLED | OUTPATIENT
Start: 2021-05-05 | End: 2021-05-05

## 2021-05-05 RX ORDER — HEPARIN SODIUM (PORCINE) LOCK FLUSH IV SOLN 100 UNIT/ML 100 UNIT/ML
500 SOLUTION INTRAVENOUS ONCE
Status: COMPLETED | OUTPATIENT
Start: 2021-05-05 | End: 2021-05-05

## 2021-05-05 RX ORDER — EPINEPHRINE 1 MG/ML
0.3 INJECTION, SOLUTION, CONCENTRATE INTRAVENOUS PRN
Status: CANCELLED | OUTPATIENT
Start: 2021-05-05

## 2021-05-05 RX ORDER — SODIUM CHLORIDE 9 MG/ML
25 INJECTION, SOLUTION INTRAVENOUS PRN
Status: DISCONTINUED | OUTPATIENT
Start: 2021-05-05 | End: 2021-05-06 | Stop reason: HOSPADM

## 2021-05-05 RX ORDER — SODIUM CHLORIDE 9 MG/ML
INJECTION, SOLUTION INTRAVENOUS CONTINUOUS
Status: CANCELLED | OUTPATIENT
Start: 2021-05-05

## 2021-05-05 RX ORDER — DIPHENHYDRAMINE HYDROCHLORIDE 50 MG/ML
50 INJECTION INTRAMUSCULAR; INTRAVENOUS ONCE
Status: CANCELLED | OUTPATIENT
Start: 2021-05-05 | End: 2021-05-05

## 2021-05-05 RX ORDER — SODIUM CHLORIDE 0.9 % (FLUSH) 0.9 %
5-40 SYRINGE (ML) INJECTION PRN
Status: CANCELLED | OUTPATIENT
Start: 2021-05-05

## 2021-05-05 RX ORDER — SODIUM CHLORIDE 0.9 % (FLUSH) 0.9 %
5-40 SYRINGE (ML) INJECTION PRN
Status: DISCONTINUED | OUTPATIENT
Start: 2021-05-05 | End: 2021-05-06 | Stop reason: HOSPADM

## 2021-05-05 RX ORDER — SODIUM CHLORIDE 9 MG/ML
25 INJECTION, SOLUTION INTRAVENOUS PRN
Status: CANCELLED | OUTPATIENT
Start: 2021-05-05

## 2021-05-05 RX ADMIN — SODIUM CHLORIDE, PRESERVATIVE FREE 10 ML: 5 INJECTION INTRAVENOUS at 13:35

## 2021-05-05 RX ADMIN — SODIUM CHLORIDE 250 ML: 9 INJECTION, SOLUTION INTRAVENOUS at 11:46

## 2021-05-05 RX ADMIN — HEPARIN 500 UNITS: 100 SYRINGE at 13:35

## 2021-05-05 ASSESSMENT — ENCOUNTER SYMPTOMS: CONSTIPATION: 0

## 2021-05-05 NOTE — PROGRESS NOTES
Subjective:      Patient ID: Bairon Rowley is a 80 y.o. female.     HPI    Review of Systems    Objective:   Physical Exam    Assessment:      ***      Plan:      ***        Sasha Dwyer RN

## 2021-05-05 NOTE — PROGRESS NOTES
Spoke with Luis Middleton related to xray results of left foot: Nondisplaced fracture of the fourth toe. Instructed to elevate left foot and use ice pack. Contact Dr. Sven Arevalo for further recommendations, may need to be evaluated by orthopedics.

## 2021-05-06 LAB
ABO/RH: NORMAL
ANTIBODY SCREEN: NORMAL
BASOPHILS ABSOLUTE: 0.04 K/UL (ref 0.01–0.08)
BASOPHILS RELATIVE PERCENT: 0.7 % (ref 0.1–1.2)
EOSINOPHILS ABSOLUTE: 0.21 K/UL (ref 0.04–0.54)
EOSINOPHILS RELATIVE PERCENT: 3.6 % (ref 0.7–7)
HCT VFR BLD CALC: 21.5 % (ref 34.1–44.9)
HEMOGLOBIN: 6.7 G/DL (ref 11.2–15.7)
LYMPHOCYTES ABSOLUTE: 0.49 K/UL (ref 1.18–3.74)
LYMPHOCYTES RELATIVE PERCENT: 8.5 % (ref 19.3–53.1)
MCH RBC QN AUTO: 35.1 PG (ref 25.6–32.2)
MCHC RBC AUTO-ENTMCNC: 31.2 G/DL (ref 32.3–35.5)
MCV RBC AUTO: 112.6 FL (ref 79.4–94.8)
MONOCYTES ABSOLUTE: 1.7 K/UL (ref 0.24–0.82)
MONOCYTES RELATIVE PERCENT: 29.4 % (ref 4.7–12.5)
NEUTROPHILS ABSOLUTE: 3.34 K/UL (ref 1.56–6.13)
NEUTROPHILS RELATIVE PERCENT: 57.8 % (ref 34–71.1)
PDW BLD-RTO: 19.6 % (ref 11.7–14.4)
PLATELET # BLD: 392 K/UL (ref 182–369)
PMV BLD AUTO: 9.8 FL (ref 7.4–10.4)
RBC # BLD: 1.91 M/UL (ref 3.93–5.22)
WBC # BLD: 5.78 K/UL (ref 3.98–10.04)

## 2021-05-11 ENCOUNTER — HOSPITAL ENCOUNTER (OUTPATIENT)
Dept: INFUSION THERAPY | Age: 86
Discharge: HOME | End: 2021-05-11
Payer: MEDICARE

## 2021-05-11 ENCOUNTER — OFFICE VISIT (OUTPATIENT)
Dept: HEMATOLOGY | Age: 86
End: 2021-05-11
Payer: MEDICARE

## 2021-05-11 VITALS
BODY MASS INDEX: 30.18 KG/M2 | SYSTOLIC BLOOD PRESSURE: 136 MMHG | OXYGEN SATURATION: 92 % | TEMPERATURE: 97.8 F | WEIGHT: 164 LBS | HEIGHT: 62 IN | HEART RATE: 81 BPM | DIASTOLIC BLOOD PRESSURE: 72 MMHG | RESPIRATION RATE: 18 BRPM

## 2021-05-11 DIAGNOSIS — D63.1 ANEMIA OF CHRONIC KIDNEY FAILURE, STAGE 4 (SEVERE) (HCC): ICD-10-CM

## 2021-05-11 DIAGNOSIS — T45.1X5A CHEMOTHERAPY-INDUCED NAUSEA: ICD-10-CM

## 2021-05-11 DIAGNOSIS — C25.7 MALIGNANT NEOPLASM OF OTHER PARTS OF PANCREAS (HCC): ICD-10-CM

## 2021-05-11 DIAGNOSIS — C25.9 PANCREATIC CARCINOMA (HCC): Primary | ICD-10-CM

## 2021-05-11 DIAGNOSIS — Z71.89 CARE PLAN DISCUSSED WITH PATIENT: ICD-10-CM

## 2021-05-11 DIAGNOSIS — K83.8 DILATION OF BILIARY TRACT: ICD-10-CM

## 2021-05-11 DIAGNOSIS — N18.4 ANEMIA OF CHRONIC KIDNEY FAILURE, STAGE 4 (SEVERE) (HCC): ICD-10-CM

## 2021-05-11 DIAGNOSIS — Z51.11 CHEMOTHERAPY MANAGEMENT, ENCOUNTER FOR: ICD-10-CM

## 2021-05-11 DIAGNOSIS — N18.9 ACUTE ON CHRONIC RENAL INSUFFICIENCY: ICD-10-CM

## 2021-05-11 DIAGNOSIS — D50.8 IRON DEFICIENCY ANEMIA SECONDARY TO INADEQUATE DIETARY IRON INTAKE: ICD-10-CM

## 2021-05-11 DIAGNOSIS — N28.9 ACUTE ON CHRONIC RENAL INSUFFICIENCY: ICD-10-CM

## 2021-05-11 DIAGNOSIS — R53.1 WEAKNESS GENERALIZED: ICD-10-CM

## 2021-05-11 DIAGNOSIS — E03.8 OTHER SPECIFIED HYPOTHYROIDISM: ICD-10-CM

## 2021-05-11 DIAGNOSIS — R11.0 CHEMOTHERAPY-INDUCED NAUSEA: ICD-10-CM

## 2021-05-11 LAB
ALBUMIN SERPL-MCNC: 3 G/DL (ref 3.5–5.2)
ALP BLD-CCNC: 120 U/L (ref 35–104)
ALT SERPL-CCNC: 18 U/L (ref 9–52)
ANION GAP SERPL CALCULATED.3IONS-SCNC: 6 MMOL/L (ref 7–19)
AST SERPL-CCNC: 35 U/L (ref 14–36)
BASOPHILS ABSOLUTE: 0.06 K/UL (ref 0.01–0.08)
BASOPHILS RELATIVE PERCENT: 0.6 % (ref 0.1–1.2)
BILIRUB SERPL-MCNC: 0.6 MG/DL (ref 0.2–1.3)
BUN BLDV-MCNC: 18 MG/DL (ref 7–17)
CALCIUM SERPL-MCNC: 9 MG/DL (ref 8.4–10.2)
CHLORIDE BLD-SCNC: 103 MMOL/L (ref 98–111)
CO2: 31 MMOL/L (ref 22–29)
CREAT SERPL-MCNC: 1.8 MG/DL (ref 0.5–1)
EOSINOPHILS ABSOLUTE: 0.58 K/UL (ref 0.04–0.54)
EOSINOPHILS RELATIVE PERCENT: 6.3 % (ref 0.7–7)
GFR NON-AFRICAN AMERICAN: 27
GLUCOSE BLD-MCNC: 122 MG/DL (ref 74–106)
HCT VFR BLD CALC: 26.9 % (ref 34.1–44.9)
HEMOGLOBIN: 8.2 G/DL (ref 11.2–15.7)
LYMPHOCYTES ABSOLUTE: 0.87 K/UL (ref 1.18–3.74)
LYMPHOCYTES RELATIVE PERCENT: 9.4 % (ref 19.3–53.1)
MAGNESIUM: 1.9 MG/DL (ref 1.6–2.3)
MCH RBC QN AUTO: 32.8 PG (ref 25.6–32.2)
MCHC RBC AUTO-ENTMCNC: 30.5 G/DL (ref 32.3–35.5)
MCV RBC AUTO: 107.6 FL (ref 79.4–94.8)
MONOCYTES ABSOLUTE: 1.7 K/UL (ref 0.24–0.82)
MONOCYTES RELATIVE PERCENT: 18.4 % (ref 4.7–12.5)
NEUTROPHILS ABSOLUTE: 6.05 K/UL (ref 1.56–6.13)
NEUTROPHILS RELATIVE PERCENT: 65.3 % (ref 34–71.1)
PDW BLD-RTO: 24.4 % (ref 11.7–14.4)
PHOSPHORUS: 3.8 MG/DL (ref 2.5–4.5)
PLATELET # BLD: 496 K/UL (ref 182–369)
PMV BLD AUTO: 9.3 FL (ref 7.4–10.4)
POTASSIUM SERPL-SCNC: 3.8 MMOL/L (ref 3.5–5.1)
RBC # BLD: 2.5 M/UL (ref 3.93–5.22)
SODIUM BLD-SCNC: 140 MMOL/L (ref 137–145)
TOTAL PROTEIN: 6 G/DL (ref 6.3–8.2)
WBC # BLD: 9.26 K/UL (ref 3.98–10.04)

## 2021-05-11 PROCEDURE — 84100 ASSAY OF PHOSPHORUS: CPT

## 2021-05-11 PROCEDURE — 1090F PRES/ABSN URINE INCON ASSESS: CPT | Performed by: NURSE PRACTITIONER

## 2021-05-11 PROCEDURE — 1036F TOBACCO NON-USER: CPT | Performed by: NURSE PRACTITIONER

## 2021-05-11 PROCEDURE — 96365 THER/PROPH/DIAG IV INF INIT: CPT

## 2021-05-11 PROCEDURE — 99214 OFFICE O/P EST MOD 30 MIN: CPT | Performed by: NURSE PRACTITIONER

## 2021-05-11 PROCEDURE — G8417 CALC BMI ABV UP PARAM F/U: HCPCS | Performed by: NURSE PRACTITIONER

## 2021-05-11 PROCEDURE — 96360 HYDRATION IV INFUSION INIT: CPT

## 2021-05-11 PROCEDURE — 1123F ACP DISCUSS/DSCN MKR DOCD: CPT | Performed by: NURSE PRACTITIONER

## 2021-05-11 PROCEDURE — 4040F PNEUMOC VAC/ADMIN/RCVD: CPT | Performed by: NURSE PRACTITIONER

## 2021-05-11 PROCEDURE — 85025 COMPLETE CBC W/AUTO DIFF WBC: CPT

## 2021-05-11 PROCEDURE — 6360000002 HC RX W HCPCS: Performed by: NURSE PRACTITIONER

## 2021-05-11 PROCEDURE — 83735 ASSAY OF MAGNESIUM: CPT

## 2021-05-11 PROCEDURE — G8428 CUR MEDS NOT DOCUMENT: HCPCS | Performed by: NURSE PRACTITIONER

## 2021-05-11 PROCEDURE — 96361 HYDRATE IV INFUSION ADD-ON: CPT

## 2021-05-11 PROCEDURE — 96367 TX/PROPH/DG ADDL SEQ IV INF: CPT

## 2021-05-11 PROCEDURE — 80053 COMPREHEN METABOLIC PANEL: CPT

## 2021-05-11 PROCEDURE — 2580000003 HC RX 258: Performed by: NURSE PRACTITIONER

## 2021-05-11 RX ORDER — PACLITAXEL 100 MG/20ML
75 INJECTION, POWDER, LYOPHILIZED, FOR SUSPENSION INTRAVENOUS ONCE
Status: CANCELLED | OUTPATIENT
Start: 2021-05-18

## 2021-05-11 RX ORDER — SODIUM CHLORIDE 9 MG/ML
25 INJECTION, SOLUTION INTRAVENOUS PRN
Status: DISCONTINUED | OUTPATIENT
Start: 2021-05-11 | End: 2021-05-11

## 2021-05-11 RX ORDER — SODIUM CHLORIDE 9 MG/ML
INJECTION, SOLUTION INTRAVENOUS CONTINUOUS
Status: CANCELLED | OUTPATIENT
Start: 2021-05-18

## 2021-05-11 RX ORDER — PALONOSETRON 0.05 MG/ML
0.25 INJECTION, SOLUTION INTRAVENOUS ONCE
Status: CANCELLED | OUTPATIENT
Start: 2021-05-18

## 2021-05-11 RX ORDER — EPINEPHRINE 1 MG/ML
0.3 INJECTION, SOLUTION, CONCENTRATE INTRAVENOUS PRN
Status: CANCELLED | OUTPATIENT
Start: 2021-05-18

## 2021-05-11 RX ORDER — SODIUM CHLORIDE 0.9 % (FLUSH) 0.9 %
10 SYRINGE (ML) INJECTION PRN
Status: CANCELLED | OUTPATIENT
Start: 2021-05-18

## 2021-05-11 RX ORDER — HEPARIN SODIUM (PORCINE) LOCK FLUSH IV SOLN 100 UNIT/ML 100 UNIT/ML
500 SOLUTION INTRAVENOUS PRN
Status: CANCELLED | OUTPATIENT
Start: 2021-05-11

## 2021-05-11 RX ORDER — MEPERIDINE HYDROCHLORIDE 50 MG/ML
12.5 INJECTION INTRAMUSCULAR; INTRAVENOUS; SUBCUTANEOUS ONCE
Status: CANCELLED | OUTPATIENT
Start: 2021-05-18 | End: 2021-05-18

## 2021-05-11 RX ORDER — SODIUM CHLORIDE 9 MG/ML
25 INJECTION, SOLUTION INTRAVENOUS PRN
Status: CANCELLED | OUTPATIENT
Start: 2021-05-11

## 2021-05-11 RX ORDER — 0.9 % SODIUM CHLORIDE 0.9 %
1000 INTRAVENOUS SOLUTION INTRAVENOUS ONCE
Status: COMPLETED | OUTPATIENT
Start: 2021-05-11 | End: 2021-05-11

## 2021-05-11 RX ORDER — HEPARIN SODIUM (PORCINE) LOCK FLUSH IV SOLN 100 UNIT/ML 100 UNIT/ML
500 SOLUTION INTRAVENOUS PRN
Status: DISCONTINUED | OUTPATIENT
Start: 2021-05-11 | End: 2021-05-12 | Stop reason: HOSPADM

## 2021-05-11 RX ORDER — SODIUM CHLORIDE 0.9 % (FLUSH) 0.9 %
5-40 SYRINGE (ML) INJECTION PRN
Status: CANCELLED | OUTPATIENT
Start: 2021-05-11

## 2021-05-11 RX ORDER — 0.9 % SODIUM CHLORIDE 0.9 %
1000 INTRAVENOUS SOLUTION INTRAVENOUS ONCE
Status: CANCELLED | OUTPATIENT
Start: 2021-05-11 | End: 2021-05-11

## 2021-05-11 RX ORDER — METHYLPREDNISOLONE SODIUM SUCCINATE 125 MG/2ML
125 INJECTION, POWDER, LYOPHILIZED, FOR SOLUTION INTRAMUSCULAR; INTRAVENOUS ONCE
Status: CANCELLED | OUTPATIENT
Start: 2021-05-18 | End: 2021-05-18

## 2021-05-11 RX ORDER — SODIUM CHLORIDE 9 MG/ML
25 INJECTION, SOLUTION INTRAVENOUS PRN
Status: DISCONTINUED | OUTPATIENT
Start: 2021-05-11 | End: 2021-05-12 | Stop reason: HOSPADM

## 2021-05-11 RX ORDER — DIPHENHYDRAMINE HYDROCHLORIDE 50 MG/ML
50 INJECTION INTRAMUSCULAR; INTRAVENOUS ONCE
Status: CANCELLED | OUTPATIENT
Start: 2021-05-18 | End: 2021-05-18

## 2021-05-11 RX ORDER — HEPARIN SODIUM (PORCINE) LOCK FLUSH IV SOLN 100 UNIT/ML 100 UNIT/ML
500 SOLUTION INTRAVENOUS PRN
Status: CANCELLED | OUTPATIENT
Start: 2021-05-18

## 2021-05-11 RX ORDER — SODIUM CHLORIDE 9 MG/ML
20 INJECTION, SOLUTION INTRAVENOUS ONCE
Status: CANCELLED | OUTPATIENT
Start: 2021-05-18 | End: 2021-05-18

## 2021-05-11 RX ORDER — SODIUM CHLORIDE 0.9 % (FLUSH) 0.9 %
5-40 SYRINGE (ML) INJECTION PRN
Status: DISCONTINUED | OUTPATIENT
Start: 2021-05-11 | End: 2021-05-12 | Stop reason: HOSPADM

## 2021-05-11 RX ORDER — SODIUM CHLORIDE 0.9 % (FLUSH) 0.9 %
5 SYRINGE (ML) INJECTION PRN
Status: CANCELLED | OUTPATIENT
Start: 2021-05-18

## 2021-05-11 RX ADMIN — ONDANSETRON 8 MG: 2 INJECTION INTRAMUSCULAR; INTRAVENOUS at 13:43

## 2021-05-11 RX ADMIN — HEPARIN 500 UNITS: 100 SYRINGE at 15:47

## 2021-05-11 RX ADMIN — SODIUM CHLORIDE, PRESERVATIVE FREE 10 ML: 5 INJECTION INTRAVENOUS at 15:47

## 2021-05-11 RX ADMIN — SODIUM CHLORIDE 1000 ML: 9 INJECTION, SOLUTION INTRAVENOUS at 14:02

## 2021-05-11 ASSESSMENT — ENCOUNTER SYMPTOMS
VOMITING: 0
EYE DISCHARGE: 0
EYE REDNESS: 0
RESPIRATORY NEGATIVE: 1
EYE PAIN: 0
ABDOMINAL PAIN: 0
WHEEZING: 0
EYES NEGATIVE: 1
COUGH: 0
BLOOD IN STOOL: 0
DIARRHEA: 0
SORE THROAT: 0
BACK PAIN: 0
CONSTIPATION: 0
SHORTNESS OF BREATH: 0

## 2021-05-11 NOTE — PROGRESS NOTES
Progress Note      Pt Name: Quita Conte  YOB: 1934  MRN: [id number]    Date of evaluation: 5/11/2021  History Obtained From:  patient, electronic medical record    CHIEF COMPLAINT:    Chief Complaint   Patient presents with    Chemotherapy    Cancer     Pancreatic    Follow-up     Nausea and severe fatigue    Treatment     HISTORY OF PRESENT ILLNESS:    Quita Conte is a 80 y.o.  female with significant PMH of iron deficiency anemia, chronic kidney disease stage IV,  and diagnosis of pancreatic adenocarcinoma. She has been receiving palliative chemotherapy with Gemzar 750 mg/m² and Abraxane 90 mg/m² every 2 weeks (this is a 25% dose reduction). Follow-up CT scan of the abdomen and pelvis on 4/13/2021 and tumor markers suggest a positive response to treatment. Treatment was held last week due to Pratibha experiencing significant fatigue and nausea. I also dose reduced her treatment regimen by 25%, starting with cycle 4 will receive Gemzar 600 mg/m² and Abraxane 75 mg/m². Pratibha returns today for evaluation, side effect monitoring, lab monitoring and consideration to receive cycle #4, day 1 of Gemzar and Abraxane. She presents today feeling somewhat better than last week although continues to have significant fatigue and declined appetite. She has been taking the Zofran and reports that her nausea has improved, she also receives Aloxi and dexamethasone for persistent nausea with each treatment. Her weight is stable. Pratibha received 1 unit of PRBCs last week for a hemoglobin of 6.7, she has a hemoglobin today of 8.2. Iron substrates repeated today, 4/6/2021 revealed a ferritin of 1088, iron 43, iron saturation 19%, TIBC 226 and a vitamin B12 level of 781. Will administer Retacrit at follow-up appointment if hemoglobin remains less than 11.     ONCOLOGIC/HEMATOLOGIC HISTORY:   Diagnosis:   Anemia of chronic kidney disease   Chronic kidney disease stage lll Pancreatic adenocarcinoma  Chronic anticoagulation due to prior DVTs    Treatment summary:  Ferrous sulfate 325 mg daily   11/26/2018 - Procrit 20,000 units for chronic kidney disease stage IV. Procrit to be given every 2 weeks ×4 and then monthly, dose to be titrated appropriately. Hold dose for hemoglobin >11   Currently receiving Retacrit 40,000 units every 4 weeks  2/8/2021 palliative chemotherapy on 2/8/2021 with Gemzar 750 mg/m² and Abraxane 90 mg/m² every 2 weeks, this is a dose reduction by 25%      Tumor monitoring:  · 12/2/2020-CA 19-9 of 133  · 12/23/2020-CA 19-9 of 217  · 1/20/2021-CA 19-9 of 236  · 3/23/2021- CA 19-9 of Rosaura Chirinos 69 seen by Dr. Xiao Sandoval on 12/2/2020 as an inpatient consultation at [facility]) of [facility] for findings of a pancreatic mass and bile duct obstruction. She presented to the ER department with complaints of hematuria that began a few days prior to presentation. She denied any dysuria, back pain, no nausea or vomiting.  She also reported easily bruising and ecchymotic areas in her back, abdomen and flank.  Initial laboratory work-up showed INR above 18.  She was given vitamin K subcutaneously.  She was found to have elevated LFTs and the following are events that occurred. · 12/01/2020CT abdomen pelvis without contrast showed a pancreatic head mass measuring 3.3 x 3.7 cm in size and associated, bile duct dilatation above the level of the ampulla.  Associated moderate pancreatic ductal dilatation. · 12/2/2020-CA 19-9 133  · 12/03/2020ERCP with FNA biopsy Positive for high-grade adenocarcinoma.  Unfortunately, stent could not be placed. · 12/5/2020-transferred from [facility]) to [address] [place] [address] and discharged home on 12/8/2020  · 12/07/2020- Cholangipancreatography Retrograde Endo ERCP with sphincterotomy, balloon sweep and placement of 10x60 PC BS metal stent at [facility] in [place].   · 12/21/2020- CT chest with contrast documented no evidence of intrathoracic neoplastic process/metastatic disease. Evidence of pneumobilia. The ectasia of the pancreatic duct, similar to the previous study. An incompletely visualized and evaluated heterogeneous mass in the head of the pancreas measuring 3.3 x 3.7cm. A biliary stent in place. Moderate persistent dilatation of the proximal common bile duct. A stable small low-density nodule in the left adrenal gland  · 12/22/2020Dr. Lambert discussed the results of CT chest and pathology that suggest advanced pancreatic cancer, unfortunately it is not amenable to surgery. She was quite weak and had poor performance, appeared very frail. Discussion was made about palliative chemotherapy but the decision to hold was made until improvement of her physical conditioning occurred. Recommended physical therapy as outpatient and reassess fitness for palliative chemotherapy in approximately 4 weeks. 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 · 12/7/2020 ERCP at [address] [place] [address] Dr by Dr. Roxanna Jaffe revealed a single severe biliary stricture in the lower third of the main bile duct with severe upstream biliary dilation, stricture was malignant appearing. Biliary sphincterectomy was performed. 1 partially covered metal stent was placed into the common bile duct. · 2/8/2021-initiated palliative chemotherapy with Gemzar 750 mg/m² and Abraxane 90 mg/m² every 2 weeks, this is a dose reduction by 25%  · 3/23/2021- CA 199 108, improved compared to pretreatment value of 236 on 1/20/2021. · 4/13/2021 CT Abdomen/Pelvis with contrast documented a poorly defined complex mass in the head of the pancreas. It appears to have decreased in size when remeasured at the same level and in same dimension as in the previous study (3 cm x 2.5 cm, compared to 3.7 cm x 3.3 cm).   Persistent moderate dilatation of the pancreatic duct.  There are 2 additional small cystic nodules in the body of the pancreas measuring 11 mm and 9 mm. A biliary stent in place and decompression of the intrahepatic biliary ducts since the previous study. 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 The diverticulosis of the distal colon with no evidence for diverticulitis. · 5/4/2021- dose reduction by 25% due to severe fatigue and nausea, starting with cycle 4 will receive Gemzar 600 mg/m² and Abraxane 75 mg/m². HEMATOLOGY HISTORY:  Ludy Jasso was seen in initial hematology consultation on 7/20/2018 for further evaluation and treatment recommendations for anemia. Ludy Jasso was referred from Dr. Kamila Riddle. Ludy Jasso presented with no significant complaints other than chronic fatigue and constipation. She had been taking ferrous sulfate 325 mg daily under the direction of Dr. Cherry Rodriguez. Ludy Jasso reported significant constipation and some GI upset with the oral iron. She denied any bleeding tendencies to include hematuria, melena, hematochezia and epistaxis. Ludy Jasso had a colonoscopy on 4/26/2016 by Dr. Esha Lam for further evaluation of iron deficiency. The only abnormal finding was diverticulosis. Ludy Jasso is routinely followed by Dr. Suyapa Plasencia for her chronic kidney disease stage IV. CMP on 6/11/2018 documented a creatinine of 2.2 and GFR 21. CBC review from 2/2/2018- 6/11/2018 documented hemoglobin ranging from 9.4- 10.1, RBC 2.91- 3.22, MCV 94- 100 with a normal WBC and platelet count  CBC at initial consultation on 7/20/2018 documented a WBC of 7.33, ANC 4.74, RBC 3.04, hemoglobin 10.3, .6 and a platelet count of 067,018. Serology studies on 7/20/2018:  Creatinine 1.98   GFR 23   Calcium 10.3   IgG 700, IgA 180, and IgM 52   M spike not observed   Immunofixation: No monoclonality detected.    Iron 74   Iron saturation 27%   TIBC 278   Vitamin B12 437   Folate 15.7   Ferritin 299   Reticulocyte count 1.5%   Erythropoietin 11.6      Beta-2 microglobulin 6.2     Occult stool positive 1 of 3 samples on 7/25/2018. Colonoscopy on 10/4/2018 by Dr. Kathrine Michaels was documented as removal of 2 polyps with benign pathology. No evidence of bleeding. Serology studies on 10/15/2018:  Iron 76   TIBC 287   Iron saturation 26%   Ferritin 321   Hemoglobin 10.1   Creatinine 2.47   GFR 17    11/26/2018 - Initiated Procrit 20,000 units for chronic kidney disease stage IV, to be given every 2 weeks ×4 and then monthly, dose to be titrated appropriately. Hemoglobin 9.6. All has anemia of chronic disease to include chronic kidney disease stage IV. She will began receiving growth factor support and will need to maintain a ferritin level greater than 200 and an iron saturation of 25% for the Procrit to be beneficial. Hold Procrit dose for hemoglobin >11. Indications/rationale for Procrit was discussed with Kacey Powell. Risks, benefits and expectations were outlined. Risks including, but not limited to hypertension, stroke, MI, death were discussed and acknowledged by Kacey Powell. Serology studies on 3/8/2019:  Creatinine 1.7/GFR 30.4   Iron 80   Iron saturation 26.5%   TIBC 302   Ferritin 144   Vitamin B12 501   Folate 13.9    Serology studies on 3/5/2020:  · Creatinine 1.3/GFR 41.4  · Iron 84  · TIBC 390  · Iron saturation 21.5%  · Ferritin 342    Iron substrates on 6/25/2020  · Ferritin 311  · Iron 76  · Iron saturation 23%  · TIBC 326  · Creatinine 1.3/GFR 39    Labs on 12/2/2020 and 12/3/2020:  · Iron 35  · TIBC 213  · Iron saturation 16%  · Vitamin B12 483  · Folate 10.6  · Ferritin 480    Labs on 3/23/2021  · Iron 54  · TIBC 366  · Iron saturation 15  · Ferritin 530  · Reticulocytes 2.1    Labs on 4/6/2021  · WBC 14.40  · RBC 2.46  · HGB 8.6  · HCT 26.4  · .3  · MCH 35  · MCHC 32.6  · ANC 12.03  · ,000  · Iron 43   · TIBC 226  · Iron saturation 19%   · Ferritin 1088. 0  · B-12= 781  · GFR 39  · BUN 21  · Creatinine 1.3  · Phosphorus 2.4  · Magnesium 1.5      Age-appropriate health screening:  · Colonoscopy on 10/4/2018 by Dr. Richie An was documented as removal of 2 polyps with benign pathology. No evidence of bleeding. · 9/20/2019 Bilateral mammogram at [address] [facility] [place] documented no mammographic evidence of malignancy.   · No bone mineral density on file     Past Medical History:    Past Medical History:   Diagnosis Date    Abdominal aortic aneurysm (AAA) ([facility]     Anemia     Arthritis     Cancer ([facility] 12/01/2020    pancreatic    Chronic kidney disease     DVT of lower extremity (deep venous thrombosis) (HCC)     twice 2010 and 2017    Hyperlipidemia     Hypertension     Kidney stones     Osteoarthritis     Palliative care patient 12/02/2020    Thyroid disease     Thyroid disorder        Past Surgical History:    Past Surgical History:   Procedure Laterality Date    COLONOSCOPY  2016    Dr Brandon Gonzales problems    ENDOSCOPIC ULTRASOUND (LOWER) N/A 12/03/2020    Dr JONNY Grijalva/unsuccessful biliary cannulation despite attempts at 2-wire cannulation and proph pancreatic stenting with cannulation around pancreatic stenting-Positive for high-grade adenocarcinoma, pancreatic head    ERCP N/A 12/03/2020    Dr JONNY Grijalva/unsuccessful biliary cannulation despite attempts at 2-wire cannulation and proph pancreatic stenting with cannulation around pancreatic stenting-Positive for high-grade adenocarcinoma, pancreatic head    ERCP  12/07/2020    Dr Josemanuel Kelly/sphincterotomy, placement of a 10 x 60 partially covered biliary stent    PORT SURGERY Right 2/5/2021    SINGLE LUMEN PORT PLACEMENT WITH ULTRASOUND AND FLUORO performed by Moon Arora MD at [address] TOE AMPUTATION Bilateral     pinky toes removed    TOTAL KNEE ARTHROPLASTY Right 01/03/2020    RIGHT TOTAL KNEE REPLACEMENT performed by Nicolasa Bedoya MD at [address] [facility] [place] OR       Current Medications:    Current Outpatient Medications   Medication Sig Dispense Refill    ondansetron (ZOFRAN ODT) 4 MG disintegrating tablet Take 2 tablets by mouth every 8 hours as needed for Nausea or Vomiting 60 tablet 3    lidocaine-prilocaine (EMLA) 2.5-2.5 % cream APPLY TO PORT AREA AND COVERWITH PLASTIC WRAP ONE HOUR PRIOR TO TREATMENT 30 g 1    promethazine (PHENERGAN) 25 MG tablet TAKE 1 TABLET BY MOUTH FOUR TIMES DAILY AS NEEDED FOR NAUSEA 20 tablet 1    apixaban (ELIQUIS) 2.5 MG TABS tablet Take 2.5 mg by mouth 2 times daily      ferrous sulfate (FEROSUL) 325 (65 Fe) MG tablet TAKE 1 TABLET BY MOUTH EVERY DAY (Patient taking differently: Take 325 mg by mouth daily (with breakfast) TAKE 1 TABLET BY MOUTH EVERY DAY) 90 tablet 3    lisinopril (PRINIVIL;ZESTRIL) 5 MG tablet Take 1 tablet by mouth 2 times daily (Patient taking differently: Take 10 mg by mouth daily ) 60 tablet 0    docusate sodium (COLACE) 100 MG capsule Take 1 capsule by mouth 2 times daily 60 capsule 1    calcitRIOL (ROCALTROL) 0.25 MCG capsule Take 0.25 mcg by mouth daily      allopurinol (ZYLOPRIM) 100 MG tablet Take 100 mg by mouth daily      levothyroxine (SYNTHROID) 112 MCG tablet Take 112 mcg by mouth Daily      simvastatin (ZOCOR) 20 MG tablet Take 20 mg by mouth nightly       No current facility-administered medications for this visit. Allergies:    Allergies   Allergen Reactions    Penicillins Rash     Childhood        Social History:    Social History     Tobacco Use    Smoking status: Never Smoker    Smokeless tobacco: Never Used   Substance Use Topics    Alcohol use: No    Drug use: No       Family History:   Family History   Problem Relation Age of Onset    Colon Cancer Brother     Cancer Mother         Breast Cancer    Heart Attack Father     Colon Polyps Neg Hx     Esophageal Cancer Neg Hx     Liver Cancer Neg Hx     Liver Disease Neg Hx     Rectal Cancer Neg Hx     Stomach Cancer Neg Hx        Vitals:  Vitals:    05/11/21 1315   BP: 136/72   Pulse: 81   Resp: 18   Temp: 97.8 °F (36.6 °C)   SpO2: 92%   Weight: 164 lb (74.4 kg)   Height: 5' 2\" (1.575 m)        Subjective   REVIEW OF SYSTEMS:   Review of Systems   Constitutional: Positive for activity change, appetite change, fatigue and unexpected weight change (Stable this week). Negative for chills, diaphoresis and fever. HENT: Negative. Negative for congestion, ear pain, hearing loss, nosebleeds, sore throat and tinnitus. Eyes: Negative. Negative for pain, discharge and redness. Respiratory: Negative. Negative for cough, shortness of breath and wheezing. Cardiovascular: Negative. Negative for chest pain, palpitations and leg swelling. Gastrointestinal: Negative. Negative for abdominal pain, blood in stool, constipation, diarrhea, nausea (Overall controlled currently) and vomiting. Endocrine: Negative for polydipsia. Genitourinary: Negative for dysuria, flank pain, frequency, hematuria and urgency. Musculoskeletal: Negative. Negative for back pain, myalgias and neck pain. Skin: Negative. Negative for rash. Neurological: Positive for weakness. Negative for dizziness, tremors, seizures and headaches. Hematological: Does not bruise/bleed easily. Psychiatric/Behavioral: Negative. The patient is not nervous/anxious. Objective   PHYSICAL EXAM:  Physical Exam  Vitals signs reviewed. Constitutional:       General: She is not in acute distress. Appearance: She is well-developed. She is ill-appearing. She is not diaphoretic. HENT:      Head: Normocephalic and atraumatic. Mouth/Throat:      Pharynx: Uvula midline. Tonsils: No tonsillar exudate. Eyes:      General: Lids are normal. No scleral icterus. Right eye: No discharge. Left eye: No discharge. Conjunctiva/sclera: Conjunctivae normal.      Pupils: Pupils are equal, round, and reactive to light. Neck:      Musculoskeletal: Normal range of motion and neck supple. Thyroid: No thyroid mass or thyromegaly. Vascular: No JVD. Trachea: Trachea normal. No tracheal deviation. Cardiovascular:      Rate and Rhythm: Normal rate and regular rhythm. Heart sounds: Normal heart sounds. No murmur. No friction rub. No gallop. Pulmonary:      Effort: Pulmonary effort is normal. No respiratory distress. Breath sounds: Normal breath sounds. No wheezing or rales. Chest:      Chest wall: No tenderness. Abdominal:      General: Bowel sounds are normal. There is no distension. Palpations: Abdomen is soft. There is no mass. Tenderness: There is no abdominal tenderness. There is no guarding or rebound. Hernia: No hernia is present. Musculoskeletal:         General: No tenderness or deformity. Comments: Range of motion within normal limits x4 extremities   Skin:     General: Skin is warm. Coloration: Skin is not pale. Findings: No erythema or rash. Neurological:      Mental Status: She is alert and oriented to person, place, and time. Cranial Nerves: No cranial nerve deficit. Motor: Weakness present. Coordination: Coordination normal.   Psychiatric:         Behavior: Behavior normal.         Thought Content:  Thought content normal.         Labs:  Lab Results   Component Value Date    WBC 9.26 05/11/2021    HGB 8.2 (L) 05/11/2021    HCT 26.9 (LL) 05/11/2021    .6 (H) 05/11/2021     (H) 05/11/2021     Lab Results   Component Value Date    NEUTROABS 6.05 05/11/2021     Lab Results   Component Value Date     05/11/2021    K 3.8 05/11/2021     05/11/2021    CO2 31 (H) 05/11/2021    BUN 18 (H) 05/11/2021    CREATININE 1.8 (H) 05/11/2021    GLUCOSE 122 (H) 05/11/2021    CALCIUM 9.0 05/11/2021    PROT 6.0 (L) 05/11/2021    LABALBU 3.0 (L) 05/11/2021    BILITOT 0.6 05/11/2021    ALKPHOS 120 (H) 05/11/2021    AST 35 05/11/2021    ALT 18 05/11/2021    LABGLOM 27 (A) 05/11/2021    GFRAA 47 (L) 04/13/2021 AGRATIO 1.9 01/20/2021    GLOB 2.9 05/04/2021       ASSESSMENT/PLAN:      1. Locally advanced pancreatic adenocarcinoma, receiving palliative chemotherapy with Gemzar and Abraxane, was initiated at a dose reduction by 25%. After receiving 3 cycles began having significant fatigue uncontrolled nausea requiring an additional dose reduction to begin with cycle #4, Gemzar will be given at 600 mg/m² versus 750 mg/m² and Abraxane will be given at 75 mg/m² versus 90 mg/m²pretreatment. Follow-up CT scan of the abdomen and pelvis on 4/13/2021 and tumor markers suggest a positive response to treatment. Treatment was held last week and will be held again today for further recovery. - Return in 1 week for consideration to receive cycle #4 at an additional dose reduction  - CMP today  - CA 19-9 and CMP upon return  -Give 1 L of normal saline today    2. Management of chemotherapy side effects:  -Nausea-maintain hydration and continue to rotate between Zofran and Phenergan every 4 hours for nausea as needed. Give 1 L of fluids today. Refill sent for Zofran.   -Diarrhea-resolved  -Fatigue-grade 2, treatment held for second week in a row and reevaluate in 1 week to resume at a 25% dose reduction (initial treatment was at a 25% dose reduction to begin with)  -Weight loss with decreased appetite-monitor closely, weight stable, if appetite does not improve will consider appetite enhancement    3. Extra hepatic/intrahepatic biliary dilation, status post ERCP with metal stent placement on 12/7/2020 at [facility] [place]. Liver enzymes improving, will continue to monitor  -CMP today  -Virtual visit with Dr. Marv Craven on 2/16/2021 and documented that a stent adjustment may be needed if LFTs continue to increase    4. Anemia of chronic kidney failure, stage 3B. Receives BARBARA therapy with Retacrit intermittently for hemoglobin <11, last dose of 40,000 units given on 4/6/2021.   After hydration on 5/4/202,1 hemoglobin of 6.7 was obtained she received 1 unit of PRBCs. She continues to take the ferrous sulfate without difficulty once daily. Labs on 4/6/2021  · Iron 43   · TIBC 226  · Iron saturation 19%   · Ferritin 1088.0  · B-12= 781    -Continue ferrous sulfate  -Retacrit 40,000 units will be given at return appointment if hemoglobin <11    5. Hypothyroidism presently being managed by Dr. Nerissa Covington and is taking Synthroid 112 µg daily. TSH 3.55 on 12/2/2020  -Follow-up with Dr. Nerissa Covington for monitoring of TSH and Synthroid dosing    6. Routine health screening history of abnormal mammogram:  Annual routine follow-up screening mammogram on 9/20/2019 documented no mammographic evidence of malignancy, BI-RADS Category 2 benign findings.  -Annual screening mammogram is overdue, will address in the near future once treatment for her pancreatic adenocarcinoma has been initiated    I discussed all of the above findings included in the assessment and plan with the patient and the patient is in agreement to move forward with current recommendations/treatment. I have addressed all of their questions and concerns that were verbalized. FOLLOW UP:  1. Return in 1 week for consideration of cycle #4 at a dose reduction by 25%  2. Continue to follow with other medical providers as recommended    Discussed precautions related to [address] and being at increased risk. Discussed proper handwashing to be done frequently, limit exposure to other individuals and maintain social distancing of 6 feet. Recommend contacting primary care provider if having respiratory symptoms for further recommendations and consideration for testing.     (Please note that portions of this note were completed with a voice recognition program. Efforts were made to edit the dictations but occasionally words are mis-transcribed,  Also, portions of this note have been copied forward, however, changed to reflect the most current clinical status of this patient.)    Electronically signed by ALBERTO Guerra on 5/13/2021 at 11:24 AM caffeine

## 2021-05-13 RX ORDER — ONDANSETRON 4 MG/1
8 TABLET, ORALLY DISINTEGRATING ORAL EVERY 8 HOURS PRN
Qty: 60 TABLET | Refills: 3 | Status: SHIPPED | OUTPATIENT
Start: 2021-05-13

## 2021-05-13 ASSESSMENT — ENCOUNTER SYMPTOMS
GASTROINTESTINAL NEGATIVE: 1
NAUSEA: 0

## 2021-05-18 ENCOUNTER — HOSPITAL ENCOUNTER (INPATIENT)
Age: 86
LOS: 8 days | Discharge: HOME HEALTH | DRG: 291 | End: 2021-05-26
Attending: EMERGENCY MEDICINE
Payer: MEDICARE

## 2021-05-18 ENCOUNTER — HOSPITAL ENCOUNTER (OUTPATIENT)
Dept: INFUSION THERAPY | Age: 86
Discharge: HOME | DRG: 291 | End: 2021-05-18
Payer: MEDICARE

## 2021-05-18 ENCOUNTER — HOSPITAL ENCOUNTER (OUTPATIENT)
Dept: GENERAL RADIOLOGY | Age: 86
Discharge: HOME | DRG: 291 | End: 2021-05-18
Payer: MEDICARE

## 2021-05-18 ENCOUNTER — APPOINTMENT (OUTPATIENT)
Dept: GENERAL RADIOLOGY | Age: 86
DRG: 291 | End: 2021-05-18
Payer: MEDICARE

## 2021-05-18 VITALS
DIASTOLIC BLOOD PRESSURE: 82 MMHG | HEART RATE: 93 BPM | BODY MASS INDEX: 31.17 KG/M2 | SYSTOLIC BLOOD PRESSURE: 135 MMHG | OXYGEN SATURATION: 99 % | RESPIRATION RATE: 18 BRPM | TEMPERATURE: 97.5 F | HEIGHT: 62 IN | WEIGHT: 169.4 LBS

## 2021-05-18 DIAGNOSIS — R06.02 BREATH SHORTNESS: ICD-10-CM

## 2021-05-18 DIAGNOSIS — C25.9 PANCREATIC CARCINOMA (HCC): Primary | ICD-10-CM

## 2021-05-18 DIAGNOSIS — D63.1 ANEMIA OF CHRONIC KIDNEY FAILURE, STAGE 4 (SEVERE) (HCC): ICD-10-CM

## 2021-05-18 DIAGNOSIS — I50.9 NEW ONSET OF CONGESTIVE HEART FAILURE (HCC): ICD-10-CM

## 2021-05-18 DIAGNOSIS — C25.7 MALIGNANT NEOPLASM OF OTHER PARTS OF PANCREAS (HCC): ICD-10-CM

## 2021-05-18 DIAGNOSIS — Z79.899 ON ANTINEOPLASTIC CHEMOTHERAPY: ICD-10-CM

## 2021-05-18 DIAGNOSIS — R53.1 WEAKNESS GENERALIZED: ICD-10-CM

## 2021-05-18 DIAGNOSIS — N18.9 CHRONIC KIDNEY DISEASE, UNSPECIFIED CKD STAGE: ICD-10-CM

## 2021-05-18 DIAGNOSIS — N18.4 ANEMIA OF CHRONIC KIDNEY FAILURE, STAGE 4 (SEVERE) (HCC): ICD-10-CM

## 2021-05-18 DIAGNOSIS — D64.9 CHRONIC ANEMIA: ICD-10-CM

## 2021-05-18 DIAGNOSIS — D64.9 ANEMIA, UNSPECIFIED TYPE: ICD-10-CM

## 2021-05-18 DIAGNOSIS — J96.01 ACUTE RESPIRATORY FAILURE WITH HYPOXIA (HCC): Primary | ICD-10-CM

## 2021-05-18 DIAGNOSIS — N18.9 CHRONIC RENAL IMPAIRMENT, UNSPECIFIED CKD STAGE: ICD-10-CM

## 2021-05-18 DIAGNOSIS — Z51.5 PALLIATIVE CARE PATIENT: ICD-10-CM

## 2021-05-18 LAB
ALBUMIN SERPL-MCNC: 3 G/DL (ref 3.5–5.2)
ALBUMIN SERPL-MCNC: 3.5 G/DL (ref 3.5–5.2)
ALP BLD-CCNC: 108 U/L (ref 35–104)
ALP BLD-CCNC: 139 U/L (ref 35–104)
ALT SERPL-CCNC: 12 U/L (ref 5–33)
ALT SERPL-CCNC: 14 U/L (ref 9–52)
ANION GAP SERPL CALCULATED.3IONS-SCNC: 11 MMOL/L (ref 7–19)
ANION GAP SERPL CALCULATED.3IONS-SCNC: 6 MMOL/L (ref 7–19)
ANISOCYTOSIS: ABNORMAL
AST SERPL-CCNC: 23 U/L (ref 5–32)
AST SERPL-CCNC: 35 U/L (ref 14–36)
BASE EXCESS ARTERIAL: 0.3 MMOL/L (ref -2–2)
BASOPHILS ABSOLUTE: 0.05 K/UL (ref 0.01–0.08)
BASOPHILS ABSOLUTE: 0.1 K/UL (ref 0–0.2)
BASOPHILS RELATIVE PERCENT: 0.7 % (ref 0.1–1.2)
BASOPHILS RELATIVE PERCENT: 1 % (ref 0–1)
BILIRUB SERPL-MCNC: 0.5 MG/DL (ref 0.2–1.3)
BILIRUB SERPL-MCNC: 0.6 MG/DL (ref 0.2–1.2)
BUN BLDV-MCNC: 16 MG/DL (ref 8–23)
BUN BLDV-MCNC: 18 MG/DL (ref 7–17)
CA 19-9: 38 U/ML (ref 0–37)
CALCIUM SERPL-MCNC: 9.1 MG/DL (ref 8.4–10.2)
CALCIUM SERPL-MCNC: 9.6 MG/DL (ref 8.8–10.2)
CARBOXYHEMOGLOBIN ARTERIAL: 2.3 % (ref 0–5)
CHLORIDE BLD-SCNC: 102 MMOL/L (ref 98–111)
CHLORIDE BLD-SCNC: 103 MMOL/L (ref 98–111)
CHOLESTEROL, TOTAL: 155 MG/DL (ref 160–199)
CO2: 25 MMOL/L (ref 22–29)
CO2: 31 MMOL/L (ref 22–29)
CREAT SERPL-MCNC: 1.3 MG/DL (ref 0.5–0.9)
CREAT SERPL-MCNC: 1.4 MG/DL (ref 0.5–1)
EOSINOPHILS ABSOLUTE: 0.2 K/UL (ref 0–0.6)
EOSINOPHILS ABSOLUTE: 0.59 K/UL (ref 0.04–0.54)
EOSINOPHILS RELATIVE PERCENT: 2.1 % (ref 0–5)
EOSINOPHILS RELATIVE PERCENT: 8 % (ref 0.7–7)
GFR AFRICAN AMERICAN: 47
GFR NON-AFRICAN AMERICAN: 36
GFR NON-AFRICAN AMERICAN: 39
GLOBULIN: 2.8 G/DL
GLUCOSE BLD-MCNC: 152 MG/DL (ref 74–106)
GLUCOSE BLD-MCNC: 160 MG/DL (ref 74–109)
HBA1C MFR BLD: 6.2 % (ref 4–6)
HCO3 ARTERIAL: 25.4 MMOL/L (ref 22–26)
HCT VFR BLD CALC: 26.7 % (ref 34.1–44.9)
HCT VFR BLD CALC: 31.5 % (ref 37–47)
HDLC SERPL-MCNC: 54 MG/DL (ref 65–121)
HEMOGLOBIN, ART, EXTENDED: 9.9 G/DL (ref 12–16)
HEMOGLOBIN: 8 G/DL (ref 11.2–15.7)
HEMOGLOBIN: 9.9 G/DL (ref 12–16)
IMMATURE GRANULOCYTES #: 0.2 K/UL
IRON SATURATION: 23 % (ref 14–50)
IRON: 53 UG/DL (ref 37–145)
LDL CHOLESTEROL CALCULATED: 79 MG/DL
LYMPHOCYTES ABSOLUTE: 0.6 K/UL (ref 1.1–4.5)
LYMPHOCYTES ABSOLUTE: 0.74 K/UL (ref 1.18–3.74)
LYMPHOCYTES RELATIVE PERCENT: 10 % (ref 19.3–53.1)
LYMPHOCYTES RELATIVE PERCENT: 7.2 % (ref 20–40)
MAGNESIUM: 1.8 MG/DL (ref 1.6–2.4)
MCH RBC QN AUTO: 33.2 PG (ref 25.6–32.2)
MCH RBC QN AUTO: 35.1 PG (ref 27–31)
MCHC RBC AUTO-ENTMCNC: 30 G/DL (ref 32.3–35.5)
MCHC RBC AUTO-ENTMCNC: 31.4 G/DL (ref 33–37)
MCV RBC AUTO: 110.8 FL (ref 79.4–94.8)
MCV RBC AUTO: 111.7 FL (ref 81–99)
METHEMOGLOBIN ARTERIAL: 1 %
MONOCYTES ABSOLUTE: 0.4 K/UL (ref 0–0.9)
MONOCYTES ABSOLUTE: 1.02 K/UL (ref 0.24–0.82)
MONOCYTES RELATIVE PERCENT: 13.8 % (ref 4.7–12.5)
MONOCYTES RELATIVE PERCENT: 4.4 % (ref 0–10)
NEUTROPHILS ABSOLUTE: 4.97 K/UL (ref 1.56–6.13)
NEUTROPHILS ABSOLUTE: 7.2 K/UL (ref 1.5–7.5)
NEUTROPHILS RELATIVE PERCENT: 67.5 % (ref 34–71.1)
NEUTROPHILS RELATIVE PERCENT: 83.6 % (ref 50–65)
O2 CONTENT ARTERIAL: 12.3 ML/DL
O2 SAT, ARTERIAL: 87.9 %
O2 THERAPY: ABNORMAL
PCO2 ARTERIAL: 42 MMHG (ref 35–45)
PDW BLD-RTO: 24.8 % (ref 11.7–14.4)
PDW BLD-RTO: 25.2 % (ref 11.5–14.5)
PH ARTERIAL: 7.39 (ref 7.35–7.45)
PHOSPHORUS: 3.3 MG/DL (ref 2.5–4.5)
PLATELET # BLD: 339 K/UL (ref 182–369)
PLATELET # BLD: 413 K/UL (ref 130–400)
PLATELET SLIDE REVIEW: ABNORMAL
PMV BLD AUTO: 10.3 FL (ref 7.4–10.4)
PMV BLD AUTO: 10.7 FL (ref 9.4–12.3)
PO2 ARTERIAL: 52 MMHG (ref 80–100)
POIKILOCYTES: ABNORMAL
POLYCHROMASIA: ABNORMAL
POTASSIUM SERPL-SCNC: 4 MMOL/L (ref 3.5–5.1)
POTASSIUM SERPL-SCNC: 4.2 MMOL/L (ref 3.5–5)
POTASSIUM, WHOLE BLOOD: 4.1
PRO-BNP: 7081 PG/ML (ref 0–1800)
RBC # BLD: 2.41 M/UL (ref 3.93–5.22)
RBC # BLD: 2.82 M/UL (ref 4.2–5.4)
SARS-COV-2, NAAT: NOT DETECTED
SODIUM BLD-SCNC: 138 MMOL/L (ref 136–145)
SODIUM BLD-SCNC: 140 MMOL/L (ref 137–145)
T4 FREE: 1.69 NG/DL (ref 0.93–1.7)
TOTAL IRON BINDING CAPACITY: 234 UG/DL (ref 250–400)
TOTAL PROTEIN: 5.8 G/DL (ref 6.3–8.2)
TOTAL PROTEIN: 7 G/DL (ref 6.6–8.7)
TRIGL SERPL-MCNC: 108 MG/DL (ref 0–149)
TROPONIN: 0.02 NG/ML (ref 0–0.03)
TSH REFLEX FT4: 4.49 UIU/ML (ref 0.35–5.5)
TSH SERPL DL<=0.05 MIU/L-ACNC: 4.71 UIU/ML (ref 0.47–4.68)
WBC # BLD: 7.37 K/UL (ref 3.98–10.04)
WBC # BLD: 8.7 K/UL (ref 4.8–10.8)

## 2021-05-18 PROCEDURE — 2580000003 HC RX 258: Performed by: NURSE PRACTITIONER

## 2021-05-18 PROCEDURE — 84484 ASSAY OF TROPONIN QUANT: CPT

## 2021-05-18 PROCEDURE — 94660 CPAP INITIATION&MGMT: CPT

## 2021-05-18 PROCEDURE — 87635 SARS-COV-2 COVID-19 AMP PRB: CPT

## 2021-05-18 PROCEDURE — 6370000000 HC RX 637 (ALT 250 FOR IP): Performed by: NURSE PRACTITIONER

## 2021-05-18 PROCEDURE — 2700000000 HC OXYGEN THERAPY PER DAY

## 2021-05-18 PROCEDURE — 96375 TX/PRO/DX INJ NEW DRUG ADDON: CPT

## 2021-05-18 PROCEDURE — 96367 TX/PROPH/DG ADDL SEQ IV INF: CPT

## 2021-05-18 PROCEDURE — 80053 COMPREHEN METABOLIC PANEL: CPT

## 2021-05-18 PROCEDURE — 83880 ASSAY OF NATRIURETIC PEPTIDE: CPT

## 2021-05-18 PROCEDURE — 36600 WITHDRAWAL OF ARTERIAL BLOOD: CPT

## 2021-05-18 PROCEDURE — 82803 BLOOD GASES ANY COMBINATION: CPT

## 2021-05-18 PROCEDURE — 6360000002 HC RX W HCPCS: Performed by: NURSE PRACTITIONER

## 2021-05-18 PROCEDURE — 80061 LIPID PANEL: CPT

## 2021-05-18 PROCEDURE — 2140000000 HC CCU INTERMEDIATE R&B

## 2021-05-18 PROCEDURE — 96417 CHEMO IV INFUS EACH ADDL SEQ: CPT

## 2021-05-18 PROCEDURE — 83036 HEMOGLOBIN GLYCOSYLATED A1C: CPT

## 2021-05-18 PROCEDURE — 83735 ASSAY OF MAGNESIUM: CPT

## 2021-05-18 PROCEDURE — 83550 IRON BINDING TEST: CPT

## 2021-05-18 PROCEDURE — 84439 ASSAY OF FREE THYROXINE: CPT

## 2021-05-18 PROCEDURE — 99222 1ST HOSP IP/OBS MODERATE 55: CPT | Performed by: INTERNAL MEDICINE

## 2021-05-18 PROCEDURE — 86301 IMMUNOASSAY TUMOR CA 19-9: CPT

## 2021-05-18 PROCEDURE — 83540 ASSAY OF IRON: CPT

## 2021-05-18 PROCEDURE — 84132 ASSAY OF SERUM POTASSIUM: CPT

## 2021-05-18 PROCEDURE — 36415 COLL VENOUS BLD VENIPUNCTURE: CPT

## 2021-05-18 PROCEDURE — 84443 ASSAY THYROID STIM HORMONE: CPT

## 2021-05-18 PROCEDURE — 71046 X-RAY EXAM CHEST 2 VIEWS: CPT

## 2021-05-18 PROCEDURE — 6360000002 HC RX W HCPCS: Performed by: EMERGENCY MEDICINE

## 2021-05-18 PROCEDURE — 96372 THER/PROPH/DIAG INJ SC/IM: CPT

## 2021-05-18 PROCEDURE — 85025 COMPLETE CBC W/AUTO DIFF WBC: CPT

## 2021-05-18 PROCEDURE — 96413 CHEMO IV INFUSION 1 HR: CPT

## 2021-05-18 PROCEDURE — 93005 ELECTROCARDIOGRAM TRACING: CPT | Performed by: EMERGENCY MEDICINE

## 2021-05-18 PROCEDURE — 84100 ASSAY OF PHOSPHORUS: CPT

## 2021-05-18 PROCEDURE — 99283 EMERGENCY DEPT VISIT LOW MDM: CPT

## 2021-05-18 PROCEDURE — 96374 THER/PROPH/DIAG INJ IV PUSH: CPT

## 2021-05-18 PROCEDURE — 71045 X-RAY EXAM CHEST 1 VIEW: CPT

## 2021-05-18 RX ORDER — ALLOPURINOL 100 MG/1
100 TABLET ORAL DAILY
Status: DISCONTINUED | OUTPATIENT
Start: 2021-05-19 | End: 2021-05-18

## 2021-05-18 RX ORDER — DOCUSATE SODIUM 100 MG/1
100 CAPSULE, LIQUID FILLED ORAL 2 TIMES DAILY
Status: DISCONTINUED | OUTPATIENT
Start: 2021-05-18 | End: 2021-05-26 | Stop reason: HOSPADM

## 2021-05-18 RX ORDER — SODIUM BICARBONATE 325 MG/1
325 TABLET ORAL 2 TIMES DAILY
COMMUNITY

## 2021-05-18 RX ORDER — SODIUM BICARBONATE 650 MG/1
325 TABLET ORAL 2 TIMES DAILY
Status: DISCONTINUED | OUTPATIENT
Start: 2021-05-18 | End: 2021-05-21

## 2021-05-18 RX ORDER — ACETAMINOPHEN 650 MG/1
650 SUPPOSITORY RECTAL EVERY 6 HOURS PRN
Status: DISCONTINUED | OUTPATIENT
Start: 2021-05-18 | End: 2021-05-26 | Stop reason: HOSPADM

## 2021-05-18 RX ORDER — ONDANSETRON 2 MG/ML
4 INJECTION INTRAMUSCULAR; INTRAVENOUS EVERY 6 HOURS PRN
Status: DISCONTINUED | OUTPATIENT
Start: 2021-05-18 | End: 2021-05-26 | Stop reason: HOSPADM

## 2021-05-18 RX ORDER — HEPARIN SODIUM 5000 [USP'U]/ML
5000 INJECTION, SOLUTION INTRAVENOUS; SUBCUTANEOUS EVERY 8 HOURS SCHEDULED
Status: DISCONTINUED | OUTPATIENT
Start: 2021-05-18 | End: 2021-05-18

## 2021-05-18 RX ORDER — FERROUS SULFATE 325(65) MG
325 TABLET ORAL DAILY
Status: DISCONTINUED | OUTPATIENT
Start: 2021-05-18 | End: 2021-05-18

## 2021-05-18 RX ORDER — DEXAMETHASONE SODIUM PHOSPHATE 10 MG/ML
10 INJECTION, SOLUTION INTRAMUSCULAR; INTRAVENOUS ONCE
Status: DISCONTINUED | OUTPATIENT
Start: 2021-05-18 | End: 2021-05-18

## 2021-05-18 RX ORDER — POLYETHYLENE GLYCOL 3350 17 G/17G
17 POWDER, FOR SOLUTION ORAL DAILY PRN
Status: DISCONTINUED | OUTPATIENT
Start: 2021-05-18 | End: 2021-05-26 | Stop reason: HOSPADM

## 2021-05-18 RX ORDER — ACETAMINOPHEN 325 MG/1
650 TABLET ORAL EVERY 6 HOURS PRN
Status: DISCONTINUED | OUTPATIENT
Start: 2021-05-18 | End: 2021-05-26 | Stop reason: HOSPADM

## 2021-05-18 RX ORDER — PROMETHAZINE HYDROCHLORIDE 25 MG/1
12.5 TABLET ORAL EVERY 6 HOURS PRN
Status: DISCONTINUED | OUTPATIENT
Start: 2021-05-18 | End: 2021-05-26 | Stop reason: HOSPADM

## 2021-05-18 RX ORDER — LEVOTHYROXINE SODIUM 112 UG/1
112 TABLET ORAL DAILY
Status: DISCONTINUED | OUTPATIENT
Start: 2021-05-19 | End: 2021-05-26 | Stop reason: HOSPADM

## 2021-05-18 RX ORDER — POTASSIUM CHLORIDE 7.45 MG/ML
10 INJECTION INTRAVENOUS PRN
Status: DISCONTINUED | OUTPATIENT
Start: 2021-05-18 | End: 2021-05-26 | Stop reason: HOSPADM

## 2021-05-18 RX ORDER — SODIUM CHLORIDE 9 MG/ML
20 INJECTION, SOLUTION INTRAVENOUS ONCE
Status: COMPLETED | OUTPATIENT
Start: 2021-05-18 | End: 2021-05-19

## 2021-05-18 RX ORDER — FUROSEMIDE 10 MG/ML
40 INJECTION INTRAMUSCULAR; INTRAVENOUS 2 TIMES DAILY
Status: DISCONTINUED | OUTPATIENT
Start: 2021-05-19 | End: 2021-05-20

## 2021-05-18 RX ORDER — HEPARIN SODIUM (PORCINE) LOCK FLUSH IV SOLN 100 UNIT/ML 100 UNIT/ML
500 SOLUTION INTRAVENOUS PRN
Status: DISCONTINUED | OUTPATIENT
Start: 2021-05-18 | End: 2021-05-19 | Stop reason: HOSPADM

## 2021-05-18 RX ORDER — SODIUM CHLORIDE 0.9 % (FLUSH) 0.9 %
5-40 SYRINGE (ML) INJECTION PRN
Status: DISCONTINUED | OUTPATIENT
Start: 2021-05-18 | End: 2021-05-26 | Stop reason: HOSPADM

## 2021-05-18 RX ORDER — FUROSEMIDE 10 MG/ML
80 INJECTION INTRAMUSCULAR; INTRAVENOUS ONCE
Status: COMPLETED | OUTPATIENT
Start: 2021-05-18 | End: 2021-05-18

## 2021-05-18 RX ORDER — NITROGLYCERIN 0.4 MG/1
0.4 TABLET SUBLINGUAL ONCE
Status: DISCONTINUED | OUTPATIENT
Start: 2021-05-18 | End: 2021-05-26 | Stop reason: HOSPADM

## 2021-05-18 RX ORDER — PALONOSETRON 0.05 MG/ML
0.25 INJECTION, SOLUTION INTRAVENOUS ONCE
Status: COMPLETED | OUTPATIENT
Start: 2021-05-18 | End: 2021-05-18

## 2021-05-18 RX ORDER — CALCITRIOL 0.25 UG/1
0.25 CAPSULE, LIQUID FILLED ORAL DAILY
Status: DISCONTINUED | OUTPATIENT
Start: 2021-05-19 | End: 2021-05-18

## 2021-05-18 RX ORDER — ALLOPURINOL 100 MG/1
100 TABLET ORAL 2 TIMES DAILY
Status: DISCONTINUED | OUTPATIENT
Start: 2021-05-18 | End: 2021-05-24

## 2021-05-18 RX ORDER — SODIUM CHLORIDE 0.9 % (FLUSH) 0.9 %
5-40 SYRINGE (ML) INJECTION EVERY 12 HOURS SCHEDULED
Status: DISCONTINUED | OUTPATIENT
Start: 2021-05-18 | End: 2021-05-26 | Stop reason: HOSPADM

## 2021-05-18 RX ORDER — PACLITAXEL 100 MG/20ML
75 INJECTION, POWDER, LYOPHILIZED, FOR SUSPENSION INTRAVENOUS ONCE
Status: COMPLETED | OUTPATIENT
Start: 2021-05-18 | End: 2021-05-18

## 2021-05-18 RX ORDER — ATORVASTATIN CALCIUM 40 MG/1
40 TABLET, FILM COATED ORAL DAILY
Status: DISCONTINUED | OUTPATIENT
Start: 2021-05-18 | End: 2021-05-26 | Stop reason: HOSPADM

## 2021-05-18 RX ORDER — MAGNESIUM SULFATE IN WATER 40 MG/ML
2000 INJECTION, SOLUTION INTRAVENOUS PRN
Status: DISCONTINUED | OUTPATIENT
Start: 2021-05-18 | End: 2021-05-26 | Stop reason: HOSPADM

## 2021-05-18 RX ORDER — SODIUM CHLORIDE 0.9 % (FLUSH) 0.9 %
10 SYRINGE (ML) INJECTION PRN
Status: DISCONTINUED | OUTPATIENT
Start: 2021-05-18 | End: 2021-05-19 | Stop reason: HOSPADM

## 2021-05-18 RX ORDER — SODIUM CHLORIDE 9 MG/ML
25 INJECTION, SOLUTION INTRAVENOUS PRN
Status: DISCONTINUED | OUTPATIENT
Start: 2021-05-18 | End: 2021-05-26 | Stop reason: HOSPADM

## 2021-05-18 RX ORDER — POTASSIUM CHLORIDE 20 MEQ/1
40 TABLET, EXTENDED RELEASE ORAL PRN
Status: DISCONTINUED | OUTPATIENT
Start: 2021-05-18 | End: 2021-05-26 | Stop reason: HOSPADM

## 2021-05-18 RX ORDER — LISINOPRIL 10 MG/1
10 TABLET ORAL DAILY
Status: DISCONTINUED | OUTPATIENT
Start: 2021-05-18 | End: 2021-05-26 | Stop reason: HOSPADM

## 2021-05-18 RX ADMIN — APIXABAN 2.5 MG: 2.5 TABLET, FILM COATED ORAL at 22:51

## 2021-05-18 RX ADMIN — HEPARIN 500 UNITS: 100 SYRINGE at 16:51

## 2021-05-18 RX ADMIN — SODIUM CHLORIDE 20 ML/HR: 9 INJECTION, SOLUTION INTRAVENOUS at 14:00

## 2021-05-18 RX ADMIN — PALONOSETRON 0.25 MG: 0.05 INJECTION, SOLUTION INTRAVENOUS at 14:16

## 2021-05-18 RX ADMIN — ATORVASTATIN CALCIUM 40 MG: 40 TABLET, FILM COATED ORAL at 22:51

## 2021-05-18 RX ADMIN — EPOETIN ALFA-EPBX 40000 UNITS: 40000 INJECTION, SOLUTION INTRAVENOUS; SUBCUTANEOUS at 16:18

## 2021-05-18 RX ADMIN — DEXAMETHASONE SODIUM PHOSPHATE: 10 INJECTION, SOLUTION INTRAMUSCULAR; INTRAVENOUS at 14:19

## 2021-05-18 RX ADMIN — SODIUM CHLORIDE, PRESERVATIVE FREE 10 ML: 5 INJECTION INTRAVENOUS at 22:52

## 2021-05-18 RX ADMIN — FUROSEMIDE 80 MG: 10 INJECTION, SOLUTION INTRAMUSCULAR; INTRAVENOUS at 19:06

## 2021-05-18 RX ADMIN — PACLITAXEL 142 MG: 100 INJECTION, POWDER, LYOPHILIZED, FOR SUSPENSION INTRAVENOUS at 15:29

## 2021-05-18 RX ADMIN — SODIUM CHLORIDE, PRESERVATIVE FREE 10 ML: 5 INJECTION INTRAVENOUS at 16:51

## 2021-05-18 RX ADMIN — GEMCITABINE 1134 MG: 38 INJECTION, SOLUTION INTRAVENOUS at 16:14

## 2021-05-18 ASSESSMENT — ENCOUNTER SYMPTOMS
RHINORRHEA: 0
CONSTIPATION: 1
BACK PAIN: 0
PHOTOPHOBIA: 0
SHORTNESS OF BREATH: 1
NAUSEA: 0
VOMITING: 0
DIARRHEA: 0
CONSTIPATION: 0
COLOR CHANGE: 0
BLOOD IN STOOL: 0
CHEST TIGHTNESS: 1
TROUBLE SWALLOWING: 0
NAUSEA: 1
WHEEZING: 0
RECTAL PAIN: 0
CHEST TIGHTNESS: 0
SORE THROAT: 0
COUGH: 0
BLOOD IN STOOL: 0
VOMITING: 0
ABDOMINAL PAIN: 0

## 2021-05-18 ASSESSMENT — PAIN SCALES - GENERAL
PAINLEVEL_OUTOF10: 0
PAINLEVEL_OUTOF10: 0

## 2021-05-18 NOTE — PROGRESS NOTES
5/18/2021  5:45 PM - Zoran, Kyin Incoming Lab Results From [facility]    Component Value Ref Range & Units Status Collected Lab   pH, Arterial 7.390  7.350 - 7.450 Final 05/18/2021  5:43  [facility]   pCO2, Arterial 42.0  35.0 - 45.0 mmHg Final 05/18/2021  5:43 PM [facility]   pO2, Arterial 52. 0Low   80.0 - 100.0 mmHg Final 05/18/2021  5:43  [facility]   HCO3, Arterial 25.4  22.0 - 26.0 mmol/L Final 05/18/2021  5:43 PM [facility] Excess, Arterial 0.3  -2.0 - 2.0 mmol/L Final 05/18/2021  5:43  [facility]   Hemoglobin, Art, Extended 9.9Low   12.0 - 16.0 g/dL Final 05/18/2021  5:43  [facility]   O2 Sat, Arterial 87. 9Low Panic   >92 % Final 05/18/2021  5:43  [facility]   Carboxyhgb, Arterial 2.3  0.0 - 5.0 % Final 05/18/2021  5:43 PM [facility]        0.0-1.5   (Smokers 1.5-5.0)    Methemoglobin, Arterial 1.0  <1.5 % Final 05/18/2021  5:43  [facility]   O2 Content, Arterial 12.3  Not Established mL/dL Final 05/18/2021  5:43  [facility]     Pt on 6 lpm nasal cannula  resp rate 26  Right brachial puncture

## 2021-05-18 NOTE — ED PROVIDER NOTES
Anemia     Arthritis     Cancer ([facility] [address] 12/01/2020    pancreatic    Chronic kidney disease     DVT of lower extremity (deep venous thrombosis) (HCC)     twice 2010 and 2017    Hyperlipidemia     Hypertension     Kidney stones     Osteoarthritis     Palliative care patient 12/02/2020    Thyroid disease     Thyroid disorder          SURGICAL HISTORY       Past Surgical History:   Procedure Laterality Date    COLONOSCOPY  2016    Dr Emily Gutierrez problems    ENDOSCOPIC ULTRASOUND (LOWER) N/A 12/03/2020    Dr JONNY Grijalva/unsuccessful biliary cannulation despite attempts at 2-wire cannulation and proph pancreatic stenting with cannulation around pancreatic stenting-Positive for high-grade adenocarcinoma, pancreatic head    ERCP N/A 12/03/2020    Dr JONNY Grijalva/unsuccessful biliary cannulation despite attempts at 2-wire cannulation and proph pancreatic stenting with cannulation around pancreatic stenting-Positive for high-grade adenocarcinoma, pancreatic head    ERCP  12/07/2020    Dr Reno Kelly/sphincterotomy, placement of a 10 x 60 partially covered biliary stent    PORT SURGERY Right 2/5/2021    SINGLE LUMEN PORT PLACEMENT WITH ULTRASOUND AND FLUORO performed by Lorelee Mohs, MD at [address] [facility] TOE AMPUTATION Bilateral     pinky toes removed    TOTAL KNEE ARTHROPLASTY Right 01/03/2020    RIGHT TOTAL KNEE REPLACEMENT performed by Dionne Swartz MD at [address] [facility]       Previous Medications    ALLOPURINOL (ZYLOPRIM) 100 MG TABLET    Take 100 mg by mouth daily    APIXABAN (ELIQUIS) 2.5 MG TABS TABLET    Take 2.5 mg by mouth 2 times daily    CALCITRIOL (ROCALTROL) 0.25 MCG CAPSULE    Take 0.25 mcg by mouth daily    DOCUSATE SODIUM (COLACE) 100 MG CAPSULE    Take 1 capsule by mouth 2 times daily    FERROUS SULFATE (FEROSUL) 325 (65 FE) MG TABLET    TAKE 1 TABLET BY MOUTH EVERY DAY    LEVOTHYROXINE (SYNTHROID) 112 MCG TABLET    Take 112 mcg by mouth Daily    LIDOCAINE-PRILOCAINE (EMLA) 2.5-2.5 % CREAM    APPLY TO PORT AREA AND COVERWITH PLASTIC WRAP ONE HOUR PRIOR TO TREATMENT    LISINOPRIL (PRINIVIL;ZESTRIL) 5 MG TABLET    Take 1 tablet by mouth 2 times daily    ONDANSETRON (ZOFRAN ODT) 4 MG DISINTEGRATING TABLET    Take 2 tablets by mouth every 8 hours as needed for Nausea or Vomiting    PROMETHAZINE (PHENERGAN) 25 MG TABLET    TAKE 1 TABLET BY MOUTH FOUR TIMES DAILY AS NEEDED FOR NAUSEA    SIMVASTATIN (ZOCOR) 20 MG TABLET    Take 20 mg by mouth nightly       ALLERGIES     Penicillins    FAMILY HISTORY       Family History   Problem Relation Age of Onset    Colon Cancer Brother     Cancer Mother         Breast Cancer    Heart Attack Father     Colon Polyps Neg Hx     Esophageal Cancer Neg Hx     Liver Cancer Neg Hx     Liver Disease Neg Hx     Rectal Cancer Neg Hx     Stomach Cancer Neg Hx           SOCIAL HISTORY       Social History     Socioeconomic History    Marital status:      Spouse name: None    Number of children: None    Years of education: None    Highest education level: None   Occupational History    None   Tobacco Use    Smoking status: Never Smoker    Smokeless tobacco: Never Used   Vaping Use    Vaping Use: Never used   Substance and Sexual Activity    Alcohol use: No    Drug use: No    Sexual activity: None   Other Topics Concern    None   Social History Narrative    None     Social Determinants of Health     Financial Resource Strain:     Difficulty of Paying Living Expenses:    Food Insecurity:     Worried About Running Out of Food in the Last Year:     Ran Out of Food in the Last Year:    Transportation Needs:     Lack of Transportation (Medical):      Lack of Transportation (Non-Medical):    Physical Activity:     Days of Exercise per Week:     Minutes of Exercise per Session:    Stress:     Feeling of Stress :    Social Connections:     Frequency of Communication with Friends and Family:     Frequency of Social Gatherings with Friends and Family:     Attends [employer] Services:     Active Member of Clubs or Organizations:     Attends Club or Organization Meetings:     Marital Status:    Intimate Partner Violence:     Fear of Current or Ex-Partner:     Emotionally Abused:     Physically Abused:     Sexually Abused:        SCREENINGS             PHYSICAL EXAM    (up to 7 for level 4, 8 or more for level 5)     ED Triage Vitals   BP Temp Temp src Pulse Resp SpO2 Height Weight   21 1729 21 1727 -- 21 1727 21 1727 21 1727 21 1727 21 172   (!) 182/99 97 °F (36.1 °C)  110 (!) 34 (!) 66 % 5' 3\" (1.6 m) 169 lb (76.7 kg)       Physical Exam  Vitals and nursing note reviewed. Constitutional:       General: She is in acute distress. Appearance: She is well-developed. She is ill-appearing. She is not diaphoretic. Interventions: Face mask in place. HENT:      Head: Normocephalic and atraumatic. Eyes:      General: No scleral icterus. Neck:      Vascular: No JVD. Cardiovascular:      Rate and Rhythm: Normal rate and regular rhythm. Pulses:           Radial pulses are 2+ on the right side and 2+ on the left side. Dorsalis pedis pulses are 2+ on the right side and 2+ on the left side. Heart sounds: Normal heart sounds. No murmur heard. No friction rub. No gallop. Pulmonary:      Effort: Tachypnea, accessory muscle usage, respiratory distress and retractions present. Breath sounds: Decreased air movement present. No stridor. No decreased breath sounds. Chest:      Chest wall: No tenderness. Abdominal:      General: There is no distension. Palpations: Abdomen is soft. Tenderness: There is no abdominal tenderness. There is no guarding or rebound. Musculoskeletal:         General: No deformity. Normal range of motion. Right lower le+ Edema present. Left lower le+ Edema present. Skin:     General: Skin is warm and dry. (EFFER-K) effervescent tablet 40 mEq (has no administration in time range)     Or   potassium chloride 10 mEq/100 mL IVPB (Peripheral Line) (has no administration in time range)   magnesium sulfate 2000 mg in 50 mL IVPB premix (has no administration in time range)   furosemide (LASIX) injection 40 mg (has no administration in time range)   furosemide (LASIX) injection 80 mg (80 mg Intravenous Given 5/18/21 1906)       EMERGENCY DEPARTMENT COURSE and DIFFERENTIALDIAGNOSIS/MDM:   Vitals:    Vitals:    05/18/21 1727 05/18/21 1729 05/18/21 1744 05/18/21 1907   BP:  (!) 182/99  138/80   Pulse: 110 109  92   Resp: (!) 34 26 28 18   Temp: 97 °F (36.1 °C) 97.6 °F (36.4 °C)     SpO2: (!) 66% (!) 85% 90% 99%   Weight: 169 lb (76.7 kg)      Height: 5' 3\" (1.6 m)          [facility]    ED Course as of May 18 2011   Tue May 18, 2021   1856 After initiating BiPAP, patient has had significant clinical improvement in work of breathing. Oxygen saturation is 98% at this time. Blood pressure has improved to 150s over 70s and heart rate has improved to the 90s with sinus rhythm on the monitor at this time. Work-up shows elevated BNP along with chest x-ray findings consistent with pulmonary edema from new onset heart failure. [JEREMIAH]      ED Course User Index  [JEREMIAH] Reina Lam MD       Based on the evaluation and work-up here patient is felt to require further monitoring, work-up, or treatment that is available in the emergency department. Case was discussed with hospitalist who agrees for observation or admission for further management. Treatment and stabilization as necessary were provided in the emergency department prior to transfer of care to the medicine service.         CONSULTS:  IP CONSULT TO HEART FAILURE NURSE/COORDINATOR  IP CONSULT TO DIETITIAN  IP CONSULT TO PALLIATIVE CARE    PROCEDURES:  Unless otherwise notedbelow, none     Procedures  CRITICAL CARE TIME   Total Critical Care time was 65 minutes, excluding separately reportable procedures. There was a high probability of clinically significant/life threatening deterioration in the patient's condition which required my urgent intervention. FINAL IMPRESSION     1. Acute respiratory failure with hypoxia ([address]    2. New onset of congestive heart failure ([address]    3. Chronic renal impairment, unspecified CKD stage    4. Chronic anemia    5. On antineoplastic chemotherapy          DISPOSITION/PLAN   DISPOSITION        PATIENT REFERRED TO:  No follow-up provider specified.     DISCHARGE MEDICATIONS:  New Prescriptions    No medications on file          (Please note that portions of this note were completed with a voice recognition program.  Efforts were made to edit the dictations butoccasionally words are mis-transcribed.)    Terri Cortes MD (electronically signed)  AttendingEmergency Physician          Terri Cortes., MD  05/18/21 2012

## 2021-05-19 LAB
ANION GAP SERPL CALCULATED.3IONS-SCNC: 11 MMOL/L (ref 7–19)
ANISOCYTOSIS: ABNORMAL
BASOPHILS ABSOLUTE: 0 K/UL (ref 0–0.2)
BASOPHILS RELATIVE PERCENT: 0.2 % (ref 0–1)
BUN BLDV-MCNC: 19 MG/DL (ref 8–23)
CALCIUM SERPL-MCNC: 8.9 MG/DL (ref 8.8–10.2)
CHLORIDE BLD-SCNC: 104 MMOL/L (ref 98–111)
CO2: 27 MMOL/L (ref 22–29)
CREAT SERPL-MCNC: 1.4 MG/DL (ref 0.5–0.9)
EKG P AXIS: 57 DEGREES
EKG P-R INTERVAL: 204 MS
EKG Q-T INTERVAL: 358 MS
EKG QRS DURATION: 128 MS
EKG QTC CALCULATION (BAZETT): 459 MS
EKG T AXIS: 66 DEGREES
EOSINOPHILS ABSOLUTE: 0 K/UL (ref 0–0.6)
EOSINOPHILS RELATIVE PERCENT: 0 % (ref 0–5)
GFR AFRICAN AMERICAN: 43
GFR NON-AFRICAN AMERICAN: 36
GLUCOSE BLD-MCNC: 160 MG/DL (ref 74–109)
HCT VFR BLD CALC: 28.4 % (ref 37–47)
HEMOGLOBIN: 8.6 G/DL (ref 12–16)
IMMATURE GRANULOCYTES #: 0.2 K/UL
LV EF: 15 %
LVEF MODALITY: NORMAL
LYMPHOCYTES ABSOLUTE: 0.3 K/UL (ref 1.1–4.5)
LYMPHOCYTES RELATIVE PERCENT: 2.1 % (ref 20–40)
MACROCYTES: ABNORMAL
MAGNESIUM: 1.7 MG/DL (ref 1.6–2.4)
MCH RBC QN AUTO: 33.3 PG (ref 27–31)
MCHC RBC AUTO-ENTMCNC: 30.3 G/DL (ref 33–37)
MCV RBC AUTO: 110.1 FL (ref 81–99)
MONOCYTES ABSOLUTE: 1.2 K/UL (ref 0–0.9)
MONOCYTES RELATIVE PERCENT: 9.5 % (ref 0–10)
NEUTROPHILS ABSOLUTE: 10.5 K/UL (ref 1.5–7.5)
NEUTROPHILS RELATIVE PERCENT: 86.4 % (ref 50–65)
PDW BLD-RTO: 24.8 % (ref 11.5–14.5)
PLATELET # BLD: 300 K/UL (ref 130–400)
PLATELET SLIDE REVIEW: ADEQUATE
PMV BLD AUTO: 11.7 FL (ref 9.4–12.3)
POIKILOCYTES: ABNORMAL
POLYCHROMASIA: ABNORMAL
POTASSIUM SERPL-SCNC: 5 MMOL/L (ref 3.5–5)
RBC # BLD: 2.58 M/UL (ref 4.2–5.4)
SODIUM BLD-SCNC: 142 MMOL/L (ref 136–145)
TROPONIN: 0.02 NG/ML (ref 0–0.03)
WBC # BLD: 12.2 K/UL (ref 4.8–10.8)

## 2021-05-19 PROCEDURE — 94660 CPAP INITIATION&MGMT: CPT

## 2021-05-19 PROCEDURE — 2580000003 HC RX 258: Performed by: NURSE PRACTITIONER

## 2021-05-19 PROCEDURE — 6370000000 HC RX 637 (ALT 250 FOR IP): Performed by: NURSE PRACTITIONER

## 2021-05-19 PROCEDURE — 6360000004 HC RX CONTRAST MEDICATION: Performed by: NURSE PRACTITIONER

## 2021-05-19 PROCEDURE — 93010 ELECTROCARDIOGRAM REPORT: CPT | Performed by: INTERNAL MEDICINE

## 2021-05-19 PROCEDURE — 99222 1ST HOSP IP/OBS MODERATE 55: CPT | Performed by: NURSE PRACTITIONER

## 2021-05-19 PROCEDURE — 99231 SBSQ HOSP IP/OBS SF/LOW 25: CPT | Performed by: INTERNAL MEDICINE

## 2021-05-19 PROCEDURE — 6360000002 HC RX W HCPCS: Performed by: NURSE PRACTITIONER

## 2021-05-19 PROCEDURE — 84484 ASSAY OF TROPONIN QUANT: CPT

## 2021-05-19 PROCEDURE — 2700000000 HC OXYGEN THERAPY PER DAY

## 2021-05-19 PROCEDURE — 6370000000 HC RX 637 (ALT 250 FOR IP): Performed by: HOSPITALIST

## 2021-05-19 PROCEDURE — 80048 BASIC METABOLIC PNL TOTAL CA: CPT

## 2021-05-19 PROCEDURE — 83735 ASSAY OF MAGNESIUM: CPT

## 2021-05-19 PROCEDURE — 2140000000 HC CCU INTERMEDIATE R&B

## 2021-05-19 PROCEDURE — 36415 COLL VENOUS BLD VENIPUNCTURE: CPT

## 2021-05-19 PROCEDURE — C8929 TTE W OR WO FOL WCON,DOPPLER: HCPCS

## 2021-05-19 PROCEDURE — 85025 COMPLETE CBC W/AUTO DIFF WBC: CPT

## 2021-05-19 RX ORDER — ASPIRIN 81 MG/1
81 TABLET ORAL DAILY
Status: DISCONTINUED | OUTPATIENT
Start: 2021-05-19 | End: 2021-05-26 | Stop reason: HOSPADM

## 2021-05-19 RX ADMIN — DOCUSATE SODIUM 100 MG: 100 CAPSULE, LIQUID FILLED ORAL at 08:51

## 2021-05-19 RX ADMIN — DOCUSATE SODIUM 100 MG: 100 CAPSULE, LIQUID FILLED ORAL at 21:09

## 2021-05-19 RX ADMIN — ALLOPURINOL 100 MG: 100 TABLET ORAL at 08:51

## 2021-05-19 RX ADMIN — LEVOTHYROXINE SODIUM 112 MCG: 112 TABLET ORAL at 05:41

## 2021-05-19 RX ADMIN — SODIUM CHLORIDE, PRESERVATIVE FREE 10 ML: 5 INJECTION INTRAVENOUS at 21:09

## 2021-05-19 RX ADMIN — SODIUM BICARBONATE 325 MG: 650 TABLET ORAL at 08:50

## 2021-05-19 RX ADMIN — APIXABAN 2.5 MG: 2.5 TABLET, FILM COATED ORAL at 08:51

## 2021-05-19 RX ADMIN — SODIUM BICARBONATE 325 MG: 650 TABLET ORAL at 21:09

## 2021-05-19 RX ADMIN — APIXABAN 2.5 MG: 2.5 TABLET, FILM COATED ORAL at 21:09

## 2021-05-19 RX ADMIN — ATORVASTATIN CALCIUM 40 MG: 40 TABLET, FILM COATED ORAL at 08:51

## 2021-05-19 RX ADMIN — SODIUM BICARBONATE 325 MG: 650 TABLET ORAL at 00:35

## 2021-05-19 RX ADMIN — ALLOPURINOL 100 MG: 100 TABLET ORAL at 00:35

## 2021-05-19 RX ADMIN — LISINOPRIL 10 MG: 10 TABLET ORAL at 08:51

## 2021-05-19 RX ADMIN — SODIUM CHLORIDE, PRESERVATIVE FREE 10 ML: 5 INJECTION INTRAVENOUS at 08:51

## 2021-05-19 RX ADMIN — FUROSEMIDE 40 MG: 10 INJECTION, SOLUTION INTRAMUSCULAR; INTRAVENOUS at 08:51

## 2021-05-19 RX ADMIN — FUROSEMIDE 40 MG: 10 INJECTION, SOLUTION INTRAMUSCULAR; INTRAVENOUS at 05:48

## 2021-05-19 RX ADMIN — PERFLUTREN 1.65 MG: 6.52 INJECTION, SUSPENSION INTRAVENOUS at 16:00

## 2021-05-19 RX ADMIN — ASPIRIN 81 MG: 81 TABLET, COATED ORAL at 08:56

## 2021-05-19 RX ADMIN — ALLOPURINOL 100 MG: 100 TABLET ORAL at 21:09

## 2021-05-19 RX ADMIN — FUROSEMIDE 40 MG: 10 INJECTION, SOLUTION INTRAMUSCULAR; INTRAVENOUS at 17:20

## 2021-05-19 ASSESSMENT — ENCOUNTER SYMPTOMS
WHEEZING: 0
CONSTIPATION: 0
EYE REDNESS: 0
EYE PAIN: 0
NAUSEA: 0
VOMITING: 0
ABDOMINAL DISTENTION: 0
DIARRHEA: 0
BLOOD IN STOOL: 0
EYE DISCHARGE: 0
EYES NEGATIVE: 1
SHORTNESS OF BREATH: 1
COUGH: 0
CHEST TIGHTNESS: 1
GASTROINTESTINAL NEGATIVE: 1
ABDOMINAL PAIN: 0
BACK PAIN: 0
SORE THROAT: 0
WHEEZING: 1

## 2021-05-19 NOTE — PROGRESS NOTES
[facility]      Patient:  Cosmo Hdez  YOB: 1934  Date of Service: 5/19/2021  MRN: [id number]   Acct: [de-identified]   Primary Care Physician: Jerardo Hatch MD  Advance Directive: Full Code  Admit Date: 5/18/2021       Hospital Day: 1  Portions of this note have been copied forward, however, changed to reflect the most current clinical status of this patient. CHIEF COMPLAINT: shortness of breath    SUBJECTIVE:  Patient states she is feeling much better today. She states that with the BiPap she was able to rest.    Hospital Course: The patient is a 80 y.o. female with a history of HTN, pancreatic and thyroid cancer, CKD, and anemia who presented to [address] [place] [address] ED complaining of shortness of breath. The patient was placed on BiPap in the ED. The daughter reported to ED staff the patient has had shortness of breath over the last several days which has prevented the patient from laying down.  She denied any significant leg swelling (but endorses a small amount), cough, fevers,or chills.  Patient was receiving chemotherapy 5/18/2021 and stated she had to lay flat for her chemo but could not tolerate it for shortness of breath, however she was able to finish her treatment.  Infusion nurse reported her O2 sat was in the 63's.  Patient did have a chest x-ray obtained there which showed edema according to patient's daughter. Daughter and patient denied any history of heart failure, COPD, or other cardiac or pulmonary problems. ECHO pending. Patient was given 80 mg Iv lasix in ED and began on 40 mg IV lasix BID. Output of -860 with some incontinence charted as well. The patient was able to be transitioned from BiPap to nasal cannula 5/19/2021 AM. Oncology consulted for continuity of care. Review of Systems:   Review of Systems   Constitutional: Positive for fatigue. Negative for chills and fever. Respiratory: Positive for chest tightness, shortness of breath and wheezing.     Cardiovascular: Negative for chest pain, palpitations and leg swelling. Gastrointestinal: Negative for abdominal distention, nausea and vomiting. Genitourinary: Negative for difficulty urinating and dysuria. Musculoskeletal: Negative for arthralgias and myalgias. Neurological: Negative for dizziness, syncope and light-headedness. Psychiatric/Behavioral: Negative for agitation and confusion. 14 point review of systems is negative except as specifically addressed above. Objective:   VITALS:  BP (!) 96/51   Pulse 71   Temp 97.2 °F (36.2 °C) (Temporal)   Resp 16   Ht 5' 3\" (1.6 m)   Wt 195 lb 6.4 oz (88.6 kg)   SpO2 96%   BMI 34.61 kg/m²   24HR INTAKE/OUTPUT:      Intake/Output Summary (Last 24 hours) at 5/19/2021 1355  Last data filed at 5/19/2021 1326  Gross per 24 hour   Intake 240 ml   Output 1100 ml   Net -860 ml       Physical Exam  Constitutional:       Appearance: Normal appearance. She is not diaphoretic. HENT:      Head: Normocephalic and atraumatic. Mouth/Throat:      Mouth: Mucous membranes are moist.      Pharynx: Oropharynx is clear. Eyes:      Extraocular Movements: Extraocular movements intact. Conjunctiva/sclera: Conjunctivae normal.      Pupils: Pupils are equal, round, and reactive to light. Cardiovascular:      Rate and Rhythm: Normal rate and regular rhythm. Pulses: Normal pulses. Heart sounds: Murmur heard. Pulmonary:      Effort: Pulmonary effort is normal.      Breath sounds: Examination of the right-upper field reveals rales. Examination of the left-upper field reveals rales. Examination of the right-middle field reveals rales. Examination of the left-middle field reveals rales. Examination of the right-lower field reveals rales. Examination of the left-lower field reveals rales. Rales present. Abdominal:      General: Bowel sounds are normal. There is no distension. Palpations: Abdomen is soft.    Musculoskeletal:      Cervical back: Normal range of motion and neck supple. Right lower le+ Pitting Edema present. Left lower le+ Pitting Edema present. Skin:     General: Skin is warm and dry. Capillary Refill: Capillary refill takes less than 2 seconds. Neurological:      General: No focal deficit present. Mental Status: She is alert and oriented to person, place, and time. Psychiatric:         Mood and Affect: Mood normal.         Behavior: Behavior normal.         Thought Content:  Thought content normal.         Judgment: Judgment normal.       Medications:      sodium chloride        aspirin  81 mg Oral Daily    nitroGLYCERIN  0.4 mg Sublingual Once    sodium chloride flush  5-40 mL Intravenous 2 times per day    furosemide  40 mg Intravenous BID    apixaban  2.5 mg Oral BID    docusate sodium  100 mg Oral BID    levothyroxine  112 mcg Oral Daily    [Held by provider] lisinopril  10 mg Oral Daily    atorvastatin  40 mg Oral Daily    sodium bicarbonate  325 mg Oral BID    allopurinol  100 mg Oral BID     sodium chloride flush, sodium chloride, promethazine **OR** ondansetron, polyethylene glycol, acetaminophen **OR** acetaminophen, perflutren lipid microspheres, potassium chloride **OR** potassium alternative oral replacement **OR** potassium chloride, magnesium sulfate  DIET LOW SODIUM 2 GM;     Lab and other Data:     Recent Labs     21  1401 21  1748 21  0327   WBC 7.37 8.7 12.2*   HGB 8.0* 9.9* 8.6*    413* 300     Recent Labs     21  1345 21  1743 21  1748 21  0327     --  138 142   K 4.0 4.1 4.2 5.0     --  102 104   CO2 31*  --  25 27   BUN 18*  --  16 19   CREATININE 1.4*  --  1.3* 1.4*   GLUCOSE 152*  --  160* 160*     Recent Labs     21  1345 21  174   AST 35 23   ALT 14 12   BILITOT 0.5 0.6   ALKPHOS 108* 139*     Troponin T:   Recent Labs     21  1748 21  0327   TROPONINI 0.02 0.02     Pro-BNP: No results for input(s): BNP in the last 72 hours. INR: No results for input(s): INR in the last 72 hours. UA:No results for input(s): NITRITE, COLORU, PHUR, LABCAST, WBCUA, RBCUA, MUCUS, TRICHOMONAS, YEAST, BACTERIA, CLARITYU, SPECGRAV, LEUKOCYTESUR, UROBILINOGEN, BILIRUBINUR, BLOODU, GLUCOSEU, AMORPHOUS in the last 72 hours. Invalid input(s): KETONESU  A1C:   Recent Labs     05/18/21  1748   LABA1C 6.2*     ABG:  Recent Labs     05/18/21  1743   PHART 7.390   FHG7NFO 42.0   PO2ART 52.0*   ENV8GEM 25.4   BEART 0.3   HGBAE 9.9*   [id number] 87.9*   CARBOXHGBART 2.3       RAD:   XR CHEST (2 VW)    Result Date: 5/18/2021  Pulmonary vascular congestion and pulmonary edema. A trace bibasal pleural effusion. Cardiomegaly. Signed by Dr Tyler Martin on 5/18/2021 3:41 PM    XR CHEST PORTABLE    Result Date: 5/18/2021  1. . Congestive heart failure with interstitial and early alveolar edema.  Signed by Dr Mechelle Rodarte on 5/18/2021 6:38 PM     Echo: pending      Assessment/Plan   New onset of congestive heart failure ([address]              -ECHO pending              -Lasix 40 mg BID (80 mg given in ER)              - Continue lisinopril              - BNP 7081, repeat in 3 days              -trend troponin              -monitor and replace electrolytes              -continue statin therapy              -strict I&o, q4 hour vital signs              -iron studies   -BiPap as needed, on cannula at this time   -chest xray consistent with CHF     Active Problems:    CKD (chronic kidney disease)              -monitor kidney functions              -lasix 40 mg BID              - daily weights              -strict I&o              -continue iron supplementation and calcirol                  Malignant neoplasm of other parts of pancreas [facility])              -oncology following              - palliative care following    DVT prophylaxis: On ALBERTO Morse - CNP, 5/19/2021 1:55 PM

## 2021-05-19 NOTE — PROGRESS NOTES
Patient name: Quita Conte  Patient : 1934  6:59 AM  ROOM 709  Patient Active Problem List   Diagnosis Code    Heme positive stool R19.5    Anemia of chronic kidney failure, stage 4 (severe) (HCC) N18.4, D63.1    Iron deficiency anemia secondary to inadequate dietary iron intake D50.8    H/O abnormal mammogram Z87.898    Primary osteoarthritis of right knee M17.11    Status post total right knee replacement Z96.651    Unilateral primary osteoarthritis, right knee M17.11    Other specified hypothyroidism E03.8    Encounter for BARBARA (erythropoietin stimulating agent) anemia management D64.9, Z79.899    Elevated INR R79.1    Hematuria R31.9    Acute blood loss anemia D62    CKD (chronic kidney disease) N18.9    Palliative care patient Z51.5    Pancreatic mass K86.89    Fatigue R53.83    Elevated liver enzymes R74.8    Biliary obstruction K83.1    Anemia D64.9    Malignant neoplasm of other parts of pancreas (HCC) C25.7    New onset of congestive heart failure (HCC) I50.9     Portions of this note have been copied forward, however, changed to reflect the most current clinical status of this patient. Subjective: Feeling better. I slept great last night, using CPAP. Diuresed well, planning 2D echo today. HISTORY OF PRESENT ILLNESS:   Kiana Madera is a 79 yo female with multiple comorbidities to include a significant history of pancreatic adenocarcinoma currently receiving palliative chemotherapy. She received treatment Gemzar and Abraxane on 2021 and started to complain of worsening shortness of breath. She had a normal respiratory rate and saturation when she presented to clinic. After completion of treatment she started experiencing worsening dyspnea with hypoxemia and desaturation. No fever of chills. She was sent for a CXR that showed pulmonary congestion. Due to her worsening respiratory status she was sent to the ER where she was admitted.      She has a significant PMH of iron following are events that occurred. 12/01/2020-CT abdomen pelvis without contrast showed a pancreatic head mass measuring 3.3 x 3.7 cm in size and associated, bile duct dilatation above the level of the ampulla. Associated moderate pancreatic ductal dilatation. 12/2/2020-CA 19-9 133  12/03/2020-ERCP with FNA biopsy Positive for high-grade adenocarcinoma. Unfortunately, stent could not be placed. 12/5/2020-transferred from [facility]) to [address] [place] [address] and discharged home on 12/8/2020 12/07/2020- Cholangipancreatography Retrograde Endo ERCP with sphincterotomy, balloon sweep and placement of 10x60 PC BS metal stent at [facility] in [place]. 12/21/2020- CT chest with contrast documented no evidence of intrathoracic neoplastic process/metastatic disease. Evidence of pneumobilia. The ectasia of the pancreatic duct, similar to the previous study. An incompletely visualized and evaluated heterogeneous mass in the head of the pancreas measuring 3.3 x 3.7cm. A biliary stent in place. Moderate persistent dilatation of the proximal common bile duct. A stable small low-density nodule in the left adrenal gland  12/22/2020-Dr. Lambert discussed the results of CT chest and pathology that suggest advanced pancreatic cancer, unfortunately it is not amenable to surgery. She was quite weak and had poor performance, appeared very frail. Discussion was made about palliative chemotherapy but the decision to hold was made until improvement of her physical conditioning occurred. Recommended physical therapy as outpatient and reassess fitness for palliative chemotherapy in approximately 4 weeks. 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   12/7/2020 ERCP at [address] [place] [address] Dr by Dr. Wellington Macedo revealed a single severe biliary stricture in the lower third of the main bile duct with severe upstream biliary dilation, stricture was malignant appearing. Biliary sphincterectomy was performed. 1 partially covered metal stent was placed into the common bile duct. 2/8/2021-initiated palliative chemotherapy with Gemzar 750 mg/m² and Abraxane 90 mg/m² every 2 weeks, this is a dose reduction by 25%  3/23/2021- CA 19-9 108, improved compared to pretreatment value of 236 on 1/20/2021.   4/13/2021 CT Abdomen/Pelvis with contrast documented a poorly defined complex mass in the head of the pancreas. It appears to have decreased in size when remeasured at the same level and in same dimension as in the previous study (3 cm x 2.5 cm, compared to 3.7 cm x 3.3 cm). Persistent moderate dilatation of the pancreatic duct. There are 2 additional small cystic nodules in the body of the pancreas measuring 11 mm and 9 mm. A biliary stent in place and decompression of the intrahepatic biliary ducts since the previous study. 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 The diverticulosis of the distal colon with no evidence for diverticulitis. 5/4/2021- dose reduction by 25% due to severe fatigue and nausea, starting with cycle 4 will receive Gemzar 600 mg/m² and Abraxane 75 mg/m². HEMATOLOGY HISTORY:  Kaitlyn Guadarrama was seen in initial hematology consultation on 7/20/2018 for further evaluation and treatment recommendations for anemia. Kaitlyn Guadarrama was referred from Dr. Edith Zayas. Kaitlyn Guadarrama presented with no significant complaints other than chronic fatigue and constipation. She had been taking ferrous sulfate 325 mg daily under the direction of Dr. Jayy Perez. Kaitlyn Guadarrama reported significant constipation and some GI upset with the oral iron. She denied any bleeding tendencies to include hematuria, melena, hematochezia and epistaxis. Kaitlyn Guadarrama had a colonoscopy on 4/26/2016 by Dr. Beka Messina for further evaluation of iron deficiency. The only abnormal finding was diverticulosis.   Kaitlyn Guadarrama is routinely followed by Dr. Dionne Brice for her chronic kidney disease stage IV. CMP on 6/11/2018 documented a creatinine of 2.2 and GFR 21. CBC review from 2/2/2018- 6/11/2018 documented hemoglobin ranging from 9.4- 10.1, RBC 2.91- 3.22, MCV 94- 100 with a normal WBC and platelet count  CBC at initial consultation on 7/20/2018 documented a WBC of 7.33, ANC 4.74, RBC 3.04, hemoglobin 10.3, .6 and a platelet count of 004,739. Serology studies on 7/20/2018:  Creatinine 1.98   GFR 23   Calcium 10.3   IgG 700, IgA 180, and IgM 52   M spike not observed   Immunofixation: No monoclonality detected. Iron 74   Iron saturation 27%   TIBC 278   Vitamin B12 437   Folate 15.7   Ferritin 299   Reticulocyte count 1.5%   Erythropoietin 11.6      Beta-2 microglobulin 6.2      Occult stool positive 1 of 3 samples on 7/25/2018. Colonoscopy on 10/4/2018 by Dr. York Agent was documented as removal of 2 polyps with benign pathology. No evidence of bleeding. Serology studies on 10/15/2018:  Iron 76   TIBC 287   Iron saturation 26%   Ferritin 321   Hemoglobin 10.1   Creatinine 2.47   GFR 17     11/26/2018 - Initiated Procrit 20,000 units for chronic kidney disease stage IV, to be given every 2 weeks ×4 and then monthly, dose to be titrated appropriately. Hemoglobin 9.6. All has anemia of chronic disease to include chronic kidney disease stage IV. She will began receiving growth factor support and will need to maintain a ferritin level greater than 200 and an iron saturation of 25% for the Procrit to be beneficial. Hold Procrit dose for hemoglobin >11. Indications/rationale for Procrit was discussed with [place]. Risks, benefits and expectations were outlined. Risks including, but not limited to hypertension, stroke, MI, death were discussed and acknowledged by [place].      Serology studies on 3/8/2019:  Creatinine 1.7/GFR 30.4   Iron 80   Iron saturation 26.5%   TIBC 302   Ferritin 144   Vitamin B12 501   Folate 13.9     Serology studies on 3/5/2020:  Creatinine 1.3/GFR 41.4  Iron 84  TIBC 390  Iron saturation 21.5%  Ferritin 342     Iron substrates on 6/25/2020  Ferritin 311  Iron 76  Iron saturation 23%  TIBC 326  Creatinine 1.3/GFR 39     Labs on 12/2/2020 and 12/3/2020:  Iron 35  TIBC 213  Iron saturation 16%  Vitamin B12 483  Folate 10.6  Ferritin 480     Labs on 3/23/2021  Iron 54  TIBC 366  Iron saturation 15  Ferritin 530  Reticulocytes 2.1     Labs on 4/6/2021  WBC 14.40  RBC 2.46  HGB 8.6  HCT 26.4  .3  MCH 35  MCHC 32.6  ANC 12.03  ,000  Iron 43   TIBC 226  Iron saturation 19%   Ferritin 1088.0  B-12= 781  GFR 39  BUN 21  Creatinine 1.3  Phosphorus 2.4  Magnesium 1.5        Age-appropriate health screening:  Colonoscopy on 10/4/2018 by Dr. David Carroll was documented as removal of 2 polyps with benign pathology. No evidence of bleeding. 9/20/2019 B[facility] mamm[facility] at [facility] [place] documented no mammographic evidence of malignancy. No bone mineral density on file     Review of Systems   Constitutional: Positive for activity change, appetite change and fatigue. Negative for chills, diaphoresis and fever. HENT: Negative. Negative for congestion, ear pain, hearing loss, nosebleeds, sore throat and tinnitus. Eyes: Negative. Negative for pain, discharge and redness. Respiratory: Positive for shortness of breath. Negative for cough and wheezing. Cardiovascular: Positive for leg swelling. Negative for chest pain and palpitations. Gastrointestinal: Negative. Negative for abdominal pain, blood in stool, constipation, diarrhea, nausea and vomiting. Endocrine: Negative for polydipsia. Genitourinary: Negative for dysuria, flank pain, frequency, hematuria and urgency. Musculoskeletal: Negative. Negative for back pain, myalgias and neck pain. Skin: Negative. Negative for rash. Neurological: Positive for weakness. Negative for dizziness, tremors, seizures and headaches. Hematological: Does not bruise/bleed easily. Psychiatric/Behavioral: Negative. The patient is not nervous/anxious. Current Pain Assessment  Pain Assessment  Pain Assessment: 0-10  Pain Level: 0  Patient's Stated Pain Goal: No pain    OBJECTIVE:  VITALS: /80   Pulse 86   Temp 98.7 °F (37.1 °C) (Temporal)   Resp 16   Ht 5' 3\" (1.6 m)   Wt 195 lb 6.4 oz (88.6 kg)   SpO2 95%   BMI 34.61 kg/m²   I&O:     Intake/Output Summary (Last 24 hours) at 5/19/2021 0659  Last data filed at 5/19/2021 5113  Gross per 24 hour   Intake --   Output 500 ml   Net -500 ml         Physical Exam  Vitals reviewed. Constitutional:       General: She is not in acute distress. Appearance: She is well-developed. She is ill-appearing. She is not diaphoretic. HENT:      Head: Normocephalic and atraumatic. Mouth/Throat:      Pharynx: Uvula midline. Tonsils: No tonsillar exudate. Eyes:      General: Lids are normal. No scleral icterus. Right eye: No discharge. Left eye: No discharge. Conjunctiva/sclera: Conjunctivae normal.      Pupils: Pupils are equal, round, and reactive to light. Neck:      Thyroid: No thyroid mass or thyromegaly. Vascular: No JVD. Trachea: Trachea normal. No tracheal deviation. Cardiovascular:      Rate and Rhythm: Normal rate and regular rhythm. Heart sounds: Normal heart sounds. No murmur heard. No friction rub. No gallop. Pulmonary:      Effort: Pulmonary effort is normal. No respiratory distress. Breath sounds: Decreased breath sounds present. No wheezing or rales. Comments: Fine crackles, significantly improved  Chest:      Chest wall: No tenderness. Abdominal:      General: Bowel sounds are normal. There is no distension. Palpations: Abdomen is soft. There is no mass. Tenderness: There is no abdominal tenderness. There is no guarding or rebound. Hernia: No hernia is present.    Musculoskeletal: General: No tenderness or deformity. Cervical back: Normal range of motion and neck supple. Right lower leg: Edema (Mild) present. Left lower leg: Edema (Mild) present. Comments: Range of motion within normal limits x4 extremities   Skin:     General: Skin is warm. Coloration: Skin is not pale. Findings: No erythema or rash. Neurological:      Mental Status: She is alert and oriented to person, place, and time. Cranial Nerves: No cranial nerve deficit. Coordination: Coordination normal.   Psychiatric:         Behavior: Behavior normal.         Thought Content: Thought content normal.         BMP:   Recent Labs     05/18/21  1748 05/19/21  0327    142   K 4.2 5.0    104   CO2 25 27   BUN 16 19   CREATININE 1.3* 1.4*   GLUCOSE 160* 160*   CALCIUM 9.6 8.9     Recent Labs     05/18/21  1748 05/18/21  1401 05/11/21  1331   WBC 8.7 7.37 9.26   HGB 9.9* 8.0* 8.2*   HCT 31.5* 26.7* 26.9*   .7* 110.8* 107.6*   * 339 496*     CMP:    Recent Labs     05/18/21  1345 05/18/21  1748 05/19/21  0327    138 142   K 4.0 4.2 5.0    102 104   CO2 31* 25 27   BUN 18* 16 19   CREATININE 1.4* 1.3* 1.4*   GFRAA  --  47* 43*   LABGLOM 36* 39* 36*   GLUCOSE 152* 160* 160*   PROT 5.8* 7.0  --    LABALBU 3.0* 3.5  --    CALCIUM 9.1 9.6 8.9   BILITOT 0.5 0.6  --    ALKPHOS 108* 139*  --    AST 35 23  --    ALT 14 12  --        30Day lookback of cultures:    Blood Culture Recent: No results for input(s): BC in the last 720 hours. Gram Stain Recent: No results for input(s): LABGRAM in the last 720 hours. Resp Culture Recent: No results for input(s): CULTRESP in the last 720 hours. Body Fluid Recent : No results for input(s): BFCX in the last 720 hours. MRSA Recent : No results for input(s): [facility] in the last 720 hours. Urine Culture Recent : No results for input(s): LABURIN in the last 720 hours.   Organism Recent : No results for input(s): ORG in the last 720 hours. Radiology:   XR CHEST (2 VW)    Result Date: 5/18/2021  Examination. XR CHEST (2 VW) 5/18/2021 2:20 PM History: Severe shortness of breath. The frontal and lateral views of the chest are obtained and compared with the previous study dated 5/4/2021. There is bilateral interstitial prominence which may represent pulmonary vascular congestion/edema. This was not noted in the previous study. There is a trace bibasal pleural effusion. There is cardiomegaly. The atheromatous changes thoracic aorta are noted. A right internal jugular approach MediPort system is seen in place. No change. There is moderate diffuse osteopenia. No focal bony abnormality. Old healed fracture of the right clavicle and residual deformity is similar to the previous study. Pulmonary vascular congestion and pulmonary edema. A trace bibasal pleural effusion. Cardiomegaly. Signed by Dr Gracia Smith on 5/18/2021 3:41 PM    XR CHEST (2 VW)    Result Date: 5/4/2021  XR CHEST (2 VW) 5/4/2021 3:49 PM Two-view chest: History: Cough, shortness of air Frontal and lateral projection chest radiograph obtained. Comparison:  Chest radiography dated 12/1/2020 and 12/12/2019 and 12/21/2020 chest CT. Findings: COPD and chronic lung changes identified. The interstitial markings are minimally prominent relative to the prior chest radiographs, mild acute bronchitis considered. There are no parenchymal infiltrates. The heart is normal in size without evidence of heart failure. Right-sided port catheter identified. There are no acute osseous abnormalities. .                                                                                    Impression: 1. COPD and chronic lung changes. 2. Mild interstitial prominence, acute bronchitis considered, no parenchymal infiltration.  Signed by Dr Mirella De Jesus on 5/4/2021 5:07 PM    XR FOOT LEFT (MIN 3 VIEWS)    Result Date: 5/5/2021  EXAMINATION:  XR FOOT LEFT (MIN 3 VIEWS)  5/5/2021 3:09 PM HISTORY: The patient fell this morning. Left foot pain. COMPARISON: No comparison study. TECHNIQUE: 3 views were obtained. FINDINGS:  There is a nondisplaced fracture of the distal metaphysis of the proximal phalanx of the fourth toe. There has been prior resection of the fifth toe. There is narrowing of the interphalangeal joint spaces of the second through fourth toes. There are hammertoe deformities of these toes. There is minimal calcaneal spurring and enthesopathy. There is soft tissue swelling of the foot. 1. Acute fracture of the fourth toe, as described. Soft tissue swelling of the foot. 2. Hammertoe deformities of the second through fourth toes. Prior fifth toe amputation. 3. Calcaneal spurring and enthesopathy. Signed by Dr Lelo Snow on 5/5/2021 3:12 PM    XR CHEST PORTABLE    Result Date: 5/18/2021  EXAMINATION: Chest one view 5/18/2021 HISTORY: Shortness of breath. FINDINGS: Upright frontal projection of the chest demonstrates congestive heart failure with cardiomegaly, vascular redistribution and interstitial pulmonary edema. Small effusions are present. 1.. Congestive heart failure with interstitial and early alveolar edema.  Signed by Dr Kei Nuno on 5/18/2021 6:38 PM      Medications  Current Facility-Administered Medications   Medication Dose Route Frequency Provider Last Rate Last Admin    nitroGLYCERIN (NITROSTAT) SL tablet 0.4 mg  0.4 mg Sublingual Once Moorhead Paget., MD        sodium chloride flush 0.9 % injection 5-40 mL  5-40 mL Intravenous 2 times per day Tatum , APRN - CNP   10 mL at 05/18/21 2252    sodium chloride flush 0.9 % injection 5-40 mL  5-40 mL Intravenous PRN Tatum , APRN - CNP        0.9 % sodium chloride infusion  25 mL Intravenous PRN Tatum , APRN - CNP        promethazine (PHENERGAN) tablet 12.5 mg  12.5 mg Oral Q6H PRN Tatum , APRN - CNP        Or    ondansetron (ZOFRAN) injection 4 mg  4 mg Intravenous Q6H PRN Tatum , ALBERTO - CNP        polyethylene glycol (GLYCOLAX) packet 17 g  17 g Oral Daily PRN ALBERTO Carbajal CNP        acetaminophen (TYLENOL) tablet 650 mg  650 mg Oral Q6H PRN ALBERTO Carbajal CNP        Or    acetaminophen (TYLENOL) suppository 650 mg  650 mg Rectal Q6H PRN ALBERTO Carbajal CNP        perflutren lipid microspheres (DEFINITY) injection 1.65 mg  1.5 mL Intravenous ONCE PRN ALBERTO Carbajal CNP        potassium chloride (KLOR-CON M) extended release tablet 40 mEq  40 mEq Oral PRN ALBERTO Carbajal CNP        Or    potassium bicarb-citric acid (EFFER-K) effervescent tablet 40 mEq  40 mEq Oral PRN ALBERTO Carbajal CNP        Or    potassium chloride 10 mEq/100 mL IVPB (Peripheral Line)  10 mEq Intravenous PRN ALBERTO Carbajal CNP        magnesium sulfate 2000 mg in 50 mL IVPB premix  2,000 mg Intravenous PRN ALBERTO Carbajal CNP        furosemide (LASIX) injection 40 mg  40 mg Intravenous BID ALBERTO Carbajal CNP   40 mg at 05/19/21 0548    apixaban (ELIQUIS) tablet 2.5 mg  2.5 mg Oral BID ALBERTO Carbajal CNP   2.5 mg at 05/18/21 2251    docusate sodium (COLACE) capsule 100 mg  100 mg Oral BID ALBERTO Carbajal CNP        levothyroxine (SYNTHROID) tablet 112 mcg  112 mcg Oral Daily ALBERTO Carbajal CNP   112 mcg at 05/19/21 0541    lisinopril (PRINIVIL;ZESTRIL) tablet 10 mg  10 mg Oral Daily ALBERTO Phelps CNP        atorvastatin (LIPITOR) tablet 40 mg  40 mg Oral Daily ALBERTO Carbajal CNP   40 mg at 05/18/21 2251    sodium bicarbonate tablet 325 mg  325 mg Oral BID Mehdi Sosa MD   325 mg at 05/19/21 0035    allopurinol (ZYLOPRIM) tablet 100 mg  100 mg Oral BID Mehdi Sosa MD   100 mg at 05/19/21 8939     Facility-Administered Medications Ordered in Other Encounters   Medication Dose Route Frequency Provider Last Rate Last Admin    sodium chloride flush 0.9 % injection 10 mL  10 mL Intravenous PRN Solange JonesALBERTO   10 mL at 05/18/21 1651    heparin flush 100 UNIT/ML injection 500 Units  500 Units Intracatheter PRN Solange Jones APRN   500 Units at 05/18/21 1651    0.9 % sodium chloride infusion  20 mL/hr Intravenous Once John Alvarez APRN 20 mL/hr at 05/18/21 1400 20 mL/hr at 05/18/21 1400       Allergies  Penicillins    ASSESSMENT:  #Decompensated Heart Failure  No prior echo available  BNP 7081 on 5/18/2021  S/p diuretics in the ER with symptomatic improvement  2D echo anticipated today  Receiving Lasix 40 mg twice daily     #Renal impairment       #Pancreatic cancer  On palliative treatment with Gemzar and Abraxane, cycle 4-day 1 on 5/18/2021     #Anemia of chronic disease, macrocytic , enhanced by chemotherapy        Receives Retacrit 40,000 units subcu for hemoglobin <10, will consider next dose at follow-up appointment in clinic     #Palliative care patient-frail  Palliative care consultation    PLAN:  Palliative care consult  Continue supportive care   Monitor labs  2D echocardiogram planned for today    Gardenia SalcedoALBERTO    05/19/21  6:59 AM    Physician's attestation/substantial contribution:  I, Dr Floridalma Casper, independently performed an evaluation on [facility]. I have reviewed relevant medical information/data to include but not limited to medication list, relevant appropriate labs and imaging when applicable. I reviewed other physician's notes, ancillary services and nurses assessment. I have reviewed the above documentation completed by the Nurse Practitioner or Physician Assistant. Please see my additional contributions to the history of present illness, physical examination, and assessment/medical decision-making that reflect my findings and impressions. I discussed essential elements of the care plan with the NP or PA and the patient as well. I answered all the questions to the patient's satisfaction.  I concur with above stated. Subjective-\"I feel much better this morning\"  Objective-lungs are more clear. Assessment/plan:  Pulmonary congestion-status post days. Significant improvement. Echocardiogram today. Pancreatic adenocarcinoma-outpatient palliative chemotherapy. Palliative care also consulted due to her advanced age and frailty. Discussed ghosting/fixation.     Floridalma Casper MD

## 2021-05-19 NOTE — PROGRESS NOTES
Physician Progress Note      Pao Urban  CSN #:                  [id number]  :                       1934  ADMIT DATE:       2021 5:32 PM  [address] DATE:  RESPONDING  PROVIDER #:        Sampson Ndiaye MD          QUERY TEXT:    Pt admitted with  Shortness of breath and has CHF documented. If possible,   please document in progress notes and discharge summary further specificity   regarding the type and acuity of CHF:    The medical record reflects the following:  Risk Facto  Clinical Indicators: Sat 60%, RR 34 CXR - CHF BNP 7081, p02 50  Treatment: Lasix IV BID 40 mg    Thank you    Baby Skiff RN, BSN, CDI  [phone]  Options provided:  -- Acute on Chronic Systolic CHF/HFrEF  -- Acute on Chronic Diastolic CHF/HFpEF  -- Acute Systolic CHF/HFrEF  -- Acute Diastolic CHF/HFpEF  -- Chronic Systolic CHF/HFrEF  -- Chronic Diastolic CHF/HFpEF  -- Other - I will add my own diagnosis  -- Disagree - Not applicable / Not valid  -- Disagree - Clinically unable to determine / Unknown  -- Refer to Clinical Documentation Reviewer    PROVIDER RESPONSE TEXT:    Unknown, new diagnosis, awaiting ECHO. Query created by: Amy Peña on 2021 8:35 AM      QUERY TEXT:    Pt admitted with acute congestive heart failure. Pt noted to have Sat 66% RR   34 P02 52 in respiratory distress with use of accessory muscle. . If possible,   please document in the progress notes and discharge summary if you are   evaluating and/or treating any of the following: The medical record reflects the following:  Risk Factors: CHF, CKID, Malignant neoplasm of the pancreas  Clinical Indicators: Sat 66%, RR 34, Use of accessory muscle and in acute   distress.  P02 52  Treatment: Lasix 40 mg BID    Baby Skiff RN, BSN, CDI  [phone]  Options provided:  -- Acute respiratory failure with hypoxia  -- Acute respiratory failure with hypercapnia  -- Chronic respiratory failure with hypoxia  -- Chronic respiratory failure with hypercapnia  -- Acute on chronic respiratory failure with hypoxia  -- Acute on chronic respiratory failure with hypercapnia  -- Other - I will add my own diagnosis  -- Disagree - Not applicable / Not valid  -- Disagree - Clinically unable to determine / Unknown  -- Refer to Clinical Documentation Reviewer    PROVIDER RESPONSE TEXT:    This patient is in acute respiratory failure with hypoxia.     Query created by: Wilma Chino on 5/19/2021 8:38 AM      Electronically signed by:  Apurva Smith MD 5/19/2021 5:46 PM

## 2021-05-19 NOTE — PLAN OF CARE
Problem: Falls - Risk of:  Goal: Will remain free from falls  Description: Will remain free from falls  5/19/2021 1346 by Ayleen Donato RN  Outcome: Ongoing  5/18/2021 2353 by Rama Sneed RN  Outcome: Ongoing  Goal: Absence of physical injury  Description: Absence of physical injury  5/19/2021 1346 by Ayleen Donato RN  Outcome: Ongoing  5/18/2021 2353 by Rama Sneed RN  Outcome: Ongoing

## 2021-05-19 NOTE — H&P
[id number] [facility]      Hospitalist - History & Physical      PCP: Krupa Cordero MD    Date of Admission: 5/18/2021    Date of Service: 5/18/2021    Chief Complaint: shortness of breath    History Of Present Illness: The patient is a 80 y.o. female with a history of HTN, pancreatic and thyroid cancer, CKD, and anemia who presented to [facility] ED complaining of shortness of breath. The patient is on BiPap and has difficulty giving long answers. The daughter reported to ED staff the patient has had shortness of breath over the last several days which has prevented the patient from laying down. She denies any significant leg swelling (but endorses a small amount), cough, fevers,or chills. Patient was receiving chemotherapy today states she had to lay flat for her chemo but could not tolerate it for shortness of breath, however she was able to finish her treatment. Infusion nurse reports her O2 sat was in the 60's. Patient did have a chest x-ray obtained there which showed edema according to patient's daughter.  Daughter and patient denies any history of heart failure, COPD, or other cardiac or pulmonary problems.     Past Medical History:        Diagnosis Date    [facility]dominal aortic aneurysm (AAA) ([facility] [address]     Anemia     Arthritis     Cancer ([facility] [address] 12/01/2020    pancreatic    Chronic kidney disease     DVT of lower extremity (deep venous thrombosis) (HCC)     twice 2010 and 2017    Hyperlipidemia     Hypertension     Kidney stones     Osteoarthritis     Palliative care patient 12/02/2020    Thyroid disease     Thyroid disorder        Past Surgical History:        Procedure Laterality Date    COLONOSCOPY  2016    Dr Juan Hernández problems    ENDOSCOPIC ULTRASOUND (LOWER) N/A 12/03/2020    Dr JONNY Howe-w/unsuccessful biliary cannulation despite attempts at 2-wire cannulation and proph pancreatic stenting with cannulation around pancreatic stenting-Positive for high-grade adenocarcinoma, pancreatic head    ERCP 100 mg by mouth daily    Historical Provider, MD   levothyroxine (SYNTHROID) 112 MCG tablet Take 112 mcg by mouth Daily    Historical Provider, MD   simvastatin (ZOCOR) 20 MG tablet Take 20 mg by mouth nightly    Historical Provider, MD       Allergies:    Penicillins    Social History:    The patient currently lives with her daughter. Tobacco:   reports that she has never smoked. She has never used smokeless tobacco.  Alcohol:   reports no history of alcohol use. Illicit Drugs: denies    Family History:      Problem Relation Age of Onset    Colon Cancer Brother     Cancer Mother         Breast Cancer    Heart Attack Father     Colon Polyps Neg Hx     Esophageal Cancer Neg Hx     Liver Cancer Neg Hx     Liver Disease Neg Hx     Rectal Cancer Neg Hx     Stomach Cancer Neg Hx        Review of Systems:   Review of Systems   Constitutional: Positive for activity change and fatigue. Negative for chills and fever. HENT: Negative for congestion, rhinorrhea and sore throat. Eyes: Negative for photophobia and visual disturbance. Respiratory: Positive for chest tightness and shortness of breath. Negative for wheezing. Cardiovascular: Positive for leg swelling. Negative for chest pain and palpitations. Gastrointestinal: Positive for constipation. Negative for blood in stool, nausea and vomiting. Genitourinary: Negative for difficulty urinating, flank pain and vaginal bleeding. Musculoskeletal: Negative for arthralgias, back pain and joint swelling. Skin: Negative for color change and wound. Neurological: Positive for weakness (generalized). Negative for dizziness and headaches. Psychiatric/Behavioral: Negative for confusion. The patient is not nervous/anxious. 14 point review of systems is negative except as specifically addressed above.     Physical Examination:  /80   Pulse 92   Temp 97.6 °F (36.4 °C)   Resp 18   Ht 5' 3\" (1.6 m)   Wt 169 lb (76.7 kg)   SpO2 99%   BMI 29.94 kg/m²   Physical Exam     Diagnostic Data:  CBC:  Recent Labs     05/18/21  1401 05/18/21  1748   WBC 7.37 8.7   HGB 8.0* 9.9*   HCT 26.7* 31.5*    413*     BMP:  Recent Labs     05/18/21  1345 05/18/21  1743 05/18/21  1748     --  138   K 4.0 4.1 4.2     --  102   CO2 31*  --  25   BUN 18*  --  16   CREATININE 1.4*  --  1.3*   CALCIUM 9.1  --  9.6     Recent Labs     05/18/21  1345 05/18/21  1748   AST 35 23   ALT 14 12   BILITOT 0.5 0.6   ALKPHOS 108* 139*     Coag Panel: No results for input(s): INR, PROTIME, APTT in the last 72 hours. Cardiac Enzymes: No results for input(s): Rosenda Ends in the last 72 hours. ABGs:  Lab Results   Component Value Date    PHART 7.390 05/18/2021    PO2ART 52.0 05/18/2021    YPB8IPS 42.0 05/18/2021     Urinalysis:  Lab Results   Component Value Date    NITRU POSITIVE 12/01/2020    WBCUA TNTC 12/01/2020    BACTERIA Rare 12/01/2020    RBCUA TNTC 12/01/2020    BLOODU MODERATE 12/01/2020    SPECGRAV 1.019 12/01/2020    GLUCOSEU Negative 12/01/2020     A1C: No results for input(s): LABA1C in the last 72 hours. ABG:  Recent Labs     05/18/21  1743   PHART 7.390   VHQ8ASW 42.0   PO2ART 52.0*   ZDE0HFK 25.4   BEART 0.3   HGBAE 9.9*   D9UUKYCC 87.9*   CARBOXHGBART 2.3       XR CHEST (2 VW)    Result Date: 5/18/2021  Examination. XR CHEST (2 VW) 5/18/2021 2:20 PM History: Severe shortness of breath. The frontal and lateral views of the chest are obtained and compared with the previous study dated 5/4/2021. There is bilateral interstitial prominence which may represent pulmonary vascular congestion/edema. This was not noted in the previous study. There is a trace bibasal pleural effusion. There is cardiomegaly. The atheromatous changes thoracic aorta are noted. A right internal jugular approach [employer] system is seen in place. No change. There is moderate diffuse osteopenia. No focal bony abnormality.  Old healed fracture of the right clavicle and residual

## 2021-05-19 NOTE — CONSULTS
MEDICAL ONCOLOGY CONSULTATION    Pt Name: Malia Sacnhez  MRN: [id number]  YOB: 1934  Date of evaluation: 5/18/2021    REASON FOR CONSULTATION: Continuity of care,pancreatic cancer  REQUESTING PHYSICIAN:    History Obtained From:patient, electronic medical record    HISTORY OF PRESENT ILLNESS:  Patient is a 79 yo female with multiple comorbidities to include a significant history of pancreatic adenocarcinoma currently receiving palliative chemotherapy. She received treatment today in clinic. She started to complain of worsening shortness of breath. She had a normal respiratory rate and saturation when she presented to clinic. After completion of treatment she started experiencing worsening dyspnea with hypoxemia and desaturation. No fever of chills. She was sent for a CXR that showed pulmonary congestion. Due to her worsening respiratory status she was sent to the ER where she was admitted. Cancer history:  Malia Sanchez is a 80 y.o.  female with significant PMH of iron deficiency anemia, chronic kidney disease stage IV,  and diagnosis of pancreatic adenocarcinoma. She has been receiving palliative chemotherapy with Gemzar 750 mg/m² and Abraxane 90 mg/m² every 2 weeks (this is a 25% dose reduction). Follow-up CT scan of the abdomen and pelvis on 4/13/2021 and tumor markers suggest a positive response to treatment. Treatment was held last week due to [place] experiencing significant fatigue and nausea. I also dose reduced her treatment regimen by 25%, starting with cycle 4 will receive Gemzar 600 mg/m² and Abraxane 75 mg/m². [place] returns today for evaluation, side effect monitoring, lab monitoring and consideration to receive cycle #4, day 1 of Gemzar and Abraxane. She presents today feeling somewhat better than last week although continues to have significant fatigue and declined appetite.   She has been taking the Zofran and reports that her nausea has improved, she also receives Aloxi and dexamethasone for persistent nausea with each treatment. Her weight is stable.     Flakita received 1 unit of PRBCs last week for a hemoglobin of 6.7, she has a hemoglobin today of 8.2. Iron substrates repeated today, 4/6/2021 revealed a ferritin of 1088, iron 43, iron saturation 19%, TIBC 226 and a vitamin B12 level of 781. Will administer Retacrit at follow-up appointment if hemoglobin remains less than 11.     ONCOLOGIC/HEMATOLOGIC HISTORY:   Diagnosis:   · Anemia of chronic kidney disease   · Chronic kidney disease stage lll  · Pancreatic adenocarcinoma  · Chronic anticoagulation due to prior DVTs     Treatment summary:  · Ferrous sulfate 325 mg daily   · 11/26/2018 - Procrit 20,000 units for chronic kidney disease stage IV. Procrit to be given every 2 weeks ×4 and then monthly, dose to be titrated appropriately. Hold dose for hemoglobin >11   · Currently receiving Retacrit 40,000 units every 4 weeks  · 2/8/2021 palliative chemotherapy on 2/8/2021 with Gemzar 750 mg/m² and Abraxane 90 mg/m² every 2 weeks, this is a dose reduction by 25%        Tumor monitoring:  · 12/2/2020-CA 19-9 of 133  · 12/23/2020-CA 19-9 of 217  · 1/20/2021-CA 19-9 of 236  · 3/23/2021- CA 19-9 of 108     CANCER HISTORY  Vicente Delacruz seen by Dr. Christiano Mckinnon on 12/2/2020 as an inpatient consultation at [facility]) of [facility] for findings of a pancreatic mass and bile duct obstruction. She presented to the ER department with complaints of hematuria that began a few days prior to presentation.   She denied any dysuria, back pain, no nausea or vomiting.  She also reported easily bruising and ecchymotic areas in her back, abdomen and flank.  Initial laboratory work-up showed INR above 18.  She was given vitamin K subcutaneously. Jose Estrada was found to have elevated LFTs and the following are events that occurred.     · 12/01/2020-CT abdomen pelvis without contrast showed a pancreatic head mass measuring 3.3 x 3.7 cm in size and associated, bile duct dilatation above the level of the ampulla.  Associated moderate pancreatic ductal dilatation. · 12/2/2020-CA 19-9 133  · 12/03/2020-ERCP with FNA biopsy Positive for high-grade adenocarcinoma.  Unfortunately, stent could not be placed.   · 12/5/2020-transferred from [address] [place] [address] to [address] [place] [address] and discharged home on 12/8/2020  · 12/07/2020- Cholangipancreatography Retrograde Endo ERCP with sphincterotomy, balloon sweep and placement of 10x60 PC BS metal stent at [facility] in [place]. · 12/21/2020- CT chest with contrast documented no evidence of intrathoracic neoplastic process/metastatic disease. Evidence of pneumobilia. The ectasia of the pancreatic duct, similar to the previous study. An incompletely visualized and evaluated heterogeneous mass in the head of the pancreas measuring 3.3 x 3.7cm. A biliary stent in place. Moderate persistent dilatation of the proximal common bile duct. A stable small low-density nodule in the left adrenal gland  · 12/22/2020-Dr. Lambert discussed the results of CT chest and pathology that suggest advanced pancreatic cancer, unfortunately it is not amenable to surgery. She was quite weak and had poor performance, appeared very frail.    Discussion was made about palliative chemotherapy but the decision to hold was made until improvement of her physical conditioning occurred.  Recommended physical therapy as outpatient and reassess fitness for palliative chemotherapy in approximately 4 weeks. 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 · 12/7/2020 ERCP at [address] [place] [address] Dr by Dr. Rena Ferreira revealed a single severe biliary stricture in the lower third of the main bile duct with severe upstream biliary dilation, stricture was malignant appearing. Biliary sphincterectomy was performed.   1 partially covered metal stent was placed into the common bile duct. · 2/8/2021-initiated palliative chemotherapy with Gemzar 750 mg/m² and Abraxane 90 mg/m² every 2 weeks, this is a dose reduction by 25%  · 3/23/2021- CA 19-9 108, improved compared to pretreatment value of 236 on 1/20/2021. · 4/13/2021 CT Abdomen/Pelvis with contrast documented a poorly defined complex mass in the head of the pancreas. It appears to have decreased in size when remeasured at the same level and in same dimension as in the previous study (3 cm x 2.5 cm, compared to 3.7 cm x 3.3 cm). Persistent moderate dilatation of the pancreatic duct. There are 2 additional small cystic nodules in the body of the pancreas measuring 11 mm and 9 mm. A biliary stent in place and decompression of the intrahepatic biliary ducts since the previous study. 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 The diverticulosis of the distal colon with no evidence for diverticulitis. · 5/4/2021- dose reduction by 25% due to severe fatigue and nausea, starting with cycle 4 will receive Gemzar 600 mg/m² and Abraxane 75 mg/m².           HEMATOLOGY HISTORY:  Quince Kussmaul was seen in initial hematology consultation on 7/20/2018 for further evaluation and treatment recommendations for anemia. Quince Kussmaul was referred from Dr. Lilo Hernandez. Quince Kussmaul presented with no significant complaints other than chronic fatigue and constipation. She had been taking ferrous sulfate 325 mg daily under the direction of Dr. Cecy Mcdowell. Quince Kussmaul reported significant constipation and some GI upset with the oral iron. She denied any bleeding tendencies to include hematuria, melena, hematochezia and epistaxis. Quince Kussmaul had a colonoscopy on 4/26/2016 by Dr. Rosamaria Schaumann for further evaluation of iron deficiency. The only abnormal finding was diverticulosis. Quince Kussmaul is routinely followed by Dr. Kanwal Lopez for her chronic kidney disease stage IV.  CMP on 6/11/2018 documented a creatinine of 2.2 and GFR 21. CBC review from 2/2/2018- 6/11/2018 documented hemoglobin ranging from 9.4- 10.1, RBC 2.91- 3.22, MCV 94- 100 with a normal WBC and platelet count  CBC at initial consultation on 7/20/2018 documented a WBC of 7.33, ANC 4.74, RBC 3.04, hemoglobin 10.3, .6 and a platelet count of 469,590.     Serology studies on 7/20/2018:  · Creatinine 1.98   · GFR 23   · Calcium 10.3   · IgG 700, IgA 180, and IgM 52   · M spike not observed   · Immunofixation: No monoclonality detected. · Iron 74   · Iron saturation 27%   · TIBC 278   · Vitamin B12 437   · Folate 15.7   · Ferritin 299   · Reticulocyte count 1.5%   · Erythropoietin 11.6   ·    · Beta-2 microglobulin 6.2      Occult stool positive 1 of 3 samples on 7/25/2018.     Colonoscopy on 10/4/2018 by Dr. Ana Simmons was documented as removal of 2 polyps with benign pathology. No evidence of bleeding.     Serology studies on 10/15/2018:  · Iron 76   · TIBC 287   · Iron saturation 26%   · Ferritin 321   · Hemoglobin 10.1   · Creatinine 2.47   · GFR 17     11/26/2018 - Initiated Procrit 20,000 units for chronic kidney disease stage IV, to be given every 2 weeks ×4 and then monthly, dose to be titrated appropriately. Hemoglobin 9.6. All has anemia of chronic disease to include chronic kidney disease stage IV. She will began receiving growth factor support and will need to maintain a ferritin level greater than 200 and an iron saturation of 25% for the Procrit to be beneficial. Hold Procrit dose for hemoglobin >11. Indications/rationale for Procrit was discussed with [place]. Risks, benefits and expectations were outlined.  Risks including, but not limited to hypertension, stroke, MI, death were discussed and acknowledged by [place].     Serology studies on 3/8/2019:  · Creatinine 1.7/GFR 30.4   · Iron 80   · Iron saturation 26.5%   · TIBC 302   · Ferritin 144   · Vitamin B12 501   · Folate 13.9     Serology studies on 3/5/2020:  · Creatinine 1.3/GFR 41.4  · Iron 84  · TIBC 390  · Iron saturation 21.5%  · Ferritin 342     Iron substrates on 6/25/2020  · Ferritin 311  · Iron 76  · Iron saturation 23%  · TIBC 326  · Creatinine 1.3/GFR 39     Labs on 12/2/2020 and 12/3/2020:  · Iron 35  · TIBC 213  · Iron saturation 16%  · Vitamin B12 483  · Folate 10.6  · Ferritin 480     Labs on 3/23/2021  · Iron 54  · TIBC 366  · Iron saturation 15  · Ferritin 530  · Reticulocytes 2.1     Labs on 4/6/2021  · WBC 14.40  · RBC 2.46  · HGB 8.6  · HCT 26.4  · .3  · MCH 35  · MCHC 32.6  · ANC 12.03  · ,000  · Iron 43   · TIBC 226  · Iron saturation 19%   · Ferritin 1088.0  · B-12= 781  · GFR 39  · BUN 21  · Creatinine 1.3  · Phosphorus 2.4  · Magnesium 1.5        Age-appropriate health screening:  · Colonoscopy on 10/4/2018 by Dr. Petty Gonzales was documented as removal of 2 polyps with benign pathology. No evidence of bleeding. · 9/20/2019 Bilateral mammogram at [facility] documented no mammographic evidence of malignancy. No bone mineral density on file Oxana Parrish is a 80 y.o.  female with significant PMH of iron deficiency anemia, chronic kidney disease stage IV,  and diagnosis of pancreatic adenocarcinoma. She has been receiving palliative chemotherapy with Gemzar 750 mg/m² and Abraxane 90 mg/m² every 2 weeks (this is a 25% dose reduction). Follow-up CT scan of the abdomen and pelvis on 4/13/2021 and tumor markers suggest a positive response to treatment. Treatment was held last week due to [place] experiencing significant fatigue and nausea. I also dose reduced her treatment regimen by 25%, starting with cycle 4 will receive Gemzar 600 mg/m² and Abraxane 75 mg/m². [place] returns today for evaluation, side effect monitoring, lab monitoring and consideration to receive cycle #4, day 1 of Gemzar and Abraxane.   She presents today feeling somewhat better than last week although continues to have significant fatigue and declined appetite. She has been taking the Zofran and reports that her nausea has improved, she also receives Aloxi and dexamethasone for persistent nausea with each treatment. Her weight is stable.     Flakita received 1 unit of PRBCs last week for a hemoglobin of 6.7, she has a hemoglobin today of 8.2. Iron substrates repeated today, 4/6/2021 revealed a ferritin of 1088, iron 43, iron saturation 19%, TIBC 226 and a vitamin B12 level of 781. Will administer Retacrit at follow-up appointment if hemoglobin remains less than 11.       Past Medical History:    Past Medical History:   Diagnosis Date    Abdominal aortic aneurysm (AAA) ([facility]     Anemia     Arthritis     Cancer ([facility] 12/01/2020    pancreatic    Chronic kidney disease     DVT of lower extremity (deep venous thrombosis) (HCC)     twice 2010 and 2017    Hyperlipidemia     Hypertension     Kidney stones     Osteoarthritis     Palliative care patient 12/02/2020    Thyroid disease     Thyroid disorder        Past Surgical History:    Past Surgical History:   Procedure Laterality Date    COLONOSCOPY  2016    Dr Alarcon Harness problems    ENDOSCOPIC ULTRASOUND (LOWER) N/A 12/03/2020    Dr JONNY Grijalva/unsuccessful biliary cannulation despite attempts at 2-wire cannulation and proph pancreatic stenting with cannulation around pancreatic stenting-Positive for high-grade adenocarcinoma, pancreatic head    ERCP N/A 12/03/2020    Dr JONNY Grijalva/unsuccessful biliary cannulation despite attempts at 2-wire cannulation and proph pancreatic stenting with cannulation around pancreatic stenting-Positive for high-grade adenocarcinoma, pancreatic head    ERCP  12/07/2020    Dr Adarsh Hernandez-becka/sphincterotomy, placement of a 10 x 60 partially covered biliary stent    PORT SURGERY Right 2/5/2021    SINGLE LUMEN PORT PLACEMENT WITH ULTRASOUND AND FLUORO performed by Dung Meyer MD at [address] TOE AMPUTATION Bilateral     pinky toes removed    TOTAL KNEE ARTHROPLASTY Right 01/03/2020    RIGHT TOTAL KNEE REPLACEMENT performed by Ez Gayle MD at [address] [facility] OR       Social History:    Marital status:   Smoking status:Never smoker  ETOH status:no  [place]    Family History:   Family History   Problem Relation Age of Onset    Colon Cancer Brother     Cancer Mother         Breast Cancer    Heart Attack Father     Colon Polyps Neg Hx     Esophageal Cancer Neg Hx     Liver Cancer Neg Hx     Liver Disease Neg Hx     Rectal Cancer Neg Hx     Stomach Cancer Neg Hx        Current Hospital Medications:    Current Facility-Administered Medications   Medication Dose Route Frequency Provider Last Rate Last Admin    nitroGLYCERIN (NITROSTAT) SL tablet 0.4 mg  0.4 mg Sublingual Once Karlee Flanagan MD        sodium chloride flush 0.9 % injection 5-40 mL  5-40 mL Intravenous 2 times per day ALBERTO Shah CNP        sodium chloride flush 0.9 % injection 5-40 mL  5-40 mL Intravenous PRN ALBERTO Shah - CNP        0.9 % sodium chloride infusion  25 mL Intravenous PRN ALBERTO Shah CNP        promethazine (PHENERGAN) tablet 12.5 mg  12.5 mg Oral Q6H PRN ALBERTO Shah - CNP        Or    ondansetron (ZOFRAN) injection 4 mg  4 mg Intravenous Q6H PRN ALBERTO Shah - CNP        polyethylene glycol (GLYCOLAX) packet 17 g  17 g Oral Daily PRN ALBERTO Shah - CNP        acetaminophen (TYLENOL) tablet 650 mg  650 mg Oral Q6H PRN Pia Mendez APRN - CNP        Or    acetaminophen (TYLENOL) suppository 650 mg  650 mg Rectal Q6H PRN ALBERTO Shah - TORO        perflutren lipid microspheres (DEFINITY) injection 1.65 mg  1.5 mL Intravenous ONCE PRN ALEBRTO Shah CNP        potassium chloride (KLOR-CON M) extended release tablet 40 mEq  40 mEq Oral PRN ALBERTO Shah - CNP        Or    potassium bicarb-citric acid (EFFER-K) effervescent tablet 40 mEq Fatigue;generalized weakness  HEENT: no blurring of vision, no double vision, no hearing difficulty, no tinnitus, no ulceration, no epistaxis;  LUNGS: no cough, no hemoptysis, no wheeze,  shortness of breath;  CARDIOVASCULAR: no palpitation, no chest pain, shortness of breath;  GI: no abdominal pain, no nausea, no vomiting, no diarrhea, no constipation;  MOUSTAPHA: no dysuria, no hematuria, no frequency or urgency, no nephrolithiasis;  MUSCULOSKELETAL: no joint pain, no swelling, no stiffness;  ENDOCRINE: no polyuria, no polydipsia, no cold or heat intolerance;  HEMATOLOGY: no easy bruising or bleeding, no history of clotting disorder;  DERMATOLOGY: no skin rash, no eczema, no pruritus;  PSYCHIATRY: no depression, no anxiety, no panic attacks, no suicidal ideation, no homicidal ideation;  NEUROLOGY: no syncope, no seizures, no numbness or tingling of hands, no numbness or tingling of feet, no paresis;    Objective   BP (!) 155/91   Pulse 113   Temp 96.3 °F (35.7 °C) (Temporal)   Resp 16   Ht 5' 3\" (1.6 m)   Wt 169 lb (76.7 kg)   SpO2 92%   BMI 29.94 kg/m²     PHYSICAL EXAM:  CONSTITUTIONAL: Alert, appropriate, no acute distress, ill appearing, frail  EYES: Non icteric, EOM intact, pupils equal round   ENT: Mucus membranes moist, no oral pharyngeal lesions, external inspection of ears and nose are normal  NECK: Supple, no masses. No palpable thyroid mass  CHEST/LUNGS: CTA bilaterally, normal respiratory effort   CARDIOVASCULAR:tachycardic, no murmurs. Bilateral lower extremity edema  ABDOMEN: soft non-tender, active bowel sounds, no HSM. No palpable masses  EXTREMITIES: warm, full ROM in all 4 extremities, no focal weakness. SKIN: warm, dry with no rashes or lesions  LYMPH: No cervical, clavicular, axillary, or inguinal lymphadenopathy  NEUROLOGIC: follows commands, non focal   PSYCH: mood and affect appropriate.  Alert and oriented to time, place, person        LABORATORY RESULTS REVIEWED/ANALYZED BY ME:  Recent Labs     05/18/21  1748 05/18/21  1401 05/11/21  1331   WBC 8.7 7.37 9.26   HGB 9.9* 8.0* 8.2*   HCT 31.5* 26.7* 26.9*   .7* 110.8* 107.6*   * 339 496*       Lab Results   Component Value Date     05/18/2021    K 4.2 05/18/2021     05/18/2021    CO2 25 05/18/2021    BUN 16 05/18/2021    CREATININE 1.3 (H) 05/18/2021    GLUCOSE 160 (H) 05/18/2021    CALCIUM 9.6 05/18/2021    PROT 7.0 05/18/2021    LABALBU 3.5 05/18/2021    BILITOT 0.6 05/18/2021    ALKPHOS 139 (H) 05/18/2021    AST 23 05/18/2021    ALT 12 05/18/2021    LABGLOM 39 (A) 05/18/2021    GFRAA 47 (L) 05/18/2021    AGRATIO 1.9 01/20/2021    GLOB 2.8 05/18/2021       Lab Results   Component Value Date    INR 1.03 12/05/2020    INR 1.04 12/04/2020    INR 1.07 12/03/2020    PROTIME 13.4 12/05/2020    PROTIME 13.5 12/04/2020    PROTIME 13.8 12/03/2020       RADIOLOGY STUDIES REPORT/REVIEWED AND INTERPRETED BY ME:  XR CHEST (2 VW)    Result Date: 5/18/2021  Examination. XR CHEST (2 VW) 5/18/2021 2:20 PM History: Severe shortness of breath. The frontal and lateral views of the chest are obtained and compared with the previous study dated 5/4/2021. There is bilateral interstitial prominence which may represent pulmonary vascular congestion/edema. This was not noted in the previous study. There is a trace bibasal pleural effusion. There is cardiomegaly. The atheromatous changes thoracic aorta are noted. A right internal jugular approach MediPort system is seen in place. No change. There is moderate diffuse osteopenia. No focal bony abnormality. Old healed fracture of the right clavicle and residual deformity is similar to the previous study. Pulmonary vascular congestion and pulmonary edema. A trace bibasal pleural effusion. Cardiomegaly.  Signed by Dr Yanira Arellano on 5/18/2021 3:41 PM    XR CHEST (2 VW)    Result Date: 5/4/2021  XR CHEST (2 VW) 5/4/2021 3:49 PM Two-view chest: History: Cough, shortness of air Frontal and lateral projection chest radiograph obtained. Comparison:  Chest radiography dated 12/1/2020 and 12/12/2019 and 12/21/2020 chest CT. Findings: COPD and chronic lung changes identified. The interstitial markings are minimally prominent relative to the prior chest radiographs, mild acute bronchitis considered. There are no parenchymal infiltrates. The heart is normal in size without evidence of heart failure. Right-sided port catheter identified. There are no acute osseous abnormalities. .                                                                                    Impression: 1. COPD and chronic lung changes. 2. Mild interstitial prominence, acute bronchitis considered, no parenchymal infiltration. Signed by Dr Ruddy Nelson on 5/4/2021 5:07 PM    XR FOOT LEFT (MIN 3 VIEWS)    Result Date: 5/5/2021  EXAMINATION:  XR FOOT LEFT (MIN 3 VIEWS)  5/5/2021 3:09 PM HISTORY: The patient fell this morning. Left foot pain. COMPARISON: No comparison study. TECHNIQUE: 3 views were obtained. FINDINGS:  There is a nondisplaced fracture of the distal metaphysis of the proximal phalanx of the fourth toe. There has been prior resection of the fifth toe. There is narrowing of the interphalangeal joint spaces of the second through fourth toes. There are hammertoe deformities of these toes. There is minimal calcaneal spurring and enthesopathy. There is soft tissue swelling of the foot. 1. Acute fracture of the fourth toe, as described. Soft tissue swelling of the foot. 2. Hammertoe deformities of the second through fourth toes. Prior fifth toe amputation. 3. Calcaneal spurring and enthesopathy. Signed by Dr Isha Flores on 5/5/2021 3:12 PM    XR CHEST PORTABLE    Result Date: 5/18/2021  EXAMINATION: Chest one view 5/18/2021 HISTORY: Shortness of breath. FINDINGS: Upright frontal projection of the chest demonstrates congestive heart failure with cardiomegaly, vascular redistribution and interstitial pulmonary edema.  Small effusions are

## 2021-05-19 NOTE — CONSULTS
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 She was receiving her chemo and had difficulty completing due to her inability to lie flat, infusion nurse noted that patient's sats were in the 60's, CXR revealed pulmonary edema and she was directed to ED for further evaluation. Further workup in the ED revealed BNP 7,081, O2 sats 87%, she was initiated on BiPAP with improvement and given IV lasix for diuresis. She was admitted for further evaluation and treatment, has since been weaned to [facility] and has responded well to continued diuresis. Oncology consulted and following closely. ECHO pending for new onset CHF. Patient is known to palliative care and we were consulted to assist with continued discussions regarding goals of care, code status, pt/family support.        Past Medical History:        Diagnosis Date    Abdominal aortic aneurysm (AAA) ([facility] [address]     Anemia     Arthritis     Cancer ([facility] [address] 12/01/2020    pancreatic    Chronic kidney disease     DVT of lower extremity (deep venous thrombosis) ([address]     twice 2010 and 2017    Hyperlipidemia     Hypertension     Kidney stones     Osteoarthritis     Palliative care patient 12/02/2020    Thyroid disease     Thyroid disorder        Past Surgical History:        Procedure Laterality Date    COLONOSCOPY  2016    Dr Ge Arango Nicho (LOWER) N/A 12/03/2020    Dr JONNY Grijalva/unsuccessful biliary cannulation despite attempts at 2-wire cannulation and proph pancreatic stenting with cannulation around pancreatic stenting-Positive for high-grade adenocarcinoma, pancreatic head    ERCP N/A 12/03/2020    Dr JONNY Grijalva/unsuccessful biliary cannulation despite attempts at 2-wire cannulation and proph pancreatic stenting with cannulation around pancreatic stenting-Positive for high-grade adenocarcinoma, pancreatic head    ERCP  12/07/2020    Dr Madeleine Hernandez-becka/sphincterotomy, placement of a 10 x 60 partially covered biliary stent    PORT SURGERY Right 2/5/2021    SINGLE LUMEN PORT PLACEMENT WITH ULTRASOUND AND FLUORO performed by Sonya Dowling MD at [address] TOE AMPUTATION Bilateral     pinky toes removed    TOTAL KNEE ARTHROPLASTY Right 01/03/2020    RIGHT TOTAL KNEE REPLACEMENT performed by Luis Antonio Leroy MD at [facility] OR       Allergies:  Penicillins    Social History:    TOBACCO:   reports that she has never smoked. She has never used smokeless tobacco.  ETOH:   reports no history of alcohol use. Patient currently lives with family     Family History:       Problem Relation Age of Onset    Colon Cancer Brother     Cancer Mother         Breast Cancer    Heart Attack Father     Colon Polyps Neg Hx     Esophageal Cancer Neg Hx     Liver Cancer Neg Hx     Liver Disease Neg Hx     Rectal Cancer Neg Hx     Stomach Cancer Neg Hx        Palliative Performance Score: 50%    Review of Systems   Constitutional: Positive for activity change and appetite change. Generalized weakness   HENT: Negative. Eyes: Negative. Respiratory: Positive for shortness of breath. Cardiovascular: Negative. Gastrointestinal: Negative. Endocrine: Negative. Genitourinary: Negative. Musculoskeletal: Negative. Skin: Negative. Neurological: Negative. Psychiatric/Behavioral: Negative.          Palliative Review of Advance Directives: MG 1.8 1.7   PHOS 3.3  --      Recent Labs     05/18/21  1345 05/18/21  1748   AST 35 23   ALT 14 12   BILITOT 0.5 0.6   ALKPHOS 108* 139*     ABGs:  Recent Labs     05/18/21  1743   PHART 7.390   PQQ7OOV 42.0   PO2ART 52.0*   EIY3GYT 25.4   BEART 0.3   HGBAE 9.9*   Z7DGKFAI 87.9*   CARBOXHGBART 2.3   02THERAPY Unknown     HgBA1c:   Recent Labs     05/18/21  1748   LABA1C 6.2*     FLP:    Lab Results   Component Value Date    TRIG 108 05/18/2021    HDL 54 05/18/2021    LDLCALC 79 05/18/2021     TSH:    Lab Results   Component Value Date    TSH 4.710 05/18/2021     Troponin T:   Recent Labs     05/18/21  1748 05/19/21  0327   TROPONINI 0.02 0.02     INR: No results for input(s): INR in the last 72 hours.     Objective:   Vitals: BP (!) 96/51   Pulse 71   Temp 97.2 °F (36.2 °C) (Temporal)   Resp 16   Ht 5' 3\" (1.6 m)   Wt 195 lb 6.4 oz (88.6 kg)   SpO2 96%   BMI 34.61 kg/m²   24HR INTAKE/OUTPUT:      Intake/Output Summary (Last 24 hours) at 5/19/2021 1316  Last data filed at 5/19/2021 0919  Gross per 24 hour   Intake --   Output 1100 ml   Net -1100 ml     Physical Exam      General appearance: alert and cooperative with exam, appears elderly, appears chronically ill, no acute distress  HEENT: atraumatic, eyes with clear conjunctiva and normal lids, pupils and irises normal, external ears and nose are normal,lips normal.  Neck: without masses, supple   Lungs: no increased work of breathing, diminished with mild rales, supplemental O2 in place  Heart: regular rate and rhythm, distal pulses intact  Abdomen: soft, non-tender; bowel sounds normal; no masses,  no organomegaly  Genitourinary: No bladder fullness, masses, or tenderness  Extremities: able to SINGH, trace BLE edema  Neurologic: No focal neurologic deficits, normal sensation, no tremor, alert and oriented  Psychiatric: Alert and oriented, no recent or remote memory deficits, good judgement, mood and affect appropriate  Skin: warm, dry    Assessment and Plan:   Active Problems:    CKD (chronic kidney disease)    Malignant neoplasm of other parts of pancreas ([facility] [address]    New onset of congestive heart failure ([facility] [address]  Resolved Problems:    * No resolved hospital problems. *      Visit Summary: Chart reviewed, pt discussed with nursing and Oncology. Patient seen at the bedside with no family present. She is known to Palliative and we discussed her current treatments, tolerance, functional status, home supports, and symptoms leading to admission. Patient feels that she has tolerated her chemo fairly well, reported initial issues with nausea but feels this has been well controlled. She attributes fatigue and weakness to recent issues with SOA and new heart failure. She lives with her son, daughter is close and helps to support her as well. I have reviewed Outpatient Supportive Care services with her, she plans to continue with treatments per Oncology, and patient would like to discuss with her daughter before making decisions. I will follow up and continue discussions on goals, plan to speak with daughter as well. Palliative will follow. Recommendations:     1. Palliative Care- [facility] to continue treatment per Oncology, I have discussed Outpatient Supportive Care services with patient, awaiting decision CODE STATUS- FULL CODE, ongoing discussion SYMPTOM MANAGEMENT- Pt improved with supplemental O2 and diuresis    2. New Onset Heart Failure- ECHO pending, abs trended, diuresis per attending    3. Pancreatic cancer- Oncology following, currently receiving palliative chemo     Thank you for consulting palliative care and allowing us to participate in the care of the patient.       CounselingTopics: Goals of care, Code Status, Disease process education, pt/family support    Time Spent Counseling > 50%:  YES                                   Total Time Spent: 58 min    Electronically signed by ALBERTO Olivares on 5/19/2021 at 1:16 PM    (Please note that portions of this note were completed with a voice recognition program.  Efforts were made to edit the dictations but occasionally words are mis-transcribed.)

## 2021-05-20 LAB
ANION GAP SERPL CALCULATED.3IONS-SCNC: 13 MMOL/L (ref 7–19)
ANISOCYTOSIS: ABNORMAL
BASOPHILS ABSOLUTE: 0 K/UL (ref 0–0.2)
BASOPHILS RELATIVE PERCENT: 0.5 % (ref 0–1)
BUN BLDV-MCNC: 36 MG/DL (ref 8–23)
CALCIUM SERPL-MCNC: 9.1 MG/DL (ref 8.8–10.2)
CHLORIDE BLD-SCNC: 100 MMOL/L (ref 98–111)
CO2: 29 MMOL/L (ref 22–29)
CREAT SERPL-MCNC: 2 MG/DL (ref 0.5–0.9)
EOSINOPHILS ABSOLUTE: 0.1 K/UL (ref 0–0.6)
EOSINOPHILS RELATIVE PERCENT: 0.9 % (ref 0–5)
GFR AFRICAN AMERICAN: 29
GFR NON-AFRICAN AMERICAN: 24
GLUCOSE BLD-MCNC: 118 MG/DL (ref 74–109)
HCT VFR BLD CALC: 26 % (ref 37–47)
HEMOGLOBIN: 8.3 G/DL (ref 12–16)
HYPOCHROMIA: ABNORMAL
IMMATURE GRANULOCYTES #: 0.1 K/UL
LYMPHOCYTES ABSOLUTE: 0.5 K/UL (ref 1.1–4.5)
LYMPHOCYTES RELATIVE PERCENT: 8.8 % (ref 20–40)
MACROCYTES: ABNORMAL
MAGNESIUM: 1.8 MG/DL (ref 1.6–2.4)
MCH RBC QN AUTO: 34.4 PG (ref 27–31)
MCHC RBC AUTO-ENTMCNC: 31.9 G/DL (ref 33–37)
MCV RBC AUTO: 107.9 FL (ref 81–99)
MONOCYTES ABSOLUTE: 0.5 K/UL (ref 0–0.9)
MONOCYTES RELATIVE PERCENT: 9 % (ref 0–10)
NEUTROPHILS ABSOLUTE: 4.5 K/UL (ref 1.5–7.5)
NEUTROPHILS RELATIVE PERCENT: 79.4 % (ref 50–65)
OVALOCYTES: ABNORMAL
PDW BLD-RTO: 24.6 % (ref 11.5–14.5)
PLATELET # BLD: 261 K/UL (ref 130–400)
PLATELET SLIDE REVIEW: ADEQUATE
PMV BLD AUTO: 11.5 FL (ref 9.4–12.3)
POLYCHROMASIA: ABNORMAL
POTASSIUM SERPL-SCNC: 4.1 MMOL/L (ref 3.5–5)
RBC # BLD: 2.41 M/UL (ref 4.2–5.4)
SODIUM BLD-SCNC: 142 MMOL/L (ref 136–145)
TEAR DROP CELLS: ABNORMAL
WBC # BLD: 5.7 K/UL (ref 4.8–10.8)

## 2021-05-20 PROCEDURE — 2580000003 HC RX 258: Performed by: NURSE PRACTITIONER

## 2021-05-20 PROCEDURE — 6370000000 HC RX 637 (ALT 250 FOR IP): Performed by: NURSE PRACTITIONER

## 2021-05-20 PROCEDURE — 94660 CPAP INITIATION&MGMT: CPT

## 2021-05-20 PROCEDURE — 99231 SBSQ HOSP IP/OBS SF/LOW 25: CPT | Performed by: INTERNAL MEDICINE

## 2021-05-20 PROCEDURE — 99223 1ST HOSP IP/OBS HIGH 75: CPT | Performed by: INTERNAL MEDICINE

## 2021-05-20 PROCEDURE — 80048 BASIC METABOLIC PNL TOTAL CA: CPT

## 2021-05-20 PROCEDURE — 2140000000 HC CCU INTERMEDIATE R&B

## 2021-05-20 PROCEDURE — 2700000000 HC OXYGEN THERAPY PER DAY

## 2021-05-20 PROCEDURE — 85025 COMPLETE CBC W/AUTO DIFF WBC: CPT

## 2021-05-20 PROCEDURE — 36415 COLL VENOUS BLD VENIPUNCTURE: CPT

## 2021-05-20 PROCEDURE — 6370000000 HC RX 637 (ALT 250 FOR IP): Performed by: HOSPITALIST

## 2021-05-20 PROCEDURE — 83735 ASSAY OF MAGNESIUM: CPT

## 2021-05-20 RX ORDER — FUROSEMIDE 40 MG/1
40 TABLET ORAL 2 TIMES DAILY
Status: DISCONTINUED | OUTPATIENT
Start: 2021-05-20 | End: 2021-05-21

## 2021-05-20 RX ADMIN — FUROSEMIDE 40 MG: 40 TABLET ORAL at 09:09

## 2021-05-20 RX ADMIN — ALLOPURINOL 100 MG: 100 TABLET ORAL at 09:09

## 2021-05-20 RX ADMIN — ASPIRIN 81 MG: 81 TABLET, COATED ORAL at 09:09

## 2021-05-20 RX ADMIN — LEVOTHYROXINE SODIUM 112 MCG: 112 TABLET ORAL at 06:10

## 2021-05-20 RX ADMIN — DOCUSATE SODIUM 100 MG: 100 CAPSULE, LIQUID FILLED ORAL at 09:09

## 2021-05-20 RX ADMIN — APIXABAN 2.5 MG: 2.5 TABLET, FILM COATED ORAL at 20:32

## 2021-05-20 RX ADMIN — SODIUM BICARBONATE 325 MG: 650 TABLET ORAL at 09:09

## 2021-05-20 RX ADMIN — APIXABAN 2.5 MG: 2.5 TABLET, FILM COATED ORAL at 09:09

## 2021-05-20 RX ADMIN — SODIUM CHLORIDE, PRESERVATIVE FREE 10 ML: 5 INJECTION INTRAVENOUS at 09:10

## 2021-05-20 RX ADMIN — ATORVASTATIN CALCIUM 40 MG: 40 TABLET, FILM COATED ORAL at 09:09

## 2021-05-20 RX ADMIN — SODIUM BICARBONATE 325 MG: 650 TABLET ORAL at 20:31

## 2021-05-20 RX ADMIN — DOCUSATE SODIUM 100 MG: 100 CAPSULE, LIQUID FILLED ORAL at 20:33

## 2021-05-20 RX ADMIN — ALLOPURINOL 100 MG: 100 TABLET ORAL at 20:32

## 2021-05-20 RX ADMIN — FUROSEMIDE 40 MG: 40 TABLET ORAL at 19:00

## 2021-05-20 RX ADMIN — SODIUM CHLORIDE, PRESERVATIVE FREE 10 ML: 5 INJECTION INTRAVENOUS at 20:40

## 2021-05-20 ASSESSMENT — PAIN SCALES - GENERAL
PAINLEVEL_OUTOF10: 0

## 2021-05-20 ASSESSMENT — ENCOUNTER SYMPTOMS
EYE PAIN: 0
ABDOMINAL DISTENTION: 0
SHORTNESS OF BREATH: 0
EYE DISCHARGE: 0
ABDOMINAL PAIN: 0
BLOOD IN STOOL: 0
EYES NEGATIVE: 1
SHORTNESS OF BREATH: 1
SORE THROAT: 0
DIARRHEA: 0
WHEEZING: 0
CONSTIPATION: 0
VOMITING: 0
BACK PAIN: 0
EYE REDNESS: 0
CHEST TIGHTNESS: 0
GASTROINTESTINAL NEGATIVE: 1
NAUSEA: 0
RESPIRATORY NEGATIVE: 1
COUGH: 0

## 2021-05-20 NOTE — CONSULTS
Nutrition Assessment     Type and Reason for Visit: Initial, Consult    Nutrition Assessment:  Consult for CHF education. Pt states does not follow any dietarRy restrictions. Does prepare her meals, does not shop for herself. Provided pt with information on Na-restricted diet. Pt indicates understanding. Will follow up as needed. Malnutrition Assessment:  Malnutrition Status: No malnutrition    Current Nutrition Therapies:    Diet NPO, After Midnight  DIET LOW SODIUM 2 GM; No Caffeine    Anthropometric Measures:  · Height: 5' 3\" (160 cm)  · Current Body Wt: 196 lb (88.9 kg)   · BMI: 34.7    Nutrition Diagnosis:   · Limited adherence to nutrition-related recommendations related to food and nutrition related knowledge deficit as evidenced by lab values      Nutrition Interventions:   Food and/or Nutrient Delivery:  Continue Current Diet  Nutrition Education/Counseling:  Education completed   Coordination of Nutrition Care:  Continue to monitor while inpatient    Goals:  Pt will implement diet recommendations at home.        Nutrition Monitoring and Evaluation:   Behavioral-Environmental Outcomes:  Knowledge or Skill, Readiness for Change   Food/Nutrient Intake Outcomes:  Food and Nutrient Intake  Physical Signs/Symptoms Outcomes:  Fluid Status or Edema, Weight     Discharge Planning:    No discharge needs at this time     Electronically signed by Ekta Paula MS, RD, LD on 5/20/21 at 1:34 PM CDT    Contact: [phone]

## 2021-05-20 NOTE — PROGRESS NOTES
Jerlean Peeling, APRN - CNP        promethazine (PHENERGAN) tablet 12.5 mg  12.5 mg Oral Q6H PRN Jerlean Peeling, APRN - CNP        Or    ondansetron (ZOFRAN) injection 4 mg  4 mg Intravenous Q6H PRN Jerlean Peeling, APRN - CNP        polyethylene glycol (GLYCOLAX) packet 17 g  17 g Oral Daily PRN Jerlean Peeling, APRN - CNP        acetaminophen (TYLENOL) tablet 650 mg  650 mg Oral Q6H PRN Jerlean Peeling, APRN - CNP        Or    acetaminophen (TYLENOL) suppository 650 mg  650 mg Rectal Q6H PRN Jerlean Peeling, APRN - CNP        potassium chloride (KLOR-CON M) extended release tablet 40 mEq  40 mEq Oral PRN Jerlean Peeling, APRN - CNP        Or    potassium bicarb-citric acid (EFFER-K) effervescent tablet 40 mEq  40 mEq Oral PRN Jerlean Peeling, APRN - CNP        Or    potassium chloride 10 mEq/100 mL IVPB (Peripheral Line)  10 mEq Intravenous PRN Jerlean Peeling, APRN - CNP        magnesium sulfate 2000 mg in 50 mL IVPB premix  2,000 mg Intravenous PRN Jerlean Peeling, APRN - CNP        apixaban (ELIQUIS) tablet 2.5 mg  2.5 mg Oral BID Jerlean Peeling, APRN - CNP   2.5 mg at 05/20/21 0909    docusate sodium (COLACE) capsule 100 mg  100 mg Oral BID Jerlean Peeling, APRN - CNP   100 mg at 05/20/21 0909    levothyroxine (SYNTHROID) tablet 112 mcg  112 mcg Oral Daily Jerlean Peeling, APRN - CNP   112 mcg at 05/20/21 0610    [Held by provider] lisinopril (PRINIVIL;ZESTRIL) tablet 10 mg  10 mg Oral Daily Jerlean Peeling, APRN - CNP   10 mg at 05/19/21 0851    atorvastatin (LIPITOR) tablet 40 mg  40 mg Oral Daily Jerlean Peeling, APRN - CNP   40 mg at 05/20/21 1628    sodium bicarbonate tablet 325 mg  325 mg Oral BID Milena Cadet MD   325 mg at 05/20/21 1451    allopurinol (ZYLOPRIM) tablet 100 mg  100 mg Oral BID Milena Cadet MD   100 mg at 05/20/21 9045        Labs:     Recent Labs     05/18/21  1748 05/19/21  0327 05/20/21  0312   WBC 8.7 12.2* 5.7   RBC rate and rhythm, distal pulses intact  Abdomen: soft, non-tender; bowel sounds normal; no masses,  no organomegaly  Genitourinary: No bladder fullness, masses, or tenderness  Extremities: able to SINGH, trace BLE edema  Neurologic: No focal neurologic deficits, normal sensation, no tremor, alert and oriented  Psychiatric: Alert and oriented, no recent or remote memory deficits, good judgement, mood and affect appropriate  Skin: warm, dry      Assessment and Plan: Active Problems:    CKD (chronic kidney disease)    Malignant neoplasm of other parts of pancreas ([facility] [address]    New onset of congestive heart failure (HCC)    Acute respiratory failure with hypoxia (HCC)  Resolved Problems:    * No resolved hospital problems. *        Visit Summary: Chart reviewed, patient discussed with nursing. Patient is currently sitting up in the recliner, feels improved. We discussed information from ECHO yesterday, questions answered regarding results and patient confirmed plan for [place] tomorrow. She is hopeful that she can have improvement in her cardiac output with medication management, defer to cardiology, encouragement provided. Patient plans to return home with the support of her family, Shalini 64 will need to be an ongoing discussion as she is already frail and I'm unsure of the impact her heart failure is going to have moving forward. I offered to discuss with her daughter, patient states that her son-in law was present for discussions regarding treatments/plans earlier and family is aware. I will follow up with the patient tomorrw, will look to cardiology for further insight as well. Encouragement and emotional support provided. Palliative care will continue to follow. Recommendations:   1.  Palliative Care- [facility] to continue treatment per Oncology, I have discussed Outpatient Supportive Care services with patient, awaiting decision CODE STATUS- FULL CODE, ongoing discussion SYMPTOM MANAGEMENT- Pt improved with supplemental O2 and diuresis     2. New Onset Heart Failure- Cardiology consulted, ECHO completed EF 15%, plan for Lexiscan tomorrow , labs trended, diuresis per attending     3. Pancreatic cancer- Oncology following, currently receiving palliative chemo          Thank you for consulting Palliative Care and allowing us to participate in the care of this patient.      Greater than 50% of time spent Counseling: Yes  Total visit time: 35 min    Electronically signed by ALBERTO Del Rosario on 5/20/2021 at 1:43 PM    (Please note that portions of this note were completed with a voice recognition program.  Evette Vieira made to edit the dictations but occasionally words are mis-transcribed.)

## 2021-05-20 NOTE — CONSULTS
History Narrative    Not on file     Social Determinants of Health     Financial Resource Strain:     Difficulty of Paying Living Expenses:    Food Insecurity:     Worried About Running Out of Food in the Last Year:     [address] in the Last Year:    Transportation Needs:     Lack of Transportation (Medical):  Lack of Transportation (Non-Medical):    Physical Activity:     Days of Exercise per Week:     Minutes of Exercise per Session:    Stress:     Feeling of Stress :    Social Connections:     Frequency of Communication with Friends and Family:     Frequency of Social Gatherings with Friends and Family:     Attends Zoroastrianism Services:     Active Member of Clubs or Organizations:     Attends Club or Organization Meetings:     Marital Status:    Intimate Partner Violence:     Fear of Current or Ex-Partner:     Emotionally Abused:     Physically Abused:     Sexually Abused: Allergies: Allergies   Allergen Reactions    Penicillins Rash     Childhood        Home Meds:  Prior to Admission medications    Medication Sig Start Date End Date Taking?  Authorizing Provider   sodium bicarbonate 325 MG tablet Take 325 mg by mouth 2 times daily   Yes Historical Provider, MD   vitamin D (CHOLECALCIFEROL) 25 MCG (1000 UT) TABS tablet Take 1,000 Units by mouth daily   Yes Historical Provider, MD   ondansetron (ZOFRAN ODT) 4 MG disintegrating tablet Take 2 tablets by mouth every 8 hours as needed for Nausea or Vomiting 5/13/21  Yes ALBERTO Patel   lidocaine-prilocaine (EMLA) 2.5-2.5 % cream APPLY TO PORT AREA AND COVERWITH PLASTIC WRAP ONE HOUR PRIOR TO TREATMENT 4/19/21  Yes ALBERTO Patel   promethazine (PHENERGAN) 25 MG tablet TAKE 1 TABLET BY MOUTH FOUR TIMES DAILY AS NEEDED FOR NAUSEA 4/13/21  Yes ALBERTO Patel   apixaban (ELIQUIS) 2.5 MG TABS tablet Take 2.5 mg by mouth 2 times daily   Yes Historical Provider, MD   lisinopril (PRINIVIL;ZESTRIL) 5 MG tablet Take 1 tablet by mouth 2 times daily  Patient taking differently: Take 10 mg by mouth daily  12/5/20  Yes Ruth Goode PA-C   allopurinol (ZYLOPRIM) 100 MG tablet Take 100 mg by mouth 2 times daily    Yes Historical Provider, MD   levothyroxine (SYNTHROID) 112 MCG tablet Take 112 mcg by mouth Daily   Yes Historical Provider, MD   simvastatin (ZOCOR) 20 MG tablet Take 20 mg by mouth nightly   Yes Historical Provider, MD   docusate sodium (COLACE) 100 MG capsule Take 1 capsule by mouth 2 times daily 1/3/20   Claudia Garcia MD       Current Meds:   furosemide  40 mg Oral BID    aspirin  81 mg Oral Daily    nitroGLYCERIN  0.4 mg Sublingual Once    sodium chloride flush  5-40 mL Intravenous 2 times per day    apixaban  2.5 mg Oral BID    docusate sodium  100 mg Oral BID    levothyroxine  112 mcg Oral Daily    [Held by provider] lisinopril  10 mg Oral Daily    atorvastatin  40 mg Oral Daily    sodium bicarbonate  325 mg Oral BID    allopurinol  100 mg Oral BID       Current Infused Meds:   sodium chloride         Physical Exam:  Vitals:    05/20/21 0730   BP: 109/64   Pulse:    Resp: 18   Temp: 97.1 °F (36.2 °C)   SpO2: 93%       Intake/Output Summary (Last 24 hours) at 5/20/2021 1236  Last data filed at 5/20/2021 0459  Gross per 24 hour   Intake 240 ml   Output 950 ml   Net -710 ml     Estimated body mass index is 34.86 kg/m² as calculated from the following:    Height as of this encounter: 5' 3\" (1.6 m). Weight as of this encounter: 196 lb 12.8 oz (89.3 kg). Physical Exam  Vitals reviewed. Constitutional:       General: She is not in acute distress. Appearance: Normal appearance. She is well-developed and normal weight. She is ill-appearing. She is not toxic-appearing or diaphoretic. HENT:      Head: Normocephalic and atraumatic. Nose: Nose normal.      Mouth/Throat:      Mouth: Mucous membranes are moist.      Pharynx: Oropharynx is clear. Eyes:      General: No scleral icterus.      Extraocular Movements: Extraocular movements intact. Pupils: Pupils are equal, round, and reactive to light. Neck:      Vascular: No carotid bruit or JVD. Cardiovascular:      Rate and Rhythm: Normal rate and regular rhythm. Heart sounds: Normal heart sounds. No murmur heard. No friction rub. No gallop. Pulmonary:      Effort: Pulmonary effort is normal. No respiratory distress. Breath sounds: Normal breath sounds. No stridor. No wheezing, rhonchi or rales. Chest:      Chest wall: No tenderness. Abdominal:      General: Abdomen is flat. Bowel sounds are normal. There is no distension. Palpations: Abdomen is soft. There is no mass. Tenderness: There is no abdominal tenderness. There is no right CVA tenderness, left CVA tenderness, guarding or rebound. Hernia: No hernia is present. Musculoskeletal:         General: No swelling, tenderness, deformity or signs of injury. Cervical back: Normal range of motion and neck supple. No rigidity or tenderness. Right lower leg: No edema. Left lower leg: No edema. Lymphadenopathy:      Cervical: No cervical adenopathy. Skin:     General: Skin is warm and dry. Neurological:      General: No focal deficit present. Mental Status: She is alert and oriented to person, place, and time. Mental status is at baseline. Cranial Nerves: No cranial nerve deficit. Sensory: No sensory deficit. Motor: No weakness. Coordination: Coordination normal.   Psychiatric:         Mood and Affect: Mood normal.         Behavior: Behavior normal.         Thought Content:  Thought content normal.         Judgment: Judgment normal.         Labs:  Recent Labs     05/18/21 1748 05/19/21 0327 05/20/21  0312   WBC 8.7 12.2* 5.7   HGB 9.9* 8.6* 8.3*   * 300 261       Recent Labs     05/18/21 1748 05/19/21 0327 05/20/21  0312    142 142   K 4.2 5.0 4.1    104 100   CO2 25 27 29   BUN 16 19 36*   CREATININE 1.3* 1.4* 2.0*   LABGLOM 39* 36* 24*   MG 1.8 1.7 1.8   CALCIUM 9.6 8.9 9.1   PHOS 3.3  --   --        CK, CKMB, Troponin: @LABRCNT (CKTOTAL:3, CKMB:3, TROPONINI:3)@    Last 3 BNP:  No results for input(s): BNP in the last 72 hours. IMAGING:  Echo Complete    Result Date: 5/19/2021  Transthoracic Echocardiography Report (TTE)  Demographics   Patient Name Gab Juarez  Date of Study        05/19/2021               J   MRN          [id number]           Gender               Female   Date of      1934       Room Number          [id number]  Birth   Age          80 year(s)   Height:      63 inches        Referring Physician  Alfreda DOMINGUEZ   Weight:      169 pounds       Sonographer          Gardenia Smith   BSA:         1.8 m^2          Interpreting         Chau Rico DO                                Physician   BMI:         29.94 kg/m^2  Procedure Type of Study   TTE procedure:ECHO NO CONTRAST WITH DOP/COLR. Study Location: Echo Lab Technical Quality: Limited visualization due to poor acoustical window. Patient Status: Inpatient Contrast Medium: Definity. Indications:Congestive heart failure. Conclusions   Summary  Left ventricular chamber size is borderline dilated. .  Mild concentric left ventricular hypertrophy. Left ventricular systolic function is severely reduced  Left ventricular ejection fraction is visually estimated at 15%. Severe left ventricular global hypokinesis. There is mild mitral regurgitation. The left atrium is moderately dilated. Signature   ----------------------------------------------------------------  Electronically signed by Chau Rico DO(Interpreting  physician) on 05/19/2021 05:18 PM  ----------------------------------------------------------------   Findings   Mitral Valve  Structurally normal mitral valve. Mitral annular calcification is present. There is mild mitral regurgitation. Aortic Valve  Aortic valve sclerosis. No aortic stenosis.   No aortic regurgitation . Tricuspid Valve  Normal tricuspid valve leaflet thickness and excursion. There is trace tricuspid regurgitation. Pulmonic Valve  No significant pulmonic regurgitation. Left Atrium  The left atrium is moderately dilated. Left Ventricle  Left ventricular chamber size is borderline dilated. .  Mild concentric left ventricular hypertrophy. Left ventricular systolic function is severely reduced  Left ventricular ejection fraction is visually estimated at 15%. Severe left ventricular global hypokinesis. Diastolic function is indeterminate. No evidence of left ventricular mass or thrombus noted. Right Atrium  Normal right atrial size. Right Ventricle  Normal right ventricular chamber size and function. Pericardial Effusion  No pericardial effusion. M-Mode Measurements (cm)   LVIDd: 4.86 cm                       LVIDs: 4.52 cm  IVSd: 1.51 cm  LVPWd: 0.96 cm                       AO Root Dimension: 2.4 cm  % Ejection Fraction: 15 %            LA: 3.2 cm                                       LVOT: 2 cm  Doppler Measurements:   AV Peak Velocity:163 cm/s            MV Peak E-Wave: 101 cm/s  AV Peak Gradient: 10.63 mmHg         MV Peak A-Wave: 149 cm/s                                       MV E/A Ratio: 0.68 %                                       MV Peak Gradient: 4.08 mmHg  Estimated RAP:3 mmHg      XR CHEST (2 VW)    Result Date: 5/18/2021  Examination. XR CHEST (2 VW) 5/18/2021 2:20 PM History: Severe shortness of breath. The frontal and lateral views of the chest are obtained and compared with the previous study dated 5/4/2021. There is bilateral interstitial prominence which may represent pulmonary vascular congestion/edema. This was not noted in the previous study. There is a trace bibasal pleural effusion. There is cardiomegaly. The atheromatous changes thoracic aorta are noted. A right internal jugular approach MediPort system is seen in place. No change.  There is moderate diffuse osteopenia. No focal bony abnormality. Old healed fracture of the right clavicle and residual deformity is similar to the previous study. Pulmonary vascular congestion and pulmonary edema. A trace bibasal pleural effusion. Cardiomegaly. Signed by Dr Gracia Smith on 5/18/2021 3:41 PM    XR CHEST (2 VW)    Result Date: 5/4/2021  XR CHEST (2 VW) 5/4/2021 3:49 PM Two-view chest: History: Cough, shortness of air Frontal and lateral projection chest radiograph obtained. Comparison:  Chest radiography dated 12/1/2020 and 12/12/2019 and 12/21/2020 chest CT. Findings: COPD and chronic lung changes identified. The interstitial markings are minimally prominent relative to the prior chest radiographs, mild acute bronchitis considered. There are no parenchymal infiltrates. The heart is normal in size without evidence of heart failure. Right-sided port catheter identified. There are no acute osseous abnormalities. .                                                                                    Impression: 1. COPD and chronic lung changes. 2. Mild interstitial prominence, acute bronchitis considered, no parenchymal infiltration. Signed by Dr Mirella De Jesus on 5/4/2021 5:07 PM    XR FOOT LEFT (MIN 3 VIEWS)    Result Date: 5/5/2021  EXAMINATION:  XR FOOT LEFT (MIN 3 VIEWS)  5/5/2021 3:09 PM HISTORY: The patient fell this morning. Left foot pain. COMPARISON: No comparison study. TECHNIQUE: 3 views were obtained. FINDINGS:  There is a nondisplaced fracture of the distal metaphysis of the proximal phalanx of the fourth toe. There has been prior resection of the fifth toe. There is narrowing of the interphalangeal joint spaces of the second through fourth toes. There are hammertoe deformities of these toes. There is minimal calcaneal spurring and enthesopathy. There is soft tissue swelling of the foot. 1. Acute fracture of the fourth toe, as described. Soft tissue swelling of the foot.  2. Hammertoe deformities of the second through fourth toes. Prior fifth toe amputation. 3. Calcaneal spurring and enthesopathy. Signed by Dr Alex Joe on 5/5/2021 3:12 PM    XR CHEST PORTABLE    Result Date: 5/18/2021  EXAMINATION: Chest one view 5/18/2021 HISTORY: Shortness of breath. FINDINGS: Upright frontal projection of the chest demonstrates congestive heart failure with cardiomegaly, vascular redistribution and interstitial pulmonary edema. Small effusions are present. 1.. Congestive heart failure with interstitial and early alveolar edema. Signed by Dr Jeanne Valladares on 5/18/2021 6:38 PM      Assessment:  1. Complaints of shortness of breath  2. Chronic kidney disease  3. Pancreatic carcinoma  4. 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 Acute respiratory failure with hypoxia  6. Anemia iron deficiency  7. Osteoarthritis  8. Previous right total knee replacement  9. CT abdomen pelvis 4/13/2021 poorly defined complex mass head of the pancreas decreased in size compared to previous study moderate dilatation persistent pancreatic duct 2 additional small cystic nodules in the body of the pancreas biliary stent in place with decompression of the intrahepatic biliary ducts fusiform aneurysmal dilatation distal abdominal aorta which is stable since previous study measured 4 cm in diameter right common iliac artery measures 2 cm in diameter also diverticular disease  10. Echocardiogram 519 2/20/2021 ejection fraction 15% severe global hypokinesis mild MR left atrial enlargement      Recommendations:  1. Continue current medical therapy  2. Suggest Lexiscan pharmacologic stress test  3.  Further comments to follow

## 2021-05-20 NOTE — CONSULTS
Pt most recent echo showing EF 15%. Pt with pancreatic Cancer and receiving chemo. Both cardiology and [facility] consulted along with Palliative care. Cardio and CHF consults were discontinued. I did see where Dr Deanna Stephenson made low sodium order note not available yet for review. Palliative care note not completed yet. Will not move forward with [facility] consult being discontinued.

## 2021-05-20 NOTE — PROGRESS NOTES
[facility]      Patient:  Shira Tapia  YOB: 1934  Date of Service: 5/20/2021  MRN: [id number]   Acct: [de-identified]   Primary Care Physician: Osbaldo Thomas MD  Advance Directive: Full Code  Admit Date: 5/18/2021       Hospital Day: 2  Portions of this note have been copied forward, however, changed to reflect the most current clinical status of this patient. CHIEF COMPLAINT Shortness of breath    SUBJECTIVE:  Patient states she is feeling much better today. She states cardiology has talked to her and she will be having a stress test tomorrow. Denies chest pain and report decrease in shortness of breath. She states they have been lowering her oxygen today as well/    Hospital Course: The patient is O 89 y.o. female with a history of HTN, pancreatic and thyroid cancer, CKD, and anemia who presented to [facility] ED complaining of shortness of breath. The patient was placed on BiPap in the ED. The daughter reported to ED staff the patient has had shortness of breath over the last several days which has prevented the patient from laying down.  She denied any significant leg swelling (but endorses a small amount), cough, fevers,or chills.  Patient was receiving chemotherapy 5/18/2021 and stated she had to lay flat for her chemo but could not tolerate it for shortness of breath, however she was able to finish her treatment.  Infusion nurse reported her O2 sat was in the 63's.  Patient did have a chest x-ray obtained there which showed edema according to patient's daughter. Daughter and patient denied any history of heart failure, COPD, or other cardiac or pulmonary problems. ECHO showed EF 15%, and cardiology was consulted. Patient was given 80 mg Iv lasix in ED and began on 40 mg IV lasix BID. The patient was able to be transitioned from BiPap to nasal cannula at 5 LPM 5/19/2021 AM. As of 5/20/2021 this patient is only requiring 1.5 LPM. Cardiology plans for stress test tomorrow.      Review of Systems:   Review of Systems   Constitutional: Positive for fatigue. Negative for chills and fever. Respiratory: Negative for chest tightness, shortness of breath and wheezing. Cardiovascular: Negative for chest pain, palpitations and leg swelling. Gastrointestinal: Negative for abdominal distention, nausea and vomiting. Genitourinary: Negative for difficulty urinating and dysuria. Musculoskeletal: Negative for arthralgias and myalgias. Neurological: Positive for weakness (generalized). Negative for dizziness, syncope and light-headedness. Psychiatric/Behavioral: Negative for agitation and confusion. 14 point review of systems is negative except as specifically addressed above. Objective:   VITALS:  /60   Pulse 82   Temp 97.4 °F (36.3 °C) (Temporal)   Resp 18   Ht 5' 3\" (1.6 m)   Wt 196 lb 12.8 oz (89.3 kg)   SpO2 94%   BMI 34.86 kg/m²   24HR INTAKE/OUTPUT:      Intake/Output Summary (Last 24 hours) at 2021 1633  Last data filed at 2021 0459  Gross per 24 hour   Intake --   Output 950 ml   Net -950 ml       Physical Exam  Constitutional:       Appearance: Normal appearance. She is not diaphoretic. HENT:      Head: Normocephalic and atraumatic. Mouth/Throat:      Mouth: Mucous membranes are moist.      Pharynx: Oropharynx is clear. Eyes:      Extraocular Movements: Extraocular movements intact. Conjunctiva/sclera: Conjunctivae normal.      Pupils: Pupils are equal, round, and reactive to light. Cardiovascular:      Rate and Rhythm: Normal rate and regular rhythm. Pulses: Normal pulses. Heart sounds: Murmur heard. Pulmonary:      Effort: Pulmonary effort is normal.      Breath sounds: No rales. Abdominal:      General: Bowel sounds are normal. There is no distension. Palpations: Abdomen is soft. Musculoskeletal:      Cervical back: Normal range of motion and neck supple. Right lower le+ Pitting Edema present.       Left lower le+ Pitting Edema present. Skin:     General: Skin is warm and dry. Capillary Refill: Capillary refill takes less than 2 seconds. Neurological:      General: No focal deficit present. Mental Status: She is alert and oriented to person, place, and time. Psychiatric:         Mood and Affect: Mood normal.         Behavior: Behavior normal.         Thought Content: Thought content normal.         Judgment: Judgment normal.       Medications:      sodium chloride        furosemide  40 mg Oral BID    aspirin  81 mg Oral Daily    nitroGLYCERIN  0.4 mg Sublingual Once    sodium chloride flush  5-40 mL Intravenous 2 times per day    apixaban  2.5 mg Oral BID    docusate sodium  100 mg Oral BID    levothyroxine  112 mcg Oral Daily    [Held by provider] lisinopril  10 mg Oral Daily    atorvastatin  40 mg Oral Daily    sodium bicarbonate  325 mg Oral BID    allopurinol  100 mg Oral BID     sodium chloride flush, sodium chloride, promethazine **OR** ondansetron, polyethylene glycol, acetaminophen **OR** acetaminophen, potassium chloride **OR** potassium alternative oral replacement **OR** potassium chloride, magnesium sulfate  Diet NPO, After Midnight  DIET LOW SODIUM 2 GM; No Caffeine     Lab and other Data:     Recent Labs     217 21  0312   WBC 8.7 12.2* 5.7   HGB 9.9* 8.6* 8.3*   * 300 261     Recent Labs     217 21  0312    142 142   K 4.2 5.0 4.1    104 100   CO2 25 27 29   BUN 16 19 36*   CREATININE 1.3* 1.4* 2.0*   GLUCOSE 160* 160* 118*     Recent Labs     21  1345 21  1748   AST 35 23   ALT 14 12   BILITOT 0.5 0.6   ALKPHOS 108* 139*     Troponin T:   Recent Labs     21   TROPONINI 0.02 0.02     Pro-BNP: No results for input(s): BNP in the last 72 hours. INR: No results for input(s): INR in the last 72 hours.   UA:No results for input(s): NITRITE, COLORU, PHUR, LABCAST, WBCUA, RBCUA, MUCUS, TRICHOMONAS, YEAST, BACTERIA, CLARITYU, SPECGRAV, LEUKOCYTESUR, UROBILINOGEN, BILIRUBINUR, BLOODU, GLUCOSEU, AMORPHOUS in the last 72 hours. Invalid input(s): KETONESU  A1C:   Recent Labs     05/18/21  1748   LABA1C 6.2*     ABG:  Recent Labs     05/18/21  1743   PHART 7.390   [id number] 42.0   PO2ART 52.0*   OTG2YIU 25.4   BEART 0.3   HGBAE 9.9*   [id number] 87.9*   CARBOXHGBART 2.3       RAD:   XR CHEST (2 VW)    Result Date: 5/18/2021  Pulmonary vascular congestion and pulmonary edema. A trace bibasal pleural effusion. Cardiomegaly. Signed by Dr Saul Alberto on 5/18/2021 3:41 PM    XR CHEST PORTABLE    Result Date: 5/18/2021  1. . Congestive heart failure with interstitial and early alveolar edema. Signed by Dr Anastacio Beltran on 5/18/2021 6:38 PM       Echo:    Summary   Left ventricular chamber size is borderline dilated. .   Mild concentric left ventricular hypertrophy. Left ventricular systolic function is severely reduced   Left ventricular ejection fraction is visually estimated at 15%. Severe left ventricular global hypokinesis. There is mild mitral regurgitation. The left atrium is moderately dilated.       Assessment/Plan   New onset of congestive heart failure ([address]              -ECHO ef estimated at 15%              -Lasix 40 mg BID (80 mg given in ER)              -Hold lisinopril              - BNP 7081, trend every 3 days              -troponins negative              -monitor and replace electrolytes              -continue statin therapy              -strict I&o, q4 hour vital signs              -iron studies              -contniue to monitor and wean O2 to maintain sat of 92-94%              -chest xray consistent with CHF   -stress test tomorrow   -cardiology following     Active Problems:    CKD (chronic kidney disease)              -monitor kidney functions (slight increase in Cr)              -lasix 40 mg BID              - daily weights              -strict I&o              -continue iron supplementation and calcirol                  Malignant neoplasm of other parts of pancreas [facility])              -oncology following              - palliative care following     DVT prophylaxis: On ALBERTO Carvalho - CNP, 5/20/2021 4:33 PM

## 2021-05-20 NOTE — PROGRESS NOTES
sent for a CXR that showed pulmonary congestion. Due to her worsening respiratory status she was sent to the ER where she was admitted. She has a significant PMH of iron deficiency anemia, chronic kidney disease stage IV,  and diagnosis of pancreatic adenocarcinoma. She has been receiving palliative chemotherapy with Gemzar 750 mg/m² and Abraxane 90 mg/m² every 2 weeks (this is a 25% dose reduction). Follow-up CT scan of the abdomen and pelvis on 2021 and tumor markers suggest a positive response to treatment. She has required treatment delay and further dose reduction due to experiencing significant fatigue and nausea. Starting with cycle 4 her treatment regimen was dose reduced by an additional 25%, Gemzar 600 mg/m² and Abraxane 75 mg/m². ONCOLOGIC/HEMATOLOGIC HISTORY:   Diagnosis:   Anemia of chronic kidney disease   Chronic kidney disease stage lll  Pancreatic adenocarcinoma  Chronic anticoagulation due to prior DVTs     Treatment summary:  Ferrous sulfate 325 mg daily   2018 - Procrit 20,000 units for chronic kidney disease stage IV. Procrit to be given every 2 weeks ×4 and then monthly, dose to be titrated appropriately. Hold dose for hemoglobin >11   Currently receiving Retacrit 40,000 units every 4 weeks  2021 palliative chemotherapy on 2021 with Gemzar 750 mg/m² and Abraxane 90 mg/m² every 2 weeks, this is a dose reduction by 25%        Tumor monitorin2020-CA 19-9 of 133  2020-CA 19-9 of 217  2021-CA 19-9 of 236  3/23/2021- CA 19-9 of 108     CANCER HISTORY  Dion Coleman was seen by Dr. Aundrea Mullins on 2020 as an inpatient consultation at [facility]) of [facility] for findings of a pancreatic mass and bile duct obstruction. She presented to the ER department with complaints of hematuria that began a few days prior to presentation. She denied any dysuria, back pain, no nausea or vomiting.   She also reported easily bruising and ecchymotic areas in her back, abdomen and flank. Initial laboratory work-up showed INR above 18. She was given vitamin K subcutaneously. She was found to have elevated LFTs and the following are events that occurred. 12/01/2020-CT abdomen pelvis without contrast showed a pancreatic head mass measuring 3.3 x 3.7 cm in size and associated, bile duct dilatation above the level of the ampulla. Associated moderate pancreatic ductal dilatation. 12/2/2020-CA 19-9 133  12/03/2020-ERCP with FNA biopsy Positive for high-grade adenocarcinoma. Unfortunately, stent could not be placed. 12/5/2020-transferred from [facility]) to [address] [place] [address] and discharged home on 12/8/2020 12/07/2020- Cholangipancreatography Retrograde Endo ERCP with sphincterotomy, balloon sweep and placement of 10x60 PC BS metal stent at [facility] in [place]. 12/21/2020- CT chest with contrast documented no evidence of intrathoracic neoplastic process/metastatic disease. Evidence of pneumobilia. The ectasia of the pancreatic duct, similar to the previous study. An incompletely visualized and evaluated heterogeneous mass in the head of the pancreas measuring 3.3 x 3.7cm. A biliary stent in place. Moderate persistent dilatation of the proximal common bile duct. A stable small low-density nodule in the left adrenal gland  12/22/2020-Dr. Lambert discussed the results of CT chest and pathology that suggest advanced pancreatic cancer, unfortunately it is not amenable to surgery. She was quite weak and had poor performance, appeared very frail. Discussion was made about palliative chemotherapy but the decision to hold was made until improvement of her physical conditioning occurred. Recommended physical therapy as outpatient and reassess fitness for palliative chemotherapy in approximately 4 weeks. 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   12/7/2020 ERCP at [address] [facility] by Dr. Gary Hinkle revealed a single severe biliary stricture in the lower third of the main bile duct with severe upstream biliary dilation, stricture was malignant appearing. Biliary sphincterectomy was performed. 1 partially covered metal stent was placed into the common bile duct. 2/8/2021-initiated palliative chemotherapy with Gemzar 750 mg/m² and Abraxane 90 mg/m² every 2 weeks, this is a dose reduction by 25%  3/23/2021- CA 19-9 108, improved compared to pretreatment value of 236 on 1/20/2021.   4/13/2021 CT Abdomen/Pelvis with contrast documented a poorly defined complex mass in the head of the pancreas. It appears to have decreased in size when remeasured at the same level and in same dimension as in the previous study (3 cm x 2.5 cm, compared to 3.7 cm x 3.3 cm). Persistent moderate dilatation of the pancreatic duct. There are 2 additional small cystic nodules in the body of the pancreas measuring 11 mm and 9 mm. A biliary stent in place and decompression of the intrahepatic biliary ducts since the previous study. 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 The diverticulosis of the distal colon with no evidence for diverticulitis. 5/4/2021- dose reduction by 25% due to severe fatigue and nausea, starting with cycle 4 will receive Gemzar 600 mg/m² and Abraxane 75 mg/m². HEMATOLOGY HISTORY:  Quince Kussmaul was seen in initial hematology consultation on 7/20/2018 for further evaluation and treatment recommendations for anemia. Quince Kussmaul was referred from Dr. Lilo Hernandez. Quince Kussmaul presented with no significant complaints other than chronic fatigue and constipation. She had been taking ferrous sulfate 325 mg daily under the direction of Dr. Cecy Mcdowell. Quince Kussmaul reported significant constipation and some GI upset with the oral iron. She denied any bleeding tendencies to include hematuria, melena, hematochezia and epistaxis. and acknowledged by [place]. Serology studies on 3/8/2019:  Creatinine 1.7/GFR 30.4   Iron 80   Iron saturation 26.5%   TIBC 302   Ferritin 144   Vitamin B12 501   Folate 13.9     Serology studies on 3/5/2020:  Creatinine 1.3/GFR 41.4  Iron 84  TIBC 390  Iron saturation 21.5%  Ferritin 342     Iron substrates on 6/25/2020  Ferritin 311  Iron 76  Iron saturation 23%  TIBC 326  Creatinine 1.3/GFR 39     Labs on 12/2/2020 and 12/3/2020:  Iron 35  TIBC 213  Iron saturation 16%  Vitamin B12 483  Folate 10.6  Ferritin 480     Labs on 3/23/2021  Iron 54  TIBC 366  Iron saturation 15  Ferritin 530  Reticulocytes 2.1     Labs on 4/6/2021  WBC 14.40  RBC 2.46  HGB 8.6  HCT 26.4  .3  MCH 35  MCHC 32.6  ANC 12.03  ,000  Iron 43   TIBC 226  Iron saturation 19%   Ferritin 1088.0  B-12= 781  GFR 39  BUN 21  Creatinine 1.3  Phosphorus 2.4  Magnesium 1.5        Age-appropriate health screening:  Colonoscopy on 10/4/2018 by Dr. Petty Gonzales was documented as removal of 2 polyps with benign pathology. No evidence of bleeding. 9/20/2019 Bilateral mammogram at [address] [place] [address] documented no mammographic evidence of malignancy. No bone mineral density on file     Review of Systems   Constitutional: Positive for activity change, appetite change and fatigue. Negative for chills, diaphoresis and fever. HENT: Negative. Negative for congestion, ear pain, hearing loss, nosebleeds, sore throat and tinnitus. Eyes: Negative. Negative for pain, discharge and redness. Respiratory: Positive for shortness of breath. Negative for cough and wheezing. Cardiovascular: Positive for leg swelling (Improving). Negative for chest pain and palpitations. Gastrointestinal: Negative. Negative for abdominal pain, blood in stool, constipation, diarrhea, nausea and vomiting. Endocrine: Negative for polydipsia. Genitourinary: Negative for dysuria, flank pain, frequency, hematuria and urgency. Musculoskeletal: Negative.   Negative for back pain, myalgias and neck pain. Skin: Negative. Negative for rash. Neurological: Positive for weakness. Negative for dizziness, tremors, seizures and headaches. Hematological: Does not bruise/bleed easily. Psychiatric/Behavioral: Negative. The patient is not nervous/anxious. Current Pain Assessment  Pain Assessment  Pain Assessment: 0-10  Pain Level: 0  Patient's Stated Pain Goal: No pain    OBJECTIVE:  VITALS: /63   Pulse 80   Temp 98 °F (36.7 °C) (Temporal)   Resp 16   Ht 5' 3\" (1.6 m)   Wt 196 lb 12.8 oz (89.3 kg)   SpO2 94%   BMI 34.86 kg/m²   I&O:     Intake/Output Summary (Last 24 hours) at 5/20/2021 0652  Last data filed at 5/20/2021 0459  Gross per 24 hour   Intake 240 ml   Output 1550 ml   Net -1310 ml         Physical Exam  Vitals reviewed. Constitutional:       General: She is not in acute distress. Appearance: She is well-developed. She is ill-appearing. She is not diaphoretic. HENT:      Head: Normocephalic and atraumatic. Mouth/Throat:      Pharynx: Uvula midline. Tonsils: No tonsillar exudate. Eyes:      General: Lids are normal. No scleral icterus. Right eye: No discharge. Left eye: No discharge. Conjunctiva/sclera: Conjunctivae normal.      Pupils: Pupils are equal, round, and reactive to light. Neck:      Thyroid: No thyroid mass or thyromegaly. Vascular: No JVD. Trachea: Trachea normal. No tracheal deviation. Cardiovascular:      Rate and Rhythm: Normal rate and regular rhythm. Heart sounds: Normal heart sounds. No murmur heard. No friction rub. No gallop. Pulmonary:      Effort: Pulmonary effort is normal. No respiratory distress. Breath sounds: Decreased breath sounds present. No wheezing or rales. Comments: Fine crackles, significantly improved  Chest:      Chest wall: No tenderness. Abdominal:      General: Bowel sounds are normal. There is no distension. Palpations: Abdomen is soft. There is no mass. Tenderness: There is no abdominal tenderness. There is no guarding or rebound. Hernia: No hernia is present. Musculoskeletal:         General: No tenderness or deformity. Cervical back: Normal range of motion and neck supple. Right lower leg: Edema (Mild) present. Left lower leg: Edema (Mild) present. Comments: Range of motion within normal limits x4 extremities   Skin:     General: Skin is warm. Coloration: Skin is not pale. Findings: No erythema or rash. Neurological:      Mental Status: She is alert and oriented to person, place, and time. Cranial Nerves: No cranial nerve deficit. Coordination: Coordination normal.   Psychiatric:         Behavior: Behavior normal.         Thought Content: Thought content normal.         BMP:   Recent Labs     05/19/21 0327 05/20/21 0312    142   K 5.0 4.1    100   CO2 27 29   BUN 19 36*   CREATININE 1.4* 2.0*   GLUCOSE 160* 118*   CALCIUM 8.9 9.1     Recent Labs     05/20/21 0312 05/19/21 0327 05/18/21  1748   WBC 5.7 12.2* 8.7   HGB 8.3* 8.6* 9.9*   HCT 26.0* 28.4* 31.5*   .9* 110.1* 111.7*    300 413*     CMP:    Recent Labs     05/18/21  1345 05/18/21  1743 05/18/21  1748 05/19/21 0327 05/20/21 0312     --  138 142 142   K 4.0  --  4.2 5.0 4.1     --  102 104 100   CO2 31*  --  25 27 29   BUN 18*  --  16 19 36*   CREATININE 1.4*  --  1.3* 1.4* 2.0*   GFRAA  --    < > 47* 43* 29*   LABGLOM 36*  --  39* 36* 24*   GLUCOSE 152*  --  160* 160* 118*   PROT 5.8*  --  7.0  --   --    LABALBU 3.0*  --  3.5  --   --    CALCIUM 9.1  --  9.6 8.9 9.1   BILITOT 0.5  --  0.6  --   --    ALKPHOS 108*  --  139*  --   --    AST 35  --  23  --   --    ALT 14  --  12  --   --     < > = values in this interval not displayed. 30Day lookback of cultures:    Blood Culture Recent: No results for input(s): BC in the last 720 hours.   Gram Stain Recent: No results for input(s): LABGRAM in the last 720 hours. Resp Culture Recent: No results for input(s): CULTRESP in the last 720 hours. Body Fluid Recent : No results for input(s): BFCX in the last 720 hours. MRSA Recent : No results for input(s): [address] in the last 720 hours. Urine Culture Recent : No results for input(s): LABURIN in the last 720 hours. Organism Recent : No results for input(s): ORG in the last 720 hours. Radiology:   XR CHEST (2 VW)    Result Date: 5/18/2021  Examination. XR CHEST (2 VW) 5/18/2021 2:20 PM History: Severe shortness of breath. The frontal and lateral views of the chest are obtained and compared with the previous study dated 5/4/2021. There is bilateral interstitial prominence which may represent pulmonary vascular congestion/edema. This was not noted in the previous study. There is a trace bibasal pleural effusion. There is cardiomegaly. The atheromatous changes thoracic aorta are noted. A right internal jugular approach MediPort system is seen in place. No change. There is moderate diffuse osteopenia. No focal bony abnormality. Old healed fracture of the right clavicle and residual deformity is similar to the previous study. Pulmonary vascular congestion and pulmonary edema. A trace bibasal pleural effusion. Cardiomegaly. Signed by Dr Sara Manuel on 5/18/2021 3:41 PM    XR CHEST (2 VW)    Result Date: 5/4/2021  XR CHEST (2 VW) 5/4/2021 3:49 PM Two-view chest: History: Cough, shortness of air Frontal and lateral projection chest radiograph obtained. Comparison:  Chest radiography dated 12/1/2020 and 12/12/2019 and 12/21/2020 chest CT. Findings: COPD and chronic lung changes identified. The interstitial markings are minimally prominent relative to the prior chest radiographs, mild acute bronchitis considered. There are no parenchymal infiltrates. The heart is normal in size without evidence of heart failure. Right-sided port catheter identified. There are no acute osseous abnormalities. Amber Vasquez Impression: 1. COPD and chronic lung changes. 2. Mild interstitial prominence, acute bronchitis considered, no parenchymal infiltration. Signed by Dr Nomi Watts on 5/4/2021 5:07 PM    XR FOOT LEFT (MIN 3 VIEWS)    Result Date: 5/5/2021  EXAMINATION:  XR FOOT LEFT (MIN 3 VIEWS)  5/5/2021 3:09 PM HISTORY: The patient fell this morning. Left foot pain. COMPARISON: No comparison study. TECHNIQUE: 3 views were obtained. FINDINGS:  There is a nondisplaced fracture of the distal metaphysis of the proximal phalanx of the fourth toe. There has been prior resection of the fifth toe. There is narrowing of the interphalangeal joint spaces of the second through fourth toes. There are hammertoe deformities of these toes. There is minimal calcaneal spurring and enthesopathy. There is soft tissue swelling of the foot. 1. Acute fracture of the fourth toe, as described. Soft tissue swelling of the foot. 2. Hammertoe deformities of the second through fourth toes. Prior fifth toe amputation. 3. Calcaneal spurring and enthesopathy. Signed by Dr Ekaterina Castanon on 5/5/2021 3:12 PM    XR CHEST PORTABLE    Result Date: 5/18/2021  EXAMINATION: Chest one view 5/18/2021 HISTORY: Shortness of breath. FINDINGS: Upright frontal projection of the chest demonstrates congestive heart failure with cardiomegaly, vascular redistribution and interstitial pulmonary edema. Small effusions are present. 1.. Congestive heart failure with interstitial and early alveolar edema.  Signed by Dr Alina Penaloza on 5/18/2021 6:38 PM      Medications  Current Facility-Administered Medications   Medication Dose Route Frequency Provider Last Rate Last Admin    aspirin EC tablet 81 mg  81 mg Oral Daily Saira Watts MD   81 mg at 05/19/21 0856    nitroGLYCERIN (NITROSTAT) SL tablet 0.4 mg  0.4 mg Sublingual Once Eloy Grullon MD        sodium chloride flush 0.9 % injection 5-40 mL mg Oral Daily Floyde Blend, APRN - CNP   10 mg at 05/19/21 0851    atorvastatin (LIPITOR) tablet 40 mg  40 mg Oral Daily Floyde Blend, APRN - CNP   40 mg at 05/19/21 0851    sodium bicarbonate tablet 325 mg  325 mg Oral BID Jared Kent MD   325 mg at 05/19/21 2109    allopurinol (ZYLOPRIM) tablet 100 mg  100 mg Oral BID Jared Kent MD   100 mg at 05/19/21 2109       Allergies  Penicillins    ASSESSMENT:  #Decompensated Heart Failure  No prior echo available  BNP 7081 on 5/18/2021  S/p diuretics in the ER with symptomatic improvement  Receiving Lasix 40 mg twice daily    2D echocardiogram on 5/19/2021:  Left ventricular chamber size is borderline dilated. .  Mild concentric left ventricular hypertrophy. Left ventricular systolic function is severely reduced  Left ventricular ejection fraction is visually estimated at 15%. Severe left ventricular global hypokinesis. There is mild mitral regurgitation. The left atrium is moderately dilated. Nuclear medicine stress test plan today, per Dr. Vivienne Coronado     #Renal impairment       #Pancreatic cancer  On palliative treatment with Gemzar and Abraxane, cycle 4-day 1 on 5/18/2021. #Anemia of chronic disease, macrocytic , enhanced by chemotherapy        Receives Retacrit 40,000 units subcu for hemoglobin <10, will consider next dose at follow-up appointment in clinic     #Palliative care patient-frail  Palliative care assisting    PLAN:  Continue supportive care   Monitor labs  Monitor renal function closely  Appreciate palliative care assistance  Await cardiology's recommendations, nuclear medicine stress test planned for today  Encourage increasing activity as tolerated      ALBERTO Shrestha    05/20/21  6:52 AM    Physician's attestation/substantial contribution:  I, Dr Wing Hawthorne, [facility]  I have reviewed relevant medical information/data to include but not limited to medication list, relevant appropriate labs and imaging when applicable. I reviewed other physician's notes, ancillary services and nurses assessment. I have reviewed the above documentation completed by the Nurse Practitioner or Physician Assistant. Please see my additional contributions to the history of present illness, physical examination, and assessment/medical decision-making that reflect my findings and impressions. I discussed essential elements of the care plan with the NP or PA and the patient as well. I answered all the questions to the patient's satisfaction. I concur with above stated. Subjective-patient is feeling much better today. Breathing is improved. Objective-improved leg edema. Lungs are clear. Assessment/plan:  Acute onset heart failure, systolic-recommended cardiology consultation  Pancreatic cancer-currently on palliative chemotherapy. She is responding to treatment. Frailty-palliative care consulted.     Hope Dhaliwal MD

## 2021-05-21 ENCOUNTER — APPOINTMENT (OUTPATIENT)
Dept: NUCLEAR MEDICINE | Age: 86
DRG: 291 | End: 2021-05-21
Payer: MEDICARE

## 2021-05-21 LAB
ANION GAP SERPL CALCULATED.3IONS-SCNC: 8 MMOL/L (ref 7–19)
ANISOCYTOSIS: ABNORMAL
BASOPHILIC STIPPLING: ABNORMAL
BASOPHILS ABSOLUTE: 0 K/UL (ref 0–0.2)
BASOPHILS RELATIVE PERCENT: 0.4 % (ref 0–1)
BUN BLDV-MCNC: 39 MG/DL (ref 8–23)
CALCIUM SERPL-MCNC: 9.4 MG/DL (ref 8.8–10.2)
CHLORIDE BLD-SCNC: 95 MMOL/L (ref 98–111)
CO2: 35 MMOL/L (ref 22–29)
CREAT SERPL-MCNC: 2.1 MG/DL (ref 0.5–0.9)
EOSINOPHILS ABSOLUTE: 0.2 K/UL (ref 0–0.6)
EOSINOPHILS RELATIVE PERCENT: 1.9 % (ref 0–5)
GFR AFRICAN AMERICAN: 27
GFR NON-AFRICAN AMERICAN: 22
GLUCOSE BLD-MCNC: 138 MG/DL (ref 74–109)
HCT VFR BLD CALC: 28.1 % (ref 37–47)
HEMOGLOBIN: 8.7 G/DL (ref 12–16)
HYPOCHROMIA: ABNORMAL
IMMATURE GRANULOCYTES #: 0.1 K/UL
LV EF: 31 %
LVEF MODALITY: NORMAL
LYMPHOCYTES ABSOLUTE: 0.3 K/UL (ref 1.1–4.5)
LYMPHOCYTES RELATIVE PERCENT: 2.8 % (ref 20–40)
MACROCYTES: ABNORMAL
MAGNESIUM: 1.7 MG/DL (ref 1.6–2.4)
MCH RBC QN AUTO: 33.7 PG (ref 27–31)
MCHC RBC AUTO-ENTMCNC: 31 G/DL (ref 33–37)
MCV RBC AUTO: 108.9 FL (ref 81–99)
MONOCYTES ABSOLUTE: 0.1 K/UL (ref 0–0.9)
MONOCYTES RELATIVE PERCENT: 1.4 % (ref 0–10)
NEUTROPHILS ABSOLUTE: 9.5 K/UL (ref 1.5–7.5)
NEUTROPHILS RELATIVE PERCENT: 92.8 % (ref 50–65)
OVALOCYTES: ABNORMAL
PDW BLD-RTO: 24.6 % (ref 11.5–14.5)
PLATELET # BLD: 300 K/UL (ref 130–400)
PLATELET SLIDE REVIEW: ADEQUATE
PMV BLD AUTO: 11.7 FL (ref 9.4–12.3)
POIKILOCYTES: ABNORMAL
POLYCHROMASIA: ABNORMAL
POTASSIUM SERPL-SCNC: 4.3 MMOL/L (ref 3.5–5)
PRO-BNP: 4830 PG/ML (ref 0–1800)
RBC # BLD: 2.58 M/UL (ref 4.2–5.4)
SODIUM BLD-SCNC: 138 MMOL/L (ref 136–145)
WBC # BLD: 10.3 K/UL (ref 4.8–10.8)

## 2021-05-21 PROCEDURE — 6370000000 HC RX 637 (ALT 250 FOR IP): Performed by: HOSPITALIST

## 2021-05-21 PROCEDURE — 2580000003 HC RX 258: Performed by: NURSE PRACTITIONER

## 2021-05-21 PROCEDURE — 83735 ASSAY OF MAGNESIUM: CPT

## 2021-05-21 PROCEDURE — 2140000000 HC CCU INTERMEDIATE R&B

## 2021-05-21 PROCEDURE — 6370000000 HC RX 637 (ALT 250 FOR IP): Performed by: NURSE PRACTITIONER

## 2021-05-21 PROCEDURE — 6360000002 HC RX W HCPCS: Performed by: INTERNAL MEDICINE

## 2021-05-21 PROCEDURE — 94660 CPAP INITIATION&MGMT: CPT

## 2021-05-21 PROCEDURE — A9500 TC99M SESTAMIBI: HCPCS | Performed by: INTERNAL MEDICINE

## 2021-05-21 PROCEDURE — 85025 COMPLETE CBC W/AUTO DIFF WBC: CPT

## 2021-05-21 PROCEDURE — 93017 CV STRESS TEST TRACING ONLY: CPT

## 2021-05-21 PROCEDURE — 99497 ADVNCD CARE PLAN 30 MIN: CPT | Performed by: NURSE PRACTITIONER

## 2021-05-21 PROCEDURE — 99232 SBSQ HOSP IP/OBS MODERATE 35: CPT | Performed by: INTERNAL MEDICINE

## 2021-05-21 PROCEDURE — 83880 ASSAY OF NATRIURETIC PEPTIDE: CPT

## 2021-05-21 PROCEDURE — 6370000000 HC RX 637 (ALT 250 FOR IP): Performed by: INTERNAL MEDICINE

## 2021-05-21 PROCEDURE — 80048 BASIC METABOLIC PNL TOTAL CA: CPT

## 2021-05-21 PROCEDURE — 99231 SBSQ HOSP IP/OBS SF/LOW 25: CPT | Performed by: INTERNAL MEDICINE

## 2021-05-21 PROCEDURE — 36415 COLL VENOUS BLD VENIPUNCTURE: CPT

## 2021-05-21 PROCEDURE — 2700000000 HC OXYGEN THERAPY PER DAY

## 2021-05-21 PROCEDURE — 99233 SBSQ HOSP IP/OBS HIGH 50: CPT | Performed by: NURSE PRACTITIONER

## 2021-05-21 PROCEDURE — 3430000000 HC RX DIAGNOSTIC RADIOPHARMACEUTICAL: Performed by: INTERNAL MEDICINE

## 2021-05-21 RX ORDER — BUMETANIDE 1 MG/1
1 TABLET ORAL 2 TIMES DAILY
Status: DISCONTINUED | OUTPATIENT
Start: 2021-05-21 | End: 2021-05-26 | Stop reason: HOSPADM

## 2021-05-21 RX ORDER — CARVEDILOL 3.12 MG/1
3.12 TABLET ORAL 2 TIMES DAILY WITH MEALS
Status: DISCONTINUED | OUTPATIENT
Start: 2021-05-21 | End: 2021-05-26 | Stop reason: HOSPADM

## 2021-05-21 RX ADMIN — ALLOPURINOL 100 MG: 100 TABLET ORAL at 08:12

## 2021-05-21 RX ADMIN — ASPIRIN 81 MG: 81 TABLET, COATED ORAL at 08:11

## 2021-05-21 RX ADMIN — LEVOTHYROXINE SODIUM 112 MCG: 112 TABLET ORAL at 05:59

## 2021-05-21 RX ADMIN — ALLOPURINOL 100 MG: 100 TABLET ORAL at 21:17

## 2021-05-21 RX ADMIN — ATORVASTATIN CALCIUM 40 MG: 40 TABLET, FILM COATED ORAL at 08:11

## 2021-05-21 RX ADMIN — CARVEDILOL 3.12 MG: 3.12 TABLET, FILM COATED ORAL at 18:01

## 2021-05-21 RX ADMIN — APIXABAN 2.5 MG: 2.5 TABLET, FILM COATED ORAL at 21:18

## 2021-05-21 RX ADMIN — TETRAKIS(2-METHOXYISOBUTYLISOCYANIDE)COPPER(I) TETRAFLUOROBORATE 10 MILLICURIE: 1 INJECTION, POWDER, LYOPHILIZED, FOR SOLUTION INTRAVENOUS at 09:57

## 2021-05-21 RX ADMIN — FUROSEMIDE 40 MG: 40 TABLET ORAL at 08:12

## 2021-05-21 RX ADMIN — SODIUM CHLORIDE, PRESERVATIVE FREE 10 ML: 5 INJECTION INTRAVENOUS at 08:12

## 2021-05-21 RX ADMIN — SODIUM CHLORIDE, PRESERVATIVE FREE 10 ML: 5 INJECTION INTRAVENOUS at 21:18

## 2021-05-21 RX ADMIN — BUMETANIDE 1 MG: 1 TABLET ORAL at 21:17

## 2021-05-21 RX ADMIN — TETRAKIS(2-METHOXYISOBUTYLISOCYANIDE)COPPER(I) TETRAFLUOROBORATE 30 MILLICURIE: 1 INJECTION, POWDER, LYOPHILIZED, FOR SOLUTION INTRAVENOUS at 09:56

## 2021-05-21 RX ADMIN — REGADENOSON 0.4 MG: 0.08 INJECTION, SOLUTION INTRAVENOUS at 09:00

## 2021-05-21 RX ADMIN — APIXABAN 2.5 MG: 2.5 TABLET, FILM COATED ORAL at 08:11

## 2021-05-21 RX ADMIN — DOCUSATE SODIUM 100 MG: 100 CAPSULE, LIQUID FILLED ORAL at 08:11

## 2021-05-21 RX ADMIN — SODIUM BICARBONATE 325 MG: 650 TABLET ORAL at 08:12

## 2021-05-21 RX ADMIN — DOCUSATE SODIUM 100 MG: 100 CAPSULE, LIQUID FILLED ORAL at 21:17

## 2021-05-21 ASSESSMENT — ENCOUNTER SYMPTOMS
VOMITING: 0
BLOOD IN STOOL: 0
COUGH: 0
DIARRHEA: 0
GASTROINTESTINAL NEGATIVE: 1
EYE REDNESS: 0
EYE PAIN: 0
SHORTNESS OF BREATH: 1
WHEEZING: 0
SORE THROAT: 0
ABDOMINAL DISTENTION: 0
EYES NEGATIVE: 1
ABDOMINAL PAIN: 0
BACK PAIN: 0
CHEST TIGHTNESS: 0
CONSTIPATION: 0
NAUSEA: 0
EYE DISCHARGE: 0

## 2021-05-21 ASSESSMENT — PAIN SCALES - GENERAL: PAINLEVEL_OUTOF10: 0

## 2021-05-21 NOTE — ACP (ADVANCE CARE PLANNING)
Advance Care Planning     Advance Care Planning (ACP) Physician/NP/PA (Provider) Conversation      Date of ACP Conversation: 5/18/2021    Conversation Conducted with:   Patient with Decision Making [address] Maker:    Current Designated Health Care Decision Maker:    Primary Decision MakeKrystina Pinedoid - Child - [phone]    Secondary Decision Maker: Ryan Arevalo - Child - [phone]    Care Preferences:    Hospitalization: YES      Ventilation: NO    Resuscitation: NO    Conversation Outcomes / Follow-Up Plan: DNR order entered      Chari Norman, APRN

## 2021-05-21 NOTE — PLAN OF CARE
Problem: Falls - Risk of:  Goal: Will remain free from falls  Description: Will remain free from falls  Outcome: Ongoing  Goal: Absence of physical injury  Description: Absence of physical injury  Outcome: Ongoing     Problem: Cardiac:  Goal: Ability to maintain an adequate cardiac output will improve  Description: Ability to maintain an adequate cardiac output will improve  Outcome: Ongoing  Goal: Hemodynamic stability will improve  Description: Hemodynamic stability will improve  Outcome: Ongoing     Problem: Fluid Volume:  Goal: Ability to achieve and maintain adequate urine output will improve  Description: Ability to achieve and maintain adequate urine output will improve  Outcome: Ongoing     Problem: Respiratory:  Goal: Respiratory status will improve  Description: Respiratory status will improve  Outcome: Ongoing     Problem: Skin Integrity:  Goal: Will show no infection signs and symptoms  Description: Will show no infection signs and symptoms  Outcome: Ongoing  Goal: Absence of new skin breakdown  Description: Absence of new skin breakdown  Outcome: Ongoing

## 2021-05-21 NOTE — PROGRESS NOTES
cardiomyopathy in which cardiology recommended medical management as she is a poor surgical candidate, Coreg 3.125 mg BID added. Patient remains weak will have PT/OT evaluate for safe discharge planning. Continue to wean oxygen and complete home O2 eval tomorrow. Review of Systems:   Review of Systems   Constitutional: Positive for fatigue. Negative for chills and fever. Respiratory: Positive for shortness of breath (post Lexiscan). Negative for chest tightness and wheezing. Cardiovascular: Negative for chest pain, palpitations and leg swelling. Gastrointestinal: Negative for abdominal distention, nausea and vomiting. Genitourinary: Negative for difficulty urinating and dysuria. Musculoskeletal: Negative for arthralgias and myalgias. Neurological: Positive for weakness (generalized). Negative for dizziness, syncope and light-headedness. Psychiatric/Behavioral: Negative for agitation and confusion. 14 point review of systems is negative except as specifically addressed above. Objective:   VITALS:  /68   Pulse 85   Temp 97.9 °F (36.6 °C) (Temporal)   Resp 16   Ht 5' 3\" (1.6 m)   Wt 194 lb 3.2 oz (88.1 kg)   SpO2 95%   BMI 34.40 kg/m²   24HR INTAKE/OUTPUT:      Intake/Output Summary (Last 24 hours) at 5/21/2021 1348  Last data filed at 5/21/2021 0335  Gross per 24 hour   Intake --   Output 350 ml   Net -350 ml       Physical Exam  Constitutional:       Appearance: Normal appearance. She is not diaphoretic. HENT:      Head: Normocephalic and atraumatic. Mouth/Throat:      Mouth: Mucous membranes are moist.      Pharynx: Oropharynx is clear. Eyes:      Extraocular Movements: Extraocular movements intact. Conjunctiva/sclera: Conjunctivae normal.      Pupils: Pupils are equal, round, and reactive to light. Cardiovascular:      Rate and Rhythm: Normal rate and regular rhythm. Pulses: Normal pulses. Heart sounds: Murmur heard.      Pulmonary:      Effort: CHF              -stress test indicated ejection fraction 31% severely impaired multiple perfusion abnormalities consistent with ischemic cardiomyopathy              -cardiology following and recommends medical management   -Coreg 3.125 mg BID added   -PT/OT eval     Active Problems:    CKD (chronic kidney disease)              -monitor kidney functions (slight increase in Cr)              -lasix discontinued and Bumex started              - daily weights              -strict I&o              -continue iron supplementation and calcirol                  Malignant neoplasm of other parts of pancreas [facility])              -oncology following              - palliative care following     DVT prophylaxis: On Mark Irvin, APRN - CNP, 5/21/2021 1:48 PM

## 2021-05-21 NOTE — PROGRESS NOTES
Cardiology Daily Note Bella Agrawal MD      Patient:  Layla Cook  [id number]    Patient Active Problem List    Diagnosis Date Noted    Acute blood loss anemia 12/01/2020    CKD (chronic kidney disease) 12/01/2020    Acute [facility] failure with hypoxia [facility]     New onset of congestive heart failure ([address] 05/18/2021    Malignant neoplasm of other parts of pancreas ([address] 01/20/2021    Anemia 12/18/2020    Elevated liver enzymes     Biliary obstruction     Palliative care patient 12/02/2020    Pancreatic mass 12/02/2020    Fatigue     Elevated INR 12/01/2020    Hematuria 12/01/2020    Encounter for BARBARA (erythropoietin stimulating agent) anemia management 06/29/2020    Other specified hypothyroidism 03/07/2020    Primary osteoarthritis of right knee 01/03/2020    Status post total right knee replacement 01/03/2020    Unilateral primary osteoarthritis, right knee 01/03/2020    Iron deficiency anemia secondary to inadequate dietary iron intake 09/08/2019    H/O abnormal mammogram 09/08/2019    Anemia of chronic kidney failure, stage 4 (severe) ([address] 05/17/2019    Heme positive stool 09/18/2018       Admit Date:  5/18/2021    Admission Problem List: Present on Admission:   New onset of congestive heart failure (HCC)   Malignant neoplasm of other parts of pancreas ([address]   CKD (chronic kidney disease)   Acute respiratory failure with hypoxia [facility])      Cardiac Specific Data:  Specialty Problems        Cardiology Problems    New onset of congestive heart failure ([address]              Subjective:  Ms. Genny Pollock seen today blood pressure 126/68 heart 85. Justina Noonan showed EF 31% mild perfusion abnormalities findings suggestive of ischemic cardiomyopathy. Given her pancreatic carcinoma advanced age significant renal dysfunction poor candidate for invasive assessment we will treat medically. Feeling somewhat better today.     Objective:   /68   Pulse 85   Temp 97.9 °F (36.6 °C) (Temporal) Resp 16   Ht 5' 3\" (1.6 m)   Wt 194 lb 3.2 oz (88.1 kg)   SpO2 95%   BMI 34.40 kg/m²       Intake/Output Summary (Last 24 hours) at 5/21/2021 1253  Last data filed at 5/21/2021 0335  Gross per 24 hour   Intake --   Output 350 ml   Net -350 ml       Prior to Admission medications    Medication Sig Start Date End Date Taking?  Authorizing Provider   sodium bicarbonate 325 MG tablet Take 325 mg by mouth 2 times daily   Yes Historical Provider, MD   vitamin D (CHOLECALCIFEROL) 25 MCG (1000 UT) TABS tablet Take 1,000 Units by mouth daily   Yes Historical Provider, MD   ondansetron (ZOFRAN ODT) 4 MG disintegrating tablet Take 2 tablets by mouth every 8 hours as needed for Nausea or Vomiting 5/13/21  Yes ALBERTO Patel   lidocaine-prilocaine (EMLA) 2.5-2.5 % cream APPLY TO PORT AREA AND COVERWITH PLASTIC WRAP ONE HOUR PRIOR TO TREATMENT 4/19/21  Yes ALBERTO Patel   promethazine (PHENERGAN) 25 MG tablet TAKE 1 TABLET BY MOUTH FOUR TIMES DAILY AS NEEDED FOR NAUSEA 4/13/21  Yes ALBERTO Patel   apixaban (ELIQUIS) 2.5 MG TABS tablet Take 2.5 mg by mouth 2 times daily   Yes Historical Provider, MD   lisinopril (PRINIVIL;ZESTRIL) 5 MG tablet Take 1 tablet by mouth 2 times daily  Patient taking differently: Take 10 mg by mouth daily  12/5/20  Yes Brianne Key PA-C   allopurinol (ZYLOPRIM) 100 MG tablet Take 100 mg by mouth 2 times daily    Yes Historical Provider, MD   levothyroxine (SYNTHROID) 112 MCG tablet Take 112 mcg by mouth Daily   Yes Historical Provider, MD   simvastatin (ZOCOR) 20 MG tablet Take 20 mg by mouth nightly   Yes Historical Provider, MD   docusate sodium (COLACE) 100 MG capsule Take 1 capsule by mouth 2 times daily 1/3/20   Cuauhtemoc Erazo MD        bumetanide  1 mg Oral BID    aspirin  81 mg Oral Daily    nitroGLYCERIN  0.4 mg Sublingual Once    sodium chloride flush  5-40 mL Intravenous 2 times per day    apixaban  2.5 mg Oral BID    docusate sodium  100 mg Oral BID    levothyroxine 112 mcg Oral Daily    [Held by provider] lisinopril  10 mg Oral Daily    atorvastatin  40 mg Oral Daily    allopurinol  100 mg Oral BID       TELEMETRY: Sinus     Physical Exam:      Physical Exam      General:  Awake, alert, NAD  Skin:  Warm and dry  Neck:  no jvd , no carotid bruits  Chest:  Clear to auscultation, no wheezing or rales  Cardiovascular:  RRR Z0O0 no murmurs, clicks, gallups, or rubs  Abdomen:  Soft nontender, nondistended, bowel sounds present  Extremities:  Edema: none      Lab Data:  CBC:   Recent Labs     05/19/21  0327 05/20/21  0312 05/21/21  0551   WBC 12.2* 5.7 10.3   HGB 8.6* 8.3* 8.7*   HCT 28.4* 26.0* 28.1*   .1* 107.9* 108.9*    261 300     BMP:   Recent Labs     05/18/21  1748 05/19/21  0327 05/20/21  0312 05/21/21  0551    142 142 138   K 4.2 5.0 4.1 4.3    104 100 95*   CO2 25 27 29 35*   PHOS 3.3  --   --   --    BUN 16 19 36* 39*   CREATININE 1.3* 1.4* 2.0* 2.1*     LIVER PROFILE:   Recent Labs     05/18/21  1345 05/18/21  1748   AST 35 23   ALT 14 12   BILITOT 0.5 0.6   ALKPHOS 108* 139*     PT/INR: No results for input(s): PROTIME, INR in the last 72 hours. APTT: No results for input(s): APTT in the last 72 hours. BNP:  No results for input(s): BNP in the last 72 hours.   CK, CKMB, Troponin: @LABRCNT (CKTOTAL:3, CKMB:3, TROPONINI:3)@    IMAGING:  Echo Complete    Result Date: 5/19/2021  Transthoracic Echocardiography Report (TTE)  Demographics   Patient Name Braulio Baird  Date of Study        05/19/2021               J   MRN          [id number]           Gender               Female   Date of      1934       Room Number          UWQ-[id number]  Birth   Age          80 year(s)   Height:      63 inches        Referring Physician  Joey Cabot APRN   Weight:      169 pounds       Sonographer          Jenifer Cates   BSA:         1.8 m^2          Interpreting         DO Petra Olson   BMI:         29.94 kg/m^2  Procedure Type of Study   TTE procedure:ECHO NO CONTRAST WITH DOP/COLR. Study Location: Echo Lab Technical Quality: Limited visualization due to poor acoustical window. Patient Status: Inpatient Contrast Medium: Definity. Indications:Congestive heart failure. Conclusions   Summary  Left ventricular chamber size is borderline dilated. .  Mild concentric left ventricular hypertrophy. Left ventricular systolic function is severely reduced  Left ventricular ejection fraction is visually estimated at 15%. Severe left ventricular global hypokinesis. There is mild mitral regurgitation. The left atrium is moderately dilated. Signature   ----------------------------------------------------------------  Electronically signed by Lissette Cardenas DO(Interpreting  physician) on 05/19/2021 05:18 PM  ----------------------------------------------------------------   Findings   Mitral Valve  Structurally normal mitral valve. Mitral annular calcification is present. There is mild mitral regurgitation. Aortic Valve  Aortic valve sclerosis. No aortic stenosis. No aortic regurgitation . Tricuspid Valve  Normal tricuspid valve leaflet thickness and excursion. There is trace tricuspid regurgitation. Pulmonic Valve  No significant pulmonic regurgitation. Left Atrium  The left atrium is moderately dilated. Left Ventricle  Left ventricular chamber size is borderline dilated. .  Mild concentric left ventricular hypertrophy. Left ventricular systolic function is severely reduced  Left ventricular ejection fraction is visually estimated at 15%. Severe left ventricular global hypokinesis. Diastolic function is indeterminate. No evidence of left ventricular mass or thrombus noted. Right Atrium  Normal right atrial size. Right Ventricle  Normal right ventricular chamber size and function. Pericardial Effusion  No pericardial effusion.   M-Mode Measurements (cm)   LVIDd: 4.86 cm                       LVIDs: 4.52 cm  IVSd: 1.51 cm  LVPWd: 0.96 cm                       AO Root Dimension: 2.4 cm  % Ejection Fraction: 15 %            LA: 3.2 cm                                       LVOT: 2 cm  Doppler Measurements:   AV Peak Velocity:163 cm/s            MV Peak E-Wave: 101 cm/s  AV Peak Gradient: 10.63 mmHg         MV Peak A-Wave: 149 cm/s                                       MV E/A Ratio: 0.68 %                                       MV Peak Gradient: 4.08 mmHg  Estimated RAP:3 mmHg      XR CHEST (2 VW)    Result Date: 5/18/2021  Examination. XR CHEST (2 VW) 5/18/2021 2:20 PM History: Severe shortness of breath. The frontal and lateral views of the chest are obtained and compared with the previous study dated 5/4/2021. There is bilateral interstitial prominence which may represent pulmonary vascular congestion/edema. This was not noted in the previous study. There is a trace bibasal pleural effusion. There is cardiomegaly. The atheromatous changes thoracic aorta are noted. A right internal jugular approach MediPort system is seen in place. No change. There is moderate diffuse osteopenia. No focal bony abnormality. Old healed fracture of the right clavicle and residual deformity is similar to the previous study. Pulmonary vascular congestion and pulmonary edema. A trace bibasal pleural effusion. Cardiomegaly. Signed by Dr Valri Kussmaul on 5/18/2021 3:41 PM    XR CHEST (2 VW)    Result Date: 5/4/2021  XR CHEST (2 VW) 5/4/2021 3:49 PM Two-view chest: History: Cough, shortness of air Frontal and lateral projection chest radiograph obtained. Comparison:  Chest radiography dated 12/1/2020 and 12/12/2019 and 12/21/2020 chest CT. Findings: COPD and chronic lung changes identified. The interstitial markings are minimally prominent relative to the prior chest radiographs, mild acute bronchitis considered. There are no parenchymal infiltrates. The heart is normal in size without evidence of heart failure.  Right-sided port sestamibi was injected in divided doses, 10.3 mCi and 30.96 mCi respectively for rest and stress imaging. The patient was discharged in stable condition. Results: Patient had symptoms of dyspnea during infusion that resolved in recovery. Baseline EKG showed LBBB, normal sinus rhythm without ST/T changes. During stress there were no significant EKG changes or rhythm changes. Baseline and peak blood pressures were 98/54, and 105/43 respectively. Baseline and peak heart rates were 96 and  107 respectively. Review of rest and stress images obtained utilizing a gated  SPECT acquisition protocol along with review of the polar plot revealed: 1. Ejection fraction 31% 2. Wall motion moderately severely impaired with inferoapical akinesis 3. Myocardial perfusion imaging demonstrated diminished uptake in the distal anteroseptal apical and inferior wall with visually suggestion of some reversibility present. Summary impressions: Moderately severely impaired left ventricular systolic function of 81% wall motion underbodies noted suggestion of defects distal anteroseptal apical and inferior wall with some reversibility present correlate clinically and/or angiographically if clinically appropriate Signed by Dr Redd Mulligan on 5/21/2021 12:26 PM        Assessment:  1. Complaints of shortness of breath  2. Chronic kidney disease creatinine up to 2.1  3. Pancreatic carcinoma  4. 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 Acute respiratory failure with hypoxia  6. Anemia iron deficiency  7. Osteoarthritis  8.  Previous right total knee replacement  9. CT abdomen pelvis 4/13/2021 poorly defined complex mass head of the pancreas decreased in size compared to previous study moderate dilatation persistent pancreatic duct 2 additional small cystic nodules in the body of the pancreas biliary stent in place with decompression of the intrahepatic biliary ducts fusiform aneurysmal dilatation distal abdominal aorta which is stable since previous study measured 4 cm in diameter right common iliac artery measures 2 cm in diameter also diverticular disease  10. Echocardiogram 519 2/20/2021 ejection fraction 15% severe global hypokinesis mild MR left atrial enlargement  11. Clide Shall today ejection fraction 31% severely impaired multiple perfusion abnormalities consistent with ischemic cardiomyopathy    Plan:  1.  Medical management poor candidate for invasive assessment add beta-blocker I will be gone over the weekend coverage available    Ty MD Malave MD 5/21/2021 12:53 PM

## 2021-05-21 NOTE — PROGRESS NOTES
Intravenous PRN Christopher Cabot, APRN - CNP        promethazine (PHENERGAN) tablet 12.5 mg  12.5 mg Oral Q6H PRN Christopher Cabot, APRN - CNP        Or    ondansetron (ZOFRAN) injection 4 mg  4 mg Intravenous Q6H PRN Christopher Cabot, APRN - CNP        polyethylene glycol (GLYCOLAX) packet 17 g  17 g Oral Daily PRN Christopher Cabot, APRN - CNP        acetaminophen (TYLENOL) tablet 650 mg  650 mg Oral Q6H PRN Christopher Cabot, APRN - CNP        Or    acetaminophen (TYLENOL) suppository 650 mg  650 mg Rectal Q6H PRN Christopher Cabot, APRN - CNP        potassium chloride (KLOR-CON M) extended release tablet 40 mEq  40 mEq Oral PRN Christopher Cabot, APRN - CNP        Or    potassium bicarb-citric acid (EFFER-K) effervescent tablet 40 mEq  40 mEq Oral PRN Christopher Cabot, APRN - CNP        Or    potassium chloride 10 mEq/100 mL IVPB (Peripheral Line)  10 mEq Intravenous PRN Christopher Vasquesot, APRN - CNP        magnesium sulfate 2000 mg in 50 mL IVPB premix  2,000 mg Intravenous PRN Christopher Cabot, APRN - CNP        apixaban (ELIQUIS) tablet 2.5 mg  2.5 mg Oral BID Joey Cabot, APRN - CNP   2.5 mg at 05/21/21 0811    docusate sodium (COLACE) capsule 100 mg  100 mg Oral BID Christopher Cabot, APRN - CNP   100 mg at 05/21/21 0811    levothyroxine (SYNTHROID) tablet 112 mcg  112 mcg Oral Daily Joey Cabot, APRN - CNP   112 mcg at 05/21/21 0559    [Held by provider] lisinopril (PRINIVIL;ZESTRIL) tablet 10 mg  10 mg Oral Daily Christopher Cabot, APRN - CNP   10 mg at 05/19/21 0851    atorvastatin (LIPITOR) tablet 40 mg  40 mg Oral Daily Christopher Cabot, APRN - CNP   40 mg at 05/21/21 0811    allopurinol (ZYLOPRIM) tablet 100 mg  100 mg Oral BID Cecilia Torres MD   100 mg at 05/21/21 6915        Labs:     Recent Labs     05/19/21  0327 05/20/21  0312 05/21/21  0551   WBC 12.2* 5.7 10.3   RBC 2.58* 2.41* 2.58*   HGB 8.6* 8.3* 8.7*   HCT 28.4* 26.0* 28.1*   .1* masses,  no organomegaly  Genitourinary: No bladder fullness, masses, or tenderness  Extremities: able to SINGH, trace BLE edema  Neurologic: No focal neurologic deficits, normal sensation, no tremor, alert and oriented  Psychiatric: Alert and oriented, no recent or remote memory deficits, good judgement, mood and affect appropriate  Skin: warm, dry      Assessment and Plan: Active Problems:    CKD (chronic kidney disease)    Malignant neoplasm of other parts of pancreas ([facility] [address]    New onset of congestive heart failure (HCC)    Acute respiratory failure with hypoxia (HCC)  Resolved Problems:    * No resolved hospital problems. *        Visit Summary: Chart reviewed, patient discussed with nursing and hospitalist. I saw the patient at the bedside and we discussed the results of her [employer] and plan for medical management. Medications reviewed with her and she verbalizes understanding. Long discussion regarding goals of care and voluntary ACP/review of living will discussion completed. Patient would like to return home if possible, is agreeable to [facility] for therapy, PT/OT evals pending. I reviewed her living will documentation and we discussed her wishes regarding resuscitative measures/intubation and mechanical ventilation in depth. Patient states that she \"would never want put on a ventilator or resuscitation\" and \"if I die, let me die. \" She reflected on having to make the decision for her spouse, which was difficult, emotional support provided. I talked about the ability to provide comfort rather than aggressive measures during that time and patient states \"that's what I want. \" Patient also stated that she would not \"want a feeding tube or anything like that\" and this is also reflected in her living will. Patient stated that she signed a DNR for for her father, I informed her that I would update her code status to DNR in the computer and document her wishes rather than sign a form at this time.  I have encouraged the patient to continue to mobilize and do the activities she enjoys. Patient plans to continue with treatment per Oncology as long as she tolerates the treatment. I attempted to contact the patient's daughter to discuss my visit with the patient and patient stated wishes, voicemail left with contact information for return call. I discussed the outcome of my visit with nursing staff. Encouragement and emotional support provided. Palliative care will continue to follow. Recommendations:   1. Palliative Care- [facility] to continue treatment per Oncology, I have discussed Outpatient Supportive Care services with patient, awaiting decision, patient is agreeable to [address]- DNR per patient's stated wishes, order updated SYMPTOM MANAGEMENT- Pt improved with supplemental O2 and diuresis     2. New Onset Heart Failure- Cardiology consulted, ECHO completed EF 15%,  Lexiscan completed EF 31%, medical management , labs trended, diuresis per attending     3. Pancreatic cancer- Oncology following, currently receiving palliative chemo          Thank you for consulting Palliative Care and allowing us to participate in the care of this patient.      Additional 30 min spent completing voluntary ACP discussion  Greater than 50% of time spent Counseling: Yes  Total visit time: 65 min    Electronically signed by ALBERTO Yousif on 5/21/2021 at 2:33 PM    (Please note that portions of this note were completed with a voice recognition program.  Claudene Borer made to edit the dictations but occasionally words are mis-transcribed.)

## 2021-05-22 LAB
ABO/RH: NORMAL
ANION GAP SERPL CALCULATED.3IONS-SCNC: 7 MMOL/L (ref 7–19)
ANISOCYTOSIS: ABNORMAL
ANTIBODY SCREEN: NORMAL
BASOPHILS ABSOLUTE: 0 K/UL (ref 0–0.2)
BASOPHILS RELATIVE PERCENT: 0.3 % (ref 0–1)
BLOOD BANK DISPENSE STATUS: NORMAL
BLOOD BANK PRODUCT CODE: NORMAL
BPU ID: NORMAL
BUN BLDV-MCNC: 44 MG/DL (ref 8–23)
CALCIUM SERPL-MCNC: 8.9 MG/DL (ref 8.8–10.2)
CHLORIDE BLD-SCNC: 98 MMOL/L (ref 98–111)
CO2: 34 MMOL/L (ref 22–29)
CREAT SERPL-MCNC: 2 MG/DL (ref 0.5–0.9)
DESCRIPTION BLOOD BANK: NORMAL
EOSINOPHILS ABSOLUTE: 0.6 K/UL (ref 0–0.6)
EOSINOPHILS RELATIVE PERCENT: 6.4 % (ref 0–5)
GFR AFRICAN AMERICAN: 29
GFR NON-AFRICAN AMERICAN: 24
GLUCOSE BLD-MCNC: 114 MG/DL (ref 74–109)
HCT VFR BLD CALC: 23.6 % (ref 37–47)
HEMOGLOBIN: 7.4 G/DL (ref 12–16)
HEMOGLOBIN: 8.2 G/DL (ref 12–16)
IMMATURE GRANULOCYTES #: 0 K/UL
LYMPHOCYTES ABSOLUTE: 0.4 K/UL (ref 1.1–4.5)
LYMPHOCYTES RELATIVE PERCENT: 4.2 % (ref 20–40)
MACROCYTES: ABNORMAL
MAGNESIUM: 1.6 MG/DL (ref 1.6–2.4)
MCH RBC QN AUTO: 33.8 PG (ref 27–31)
MCHC RBC AUTO-ENTMCNC: 31.4 G/DL (ref 33–37)
MCV RBC AUTO: 107.8 FL (ref 81–99)
MONOCYTES ABSOLUTE: 0.1 K/UL (ref 0–0.9)
MONOCYTES RELATIVE PERCENT: 1.6 % (ref 0–10)
NEUTROPHILS ABSOLUTE: 7.7 K/UL (ref 1.5–7.5)
NEUTROPHILS RELATIVE PERCENT: 87.2 % (ref 50–65)
PDW BLD-RTO: 24.5 % (ref 11.5–14.5)
PLATELET # BLD: 260 K/UL (ref 130–400)
PLATELET SLIDE REVIEW: ADEQUATE
PMV BLD AUTO: 11.7 FL (ref 9.4–12.3)
POTASSIUM SERPL-SCNC: 4.1 MMOL/L (ref 3.5–5)
RBC # BLD: 2.19 M/UL (ref 4.2–5.4)
SODIUM BLD-SCNC: 139 MMOL/L (ref 136–145)
WBC # BLD: 8.8 K/UL (ref 4.8–10.8)

## 2021-05-22 PROCEDURE — 86901 BLOOD TYPING SEROLOGIC RH(D): CPT

## 2021-05-22 PROCEDURE — 36415 COLL VENOUS BLD VENIPUNCTURE: CPT

## 2021-05-22 PROCEDURE — P9016 RBC LEUKOCYTES REDUCED: HCPCS

## 2021-05-22 PROCEDURE — 6370000000 HC RX 637 (ALT 250 FOR IP): Performed by: HOSPITALIST

## 2021-05-22 PROCEDURE — 36591 DRAW BLOOD OFF VENOUS DEVICE: CPT

## 2021-05-22 PROCEDURE — 99231 SBSQ HOSP IP/OBS SF/LOW 25: CPT | Performed by: INTERNAL MEDICINE

## 2021-05-22 PROCEDURE — 86900 BLOOD TYPING SEROLOGIC ABO: CPT

## 2021-05-22 PROCEDURE — 97530 THERAPEUTIC ACTIVITIES: CPT

## 2021-05-22 PROCEDURE — 86850 RBC ANTIBODY SCREEN: CPT

## 2021-05-22 PROCEDURE — 80048 BASIC METABOLIC PNL TOTAL CA: CPT

## 2021-05-22 PROCEDURE — 97161 PT EVAL LOW COMPLEX 20 MIN: CPT

## 2021-05-22 PROCEDURE — 6370000000 HC RX 637 (ALT 250 FOR IP): Performed by: INTERNAL MEDICINE

## 2021-05-22 PROCEDURE — 94660 CPAP INITIATION&MGMT: CPT

## 2021-05-22 PROCEDURE — 85025 COMPLETE CBC W/AUTO DIFF WBC: CPT

## 2021-05-22 PROCEDURE — 2140000000 HC CCU INTERMEDIATE R&B

## 2021-05-22 PROCEDURE — 2700000000 HC OXYGEN THERAPY PER DAY

## 2021-05-22 PROCEDURE — 86923 COMPATIBILITY TEST ELECTRIC: CPT

## 2021-05-22 PROCEDURE — 2580000003 HC RX 258: Performed by: NURSE PRACTITIONER

## 2021-05-22 PROCEDURE — 6370000000 HC RX 637 (ALT 250 FOR IP): Performed by: NURSE PRACTITIONER

## 2021-05-22 PROCEDURE — 83735 ASSAY OF MAGNESIUM: CPT

## 2021-05-22 RX ORDER — FUROSEMIDE 10 MG/ML
20 INJECTION INTRAMUSCULAR; INTRAVENOUS SEE ADMIN INSTRUCTIONS
Status: DISCONTINUED | OUTPATIENT
Start: 2021-05-22 | End: 2021-05-23

## 2021-05-22 RX ADMIN — ASPIRIN 81 MG: 81 TABLET, COATED ORAL at 09:28

## 2021-05-22 RX ADMIN — ALLOPURINOL 100 MG: 100 TABLET ORAL at 21:00

## 2021-05-22 RX ADMIN — BUMETANIDE 1 MG: 1 TABLET ORAL at 21:00

## 2021-05-22 RX ADMIN — ATORVASTATIN CALCIUM 40 MG: 40 TABLET, FILM COATED ORAL at 09:27

## 2021-05-22 RX ADMIN — BUMETANIDE 1 MG: 1 TABLET ORAL at 09:27

## 2021-05-22 RX ADMIN — ALLOPURINOL 100 MG: 100 TABLET ORAL at 09:28

## 2021-05-22 RX ADMIN — APIXABAN 2.5 MG: 2.5 TABLET, FILM COATED ORAL at 09:27

## 2021-05-22 RX ADMIN — CARVEDILOL 3.12 MG: 3.12 TABLET, FILM COATED ORAL at 18:39

## 2021-05-22 RX ADMIN — SODIUM CHLORIDE, PRESERVATIVE FREE 10 ML: 5 INJECTION INTRAVENOUS at 09:28

## 2021-05-22 RX ADMIN — LEVOTHYROXINE SODIUM 112 MCG: 112 TABLET ORAL at 09:33

## 2021-05-22 RX ADMIN — APIXABAN 2.5 MG: 2.5 TABLET, FILM COATED ORAL at 21:00

## 2021-05-22 RX ADMIN — DOCUSATE SODIUM 100 MG: 100 CAPSULE, LIQUID FILLED ORAL at 21:00

## 2021-05-22 RX ADMIN — SODIUM CHLORIDE, PRESERVATIVE FREE 10 ML: 5 INJECTION INTRAVENOUS at 21:00

## 2021-05-22 RX ADMIN — CARVEDILOL 3.12 MG: 3.12 TABLET, FILM COATED ORAL at 09:28

## 2021-05-22 RX ADMIN — DOCUSATE SODIUM 100 MG: 100 CAPSULE, LIQUID FILLED ORAL at 09:27

## 2021-05-22 ASSESSMENT — ENCOUNTER SYMPTOMS
EYE PAIN: 0
ABDOMINAL PAIN: 0
WHEEZING: 0
EYE DISCHARGE: 0
CONSTIPATION: 0
SHORTNESS OF BREATH: 1
SHORTNESS OF BREATH: 0
EYE REDNESS: 0
NAUSEA: 0
BLOOD IN STOOL: 0
EYES NEGATIVE: 1
VOMITING: 0
COUGH: 0
SORE THROAT: 0
GASTROINTESTINAL NEGATIVE: 1
ABDOMINAL DISTENTION: 0
BACK PAIN: 0
CHEST TIGHTNESS: 0
DIARRHEA: 0

## 2021-05-22 NOTE — PROGRESS NOTES
Physical Therapy    Facility/Department: [facility] PROGRESSIVE CARE  Initial Assessment    NAME: Michael Mata  : 1934  MRN: [id number]    Date of Service: 2021    Discharge Recommendations:  Continue to assess pending progress, Patient would benefit from continued therapy after discharge        Assessment   Body structures, Functions, Activity limitations: Decreased functional mobility ; Decreased ADL status; Decreased strength;Decreased endurance;Decreased balance  Assessment: Pt DECLINES UP TO CHAIR AT THIS TIME. ABLE TO SIT EOB AND THEN STAND WITH ASSIST. TOOK SMALL SIDE STEPS, TEND TO LEAN BACK. WILL PROGRESS WITH TRANSFERS AND GT, LIKELY USING RW.  PT Education: PT Role;Plan of Care  REQUIRES PT FOLLOW UP: Yes  Activity Tolerance  Activity Tolerance: Patient Tolerated treatment well       Patient Diagnosis(es): The primary encounter diagnosis was Acute respiratory failure with hypoxia ([address] Diagnoses of New onset of congestive heart failure ([address] Chronic renal impairment, unspecified CKD stage, Chronic anemia, and On antineoplastic chemotherapy were also pertinent to this visit. has a past medical history of Abdominal aortic aneurysm (AAA) ([address] Anemia, Arthritis, Cancer ([address] Chronic kidney disease, DVT of lower extremity (deep venous thrombosis) ([address] Hyperlipidemia, Hypertension, Kidney stones, Osteoarthritis, Palliative care patient, Thyroid disease, and Thyroid disorder. has a past surgical history that includes Toe amputation (Bilateral); Colonoscopy (2016); Total knee arthroplasty (Right, 2020); Endoscopic ultrasonography, GI (N/A, 2020); ERCP (N/A, 2020); ERCP (2020); and Port Surgery (Right, 2021). Restrictions  Restrictions/Precautions  Restrictions/Precautions: Fall Risk  Vision/Hearing        Subjective  General  Diagnosis: CHF, PANCREATIC CA  Subjective  Subjective: Pt WILLING TO PARTICIPATE. REPORTS BEING COLD.   Pain Screening  Patient Currently in Yulisa Gallo, PT

## 2021-05-22 NOTE — PROGRESS NOTES
Patient name: Moi Lucero  Patient : 1934  9:42 AM  ROOM 709  Patient Active Problem List   Diagnosis Code    Heme positive stool R19.5    Anemia of chronic kidney failure, stage 4 (severe) (HCC) N18.4, D63.1    Iron deficiency anemia secondary to inadequate dietary iron intake D50.8    H/O abnormal mammogram Z87.898    Primary osteoarthritis of right knee M17.11    Status post total right knee replacement Z96.651    Unilateral primary osteoarthritis, right knee M17.11    Other specified hypothyroidism E03.8    Encounter for BARBARA (erythropoietin stimulating agent) anemia management D64.9, Z79.899    Elevated INR R79.1    Hematuria R31.9    Acute blood loss anemia D62    CKD (chronic kidney disease) N18.9    Palliative care patient Z51.5    Pancreatic mass K86.89    Fatigue R53.83    Elevated liver enzymes R74.8    Biliary obstruction K83.1    Anemia D64.9    Malignant neoplasm of other parts of pancreas (HCC) C25.7    New onset of congestive heart failure (HCC) I50.9    Acute respiratory failure with hypoxia (HCC) J96.01     Portions of this note have been copied forward, however, changed to reflect the most current clinical status of this patient. Subjective: Continues with nasal O2. Complains of dyspnea. Has cough that is nonproductive    HISTORY OF PRESENT ILLNESS:   Dyane Bumpers is a 81 yo female with multiple comorbidities to include a significant history of pancreatic adenocarcinoma currently receiving palliative chemotherapy. She received treatment Gemzar and Abraxane on 2021 and started to complain of worsening shortness of breath. She had a normal respiratory rate and saturation when she presented to clinic. After completion of treatment she started experiencing worsening dyspnea with hypoxemia and desaturation. No fever of chills. She was sent for a CXR that showed pulmonary congestion.  Due to her worsening respiratory status she was sent to the ER where she was admitted. She has a significant PMH of iron deficiency anemia, chronic kidney disease stage IV,  and diagnosis of pancreatic adenocarcinoma. She has been receiving palliative chemotherapy with Gemzar 750 mg/m² and Abraxane 90 mg/m² every 2 weeks (this is a 25% dose reduction). Follow-up CT scan of the abdomen and pelvis on 4/13/2021 and tumor markers suggest a positive response to treatment. She has required treatment delay and further dose reduction due to experiencing significant fatigue and nausea. Starting with cycle 4 her treatment regimen was dose reduced by an additional 25%, Gemzar 600 mg/m² and Abraxane 75 mg/m². 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 Procrit to be given every 2 weeks ×4 and then monthly, dose to be titrated appropriately. Hold dose for hemoglobin >11   · Currently receiving Retacrit 40,000 units every 4 weeks  · 2/8/2021 palliative chemotherapy on 2/8/2021 with Gemzar 750 mg/m² and Abraxane 90 mg/m² every 2 weeks, this is a dose reduction by 25%        Tumor monitoring:  · 12/2/2020-CA 19-9 of 133  · 12/23/2020-CA 19-9 of 217  · 1/20/2021-CA 19-9 of 236  · 3/23/2021- CA 19-9 of 108     CANCER HISTORY  Lamine Bush was seen by Dr. Mirian Erazo on 12/2/2020 as an inpatient consultation at [facility] for findings of a pancreatic mass and bile duct obstruction. She presented to the ER department with complaints of hematuria that began a few days prior to presentation. She denied any dysuria, back pain, no nausea or vomiting. She also reported easily bruising and ecchymotic areas in her back, abdomen and flank. Initial laboratory work-up showed INR above 18. She was given vitamin K subcutaneously. She was found to have elevated LFTs and the following are events that occurred. · 12/01/2020-CT abdomen pelvis without contrast showed a pancreatic head mass measuring 3.3 x 3.7 cm in size and associated, bile duct dilatation above the level of the ampulla. Associated moderate pancreatic ductal dilatation. · 12/2/2020-CA 19-9 133  · 12/03/2020-ERCP with FNA biopsy Positive for high-grade adenocarcinoma. Unfortunately, stent could not be placed. · 12/5/2020-transferred from [facility] to [address] [place] [address] and discharged home on 12/8/2020  · 12/07/2020- Cholangipancreatography Retrograde Endo ERCP with sphincterotomy, balloon sweep and placement of 10x60 PC BS metal stent at [facility] in [place]. · 12/21/2020- CT chest with contrast documented no evidence of intrathoracic neoplastic process/metastatic disease. Evidence of pneumobilia. The ectasia of the pancreatic duct, similar to the previous study. An incompletely visualized and evaluated heterogeneous mass in the head of the pancreas measuring 3.3 x 3.7cm. A biliary stent in place. Moderate persistent dilatation of the proximal common bile duct. A stable small low-density nodule in the left adrenal gland  · 12/22/2020-Dr. Lambert discussed the results of CT chest and pathology that suggest advanced pancreatic cancer, unfortunately it is not amenable to surgery. She was quite weak and had poor performance, appeared very frail. Discussion was made about palliative chemotherapy but the decision to hold was made until improvement of her physical conditioning occurred. Recommended physical therapy as outpatient and reassess fitness for palliative chemotherapy in approximately 4 weeks. 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   · 12/7/2020 ERCP at [address] [place] [address] Dr by Dr. Carmine Cotter revealed a single severe biliary stricture in the lower third of the main bile duct with severe upstream biliary dilation, stricture was malignant appearing. Biliary sphincterectomy was performed. 1 partially covered metal stent was placed into the common bile duct. · 2/8/2021-initiated palliative chemotherapy with Gemzar 750 mg/m² and Abraxane 90 mg/m² every 2 weeks, this is a dose reduction by 25%  · 3/23/2021- CA 19-9 108, improved compared to pretreatment value of 236 on 1/20/2021. · 4/13/2021 CT Abdomen/Pelvis with contrast documented a poorly defined complex mass in the head of the pancreas. It appears to have decreased in size when remeasured at the same level and in same dimension as in the previous study (3 cm x 2.5 cm, compared to 3.7 cm x 3.3 cm). Persistent moderate dilatation of the pancreatic duct. There are 2 additional small cystic nodules in the body of the pancreas measuring 11 mm and 9 mm. A biliary stent in place and decompression of the intrahepatic biliary ducts since the previous study. 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 The diverticulosis of the distal colon with no evidence for diverticulitis. · 5/4/2021- dose reduction by 25% due to severe fatigue and nausea, starting with cycle 4 will receive Gemzar 600 mg/m² and Abraxane 75 mg/m². HEMATOLOGY HISTORY:  Isak Garduno was seen in initial hematology consultation on 7/20/2018 for further evaluation and treatment recommendations for anemia. Isak Garduno was referred from Dr. Charito Christensen. Isak Garduno presented with no significant complaints other than chronic fatigue and constipation. She had been taking ferrous sulfate 325 mg daily under the direction of Dr. Kaden Willams. Isak Garduno reported significant constipation and some GI upset with the oral iron. She denied any bleeding tendencies to include hematuria, melena, hematochezia and epistaxis.    Isak Garduno had a colonoscopy on 4/26/2016 by Dr. Marion Hamilton Aliza Ascencio for further evaluation of iron deficiency. The only abnormal finding was diverticulosis. Dee Verdin is routinely followed by Dr. Bronwyn Holter for her chronic kidney disease stage IV. CMP on 6/11/2018 documented a creatinine of 2.2 and GFR 21. CBC review from 2/2/2018- 6/11/2018 documented hemoglobin ranging from 9.4- 10.1, RBC 2.91- 3.22, MCV 94- 100 with a normal WBC and platelet count  CBC at initial consultation on 7/20/2018 documented a WBC of 7.33, ANC 4.74, RBC 3.04, hemoglobin 10.3, .6 and a platelet count of 461,837. Serology studies on 7/20/2018:  · Creatinine 1.98   · GFR 23   · Calcium 10.3   · IgG 700, IgA 180, and IgM 52   · M spike not observed   · Immunofixation: No monoclonality detected. · Iron 74   · Iron saturation 27%   · TIBC 278   · Vitamin B12 437   · Folate 15.7   · Ferritin 299   · Reticulocyte count 1.5%   · Erythropoietin 11.6   ·    · Beta-2 microglobulin 6.2      Occult stool positive 1 of 3 samples on 7/25/2018. Colonoscopy on 10/4/2018 by Dr. Madi Bautista was documented as removal of 2 polyps with benign pathology. No evidence of bleeding. Serology studies on 10/15/2018:  · Iron 76   · TIBC 287   · Iron saturation 26%   · Ferritin 321   · Hemoglobin 10.1   · Creatinine 2.47   · GFR 17     11/26/2018 - Initiated Procrit 20,000 units for chronic kidney disease stage IV, to be given every 2 weeks ×4 and then monthly, dose to be titrated appropriately. Hemoglobin 9.6. All has anemia of chronic disease to include chronic kidney disease stage IV. She will began receiving growth factor support and will need to maintain a ferritin level greater than 200 and an iron saturation of 25% for the Procrit to be beneficial. Hold Procrit dose for hemoglobin >11. Indications/rationale for Procrit was discussed with Dee Verdin. Risks, benefits and expectations were outlined.  Risks including, but not limited to hypertension, stroke, MI, death were discussed and acknowledged by [place]. Serology studies on 3/8/2019:  · Creatinine 1.7/GFR 30.4   · Iron 80   · Iron saturation 26.5%   · TIBC 302   · Ferritin 144   · Vitamin B12 501   · Folate 13.9     Serology studies on 3/5/2020:  · Creatinine 1.3/GFR 41.4  · Iron 84  · TIBC 390  · Iron saturation 21.5%  · Ferritin 342     Iron substrates on 6/25/2020  · Ferritin 311  · Iron 76  · Iron saturation 23%  · TIBC 326  · Creatinine 1.3/GFR 39     Labs on 12/2/2020 and 12/3/2020:  · Iron 35  · TIBC 213  · Iron saturation 16%  · Vitamin B12 483  · Folate 10.6  · Ferritin 480     Labs on 3/23/2021  · Iron 54  · TIBC 366  · Iron saturation 15  · Ferritin 530  · Reticulocytes 2.1     Labs on 4/6/2021  · WBC 14.40  · RBC 2.46  · HGB 8.6  · HCT 26.4  · .3  · MCH 35  · MCHC 32.6  · ANC 12.03  · ,000  · Iron 43   · TIBC 226  · Iron saturation 19%   · Ferritin 1088.0  · B-12= 781  · GFR 39  · BUN 21  · Creatinine 1.3  · Phosphorus 2.4  · Magnesium 1.5        Age-appropriate health screening:  · Colonoscopy on 10/4/2018 by Dr. Nallely Schultz was documented as removal of 2 polyps with benign pathology. No evidence of bleeding. · 9/20/2019 Bilateral mammogram at [address] [place] [address] documented no mammographic evidence of malignancy. No bone mineral density on file     Review of Systems   Constitutional: Positive for activity change, appetite change and fatigue. Negative for chills, diaphoresis and fever. HENT: Negative. Negative for congestion, ear pain, hearing loss, nosebleeds, sore throat and tinnitus. Eyes: Negative. Negative for pain, discharge and redness. Respiratory: Positive for shortness of breath. Negative for cough and wheezing. Cardiovascular: Positive for leg swelling (Improving). Negative for chest pain and palpitations. Gastrointestinal: Negative. Negative for abdominal pain, blood in stool, constipation, diarrhea, nausea and vomiting. Endocrine: Negative for polydipsia.    Genitourinary: Negative for dysuria, flank pain, frequency, hematuria and urgency. Musculoskeletal: Negative. Negative for back pain, myalgias and neck pain. Skin: Negative. Negative for rash. Neurological: Positive for weakness. Negative for dizziness, tremors, seizures and headaches. Hematological: Does not bruise/bleed easily. Psychiatric/Behavioral: Negative. The patient is not nervous/anxious. Current Pain Assessment  Pain Assessment  Pain Assessment: 0-10  Pain Level: 0  Patient's Stated Pain Goal: No pain    OBJECTIVE:  VITALS: /66   Pulse 88   Temp 98.3 °F (36.8 °C) (Temporal)   Resp 20   Ht 5' 3\" (1.6 m)   Wt 164 lb (74.4 kg)   SpO2 93%   BMI 29.05 kg/m²   I&O:     Intake/Output Summary (Last 24 hours) at 5/22/2021 0942  Last data filed at 5/22/2021 0900  Gross per 24 hour   Intake 420 ml   Output 400 ml   Net 20 ml         Physical Exam  Vitals reviewed. Constitutional:       General: She is not in acute distress. Appearance: She is well-developed. She is ill-appearing. She is not diaphoretic. HENT:      Head: Normocephalic and atraumatic. Mouth/Throat:      Pharynx: Uvula midline. Tonsils: No tonsillar exudate. Eyes:      General: Lids are normal. No scleral icterus. Right eye: No discharge. Left eye: No discharge. Conjunctiva/sclera: Conjunctivae normal.      Pupils: Pupils are equal, round, and reactive to light. Neck:      Thyroid: No thyroid mass or thyromegaly. Vascular: No JVD. Trachea: Trachea normal. No tracheal deviation. Cardiovascular:      Rate and Rhythm: Normal rate and regular rhythm. Heart sounds: Normal heart sounds. No murmur heard. No friction rub. No gallop. Pulmonary:      Effort: Pulmonary effort is normal. No respiratory distress. Breath sounds: Decreased breath sounds present. No wheezing or rales. Comments: Fine crackles, significantly improved  Chest:      Chest wall: No tenderness.    Abdominal:      General: hours.  Organism Recent : No results for input(s): ORG in the last 720 hours. Radiology:   XR CHEST (2 VW)    Result Date: 5/18/2021  Examination. XR CHEST (2 VW) 5/18/2021 2:20 PM History: Severe shortness of breath. The frontal and lateral views of the chest are obtained and compared with the previous study dated 5/4/2021. There is bilateral interstitial prominence which may represent pulmonary vascular congestion/edema. This was not noted in the previous study. There is a trace bibasal pleural effusion. There is cardiomegaly. The atheromatous changes thoracic aorta are noted. A right internal jugular approach MediPort system is seen in place. No change. There is moderate diffuse osteopenia. No focal bony abnormality. Old healed fracture of the right clavicle and residual deformity is similar to the previous study. Pulmonary vascular congestion and pulmonary edema. A trace bibasal pleural effusion. Cardiomegaly. Signed by Dr Migdalia Fleming on 5/18/2021 3:41 PM    XR CHEST (2 VW)    Result Date: 5/4/2021  XR CHEST (2 VW) 5/4/2021 3:49 PM Two-view chest: History: Cough, shortness of air Frontal and lateral projection chest radiograph obtained. Comparison:  Chest radiography dated 12/1/2020 and 12/12/2019 and 12/21/2020 chest CT. Findings: COPD and chronic lung changes identified. The interstitial markings are minimally prominent relative to the prior chest radiographs, mild acute bronchitis considered. There are no parenchymal infiltrates. The heart is normal in size without evidence of heart failure. Right-sided port catheter identified. There are no acute osseous abnormalities. .                                                                                    Impression: 1. COPD and chronic lung changes. 2. Mild interstitial prominence, acute bronchitis considered, no parenchymal infiltration.  Signed by Dr Mariela Collins on 5/4/2021 5:07 PM    XR FOOT LEFT (MIN 3 VIEWS)    Result Date: 5/5/2021  EXAMINATION:  XR FOOT LEFT (MIN 3 VIEWS)  5/5/2021 3:09 PM HISTORY: The patient fell this morning. Left foot pain. COMPARISON: No comparison study. TECHNIQUE: 3 views were obtained. FINDINGS:  There is a nondisplaced fracture of the distal metaphysis of the proximal phalanx of the fourth toe. There has been prior resection of the fifth toe. There is narrowing of the interphalangeal joint spaces of the second through fourth toes. There are hammertoe deformities of these toes. There is minimal calcaneal spurring and enthesopathy. There is soft tissue swelling of the foot. 1. Acute fracture of the fourth toe, as described. Soft tissue swelling of the foot. 2. Hammertoe deformities of the second through fourth toes. Prior fifth toe amputation. 3. Calcaneal spurring and enthesopathy. Signed by Dr Andrés Link on 5/5/2021 3:12 PM    XR CHEST PORTABLE    Result Date: 5/18/2021  EXAMINATION: Chest one view 5/18/2021 HISTORY: Shortness of breath. FINDINGS: Upright frontal projection of the chest demonstrates congestive heart failure with cardiomegaly, vascular redistribution and interstitial pulmonary edema. Small effusions are present. 1.. Congestive heart failure with interstitial and early alveolar edema.  Signed by Dr Saint Mays on 5/18/2021 6:38 PM      Medications  Current Facility-Administered Medications   Medication Dose Route Frequency Provider Last Rate Last Admin    bumetanide (BUMEX) tablet 1 mg  1 mg Oral BID Radha Whiting MD   1 mg at 05/22/21 0927    carvedilol (COREG) tablet 3.125 mg  3.125 mg Oral BID  Kelsi Estrada MD   3.125 mg at 05/22/21 1820    aspirin EC tablet 81 mg  81 mg Oral Daily Glen Cote MD   81 mg at 05/22/21 1518    nitroGLYCERIN (NITROSTAT) SL tablet 0.4 mg  0.4 mg Sublingual Once Jayden Magana MD        sodium chloride flush 0.9 % injection 5-40 mL  5-40 mL Intravenous 2 times per day ALBERTO Randle - CNP   10 mL at 05/22/21 7910    performed an evaluation on [facility]        I have reviewed relevant medical information/data to include but not limited to the medication list, relevant appropriate lab work and imaging when applicable. I reviewed other physician's notes, ancillary services and nurses assessments. I have reviewed the above documentation completed by Pawel Montes PA-C   Please see my additional addended and/or modified contributions to the history of present illness, physical examination, and assessment/medical decision-making and plan that reflects my findings and impressions. I discussed essential elements of the care plan with Pawel Montes PA-C and the patient. I have encouraged and answered all the questions raised to the patient's understanding and satisfaction. I concur with the above stated. Subjective-symptomatically stable this morning in no acute distress. Objective- heart without tachycardia, good breath sounds bilaterally on auscultation.     Assessment/plan:  1 unit packed red blood cell today  Continue supportive care   Monitor labs  Monitor renal function closely, consider nephrology consultation  Appreciate palliative care assistance  Encourage increasing activity as tolerated        Electronically signed by Mariella Gaines MD on 5/22/21 at 12:02 PM CDT

## 2021-05-22 NOTE — PROGRESS NOTES
[facility]      Patient:  Michael Mata  YOB: 1934  Date of Service: 5/22/2021  MRN: [id number]   Acct: [de-identified]   Primary Care Physician: Norberto Palmer MD  Advance Directive: [facility]  Admit Date: 5/18/2021       Hospital Day: 4  Portions of this note have been copied forward, however, changed to reflect the most current clinical status of this patient. CHIEF COMPLAINT shortness of breath    SUBJECTIVE:  Patient states she is very tired port Lexiscan. She states she was very short of breath earlier, but it is resolving. Hospital Course: The patient is O 62 y.o. female with a history of HTN, pancreatic and thyroid cancer, CKD, and anemia who presented to [facility] ED complaining of shortness of breath. The patient was placed on BiPap in the ED. The daughter reported to ED staff the patient has had shortness of breath over the last several days which has prevented the patient from laying down.  She denied any significant leg swelling (but endorses a small amount), cough, fevers,or chills.  Patient was receiving chemotherapy 5/18/2021 and stated she had to lay flat for her chemo but could not tolerate it for shortness of breath, however she was able to finish her treatment.  Infusion nurse reported her O2 sat was in the 63's.  Patient did have a chest x-ray obtained there which showed edema according to patient's daughter. Daughter and patient denied any history of heart failure, COPD, or other cardiac or pulmonary problems. ECHO showed EF 15%, and cardiology was consulted. Patient was given 80 mg Iv lasix in ED and began on 40 mg IV lasix BID and changed to PO on 5/20/2021 changed to bumex 1 mg BID on 5/21/21.  The patient was able to be transitioned from BiPap to nasal cannula at 5 LPM 5/19/2021 AM. As of 5/20/2021 this patient is requiring 5 LPM. Stress test on 5/21/2021 indicated ejection fraction 31% severely impaired multiple perfusion abnormalities consistent with ischemic cardiomyopathy in which cardiology recommended medical management as she is a poor surgical candidate, Coreg 3.125 mg BID added. 5/22/2021 Pt able to participate with PT/Ot. 1 Unit PRBC per oncology today. Will continue with Bumex, renal functions stable at this time. Pt continues to require 5 LPM via nasal cannula, nursing and RT to wean as tolerated. Review of Systems:   Review of Systems   Constitutional: Positive for fatigue. Negative for chills and fever. Respiratory: Negative for chest tightness, shortness of breath (post Lexiscan) and wheezing. Cardiovascular: Negative for chest pain, palpitations and leg swelling. Gastrointestinal: Negative for abdominal distention, nausea and vomiting. Genitourinary: Negative for difficulty urinating and dysuria. Musculoskeletal: Negative for arthralgias and myalgias. Neurological: Positive for weakness (generalized). Negative for dizziness, syncope and light-headedness. Psychiatric/Behavioral: Negative for agitation and confusion. 14 point review of systems is negative except as specifically addressed above. Objective:   VITALS:  BP (!) 122/48   Pulse 86   Temp 98.9 °F (37.2 °C) (Temporal)   Resp 12   Ht 5' 3\" (1.6 m)   Wt 164 lb (74.4 kg)   SpO2 96%   BMI 29.05 kg/m²   24HR INTAKE/OUTPUT:      Intake/Output Summary (Last 24 hours) at 5/22/2021 1316  Last data filed at 5/22/2021 1224  Gross per 24 hour   Intake 420 ml   Output 725 ml   Net -305 ml       Physical Exam  Constitutional:       Appearance: Normal appearance. She is not diaphoretic. HENT:      Head: Normocephalic and atraumatic. Mouth/Throat:      Mouth: Mucous membranes are moist.      Pharynx: Oropharynx is clear. Eyes:      Extraocular Movements: Extraocular movements intact. Conjunctiva/sclera: Conjunctivae normal.      Pupils: Pupils are equal, round, and reactive to light. Cardiovascular:      Rate and Rhythm: Normal rate and regular rhythm.       Pulses: Normal pulses. Heart sounds: Murmur heard. Pulmonary:      Effort: Pulmonary effort is normal.      Breath sounds: Normal breath sounds. No rales. Abdominal:      General: Bowel sounds are normal. There is no distension. Palpations: Abdomen is soft. Musculoskeletal:      Cervical back: Normal range of motion and neck supple. Right lower le+ Pitting Edema present. Left lower le+ Pitting Edema present. Skin:     General: Skin is warm and dry. Capillary Refill: Capillary refill takes less than 2 seconds. Neurological:      General: No focal deficit present. Mental Status: She is alert and oriented to person, place, and time. Psychiatric:         Mood and Affect: Mood normal.         Behavior: Behavior normal.         Thought Content:  Thought content normal.         Judgment: Judgment normal.             Medications:      sodium chloride        furosemide  20 mg Intravenous See Admin Instructions    bumetanide  1 mg Oral BID    carvedilol  3.125 mg Oral BID WC    aspirin  81 mg Oral Daily    nitroGLYCERIN  0.4 mg Sublingual Once    sodium chloride flush  5-40 mL Intravenous 2 times per day    apixaban  2.5 mg Oral BID    docusate sodium  100 mg Oral BID    levothyroxine  112 mcg Oral Daily    [Held by provider] lisinopril  10 mg Oral Daily    atorvastatin  40 mg Oral Daily    allopurinol  100 mg Oral BID     sodium chloride flush, sodium chloride, promethazine **OR** ondansetron, polyethylene glycol, acetaminophen **OR** acetaminophen, potassium chloride **OR** potassium alternative oral replacement **OR** potassium chloride, magnesium sulfate  DIET CARDIAC;     Lab and other Data:     Recent Labs     21  0551 21  0259   WBC 5.7 10.3 8.8   HGB 8.3* 8.7* 7.4*    300 260     Recent Labs     21  0551 21  0259    138 139   K 4.1 4.3 4.1    95* 98   CO2 29 35* 34*   BUN 36* 39* 44* CREATININE 2.0* 2.1* 2.0*   GLUCOSE 118* 138* 114*       RAD:   XR CHEST (2 VW)    Result Date: 2021  Pulmonary vascular congestion and pulmonary edema. A trace bibasal pleural effusion. Cardiomegaly. Signed by Dr Renetta Bacon on 2021 3:41 PM    XR CHEST PORTABLE    Result Date: 2021  1. . Congestive heart failure with interstitial and early alveolar edema. Signed by Dr Elijah De La O on 2021 6:38 PM       Echo:    Summary   Left ventricular chamber size is borderline dilated. Stevphen Abiodun concentric left ventricular hypertrophy.   Left ventricular systolic function is severely reduced   Left ventricular ejection fraction is visually estimated at 15%.   Severe left ventricular global hypokinesis.   There is mild mitral regurgitation.   The left atrium is moderately dilated.     Assessment/Plan   New onset of congestive heart failure ([address]              -ECHO ef estimated at 15%              -Bumex 1 mg PO BID              -Hold lisinopril              - BNP 4830, trend every 3 days              -troponins negative              -monitor and replace electrolytes              -continue statin therapy              -strict I&o, q4 hour vital signs              -iron studies              -contniue to monitor and wean O2 to maintain sat of 92-94%, will obtain home o2 eval              -chest xray consistent with CHF              -stress test indicated ejection fraction 31% severely impaired multiple perfusion abnormalities consistent with ischemic cardiomyopathy              -cardiology following and recommends medical management   -Coreg 3.125 mg BID added   -PT/OT eval     Active Problems:    CKD (chronic kidney disease)              -monitor kidney functions (stable)              -lasix discontinued and Bumex started              - daily weights              -strict I&o              -continue iron supplementation and calcirol                  Malignant neoplasm of other parts of pancreas [facility])              -oncology following              - palliative care following   -1 unit PRBC today     DVT prophylaxis: On Eliquis Joey Cabot, APRN - CNP, 5/22/2021 1:16 PM

## 2021-05-23 LAB
ANION GAP SERPL CALCULATED.3IONS-SCNC: 7 MMOL/L (ref 7–19)
ANISOCYTOSIS: ABNORMAL
BASOPHILS ABSOLUTE: 0 K/UL (ref 0–0.2)
BASOPHILS RELATIVE PERCENT: 0.3 % (ref 0–1)
BUN BLDV-MCNC: 51 MG/DL (ref 8–23)
CALCIUM SERPL-MCNC: 9.6 MG/DL (ref 8.8–10.2)
CHLORIDE BLD-SCNC: 96 MMOL/L (ref 98–111)
CO2: 35 MMOL/L (ref 22–29)
CREAT SERPL-MCNC: 2.3 MG/DL (ref 0.5–0.9)
EOSINOPHILS ABSOLUTE: 0.6 K/UL (ref 0–0.6)
EOSINOPHILS RELATIVE PERCENT: 6.7 % (ref 0–5)
GFR AFRICAN AMERICAN: 24
GFR NON-AFRICAN AMERICAN: 20
GLUCOSE BLD-MCNC: 116 MG/DL (ref 74–109)
HCT VFR BLD CALC: 24.2 % (ref 37–47)
HEMOGLOBIN: 7.7 G/DL (ref 12–16)
IMMATURE GRANULOCYTES #: 0.1 K/UL
LYMPHOCYTES ABSOLUTE: 0.4 K/UL (ref 1.1–4.5)
LYMPHOCYTES RELATIVE PERCENT: 4 % (ref 20–40)
MACROCYTES: ABNORMAL
MAGNESIUM: 1.7 MG/DL (ref 1.6–2.4)
MCH RBC QN AUTO: 32.5 PG (ref 27–31)
MCHC RBC AUTO-ENTMCNC: 31.8 G/DL (ref 33–37)
MCV RBC AUTO: 102.1 FL (ref 81–99)
MONOCYTES ABSOLUTE: 0.1 K/UL (ref 0–0.9)
MONOCYTES RELATIVE PERCENT: 1.6 % (ref 0–10)
NEUTROPHILS ABSOLUTE: 7.5 K/UL (ref 1.5–7.5)
NEUTROPHILS RELATIVE PERCENT: 86.7 % (ref 50–65)
PDW BLD-RTO: 24.9 % (ref 11.5–14.5)
PLATELET # BLD: 225 K/UL (ref 130–400)
PLATELET SLIDE REVIEW: ADEQUATE
PMV BLD AUTO: 11.6 FL (ref 9.4–12.3)
POTASSIUM SERPL-SCNC: 3.6 MMOL/L (ref 3.5–5)
RBC # BLD: 2.37 M/UL (ref 4.2–5.4)
SODIUM BLD-SCNC: 138 MMOL/L (ref 136–145)
WBC # BLD: 8.7 K/UL (ref 4.8–10.8)

## 2021-05-23 PROCEDURE — 6370000000 HC RX 637 (ALT 250 FOR IP): Performed by: HOSPITALIST

## 2021-05-23 PROCEDURE — 2700000000 HC OXYGEN THERAPY PER DAY

## 2021-05-23 PROCEDURE — 2580000003 HC RX 258: Performed by: NURSE PRACTITIONER

## 2021-05-23 PROCEDURE — 85018 HEMOGLOBIN: CPT

## 2021-05-23 PROCEDURE — 85025 COMPLETE CBC W/AUTO DIFF WBC: CPT

## 2021-05-23 PROCEDURE — 94761 N-INVAS EAR/PLS OXIMETRY MLT: CPT

## 2021-05-23 PROCEDURE — 83735 ASSAY OF MAGNESIUM: CPT

## 2021-05-23 PROCEDURE — 99232 SBSQ HOSP IP/OBS MODERATE 35: CPT | Performed by: INTERNAL MEDICINE

## 2021-05-23 PROCEDURE — 2140000000 HC CCU INTERMEDIATE R&B

## 2021-05-23 PROCEDURE — 6370000000 HC RX 637 (ALT 250 FOR IP): Performed by: NURSE PRACTITIONER

## 2021-05-23 PROCEDURE — 6370000000 HC RX 637 (ALT 250 FOR IP): Performed by: INTERNAL MEDICINE

## 2021-05-23 PROCEDURE — 94660 CPAP INITIATION&MGMT: CPT

## 2021-05-23 PROCEDURE — 80048 BASIC METABOLIC PNL TOTAL CA: CPT

## 2021-05-23 RX ORDER — MIDODRINE HYDROCHLORIDE 5 MG/1
5 TABLET ORAL
Status: DISCONTINUED | OUTPATIENT
Start: 2021-05-23 | End: 2021-05-24

## 2021-05-23 RX ADMIN — MIDODRINE HYDROCHLORIDE 5 MG: 5 TABLET ORAL at 12:18

## 2021-05-23 RX ADMIN — SODIUM CHLORIDE, PRESERVATIVE FREE 10 ML: 5 INJECTION INTRAVENOUS at 20:29

## 2021-05-23 RX ADMIN — MIDODRINE HYDROCHLORIDE 5 MG: 5 TABLET ORAL at 08:46

## 2021-05-23 RX ADMIN — DOCUSATE SODIUM 100 MG: 100 CAPSULE, LIQUID FILLED ORAL at 08:41

## 2021-05-23 RX ADMIN — MIDODRINE HYDROCHLORIDE 5 MG: 5 TABLET ORAL at 18:14

## 2021-05-23 RX ADMIN — ATORVASTATIN CALCIUM 40 MG: 40 TABLET, FILM COATED ORAL at 08:40

## 2021-05-23 RX ADMIN — SODIUM CHLORIDE, PRESERVATIVE FREE 40 ML: 5 INJECTION INTRAVENOUS at 20:33

## 2021-05-23 RX ADMIN — POLYETHYLENE GLYCOL 3350 17 G: 17 POWDER, FOR SOLUTION ORAL at 21:00

## 2021-05-23 RX ADMIN — DOCUSATE SODIUM 100 MG: 100 CAPSULE, LIQUID FILLED ORAL at 20:29

## 2021-05-23 RX ADMIN — BUMETANIDE 1 MG: 1 TABLET ORAL at 08:40

## 2021-05-23 RX ADMIN — CARVEDILOL 3.12 MG: 3.12 TABLET, FILM COATED ORAL at 18:14

## 2021-05-23 RX ADMIN — ALLOPURINOL 100 MG: 100 TABLET ORAL at 08:41

## 2021-05-23 RX ADMIN — ASPIRIN 81 MG: 81 TABLET, COATED ORAL at 08:41

## 2021-05-23 RX ADMIN — CARVEDILOL 3.12 MG: 3.12 TABLET, FILM COATED ORAL at 08:41

## 2021-05-23 RX ADMIN — SODIUM CHLORIDE, PRESERVATIVE FREE 10 ML: 5 INJECTION INTRAVENOUS at 08:41

## 2021-05-23 RX ADMIN — BUMETANIDE 1 MG: 1 TABLET ORAL at 20:29

## 2021-05-23 RX ADMIN — LEVOTHYROXINE SODIUM 112 MCG: 112 TABLET ORAL at 05:29

## 2021-05-23 RX ADMIN — APIXABAN 2.5 MG: 2.5 TABLET, FILM COATED ORAL at 08:41

## 2021-05-23 RX ADMIN — APIXABAN 2.5 MG: 2.5 TABLET, FILM COATED ORAL at 20:29

## 2021-05-23 RX ADMIN — ALLOPURINOL 100 MG: 100 TABLET ORAL at 20:29

## 2021-05-23 ASSESSMENT — ENCOUNTER SYMPTOMS
CHEST TIGHTNESS: 0
COUGH: 0
BLOOD IN STOOL: 0
EYE REDNESS: 0
NAUSEA: 0
BACK PAIN: 0
CONSTIPATION: 1
SORE THROAT: 0
GASTROINTESTINAL NEGATIVE: 1
VOMITING: 0
CONSTIPATION: 0
ABDOMINAL PAIN: 0
EYE DISCHARGE: 0
EYE PAIN: 0
EYES NEGATIVE: 1
SHORTNESS OF BREATH: 0
WHEEZING: 0
DIARRHEA: 0
SHORTNESS OF BREATH: 1

## 2021-05-23 NOTE — PLAN OF CARE
adjust to condition or change in health will improve  Outcome: Ongoing     Problem: Health Behavior:  Goal: Identification of resources available to assist in meeting health care needs will improve  Description: Identification of resources available to assist in meeting health care needs will improve  Outcome: Ongoing     Problem: Nutritional:  Goal: Maintenance of adequate nutrition will improve  Description: Maintenance of adequate nutrition will improve  Outcome: Ongoing     Problem: Physical Regulation:  Goal: Signs and symptoms of infection will decrease  Description: Signs and symptoms of infection will decrease  Outcome: Ongoing  Goal: Complications related to the disease process, condition or treatment will be avoided or minimized  Description: Complications related to the disease process, condition or treatment will be avoided or minimized  Outcome: Ongoing     Problem: Safety:  Goal: Ability to remain free from injury will improve  Description: Ability to remain free from injury will improve  Outcome: Ongoing     Problem: Sensory:  Goal: Pain level will decrease  Description: Pain level will decrease  Outcome: Ongoing  Goal: General experience of comfort will improve  Description: General experience of comfort will improve  Outcome: Ongoing

## 2021-05-23 NOTE — PROGRESS NOTES
Patient name: Angie Pina  Patient : 1934  9:22 AM  ROOM 709  Patient Active Problem List   Diagnosis Code    Heme positive stool R19.5    Anemia of chronic kidney failure, stage 4 (severe) (HCC) N18.4, D63.1    Iron deficiency anemia secondary to inadequate dietary iron intake D50.8    H/O abnormal mammogram Z87.898    Primary osteoarthritis of right knee M17.11    Status post total right knee replacement Z96.651    Unilateral primary osteoarthritis, right knee M17.11    Other specified hypothyroidism E03.8    Encounter for BARBARA (erythropoietin stimulating agent) anemia management D64.9, Z79.899    Elevated INR R79.1    Hematuria R31.9    Acute blood loss anemia D62    CKD (chronic kidney disease) N18.9    Palliative care patient Z51.5    Pancreatic mass K86.89    Fatigue R53.83    Elevated liver enzymes R74.8    Biliary obstruction K83.1    Anemia D64.9    Malignant neoplasm of other parts of pancreas (HCC) C25.7    New onset of congestive heart failure (HCC) I50.9    Acute respiratory failure with hypoxia (HCC) J96.01     Portions of this note have been copied forward, however, changed to reflect the most current clinical status of this patient. Subjective: Resting OK - Breathing OK this am      HISTORY OF PRESENT ILLNESS:   Easton Yao is a 81 yo female with multiple comorbidities to include a significant history of pancreatic adenocarcinoma currently receiving palliative chemotherapy. She received treatment Gemzar and Abraxane on 2021 and started to complain of worsening shortness of breath. She had a normal respiratory rate and saturation when she presented to clinic. After completion of treatment she started experiencing worsening dyspnea with hypoxemia and desaturation. No fever of chills. She was sent for a CXR that showed pulmonary congestion. Due to her worsening respiratory status she was sent to the ER where she was admitted.      She has a main bile duct with severe upstream biliary dilation, stricture was malignant appearing. Biliary sphincterectomy was performed. 1 partially covered metal stent was placed into the common bile duct. · 2/8/2021-initiated palliative chemotherapy with Gemzar 750 mg/m² and Abraxane 90 mg/m² every 2 weeks, this is a dose reduction by 25%  · 3/23/2021- CA 19-9 108, improved compared to pretreatment value of 236 on 1/20/2021. · 4/13/2021 CT Abdomen/Pelvis with contrast documented a poorly defined complex mass in the head of the pancreas. It appears to have decreased in size when remeasured at the same level and in same dimension as in the previous study (3 cm x 2.5 cm, compared to 3.7 cm x 3.3 cm). Persistent moderate dilatation of the pancreatic duct. There are 2 additional small cystic nodules in the body of the pancreas measuring 11 mm and 9 mm. A biliary stent in place and decompression of the intrahepatic biliary ducts since the previous study. 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 The diverticulosis of the distal colon with no evidence for diverticulitis. · 5/4/2021- dose reduction by 25% due to severe fatigue and nausea, starting with cycle 4 will receive Gemzar 600 mg/m² and Abraxane 75 mg/m². HEMATOLOGY HISTORY:  [place] was seen in initial hematology consultation on 7/20/2018 for further evaluation and treatment recommendations for anemia. [place] was referred from Dr. Juan F Carr. [place] presented with no significant complaints other than chronic fatigue and constipation. She had been taking ferrous sulfate 325 mg daily under the direction of Dr. Sven Arevalo. [place] reported significant constipation and some GI upset with the oral iron. She denied any bleeding tendencies to include hematuria, melena, hematochezia and epistaxis.    [place] had a colonoscopy on 4/26/2016 by Dr. Petty Gonzales for further evaluation of iron studies on 3/8/2019:  · Creatinine 1.7/GFR 30.4   · Iron 80   · Iron saturation 26.5%   · TIBC 302   · Ferritin 144   · Vitamin B12 501   · Folate 13.9     Serology studies on 3/5/2020:  · Creatinine 1.3/GFR 41.4  · Iron 84  · TIBC 390  · Iron saturation 21.5%  · Ferritin 342     Iron substrates on 6/25/2020  · Ferritin 311  · Iron 76  · Iron saturation 23%  · TIBC 326  · Creatinine 1.3/GFR 39     Labs on 12/2/2020 and 12/3/2020:  · Iron 35  · TIBC 213  · Iron saturation 16%  · Vitamin B12 483  · Folate 10.6  · Ferritin 480     Labs on 3/23/2021  · Iron 54  · TIBC 366  · Iron saturation 15  · Ferritin 530  · Reticulocytes 2.1     Labs on 4/6/2021  · WBC 14.40  · RBC 2.46  · HGB 8.6  · HCT 26.4  · .3  · MCH 35  · MCHC 32.6  · ANC 12.03  · ,000  · Iron 43   · TIBC 226  · Iron saturation 19%   · Ferritin 1088.0  · B-12= 781  · GFR 39  · BUN 21  · Creatinine 1.3  · Phosphorus 2.4  · Magnesium 1.5        Age-appropriate health screening:  · Colonoscopy on 10/4/2018 by Dr. Ana Simmons was documented as removal of 2 polyps with benign pathology. No evidence of bleeding. · 9/20/2019 B[facility] mammogram at [facility] documented no mammographic evidence of malignancy. No bone mineral density on file     Review of Systems   Constitutional: Positive for activity change, appetite change and fatigue. Negative for chills, diaphoresis and fever. HENT: Negative. Negative for congestion, ear pain, hearing loss, nosebleeds, sore throat and tinnitus. Eyes: Negative. Negative for pain, discharge and redness. Respiratory: Positive for shortness of breath. Negative for cough and wheezing. Cardiovascular: Positive for leg swelling (Improving). Negative for chest pain and palpitations. Gastrointestinal: Negative. Negative for abdominal pain, blood in stool, constipation, diarrhea, nausea and vomiting. Endocrine: Negative for polydipsia.    Genitourinary: Negative for dysuria, flank pain, frequency, hematuria and urgency. Musculoskeletal: Negative. Negative for back pain, myalgias and neck pain. Skin: Negative. Negative for rash. Neurological: Positive for weakness. Negative for dizziness, tremors, seizures and headaches. Hematological: Does not bruise/bleed easily. Psychiatric/Behavioral: Negative. The patient is not nervous/anxious. Current Pain Assessment  Pain Assessment  Pain Assessment: 0-10  Pain Level: 0  Patient's Stated Pain Goal: No pain    OBJECTIVE:  VITALS: BP (!) 108/42 Comment: MANUAL CUFF  Pulse 69   Temp 97.9 °F (36.6 °C)   Resp 19   Ht 5' 3\" (1.6 m)   Wt 163 lb 0.4 oz (73.9 kg)   SpO2 95%   BMI 28.88 kg/m²   I&O:     Intake/Output Summary (Last 24 hours) at 5/23/2021 3996  Last data filed at 5/22/2021 2100  Gross per 24 hour   Intake 586.25 ml   Output 750 ml   Net -163.75 ml         Physical Exam  Vitals reviewed. Constitutional:       General: She is not in acute distress. Appearance: She is well-developed. She is ill-appearing. She is not diaphoretic. HENT:      Head: Normocephalic and atraumatic. Mouth/Throat:      Pharynx: Uvula midline. Tonsils: No tonsillar exudate. Eyes:      General: Lids are normal. No scleral icterus. Right eye: No discharge. Left eye: No discharge. Conjunctiva/sclera: Conjunctivae normal.      Pupils: Pupils are equal, round, and reactive to light. Neck:      Thyroid: No thyroid mass or thyromegaly. Vascular: No JVD. Trachea: Trachea normal. No tracheal deviation. Cardiovascular:      Rate and Rhythm: Normal rate and regular rhythm. Heart sounds: Normal heart sounds. No murmur heard. No friction rub. No gallop. Pulmonary:      Effort: Pulmonary effort is normal. No respiratory distress. Breath sounds: Decreased breath sounds present. No wheezing or rales. Comments: Fine crackles, significantly improved  Chest:      Chest wall: No tenderness.    Abdominal:      General: Bowel input(s): LABURIN in the last 720 hours. Organism Recent : No results for input(s): ORG in the last 720 hours. Radiology:   XR CHEST (2 VW)    Result Date: 5/18/2021  Examination. XR CHEST (2 VW) 5/18/2021 2:20 PM History: Severe shortness of breath. The frontal and lateral views of the chest are obtained and compared with the previous study dated 5/4/2021. There is bilateral interstitial prominence which may represent pulmonary vascular congestion/edema. This was not noted in the previous study. There is a trace bibasal pleural effusion. There is cardiomegaly. The atheromatous changes thoracic aorta are noted. A right internal jugular approach MediPort system is seen in place. No change. There is moderate diffuse osteopenia. No focal bony abnormality. Old healed fracture of the right clavicle and residual deformity is similar to the previous study. Pulmonary vascular congestion and pulmonary edema. A trace bibasal pleural effusion. Cardiomegaly. Signed by Dr Saul Alberto on 5/18/2021 3:41 PM    XR CHEST (2 VW)    Result Date: 5/4/2021  XR CHEST (2 VW) 5/4/2021 3:49 PM Two-view chest: History: Cough, shortness of air Frontal and lateral projection chest radiograph obtained. Comparison:  Chest radiography dated 12/1/2020 and 12/12/2019 and 12/21/2020 chest CT. Findings: COPD and chronic lung changes identified. The interstitial markings are minimally prominent relative to the prior chest radiographs, mild acute bronchitis considered. There are no parenchymal infiltrates. The heart is normal in size without evidence of heart failure. Right-sided port catheter identified. There are no acute osseous abnormalities. .                                                                                    Impression: 1. COPD and chronic lung changes. 2. Mild interstitial prominence, acute bronchitis considered, no parenchymal infiltration.  Signed by Dr Uma Boucher on 5/4/2021 5:07 PM    XR FOOT LEFT (MIN 3 VIEWS)    Result Date: 2021  EXAMINATION:  XR FOOT LEFT (MIN 3 VIEWS)  2021 3:09 PM HISTORY: The patient fell this morning. Left foot pain. COMPARISON: No comparison study. TECHNIQUE: 3 views were obtained. FINDINGS:  There is a nondisplaced fracture of the distal metaphysis of the proximal phalanx of the fourth toe. There has been prior resection of the fifth toe. There is narrowing of the interphalangeal joint spaces of the second through fourth toes. There are hammertoe deformities of these toes. There is minimal calcaneal spurring and enthesopathy. There is soft tissue swelling of the foot. 1. Acute fracture of the fourth toe, as described. Soft tissue swelling of the foot. 2. Hammertoe deformities of the second through fourth toes. Prior fifth toe amputation. 3. Calcaneal spurring and enthesopathy. Signed by Dr Tiffanie Zimmerman on 2021 3:12 PM    XR CHEST PORTABLE    Result Date: 2021  EXAMINATION: Chest one view 2021 HISTORY: Shortness of breath. FINDINGS: Upright frontal projection of the chest demonstrates congestive heart failure with cardiomegaly, vascular redistribution and interstitial pulmonary edema. Small effusions are present. 1.. Congestive heart failure with interstitial and early alveolar edema.  Signed by Dr Elijah De La O on 2021 6:38 PM      Medications  Current Facility-Administered Medications   Medication Dose Route Frequency Provider Last Rate Last Admin    midodrine (PROAMATINE) tablet 5 mg  5 mg Oral TID  Jair Laureano MD   5 mg at 21 0846    bumetanide (BUMEX) tablet 1 mg  1 mg Oral BID Jair Laureano MD   1 mg at 21 0840    carvedilol (COREG) tablet 3.125 mg  3.125 mg Oral BID NAGA Strickland MD   3.125 mg at 21 0841    aspirin EC tablet 81 mg  81 mg Oral Daily Dionicio San MD   81 mg at 21 0841    nitroGLYCERIN (NITROSTAT) SL tablet 0.4 mg  0.4 mg Sublingual Once Willie Ferguson MD        sodium chloride flush 0.9 % injection 5-40 mL  5-40 mL Intravenous 2 times per day Daphne Fossa, APRN - CNP   10 mL at 05/23/21 0841    sodium chloride flush 0.9 % injection 5-40 mL  5-40 mL Intravenous PRN Daphne Fossa, APRN - CNP        0.9 % sodium chloride infusion  25 mL Intravenous PRN Daphne Fossa, APRN - CNP        promethazine (PHENERGAN) tablet 12.5 mg  12.5 mg Oral Q6H PRN Daphne Fossa, APRN - CNP        Or    ondansetron (ZOFRAN) injection 4 mg  4 mg Intravenous Q6H PRN Daphne Fossa, APRN - CNP        polyethylene glycol (GLYCOLAX) packet 17 g  17 g Oral Daily PRN Daphne Fossa, APRN - CNP        acetaminophen (TYLENOL) tablet 650 mg  650 mg Oral Q6H PRN Daphne Fossa, APRN - CNP        Or    acetaminophen (TYLENOL) suppository 650 mg  650 mg Rectal Q6H PRN Daphne Fossa, APRN - CNP        potassium chloride (KLOR-CON M) extended release tablet 40 mEq  40 mEq Oral PRN Daphne Fossa, APRN - CNP        Or    potassium bicarb-citric acid (EFFER-K) effervescent tablet 40 mEq  40 mEq Oral PRN Daphne Fossa, APRN - CNP        Or    potassium chloride 10 mEq/100 mL IVPB (Peripheral Line)  10 mEq Intravenous PRN Daphne Fossa, APRN - CNP        magnesium sulfate 2000 mg in 50 mL IVPB premix  2,000 mg Intravenous PRN Daphne Fossa, APRN - CNP        apixaban (ELIQUIS) tablet 2.5 mg  2.5 mg Oral BID Daphne Fossa, APRN - CNP   2.5 mg at 05/23/21 0841    docusate sodium (COLACE) capsule 100 mg  100 mg Oral BID Daphne Fossa, APRN - CNP   100 mg at 05/23/21 0841    levothyroxine (SYNTHROID) tablet 112 mcg  112 mcg Oral Daily Daphne Fossa, APRN - CNP   112 mcg at 05/23/21 0529    [Held by provider] lisinopril (PRINIVIL;ZESTRIL) tablet 10 mg  10 mg Oral Daily Daphne Fossa, APRN - CNP   10 mg at 05/19/21 0851    atorvastatin (LIPITOR) tablet 40 mg  40 mg Oral Daily Daphne Fossa, APRN - CNP   40 mg at 05/23/21 0840    allopurinol (ZYLOPRIM) tablet 100 mg  100 mg Oral BID Kd Larose MD   100 mg at 05/23/21 1024       Allergies  Penicillins    ASSESSMENT:  #Decompensated Heart Failure  No prior echo available  BNP 7081 on 5/18/2021  S/p diuretics in the ER with symptomatic improvement  BNP 4830 on 5/21/2021  Cardiology assisting    2D echocardiogram on 5/19/2021:  · Left ventricular chamber size is borderline dilated. .  · Mild concentric left ventricular hypertrophy. · Left ventricular systolic function is severely reduced  · Left ventricular ejection fraction is visually estimated at 15%. Severe left ventricular global hypokinesis. · There is mild mitral regurgitation. · The left atrium is moderately dilated. Hue scan 5/21/2021  Moderately severely impaired left ventricular systolic function of 22%  wall motion underbodies noted suggestion of defects distal  anteroseptal apical and inferior wall with some reversibility present  correlate clinically and/or angiographically if clinically appropriate    Dr. Michael Rose recommends medical management. Coreg 3.125 mg twice daily started   Bumex 1 mg twice daily     #Renal impairment         #Pancreatic cancer  On palliative treatment with dose reduced Gemzar and Abraxane, cycle 4-day 1 on 5/18/2021.        #Anemia of chronic disease, macrocytic , enhanced by chemotherapy                Receives Retacrit 40,000 units subcu for hemoglobin <10, will consider next dose at follow-up appointment in clinic    Hgb 7.7 today     #Palliative care patient-frail  Palliative care assisting    PLAN:  Continue supportive care   Monitor labs  Monitor renal function closely, consider nephrology consultation -she sees Dr. Jacobo Grimaldo care assistance  Encourage increasing activity as tolerated      Hyacinth Porras PA-C    05/23/21  9:22 AM    Physician's attestation and contribution:  I, Dr Gerry Toledo, personally and independently performed an evaluation on

## 2021-05-23 NOTE — PLAN OF CARE
Problem: Falls - Risk of:  Goal: Will remain free from falls  Description: Will remain free from falls  5/23/2021 1450 by Maxim Friedman RN  Outcome: Ongoing  5/23/2021 0403 by Altagracia Perales RN  Outcome: Ongoing  Goal: Absence of physical injury  Description: Absence of physical injury  5/23/2021 1450 by Maxim Friedman RN  Outcome: Ongoing  5/23/2021 0403 by Altagracia Perales RN  Outcome: Ongoing     Problem: Cardiac:  Goal: Ability to maintain an adequate cardiac output will improve  Description: Ability to maintain an adequate cardiac output will improve  5/23/2021 1450 by Maxim Friedman RN  Outcome: Ongoing  5/23/2021 0403 by Altagracia Perales RN  Outcome: Ongoing  Goal: Hemodynamic stability will improve  Description: Hemodynamic stability will improve  5/23/2021 1450 by Maxim Friedman RN  Outcome: Ongoing  5/23/2021 0403 by Altagracia Perales RN  Outcome: Ongoing  Goal: Ability to maintain adequate ventilation will improve  Description: Ability to maintain adequate ventilation will improve  5/23/2021 1450 by Maxim Friedman RN  Outcome: Ongoing  5/23/2021 0403 by Altagracia Perales RN  Outcome: Ongoing  Goal: Ability to achieve and maintain adequate cardiopulmonary perfusion will improve  Description: Ability to achieve and maintain adequate cardiopulmonary perfusion will improve  5/23/2021 1450 by Maxim Friedman RN  Outcome: Ongoing  5/23/2021 0403 by Altagracia Perales RN  Outcome: Ongoing     Problem: Fluid Volume:  Goal: Ability to achieve and maintain adequate urine output will improve  Description: Ability to achieve and maintain adequate urine output will improve  5/23/2021 1450 by Maxim Friedman RN  Outcome: Ongoing  5/23/2021 0403 by Altagracia Perales RN  Outcome: Ongoing     Problem: Respiratory:  Goal: Respiratory status will improve  Description: Respiratory status will improve  5/23/2021 1450 by Maxim Friedman RN  Outcome: Ongoing  5/23/2021 0403 by Altagracia Perales RN  Outcome: Ongoing     Problem: Skin Integrity:  Goal: Will show no infection signs and symptoms  Description: Will show no infection signs and symptoms  5/23/2021 1450 by Faheem Frey RN  Outcome: Ongoing  5/23/2021 0403 by Jenni Ferguson RN  Outcome: Ongoing  Goal: Absence of new skin breakdown  Description: Absence of new skin breakdown  5/23/2021 1450 by Faheem Frey RN  Outcome: Ongoing  5/23/2021 0403 by Jenni Ferguson RN  Outcome: Ongoing     Problem:  Activity:  Goal: Fatigue will decrease  Description: Fatigue will decrease  5/23/2021 1450 by Faheem Frey RN  Outcome: Ongoing  5/23/2021 0403 by Jenni Ferguson RN  Outcome: Ongoing  Goal: Ability to tolerate increased activity will improve  Description: Ability to tolerate increased activity will improve  5/23/2021 1450 by Faheem Frey RN  Outcome: Ongoing  5/23/2021 0403 by Jenni Ferguson RN  Outcome: Ongoing  Goal: Ability to maintain optimal joint mobility will improve  Description: Ability to maintain optimal joint mobility will improve  5/23/2021 1450 by Faheem Frey RN  Outcome: Ongoing  5/23/2021 0403 by Jenni Ferguson RN  Outcome: Ongoing     Problem: Coping:  Goal: Ability to adjust to condition or change in health will improve  Description: Ability to adjust to condition or change in health will improve  5/23/2021 1450 by Faheem Frey RN  Outcome: Ongoing  5/23/2021 0403 by Jenni Ferguson RN  Outcome: Ongoing     Problem: Health Behavior:  Goal: Identification of resources available to assist in meeting health care needs will improve  Description: Identification of resources available to assist in meeting health care needs will improve  5/23/2021 1450 by Faheem Frey RN  Outcome: Ongoing  5/23/2021 0403 by Jenni Ferguson RN  Outcome: Ongoing     Problem: Nutritional:  Goal: Maintenance of adequate nutrition will improve  Description: Maintenance of adequate nutrition will improve  5/23/2021 1450 by Faheem Frey RN  Outcome: Ongoing  5/23/2021 0403 by Jannette Partida RN  Outcome: Ongoing     Problem: Physical Regulation:  Goal: Signs and symptoms of infection will decrease  Description: Signs and symptoms of infection will decrease  5/23/2021 1450 by Casie Nava RN  Outcome: Ongoing  5/23/2021 0403 by Jannette Partida RN  Outcome: Ongoing  Goal: Complications related to the disease process, condition or treatment will be avoided or minimized  Description: Complications related to the disease process, condition or treatment will be avoided or minimized  5/23/2021 1450 by Casie Nava RN  Outcome: Ongoing  5/23/2021 0403 by Jannette Partida RN  Outcome: Ongoing     Problem: Safety:  Goal: Ability to remain free from injury will improve  Description: Ability to remain free from injury will improve  5/23/2021 1450 by Casie Nava RN  Outcome: Ongoing  5/23/2021 0403 by Jannette Partida RN  Outcome: Ongoing     Problem: Sensory:  Goal: Pain level will decrease  Description: Pain level will decrease  5/23/2021 1450 by Casie Nava RN  Outcome: Ongoing  5/23/2021 0403 by Jannette Partida RN  Outcome: Ongoing  Goal: General experience of comfort will improve  Description: General experience of comfort will improve  5/23/2021 1450 by Casie Nava RN  Outcome: Ongoing  5/23/2021 0403 by Jannette Partida RN  Outcome: Ongoing

## 2021-05-23 NOTE — PROGRESS NOTES
[id number] [facility]      Patient:  Jairo Barragan  YOB: 1934  Date of Service: 5/23/2021  MRN: [id number]   Acct: [de-identified]   Primary Care Physician: Yusra Williamson MD  Advance Directive: [facility]  Admit Date: 5/18/2021       Hospital Day: 5  Portions of this note have been copied forward, however, changed to reflect the most current clinical status of this patient. CHIEF COMPLAINT shortness of breath    SUBJECTIVE:  Patient states she feels better today. She denies shortness of breath at rest today but is still on oxygen    Hospital Course: The patient is M 62 y.o. female with a history of HTN, pancreatic and thyroid cancer, CKD, and anemia who presented to [facility] ED complaining of shortness of breath. The patient was placed on BiPap in the ED. The daughter reported to ED staff the patient has had shortness of breath over the last several days which has prevented the patient from laying down.  She denied any significant leg swelling (but endorses a small amount), cough, fevers,or chills.  Patient was receiving chemotherapy 5/18/2021 and stated she had to lay flat for her chemo but could not tolerate it for shortness of breath, however she was able to finish her treatment.  Infusion nurse reported her O2 sat was in the 63's.  Patient did have a chest x-ray obtained there which showed edema according to patient's daughter. Daughter and patient denied any history of heart failure, COPD, or other cardiac or pulmonary problems. ECHO showed EF 15%, and cardiology was consulted. Patient was given 80 mg Iv lasix in ED and began on 40 mg IV lasix BID and changed to PO on 5/20/2021 changed to bumex 1 mg BID on 5/21/21.  The patient was able to be transitioned from BiPap to nasal cannula at 5 LPM 5/19/2021 AM. As of 5/20/2021 this patient is requiring 5 LPM. Stress test on 5/21/2021 indicated ejection fraction 31% severely impaired multiple perfusion abnormalities consistent with ischemic cardiomyopathy in which cardiology recommended medical management as she is a poor surgical candidate, Coreg 3.125 mg BID added. 5/22/2021 Pt able to participate with PT/Ot. 1 Unit PRBC per oncology 5/22/2021. Will continue with Bumex, renal functions stable at this time. Pt continues to require 5 LPM via nasal cannula, nursing and RT to wean as tolerated. midodrine added for hypotension 5/23/2021. Review of Systems:   Review of Systems   Constitutional: Positive for activity change and fatigue. Respiratory: Negative for chest tightness and shortness of breath. Cardiovascular: Negative for chest pain, palpitations and leg swelling. Gastrointestinal: Positive for constipation. Negative for abdominal pain and nausea. Genitourinary: Negative for difficulty urinating, flank pain and frequency. Musculoskeletal: Negative for back pain and myalgias. Neurological: Positive for weakness. Negative for dizziness and light-headedness. Psychiatric/Behavioral: Negative for confusion. 14 point review of systems is negative except as specifically addressed above. Objective:   VITALS:  /60   Pulse 77   Temp 97.8 °F (36.6 °C) (Temporal)   Resp 18   Ht 5' 3\" (1.6 m)   Wt 163 lb 0.4 oz (73.9 kg)   SpO2 92%   BMI 28.88 kg/m²   24HR INTAKE/OUTPUT:      Intake/Output Summary (Last 24 hours) at 5/23/2021 1254  Last data filed at 5/23/2021 0930  Gross per 24 hour   Intake 706.25 ml   Output 425 ml   Net 281.25 ml       Physical Exam  Constitutional:       Appearance: Normal appearance. HENT:      Head: Normocephalic and atraumatic. Nose: Congestion present. Mouth/Throat:      Mouth: Mucous membranes are moist.      Pharynx: Oropharynx is clear. Eyes:      Extraocular Movements: Extraocular movements intact. Conjunctiva/sclera: Conjunctivae normal.      Pupils: Pupils are equal, round, and reactive to light. Cardiovascular:      Rate and Rhythm: Normal rate and regular rhythm.       Pulses: Normal pulses. Heart sounds: Normal heart sounds. Pulmonary:      Effort: Pulmonary effort is normal.      Breath sounds: Normal breath sounds. Abdominal:      General: Bowel sounds are normal. There is no distension. Palpations: Abdomen is soft. Musculoskeletal:         General: Normal range of motion. Cervical back: Normal range of motion and neck supple. Skin:     General: Skin is warm and dry. Capillary Refill: Capillary refill takes less than 2 seconds. Neurological:      Mental Status: She is alert and oriented to person, place, and time. Psychiatric:         Mood and Affect: Mood normal.         Behavior: Behavior normal.       Medications:      sodium chloride        midodrine  5 mg Oral TID WC    bumetanide  1 mg Oral BID    carvedilol  3.125 mg Oral BID WC    aspirin  81 mg Oral Daily    nitroGLYCERIN  0.4 mg Sublingual Once    sodium chloride flush  5-40 mL Intravenous 2 times per day    apixaban  2.5 mg Oral BID    docusate sodium  100 mg Oral BID    levothyroxine  112 mcg Oral Daily    [Held by provider] lisinopril  10 mg Oral Daily    atorvastatin  40 mg Oral Daily    allopurinol  100 mg Oral BID     sodium chloride flush, sodium chloride, promethazine **OR** ondansetron, polyethylene glycol, acetaminophen **OR** acetaminophen, potassium chloride **OR** potassium alternative oral replacement **OR** potassium chloride, magnesium sulfate  DIET CARDIAC;     Lab and other Data:     Recent Labs     05/21/21  0551 05/22/21  0259 05/22/21  2330 05/23/21  0550   WBC 10.3 8.8  --  8.7   HGB 8.7* 7.4* 8.2* 7.7*    260  --  225     Recent Labs     05/21/21  0551 05/22/21  0259 05/23/21  0550    139 138   K 4.3 4.1 3.6   CL 95* 98 96*   CO2 35* 34* 35*   BUN 39* 44* 51*   CREATININE 2.1* 2.0* 2.3*   GLUCOSE 138* 114* 116*     No results for input(s): AST, ALT, ALB, BILITOT, ALKPHOS in the last 72 hours.   Troponin T: No results for input(s): TROPONINI in the last 72 hours. Pro-BNP: No results for input(s): BNP in the last 72 hours. INR: No results for input(s): INR in the last 72 hours. UA:No results for input(s): NITRITE, COLORU, PHUR, LABCAST, WBCUA, RBCUA, MUCUS, TRICHOMONAS, YEAST, BACTERIA, CLARITYU, SPECGRAV, LEUKOCYTESUR, UROBILINOGEN, BILIRUBINUR, BLOODU, GLUCOSEU, AMORPHOUS in the last 72 hours. Invalid input(s): Remy Camacho  A1C: No results for input(s): LABA1C in the last 72 hours. ABG:No results for input(s): PHART, KTZ0MKR, PO2ART, VJS6KOI, BEART, HGBAE, W4AXLLDC, CARBOXHGBART in the last 72 hours. RAD:   XR CHEST (2 VW)    Result Date: 5/18/2021  Pulmonary vascular congestion and pulmonary edema. A trace bibasal pleural effusion. Cardiomegaly. Signed by Dr Ben Juárez on 5/18/2021 3:41 PM    XR CHEST PORTABLE    Result Date: 5/18/2021  1. . Congestive heart failure with interstitial and early alveolar edema. Signed by Dr Richy Roldan on 5/18/2021 6:38 PM       Echo:   Summary   Left ventricular chamber size is borderline dilated.  Joe Rosmery concentric left ventricular hypertrophy.   Left ventricular systolic function is severely reduced   Left ventricular ejection fraction is visually estimated at 15%.   Severe left ventricular global hypokinesis.   There is mild mitral regurgitation.   The left atrium is moderately dilated.         Assessment/Plan   New onset of congestive heart failure ([address]              -ECHO ef estimated at 15%              -Bumex 1 mg PO BID              -Hold lisinopril              - BNP 4830, trend every 3 days              -troponins negative              -monitor and replace electrolytes              -continue statin therapy              -strict I&o, q4 hour vital signs              -iron studies              -contniue to monitor and wean O2 to maintain sat of 92-94%, will obtain home o2 eval              -chest xray consistent with CHF              -stress test indicated ejection fraction 31% severely impaired multiple perfusion abnormalities consistent with ischemic cardiomyopathy              -cardiology following and recommends medical management              -Coreg 3.125 mg BID added              -PT/OT eval   -midodrine added for hypotension     Active Problems:    CKD (chronic kidney disease)              -monitor kidney functions, appears he baseline is 2-2. 3              -continue Bumex              - daily weights              -UUBLXE I&o              -continue iron supplementation and calcitrol                  Malignant neoplasm of other parts of pancreas [facility])              -oncology following              - palliative care following              -1 unit PRBC 5/22/21     DVT prophylaxis: On ALBERTO Corey - CNP, 5/23/2021 12:54 PM

## 2021-05-24 LAB
ANION GAP SERPL CALCULATED.3IONS-SCNC: 7 MMOL/L (ref 7–19)
ANISOCYTOSIS: ABNORMAL
ATYPICAL LYMPHOCYTE RELATIVE PERCENT: 1 % (ref 0–8)
BASOPHILS ABSOLUTE: 0 K/UL (ref 0–0.2)
BASOPHILS RELATIVE PERCENT: 0 % (ref 0–1)
BILIRUBIN URINE: NEGATIVE
BLOOD, URINE: NEGATIVE
BUN BLDV-MCNC: 57 MG/DL (ref 8–23)
CALCIUM SERPL-MCNC: 9.5 MG/DL (ref 8.8–10.2)
CHLORIDE BLD-SCNC: 93 MMOL/L (ref 98–111)
CLARITY: CLEAR
CO2: 35 MMOL/L (ref 22–29)
COLOR: YELLOW
CREAT SERPL-MCNC: 2.6 MG/DL (ref 0.5–0.9)
CREATININE URINE: 62.1 MG/DL (ref 4.2–622)
EOSINOPHILS ABSOLUTE: 0.29 K/UL (ref 0–0.6)
EOSINOPHILS RELATIVE PERCENT: 5 % (ref 0–5)
GFR AFRICAN AMERICAN: 21
GFR NON-AFRICAN AMERICAN: 17
GLUCOSE BLD-MCNC: 117 MG/DL (ref 74–109)
GLUCOSE URINE: NEGATIVE MG/DL
HCT VFR BLD CALC: 24.5 % (ref 37–47)
HEMOGLOBIN: 8 G/DL (ref 12–16)
IMMATURE GRANULOCYTES #: 0.1 K/UL
KETONES, URINE: NEGATIVE MG/DL
LEUKOCYTE ESTERASE, URINE: NEGATIVE
LYMPHOCYTES ABSOLUTE: 0.4 K/UL (ref 1.1–4.5)
LYMPHOCYTES RELATIVE PERCENT: 6 % (ref 20–40)
MACROCYTES: ABNORMAL
MCH RBC QN AUTO: 33.3 PG (ref 27–31)
MCHC RBC AUTO-ENTMCNC: 32.7 G/DL (ref 33–37)
MCV RBC AUTO: 102.1 FL (ref 81–99)
MONOCYTES ABSOLUTE: 0 K/UL (ref 0–0.9)
MONOCYTES RELATIVE PERCENT: 0 % (ref 0–10)
NEUTROPHILS ABSOLUTE: 5.1 K/UL (ref 1.5–7.5)
NEUTROPHILS RELATIVE PERCENT: 88 % (ref 50–65)
NITRITE, URINE: NEGATIVE
PDW BLD-RTO: 24.4 % (ref 11.5–14.5)
PH UA: 5.5 (ref 5–8)
PLATELET # BLD: 215 K/UL (ref 130–400)
PMV BLD AUTO: 11.3 FL (ref 9.4–12.3)
POTASSIUM SERPL-SCNC: 3.7 MMOL/L (ref 3.5–5)
PRO-BNP: 3176 PG/ML (ref 0–1800)
PROTEIN PROTEIN: 12 MG/DL (ref 15–45)
PROTEIN UA: NEGATIVE MG/DL
RBC # BLD: 2.4 M/UL (ref 4.2–5.4)
SODIUM BLD-SCNC: 135 MMOL/L (ref 136–145)
SODIUM URINE: 76 MMOL/L
SPECIFIC GRAVITY UA: 1.01 (ref 1–1.03)
UREA NITROGEN, UR: 291 MG/DL
UROBILINOGEN, URINE: 0.2 E.U./DL
WBC # BLD: 5.8 K/UL (ref 4.8–10.8)

## 2021-05-24 PROCEDURE — 2140000000 HC CCU INTERMEDIATE R&B

## 2021-05-24 PROCEDURE — 80048 BASIC METABOLIC PNL TOTAL CA: CPT

## 2021-05-24 PROCEDURE — 99497 ADVNCD CARE PLAN 30 MIN: CPT | Performed by: NURSE PRACTITIONER

## 2021-05-24 PROCEDURE — 6370000000 HC RX 637 (ALT 250 FOR IP): Performed by: HOSPITALIST

## 2021-05-24 PROCEDURE — 81003 URINALYSIS AUTO W/O SCOPE: CPT

## 2021-05-24 PROCEDURE — 2580000003 HC RX 258: Performed by: NURSE PRACTITIONER

## 2021-05-24 PROCEDURE — 84156 ASSAY OF PROTEIN URINE: CPT

## 2021-05-24 PROCEDURE — 84300 ASSAY OF URINE SODIUM: CPT

## 2021-05-24 PROCEDURE — 83880 ASSAY OF NATRIURETIC PEPTIDE: CPT

## 2021-05-24 PROCEDURE — 94761 N-INVAS EAR/PLS OXIMETRY MLT: CPT

## 2021-05-24 PROCEDURE — 82570 ASSAY OF URINE CREATININE: CPT

## 2021-05-24 PROCEDURE — 2700000000 HC OXYGEN THERAPY PER DAY

## 2021-05-24 PROCEDURE — 84540 ASSAY OF URINE/UREA-N: CPT

## 2021-05-24 PROCEDURE — 85025 COMPLETE CBC W/AUTO DIFF WBC: CPT

## 2021-05-24 PROCEDURE — 99233 SBSQ HOSP IP/OBS HIGH 50: CPT | Performed by: NURSE PRACTITIONER

## 2021-05-24 PROCEDURE — 99232 SBSQ HOSP IP/OBS MODERATE 35: CPT | Performed by: INTERNAL MEDICINE

## 2021-05-24 PROCEDURE — 97530 THERAPEUTIC ACTIVITIES: CPT

## 2021-05-24 PROCEDURE — 6370000000 HC RX 637 (ALT 250 FOR IP): Performed by: INTERNAL MEDICINE

## 2021-05-24 PROCEDURE — 94660 CPAP INITIATION&MGMT: CPT

## 2021-05-24 PROCEDURE — 6370000000 HC RX 637 (ALT 250 FOR IP): Performed by: NURSE PRACTITIONER

## 2021-05-24 RX ORDER — ALLOPURINOL 100 MG/1
100 TABLET ORAL DAILY
Status: DISCONTINUED | OUTPATIENT
Start: 2021-05-24 | End: 2021-05-26 | Stop reason: HOSPADM

## 2021-05-24 RX ORDER — MIDODRINE HYDROCHLORIDE 10 MG/1
10 TABLET ORAL
Status: DISCONTINUED | OUTPATIENT
Start: 2021-05-24 | End: 2021-05-26 | Stop reason: HOSPADM

## 2021-05-24 RX ADMIN — BUMETANIDE 1 MG: 1 TABLET ORAL at 20:20

## 2021-05-24 RX ADMIN — MIDODRINE HYDROCHLORIDE 10 MG: 10 TABLET ORAL at 18:23

## 2021-05-24 RX ADMIN — DOCUSATE SODIUM 100 MG: 100 CAPSULE, LIQUID FILLED ORAL at 10:21

## 2021-05-24 RX ADMIN — APIXABAN 2.5 MG: 2.5 TABLET, FILM COATED ORAL at 10:21

## 2021-05-24 RX ADMIN — DOCUSATE SODIUM 100 MG: 100 CAPSULE, LIQUID FILLED ORAL at 20:20

## 2021-05-24 RX ADMIN — CARVEDILOL 3.12 MG: 3.12 TABLET, FILM COATED ORAL at 18:23

## 2021-05-24 RX ADMIN — CARVEDILOL 3.12 MG: 3.12 TABLET, FILM COATED ORAL at 10:21

## 2021-05-24 RX ADMIN — BUMETANIDE 1 MG: 1 TABLET ORAL at 10:22

## 2021-05-24 RX ADMIN — SODIUM CHLORIDE, PRESERVATIVE FREE 10 ML: 5 INJECTION INTRAVENOUS at 20:20

## 2021-05-24 RX ADMIN — ALLOPURINOL 100 MG: 100 TABLET ORAL at 10:24

## 2021-05-24 RX ADMIN — APIXABAN 2.5 MG: 2.5 TABLET, FILM COATED ORAL at 20:20

## 2021-05-24 RX ADMIN — LEVOTHYROXINE SODIUM 112 MCG: 112 TABLET ORAL at 05:26

## 2021-05-24 RX ADMIN — ASPIRIN 81 MG: 81 TABLET, COATED ORAL at 10:21

## 2021-05-24 RX ADMIN — SODIUM CHLORIDE, PRESERVATIVE FREE 10 ML: 5 INJECTION INTRAVENOUS at 10:22

## 2021-05-24 RX ADMIN — ATORVASTATIN CALCIUM 40 MG: 40 TABLET, FILM COATED ORAL at 10:21

## 2021-05-24 RX ADMIN — MIDODRINE HYDROCHLORIDE 10 MG: 10 TABLET ORAL at 12:52

## 2021-05-24 ASSESSMENT — ENCOUNTER SYMPTOMS
CONSTIPATION: 0
VOMITING: 1
SHORTNESS OF BREATH: 0
DIARRHEA: 0
TROUBLE SWALLOWING: 0
SORE THROAT: 0
ABDOMINAL DISTENTION: 0
BLOOD IN STOOL: 0
WHEEZING: 0
NAUSEA: 1
ABDOMINAL PAIN: 0
SINUS PAIN: 0
COUGH: 0

## 2021-05-24 ASSESSMENT — PAIN SCALES - GENERAL
PAINLEVEL_OUTOF10: 0
PAINLEVEL_OUTOF10: 0

## 2021-05-24 NOTE — CONSULTS
aspirin EC tablet 81 mg  81 mg Oral Daily Eugenie Kerns MD   81 mg at 05/24/21 1021    nitroGLYCERIN (NITROSTAT) SL tablet 0.4 mg  0.4 mg Sublingual Once August MD Cholo        sodium chloride flush 0.9 % injection 5-40 mL  5-40 mL Intravenous 2 times per day Reino Boots, APRN - CNP   10 mL at 05/24/21 1022    sodium chloride flush 0.9 % injection 5-40 mL  5-40 mL Intravenous PRN Reino Boots, APRN - CNP   40 mL at 05/23/21 2033    0.9 % sodium chloride infusion  25 mL Intravenous PRN Reino Boots, APRN - CNP        promethazine (PHENERGAN) tablet 12.5 mg  12.5 mg Oral Q6H PRN Reino Boots, APRN - CNP        Or    ondansetron (ZOFRAN) injection 4 mg  4 mg Intravenous Q6H PRN Reino Boots, APRN - CNP        polyethylene glycol (GLYCOLAX) packet 17 g  17 g Oral Daily PRN Reino Boots, APRN - CNP   17 g at 05/23/21 2100    acetaminophen (TYLENOL) tablet 650 mg  650 mg Oral Q6H PRN Reino Boots, APRN - CNP        Or    acetaminophen (TYLENOL) suppository 650 mg  650 mg Rectal Q6H PRN Reino Boots, APRN - CNP        potassium chloride (KLOR-CON M) extended release tablet 40 mEq  40 mEq Oral PRN Reino Boots, APRN - CNP        Or    potassium bicarb-citric acid (EFFER-K) effervescent tablet 40 mEq  40 mEq Oral PRN Reino Boots, APRN - CNP        Or    potassium chloride 10 mEq/100 mL IVPB (Peripheral Line)  10 mEq Intravenous PRN Reino Boots, APRN - CNP        magnesium sulfate 2000 mg in 50 mL IVPB premix  2,000 mg Intravenous PRN Reino Boots, APRN - CNP        apixaban (ELIQUIS) tablet 2.5 mg  2.5 mg Oral BID Reino Boots, APRN - CNP   2.5 mg at 05/24/21 1021    docusate sodium (COLACE) capsule 100 mg  100 mg Oral BID Reino Boots, APRN - CNP   100 mg at 05/24/21 1021    levothyroxine (SYNTHROID) tablet 112 mcg  112 mcg Oral Daily Reino Boots, APRN - CNP   112 mcg at 05/24/21 3888    [Held by provider] lisinopril (PRINIVIL;ZESTRIL) tablet 10 mg  10 mg Oral Daily Luis Calderon, APRN - CNP   10 mg at 05/19/21 0851    atorvastatin (LIPITOR) tablet 40 mg  40 mg Oral Daily Luis Calderon, APRN - CNP   40 mg at 05/24/21 1021       Past Medical History:  Past Medical History:   Diagnosis Date    [facility] aortic [facility] (AAA) [facility]     Anemia     Arthritis     Cancer ([facility] 12/01/2020    pancreatic    CHF (congestive heart failure) (HCC)     Chronic kidney disease     DVT of lower extremity (deep venous thrombosis) (HCC)     twice 2010 and 2017    Hyperlipidemia     Hypertension     Kidney stones     Osteoarthritis     Palliative care patient 12/02/2020    Thyroid disease     Thyroid disorder        Past Surgical History:  Past Surgical History:   Procedure Laterality Date    COLONOSCOPY  2016    Dr Ge Hebert problems    ENDOSCOPIC ULTRASOUND (LOWER) N/A 12/03/2020    Dr JONNY Grijalva/unsuccessful biliary cannulation despite attempts at 2-wire cannulation and proph pancreatic stenting with cannulation around pancreatic stenting-Positive for high-grade adenocarcinoma, pancreatic head    ERCP N/A 12/03/2020    Dr JONNY Grijalva/unsuccessful biliary cannulation despite attempts at 2-wire cannulation and proph pancreatic stenting with cannulation around pancreatic stenting-Positive for high-grade adenocarcinoma, pancreatic head    ERCP  12/07/2020    Dr Tiana Kelly/sphincterotomy, placement of a 10 x 60 partially covered biliary stent    PORT SURGERY Right 2/5/2021    SINGLE LUMEN PORT PLACEMENT WITH ULTRASOUND AND FLUORO performed by Franchesca Somers MD at [address] [facility] Bilateral     pinky toes removed    TOTAL KNEE ARTHROPLASTY Right 01/03/2020    RIGHT TOTAL KNEE REPLACEMENT performed by Yola Marcelino MD at [facility] OR       Family History  Family History   Problem Relation Age of Onset    Colon Cancer Brother     Cancer Mother         Breast Cancer    Heart Attack Father     ROS: No joint pain or swelling   14 point ROS reviewed with the patient and negative except as noted above and in the HPI unless unable to obtain. Objective:  Patient Vitals for the past 24 hrs:   BP Temp Temp src Pulse Resp SpO2 Weight   05/24/21 1358 (!) 101/52 96.5 °F (35.8 °C) Temporal 69 18 -- --   05/24/21 0925 -- -- -- -- -- 98 % --   05/24/21 0611 -- -- -- -- -- -- 163 lb 9.6 oz (74.2 kg)   05/24/21 0609 -- -- -- -- 18 94 % --   05/24/21 0607 (!) 105/55 97.3 °F (36.3 °C) Temporal 76 20 95 % --   05/24/21 0052 (!) 100/40 97.2 °F (36.2 °C) Temporal 65 20 91 % --   05/23/21 1802 110/62 97.1 °F (36.2 °C) Temporal 82 18 95 % --       Intake/Output Summary (Last 24 hours) at 5/24/2021 1435  Last data filed at 5/24/2021 7775  Gross per 24 hour   Intake 240 ml   Output 200 ml   Net 40 ml     General: awake/alert   Chest:  clear to auscultation bilaterally  CVS: regular rate and rhythm  Abdominal: soft, nontender, normal bowel sounds  Extremities: no cyanosis or edema  Skin: warm and dry without rash      Labs:  BMP:   Recent Labs     05/22/21 0259 05/23/21  0550 05/24/21  0340    138 135*   K 4.1 3.6 3.7   CL 98 96* 93*   CO2 34* 35* 35*   BUN 44* 51* 57*   CREATININE 2.0* 2.3* 2.6*   CALCIUM 8.9 9.6 9.5     CBC:   Recent Labs     05/22/21 0259 05/22/21  2330 05/23/21  0550 05/24/21  0340   WBC 8.8  --  8.7 5.8   HGB 7.4* 8.2* 7.7* 8.0*   HCT 23.6*  --  24.2* 24.5*   .8*  --  102.1* 102.1*     --  225 215     LIVER PROFILE: No results for input(s): AST, ALT, LIPASE, BILIDIR, BILITOT, ALKPHOS in the last 72 hours. Invalid input(s): AMYLASE,  ALB  PT/INR: No results for input(s): PROTIME, INR in the last 72 hours. APTT: No results for input(s): APTT in the last 72 hours. BNP:  No results for input(s): BNP in the last 72 hours. Ionized Calcium:No results for input(s): IONCA in the last 72 hours.   Magnesium:  Recent Labs     05/22/21  0259 05/23/21  0550   MG 1.6 1.7     Phosphorus:No results for input(s): PHOS in the last 72 hours. HgbA1C: No results for input(s): LABA1C in the last 72 hours. Hepatic: No results for input(s): ALKPHOS, ALT, AST, PROT, BILITOT, BILIDIR, LABALBU in the last 72 hours. Lactic Acid: No results for input(s): LACTA in the last 72 hours. Troponin: No results for input(s): CKTOTAL, CKMB, TROPONINT in the last 72 hours. ABGs: No results for input(s): PH, PCO2, PO2, HCO3, O2SAT in the last 72 hours. CRP:  No results for input(s): CRP in the last 72 hours. Sed Rate:  No results for input(s): SEDRATE in the last 72 hours. Cultures:   No results for input(s): CULTURE in the last 72 hours. No results for input(s): BC, Oneyda Bowman in the last 72 hours. No results for input(s): CXSURG in the last 72 hours. Radiology reports as per the Radiologist  Radiology: Echo Complete    Result Date: 5/19/2021  Transthoracic Echocardiography Report (TTE)  Demographics   Patient Name Mary Dumont  Date of Study        05/19/2021               J   MRN          [id number]           Gender               Female   Date of      1934       Room Number          [id number]  Birth   Age          80 year(s)   Height:      63 inches        Referring Physician  Gayathri DOMINGUEZ   Weight:      169 pounds       Sonographer          Dorinda Cano   BSA:         1.8 m^2          Interpreting         Rosiland Hammans, DO                                Physician   BMI:         29.94 kg/m^2  Procedure Type of Study   TTE procedure:ECHO NO CONTRAST WITH DOP/COLR. Study Location: Echo Lab Technical Quality: Limited visualization due to poor acoustical window. Patient Status: Inpatient Contrast Medium: Definity. Indications:Congestive heart failure. Conclusions   Summary  Left ventricular chamber size is borderline dilated. .  Mild concentric left ventricular hypertrophy.   Left ventricular systolic function is severely reduced  Left ventricular ejection fraction is visually estimated at VW)    Result Date: 5/18/2021  Examination. XR CHEST (2 VW) 5/18/2021 2:20 PM History: Severe shortness of breath. The frontal and lateral views of the chest are obtained and compared with the previous study dated 5/4/2021. There is bilateral interstitial prominence which may represent pulmonary vascular congestion/edema. This was not noted in the previous study. There is a trace bibasal pleural effusion. There is cardiomegaly. The atheromatous changes thoracic aorta are noted. A right internal jugular approach MediPort system is seen in place. No change. There is moderate diffuse osteopenia. No focal bony abnormality. Old healed fracture of the right clavicle and residual deformity is similar to the previous study. Pulmonary vascular congestion and pulmonary edema. A trace bibasal pleural effusion. Cardiomegaly. Signed by Dr Ben Juárez on 5/18/2021 3:41 PM    XR CHEST PORTABLE    Result Date: 5/18/2021  EXAMINATION: Chest one view 5/18/2021 HISTORY: Shortness of breath. FINDINGS: Upright frontal projection of the chest demonstrates congestive heart failure with cardiomegaly, vascular redistribution and interstitial pulmonary edema. Small effusions are present. 1.. Congestive heart failure with interstitial and early alveolar edema.  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

## 2021-05-24 NOTE — PROGRESS NOTES
Patient transferred from chair to bed with elmo steady knees weak unable to ambulate back to bed with assistance.

## 2021-05-24 NOTE — PROGRESS NOTES
Palliative Care Progress Note  5/24/2021 8:22 AM  Subjective:   Admit Date: 5/18/2021  PCP: Naida Montes MD    Chief Complaint: Shortness of breath    Interval History: No acute overnight events. She remains quite weak overall, renal function slightly worsened with Cr 2.6 today. Bumex continued for diuresis, she feels that SOA has improved, weaned to [facility]. She continues to be followed closely by Cardiology and Oncology. Continued discussions regarding goals of care. Portions of this note have been copied forward, however, changed to reflect the most current clinical status of this patient. Review of Systems   Review of Systems   Constitutional: Positive for activity change and appetite change. Generalized weakness   HENT: Negative. Eyes: Negative. Respiratory: Positive for shortness of breath. Cardiovascular: Negative. Gastrointestinal: Negative. Endocrine: Negative. Genitourinary: Negative. Musculoskeletal: Negative. Skin: Negative. Neurological: Negative. Psychiatric/Behavioral: Negative.             DIET CARDIAC;    Medications:   sodium chloride       Current Facility-Administered Medications   Medication Dose Route Frequency Provider Last Rate Last Admin    midodrine (PROAMATINE) tablet 5 mg  5 mg Oral TID  Freddie Mcgovern MD   5 mg at 05/23/21 1814    bumetanide (BUMEX) tablet 1 mg  1 mg Oral BID Freddie Mcgovern MD   1 mg at 05/23/21 2029    carvedilol (COREG) tablet 3.125 mg  3.125 mg Oral BID  Troy Thomas MD   3.125 mg at 05/23/21 1814    aspirin EC tablet 81 mg  81 mg Oral Daily Ghada Jimenez MD   81 mg at 05/23/21 0841    nitroGLYCERIN (NITROSTAT) SL tablet 0.4 mg  0.4 mg Sublingual Once Theoplis MD Chloé        sodium chloride flush 0.9 % injection 5-40 mL  5-40 mL Intravenous 2 times per day ALBERTO Ford - CNP   10 mL at 05/23/21 2029    sodium chloride flush 0.9 % injection 5-40 mL  5-40 mL Intravenous PRN Tatum , APRN - CNP   40 mL at 05/23/21 2033    0.9 % sodium chloride infusion  25 mL Intravenous PRN Tatum , APRN - CNP        promethazine (PHENERGAN) tablet 12.5 mg  12.5 mg Oral Q6H PRN Tatum , APRN - CNP        Or    ondansetron (ZOFRAN) injection 4 mg  4 mg Intravenous Q6H PRN Tatum , APRN - CNP        polyethylene glycol (GLYCOLAX) packet 17 g  17 g Oral Daily PRN Tatum , APRN - CNP   17 g at 05/23/21 2100    acetaminophen (TYLENOL) tablet 650 mg  650 mg Oral Q6H PRN Tatum , APRN - CNP        Or    acetaminophen (TYLENOL) suppository 650 mg  650 mg Rectal Q6H PRN Tatum , APRN - CNP        potassium chloride (KLOR-CON M) extended release tablet 40 mEq  40 mEq Oral PRN Tatum , APRN - CNP        Or    potassium bicarb-citric acid (EFFER-K) effervescent tablet 40 mEq  40 mEq Oral PRN Tatum , APRN - CNP        Or    potassium chloride 10 mEq/100 mL IVPB (Peripheral Line)  10 mEq Intravenous PRN Tatum , APRN - CNP        magnesium sulfate 2000 mg in 50 mL IVPB premix  2,000 mg Intravenous PRN Tatum , APRN - CNP        apixaban (ELIQUIS) tablet 2.5 mg  2.5 mg Oral BID Tatum , APRN - CNP   2.5 mg at 05/23/21 2029    docusate sodium (COLACE) capsule 100 mg  100 mg Oral BID Tatum , APRN - CNP   100 mg at 05/23/21 2029    levothyroxine (SYNTHROID) tablet 112 mcg  112 mcg Oral Daily Tatum , APRN - CNP   112 mcg at 05/24/21 0526    [Held by provider] lisinopril (PRINIVIL;ZESTRIL) tablet 10 mg  10 mg Oral Daily Tatum , APRN - CNP   10 mg at 05/19/21 0851    atorvastatin (LIPITOR) tablet 40 mg  40 mg Oral Daily Tatum , APRN - CNP   40 mg at 05/23/21 0840    allopurinol (ZYLOPRIM) tablet 100 mg  100 mg Oral BID Marito Aguilar MD   100 mg at 05/23/21 2029        Labs:     Recent Labs     05/22/21  0259 05/22/21  8610 05/23/21  0550 05/24/21  0340   WBC 8.8  --  8.7 5.8   RBC 2.19*  --  2.37* 2.40*   HGB 7.4* 8.2* 7.7* 8.0*   HCT 23.6*  --  24.2* 24.5*   .8*  --  102.1* 102.1*   MCH 33.8*  --  32.5* 33.3*   MCHC 31.4*  --  31.8* 32.7*     --  225 215     Recent Labs     05/22/21  0259 05/23/21  0550 05/24/21  0340    138 135*   K 4.1 3.6 3.7   ANIONGAP 7 7 7   CL 98 96* 93*   CO2 34* 35* 35*   BUN 44* 51* 57*   CREATININE 2.0* 2.3* 2.6*   GLUCOSE 114* 116* 117*   CALCIUM 8.9 9.6 9.5     Recent Labs     05/22/21  0259 05/23/21  0550   MG 1.6 1.7     No results for input(s): AST, ALT, ALB, BILITOT, ALKPHOS, ALB in the last 72 hours. ABGs:  No results for input(s): PHART, ACI1TOX, PO2ART, FMZ3DIM, BEART, HGBAE, Y5BYBSMO, CARBOXHGBART, 02THERAPY in the last 72 hours. HgBA1c:   No results for input(s): LABA1C in the last 72 hours. FLP:    Lab Results   Component Value Date    TRIG 108 05/18/2021    HDL 54 05/18/2021    LDLCALC 79 05/18/2021     TSH:    Lab Results   Component Value Date    TSH 4.710 05/18/2021     Troponin T:   No results for input(s): TROPONINI in the last 72 hours. INR: No results for input(s): INR in the last 72 hours.     Objective:   Vitals: BP (!) 105/55   Pulse 76   Temp 97.3 °F (36.3 °C) (Temporal)   Resp 18   Ht 5' 3\" (1.6 m)   Wt 163 lb 9.6 oz (74.2 kg)   SpO2 94%   BMI 28.98 kg/m²   24HR INTAKE/OUTPUT:      Intake/Output Summary (Last 24 hours) at 5/24/2021 0132  Last data filed at 5/23/2021 2018  Gross per 24 hour   Intake 360 ml   Output 200 ml   Net 160 ml     Physical Exam  General appearance: alert and cooperative with exam, appears elderly, appears chronically ill, no acute distress  HEENT: atraumatic, eyes with clear conjunctiva and normal lids, pupils and irises normal, external ears and nose are normal,lips normal.  Neck: without masses, supple   Lungs: no increased work of breathing, diminished, supplemental O2 in place  Heart: regular rate and rhythm, distal pulses increased falls, and it wears the patient out going to/from appointments. I do think that hospice would benefit the patient greatly, daughter states that it would be ideal to have home focused care, but remains unsure about chemo. I would favor hospice care for this patient. All questions answered, encouragement and emotional support provided. Palliative care will continue to follow. ADDENDUM: Messaged ALBERTO Tomas regarding recommendations for continued therapy vs. Pursuit of hospice care. Hospice care very appropriate at this point. I have followed up discussion with the patient and her daughter. Recommendations:   1. Palliative Care- C ongoing discussion. Pt debating between continued chemo with [facility] and Outpatient Supportive Care vs. Discontinuing chemo and pursuing Hospice. I would favor hospice care for this patient. CODE STATUS- DNR per patient's stated wishes, order updated SYMPTOM MANAGEMENT- Pt improved with supplemental O2 and diuresis     2. New Onset Heart Failure- Cardiology consulted, ECHO completed EF 15%,  Lexiscan completed EF 31%, medical management , labs trended, diuresis per attending     3. Pancreatic cancer- Oncology following, currently receiving palliative chemo       Thank you for consulting Palliative Care and allowing us to participate in the care of this patient.      Additional 30 min spent completing voluntary ACP discussion  Greater than 50% of time spent Counseling: Yes  Total visit time: 65 min    Electronically signed by ALBERTO Fraser on 5/24/2021 at 8:22 AM    (Please note that portions of this note were completed with a voice recognition program.  Isaak Fitch made to edit the dictations but occasionally words are mis-transcribed.)

## 2021-05-24 NOTE — CARE COORDINATION
Date / Time of Evaluation: 5/24/2021 10:44 AM  Assessment Completed by: Laura Lake RN    Patient Admission Status: Inpatient [101]    [address] [id number] (home)   Telephone Information:   Mobile [phone]       (Best Practice:  Have patient / caregiver verify above address and phone number by stating out loud their current address and reachable phone number.)  Is above information correct? yes      Current PCP:  Fernando Saldana MD  PCP verified? yes    Initial Assessment Completed at bedside with:  patient    Emergency Contacts:  Extended Emergency Contact Information  Primary Emergency Contact: Nieves Myers  Address: [address]           [place], [address] [facility] [address] [facility] Phone: [phone]  Mobile Phone: [phone]  Relation: Child  Secondary Emergency Contact: Ryan Arevalo  Address: [facility], [address] [facility] [address] [facility] Phone: [phone]  Relation: Child    Advance Directives: Code Status:  DNR-CC    Financial:  Payor: Michael Ervin / Plan: MEDICARE PART A AND B / Product Type: *No Product type* /     Pre-Cert required for SNF:  no    Have Long Term Care Insurance:  no    Pharmacy:   [address], [address] [place] [id number]  Phone: [phone] Fax: [phone]      Potential assistance purchasing medications?  no    ADLS:  Support System:  Children, Family members    Current Home Environment:  Lives with family  Steps:  no    Plans to RETURN to current housing: yes  Barriers to RETURNING to current housing:  Currently very weak and not improving    Currently ACTIVE with Home Health CARE:  No.  [facility] vs Hosp[facility] at home      DME Provider:  Has a cane and walker at home and uses as needed    Has a pulse oximetry unit at home: no    Had [address] [facility] prior to admission:  No but may need upon discharge    Active with HD/PD prior to admission: yes  Nephrologist:  [address]:  na    Transition Plan:  Home w/Family    Transportation PLAN for Discharge:  family      Additional CM/SW Notes: patient has been currently taking palliative chemo as an OP. Palliative is following patient for goals of care at this time. Patient does not currently have [place] but would benefit from having if doesn't decide on home with hospice. She does live with family. She also may need home O2 at time of discharge. Yoshi Florian and/or her family were provided with choice of provider.         David Mo RN  [facility]  Care Management Department  Ph:  [phone]   Fax: [phone]

## 2021-05-24 NOTE — PLAN OF CARE
Problem: Falls - Risk of:  Goal: Will remain free from falls  Description: Will remain free from falls  5/24/2021 1834 by Janine Kimbrough RN  Outcome: Ongoing  5/24/2021 0548 by Qi Douglas RN  Outcome: Ongoing  Goal: Absence of physical injury  Description: Absence of physical injury  5/24/2021 1834 by Janine Kimbrough RN  Outcome: Ongoing  5/24/2021 0548 by Qi Douglas RN  Outcome: Ongoing     Problem: Cardiac:  Goal: Ability to maintain an adequate cardiac output will improve  Description: Ability to maintain an adequate cardiac output will improve  5/24/2021 1834 by Janine Kimbrough RN  Outcome: Ongoing  5/24/2021 0548 by Qi Douglas RN  Outcome: Ongoing  Goal: Hemodynamic stability will improve  Description: Hemodynamic stability will improve  5/24/2021 1834 by Janine Kimbrough RN  Outcome: Ongoing  5/24/2021 0548 by Qi Douglas RN  Outcome: Ongoing  Goal: Ability to maintain adequate ventilation will improve  Description: Ability to maintain adequate ventilation will improve  5/24/2021 1834 by Janine Kimbrough RN  Outcome: Ongoing  5/24/2021 0548 by Qi Douglas RN  Outcome: Ongoing  Goal: Ability to achieve and maintain adequate cardiopulmonary perfusion will improve  Description: Ability to achieve and maintain adequate cardiopulmonary perfusion will improve  5/24/2021 1834 by Janine Kimbrough RN  Outcome: Ongoing  5/24/2021 0548 by Qi Douglas RN  Outcome: Ongoing     Problem: Fluid Volume:  Goal: Ability to achieve and maintain adequate urine output will improve  Description: Ability to achieve and maintain adequate urine output will improve  5/24/2021 1834 by Janine Kimbrough RN  Outcome: Ongoing  5/24/2021 0548 by Qi Douglas RN  Outcome: Ongoing     Problem: Respiratory:  Goal: Respiratory status will improve  Description: Respiratory status will improve  5/24/2021 1834 by Janine Kimrbough RN  Outcome: Ongoing  5/24/2021 0548 by Qi Douglas RN  Outcome: Ongoing     Problem: Skin Integrity:  Goal: Will show no infection signs and symptoms  Description: Will show no infection signs and symptoms  5/24/2021 1834 by Sudha Pedraza RN  Outcome: Ongoing  5/24/2021 0548 by Haja Lee RN  Outcome: Ongoing  Goal: Absence of new skin breakdown  Description: Absence of new skin breakdown  5/24/2021 1834 by Sudha Pedraza RN  Outcome: Ongoing  5/24/2021 0548 by Haja Lee RN  Outcome: Ongoing     Problem:  Activity:  Goal: Fatigue will decrease  Description: Fatigue will decrease  5/24/2021 1834 by Sudha Pedraza RN  Outcome: Ongoing  5/24/2021 0548 by Haja Lee RN  Outcome: Ongoing  Goal: Ability to tolerate increased activity will improve  Description: Ability to tolerate increased activity will improve  5/24/2021 1834 by Sudha Pedraza RN  Outcome: Ongoing  5/24/2021 0548 by Haja Lee RN  Outcome: Ongoing  Goal: Ability to maintain optimal joint mobility will improve  Description: Ability to maintain optimal joint mobility will improve  5/24/2021 1834 by Sudha Pedraza RN  Outcome: Ongoing  5/24/2021 0548 by Haja Lee RN  Outcome: Ongoing     Problem: Coping:  Goal: Ability to adjust to condition or change in health will improve  Description: Ability to adjust to condition or change in health will improve  5/24/2021 1834 by Sudha Pedraza RN  Outcome: Ongoing  5/24/2021 0548 by Haja Lee RN  Outcome: Ongoing     Problem: Health Behavior:  Goal: Identification of resources available to assist in meeting health care needs will improve  Description: Identification of resources available to assist in meeting health care needs will improve  5/24/2021 1834 by Sudha Pedraza RN  Outcome: Ongoing  5/24/2021 0548 by Haja Lee RN  Outcome: Ongoing     Problem: Nutritional:  Goal: Maintenance of adequate nutrition will improve  Description: Maintenance of adequate nutrition will improve  5/24/2021 1834 by Sudha Pedraza RN  Outcome: Ongoing  5/24/2021 0548 by Ethan Reyes RN  Outcome: Ongoing     Problem: Physical Regulation:  Goal: Signs and symptoms of infection will decrease  Description: Signs and symptoms of infection will decrease  5/24/2021 1834 by Lupe Hernandez RN  Outcome: Ongoing  5/24/2021 0548 by Ethan Reyes RN  Outcome: Ongoing  Goal: Complications related to the disease process, condition or treatment will be avoided or minimized  Description: Complications related to the disease process, condition or treatment will be avoided or minimized  5/24/2021 1834 by Lupe Hernandez RN  Outcome: Ongoing  5/24/2021 0548 by Ethan Reyes RN  Outcome: Ongoing     Problem: Safety:  Goal: Ability to remain free from injury will improve  Description: Ability to remain free from injury will improve  5/24/2021 1834 by Lupe Hernandez RN  Outcome: Ongoing  5/24/2021 0548 by Ethan Reyes RN  Outcome: Ongoing     Problem: Sensory:  Goal: Pain level will decrease  Description: Pain level will decrease  5/24/2021 1834 by Lupe Hernandez RN  Outcome: Ongoing  5/24/2021 0548 by Ethan Reyes RN  Outcome: Ongoing  Goal: General experience of comfort will improve  Description: General experience of comfort will improve  5/24/2021 1834 by Lupe Hernandez RN  Outcome: Ongoing  5/24/2021 0548 by Ethan Reyes RN  Outcome: Ongoing

## 2021-05-24 NOTE — PROGRESS NOTES
impaired multiple perfusion abnormalities consistent with ischemic cardiomyopathy in which cardiology recommended medical management as she is a poor surgical candidate, Coreg 3.125 mg BID added. 5/22/2021 Pt able to participate with PT/OT. 1 Unit PRBC per oncology 5/22/2021. Continued with IV Bumex. Pt continues to require 5 LPM via nasal cannula, nursing and RT to wean as tolerated. midodrine added for hypotension 5/23/2021. Review of Systems   Constitutional: Negative for chills, diaphoresis, fatigue and fever. HENT: Negative for congestion, ear pain, sinus pain, sore throat and trouble swallowing. Eyes: Negative for visual disturbance. Respiratory: Negative for cough, shortness of breath and wheezing. Reports improvement in SOB    Cardiovascular: Negative for chest pain, palpitations and leg swelling. Gastrointestinal: Positive for nausea and vomiting. Negative for abdominal distention, abdominal pain, blood in stool, constipation and diarrhea. Endocrine: Negative for cold intolerance and heat intolerance. Genitourinary: Negative for difficulty urinating, flank pain, frequency and urgency. Musculoskeletal: Negative for arthralgias and myalgias. Neurological: Negative for dizziness, syncope, weakness, light-headedness, numbness and headaches. Hematological: Does not bruise/bleed easily. Psychiatric/Behavioral: Negative for agitation, confusion and dysphoric mood. Objective:   VITALS:  BP (!) 101/52   Pulse 69   Temp 96.5 °F (35.8 °C) (Temporal)   Resp 18   Ht 5' 3\" (1.6 m)   Wt 163 lb 9.6 oz (74.2 kg)   SpO2 98% Comment: drops as low as 84% with exertion  BMI 28.98 kg/m²   24HR INTAKE/OUTPUT:      Intake/Output Summary (Last 24 hours) at 5/24/2021 5716  Last data filed at 5/24/2021 1358  Gross per 24 hour   Intake 360 ml   Output 200 ml   Net 160 ml         Physical Exam  Constitutional:       General: She is not in acute distress.      Appearance: Normal appearance. She is not toxic-appearing or diaphoretic. HENT:      Head: Normocephalic and atraumatic. Right Ear: External ear normal.      Left Ear: External ear normal.      Nose: Nose normal. No congestion or rhinorrhea. Mouth/Throat:      Mouth: Mucous membranes are moist.      Pharynx: Oropharynx is clear. Eyes:      General: No scleral icterus. Extraocular Movements: Extraocular movements intact. Conjunctiva/sclera: Conjunctivae normal.   Cardiovascular:      Rate and Rhythm: Normal rate and regular rhythm. Pulses: Normal pulses. Heart sounds: Normal heart sounds. No murmur heard. No friction rub. No gallop. Pulmonary:      Effort: Pulmonary effort is normal. No respiratory distress. Breath sounds: Normal breath sounds. No wheezing, rhonchi or rales. Abdominal:      General: Abdomen is flat. Bowel sounds are normal. There is no distension. Palpations: Abdomen is soft. Tenderness: There is no abdominal tenderness. Musculoskeletal:         General: No swelling. Normal range of motion. Cervical back: Normal range of motion and neck supple. Right lower leg: No edema. Left lower leg: No edema. Skin:     General: Skin is warm and dry. Coloration: Skin is not jaundiced. Findings: No erythema, lesion or rash. Neurological:      General: No focal deficit present. Mental Status: She is alert and oriented to person, place, and time. Mental status is at baseline. Cranial Nerves: No cranial nerve deficit. Sensory: No sensory deficit. Motor: No weakness. Psychiatric:         Mood and Affect: Mood normal.         Behavior: Behavior normal.         Thought Content:  Thought content normal.         Judgment: Judgment normal.            Medications:      sodium chloride        allopurinol  100 mg Oral Daily    midodrine  10 mg Oral TID     bumetanide  1 mg Oral BID    carvedilol  3.125 mg Oral BID     aspirin  81 mg Oral Daily    nitroGLYCERIN  0.4 mg Sublingual Once    sodium chloride flush  5-40 mL Intravenous 2 times per day    apixaban  2.5 mg Oral BID    docusate sodium  100 mg Oral BID    levothyroxine  112 mcg Oral Daily    [Held by provider] lisinopril  10 mg Oral Daily    atorvastatin  40 mg Oral Daily     sodium chloride flush, sodium chloride, promethazine **OR** ondansetron, polyethylene glycol, acetaminophen **OR** acetaminophen, potassium chloride **OR** potassium alternative oral replacement **OR** potassium chloride, magnesium sulfate  DIET CARDIAC;     Lab and other Data:     Recent Labs     05/22/21  0259 05/22/21  2330 05/23/21  0550 05/24/21  0340   WBC 8.8  --  8.7 5.8   HGB 7.4* 8.2* 7.7* 8.0*     --  225 215     Recent Labs     05/22/21  0259 05/23/21  0550 05/24/21  0340    138 135*   K 4.1 3.6 3.7   CL 98 96* 93*   CO2 34* 35* 35*   BUN 44* 51* 57*   CREATININE 2.0* 2.3* 2.6*   GLUCOSE 114* 116* 117*     UA:  Recent Labs     05/24/21  1600   COLORU YELLOW   PHUR 5.5   CLARITYU Clear   SPECGRAV 1.010   LEUKOCYTESUR Negative   UROBILINOGEN 0.2   BILIRUBINUR Negative   BLOODU Negative   GLUCOSEU Negative       RAD:   Echo Complete    Result Date: 5/19/2021  Summary  Left ventricular chamber size is borderline dilated. .  Mild concentric left ventricular hypertrophy. Left ventricular systolic function is severely reduced  Left ventricular ejection fraction is visually estimated at 15%. Severe left ventricular global hypokinesis. There is mild mitral regurgitation. The left atrium is moderately dilated. Signature   ----------------------------------------------------------------  Electronically signed by Chau Rico DO(Interpreting  physician) on 05/19/2021 05:18 PM  -      XR CHEST (2 VW)    Result Date: 5/18/2021    Pulmonary vascular congestion and pulmonary edema. A trace bibasal pleural effusion. Cardiomegaly.  Signed by Dr Tyler Martin on 5/18/2021 3:41 PM      XR CHEST PORTABLE    Result Date: 5/18/2021    1. . Congestive heart failure with interstitial and early alveolar edema. Signed by Dr Kenia Kemp on 5/18/2021 6:38 PM        Micro:    Rapid COVID- negative       Assessment/Plan   Principal Problem:    New onset of congestive heart failure (HCC)  Active Problems:    CKD (chronic kidney disease)    Malignant neoplasm of other parts of pancreas ([facility]    Acute respiratory failure with hypoxia (HCC)  Resolved Problems:    * No resolved hospital problems. *    New onset of congestive heart failure ([facility]              -ECHO EF estimated at 15%              - continue Bumex 1 mg PO BID              -Hold lisinopril               - BNP 4830, trend every 3 days              -troponins negative              -monitor and replace electrolytes              -continue statin therapy              -strict I&o, q4 hour vital signs              -iron studies              -contniue to monitor and wean O2 to maintain sat of 92-94%, will obtain home o2 eval              -chest xray consistent with CHF              -stress test indicated ejection fraction 31% severely impaired multiple perfusion abnormalities consistent with ischemic cardiomyopathy              -cardiology following and recommends medical management              -Coreg 3.125 mg BID added              -PT/OT eval              -midodrine added for hypotension     Active Problems:    CKD (chronic kidney disease)              -monitor kidney functions, Cr 2.6 today,    - Nephrology consulted               -continue Bumex              - daily weights              -strict I&o              -continue iron supplementation and calcitrol                  Malignant neoplasm of other parts of pancreas ([facility]              -oncology following              - palliative care following              -1 unit PRBC 5/22/21        DVT Prophylaxis: on Eliquis       Further Orders per Clinical course/attending.      Electronically signed by Yun Tai Preethi Lazo, APRN - CNP on 5/24/2021 at 5:54 PM       EMR Dragon/Transcription disclaimer:   Much of this encounter note is an electronic transcription/translation of spoken language to printed text.  The electronic translation of spoken language may permit erroneous, or at times, nonsensical words or phrases to be inadvertently transcribed; although attempts have made to review the note for such errors, some may still exist.

## 2021-05-24 NOTE — PROGRESS NOTES
PROGRESS NOTE    Pt Name: Cassandra Gore  MRN: [id number]  YOB: 1934  Date of evaluation: 5/24/2021      Subjective  \"I feel tired\". Family at bedside. Son and daughter. Discussed at length about her condition, prognosis etc.  Palliative care following. Lengthy discussion with patient and family members. Prior history  Corey Mancini is a 79 yo female with multiple comorbidities to include a significant history of pancreatic adenocarcinoma currently receiving palliative chemotherapy. She received treatment Gemzar and Abraxane on 5/18/2021 and started to complain of worsening shortness of breath. She had a normal respiratory rate and saturation when she presented to clinic. After completion of treatment she started experiencing worsening dyspnea with hypoxemia and desaturation. No fever of chills. She was sent for a CXR that showed pulmonary congestion. Due to her worsening respiratory status she was sent to the ER where she was admitted. She has a significant PMH of iron deficiency anemia, chronic kidney disease stage IV,  and diagnosis of pancreatic adenocarcinoma. She has been receiving palliative chemotherapy with Gemzar 750 mg/m² and Abraxane 90 mg/m² every 2 weeks (this is a 25% dose reduction). Follow-up CT scan of the abdomen and pelvis on 4/13/2021 and tumor markers suggest a positive response to treatment. She has required treatment delay and further dose reduction due to experiencing significant fatigue and nausea. Starting with cycle 4 her treatment regimen was dose reduced by an additional 25%, Gemzar 600 mg/m² and Abraxane 75 mg/m². 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  Procrit to be given every 2 weeks ×4 and then monthly, dose to be titrated appropriately. Hold dose for hemoglobin >11   · Currently receiving Retacrit 40,000 units every 4 weeks  · 2/8/2021 palliative chemotherapy on 2/8/2021 with Gemzar 750 mg/m² and Abraxane 90 mg/m² every 2 weeks, this is a dose reduction by 25%        Tumor monitoring:  · 12/2/2020-CA 19-9 of 133  · 12/23/2020-CA 19-9 of 217  · 1/20/2021-CA 19-9 of 236  · 3/23/2021- CA 19-9 of 108     CANCER HISTORY  Author Karolyn was seen by Dr. Aminata Gonsalez on 12/2/2020 as an inpatient consultation at [facility]) of [facility] for findings of a pancreatic mass and bile duct obstruction. She presented to the ER department with complaints of hematuria that began a few days prior to presentation. She denied any dysuria, back pain, no nausea or vomiting. She also reported easily bruising and ecchymotic areas in her back, abdomen and flank. Initial laboratory work-up showed INR above 18. She was given vitamin K subcutaneously. She was found to have elevated LFTs and the following are events that occurred. · 12/01/2020-CT abdomen pelvis without contrast showed a pancreatic head mass measuring 3.3 x 3.7 cm in size and associated, bile duct dilatation above the level of the ampulla. Associated moderate pancreatic ductal dilatation. · 12/2/2020-CA 19-9 133  · 12/03/2020-ERCP with FNA biopsy Positive for high-grade adenocarcinoma. Unfortunately, stent could not be placed. · 12/5/2020-transferred from [facility]) to [address] [place] [address] and discharged home on 12/8/2020  · 12/07/2020- Cholangipancreatography Retrograde Endo ERCP with sphincterotomy, balloon sweep and placement of 10x60 PC BS metal stent at [facility] in [place]. · 12/21/2020- CT chest with contrast documented no evidence of intrathoracic neoplastic process/metastatic disease. Evidence of pneumobilia. The ectasia of the pancreatic duct, similar to the previous study.  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 The diverticulosis of the distal colon with no evidence for diverticulitis. · 5/4/2021- dose reduction by 25% due to severe fatigue and nausea, starting with cycle 4 will receive Gemzar 600 mg/m² and Abraxane 75 mg/m². HEMATOLOGY HISTORY:  Corey Mancini was seen in initial hematology consultation on 7/20/2018 for further evaluation and treatment recommendations for anemia. Corey Mancini was referred from Dr. Jules Carlson. Corey Mancini presented with no significant complaints other than chronic fatigue and constipation. She had been taking ferrous sulfate 325 mg daily under the direction of Dr. Lorie Caro. Corey Mancini reported significant constipation and some GI upset with the oral iron. She denied any bleeding tendencies to include hematuria, melena, hematochezia and epistaxis. Corey Mancini had a colonoscopy on 4/26/2016 by Dr. Noelle Kessler for further evaluation of iron deficiency. The only abnormal finding was diverticulosis. Corey Mancini is routinely followed by Dr. Arcenio Sanchez for her chronic kidney disease stage IV. CMP on 6/11/2018 documented a creatinine of 2.2 and GFR 21. CBC review from 2/2/2018- 6/11/2018 documented hemoglobin ranging from 9.4- 10.1, RBC 2.91- 3.22, MCV 94- 100 with a normal WBC and platelet count  CBC at initial consultation on 7/20/2018 documented a WBC of 7.33, ANC 4.74, RBC 3.04, hemoglobin 10.3, .6 and a platelet count of 997,629. Serology studies on 7/20/2018:  · Creatinine 1.98   · GFR 23   · Calcium 10.3   · IgG 700, IgA 180, and IgM 52   · M spike not observed   · Immunofixation: No monoclonality detected. · Iron 74   · Iron saturation 27%   · TIBC 278   · Vitamin B12 437   · Folate 15.7   · Ferritin 299   · Reticulocyte count 1.5%   · Erythropoietin 11.6   ·    · Beta-2 microglobulin 6.2      Occult stool positive 1 of 3 samples on 7/25/2018.      Colonoscopy on 10/4/2018 by Dr. Noelle Kessler was documented as removal of 2 polyps with benign pathology. No evidence of bleeding. Serology studies on 10/15/2018:  · Iron 76   · TIBC 287   · Iron saturation 26%   · Ferritin 321   · Hemoglobin 10.1   · Creatinine 2.47   · GFR 17     11/26/2018 - Initiated Procrit 20,000 units for chronic kidney disease stage IV, to be given every 2 weeks ×4 and then monthly, dose to be titrated appropriately. Hemoglobin 9.6. All has anemia of chronic disease to include chronic kidney disease stage IV. She will began receiving growth factor support and will need to maintain a ferritin level greater than 200 and an iron saturation of 25% for the Procrit to be beneficial. Hold Procrit dose for hemoglobin >11. Indications/rationale for Procrit was discussed with [place]. Risks, benefits and expectations were outlined. Risks including, but not limited to hypertension, stroke, MI, death were discussed and acknowledged by [place].      Serology studies on 3/8/2019:  · Creatinine 1.7/GFR 30.4   · Iron 80   · Iron saturation 26.5%   · TIBC 302   · Ferritin 144   · Vitamin B12 501   · Folate 13.9     Serology studies on 3/5/2020:  · Creatinine 1.3/GFR 41.4  · Iron 84  · TIBC 390  · Iron saturation 21.5%  · Ferritin 342     Iron substrates on 6/25/2020  · Ferritin 311  · Iron 76  · Iron saturation 23%  · TIBC 326  · Creatinine 1.3/GFR 39     Labs on 12/2/2020 and 12/3/2020:  · Iron 35  · TIBC 213  · Iron saturation 16%  · Vitamin B12 483  · Folate 10.6  · Ferritin 480     Labs on 3/23/2021  · Iron 54  · TIBC 366  · Iron saturation 15  · Ferritin 530  · Reticulocytes 2.1     Labs on 4/6/2021  · WBC 14.40  · RBC 2.46  · HGB 8.6  · HCT 26.4  · .3  · MCH 35  · MCHC 32.6  · ANC 12.03  · ,000  · Iron 43   · TIBC 226  · Iron saturation 19%   · Ferritin 1088.0  · B-12= 781  · GFR 39  · BUN 21  · Creatinine 1.3  · Phosphorus 2.4  · Magnesium 1.5        Age-appropriate health screening:  · Colonoscopy on 10/4/2018 by Dr. Corinne Amass was documented as removal of 2 polyps with benign pathology. No evidence of bleeding. · 9/20/2019 [facility] documented no mammographic evidence of malignancy. No bone mineral density on file         REVIEW OF SYSTEMS:   Constitutional: Positive for activity change, appetite change and fatigue. Negative for chills, diaphoresis and fever. HENT: Negative. Negative for congestion, ear pain, hearing loss, nosebleeds, sore throat and tinnitus. Eyes: Negative. Negative for pain, discharge and redness. Respiratory: Positive for shortness of breath. Negative for cough and wheezing. Cardiovascular: Positive for leg swelling (Improving). Negative for chest pain and palpitations. Gastrointestinal: Negative. Negative for abdominal pain, blood in stool, constipation, diarrhea, nausea and vomiting. Endocrine: Negative for polydipsia. Genitourinary: Negative for dysuria, flank pain, frequency, hematuria and urgency. Musculoskeletal: Negative. Negative for back pain, myalgias and neck pain. Skin: Negative. Negative for rash. Neurological: Positive for weakness. Negative for dizziness, tremors, seizures and headaches. Hematological: Does not bruise/bleed easily. Psychiatric/Behavioral: Negative. The patient is not nervous/anxious. Objective   BP (!) 105/55   Pulse 76   Temp 97.3 °F (36.3 °C) (Temporal)   Resp 18   Ht 5' 3\" (1.6 m)   Wt 163 lb 9.6 oz (74.2 kg)   SpO2 94%   BMI 28.98 kg/m²     PHYSICAL EXAM:  CONSTITUTIONAL: Alert, appropriate, no acute distress  EYES: Non icteric, EOM intact, pupils equal round   ENT: Mucus membranes moist, no oral pharyngeal lesions, external inspection of ears and nose are normal  NECK: Supple, no masses. No palpable thyroid mass  CHEST/LUNGS: CTA bilaterally, normal respiratory effort   CARDIOVASCULAR: RRR, no murmurs. No lower extremity edema  ABDOMEN: soft non-tender, active bowel sounds, no HSM.   No palpable masses  EXTREMITIES: warm, full ROM in all 4 extremities, no focal weakness. SKIN: warm, dry with no rashes or lesions  LYMPH: No cervical, clavicular, axillary, or inguinal lymphadenopathy  NEUROLOGIC: follows commands, non focal   PSYCH: mood and affect appropriate.   Alert and oriented to time, place, person        LABORATORY RESULTS REVIEWED BY ME:  Recent Labs     05/24/21  0340 05/23/21  0550 05/22/21  2330 05/22/21  0259   WBC 5.8 8.7  --  8.8   HGB 8.0* 7.7* 8.2* 7.4*   HCT 24.5* 24.2*  --  23.6*   .1* 102.1*  --  107.8*    225  --  260       Lab Results   Component Value Date     (L) 05/24/2021    K 3.7 05/24/2021    CL 93 (L) 05/24/2021    CO2 35 (H) 05/24/2021    BUN 57 (H) 05/24/2021    CREATININE 2.6 (H) 05/24/2021    GLUCOSE 117 (H) 05/24/2021    CALCIUM 9.5 05/24/2021    PROT 7.0 05/18/2021    LABALBU 3.5 05/18/2021    BILITOT 0.6 05/18/2021    ALKPHOS 139 (H) 05/18/2021    AST 23 05/18/2021    ALT 12 05/18/2021    LABGLOM 17 (A) 05/24/2021    GFRAA 21 (L) 05/24/2021    AGRATIO 1.9 01/20/2021    GLOB 2.8 05/18/2021       Lab Results   Component Value Date    INR 1.03 12/05/2020    INR 1.04 12/04/2020    INR 1.07 12/03/2020    PROTIME 13.4 12/05/2020    PROTIME 13.5 12/04/2020    PROTIME 13.8 12/03/2020       RADIOLOGY STUDIES REVIEWED BY ME:  Echo Complete    Result Date: 5/19/2021  Transthoracic Echocardiography Report (TTE)  Demographics   Patient Name Sharmaine Hoyt  Date of Study        05/19/2021               J   MRN          [id number]           Gender               Female   Date of      1934       Room Number          [place]  Birth   Age          80 year(s)   Height:      63 inches        Referring Physician  Mitzy DOMINGUEZ   Weight:      169 pounds       Sonographer          Jenifer Cates   BSA:         1.8 m^2          Interpreting         DO Petra Benjamin   BMI:         29.94 kg/m^2  Procedure Type of Study   TTE procedure:ECHO NO CONTRAST WITH DOP/COLR. Study Location: Echo Lab Technical Quality: Limited visualization due to poor acoustical window. Patient Status: Inpatient Contrast Medium: Definity. Indications:Congestive heart failure. Conclusions   Summary  Left ventricular chamber size is borderline dilated. .  Mild concentric left ventricular hypertrophy. Left ventricular systolic function is severely reduced  Left ventricular ejection fraction is visually estimated at 15%. Severe left ventricular global hypokinesis. There is mild mitral regurgitation. The left atrium is moderately dilated. Signature   ----------------------------------------------------------------  Electronically signed by Surjit Kaur DO(Interpreting  physician) on 05/19/2021 05:18 PM  ----------------------------------------------------------------   Findings   Mitral Valve  Structurally normal mitral valve. Mitral annular calcification is present. There is mild mitral regurgitation. Aortic Valve  Aortic valve sclerosis. No aortic stenosis. No aortic regurgitation . Tricuspid Valve  Normal tricuspid valve leaflet thickness and excursion. There is trace tricuspid regurgitation. Pulmonic Valve  No significant pulmonic regurgitation. Left Atrium  The left atrium is moderately dilated. Left Ventricle  Left ventricular chamber size is borderline dilated. .  Mild concentric left ventricular hypertrophy. Left ventricular systolic function is severely reduced  Left ventricular ejection fraction is visually estimated at 15%. Severe left ventricular global hypokinesis. Diastolic function is indeterminate. No evidence of left ventricular mass or thrombus noted. Right Atrium  Normal right atrial size. Right Ventricle  Normal right ventricular chamber size and function. Pericardial Effusion  No pericardial effusion.   M-Mode Measurements (cm)   LVIDd: 4.86 cm                       LVIDs: 4.52 cm  IVSd: 1.51 cm LVPWd: 0.96 cm                       AO Root Dimension: 2.4 cm  % Ejection Fraction: 15 %            LA: 3.2 cm                                       LVOT: 2 cm  Doppler Measurements:   AV Peak Velocity:163 cm/s            MV Peak E-Wave: 101 cm/s  AV Peak Gradient: 10.63 mmHg         MV Peak A-Wave: 149 cm/s                                       MV E/A Ratio: 0.68 %                                       MV Peak Gradient: 4.08 mmHg  Estimated RAP:3 mmHg      XR CHEST (2 VW)    Result Date: 5/18/2021  Examination. XR CHEST (2 VW) 5/18/2021 2:20 PM History: Severe shortness of breath. The frontal and lateral views of the chest are obtained and compared with the previous study dated 5/4/2021. There is bilateral interstitial prominence which may represent pulmonary vascular congestion/edema. This was not noted in the previous study. There is a trace bibasal pleural effusion. There is cardiomegaly. The atheromatous changes thoracic aorta are noted. A right internal jugular approach MediPort system is seen in place. No change. There is moderate diffuse osteopenia. No focal bony abnormality. Old healed fracture of the right clavicle and residual deformity is similar to the previous study. Pulmonary vascular congestion and pulmonary edema. A trace bibasal pleural effusion. Cardiomegaly. Signed by Dr David Wood on 5/18/2021 3:41 PM    XR CHEST (2 VW)    Result Date: 5/4/2021  XR CHEST (2 VW) 5/4/2021 3:49 PM Two-view chest: History: Cough, shortness of air Frontal and lateral projection chest radiograph obtained. Comparison:  Chest radiography dated 12/1/2020 and 12/12/2019 and 12/21/2020 chest CT. Findings: COPD and chronic lung changes identified. The interstitial markings are minimally prominent relative to the prior chest radiographs, mild acute bronchitis considered. There are no parenchymal infiltrates. The heart is normal in size without evidence of heart failure. Right-sided port catheter identified.  There are no acute osseous abnormalities. .                                                                                    Impression: 1. COPD and chronic lung changes. 2. Mild interstitial prominence, acute bronchitis considered, no parenchymal infiltration. Signed by Dr Santy Monroy on 5/4/2021 5:07 PM    XR FOOT LEFT (MIN 3 VIEWS)    Result Date: 5/5/2021  EXAMINATION:  XR FOOT LEFT (MIN 3 VIEWS)  5/5/2021 3:09 PM HISTORY: The patient fell this morning. Left foot pain. COMPARISON: No comparison study. TECHNIQUE: 3 views were obtained. FINDINGS:  There is a nondisplaced fracture of the distal metaphysis of the proximal phalanx of the fourth toe. There has been prior resection of the fifth toe. There is narrowing of the interphalangeal joint spaces of the second through fourth toes. There are hammertoe deformities of these toes. There is minimal calcaneal spurring and enthesopathy. There is soft tissue swelling of the foot. 1. Acute fracture of the fourth toe, as described. Soft tissue swelling of the foot. 2. Hammertoe deformities of the second through fourth toes. Prior fifth toe amputation. 3. Calcaneal spurring and enthesopathy. Signed by Dr Ayala Cobos on 5/5/2021 3:12 PM    XR CHEST PORTABLE    Result Date: 5/18/2021  EXAMINATION: Chest one view 5/18/2021 HISTORY: Shortness of breath. FINDINGS: Upright frontal projection of the chest demonstrates congestive heart failure with cardiomegaly, vascular redistribution and interstitial pulmonary edema. Small effusions are present. 1.. Congestive heart failure with interstitial and early alveolar edema. Signed by Dr Bell Sheppard on 5/18/2021 6:38 PM    NM MYOCARDIAL SPECT REST EXERCISE OR RX    Result Date: 5/21/2021  Lexiscan Nuclear Stress Test Report Procedure date: 05/21/21 Indications: shortness of breath Procedure: Stress was performed with injection of 0.4 mg Lexiscan. Vital signs and EKG were monitored.  Technetium-99 sestamibi was injected in divided suggestion of defects distal  anteroseptal apical and inferior wall with some reversibility present  correlate clinically and/or angiographically if clinically appropriate     Dr. Annalisa Camargo recommends medical management. Coreg 3.125 mg twice daily started   Bumex 1 mg twice daily     #NIKHIL/CKD-worsening          #Pancreatic cancer  On palliative treatment with dose reduced Gemzar and Abraxane, cycle 4-day 1 on 5/18/2021. #Anemia of chronic disease, macrocytic , enhanced by chemotherapy                     Receives Retacrit 40,000 units subcu for hemoglobin <10, will consider next dose at follow-up appointment in clinic     Hgb 8 today      #Palliative care patient-frail  Palliative care assisting  Discussed with palliative care this morning. They will have a discussion about goals and expectations with the patient and also family members. PLAN:  Continue supportive care   Monitor labs  Monitor renal function closely, consider nephrology consultation -she sees Dr. Melissa Sousa care assistance  Encourage increasing activity as tolerated    I have seen, examined and reviewed this patient medication list, appropriate labs and imaging studies. I reviewed relevant medical records and others physicians notes. I discussed the plans of care with the patient. I answered all the questions to the patients satisfaction. I have also reviewed the chief complaint (CC) and part of the history (History of Present Illness (HPI), Past Family Social History [facility]), or Review of Systems (ROS) and made changes when appropriated.        (Please note that portions of this note were completed with a voice recognition program. Efforts were made to edit the dictations but occasionally words are mis-transcribed.)      Karon Hill MD    05/24/21  6:53 AM

## 2021-05-25 ENCOUNTER — HOSPITAL ENCOUNTER (OUTPATIENT)
Dept: INFUSION THERAPY | Age: 86
End: 2021-05-25
Payer: MEDICARE

## 2021-05-25 PROBLEM — I50.21: Status: ACTIVE | Noted: 2021-05-25

## 2021-05-25 LAB
ANION GAP SERPL CALCULATED.3IONS-SCNC: 7 MMOL/L (ref 7–19)
ANISOCYTOSIS: ABNORMAL
ATYPICAL LYMPHOCYTE RELATIVE PERCENT: 3 % (ref 0–8)
BASOPHILS ABSOLUTE: 0 K/UL (ref 0–0.2)
BASOPHILS RELATIVE PERCENT: 0 % (ref 0–1)
BUN BLDV-MCNC: 61 MG/DL (ref 8–23)
CALCIUM SERPL-MCNC: 9.8 MG/DL (ref 8.8–10.2)
CHLORIDE BLD-SCNC: 94 MMOL/L (ref 98–111)
CO2: 34 MMOL/L (ref 22–29)
CREAT SERPL-MCNC: 2.6 MG/DL (ref 0.5–0.9)
EOSINOPHILS ABSOLUTE: 0 K/UL (ref 0–0.6)
EOSINOPHILS RELATIVE PERCENT: 0 % (ref 0–5)
GFR AFRICAN AMERICAN: 21
GFR NON-AFRICAN AMERICAN: 17
GLUCOSE BLD-MCNC: 113 MG/DL (ref 74–109)
HCT VFR BLD CALC: 25.3 % (ref 37–47)
HEMOGLOBIN: 8.1 G/DL (ref 12–16)
HYPOCHROMIA: ABNORMAL
IMMATURE GRANULOCYTES #: 0.1 K/UL
LYMPHOCYTES ABSOLUTE: 0.4 K/UL (ref 1.1–4.5)
LYMPHOCYTES RELATIVE PERCENT: 5 % (ref 20–40)
MACROCYTES: ABNORMAL
MAGNESIUM: 1.6 MG/DL (ref 1.6–2.4)
MCH RBC QN AUTO: 32.9 PG (ref 27–31)
MCHC RBC AUTO-ENTMCNC: 32 G/DL (ref 33–37)
MCV RBC AUTO: 102.8 FL (ref 81–99)
MONOCYTES ABSOLUTE: 0 K/UL (ref 0–0.9)
MONOCYTES RELATIVE PERCENT: 0 % (ref 0–10)
NEUTROPHILS ABSOLUTE: 4.1 K/UL (ref 1.5–7.5)
NEUTROPHILS RELATIVE PERCENT: 92 % (ref 50–65)
OVALOCYTES: ABNORMAL
PARATHYROID HORMONE INTACT: 212.5 PG/ML (ref 15–65)
PDW BLD-RTO: 23.9 % (ref 11.5–14.5)
PHOSPHORUS: 3.1 MG/DL (ref 2.5–4.5)
PLATELET # BLD: 181 K/UL (ref 130–400)
PLATELET SLIDE REVIEW: ADEQUATE
PMV BLD AUTO: 11.6 FL (ref 9.4–12.3)
POTASSIUM REFLEX MAGNESIUM: 3.8 MMOL/L (ref 3.5–5)
RBC # BLD: 2.46 M/UL (ref 4.2–5.4)
SODIUM BLD-SCNC: 135 MMOL/L (ref 136–145)
TEAR DROP CELLS: ABNORMAL
URIC ACID, SERUM: 6.5 MG/DL (ref 2.4–5.7)
VITAMIN D 25-HYDROXY: 38 NG/ML
WBC # BLD: 4.5 K/UL (ref 4.8–10.8)

## 2021-05-25 PROCEDURE — 2140000000 HC CCU INTERMEDIATE R&B

## 2021-05-25 PROCEDURE — 2700000000 HC OXYGEN THERAPY PER DAY

## 2021-05-25 PROCEDURE — 97165 OT EVAL LOW COMPLEX 30 MIN: CPT

## 2021-05-25 PROCEDURE — 99231 SBSQ HOSP IP/OBS SF/LOW 25: CPT | Performed by: INTERNAL MEDICINE

## 2021-05-25 PROCEDURE — 94660 CPAP INITIATION&MGMT: CPT

## 2021-05-25 PROCEDURE — 99233 SBSQ HOSP IP/OBS HIGH 50: CPT | Performed by: NURSE PRACTITIONER

## 2021-05-25 PROCEDURE — 82306 VITAMIN D 25 HYDROXY: CPT

## 2021-05-25 PROCEDURE — 84100 ASSAY OF PHOSPHORUS: CPT

## 2021-05-25 PROCEDURE — 83970 ASSAY OF PARATHORMONE: CPT

## 2021-05-25 PROCEDURE — 94761 N-INVAS EAR/PLS OXIMETRY MLT: CPT

## 2021-05-25 PROCEDURE — 80048 BASIC METABOLIC PNL TOTAL CA: CPT

## 2021-05-25 PROCEDURE — 6370000000 HC RX 637 (ALT 250 FOR IP): Performed by: HOSPITALIST

## 2021-05-25 PROCEDURE — 2580000003 HC RX 258: Performed by: NURSE PRACTITIONER

## 2021-05-25 PROCEDURE — 97530 THERAPEUTIC ACTIVITIES: CPT

## 2021-05-25 PROCEDURE — 6370000000 HC RX 637 (ALT 250 FOR IP): Performed by: NURSE PRACTITIONER

## 2021-05-25 PROCEDURE — 83735 ASSAY OF MAGNESIUM: CPT

## 2021-05-25 PROCEDURE — 84550 ASSAY OF BLOOD/URIC ACID: CPT

## 2021-05-25 PROCEDURE — 85025 COMPLETE CBC W/AUTO DIFF WBC: CPT

## 2021-05-25 PROCEDURE — 6370000000 HC RX 637 (ALT 250 FOR IP): Performed by: INTERNAL MEDICINE

## 2021-05-25 RX ADMIN — SODIUM CHLORIDE, PRESERVATIVE FREE 10 ML: 5 INJECTION INTRAVENOUS at 20:55

## 2021-05-25 RX ADMIN — DOCUSATE SODIUM 100 MG: 100 CAPSULE, LIQUID FILLED ORAL at 08:35

## 2021-05-25 RX ADMIN — DOCUSATE SODIUM 100 MG: 100 CAPSULE, LIQUID FILLED ORAL at 20:55

## 2021-05-25 RX ADMIN — BUMETANIDE 1 MG: 1 TABLET ORAL at 20:55

## 2021-05-25 RX ADMIN — CARVEDILOL 3.12 MG: 3.12 TABLET, FILM COATED ORAL at 08:37

## 2021-05-25 RX ADMIN — APIXABAN 2.5 MG: 2.5 TABLET, FILM COATED ORAL at 20:55

## 2021-05-25 RX ADMIN — APIXABAN 2.5 MG: 2.5 TABLET, FILM COATED ORAL at 08:36

## 2021-05-25 RX ADMIN — SODIUM CHLORIDE, PRESERVATIVE FREE 10 ML: 5 INJECTION INTRAVENOUS at 08:36

## 2021-05-25 RX ADMIN — CARVEDILOL 3.12 MG: 3.12 TABLET, FILM COATED ORAL at 17:56

## 2021-05-25 RX ADMIN — MIDODRINE HYDROCHLORIDE 10 MG: 10 TABLET ORAL at 08:35

## 2021-05-25 RX ADMIN — MIDODRINE HYDROCHLORIDE 10 MG: 10 TABLET ORAL at 17:56

## 2021-05-25 RX ADMIN — LEVOTHYROXINE SODIUM 112 MCG: 112 TABLET ORAL at 05:40

## 2021-05-25 RX ADMIN — ALLOPURINOL 100 MG: 100 TABLET ORAL at 08:35

## 2021-05-25 RX ADMIN — BUMETANIDE 1 MG: 1 TABLET ORAL at 08:36

## 2021-05-25 RX ADMIN — MIDODRINE HYDROCHLORIDE 10 MG: 10 TABLET ORAL at 11:57

## 2021-05-25 RX ADMIN — ASPIRIN 81 MG: 81 TABLET, COATED ORAL at 08:35

## 2021-05-25 RX ADMIN — ATORVASTATIN CALCIUM 40 MG: 40 TABLET, FILM COATED ORAL at 08:36

## 2021-05-25 ASSESSMENT — ENCOUNTER SYMPTOMS
WHEEZING: 0
COUGH: 0
ABDOMINAL DISTENTION: 0
TROUBLE SWALLOWING: 0
SORE THROAT: 0
NAUSEA: 0
VOMITING: 0
CONSTIPATION: 0
BLOOD IN STOOL: 0
SHORTNESS OF BREATH: 0
ABDOMINAL PAIN: 0
DIARRHEA: 0
SINUS PAIN: 0

## 2021-05-25 ASSESSMENT — PAIN SCALES - GENERAL
PAINLEVEL_OUTOF10: 0
PAINLEVEL_OUTOF10: 0

## 2021-05-25 NOTE — PROGRESS NOTES
Palliative Care Progress Note  5/25/2021 6:49 AM  Subjective:   Admit Date: 5/18/2021  PCP: Laura Rose MD    Chief Complaint: Shortness of breath    Interval History: No acute overnight events. Patient remains weak overall, continues to work with therapy. Renal function holding about the same, Nephrology consulted and following. She continues to be followed closely by Oncology and Cardiology. Continued discussions regarding goals of care. Portions of this note have been copied forward, however, changed to reflect the most current clinical status of this patient. Review of Systems   Review of Systems   Constitutional: Positive for activity change and appetite change. Generalized weakness   HENT: Negative. Eyes: Negative. Respiratory: Positive for shortness of breath. Cardiovascular: Negative. Gastrointestinal: Negative. Endocrine: Negative. Genitourinary: Negative. Musculoskeletal: Negative. Skin: Negative. Neurological: Negative. Psychiatric/Behavioral: Negative.         DIET CARDIAC;    Medications:   sodium chloride       Current Facility-Administered Medications   Medication Dose Route Frequency Provider Last Rate Last Admin    allopurinol (ZYLOPRIM) tablet 100 mg  100 mg Oral Daily Jonathan Chen MD   100 mg at 05/24/21 1024    midodrine (PROAMATINE) tablet 10 mg  10 mg Oral TID  Jonathan Chen MD   10 mg at 05/24/21 1823    bumetanide (BUMEX) tablet 1 mg  1 mg Oral BID Jonathan Chen MD   1 mg at 05/24/21 2020    carvedilol (COREG) tablet 3.125 mg  3.125 mg Oral BID  Prachi Peoples MD   3.125 mg at 05/24/21 1823    aspirin EC tablet 81 mg  81 mg Oral Daily Amandeep Rainey MD   81 mg at 05/24/21 1021    nitroGLYCERIN (NITROSTAT) SL tablet 0.4 mg  0.4 mg Sublingual Once Harman Goodell., MD        sodium chloride flush 0.9 % injection 5-40 mL  5-40 mL Intravenous 2 times per day Yan November, APRN - CNP   10 mL at 05/24/21 2020    RBC 2.37* 2.40* 2.46*   HGB 7.7* 8.0* 8.1*   HCT 24.2* 24.5* 25.3*   .1* 102.1* 102.8*   MCH 32.5* 33.3* 32.9*   MCHC 31.8* 32.7* 32.0*    215 181     Recent Labs     05/23/21  0550 05/24/21  0340 05/25/21  0330    135* 135*   K 3.6 3.7 3.8   ANIONGAP 7 7 7   CL 96* 93* 94*   CO2 35* 35* 34*   BUN 51* 57* 61*   CREATININE 2.3* 2.6* 2.6*   GLUCOSE 116* 117* 113*   CALCIUM 9.6 9.5 9.8     Recent Labs     05/23/21  0550 05/25/21  0330   MG 1.7 1.6   PHOS  --  3.1     No results for input(s): AST, ALT, ALB, BILITOT, ALKPHOS, ALB in the last 72 hours. ABGs:  No results for input(s): PHART, UGJ5UQF, PO2ART, CQZ7ESH, BEART, HGBAE, E3NSJWNM, CARBOXHGBART, 02THERAPY in the last 72 hours. HgBA1c:   No results for input(s): LABA1C in the last 72 hours. FLP:    Lab Results   Component Value Date    TRIG 108 05/18/2021    HDL 54 05/18/2021    LDLCALC 79 05/18/2021     TSH:    Lab Results   Component Value Date    TSH 4.710 05/18/2021     Troponin T:   No results for input(s): TROPONINI in the last 72 hours. INR: No results for input(s): INR in the last 72 hours.     Objective:   Vitals: BP (!) 105/52   Pulse 71   Temp 97.5 °F (36.4 °C) (Temporal)   Resp 15   Ht 5' 3\" (1.6 m)   Wt 194 lb (88 kg)   SpO2 94%   BMI 34.37 kg/m²   24HR INTAKE/OUTPUT:      Intake/Output Summary (Last 24 hours) at 5/25/2021 9787  Last data filed at 5/24/2021 1358  Gross per 24 hour   Intake 360 ml   Output --   Net 360 ml     Physical Exam  General appearance: alert and cooperative with exam, appears elderly, appears chronically ill, no acute distress  HEENT: atraumatic, eyes with clear conjunctiva and normal lids, pupils and irises normal, external ears and nose are normal,lips normal.  Neck: without masses, supple   Lungs: no increased work of breathing, diminished, supplemental O2 in place  Heart: regular rate and rhythm, distal pulses intact  Abdomen: soft, non-tender; bowel sounds normal; no masses,  no organomegaly  Genitourinary: No bladder fullness, masses, or tenderness  Extremities: able to SINGH, 1+ BLE edema  Neurologic: No focal neurologic deficits, normal sensation, no tremor, alert and oriented  Psychiatric: Alert and oriented, no recent or remote memory deficits, good judgement, mood and affect appropriate  Skin: warm, dry      Assessment and Plan:   Principal Problem:    New onset of congestive heart failure (HCC)  Active Problems:    CKD (chronic kidney disease)    Malignant neoplasm of other parts of pancreas (HCC)    Acute respiratory failure with hypoxia (HCC)  Resolved Problems:    * No resolved hospital problems. *      Visit Summary: Chart reviewed, patient discussed with nursing and Oncology. I saw the patient at the bedside with her daughter present. She is resting in bed, remains weak overall. Goals readdressed, pt's daughter and son in law plan to move in with the patient to help care for her at home. They would like for the patient to have continued physical therapy, which is provided minimally with hospice care, and feel that [place] is the best option for care at this time. Once patient is maximized from therapy, pt/family aware of the option to transition toward hospice at any point. Pt/family would like for the patient to be followed by Outpatient Supportive Care, consult placed. Although I would favor transition to hospice, it is reasonable for pt to pursue [place] in effort to strengthen and optimize her function at home, teaching with family on safe transfers etc., if possible. Pt has DME in the home but will need O2 concentrator/portable tanks as she was not O2 dependent prior to admission. All questions answered, emotional support provided. Palliative Care will follow. I would favor hospice care for this patient. All questions answered, encouragement and emotional support provided. Palliative care will continue to follow. Recommendations:   1. Palliative Care- C ongoing discussion.

## 2021-05-25 NOTE — PLAN OF CARE
Ongoing     Problem: Skin Integrity:  Goal: Will show no infection signs and symptoms  Description: Will show no infection signs and symptoms  5/25/2021 1025 by Blanquita Montez RN  Outcome: Ongoing  5/25/2021 0009 by Israel Wyatt RN  Outcome: Ongoing  Goal: Absence of new skin breakdown  Description: Absence of new skin breakdown  5/25/2021 1025 by Blanquita Montez RN  Outcome: Ongoing  5/25/2021 0009 by Israel Wyatt RN  Outcome: Ongoing     Problem:  Activity:  Goal: Fatigue will decrease  Description: Fatigue will decrease  5/25/2021 1025 by Blanquita Montez RN  Outcome: Ongoing  5/25/2021 0009 by Israel Wyatt RN  Outcome: Ongoing  Goal: Ability to tolerate increased activity will improve  Description: Ability to tolerate increased activity will improve  5/25/2021 1025 by Blanquita Montez RN  Outcome: Ongoing  5/25/2021 0009 by Israel Wyatt RN  Outcome: Ongoing  Goal: Ability to maintain optimal joint mobility will improve  Description: Ability to maintain optimal joint mobility will improve  5/25/2021 1025 by Blanquita Montez RN  Outcome: Ongoing  5/25/2021 0009 by Isarel Wyatt RN  Outcome: Ongoing     Problem: Coping:  Goal: Ability to adjust to condition or change in health will improve  Description: Ability to adjust to condition or change in health will improve  5/25/2021 1025 by Blanquita Montez RN  Outcome: Ongoing  5/25/2021 0009 by Israel Wyatt RN  Outcome: Ongoing     Problem: Health Behavior:  Goal: Identification of resources available to assist in meeting health care needs will improve  Description: Identification of resources available to assist in meeting health care needs will improve  5/25/2021 1025 by Blanquita Montez RN  Outcome: Ongoing  5/25/2021 0009 by Israel Wyatt RN  Outcome: Ongoing     Problem: Nutritional:  Goal: Maintenance of adequate nutrition will improve  Description: Maintenance of adequate nutrition will improve  5/25/2021 1025 by Blanquita Montez RN  Outcome: Ongoing  5/25/2021 0009 by Fredis Gracia RN  Outcome: Ongoing     Problem: Physical Regulation:  Goal: Signs and symptoms of infection will decrease  Description: Signs and symptoms of infection will decrease  5/25/2021 1025 by Angie Wong RN  Outcome: Ongoing  5/25/2021 0009 by Fredis Gracia RN  Outcome: Ongoing  Goal: Complications related to the disease process, condition or treatment will be avoided or minimized  Description: Complications related to the disease process, condition or treatment will be avoided or minimized  5/25/2021 1025 by Angie Wong RN  Outcome: Ongoing  5/25/2021 0009 by Fredis Gracia RN  Outcome: Ongoing     Problem: Safety:  Goal: Ability to remain free from injury will improve  Description: Ability to remain free from injury will improve  5/25/2021 1025 by Angie Wong RN  Outcome: Ongoing  5/25/2021 0009 by Fredis Gracia RN  Outcome: Ongoing     Problem: Sensory:  Goal: Pain level will decrease  Description: Pain level will decrease  5/25/2021 1025 by Angie Wong RN  Outcome: Ongoing  5/25/2021 0009 by Fredis Gracia RN  Outcome: Ongoing  Goal: General experience of comfort will improve  Description: General experience of comfort will improve  5/25/2021 1025 by Angie Wong RN  Outcome: Ongoing  5/25/2021 0009 by Fredis Gracia RN  Outcome: Ongoing

## 2021-05-25 NOTE — PROGRESS NOTES
[facility]      Progress Note    Patient:  Donna Zarate  YOB: 1934  Date of Service: 5/25/2021  MRN: [id number]   Acct: [de-identified]   Primary Care Physician: Meryl Son MD  Advance Directive: [facility]  Admit Date: 5/18/2021       Hospital Day: 7    Portions of this note have been copied forward, however, updated to reflect the most current clinical status of this patient. CHIEF COMPLAINT SOB    SUBJECTIVE:  Ms. Jono León was resting comfortably in chair this afternoon. Daughter at bedside reported patient walked with PT this morning and did good. Patient denies SOB with activity. Denies chest pain at this time. Reports having BM today and feels much better now. CUMULATIVE HOSPITAL COURSE:   The patient is a 80 y.o. female with a history of HTN, pancreatic and thyroid cancer, CKD, and anemia who presented to [facility] ED complaining of shortness of breath. The patient was placed on BiPap in the ED. The daughter reported to ED staff that patient has had shortness of breath over the last several days which has prevented the patient from laying down.  She denied any significant leg swelling (but endorses a small amount), cough, fevers,or chills.  Patient was receiving chemotherapy on 5/18/2021 and stated she had to lay flat for her chemo but could not tolerate it for shortness of breath, however she was able to finish her treatment.  Infusion nurse reported her O2 sat was in the 63's.  Patient did have a chest x-ray obtained there which showed edema according to patient's daughter. Daughter and patient denied any history of heart failure, COPD, or other cardiac or pulmonary problems. ECHO on 5/19/21 showed EF 15%, and cardiology was consulted. Patient was given 80 mg Iv lasix in ED and began on 40 mg IV lasix BID and changed to PO on 5/20/2021 later changed to bumex 1 mg BID on 5/21/21.  The patient was able to be transitioned from BiPap to nasal cannula at 5 LPM 5/19/2021 AM. As of 5/20/2021 this patient was requiring 5 LPM. Stress test on 5/21/2021 indicated ejection fraction 31% severely impaired multiple perfusion abnormalities consistent with ischemic cardiomyopathy in which cardiology recommended medical management as she is a poor surgical candidate, Coreg 3.125 mg BID added. 5/22/2021 Pt able to participate with PT/OT. 1 Unit PRBC per oncology 5/22/2021. Continued with IV Bumex. Pt continues to require 5 LPM via nasal cannula, nursing and RT to wean as tolerated. midodrine added for hypotension 5/23/2021. Review of Systems   Constitutional: Negative for chills, diaphoresis, fatigue and fever. HENT: Negative for congestion, ear pain, sinus pain, sore throat and trouble swallowing. Eyes: Negative for visual disturbance. Respiratory: Negative for cough, shortness of breath and wheezing. Reports much improvement in shortness of breath   Cardiovascular: Negative for chest pain, palpitations and leg swelling. Gastrointestinal: Negative for abdominal distention, abdominal pain, blood in stool, constipation, diarrhea, nausea and vomiting. Endocrine: Negative for cold intolerance and heat intolerance. Genitourinary: Negative for difficulty urinating, flank pain, frequency, urgency and vaginal bleeding. Musculoskeletal: Negative for arthralgias and myalgias. Neurological: Negative for dizziness, syncope, weakness, light-headedness, numbness and headaches. Hematological: Does not bruise/bleed easily. Psychiatric/Behavioral: Negative for agitation, confusion and dysphoric mood.         Objective:   VITALS:  BP (!) 105/57   Pulse 79   Temp 96.6 °F (35.9 °C) (Temporal)   Resp 18   Ht 5' 3\" (1.6 m)   Wt 194 lb (88 kg)   SpO2 92%   BMI 34.37 kg/m²   24HR INTAKE/OUTPUT:      Intake/Output Summary (Last 24 hours) at 5/25/2021 1709  Last data filed at 5/25/2021 1332  Gross per 24 hour   Intake 340 ml   Output 700 ml   Net -360 ml         Physical Exam  Constitutional:       General: She is not in acute distress. Appearance: Normal appearance. She is not toxic-appearing or diaphoretic. HENT:      Head: Normocephalic and atraumatic. Right Ear: External ear normal.      Left Ear: External ear normal.      Nose: Nose normal. No congestion or rhinorrhea. Mouth/Throat:      Mouth: Mucous membranes are moist.      Pharynx: Oropharynx is clear. Eyes:      General: No scleral icterus. Extraocular Movements: Extraocular movements intact. Conjunctiva/sclera: Conjunctivae normal.   Cardiovascular:      Rate and Rhythm: Normal rate and regular rhythm. Pulses: Normal pulses. Heart sounds: Normal heart sounds. No murmur heard. No friction rub. No gallop. Pulmonary:      Effort: Pulmonary effort is normal. No respiratory distress. Breath sounds: Normal breath sounds. No wheezing, rhonchi or rales. Abdominal:      General: Abdomen is flat. Bowel sounds are normal. There is no distension. Palpations: Abdomen is soft. Tenderness: There is no abdominal tenderness. Musculoskeletal:         General: No swelling. Normal range of motion. Cervical back: Normal range of motion and neck supple. Right lower leg: No edema. Left lower leg: No edema. Skin:     General: Skin is warm and dry. Coloration: Skin is not jaundiced. Findings: No erythema, lesion or rash. Neurological:      General: No focal deficit present. Mental Status: She is alert and oriented to person, place, and time. Mental status is at baseline. Cranial Nerves: No cranial nerve deficit. Sensory: No sensory deficit. Motor: No weakness. Psychiatric:         Mood and Affect: Mood normal.         Behavior: Behavior normal.         Thought Content:  Thought content normal.         Judgment: Judgment normal.            Medications:      sodium chloride        allopurinol  100 mg Oral Daily    midodrine  10 mg Oral TID WC    bumetanide  1 mg Oral BID    carvedilol  3.125 mg Oral BID WC    aspirin  81 mg Oral Daily    nitroGLYCERIN  0.4 mg Sublingual Once    sodium chloride flush  5-40 mL Intravenous 2 times per day    apixaban  2.5 mg Oral BID    docusate sodium  100 mg Oral BID    levothyroxine  112 mcg Oral Daily    [Held by provider] lisinopril  10 mg Oral Daily    atorvastatin  40 mg Oral Daily     sodium chloride flush, sodium chloride, promethazine **OR** ondansetron, polyethylene glycol, acetaminophen **OR** acetaminophen, potassium chloride **OR** potassium alternative oral replacement **OR** potassium chloride, magnesium sulfate  DIET CARDIAC;     Lab and other Data:     Recent Labs     05/23/21  0550 05/24/21  0340 05/25/21  0330   WBC 8.7 5.8 4.5*   HGB 7.7* 8.0* 8.1*    215 181     Recent Labs     05/23/21  0550 05/24/21  0340 05/25/21  0330    135* 135*   K 3.6 3.7 3.8   CL 96* 93* 94*   CO2 35* 35* 34*   BUN 51* 57* 61*   CREATININE 2.3* 2.6* 2.6*   GLUCOSE 116* 117* 113*     UA:  Recent Labs     05/24/21  1600   COLORU YELLOW   PHUR 5.5   CLARITYU Clear   SPECGRAV 1.010   LEUKOCYTESUR Negative   UROBILINOGEN 0.2   BILIRUBINUR Negative   BLOODU Negative   GLUCOSEU Negative       RAD:   Echo Complete    Result Date: 5/19/2021  Summary  Left ventricular chamber size is borderline dilated. .  Mild concentric left ventricular hypertrophy. Left ventricular systolic function is severely reduced  Left ventricular ejection fraction is visually estimated at 15%. Severe left ventricular global hypokinesis. There is mild mitral regurgitation. The left atrium is moderately dilated. Signature   ----------------------------------------------------------------  Electronically signed by Alejandra Davis DO(Interpreting  physician) on 05/19/2021 05:18 PM  -      XR CHEST (2 VW)    Result Date: 5/18/2021    Pulmonary vascular congestion and pulmonary edema. A trace bibasal pleural effusion. supplementation and calcitrol                  Malignant neoplasm of other parts of pancreas ([facility] [address]              -oncology following              - palliative care following              - Received 1 unit PRBC on 5/22/21        DVT Prophylaxis: on Eliquis       Further Orders per Clinical course/attending. Electronically signed by ALBERTO Rodríguez CNP on 5/25/2021 at 5:09 PM       EMR Dragon/Transcription disclaimer:   Much of this encounter note is an electronic transcription/translation of spoken language to printed text.  The electronic translation of spoken language may permit erroneous, or at times, nonsensical words or phrases to be inadvertently transcribed; although attempts have made to review the note for such errors, some may still exist.

## 2021-05-25 NOTE — PLAN OF CARE
Problem: Falls - Risk of:  Goal: Will remain free from falls  Description: Will remain free from falls  5/25/2021 0009 by Israel Wyatt RN  Outcome: Ongoing     Problem: Falls - Risk of:  Goal: Absence of physical injury  Description: Absence of physical injury  5/25/2021 0009 by Israel Wyatt RN  Outcome: Ongoing     Problem: Cardiac:  Goal: Ability to maintain an adequate cardiac output will improve  Description: Ability to maintain an adequate cardiac output will improve  5/25/2021 0009 by Israel Wyatt RN  Outcome: Ongoing     Problem: Cardiac:  Goal: Hemodynamic stability will improve  Description: Hemodynamic stability will improve  5/25/2021 0009 by Israel Wyatt RN  Outcome: Ongoing     Problem: Cardiac:  Goal: Ability to maintain adequate ventilation will improve  Description: Ability to maintain adequate ventilation will improve  5/25/2021 0009 by Israel Wyatt RN  Outcome: Ongoing     Problem: Cardiac:  Goal: Ability to achieve and maintain adequate cardiopulmonary perfusion will improve  Description: Ability to achieve and maintain adequate cardiopulmonary perfusion will improve  5/25/2021 0009 by Israel Wyatt RN  Outcome: Ongoing     Problem: Fluid Volume:  Goal: Ability to achieve and maintain adequate urine output will improve  Description: Ability to achieve and maintain adequate urine output will improve  5/25/2021 0009 by Israel Wyatt RN  Outcome: Ongoing     Problem: Respiratory:  Goal: Respiratory status will improve  Description: Respiratory status will improve  5/25/2021 0009 by Israel Wyatt RN  Outcome: Ongoing     Problem: Skin Integrity:  Goal: Will show no infection signs and symptoms  Description: Will show no infection signs and symptoms  5/25/2021 0009 by Israel Wyatt RN  Outcome: Ongoing     Problem:  Activity:  Goal: Fatigue will decrease  Description: Fatigue will decrease  5/25/2021 0009 by Israel Wyatt RN  Outcome: Ongoing     Problem: Activity:  Goal: Ability to tolerate increased activity will improve  Description: Ability to tolerate increased activity will improve  5/25/2021 0009 by Giovani Bautista RN  Outcome: Ongoing     Problem:  Activity:  Goal: Ability to maintain optimal joint mobility will improve  Description: Ability to maintain optimal joint mobility will improve  5/25/2021 0009 by Giovani Bautista RN  Outcome: Ongoing     Problem: Coping:  Goal: Ability to adjust to condition or change in health will improve  Description: Ability to adjust to condition or change in health will improve  5/25/2021 0009 by Giovani Bautista RN  Outcome: Ongoing

## 2021-05-25 NOTE — PROGRESS NOTES
Occupational Therapy   Occupational Therapy Initial Assessment  Date: 2021   Patient Name: Wilberto Brito  MRN: [id number]     : 1934    Date of Service: 2021    Discharge Recommendations:          Assessment   Performance deficits / Impairments: Decreased functional mobility ; Decreased endurance;Decreased ADL status; Decreased balance;Decreased strength;Decreased ROM  Assessment: Will progress as tolerated  Treatment Diagnosis: CHF, pancreatic cancer  Prognosis: Good  Decision Making: Low Complexity  REQUIRES OT FOLLOW UP: Yes  Activity Tolerance  Activity Tolerance: Patient Tolerated treatment well           Patient Diagnosis(es): The primary encounter diagnosis was Acute respiratory failure with hypoxia ([facility] [address] Diagnoses of New onset of congestive heart failure ([address] Chronic renal impairment, unspecified CKD stage, Chronic anemia, On antineoplastic chemotherapy, Malignant neoplasm of other parts of pancreas ([address] Chronic kidney disease, unspecified CKD stage, and Palliative care patient were also pertinent to this visit. has a past medical history of Abdominal aortic aneurysm (AAA) ([address] Anemia, Arthritis, Cancer ([address] CHF (congestive heart failure) ([address] Chronic kidney disease, DVT of lower extremity (deep venous thrombosis) ([address] Hyperlipidemia, Hypertension, Kidney stones, Osteoarthritis, Palliative care patient, Thyroid disease, and Thyroid disorder. has a past surgical history that includes Toe amputation (Bilateral); Colonoscopy (); Total knee arthroplasty (Right, 2020); Endoscopic ultrasonography, GI (N/A, 2020); ERCP (N/A, 2020); ERCP (2020); and Port Surgery (Right, 2021).     Treatment Diagnosis: CHF, pancreatic cancer      Restrictions  Restrictions/Precautions  Restrictions/Precautions: Fall Risk    Subjective   General  Chart Reviewed: Yes  Patient assessed for rehabilitation services?: Yes  Family / Caregiver Present: Yes  Diagnosis: CHF, pancreatic cancer  Patient Currently in Pain: No  Vital Signs  Patient Currently in Pain: No  Oxygen Therapy  O2 Device: Nasal cannula  O2 Flow Rate (L/min): 2 L/min  Social/Functional History  Social/Functional History  Lives With: Son  Type of Home: House  Home Layout: Able to Live on Main level with bedroom/bathroom  Home Access: Stairs to enter without rails  Entrance Stairs - Number of Steps: 2  Bathroom Shower/Tub: Walk-in shower  Bathroom Toilet: Handicap height  Bathroom Equipment: Shower chair  Home Equipment: Rolling walker, Cane  ADL Assistance: Needs assistance  Homemaking Assistance: Needs assistance  Homemaking Responsibilities: Yes  Ambulation Assistance: Independent (Daughter in law said pt. walking less than 2 weeks ago)  Transfer Assistance: Independent  Active : No  Additional Comments: recent history of falls       Objective   Vision: Within Functional Limits  Hearing: Within functional limits    Orientation  Overall Orientation Status: Within Functional Limits        ADL  Feeding: Independent  Grooming: Supervision  UE Bathing: Minimal assistance  LE Bathing: Maximum assistance  UE Dressing: Minimal assistance  LE Dressing: Maximum assistance  Toileting: Maximum assistance  Additional Comments: Fatigues quickly           Transfers  Stand Step Transfers: Minimal assistance  Sit to stand: Minimal assistance  Transfer Comments: RW and extended time to complete     Cognition  Overall Cognitive Status: WFL                 LUE AROM (degrees)  LUE AROM : WFL  RUE AROM (degrees)  RUE AROM : WFL  LUE Strength  Gross LUE Strength: Exceptions to [facility]  RUE Strength  Gross RUE Strength: Exceptions to [facility]  Times per week: 3-5x/week  Times per day: Daily    G-Code     OutComes Score                                                  AM-PAC Score             Goals  Short term goals  Time Frame for Short term goals: 1 week  Short term goal 1: Supervision with [facility] tfers  Short

## 2021-05-25 NOTE — PROGRESS NOTES
PROGRESS NOTE    Pt Name: Donna Zarate  MRN: [id number]  YOB: 1934  Date of evaluation: 5/25/2021  Room: 709    Subjective  Continues to be weak    Prior history  Isak Garduno is a 81 yo female with multiple comorbidities to include a significant history of pancreatic adenocarcinoma currently receiving palliative chemotherapy. She received treatment Gemzar and Abraxane on 5/18/2021 and started to complain of worsening shortness of breath. She had a normal respiratory rate and saturation when she presented to clinic. After completion of treatment she started experiencing worsening dyspnea with hypoxemia and desaturation. No fever of chills. She was sent for a CXR that showed pulmonary congestion. Due to her worsening respiratory status she was sent to the ER where she was admitted. She has a significant PMH of iron deficiency anemia, chronic kidney disease stage IV,  and diagnosis of pancreatic adenocarcinoma. She has been receiving palliative chemotherapy with Gemzar 750 mg/m² and Abraxane 90 mg/m² every 2 weeks (this is a 25% dose reduction). Follow-up CT scan of the abdomen and pelvis on 4/13/2021 and tumor markers suggest a positive response to treatment. She has required treatment delay and further dose reduction due to experiencing significant fatigue and nausea. Starting with cycle 4 her treatment regimen was dose reduced by an additional 25%, Gemzar 600 mg/m² and Abraxane 75 mg/m². 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 Procrit to be given every 2 weeks ×4 and then monthly, dose to be titrated appropriately.  Hold dose for hemoglobin >11   Currently receiving Retacrit 40,000 units every 4 weeks  2/8/2021 palliative chemotherapy on 2021 with Gemzar 750 mg/m² and Abraxane 90 mg/m² every 2 weeks, this is a dose reduction by 25%        Tumor monitorin2020-CA 19-9 of 133  2020-CA 19-9 of 217  2021-CA 19-9 of 236  3/23/2021- CA 19-9 of 108     CANCER HISTORY  Niko Mitchell was seen by Dr. Hira Victoria on 2020 as an inpatient consultation at [facility]) of [facility] for findings of a pancreatic mass and bile duct obstruction. She presented to the ER department with complaints of hematuria that began a few days prior to presentation. She denied any dysuria, back pain, no nausea or vomiting. She also reported easily bruising and ecchymotic areas in her back, abdomen and flank. Initial laboratory work-up showed INR above 18. She was given vitamin K subcutaneously. She was found to have elevated LFTs and the following are events that occurred. 2020-CT abdomen pelvis without contrast showed a pancreatic head mass measuring 3.3 x 3.7 cm in size and associated, bile duct dilatation above the level of the ampulla. Associated moderate pancreatic ductal dilatation. 2020-CA 19-9 133  2020-ERCP with FNA biopsy Positive for high-grade adenocarcinoma. Unfortunately, stent could not be placed. 2020-transferred from [facility]) to [address] [place] [address] and discharged home on 2020- Cholangipancreatography Retrograde Endo ERCP with sphincterotomy, balloon sweep and placement of 10x60 PC BS metal stent at [facility] in [place]. 2020- CT chest with contrast documented no evidence of intrathoracic neoplastic process/metastatic disease. Evidence of pneumobilia. The ectasia of the pancreatic duct, similar to the previous study. An incompletely visualized and evaluated heterogeneous mass in the head of the pancreas measuring 3.3 x 3.7cm. A biliary stent in place. Moderate persistent dilatation of the proximal common bile duct.  A stable small low-density nodule in the left adrenal gland  12/22/2020-Dr. Lambert discussed the results of CT chest and pathology that suggest advanced pancreatic cancer, unfortunately it is not amenable to surgery. She was quite weak and had poor performance, appeared very frail. Discussion was made about palliative chemotherapy but the decision to hold was made until improvement of her physical conditioning occurred. Recommended physical therapy as outpatient and reassess fitness for palliative chemotherapy in approximately 4 weeks. 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 12/7/2020 ERCP at [address] [place] [address] Dr by Dr. Prince Garrido revealed a single severe biliary stricture in the lower third of the main bile duct with severe upstream biliary dilation, stricture was malignant appearing. Biliary sphincterectomy was performed. 1 partially covered metal stent was placed into the common bile duct. 2/8/2021-initiated palliative chemotherapy with Gemzar 750 mg/m² and Abraxane 90 mg/m² every 2 weeks, this is a dose reduction by 25%  3/23/2021- CA 19-9 108, improved compared to pretreatment value of 236 on 1/20/2021.   4/13/2021 CT Abdomen/Pelvis with contrast documented a poorly defined complex mass in the head of the pancreas. It appears to have decreased in size when remeasured at the same level and in same dimension as in the previous study (3 cm x 2.5 cm, compared to 3.7 cm x 3.3 cm). Persistent moderate dilatation of the pancreatic duct. There are 2 additional small cystic nodules in the body of the pancreas measuring 11 mm and 9 mm. A biliary stent in place and decompression of the intrahepatic biliary ducts since the previous study. 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 The diverticulosis of the distal colon with no evidence for diverticulitis.   5/4/2021- dose reduction by 25% due to severe fatigue and nausea, starting with cycle 4 will receive Gemzar 600 mg/m² and Abraxane 75 mg/m². HEMATOLOGY HISTORY:  Quince Kussmaul was seen in initial hematology consultation on 7/20/2018 for further evaluation and treatment recommendations for anemia. Quince Kussmaul was referred from Dr. Lilo Hernandez. Quince Kussmaul presented with no significant complaints other than chronic fatigue and constipation. She had been taking ferrous sulfate 325 mg daily under the direction of Dr. Cecy Mcdowell. Quince Kussmaul reported significant constipation and some GI upset with the oral iron. She denied any bleeding tendencies to include hematuria, melena, hematochezia and epistaxis. Quince Kussmaul had a colonoscopy on 4/26/2016 by Dr. Rosamaria Schaumann for further evaluation of iron deficiency. The only abnormal finding was diverticulosis. Quince Kussmaul is routinely followed by Dr. Kanwal Lopez for her chronic kidney disease stage IV. CMP on 6/11/2018 documented a creatinine of 2.2 and GFR 21. CBC review from 2/2/2018- 6/11/2018 documented hemoglobin ranging from 9.4- 10.1, RBC 2.91- 3.22, MCV 94- 100 with a normal WBC and platelet count  CBC at initial consultation on 7/20/2018 documented a WBC of 7.33, ANC 4.74, RBC 3.04, hemoglobin 10.3, .6 and a platelet count of 117,424. Serology studies on 7/20/2018:  Creatinine 1.98   GFR 23   Calcium 10.3   IgG 700, IgA 180, and IgM 52   M spike not observed   Immunofixation: No monoclonality detected. Iron 74   Iron saturation 27%   TIBC 278   Vitamin B12 437   Folate 15.7   Ferritin 299   Reticulocyte count 1.5%   Erythropoietin 11.6      Beta-2 microglobulin 6.2      Occult stool positive 1 of 3 samples on 7/25/2018. Colonoscopy on 10/4/2018 by Dr. Rosamaria Schaumann was documented as removal of 2 polyps with benign pathology. No evidence of bleeding.      Serology studies on 10/15/2018:  Iron 76   TIBC 287   Iron saturation 26%   Ferritin 321   Hemoglobin 10.1   Creatinine 2.47   GFR 17     11/26/2018 - Initiated Procrit 20,000 units for chronic kidney disease stage IV, to be given every 2 weeks ×4 and then monthly, dose to be titrated appropriately. Hemoglobin 9.6. All has anemia of chronic disease to include chronic kidney disease stage IV. She will began receiving growth factor support and will need to maintain a ferritin level greater than 200 and an iron saturation of 25% for the Procrit to be beneficial. Hold Procrit dose for hemoglobin >11. Indications/rationale for Procrit was discussed with [place]. Risks, benefits and expectations were outlined. Risks including, but not limited to hypertension, stroke, MI, death were discussed and acknowledged by [place]. Serology studies on 3/8/2019:  Creatinine 1.7/GFR 30.4   Iron 80   Iron saturation 26.5%   TIBC 302   Ferritin 144   Vitamin B12 501   Folate 13.9     Serology studies on 3/5/2020:  Creatinine 1.3/GFR 41.4  Iron 84  TIBC 390  Iron saturation 21.5%  Ferritin 342     Iron substrates on 6/25/2020  Ferritin 311  Iron 76  Iron saturation 23%  TIBC 326  Creatinine 1.3/GFR 39     Labs on 12/2/2020 and 12/3/2020:  Iron 35  TIBC 213  Iron saturation 16%  Vitamin B12 483  Folate 10.6  Ferritin 480     Labs on 3/23/2021  Iron 54  TIBC 366  Iron saturation 15  Ferritin 530  Reticulocytes 2.1     Labs on 4/6/2021  WBC 14.40  RBC 2.46  HGB 8.6  HCT 26.4  .3  MCH 35  MCHC 32.6  ANC 12.03  ,000  Iron 43   TIBC 226  Iron saturation 19%   Ferritin 1088.0  B-12= 781  GFR 39  BUN 21  Creatinine 1.3  Phosphorus 2.4  Magnesium 1.5        Age-appropriate health screening:  Colonoscopy on 10/4/2018 by Dr. Allie San was documented as removal of 2 polyps with benign pathology. No evidence of bleeding. 9/20/2019 [facility] documented no mammographic evidence of malignancy. No bone mineral density on file         REVIEW OF SYSTEMS:   Constitutional: Positive for activity change, appetite change and fatigue. 05/25/21  0330 05/24/21  0340 05/23/21  0550   WBC 4.5* 5.8 8.7   HGB 8.1* 8.0* 7.7*   HCT 25.3* 24.5* 24.2*   .8* 102.1* 102.1*    215 225       Lab Results   Component Value Date     (L) 05/25/2021    K 3.8 05/25/2021    CL 94 (L) 05/25/2021    CO2 34 (H) 05/25/2021    BUN 61 (H) 05/25/2021    CREATININE 2.6 (H) 05/25/2021    GLUCOSE 113 (H) 05/25/2021    CALCIUM 9.8 05/25/2021    PROT 7.0 05/18/2021    LABALBU 3.5 05/18/2021    BILITOT 0.6 05/18/2021    ALKPHOS 139 (H) 05/18/2021    AST 23 05/18/2021    ALT 12 05/18/2021    LABGLOM 17 (A) 05/25/2021    GFRAA 21 (L) 05/25/2021    AGRATIO 1.9 01/20/2021    GLOB 2.8 05/18/2021       Lab Results   Component Value Date    INR 1.03 12/05/2020    INR 1.04 12/04/2020    INR 1.07 12/03/2020    PROTIME 13.4 12/05/2020    PROTIME 13.5 12/04/2020    PROTIME 13.8 12/03/2020       RADIOLOGY STUDIES REVIEWED BY ME:  Echo Complete    Result Date: 5/19/2021  Transthoracic Echocardiography Report (TTE)  Demographics   Patient Name Robert Killian  Date of Study        05/19/2021               J   MRN          [id number]           Gender               Female   Date of      1934       Room Number          [place]  Birth   Age          80 year(s)   Height:      63 inches        Referring Physician  Stephanie DOMINGUEZ   Weight:      169 pounds       Sonographer          Jenifer Cates   BSA:         1.8 m^2          Interpreting         Salreglaann ,                                 Physician   BMI:         29.94 kg/m^2  Procedure Type of Study   TTE procedure:ECHO NO CONTRAST WITH DOP/COLR. Study Location: Echo Lab Technical Quality: Limited visualization due to poor acoustical window. Patient Status: Inpatient Contrast Medium: Definity. Indications:Congestive heart failure. Conclusions   Summary  Left ventricular chamber size is borderline dilated. .  Mild concentric left ventricular hypertrophy.   Left ventricular systolic function is severely HISTORY: The patient fell this morning. Left foot pain. COMPARISON: No comparison study. TECHNIQUE: 3 views were obtained. FINDINGS:  There is a nondisplaced fracture of the distal metaphysis of the proximal phalanx of the fourth toe. There has been prior resection of the fifth toe. There is narrowing of the interphalangeal joint spaces of the second through fourth toes. There are hammertoe deformities of these toes. There is minimal calcaneal spurring and enthesopathy. There is soft tissue swelling of the foot. 1. Acute fracture of the fourth toe, as described. Soft tissue swelling of the foot. 2. Hammertoe deformities of the second through fourth toes. Prior fifth toe amputation. 3. Calcaneal spurring and enthesopathy. Signed by Dr Eddie Hobson on 5/5/2021 3:12 PM    XR CHEST PORTABLE    Result Date: 5/18/2021  EXAMINATION: Chest one view 5/18/2021 HISTORY: Shortness of breath. FINDINGS: Upright frontal projection of the chest demonstrates congestive heart failure with cardiomegaly, vascular redistribution and interstitial pulmonary edema. Small effusions are present. 1.. Congestive heart failure with interstitial and early alveolar edema. Signed by Dr Maile Whitley on 5/18/2021 6:38 PM    NM MYOCARDIAL SPECT REST EXERCISE OR RX    Result Date: 5/21/2021  Lexiscan Nuclear Stress Test Report Procedure date: 05/21/21 Indications: shortness of breath Procedure: Stress was performed with injection of 0.4 mg Lexiscan. Vital signs and EKG were monitored. Technetium-99 sestamibi was injected in divided doses, 10.3 mCi and 30.96 mCi respectively for rest and stress imaging. The patient was discharged in stable condition. Results: Patient had symptoms of dyspnea during infusion that resolved in recovery. Baseline EKG showed LBBB, normal sinus rhythm without ST/T changes. During stress there were no significant EKG changes or rhythm changes. Baseline and peak blood pressures were 98/54, and 105/43 respectively. the NP or PA . I concur with above stated. Subjective-patient sleeping this morning when I rounded. Objective-no change  Assessment/plan:  Pancreatic malignancy-patient had a response to initial chemotherapy. However, has developed heart failure. Overall performance status has declined significantly. Discussed with son and daughter at bedside. Palliative care has been consulted to follow the patient. We have recommended transition to best supportive care only. Son and daughter seem to be in agreement.     Perla Hawley MD

## 2021-05-25 NOTE — PROGRESS NOTES
Nephrology ([address] [facility]) Consult Note      Patient:  Oxana Parrish  YOB: 1934  Date of Service: 5/25/2021  MRN: [id number]   Acct: [de-identified]   Primary Care Physician: Jenn Waller MD  Advance Directive: [facility]  Admit Date: 5/18/2021       Hospital Day: 7  Referring Provider: Piero Castrejon MD    Patient independently seen and examined, Chart, Consults, Notes, Operative notes, Labs, Cardiology, and Radiology studies reviewed as available. Subjective:  Oxana Parrish is a 80 y.o. female  whom we were consulted for chronic kidney disease stage IV. Consulted on hospital day 6 for increasing creatinine. Patient was admitted for shortness of air and found to have congestive heart failure with exacerbation. She was placed on diuretics and her creatinine had gradually worsened. Review of her last office note shows a slightly declining GFR trend from 39-29 over the past several years. At that visit she was recently started on chemotherapy for pancreatic cancer and was otherwise doing well. At the initial examination, family was present. She noted that she had generally been feeling poorly over the last several days but had felt a bit better since admission. She had had issues with fatigue and nausea over the last week or 2. Her edema had improved. She denied current chest pain, nausea or vomiting. Today, no overnight events. She is feeling a bit better. Family present. She denies current chest pain, shortness of breath, nausea vomiting.     Allergies:  Penicillins    Medicines:  Current Facility-Administered Medications   Medication Dose Route Frequency Provider Last Rate Last Admin    allopurinol (ZYLOPRIM) tablet 100 mg  100 mg Oral Daily Piero Castrejon MD   100 mg at 05/25/21 0835    midodrine (PROAMATINE) tablet 10 mg  10 mg Oral TID  Piero Castrejon MD   10 mg at 05/25/21 1157    bumetanide (BUMEX) tablet 1 mg  1 mg Oral BID Piero Castrejon MD   1 mg at 05/25/21 0836    carvedilol (COREG) tablet 3.125 mg  3.125 mg Oral BID  Ellen Phoenix MD   3.125 mg at 05/25/21 9508    aspirin EC tablet 81 mg  81 mg Oral Daily Arcenio Agustin MD   81 mg at 05/25/21 0835    nitroGLYCERIN (NITROSTAT) SL tablet 0.4 mg  0.4 mg Sublingual Once Terri Alex MD        sodium chloride flush 0.9 % injection 5-40 mL  5-40 mL Intravenous 2 times per day Robyne Bakes, APRN - CNP   10 mL at 05/25/21 0836    sodium chloride flush 0.9 % injection 5-40 mL  5-40 mL Intravenous PRN Robyne Bakes, APRN - CNP   40 mL at 05/23/21 2033    0.9 % sodium chloride infusion  25 mL Intravenous PRN Robyne Bakes, APRN - CNP        promethazine (PHENERGAN) tablet 12.5 mg  12.5 mg Oral Q6H PRN Robyne Bakes, APRN - CNP        Or    ondansetron (ZOFRAN) injection 4 mg  4 mg Intravenous Q6H PRN Robyne Bakes, APRN - CNP        polyethylene glycol (GLYCOLAX) packet 17 g  17 g Oral Daily PRN Robyne Bakes, APRN - CNP   17 g at 05/23/21 2100    acetaminophen (TYLENOL) tablet 650 mg  650 mg Oral Q6H PRN Robyne Bakes, APRN - CNP        Or    acetaminophen (TYLENOL) suppository 650 mg  650 mg Rectal Q6H PRN Robyne Bakes, APRN - CNP        potassium chloride (KLOR-CON M) extended release tablet 40 mEq  40 mEq Oral PRN Robyne Bakes, APRN - CNP        Or    potassium bicarb-citric acid (EFFER-K) effervescent tablet 40 mEq  40 mEq Oral PRN Robyne Bakes, APRN - CNP        Or    potassium chloride 10 mEq/100 mL IVPB (Peripheral Line)  10 mEq Intravenous PRN Robyne Bakes, APRN - CNP        magnesium sulfate 2000 mg in 50 mL IVPB premix  2,000 mg Intravenous PRN Robyne Bakes, APRN - CNP        apixaban (ELIQUIS) tablet 2.5 mg  2.5 mg Oral BID Robyne Bakes, APRN - CNP   2.5 mg at 05/25/21 0836    docusate sodium (COLACE) capsule 100 mg  100 mg Oral BID ALBERTO Rocha CNP   100 mg at 05/25/21 0835    levothyroxine (SYNTHROID) tablet 112 mcg  112 mcg Oral Daily Willma Solid, APRN - CNP   112 mcg at 05/25/21 0540    [Held by provider] lisinopril (PRINIVIL;ZESTRIL) tablet 10 mg  10 mg Oral Daily Willma Solid, APRN - CNP   10 mg at 05/19/21 0851    atorvastatin (LIPITOR) tablet 40 mg  40 mg Oral Daily Willma Solid, APRN - CNP   40 mg at 05/25/21 7991       Past Medical History:  Past Medical History:   Diagnosis Date    Abdominal aortic [facility] (AAA) [facility] [address]     Anemia     Arthritis     Cancer ([facility] [address] 12/01/2020    pancreatic    CHF (congestive heart failure) (HCC)     Chronic kidney disease     DVT of lower extremity (deep venous thrombosis) (HCC)     twice 2010 and 2017    Hyperlipidemia     Hypertension     Kidney stones     Osteoarthritis     Palliative care patient 12/02/2020    Thyroid disease     Thyroid disorder        Past Surgical History:  Past Surgical History:   Procedure Laterality Date    COLONOSCOPY  2016    Dr Brandon Gonzales problems    ENDOSCOPIC ULTRASOUND (LOWER) N/A 12/03/2020    Dr JONNY Grijalva/unsuccessful biliary cannulation despite attempts at 2-wire cannulation and proph pancreatic stenting with cannulation around pancreatic stenting-Positive for high-grade adenocarcinoma, pancreatic head    ERCP N/A 12/03/2020    Dr JONNY Grijalva/unsuccessful biliary cannulation despite attempts at 2-wire cannulation and proph pancreatic stenting with cannulation around pancreatic stenting-Positive for high-grade adenocarcinoma, pancreatic head    ERCP  12/07/2020    Dr Josemanuel Kelly/sphincterotomy, placement of a 10 x 60 partially covered biliary stent    PORT SURGERY Right 2/5/2021    SINGLE LUMEN PORT PLACEMENT WITH ULTRASOUND AND FLUORO performed by Moon Arora MD at [facility] TOE AMPUTATION Bilateral     pinky toes removed    TOTAL KNEE ARTHROPLASTY Right 01/03/2020    RIGHT TOTAL KNEE REPLACEMENT performed by Nicolasa Bedoya MD at [facility] History  Family History   Problem Relation Age of Onset    Colon Cancer Brother     Cancer Mother         Breast Cancer    Heart Attack Father     Colon Polyps Neg Hx     Esophageal Cancer Neg Hx     Liver Cancer Neg Hx     Liver Disease Neg Hx     Rectal Cancer Neg Hx     Stomach Cancer Neg Hx        Social History  Social History     Socioeconomic History    Marital status:      Spouse name: Not on file    Number of children: Not on file    Years of education: Not on file    Highest education level: Not on file   Occupational History    Not on file   Tobacco Use    Smoking status: Never Smoker    Smokeless tobacco: Never Used   Vaping Use    Vaping Use: Never used   Substance and Sexual Activity    Alcohol use: No    Drug use: No    Sexual activity: Not on file   Other Topics Concern    Not on file   Social History Narrative    Not on file     Social Determinants of Health     Financial Resource Strain:     Difficulty of Paying Living Expenses:    Food Insecurity:     Worried About Running Out of Food in the Last Year:     [address] in the Last Year:    Transportation Needs:     Lack of Transportation (Medical):      Lack of Transportation (Non-Medical):    Physical Activity:     Days of Exercise per Week:     Minutes of Exercise per Session:    Stress:     Feeling of Stress :    Social Connections:     Frequency of Communication with Friends and Family:     Frequency of Social Gatherings with Friends and Family:     Attends Anabaptism Services:     Active Member of Clubs or Organizations:     Attends Club or Organization Meetings:     Marital Status:    Intimate Partner Violence:     Fear of Current or Ex-Partner:     Emotionally Abused:     Physically Abused:     Sexually Abused:          Review of Systems:  History obtained from chart review and the patient  General ROS: No fever or chills  Respiratory ROS: No cough, shortness of breath, wheezing  Cardiovascular ROS: No chest pain or palpitations  Gastrointestinal ROS: No abdominal pain or melena  Genito-Urinary ROS: No dysuria or hematuria  Musculoskeletal ROS: No joint pain or swelling   14 point ROS reviewed with the patient and negative except as noted above and in the HPI unless unable to obtain. Objective:  Patient Vitals for the past 24 hrs:   BP Temp Temp src Pulse Resp SpO2 Height Weight   05/25/21 1132 -- -- -- -- -- -- 5' 3\" (1.6 m) --   05/25/21 0613 -- -- -- -- 15 94 % -- --   05/25/21 0612 (!) 105/52 97.5 °F (36.4 °C) Temporal 71 18 96 % -- --   05/25/21 0519 -- -- -- -- -- -- -- 194 lb (88 kg)   05/24/21 2356 (!) 111/46 98.1 °F (36.7 °C) Temporal 66 16 93 % -- --   05/24/21 1806 (!) 103/52 97.7 °F (36.5 °C) Temporal 70 20 95 % -- --       Intake/Output Summary (Last 24 hours) at 5/25/2021 1402  Last data filed at 5/25/2021 1332  Gross per 24 hour   Intake 340 ml   Output 700 ml   Net -360 ml     General: awake/alert   Chest:  clear to auscultation bilaterally  CVS: regular rate and rhythm  Abdominal: soft, nontender, normal bowel sounds  Extremities: no cyanosis or edema  Skin: warm and dry without rash      Labs:  BMP:   Recent Labs     05/23/21  0550 05/24/21  0340 05/25/21  0330    135* 135*   K 3.6 3.7 3.8   CL 96* 93* 94*   CO2 35* 35* 34*   PHOS  --   --  3.1   BUN 51* 57* 61*   CREATININE 2.3* 2.6* 2.6*   CALCIUM 9.6 9.5 9.8     CBC:   Recent Labs     05/23/21  0550 05/24/21  0340 05/25/21  0330   WBC 8.7 5.8 4.5*   HGB 7.7* 8.0* 8.1*   HCT 24.2* 24.5* 25.3*   .1* 102.1* 102.8*    215 181     LIVER PROFILE: No results for input(s): AST, ALT, LIPASE, BILIDIR, BILITOT, ALKPHOS in the last 72 hours. Invalid input(s): AMYLASE,  ALB  PT/INR: No results for input(s): PROTIME, INR in the last 72 hours. APTT: No results for input(s): APTT in the last 72 hours. BNP:  No results for input(s): BNP in the last 72 hours.   Ionized Calcium:No results for input(s): IONCA in the last 72 hours. Magnesium:  Recent Labs     05/23/21  0550 05/25/21  0330   MG 1.7 1.6     Phosphorus:  Recent Labs     05/25/21  0330   PHOS 3.1     HgbA1C: No results for input(s): LABA1C in the last 72 hours. Hepatic: No results for input(s): ALKPHOS, ALT, AST, PROT, BILITOT, BILIDIR, LABALBU in the last 72 hours. Lactic Acid: No results for input(s): LACTA in the last 72 hours. Troponin: No results for input(s): CKTOTAL, CKMB, TROPONINT in the last 72 hours. ABGs: No results for input(s): PH, PCO2, PO2, HCO3, O2SAT in the last 72 hours. CRP:  No results for input(s): CRP in the last 72 hours. Sed Rate:  No results for input(s): SEDRATE in the last 72 hours. Cultures:   No results for input(s): CULTURE in the last 72 hours. No results for input(s): BC, Jana Lofts in the last 72 hours. No results for input(s): CXSURG in the last 72 hours. Radiology reports as per the Radiologist  Radiology: Echo Complete    Result Date: 5/19/2021  Transthoracic Echocardiography Report (TTE)  Demographics   Patient Name Jennifer Maillard  Date of Study        05/19/2021               J   MRN          [id number]           Gender               Female   Date of      1934       Room Number          JEV-2044  Birth   Age          80 year(s)   Height:      63 inches        Referring Physician  Timmy DOMINGUEZ   Weight:      169 pounds       Sonographer          Kayleigh Stanford   BSA:         1.8 m^2          Interpreting         Daly Person DO                                Physician   BMI:         29.94 kg/m^2  Procedure Type of Study   TTE procedure:ECHO NO CONTRAST WITH DOP/COLR. Study Location: Echo Lab Technical Quality: Limited visualization due to poor acoustical window. Patient Status: Inpatient Contrast Medium: Definity. Indications:Congestive heart failure. Conclusions   Summary  Left ventricular chamber size is borderline dilated. .  Mild concentric left ventricular hypertrophy.   Left MV Peak Gradient: 4.08 mmHg  Estimated RAP:3 mmHg      XR CHEST (2 VW)    Result Date: 5/18/2021  Examination. XR CHEST (2 VW) 5/18/2021 2:20 PM History: Severe shortness of breath. The frontal and lateral views of the chest are obtained and compared with the previous study dated 5/4/2021. There is bilateral interstitial prominence which may represent pulmonary vascular congestion/edema. This was not noted in the previous study. There is a trace bibasal pleural effusion. There is cardiomegaly. The atheromatous changes thoracic aorta are noted. A right internal jugular approach MediPort system is seen in place. No change. There is moderate diffuse osteopenia. No focal bony abnormality. Old healed fracture of the right clavicle and residual deformity is similar to the previous study. Pulmonary vascular congestion and pulmonary edema. A trace bibasal pleural effusion. Cardiomegaly. Signed by Dr Valri Kussmaul on 5/18/2021 3:41 PM    XR CHEST PORTABLE    Result Date: 5/18/2021  EXAMINATION: Chest one view 5/18/2021 HISTORY: Shortness of breath. FINDINGS: Upright frontal projection of the chest demonstrates congestive heart failure with cardiomegaly, vascular redistribution and interstitial pulmonary edema. Small effusions are present. 1.. Congestive heart failure with interstitial and early alveolar edema.  Signed by Dr Wolfgang Rico on 5/18/2021 6:38 PM       Assessment   Acute kidney injury  Chronic kidney disease stage IV (baseline eGFR 29)  Hypertension  Vitamin deficiency  Secondary hyperparathyroidism  Hyperuricemia  Metabolic acidosis  Pancreatic cancer  Acute systolic congestive heart failure  Anemia with chronic kidney disease and chemotherapy    Plan:  Discussed with patient, family, nursing  Continue oral diuretics for now with dose adjustments as needed  BARBARA per hematology oncology service  Palliative care evaluation  Continue to hold oral bicarbonate as contracted from

## 2021-05-25 NOTE — PROGRESS NOTES
Physician Progress Note      Jemal Alanis  CSN #:                  [id number]  :                       1934  ADMIT DATE:       2021 5:32 PM  [address] [place] DATE:  RESPONDING  PROVIDER #:        Allen Smith MD          QUERY TEXT:    Pt admitted with  Shortness of breath and has CHF documented. If possible,   please document in progress notes and discharge summary further specificity   regarding the type and acuity of CHF:    The medical record reflects the following:  Risk Facto  Clinical Indicators: Sat 60%, RR 34 CXR - CHF BNP 7081, p02 50  Treatment: Lasix IV BID 40 mg    Thank you    Alison Negrete RN, BSN, [facility]  [phone]  Options provided:  -- Acute on Chronic Systolic CHF/HFrEF  -- Acute on Chronic Diastolic CHF/HFpEF  -- Acute Systolic CHF/HFrEF  -- Acute Diastolic CHF/HFpEF  -- Chronic Systolic CHF/HFrEF  -- Chronic Diastolic CHF/HFpEF  -- Other - I will add my own diagnosis  -- Disagree - Not applicable / Not valid  -- Disagree - Clinically unable to determine / Unknown  -- Refer to Clinical Documentation Reviewer    PROVIDER RESPONSE TEXT:    This patient is in acute systolic CHF/HFrEF.     Query created by: Josué Salas on 2021 2:23 PM      Electronically signed by:  Allen Smith MD 2021 5:51 PM

## 2021-05-25 NOTE — PROGRESS NOTES
Physical Therapy  Name: Devon Giron  MRN:  [id number]  Date of service:  5/25/2021 05/25/21 1044   Restrictions/Precautions   Restrictions/Precautions Fall Risk   General   Chart Reviewed Yes   Subjective   Subjective Pt in bed, agreeable to work with therapy. Pain Screening   Patient Currently in Pain No   Oxygen Therapy   O2 Device Nasal cannula   O2 Flow Rate (L/min) 2 L/min   Bed Mobility   Supine to Sit Minimal assistance   Transfers   Sit to Stand Minimal Assistance   Stand to sit Minimal Assistance   Bed to Chair Minimal assistance   Stand Pivot Transfers Minimal Assistance  (stand/step)   Comment Pt able to stand and take 2-3 small side steps at EOB, then able to step to recliner with rwx   Ambulation   Ambulation? No   Short term goals   Time Frame for Short term goals 14 DAYS   Short term goal 1 BED MOB MOD IND   Short term goal 2 TRANSFERS SUPERVISION   Short term goal 3 ' RW SUPERVISION   Conditions Requiring Skilled Therapeutic Intervention   Body structures, Functions, Activity limitations Decreased functional mobility ; Decreased ADL status; Decreased strength;Decreased endurance;Decreased balance   Assessment Pt showing improvement in overall mobility, able to stand from bedside and take small sidesteps at EOB then able to step to recliner with rwx with Tsering. Pt positioned for comfort with all needs in reach. Activity Tolerance   Activity Tolerance Patient Tolerated treatment well   Safety Devices   Type of devices Call light within reach; Chair alarm in place;Gait belt;Left in chair  (family present)         Electronically signed by Nasrin Singh PTA on 5/25/2021 at 10:50 AM

## 2021-05-25 NOTE — PLAN OF CARE
Problem: Falls - Risk of:  Goal: Will remain free from falls  Description: Will remain free from falls  5/25/2021 1030 by Estefania Aldrich RN  Outcome: Ongoing  5/25/2021 1025 by Estefania Aldrich RN  Outcome: Ongoing  5/25/2021 0009 by Millicent Miller RN  Outcome: Ongoing  Goal: Absence of physical injury  Description: Absence of physical injury  5/25/2021 1030 by Estefania Aldrich RN  Outcome: Ongoing  5/25/2021 1025 by Estefania Aldrich RN  Outcome: Ongoing  5/25/2021 0009 by Millicent Miller RN  Outcome: Ongoing     Problem: Cardiac:  Goal: Ability to maintain an adequate cardiac output will improve  Description: Ability to maintain an adequate cardiac output will improve  5/25/2021 1030 by Estefania Aldrich RN  Outcome: Ongoing  5/25/2021 1025 by Estefania Aldrich RN  Outcome: Ongoing  5/25/2021 0009 by Millicent Miller RN  Outcome: Ongoing  Goal: Hemodynamic stability will improve  Description: Hemodynamic stability will improve  5/25/2021 1030 by Estefania Aldrich RN  Outcome: Ongoing  5/25/2021 1025 by Estefania Aldrich RN  Outcome: Ongoing  5/25/2021 0009 by Millicent Miller RN  Outcome: Ongoing  Goal: Ability to maintain adequate ventilation will improve  Description: Ability to maintain adequate ventilation will improve  5/25/2021 1030 by Estefania Aldrich RN  Outcome: Ongoing  5/25/2021 1025 by Estefania Aldrich RN  Outcome: Ongoing  5/25/2021 0009 by Millicent Miller RN  Outcome: Ongoing  Goal: Ability to achieve and maintain adequate cardiopulmonary perfusion will improve  Description: Ability to achieve and maintain adequate cardiopulmonary perfusion will improve  5/25/2021 1030 by Estefania Aldrich RN  Outcome: Ongoing  5/25/2021 1025 by Estefania Aldrich RN  Outcome: Ongoing  5/25/2021 0009 by Millicent Miller RN  Outcome: Ongoing     Problem: Fluid Volume:  Goal: Ability to achieve and maintain adequate urine output will improve  Description: Ability to achieve and maintain adequate urine output will improve  5/25/2021 1030 by Jero Larry RN  Outcome: Ongoing  5/25/2021 1025 by Jero Larry RN  Outcome: Ongoing  5/25/2021 0009 by Sanya Meeks RN  Outcome: Ongoing     Problem: Respiratory:  Goal: Respiratory status will improve  Description: Respiratory status will improve  5/25/2021 1030 by Jero Larry RN  Outcome: Ongoing  5/25/2021 1025 by Jero Larry RN  Outcome: Ongoing  5/25/2021 0009 by Sanya Meeks RN  Outcome: Ongoing     Problem: Skin Integrity:  Goal: Will show no infection signs and symptoms  Description: Will show no infection signs and symptoms  5/25/2021 1030 by Jero Larry RN  Outcome: Ongoing  5/25/2021 1025 by Jero Larry RN  Outcome: Ongoing  5/25/2021 0009 by Sanya Meeks RN  Outcome: Ongoing  Goal: Absence of new skin breakdown  Description: Absence of new skin breakdown  5/25/2021 1030 by Jero Larry RN  Outcome: Ongoing  5/25/2021 1025 by Jero Larry RN  Outcome: Ongoing  5/25/2021 0009 by Sanya Meeks RN  Outcome: Ongoing     Problem:  Activity:  Goal: Fatigue will decrease  Description: Fatigue will decrease  5/25/2021 1030 by Jero Larry RN  Outcome: Ongoing  5/25/2021 1025 by Jero Larry RN  Outcome: Ongoing  5/25/2021 0009 by Sanya Meeks RN  Outcome: Ongoing  Goal: Ability to tolerate increased activity will improve  Description: Ability to tolerate increased activity will improve  5/25/2021 1030 by Jero Larry RN  Outcome: Ongoing  5/25/2021 1025 by Jero Larry RN  Outcome: Ongoing  5/25/2021 0009 by Sanya Meeks RN  Outcome: Ongoing  Goal: Ability to maintain optimal joint mobility will improve  Description: Ability to maintain optimal joint mobility will improve  5/25/2021 1030 by Jero Larry RN  Outcome: Ongoing  5/25/2021 1025 by Jero Larry RN  Outcome: Ongoing  5/25/2021 0009 by Sanya Meeks RN  Outcome: Ongoing     Problem: Coping:  Goal: Ability to adjust to condition or change in health will improve  Description: Ability to adjust to condition or change in health will improve  5/25/2021 1030 by Janine Kimbrough RN  Outcome: Ongoing  5/25/2021 1025 by Janine Kimbrough RN  Outcome: Ongoing  5/25/2021 0009 by Haily Thurston RN  Outcome: Ongoing     Problem: Health Behavior:  Goal: Identification of resources available to assist in meeting health care needs will improve  Description: Identification of resources available to assist in meeting health care needs will improve  5/25/2021 1030 by Janine Kimbrough RN  Outcome: Ongoing  5/25/2021 1025 by Janine Kimbrough RN  Outcome: Ongoing  5/25/2021 0009 by Haily Thurston RN  Outcome: Ongoing     Problem: Nutritional:  Goal: Maintenance of adequate nutrition will improve  Description: Maintenance of adequate nutrition will improve  5/25/2021 1030 by Janine Kimbrough RN  Outcome: Ongoing  5/25/2021 1025 by Janine Kimbrough RN  Outcome: Ongoing  5/25/2021 0009 by Haily Thurston RN  Outcome: Ongoing     Problem: Physical Regulation:  Goal: Signs and symptoms of infection will decrease  Description: Signs and symptoms of infection will decrease  5/25/2021 1030 by Janine Kimbrough RN  Outcome: Ongoing  5/25/2021 1025 by Janine Kimbrough RN  Outcome: Ongoing  5/25/2021 0009 by Haily Thurston RN  Outcome: Ongoing  Goal: Complications related to the disease process, condition or treatment will be avoided or minimized  Description: Complications related to the disease process, condition or treatment will be avoided or minimized  5/25/2021 1030 by Janine Kimbrough RN  Outcome: Ongoing  5/25/2021 1025 by Janine Kimbrough RN  Outcome: Ongoing  5/25/2021 0009 by Haily Thurston RN  Outcome: Ongoing     Problem: Safety:  Goal: Ability to remain free from injury will improve  Description: Ability to remain free from injury will improve  5/25/2021 1030 by Janine Kimbrough RN  Outcome: Ongoing  5/25/2021 1025 by Janine Kimbrough RN  Outcome: Ongoing

## 2021-05-25 NOTE — PROGRESS NOTES
Evaluation:   Behavioral-Environmental Outcomes:  Readiness for Change   Food/Nutrient Intake Outcomes:  Food and Nutrient Intake  Physical Signs/Symptoms Outcomes:  Weight, Skin, Nutrition Focused Physical Findings, Fluid Status or Edema     Discharge Planning:    Continue current diet     Electronically signed by Renee Ford MS, RD, LD on 5/25/21 at 11:37 AM CDT    Contact: [phone]

## 2021-05-26 VITALS
SYSTOLIC BLOOD PRESSURE: 112 MMHG | OXYGEN SATURATION: 96 % | WEIGHT: 196.3 LBS | HEART RATE: 68 BPM | TEMPERATURE: 97 F | HEIGHT: 63 IN | DIASTOLIC BLOOD PRESSURE: 60 MMHG | RESPIRATION RATE: 16 BRPM | BODY MASS INDEX: 34.78 KG/M2

## 2021-05-26 LAB
ANION GAP SERPL CALCULATED.3IONS-SCNC: 8 MMOL/L (ref 7–19)
ANISOCYTOSIS: ABNORMAL
ATYPICAL LYMPHOCYTE RELATIVE PERCENT: 2 % (ref 0–8)
BASOPHILS ABSOLUTE: 0 K/UL (ref 0–0.2)
BASOPHILS RELATIVE PERCENT: 0 % (ref 0–1)
BUN BLDV-MCNC: 65 MG/DL (ref 8–23)
CALCIUM SERPL-MCNC: 9.5 MG/DL (ref 8.8–10.2)
CHLORIDE BLD-SCNC: 91 MMOL/L (ref 98–111)
CO2: 34 MMOL/L (ref 22–29)
CREAT SERPL-MCNC: 2.7 MG/DL (ref 0.5–0.9)
EOSINOPHILS ABSOLUTE: 0.09 K/UL (ref 0–0.6)
EOSINOPHILS RELATIVE PERCENT: 2 % (ref 0–5)
GFR AFRICAN AMERICAN: 20
GFR NON-AFRICAN AMERICAN: 17
GLUCOSE BLD-MCNC: 151 MG/DL (ref 74–109)
HCT VFR BLD CALC: 25.1 % (ref 37–47)
HEMOGLOBIN: 8.1 G/DL (ref 12–16)
IMMATURE GRANULOCYTES #: 0.1 K/UL
LYMPHOCYTES ABSOLUTE: 0.3 K/UL (ref 1.1–4.5)
LYMPHOCYTES RELATIVE PERCENT: 4 % (ref 20–40)
MACROCYTES: ABNORMAL
MCH RBC QN AUTO: 32.9 PG (ref 27–31)
MCHC RBC AUTO-ENTMCNC: 32.3 G/DL (ref 33–37)
MCV RBC AUTO: 102 FL (ref 81–99)
MONOCYTES ABSOLUTE: 0.1 K/UL (ref 0–0.9)
MONOCYTES RELATIVE PERCENT: 2 % (ref 0–10)
NEUTROPHILS ABSOLUTE: 4 K/UL (ref 1.5–7.5)
NEUTROPHILS RELATIVE PERCENT: 90 % (ref 50–65)
OVALOCYTES: ABNORMAL
PDW BLD-RTO: 23.8 % (ref 11.5–14.5)
PLATELET # BLD: 163 K/UL (ref 130–400)
PLATELET SLIDE REVIEW: ADEQUATE
PMV BLD AUTO: 11.6 FL (ref 9.4–12.3)
POLYCHROMASIA: ABNORMAL
POTASSIUM REFLEX MAGNESIUM: 3.7 MMOL/L (ref 3.5–5)
RBC # BLD: 2.46 M/UL (ref 4.2–5.4)
SODIUM BLD-SCNC: 133 MMOL/L (ref 136–145)
TEAR DROP CELLS: ABNORMAL
WBC # BLD: 4.4 K/UL (ref 4.8–10.8)

## 2021-05-26 PROCEDURE — 2700000000 HC OXYGEN THERAPY PER DAY

## 2021-05-26 PROCEDURE — 2580000003 HC RX 258: Performed by: NURSE PRACTITIONER

## 2021-05-26 PROCEDURE — 6360000002 HC RX W HCPCS: Performed by: HOSPITALIST

## 2021-05-26 PROCEDURE — 80048 BASIC METABOLIC PNL TOTAL CA: CPT

## 2021-05-26 PROCEDURE — 99232 SBSQ HOSP IP/OBS MODERATE 35: CPT | Performed by: NURSE PRACTITIONER

## 2021-05-26 PROCEDURE — 94761 N-INVAS EAR/PLS OXIMETRY MLT: CPT

## 2021-05-26 PROCEDURE — 6370000000 HC RX 637 (ALT 250 FOR IP): Performed by: HOSPITALIST

## 2021-05-26 PROCEDURE — 6370000000 HC RX 637 (ALT 250 FOR IP): Performed by: NURSE PRACTITIONER

## 2021-05-26 PROCEDURE — 85025 COMPLETE CBC W/AUTO DIFF WBC: CPT

## 2021-05-26 PROCEDURE — 99231 SBSQ HOSP IP/OBS SF/LOW 25: CPT | Performed by: INTERNAL MEDICINE

## 2021-05-26 PROCEDURE — 6370000000 HC RX 637 (ALT 250 FOR IP): Performed by: INTERNAL MEDICINE

## 2021-05-26 RX ORDER — BUMETANIDE 1 MG/1
1 TABLET ORAL 2 TIMES DAILY
Qty: 30 TABLET | Refills: 0 | Status: SHIPPED | OUTPATIENT
Start: 2021-05-26 | End: 2021-09-30

## 2021-05-26 RX ORDER — MIDODRINE HYDROCHLORIDE 10 MG/1
10 TABLET ORAL
Qty: 90 TABLET | Refills: 0 | Status: SHIPPED | OUTPATIENT
Start: 2021-05-26 | End: 2021-06-17 | Stop reason: SDUPTHER

## 2021-05-26 RX ORDER — CARVEDILOL 3.12 MG/1
3.12 TABLET ORAL 2 TIMES DAILY WITH MEALS
Qty: 60 TABLET | Refills: 0 | Status: SHIPPED | OUTPATIENT
Start: 2021-05-26 | End: 2021-06-17 | Stop reason: SDUPTHER

## 2021-05-26 RX ORDER — ASPIRIN 81 MG/1
81 TABLET ORAL DAILY
Qty: 30 TABLET | Refills: 0 | Status: SHIPPED | OUTPATIENT
Start: 2021-05-27 | End: 2021-06-17 | Stop reason: SDUPTHER

## 2021-05-26 RX ORDER — NITROGLYCERIN 0.4 MG/1
TABLET SUBLINGUAL
Qty: 25 TABLET | Refills: 0 | Status: SHIPPED | OUTPATIENT
Start: 2021-05-26

## 2021-05-26 RX ORDER — POLYETHYLENE GLYCOL 3350 17 G/17G
17 POWDER, FOR SOLUTION ORAL DAILY PRN
Qty: 30 EACH | Refills: 0 | Status: SHIPPED | OUTPATIENT
Start: 2021-05-26 | End: 2021-06-25

## 2021-05-26 RX ORDER — HEPARIN SODIUM (PORCINE) LOCK FLUSH IV SOLN 100 UNIT/ML 100 UNIT/ML
3 SOLUTION INTRAVENOUS PRN
Status: DISCONTINUED | OUTPATIENT
Start: 2021-05-26 | End: 2021-05-26 | Stop reason: HOSPADM

## 2021-05-26 RX ADMIN — APIXABAN 2.5 MG: 2.5 TABLET, FILM COATED ORAL at 08:25

## 2021-05-26 RX ADMIN — LEVOTHYROXINE SODIUM 112 MCG: 112 TABLET ORAL at 06:03

## 2021-05-26 RX ADMIN — MIDODRINE HYDROCHLORIDE 10 MG: 10 TABLET ORAL at 08:25

## 2021-05-26 RX ADMIN — CARVEDILOL 3.12 MG: 3.12 TABLET, FILM COATED ORAL at 08:25

## 2021-05-26 RX ADMIN — BUMETANIDE 1 MG: 1 TABLET ORAL at 08:25

## 2021-05-26 RX ADMIN — HEPARIN 300 UNITS: 100 SYRINGE at 14:26

## 2021-05-26 RX ADMIN — SODIUM CHLORIDE, PRESERVATIVE FREE 10 ML: 5 INJECTION INTRAVENOUS at 08:25

## 2021-05-26 RX ADMIN — ATORVASTATIN CALCIUM 40 MG: 40 TABLET, FILM COATED ORAL at 08:25

## 2021-05-26 RX ADMIN — DOCUSATE SODIUM 100 MG: 100 CAPSULE, LIQUID FILLED ORAL at 08:25

## 2021-05-26 RX ADMIN — ASPIRIN 81 MG: 81 TABLET, COATED ORAL at 08:25

## 2021-05-26 RX ADMIN — ALLOPURINOL 100 MG: 100 TABLET ORAL at 08:25

## 2021-05-26 RX ADMIN — MIDODRINE HYDROCHLORIDE 10 MG: 10 TABLET ORAL at 13:03

## 2021-05-26 ASSESSMENT — PAIN SCALES - GENERAL: PAINLEVEL_OUTOF10: 0

## 2021-05-26 NOTE — DISCHARGE SUMMARY
[facility] Hospitalists    Discharge Summary      Cassandra Gore  :  1934  MRN:  [id number]    Admit date:  2021  Discharge date:    2021    Discharging Physician:  Dr. Wendelyn Hatchet Directive: DNR-CC    Consults: cardiology, nephrology, hematology/oncology and Palliative care     Primary Care Physician:  Vernon Rosa MD    Discharge Diagnoses:  Principal Problem:    New onset of congestive heart failure ([address]  Active Problems:    CKD (chronic kidney disease)    Malignant neoplasm of other parts of pancreas ([address]    Acute respiratory failure with hypoxia ([address]    CHF NYHA class III, acute, systolic ([address]  Resolved Problems:    * No resolved hospital problems. *      Portions of this note have been copied forward, however, changed to reflect the most current clinical status of this patient. Hospital Course: The patient is V 79 y.o. female with a history of HTN, pancreatic and thyroid cancer, CKD, and anemia who presented to [facility] ED complaining of shortness of breath. The patient was placed on BiPap in the ED. The daughter reported to ED staff that patient has had shortness of breath over the last several days which has prevented the patient from laying down.  She denied any significant leg swelling (but endorses a small amount), cough, fevers,or chills.  Patient was receiving chemotherapy on 2021 and stated she had to lay flat for her chemo but could not tolerate it for shortness of breath, however she was able to finish her treatment.  Infusion nurse reported her O2 sat was in the 63's.  Patient did have a chest x-ray obtained there which showed edema according to patient's daughter. Daughter and patient denied any history of heart failure, COPD, or other cardiac or pulmonary problems. ECHO on 21 showed EF 15%, and cardiology was consulted.  Patient was given 80 mg Iv lasix in ED and began on 40 mg IV lasix BID and changed to PO on 2021 later changed to bumex 1 mg BID on 5/21/21. The patient was able to be transitioned from BiPap to nasal cannula at 5 LPM 5/19/2021 AM. As of 5/20/2021 this patient was requiring 5 LPM, now down to 2L O2 with sats above 90's. Stress test on 5/21/21 with moderately severely impaired left ventricular systolic function 07% while motion underbodies noted suggestion of defects distal anteroseptal apical and inferior wall with some reversibility present. Patient was seen by cardiology, poor candidate for invasive procedures and recommends medical management. Coreg 3.125 mg BID added by cardiology. Received 1 Unit PRBC per oncology on 5/22/2021. Continued with IV Bumex, transitioned to PO Bumex on 5/21/21. Midodrine added for hypotension 5/23/2021. Patient's SOB continued to improve and has required less oxygen. Home O2 evaluation was performed and she requires 2L Oxygen all the time. She is being discharged on ASA, Bumex, Coreg, Midodrine, PRN nitro, and PRN Miralax. She has been instructed to follow up with PCP, oncology, and cardiology as scheduled. Patient is currently in stable condition to be discharged to be discharged home with home health. Significant Diagnostic Studies:     Echo Complete  Result Date: 5/19/2021    Summary  Left ventricular chamber size is borderline dilated. .  Mild concentric left ventricular hypertrophy. Left ventricular systolic function is severely reduced  Left ventricular ejection fraction is visually estimated at 15%. Severe left ventricular global hypokinesis. There is mild mitral regurgitation. The left atrium is moderately dilated. Signature   ----------------------------------------------------------------  Electronically signed by Serina Miles DO(Interpreting  physician) on 05/19/2021 05:18 PM       XR CHEST (2 VW)    Result Date: 5/18/2021    Pulmonary vascular congestion and pulmonary edema. A trace bibasal pleural effusion. Cardiomegaly.  Signed by Dr Amanda Ruiz on 5/18/2021 3:41 PM      XR CHEST PORTABLE    Result Date: 5/18/2021    1. . Congestive heart failure with interstitial and early alveolar edema. Signed by Dr Sha Mckoy on 5/18/2021 6:38 PM        Pertinent Labs:   CBC:   Recent Labs     05/24/21 0340 05/25/21 0330 05/26/21  0200   WBC 5.8 4.5* 4.4*   HGB 8.0* 8.1* 8.1*    181 163     BMP:    Recent Labs     05/24/21 0340 05/25/21  0330 05/26/21  0200   * 135* 133*   K 3.7 3.8 3.7   CL 93* 94* 91*   CO2 35* 34* 34*   BUN 57* 61* 65*   CREATININE 2.6* 2.6* 2.7*   GLUCOSE 117* 113* 151*       Physical Exam:   Vital Signs: /60   Pulse 68   Temp 97 °F (36.1 °C)   Resp 16   Ht 5' 3\" (1.6 m)   Wt 196 lb 4.8 oz (89 kg)   SpO2 96%   BMI 34.77 kg/m²   General appearance:. Alert and Cooperative   HEENT: Normocephalic. Chest: Clear lung sounds bilaterally without wheezes or rhonchi. Cardiac: RRR, S1, S2 normal. No murmurs, gallops, or rubs auscultated. Abdomen: soft, non-tender; non-distended normal bowel sounds no masses, no organomegaly. Extremities: No clubbing or cyanosis. No peripheral edema. Peripheral pulses palpable. Neurologic: Grossly intact. Discharge Medications:          Medication List      START taking these medications    aspirin 81 MG EC tablet  Take 1 tablet by mouth daily  Start taking on: May 27, 2021     bumetanide 1 MG tablet  Commonly known as: BUMEX  Take 1 tablet by mouth 2 times daily     carvedilol 3.125 MG tablet  Commonly known as: COREG  Take 1 tablet by mouth 2 times daily (with meals)     midodrine 10 MG tablet  Commonly known as: PROAMATINE  Take 1 tablet by mouth 3 times daily (with meals)     nitroGLYCERIN 0.4 MG SL tablet  Commonly known as: NITROSTAT  up to max of 3 total doses. If no relief after 1 dose, call 911.      polyethylene glycol 17 g packet  Commonly known as: GLYCOLAX  Take 17 g by mouth daily as needed for Constipation        CONTINUE taking these medications    allopurinol 100 MG tablet  Commonly known as: ZYLOPRIM     docusate sodium 100 MG capsule  Commonly known as: Colace  Take 1 capsule by mouth 2 times daily     Eliquis 2.5 MG Tabs tablet  Generic drug: apixaban     lidocaine-prilocaine 2.5-2.5 % cream  Commonly known as: EMLA  APPLY TO PORT AREA AND COVERWITH PLASTIC WRAP ONE HOUR PRIOR TO TREATMENT     ondansetron 4 MG disintegrating tablet  Commonly known as: Zofran ODT  Take 2 tablets by mouth every 8 hours as needed for Nausea or Vomiting     promethazine 25 MG tablet  Commonly known as: PHENERGAN  TAKE 1 TABLET BY MOUTH FOUR TIMES DAILY AS NEEDED FOR NAUSEA     simvastatin 20 MG tablet  Commonly known as: ZOCOR     sodium bicarbonate 325 MG tablet     Synthroid 112 MCG tablet  Generic drug: levothyroxine     vitamin D 25 MCG (1000 UT) Tabs tablet  Commonly known as: CHOLECALCIFEROL        STOP taking these medications    lisinopril 5 MG tablet  Commonly known as: PRINIVIL;ZESTRIL           Where to Get Your Medications      These medications were sent to [address] [address]    Phone: [phone]   · aspirin 81 MG EC tablet  · bumetanide 1 MG tablet  · carvedilol 3.125 MG tablet  · midodrine 10 MG tablet  · nitroGLYCERIN 0.4 MG SL tablet  · polyethylene glycol 17 g packet            Discharge Instructions: Follow up with Eugene Cox MD within 2 business days of Discharge. Follow-up with oncology and cardiology as scheduled. Take medications as directed. Resume activity as tolerated. Diet: DIET CARDIAC; Disposition: Patient is medically stable and will be discharged home with home health. Time spent on discharge 35 minutes spent in assessing patient, reviewing medications, discussion with nursing, confirming safe discharge plan and preparation of discharge summary.     Signed:  Electronically signed by ALBERTO Nash CNP on 5/26/21 at 1:01 PM CDT         EMR Dragon/Transcription disclaimer:   Much of this encounter note is an electronic transcription/translation of spoken language to printed text.  The electronic translation of spoken language may permit erroneous, or at times, nonsensical words or phrases to be inadvertently transcribed; although attempts have made to review the note for such errors, some may still exist.

## 2021-05-26 NOTE — PLAN OF CARE
Problem: Falls - Risk of:  Goal: Will remain free from falls  Description: Will remain free from falls  5/25/2021 2343 by Millicent Miller RN  Outcome: Ongoing     Problem: Falls - Risk of:  Goal: Absence of physical injury  Description: Absence of physical injury  5/25/2021 2343 by Millicent Miller RN  Outcome: Ongoing     Problem: Cardiac:  Goal: Ability to maintain an adequate cardiac output will improve  Description: Ability to maintain an adequate cardiac output will improve  5/25/2021 2343 by Millicent Miller RN  Outcome: Ongoing     Problem: Cardiac:  Goal: Hemodynamic stability will improve  Description: Hemodynamic stability will improve  5/25/2021 2343 by Millicent Miller RN  Outcome: Ongoing     Problem: Cardiac:  Goal: Ability to maintain adequate ventilation will improve  Description: Ability to maintain adequate ventilation will improve  5/25/2021 2343 by Millicent Miller RN  Outcome: Ongoing     Problem: Cardiac:  Goal: Ability to achieve and maintain adequate cardiopulmonary perfusion will improve  Description: Ability to achieve and maintain adequate cardiopulmonary perfusion will improve  5/25/2021 2343 by Millicent Miller RN  Outcome: Ongoing     Problem: Fluid Volume:  Goal: Ability to achieve and maintain adequate urine output will improve  Description: Ability to achieve and maintain adequate urine output will improve  5/25/2021 2343 by Millicent Miller RN  Outcome: Ongoing     Problem: Respiratory:  Goal: Respiratory status will improve  Description: Respiratory status will improve  5/25/2021 2343 by Millicent Miller RN  Outcome: Ongoing

## 2021-05-26 NOTE — PROGRESS NOTES
PROGRESS NOTE    Pt Name: Kenzie Monroy  MRN: [id number]  YOB: 1934  Date of evaluation: 5/26/2021  Room: 709    Subjective  Generalized weakness. Family states patient was up in chair at bedside and walked in room with assist yesterday. Prior history  Ronnie Wang is a 79 yo female with multiple comorbidities to include a significant history of pancreatic adenocarcinoma currently receiving palliative chemotherapy. She received treatment Gemzar and Abraxane on 5/18/2021 and started to complain of worsening shortness of breath. She had a normal respiratory rate and saturation when she presented to clinic. After completion of treatment she started experiencing worsening dyspnea with hypoxemia and desaturation. No fever of chills. She was sent for a CXR that showed pulmonary congestion. Due to her worsening respiratory status she was sent to the ER where she was admitted. She has a significant PMH of iron deficiency anemia, chronic kidney disease stage IV,  and diagnosis of pancreatic adenocarcinoma. She has been receiving palliative chemotherapy with Gemzar 750 mg/m² and Abraxane 90 mg/m² every 2 weeks (this is a 25% dose reduction). Follow-up CT scan of the abdomen and pelvis on 4/13/2021 and tumor markers suggest a positive response to treatment. She has required treatment delay and further dose reduction due to experiencing significant fatigue and nausea. Starting with cycle 4 her treatment regimen was dose reduced by an additional 25%, Gemzar 600 mg/m² and Abraxane 75 mg/m². 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 Procrit to be given every 2 weeks ×4 and then monthly, dose to be titrated appropriately.  Hold dose for hemoglobin >11   Currently receiving Retacrit 40,000 units every 4 weeks  2021 palliative chemotherapy on 2021 with Gemzar 750 mg/m² and Abraxane 90 mg/m² every 2 weeks, this is a dose reduction by 25%        Tumor monitorin2020-CA 19-9 of 133  2020-CA 19-9 of 217  2021-CA 19-9 of 236  3/23/2021- CA 19-9 of 108     CANCER HISTORY  Niko Mitchell was seen by Dr. Hira Victoria on 2020 as an inpatient consultation at [facility]) of [facility] [place] for findings of a pancreatic mass and bile duct obstruction. She presented to the ER department with complaints of hematuria that began a few days prior to presentation. She denied any dysuria, back pain, no nausea or vomiting. She also reported easily bruising and ecchymotic areas in her back, abdomen and flank. Initial laboratory work-up showed INR above 18. She was given vitamin K subcutaneously. She was found to have elevated LFTs and the following are events that occurred. 2020-CT abdomen pelvis without contrast showed a pancreatic head mass measuring 3.3 x 3.7 cm in size and associated, bile duct dilatation above the level of the ampulla. Associated moderate pancreatic ductal dilatation. 2020-CA 19-9 133  2020-ERCP with FNA biopsy Positive for high-grade adenocarcinoma. Unfortunately, stent could not be placed. 2020-transferred from [facility]) to [address] [place] [address] and discharged home on 2020- Cholangipancreatography Retrograde Endo ERCP with sphincterotomy, balloon sweep and placement of 10x60 PC BS metal stent at [facility] in [place]. 2020- CT chest with contrast documented no evidence of intrathoracic neoplastic process/metastatic disease. Evidence of pneumobilia. The ectasia of the pancreatic duct, similar to the previous study. An incompletely visualized and evaluated heterogeneous mass in the head of the pancreas measuring 3.3 x 3.7cm.  A biliary stent in place. Moderate persistent dilatation of the proximal common bile duct. A stable small low-density nodule in the left adrenal gland  12/22/2020-Dr. Lambert discussed the results of CT chest and pathology that suggest advanced pancreatic cancer, unfortunately it is not amenable to surgery. She was quite weak and had poor performance, appeared very frail. Discussion was made about palliative chemotherapy but the decision to hold was made until improvement of her physical conditioning occurred. Recommended physical therapy as outpatient and reassess fitness for palliative chemotherapy in approximately 4 weeks. 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 12/7/2020 ERCP at [address] [place] [address] Dr by Dr. Wade Carrillo revealed a single severe biliary stricture in the lower third of the main bile duct with severe upstream biliary dilation, stricture was malignant appearing. Biliary sphincterectomy was performed. 1 partially covered metal stent was placed into the common bile duct. 2/8/2021-initiated palliative chemotherapy with Gemzar 750 mg/m² and Abraxane 90 mg/m² every 2 weeks, this is a dose reduction by 25%  3/23/2021- CA 19-9 108, improved compared to pretreatment value of 236 on 1/20/2021.   4/13/2021 CT Abdomen/Pelvis with contrast documented a poorly defined complex mass in the head of the pancreas. It appears to have decreased in size when remeasured at the same level and in same dimension as in the previous study (3 cm x 2.5 cm, compared to 3.7 cm x 3.3 cm). Persistent moderate dilatation of the pancreatic duct. There are 2 additional small cystic nodules in the body of the pancreas measuring 11 mm and 9 mm. A biliary stent in place and decompression of the intrahepatic biliary ducts since the previous study. 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  The diverticulosis of the distal colon with no evidence for diverticulitis. 5/4/2021- dose reduction by 25% due to severe fatigue and nausea, starting with cycle 4 will receive Gemzar 600 mg/m² and Abraxane 75 mg/m². HEMATOLOGY HISTORY:  Bhumika Mehta was seen in initial hematology consultation on 7/20/2018 for further evaluation and treatment recommendations for anemia. Bhumika Mehta was referred from Dr. Alberto Wilkinson. Bhumika Mheta presented with no significant complaints other than chronic fatigue and constipation. She had been taking ferrous sulfate 325 mg daily under the direction of Dr. Yg Moreira. Bhumika Mehta reported significant constipation and some GI upset with the oral iron. She denied any bleeding tendencies to include hematuria, melena, hematochezia and epistaxis. Bhumika Mehta had a colonoscopy on 4/26/2016 by Dr. Xi Rice for further evaluation of iron deficiency. The only abnormal finding was diverticulosis. Bhumika Mehta is routinely followed by Dr. Ariella Padilla for her chronic kidney disease stage IV. CMP on 6/11/2018 documented a creatinine of 2.2 and GFR 21. CBC review from 2/2/2018- 6/11/2018 documented hemoglobin ranging from 9.4- 10.1, RBC 2.91- 3.22, MCV 94- 100 with a normal WBC and platelet count  CBC at initial consultation on 7/20/2018 documented a WBC of 7.33, ANC 4.74, RBC 3.04, hemoglobin 10.3, .6 and a platelet count of 086,676. Serology studies on 7/20/2018:  Creatinine 1.98   GFR 23   Calcium 10.3   IgG 700, IgA 180, and IgM 52   M spike not observed   Immunofixation: No monoclonality detected. Iron 74   Iron saturation 27%   TIBC 278   Vitamin B12 437   Folate 15.7   Ferritin 299   Reticulocyte count 1.5%   Erythropoietin 11.6      Beta-2 microglobulin 6.2      Occult stool positive 1 of 3 samples on 7/25/2018. Colonoscopy on 10/4/2018 by Dr. Xi Rice was documented as removal of 2 polyps with benign pathology. No evidence of bleeding.      Serology studies on 10/15/2018:  Iron 76   TIBC 287   Iron saturation 26%   Ferritin 321   Hemoglobin 10.1   Creatinine 2.47   GFR 17     11/26/2018 - Initiated Procrit 20,000 units for chronic kidney disease stage IV, to be given every 2 weeks ×4 and then monthly, dose to be titrated appropriately. Hemoglobin 9.6. All has anemia of chronic disease to include chronic kidney disease stage IV. She will began receiving growth factor support and will need to maintain a ferritin level greater than 200 and an iron saturation of 25% for the Procrit to be beneficial. Hold Procrit dose for hemoglobin >11. Indications/rationale for Procrit was discussed with [place]. Risks, benefits and expectations were outlined. Risks including, but not limited to hypertension, stroke, MI, death were discussed and acknowledged by [place]. Serology studies on 3/8/2019:  Creatinine 1.7/GFR 30.4   Iron 80   Iron saturation 26.5%   TIBC 302   Ferritin 144   Vitamin B12 501   Folate 13.9     Serology studies on 3/5/2020:  Creatinine 1.3/GFR 41.4  Iron 84  TIBC 390  Iron saturation 21.5%  Ferritin 342     Iron substrates on 6/25/2020  Ferritin 311  Iron 76  Iron saturation 23%  TIBC 326  Creatinine 1.3/GFR 39     Labs on 12/2/2020 and 12/3/2020:  Iron 35  TIBC 213  Iron saturation 16%  Vitamin B12 483  Folate 10.6  Ferritin 480     Labs on 3/23/2021  Iron 54  TIBC 366  Iron saturation 15  Ferritin 530  Reticulocytes 2.1     Labs on 4/6/2021  WBC 14.40  RBC 2.46  HGB 8.6  HCT 26.4  .3  MCH 35  MCHC 32.6  ANC 12.03  ,000  Iron 43   TIBC 226  Iron saturation 19%   Ferritin 1088.0  B-12= 781  GFR 39  BUN 21  Creatinine 1.3  Phosphorus 2.4  Magnesium 1.5        Age-appropriate health screening:  Colonoscopy on 10/4/2018 by Dr. Petty Gonzales was documented as removal of 2 polyps with benign pathology. No evidence of bleeding. 9/20/2019 [facility] documented no mammographic evidence of malignancy.   No bone mineral density on file REVIEW OF SYSTEMS:   Constitutional: Positive for activity change, appetite change and fatigue. Negative for chills, diaphoresis and fever. HENT: Negative. Negative for congestion, ear pain, hearing loss, nosebleeds, sore throat and tinnitus. Eyes: Negative. Negative for pain, discharge and redness. Respiratory: Positive for shortness of breath. Negative for cough and wheezing. Cardiovascular: Positive for leg swelling (Improving). Negative for chest pain and palpitations. Gastrointestinal: Negative. Negative for abdominal pain, blood in stool, constipation, diarrhea, nausea and vomiting. Endocrine: Negative for polydipsia. Genitourinary: Negative for dysuria, flank pain, frequency, hematuria and urgency. Musculoskeletal: Negative. Negative for back pain, myalgias and neck pain. Skin: Negative. Negative for rash. Neurological: Positive for weakness. Negative for dizziness, tremors, seizures and headaches. Hematological: Does not bruise/bleed easily. Psychiatric/Behavioral: Negative. The patient is not nervous/anxious. Objective   /62   Pulse 71   Temp 98.6 °F (37 °C) (Temporal)   Resp 16   Ht 5' 3\" (1.6 m)   Wt 196 lb 4.8 oz (89 kg)   SpO2 93%   BMI 34.77 kg/m²     PHYSICAL EXAM:  CONSTITUTIONAL: Alert, appropriate, no acute distress. Generalized weakness  EYES: Non icteric, EOM intact, pupils equal round   ENT: Mucus membranes moist, no oral pharyngeal lesions, external inspection of ears and nose are normal  NECK: Supple, no masses. No palpable thyroid mass  CHEST/LUNGS: CTA bilaterally, normal respiratory effort. Wearing supplemental O2 at 3L with O2 sat 97%  CARDIOVASCULAR: RRR  No lower extremity edema  ABDOMEN: soft non-tender, active bowel sounds, no HSM. No palpable masses  EXTREMITIES: ws, no focal weakness.   SKIN: warm, dry with no rashes or lesions  LYMPH: No cervical, clavicular, or axillary lymphadenopathy  NEUROLOGIC: follows commands, non focal PSYCH: mood and affect appropriate. Alert and oriented to time, place, person    LABORATORY RESULTS REVIEWED BY ME:  Recent Labs     05/26/21  0200 05/25/21  0330 05/24/21  0340   WBC 4.4* 4.5* 5.8   HGB 8.1* 8.1* 8.0*   HCT 25.1* 25.3* 24.5*   .0* 102.8* 102.1*    181 215       Lab Results   Component Value Date     (L) 05/26/2021    K 3.7 05/26/2021    CL 91 (L) 05/26/2021    CO2 34 (H) 05/26/2021    BUN 65 (H) 05/26/2021    CREATININE 2.7 (H) 05/26/2021    GLUCOSE 151 (H) 05/26/2021    CALCIUM 9.5 05/26/2021    PROT 7.0 05/18/2021    LABALBU 3.5 05/18/2021    BILITOT 0.6 05/18/2021    ALKPHOS 139 (H) 05/18/2021    AST 23 05/18/2021    ALT 12 05/18/2021    LABGLOM 17 (A) 05/26/2021    GFRAA 20 (L) 05/26/2021    AGRATIO 1.9 01/20/2021    GLOB 2.8 05/18/2021       Lab Results   Component Value Date    INR 1.03 12/05/2020    INR 1.04 12/04/2020    INR 1.07 12/03/2020    PROTIME 13.4 12/05/2020    PROTIME 13.5 12/04/2020    PROTIME 13.8 12/03/2020       RADIOLOGY STUDIES REVIEWED BY ME:  Echo Complete    Result Date: 5/19/2021  Transthoracic Echocardiography Report (TTE)  Demographics   Patient Name Yudi Dong  Date of Study        05/19/2021               J   MRN          [id number]           Gender               Female   Date of      1934       Room Number          [place]  Birth   Age          80 year(s)   Height:      63 inches        Referring Physician  Sachi DOMINGUEZ   Weight:      169 pounds       Sonographer          Lyric Matthews   BSA:         1.8 m^2          Interpreting         Faisal Kay DO                                Physician   BMI:         29.94 kg/m^2  Procedure Type of Study   TTE procedure:ECHO NO CONTRAST WITH DOP/COLR. Study Location: Echo Lab Technical Quality: Limited visualization due to poor acoustical window. Patient Status: Inpatient Contrast Medium: Definity. Indications:Congestive heart failure.   Conclusions   Summary  Left ventricular chamber size is borderline dilated. .  Mild concentric left ventricular hypertrophy. Left ventricular systolic function is severely reduced  Left ventricular ejection fraction is visually estimated at 15%. Severe left ventricular global hypokinesis. There is mild mitral regurgitation. The left atrium is moderately dilated. Signature   ----------------------------------------------------------------  Electronically signed by Isabel Rowe DO(Interpreting  physician) on 05/19/2021 05:18 PM  ----------------------------------------------------------------   Findings   Mitral Valve  Structurally normal mitral valve. Mitral annular calcification is present. There is mild mitral regurgitation. Aortic Valve  Aortic valve sclerosis. No aortic stenosis. No aortic regurgitation . Tricuspid Valve  Normal tricuspid valve leaflet thickness and excursion. There is trace tricuspid regurgitation. Pulmonic Valve  No significant pulmonic regurgitation. Left Atrium  The left atrium is moderately dilated. Left Ventricle  Left ventricular chamber size is borderline dilated. .  Mild concentric left ventricular hypertrophy. Left ventricular systolic function is severely reduced  Left ventricular ejection fraction is visually estimated at 15%. Severe left ventricular global hypokinesis. Diastolic function is indeterminate. No evidence of left ventricular mass or thrombus noted. Right Atrium  Normal right atrial size. Right Ventricle  Normal right ventricular chamber size and function. Pericardial Effusion  No pericardial effusion. M-Mode Measurements (cm)   LVIDd: 4.86 cm                       LVIDs: 4.52 cm  IVSd: 1.51 cm  LVPWd: 0.96 cm                       AO Root Dimension: 2.4 cm  % Ejection Fraction: 15 %            LA:  3.2 cm                                       LVOT: 2 cm  Doppler Measurements:   AV Peak Velocity:163 cm/s            MV Peak E-Wave: 101 cm/s  AV Peak Gradient: 10.63 mmHg there were no significant EKG changes or rhythm changes. Baseline and peak blood pressures were 98/54, and 105/43 respectively. Baseline and peak heart rates were 96 and  107 respectively. Review of rest and stress images obtained utilizing a gated  SPECT acquisition protocol along with review of the polar plot revealed: 1. Ejection fraction 31% 2. Wall motion moderately severely impaired with inferoapical akinesis 3. Myocardial perfusion imaging demonstrated diminished uptake in the distal anteroseptal apical and inferior wall with visually suggestion of some reversibility present. Summary impressions: Moderately severely impaired left ventricular systolic function of 93% wall motion underbodies noted suggestion of defects distal anteroseptal apical and inferior wall with some reversibility present correlate clinically and/or angiographically if clinically appropriate Signed by Dr Luis Ontiveros on 5/21/2021 12:26 PM    ASSESSMENT:  #Decompensated systolic heart Failure  No prior echo available  BNP 7081 on 5/18/2021  S/p diuretics in the ER with symptomatic improvement  BNP 4830 on 5/21/2021  Cardiology assisting     2D echocardiogram on 5/19/2021:  Left ventricular chamber size is borderline dilated. .  Mild concentric left ventricular hypertrophy. Left ventricular systolic function is severely reduced  Left ventricular ejection fraction is visually estimated at 15%. Severe left ventricular global hypokinesis. There is mild mitral regurgitation. The left atrium is moderately dilated. Hue scan 5/21/2021  Moderately severely impaired left ventricular systolic function of 02%  wall motion underbodies noted suggestion of defects distal  anteroseptal apical and inferior wall with some reversibility present  correlate clinically and/or angiographically if clinically appropriate     Dr. Laurie Turk recommends medical management.        Continuing on  Coreg 3.125 mg twice daily  Bumex 1 mg twice daily #NIKHIL/CKD-worsening       Dr. Suyapa Plasencia is following     #Pancreatic cancer  On palliative treatment with dose reduced Gemzar and Abraxane, cycle 4-day 1 on 5/18/2021. #Anemia of chronic disease, macrocytic , enhanced by chemotherapy          Hgb trend:       Receives Retacrit 40,000 units subcu for hemoglobin <10, will consider next dose at follow-up appointment in clinic     Hgb 8.1 today      #Palliative care patient-frail  Palliative care assisting  They are having an ongoing discussion about goals and expectations with the patient and also family members. DNR noted. Patient desires home with home health/supportive care with possible transition to hospice later. She will be re-evaluated in the clinic in 3 weeks to re-evaluate performance status and for further decision making. PLAN:  Wean O2. Decreased from 3L to 2L via [place] this am - discussed with nursing  Continue supportive care   Appreciate palliative care assistance  Follow-up with ALBERTO Gutierres in 3 weeks    I have seen, examined and reviewed this patient medication list, appropriate labs and imaging studies. I reviewed relevant medical records and others physicians notes. I discussed the plans of care with the patient. I answered all the questions to the patients satisfaction. I have also reviewed the chief complaint (CC) and part of the history (History of Present Illness (HPI), Past Family Social History [facility]), or Review of Systems (ROS) and made changes when appropriated. (Please note that portions of this note were completed with a voice recognition program. Efforts were made to edit the dictations but occasionally words are mis-transcribed.)      [facility], APRN    05/26/21  7:05 AM  Physician's attestation/substantial contribution:  I, Dr Hope Dhaliwal, independently performed an evaluation on Dana Gutierres.  I have reviewed relevant medical information/data to include but not limited to medication list, relevant appropriate labs and imaging when applicable. I reviewed other physician's notes, ancillary services and nurses assessment. I have reviewed the above documentation completed by the Nurse Practitioner or Physician Assistant. Please see my additional contributions to the history of present illness, physical examination, and assessment/medical decision-making that reflect my findings and impressions. I discussed essential elements of the care plan with the NP or PA . I concur with above stated. Subjective-sleeping. Discussed with son and daughter at bedside  Objective-no change  Assessment/plan:  New onset heart failure-medical treatment. Pancreatic cancer-patient contemplative regarding best supportive care versus further treatment. Will reassess in 3 weeks. We will sign off.     Devi Willams MD

## 2021-05-26 NOTE — PROGRESS NOTES
Nephrology ([address] [facility]) Consult Note      Patient:  Donna Zarate  YOB: 1934  Date of Service: 5/26/2021  MRN: [id number]   Acct: [de-identified]   Primary Care Physician: Meryl Son MD  Advance Directive: [facility]  Admit Date: 5/18/2021       Hospital Day: 8  Referring Provider: Dani Au MD    Patient independently seen and examined, Chart, Consults, Notes, Operative notes, Labs, Cardiology, and Radiology studies reviewed as available. Subjective:  Donna Zarate is a 80 y.o. female  whom we were consulted for chronic kidney disease stage IV. Consulted on hospital day 6 for increasing creatinine. Patient was admitted for shortness of air and found to have congestive heart failure with exacerbation. She was placed on diuretics and her creatinine had gradually worsened. Review of her last office note shows a slightly declining GFR trend from 39-29 over the past several years. At that visit she was recently started on chemotherapy for pancreatic cancer and was otherwise doing well. At the initial examination, family was present. She noted that she had generally been feeling poorly over the last several days but had felt a bit better since admission. She had had issues with fatigue and nausea over the last week or 2. Her edema had improved. She denied current chest pain, nausea or vomiting. Today, no overnight events. She denies current chest pain, shortness of air, nausea vomiting.     Allergies:  Penicillins    Medicines:  Current Facility-Administered Medications   Medication Dose Route Frequency Provider Last Rate Last Admin    heparin flush 100 UNIT/ML injection 300 Units  3 mL Intravenous PRN Dani Au MD        allopurinol (ZYLOPRIM) tablet 100 mg  100 mg Oral Daily Dani Au MD   100 mg at 05/26/21 0825    midodrine (PROAMATINE) tablet 10 mg  10 mg Oral TID  Dani Au MD   10 mg at 05/26/21 1303    bumetanide (BUMEX) tablet 1 mg  1 mg Oral BID Jessica Theodore MD   1 mg at 05/26/21 0825    carvedilol (COREG) tablet 3.125 mg  3.125 mg Oral BID  Pippa Raphael MD   3.125 mg at 05/26/21 0825    aspirin EC tablet 81 mg  81 mg Oral Daily Ezekiel Adames MD   81 mg at 05/26/21 0825    nitroGLYCERIN (NITROSTAT) SL tablet 0.4 mg  0.4 mg Sublingual Once Den Ruelas MD        sodium chloride flush 0.9 % injection 5-40 mL  5-40 mL Intravenous 2 times per day Gayathri Allen, APRN - CNP   10 mL at 05/26/21 0825    sodium chloride flush 0.9 % injection 5-40 mL  5-40 mL Intravenous PRN Gayathri Allen, APRN - CNP   40 mL at 05/23/21 2033    0.9 % sodium chloride infusion  25 mL Intravenous PRN Gayathri Allen, APRN - CNP        promethazine (PHENERGAN) tablet 12.5 mg  12.5 mg Oral Q6H PRN Gayathri Allen, APRN - CNP        Or    ondansetron (ZOFRAN) injection 4 mg  4 mg Intravenous Q6H PRN Gayathri Allen, APRN - CNP        polyethylene glycol (GLYCOLAX) packet 17 g  17 g Oral Daily PRN Gayathri Allen, APRN - CNP   17 g at 05/23/21 2100    acetaminophen (TYLENOL) tablet 650 mg  650 mg Oral Q6H PRN Gayathri Allen, APRN - CNP        Or    acetaminophen (TYLENOL) suppository 650 mg  650 mg Rectal Q6H PRN Gayathri Allen, APRN - CNP        potassium chloride (KLOR-CON M) extended release tablet 40 mEq  40 mEq Oral PRN Gayathri Allen, APRN - CNP        Or    potassium bicarb-citric acid (EFFER-K) effervescent tablet 40 mEq  40 mEq Oral PRN Gayathri Allen, APRN - CNP        Or    potassium chloride 10 mEq/100 mL IVPB (Peripheral Line)  10 mEq Intravenous PRN Gayathri Allen, APRN - CNP        magnesium sulfate 2000 mg in 50 mL IVPB premix  2,000 mg Intravenous PRN Gayathri Allen, APRN - CNP        apixaban (ELIQUIS) tablet 2.5 mg  2.5 mg Oral BID Gayathri Allen, APRN - CNP   2.5 mg at 05/26/21 0825    docusate sodium (COLACE) capsule 100 mg  100 mg Oral BID Cyndra Holter, APRN - CNP   100 mg at 05/26/21 0825    levothyroxine (SYNTHROID) tablet 112 mcg  112 mcg Oral Daily Nazdra Holter, APRN - CNP   112 mcg at 05/26/21 0603    [Held by provider] lisinopril (PRINIVIL;ZESTRIL) tablet 10 mg  10 mg Oral Daily Cyndra Holter, APRN - CNP   10 mg at 05/19/21 0851    atorvastatin (LIPITOR) tablet 40 mg  40 mg Oral Daily Nazdra Holter, APRN - CNP   40 mg at 05/26/21 0825       Past Medical History:  Past Medical History:   Diagnosis Date    Abdominal aortic aneurysm (AAA) ([facility] [address]     Anemia     Arthritis     Cancer ([facility] [address] 12/01/2020    pancreatic    CHF (congestive heart failure) (HCC)     Chronic kidney disease     DVT of lower extremity (deep venous thrombosis) ([facility] [address]     twice 2010 and 2017    Hyperlipidemia     Hypertension     Kidney stones     Osteoarthritis     Palliative care patient 12/02/2020    Thyroid disease     Thyroid disorder        Past Surgical History:  Past Surgical History:   Procedure Laterality Date    COLONOSCOPY  2016    Dr Colonel Leroy problems    ENDOSCOPIC ULTRASOUND (LOWER) N/A 12/03/2020    Dr JONNY Grijalva/unsuccessful biliary cannulation despite attempts at 2-wire cannulation and proph pancreatic stenting with cannulation around pancreatic stenting-Positive for high-grade adenocarcinoma, pancreatic head    ERCP N/A 12/03/2020    Dr JONNY Grijalva/unsuccessful biliary cannulation despite attempts at 2-wire cannulation and proph pancreatic stenting with cannulation around pancreatic stenting-Positive for high-grade adenocarcinoma, pancreatic head    ERCP  12/07/2020    Dr Traci Kelly/sphincterotomy, placement of a 10 x 60 partially covered biliary stent    PORT SURGERY Right 2/5/2021    SINGLE LUMEN PORT PLACEMENT WITH ULTRASOUND AND FLUORO performed by Kendrick Dooley MD at [address] TOE AMPUTATION Bilateral     pinky toes removed    TOTAL KNEE ARTHROPLASTY Right 01/03/2020    RIGHT TOTAL KNEE REPLACEMENT performed by Swati Villalba MD at [facility] OR       Family History  Family History   Problem Relation Age of Onset    Colon Cancer Brother     Cancer Mother         Breast Cancer    Heart Attack Father     Colon Polyps Neg Hx     Esophageal Cancer Neg Hx     Liver Cancer Neg Hx     Liver Disease Neg Hx     Rectal Cancer Neg Hx     Stomach Cancer Neg Hx        Social History  Social History     Socioeconomic History    Marital status:      Spouse name: Not on file    Number of children: Not on file    Years of education: Not on file    Highest education level: Not on file   Occupational History    Not on file   Tobacco Use    Smoking status: Never Smoker    Smokeless tobacco: Never Used   Vaping Use    Vaping Use: Never used   Substance and Sexual Activity    Alcohol use: No    Drug use: No    Sexual activity: Not on file   Other Topics Concern    Not on file   Social History Narrative    Not on file     Social Determinants of Health     Financial Resource Strain:     Difficulty of Paying Living Expenses:    Food Insecurity:     Worried About Running Out of Food in the Last Year:     [address] in the Last Year:    Transportation Needs:     Lack of Transportation (Medical):      Lack of Transportation (Non-Medical):    Physical Activity:     Days of Exercise per Week:     Minutes of Exercise per Session:    Stress:     Feeling of Stress :    Social Connections:     Frequency of Communication with Friends and Family:     Frequency of Social Gatherings with Friends and Family:     Attends Christianity Services:     Active Member of Clubs or Organizations:     Attends Club or Organization Meetings:     Marital Status:    Intimate Partner Violence:     Fear of Current or Ex-Partner:     Emotionally Abused:     Physically Abused:     Sexually Abused:          Review of Systems:  History obtained from chart review and the patient  General ROS: No fever or chills  Respiratory ROS: No cough, shortness of breath, wheezing  Cardiovascular ROS: No chest pain or palpitations  Gastrointestinal ROS: No abdominal pain or melena  Genito-Urinary ROS: No dysuria or hematuria  Musculoskeletal ROS: No joint pain or swelling   14 point ROS reviewed with the patient and negative except as noted above and in the HPI unless unable to obtain. Objective:  Patient Vitals for the past 24 hrs:   BP Temp Temp src Pulse Resp SpO2 Weight   05/26/21 1010 112/60 97 °F (36.1 °C) -- 68 16 96 % --   05/26/21 0627 (!) 109/56 97.2 °F (36.2 °C) -- 65 14 97 % --   05/26/21 8313 -- -- -- -- -- -- 196 lb 4.8 oz (89 kg)   05/26/21 0224 108/62 98.6 °F (37 °C) Temporal 71 16 93 % --   05/25/21 2231 (!) 115/59 98.1 °F (36.7 °C) Temporal 62 16 95 % --   05/25/21 1855 -- -- -- -- -- 94 % --   05/25/21 1826 (!) 110/56 98 °F (36.7 °C) Temporal 59 17 94 % --   05/25/21 1409 (!) 105/57 96.6 °F (35.9 °C) Temporal 79 18 92 % --   05/25/21 1402 -- -- -- -- 16 94 % --       Intake/Output Summary (Last 24 hours) at 5/26/2021 1358  Last data filed at 5/26/2021 0930  Gross per 24 hour   Intake 280 ml   Output --   Net 280 ml     General: awake/alert   Chest:  clear to auscultation bilaterally  CVS: regular rate and rhythm  Abdominal: soft, nontender, normal bowel sounds  Extremities: no cyanosis or edema  Skin: warm and dry without rash      Labs:  BMP:   Recent Labs     05/24/21  0340 05/25/21  0330 05/26/21  0200   * 135* 133*   K 3.7 3.8 3.7   CL 93* 94* 91*   CO2 35* 34* 34*   PHOS  --  3.1  --    BUN 57* 61* 65*   CREATININE 2.6* 2.6* 2.7*   CALCIUM 9.5 9.8 9.5     CBC:   Recent Labs     05/24/21  0340 05/25/21  0330 05/26/21  0200   WBC 5.8 4.5* 4.4*   HGB 8.0* 8.1* 8.1*   HCT 24.5* 25.3* 25.1*   .1* 102.8* 102.0*    181 163     LIVER PROFILE: No results for input(s): AST, ALT, LIPASE, BILIDIR, BILITOT, ALKPHOS in the last 72 hours. Invalid input(s):   AMYLASE,  ALB  PT/INR: No results for input(s): PROTIME, INR in the last 72 hours. APTT: No results for input(s): APTT in the last 72 hours. BNP:  No results for input(s): BNP in the last 72 hours. Ionized Calcium:No results for input(s): IONCA in the last 72 hours. Magnesium:  Recent Labs     05/25/21  0330   MG 1.6     Phosphorus:  Recent Labs     05/25/21  0330   PHOS 3.1     HgbA1C: No results for input(s): LABA1C in the last 72 hours. Hepatic: No results for input(s): ALKPHOS, ALT, AST, PROT, BILITOT, BILIDIR, LABALBU in the last 72 hours. Lactic Acid: No results for input(s): LACTA in the last 72 hours. Troponin: No results for input(s): CKTOTAL, CKMB, TROPONINT in the last 72 hours. ABGs: No results for input(s): PH, PCO2, PO2, HCO3, O2SAT in the last 72 hours. CRP:  No results for input(s): CRP in the last 72 hours. Sed Rate:  No results for input(s): SEDRATE in the last 72 hours. Cultures:   No results for input(s): CULTURE in the last 72 hours. No results for input(s): BC, Lovena Boop in the last 72 hours. No results for input(s): CXSURG in the last 72 hours. Radiology reports as per the Radiologist  Radiology: Echo Complete    Result Date: 5/19/2021  Transthoracic Echocardiography Report (TTE)  Demographics   Patient Name Jeannine Galicia  Date of Study        05/19/2021               J   MRN          [id number]           Gender               Female   Date of      1934       Room Number          [id number]  Birth   Age          80 year(s)   Height:      63 inches        Referring Physician  Carolyn DOMINGUEZ   Weight:      169 pounds       Sonographer          Leighann Christy   BSA:         1.8 m^2          Interpreting         Melissa Chadwick DO                                Physician   BMI:         29.94 kg/m^2  Procedure Type of Study   TTE procedure:ECHO NO CONTRAST WITH DOP/COLR. Study Location: Echo Lab Technical Quality: Limited visualization due to poor acoustical window. Patient Status: Inpatient Contrast Medium: Definity. hematology oncology service  Palliative care evaluation  Continue to hold oral bicarbonate as contracted from diuretic usage  Consider effective diuretic dose decrease as creatinine continues to increase slightly  Follow-up office 1 week for close outpatient follow-up    Thank you for the consult, we appreciate the opportunity to provide care to your patients. Feel free to contact me if I can be of any further assistance.       Jonathan Esquivel MD  05/26/21  1:58 PM

## 2021-05-26 NOTE — PROGRESS NOTES
Palliative Care Progress Note  5/26/2021 8:42 AM  Subjective:   Admit Date: 5/18/2021  PCP: Tonya Oconnell MD    Chief Complaint: Shortness of breath    Interval History: No acute overnight events. Patient remains weak but is improved this morning. Appetite has improved, O2 weaned to [facility], and she continues to work with therapy. Nephrology, Oncology and Cardiology following. Patient is hopeful to discharge home to day. Portions of this note have been copied forward, however, changed to reflect the most current clinical status of this patient. Review of Systems   Review of Systems   Constitutional: Positive for activity change        Generalized weakness   HENT: Negative. Eyes: Negative. Respiratory: Positive for shortness of breath. (improved)  Cardiovascular: Negative. Gastrointestinal: Negative. Endocrine: Negative. Genitourinary: Negative. Musculoskeletal: Negative. Skin: Negative. Neurological: Negative. Psychiatric/Behavioral: Negative.         DIET CARDIAC;    Medications:   sodium chloride       Current Facility-Administered Medications   Medication Dose Route Frequency Provider Last Rate Last Admin    allopurinol (ZYLOPRIM) tablet 100 mg  100 mg Oral Daily Carmelina Hagen MD   100 mg at 05/26/21 0825    midodrine (PROAMATINE) tablet 10 mg  10 mg Oral TID  Carmelina Hagen MD   10 mg at 05/26/21 0825    bumetanide (BUMEX) tablet 1 mg  1 mg Oral BID Carmelina Hagen MD   1 mg at 05/26/21 0825    carvedilol (COREG) tablet 3.125 mg  3.125 mg Oral BID  Sara Rinaldi MD   3.125 mg at 05/26/21 0825    aspirin EC tablet 81 mg  81 mg Oral Daily Jem To MD   81 mg at 05/26/21 0825    nitroGLYCERIN (NITROSTAT) SL tablet 0.4 mg  0.4 mg Sublingual Once Anisha Novak MD        sodium chloride flush 0.9 % injection 5-40 mL  5-40 mL Intravenous 2 times per day Carleene Harada, APRN - CNP   10 mL at 05/26/21 0825    sodium chloride flush 0.9 % injection 5-40 mL  5-40 mL Intravenous PRN  First, APRN - CNP   40 mL at 05/23/21 2033    0.9 % sodium chloride infusion  25 mL Intravenous PRN  First, APRN - CNP        promethazine (PHENERGAN) tablet 12.5 mg  12.5 mg Oral Q6H PRN  First, APRN - CNP        Or    ondansetron (ZOFRAN) injection 4 mg  4 mg Intravenous Q6H PRN  First, APRN - CNP        polyethylene glycol (GLYCOLAX) packet 17 g  17 g Oral Daily PRN  First, APRN - CNP   17 g at 05/23/21 2100    acetaminophen (TYLENOL) tablet 650 mg  650 mg Oral Q6H PRN  First, APRN - CNP        Or    acetaminophen (TYLENOL) suppository 650 mg  650 mg Rectal Q6H PRN  First, APRN - CNP        potassium chloride (KLOR-CON M) extended release tablet 40 mEq  40 mEq Oral PRN  First, APRN - CNP        Or    potassium bicarb-citric acid (EFFER-K) effervescent tablet 40 mEq  40 mEq Oral PRN  First, APRN - CNP        Or    potassium chloride 10 mEq/100 mL IVPB (Peripheral Line)  10 mEq Intravenous PRN  First, APRN - CNP        magnesium sulfate 2000 mg in 50 mL IVPB premix  2,000 mg Intravenous PRN  First, APRN - CNP        apixaban (ELIQUIS) tablet 2.5 mg  2.5 mg Oral BID  First, APRN - CNP   2.5 mg at 05/26/21 0825    docusate sodium (COLACE) capsule 100 mg  100 mg Oral BID  First, APRN - CNP   100 mg at 05/26/21 0825    levothyroxine (SYNTHROID) tablet 112 mcg  112 mcg Oral Daily  First, APRN - CNP   112 mcg at 05/26/21 0603    [Held by provider] lisinopril (PRINIVIL;ZESTRIL) tablet 10 mg  10 mg Oral Daily  First, APRN - CNP   10 mg at 05/19/21 0851    atorvastatin (LIPITOR) tablet 40 mg  40 mg Oral Daily  First, APRN - CNP   40 mg at 05/26/21 0825        Labs:     Recent Labs     05/24/21  0340 05/25/21  0330 05/26/21  0200   WBC 5.8 4.5* 4.4*   RBC 2.40* 2.46* 2.46*   HGB 8. 0* 8.1* 8.1*   HCT 24.5* 25.3* 25.1*   .1* 102.8* 102.0*   MCH 33.3* 32.9* 32.9*   MCHC 32.7* 32.0* 32.3*    181 163     Recent Labs     05/24/21  0340 05/25/21  0330 05/26/21  0200   * 135* 133*   K 3.7 3.8 3.7   ANIONGAP 7 7 8   CL 93* 94* 91*   CO2 35* 34* 34*   BUN 57* 61* 65*   CREATININE 2.6* 2.6* 2.7*   GLUCOSE 117* 113* 151*   CALCIUM 9.5 9.8 9.5     Recent Labs     05/25/21  0330   MG 1.6   PHOS 3.1     No results for input(s): AST, ALT, ALB, BILITOT, ALKPHOS, ALB in the last 72 hours. ABGs:  No results for input(s): PHART, IYY2AEG, PO2ART, VZN6KDM, BEART, HGBAE, S6STQQOP, CARBOXHGBART, 02THERAPY in the last 72 hours. HgBA1c:   No results for input(s): LABA1C in the last 72 hours. FLP:    Lab Results   Component Value Date    TRIG 108 05/18/2021    HDL 54 05/18/2021    LDLCALC 79 05/18/2021     TSH:    Lab Results   Component Value Date    TSH 4.710 05/18/2021     Troponin T:   No results for input(s): TROPONINI in the last 72 hours. INR: No results for input(s): INR in the last 72 hours.     Objective:   Vitals: BP (!) 109/56   Pulse 65   Temp 97.2 °F (36.2 °C)   Resp 14   Ht 5' 3\" (1.6 m)   Wt 196 lb 4.8 oz (89 kg)   SpO2 97%   BMI 34.77 kg/m²   24HR INTAKE/OUTPUT:      Intake/Output Summary (Last 24 hours) at 5/26/2021 2596  Last data filed at 5/25/2021 1826  Gross per 24 hour   Intake 380 ml   Output --   Net 380 ml     Physical Exam  General appearance: alert and cooperative with exam, appears elderly, appears chronically ill, no acute distress  HEENT: atraumatic, eyes with clear conjunctiva and normal lids, pupils and irises normal, external ears and nose are normal,lips normal.  Neck: without masses, supple   Lungs: no increased work of breathing, diminished, supplemental O2 in place  Heart: regular rate and rhythm, distal pulses intact  Abdomen: soft, non-tender; bowel sounds normal; no masses,  no organomegaly  Genitourinary: No bladder fullness, masses, or tenderness  Extremities: able to SINGH, 1+ BLE edema  Neurologic: No focal neurologic deficits, normal sensation, no tremor, alert and oriented  Psychiatric: Alert and oriented, no recent or remote memory deficits, good judgement, mood and affect appropriate  Skin: warm, dry      Assessment and Plan:   Principal Problem:    New onset of congestive heart failure (HCC)  Active Problems:    CKD (chronic kidney disease)    Malignant neoplasm of other parts of pancreas (HCC)    Acute respiratory failure with hypoxia (HCC)    CHF NYHA class III, acute, systolic (HCC)  Resolved Problems:    * No resolved hospital problems. *      Visit Summary: Chart reviewed, patient discussed with nursing and Oncology. I saw the patient at the bedside with her daughter present. She is resting in bed, feels improved to day. She is hopeful to be discharged today, plans to have [place] services and Outpatient Supportive Care at discharge, follow up with Oncology in 3 weeks. I reviewed Supportive Care services, expected contact from APRN to schedule initial appointment. All questions answered, encouragement and emotional support provided. Palliative Care will follow. Recommendations:   1. Palliative Care- [facility] ongoing discussion. Family currently agreeable to [place] and Outpatient Supportive Care. Plans for follow up with Oncology in 3 weeks. CODE STATUS- DNR per patient's stated wishes, order updated SYMPTOM MANAGEMENT- Pt improved with supplemental O2 and diuresis     2. New Onset Heart Failure- Cardiology consulted, ECHO completed EF 15%,  Lexiscan completed EF 31%, medical management , labs trended, diuresis per attending    3. NIKHIL/CKD- labs trended, Nephrology consulted and following     4. Pancreatic cancer- Oncology following, currently receiving palliative chemo       Thank you for consulting Palliative Care and allowing us to participate in the care of this patient.        Greater than 50% of time spent Counseling: Yes  Total visit time: 25 min    Electronically signed by ALBERTO Olivares on 5/26/2021 at 8:42 AM    (Please note that portions of this note were completed with a voice recognition program.  Magen Bolton made to edit the dictations but occasionally words are mis-transcribed.)

## 2021-05-26 NOTE — PROGRESS NOTES
Occupational Therapy  Attempted to see pt in PM. Pt had just been put to bed less than an hour ago and did not feel like getting back up due to being tired. Will continue to follow.     REYNALDO Archer  Electronically signed by Nimesh Hill on 5/26/2021 at 2:29 PM

## 2021-05-26 NOTE — PROGRESS NOTES
PTS SAT ON RA AT REST WAS 91%  PT THEN  WALKED TO CHAIR SAT DROPPED TO 87% . 2 LPM PLACED ON PT SAT INCREASED TO 92%.

## 2021-06-17 ENCOUNTER — OFFICE VISIT (OUTPATIENT)
Dept: CARDIOLOGY CLINIC | Age: 86
End: 2021-06-17
Payer: MEDICARE

## 2021-06-17 VITALS
SYSTOLIC BLOOD PRESSURE: 126 MMHG | HEIGHT: 62 IN | WEIGHT: 153.7 LBS | BODY MASS INDEX: 28.29 KG/M2 | DIASTOLIC BLOOD PRESSURE: 80 MMHG | HEART RATE: 92 BPM

## 2021-06-17 DIAGNOSIS — Z79.01 CHRONIC ANTICOAGULATION: ICD-10-CM

## 2021-06-17 DIAGNOSIS — I25.5 ISCHEMIC CARDIOMYOPATHY: ICD-10-CM

## 2021-06-17 DIAGNOSIS — R94.39 ABNORMAL STRESS TEST: Primary | ICD-10-CM

## 2021-06-17 DIAGNOSIS — R07.89 CHEST DISCOMFORT: ICD-10-CM

## 2021-06-17 PROCEDURE — G8417 CALC BMI ABV UP PARAM F/U: HCPCS | Performed by: INTERNAL MEDICINE

## 2021-06-17 PROCEDURE — G8427 DOCREV CUR MEDS BY ELIG CLIN: HCPCS | Performed by: INTERNAL MEDICINE

## 2021-06-17 PROCEDURE — 4040F PNEUMOC VAC/ADMIN/RCVD: CPT | Performed by: INTERNAL MEDICINE

## 2021-06-17 PROCEDURE — 1123F ACP DISCUSS/DSCN MKR DOCD: CPT | Performed by: INTERNAL MEDICINE

## 2021-06-17 PROCEDURE — 93000 ELECTROCARDIOGRAM COMPLETE: CPT | Performed by: INTERNAL MEDICINE

## 2021-06-17 PROCEDURE — 99214 OFFICE O/P EST MOD 30 MIN: CPT | Performed by: INTERNAL MEDICINE

## 2021-06-17 PROCEDURE — 1036F TOBACCO NON-USER: CPT | Performed by: INTERNAL MEDICINE

## 2021-06-17 PROCEDURE — 1090F PRES/ABSN URINE INCON ASSESS: CPT | Performed by: INTERNAL MEDICINE

## 2021-06-17 PROCEDURE — 1111F DSCHRG MED/CURRENT MED MERGE: CPT | Performed by: INTERNAL MEDICINE

## 2021-06-17 RX ORDER — MIDODRINE HYDROCHLORIDE 10 MG/1
10 TABLET ORAL
Qty: 90 TABLET | Refills: 5 | Status: SHIPPED | OUTPATIENT
Start: 2021-06-17

## 2021-06-17 RX ORDER — ASPIRIN 81 MG/1
81 TABLET ORAL DAILY
Qty: 30 TABLET | Refills: 5 | Status: SHIPPED | OUTPATIENT
Start: 2021-06-17

## 2021-06-17 RX ORDER — CARVEDILOL 3.12 MG/1
3.12 TABLET ORAL 2 TIMES DAILY WITH MEALS
Qty: 60 TABLET | Refills: 5 | Status: SHIPPED | OUTPATIENT
Start: 2021-06-17

## 2021-06-17 ASSESSMENT — ENCOUNTER SYMPTOMS
VOMITING: 0
GASTROINTESTINAL NEGATIVE: 1
RESPIRATORY NEGATIVE: 1
EYES NEGATIVE: 1
SHORTNESS OF BREATH: 0
DIARRHEA: 0
NAUSEA: 0

## 2021-06-17 NOTE — PROGRESS NOTES
[facility]                            Date:  6/17/2021  Patient: Paramjit Ace  Age:  80 y.o., 1934      Reason for evaluation:         SUBJECTIVE:    Returns today follow-up assessment recently hospitalized. Recently diagnosed pancreatic carcinoma. Stress test abnormal ejection fraction 31% several defects present with reversible ischemia. Medical therapy recommended due to her multiple comorbidities. She was doing well until yesterday had some indigestion in her chest that went on off and on throughout the night gone today given 2 nitroglycerin tablets that seemed to help. No other issues reported. Blood pressure 126/80 heart 92. Echocardiogram 5/18/2021 ejection fraction 15% severe left ventricular global hypokinesis mild MR dilated left atrium. Review of Systems   Constitutional: Negative. Negative for chills, fever and unexpected weight change. HENT: Negative. Eyes: Negative. Respiratory: Negative. Negative for shortness of breath. Cardiovascular: Negative. Negative for chest pain. Gastrointestinal: Negative. Negative for diarrhea, nausea and vomiting. Endocrine: Negative. Genitourinary: Negative. Musculoskeletal: Negative. Skin: Negative. Neurological: Negative. All other systems reviewed and are negative. OBJECTIVE:     /80   Pulse 92   Ht 5' 2\" (1.575 m)   Wt 153 lb 11.2 oz (69.7 kg)   BMI 28.11 kg/m²     Labs:   CBC: No results for input(s): WBC, HGB, HCT, PLT in the last 72 hours. BMP:No results for input(s): NA, K, CO2, BUN, CREATININE, LABGLOM, GLUCOSE in the last 72 hours. BNP: No results for input(s): BNP in the last 72 hours. PT/INR: No results for input(s): PROTIME, INR in the last 72 hours. APTT:No results for input(s): APTT in the last 72 hours. CARDIAC ENZYMES:No results for input(s): CKTOTAL, CKMB, CKMBINDEX, TROPONINI in the last 72 hours.   FASTING LIPID PANEL:  Lab Results   Component Value Date    HDL 54 05/18/2021    LDLCALC 79 05/18/2021    TRIG 108 05/18/2021     LIVER PROFILE:No results for input(s): AST, ALT, LABALBU in the last 72 hours.         Past Medical History:   Diagnosis Date    Abdominal aortic aneurysm (AAA) ([facility] [address]     Anemia     Arthritis     Cancer ([facility] [address] 12/01/2020    pancreatic    CHF (congestive heart failure) (HCC)     Chronic kidney disease     DVT of lower extremity (deep venous thrombosis) (HCC)     twice 2010 and 2017    Hyperlipidemia     Hypertension     Kidney stones     Osteoarthritis     Palliative care patient 12/02/2020    Thyroid disease     Thyroid disorder      Past Surgical History:   Procedure Laterality Date    COLONOSCOPY  2016    Dr White Rm problems    ENDOSCOPIC ULTRASOUND (LOWER) N/A 12/03/2020    Dr JONNY Grijalva/unsuccessful biliary cannulation despite attempts at 2-wire cannulation and proph pancreatic stenting with cannulation around pancreatic stenting-Positive for high-grade adenocarcinoma, pancreatic head    ERCP N/A 12/03/2020    Dr JONNY Grijalva/unsuccessful biliary cannulation despite attempts at 2-wire cannulation and proph pancreatic stenting with cannulation around pancreatic stenting-Positive for high-grade adenocarcinoma, pancreatic head    ERCP  12/07/2020    Dr Bozena Kelly/sphincterotomy, placement of a 10 x 60 partially covered biliary stent    PORT SURGERY Right 2/5/2021    SINGLE LUMEN PORT PLACEMENT WITH ULTRASOUND AND FLUORO performed by Jarad Puente MD at [address] [place] [address] TOE AMPUTATION Bilateral     pinky toes removed    TOTAL KNEE ARTHROPLASTY Right 01/03/2020    RIGHT TOTAL KNEE REPLACEMENT performed by Demetrius Jaimes [facility] at [facility] OR     Family History   Problem Relation Age of Onset    Colon Cancer Brother     Cancer Mother         Breast Cancer    Heart Attack Father     Colon Polyps Neg Hx     Esophageal Cancer Neg Hx     Liver Cancer Neg Hx     Liver Disease Neg Hx     Rectal Cancer Neg Hx     Stomach education: Not on file    Highest education level: Not on file   Occupational History    Not on file   Tobacco Use    Smoking status: Never Smoker    Smokeless tobacco: Never Used   Vaping Use    Vaping Use: Never used   Substance and Sexual Activity    Alcohol use: No    Drug use: No    Sexual activity: Not on file   Other Topics Concern    Not on file   Social History Narrative    Not on file     Social Determinants of Health     Financial Resource Strain:     Difficulty of Paying Living Expenses:    Food Insecurity:     Worried About Running Out of Food in the Last Year:     [address] in the Last Year:    Transportation Needs:     Lack of Transportation (Medical):  Lack of Transportation (Non-Medical):    Physical Activity:     Days of Exercise per Week:     Minutes of Exercise per Session:    Stress:     Feeling of Stress :    Social Connections:     Frequency of Communication with Friends and Family:     Frequency of Social Gatherings with Friends and Family:     Attends Faith Services:     Active Member of Clubs or Organizations:     Attends Club or Organization Meetings:     Marital Status:    Intimate Partner Violence:     Fear of Current or Ex-Partner:     Emotionally Abused:     Physically Abused:     Sexually Abused:        Physical Examination:  /80   Pulse 92   Ht 5' 2\" (1.575 m)   Wt 153 lb 11.2 oz (69.7 kg)   BMI 28.11 kg/m²   Physical Exam  Vitals reviewed. Constitutional:       Appearance: She is well-developed. Neck:      Vascular: No carotid bruit or JVD. Cardiovascular:      Rate and Rhythm: Normal rate and regular rhythm. Heart sounds: Normal heart sounds. No murmur heard. No friction rub. No gallop. Pulmonary:      Effort: Pulmonary effort is normal. No respiratory distress. Breath sounds: Normal breath sounds. No wheezing or rales. Abdominal:      General: There is no distension. Tenderness:  There is no abdominal tenderness. Lymphadenopathy:      Cervical: No cervical adenopathy. Skin:     General: Skin is warm and dry. ASSESSMENT:     Diagnosis Orders   1. Abnormal stress test  EKG 12 lead   2. Chest discomfort     3. Ischemic cardiomyopathy     4. Chronic anticoagulation         PLAN:  Orders Placed This Encounter   Procedures    EKG 12 lead     No orders of the defined types were placed in this encounter. 1. Continue present medications  2. Recommend follow-up assessment in 3 months    Return in about 3 months (around 9/17/2021) for return to Dr. Curt Reeder only. Angelito Low MD 6/17/2021 2:27 PM CDT    [facility]      Thisdictation was generated by voice recognition computer software. Although all attempts are made to edit the dictation for accuracy, there may be errors in the transcription that are not intended.

## 2021-06-18 ENCOUNTER — HOSPITAL ENCOUNTER (OUTPATIENT)
Dept: INFUSION THERAPY | Age: 86
Discharge: HOME | End: 2021-06-18
Payer: MEDICARE

## 2021-06-18 ENCOUNTER — OFFICE VISIT (OUTPATIENT)
Dept: HEMATOLOGY | Age: 86
End: 2021-06-18
Payer: MEDICARE

## 2021-06-18 VITALS
HEIGHT: 63 IN | SYSTOLIC BLOOD PRESSURE: 130 MMHG | OXYGEN SATURATION: 95 % | DIASTOLIC BLOOD PRESSURE: 86 MMHG | HEART RATE: 85 BPM | BODY MASS INDEX: 27.23 KG/M2

## 2021-06-18 DIAGNOSIS — N18.9 CHRONIC KIDNEY DISEASE, UNSPECIFIED CKD STAGE: ICD-10-CM

## 2021-06-18 DIAGNOSIS — N18.4 ANEMIA OF CHRONIC KIDNEY FAILURE, STAGE 4 (SEVERE) (HCC): ICD-10-CM

## 2021-06-18 DIAGNOSIS — E03.8 OTHER SPECIFIED HYPOTHYROIDISM: ICD-10-CM

## 2021-06-18 DIAGNOSIS — R53.1 WEAKNESS GENERALIZED: ICD-10-CM

## 2021-06-18 DIAGNOSIS — C25.9 PANCREATIC CARCINOMA (HCC): Primary | ICD-10-CM

## 2021-06-18 DIAGNOSIS — D64.9 ANEMIA, UNSPECIFIED TYPE: ICD-10-CM

## 2021-06-18 DIAGNOSIS — K83.8 DILATION OF BILIARY TRACT: ICD-10-CM

## 2021-06-18 DIAGNOSIS — I50.21: ICD-10-CM

## 2021-06-18 DIAGNOSIS — Z79.899 ENCOUNTER FOR ESA (ERYTHROPOIETIN STIMULATING AGENT) ANEMIA MANAGEMENT: ICD-10-CM

## 2021-06-18 DIAGNOSIS — D64.9 ENCOUNTER FOR ESA (ERYTHROPOIETIN STIMULATING AGENT) ANEMIA MANAGEMENT: ICD-10-CM

## 2021-06-18 DIAGNOSIS — D50.8 IRON DEFICIENCY ANEMIA SECONDARY TO INADEQUATE DIETARY IRON INTAKE: ICD-10-CM

## 2021-06-18 DIAGNOSIS — Z51.11 CHEMOTHERAPY MANAGEMENT, ENCOUNTER FOR: ICD-10-CM

## 2021-06-18 DIAGNOSIS — D63.1 ANEMIA OF CHRONIC KIDNEY FAILURE, STAGE 4 (SEVERE) (HCC): ICD-10-CM

## 2021-06-18 LAB
BASOPHILS ABSOLUTE: 0.07 K/UL (ref 0.01–0.08)
BASOPHILS RELATIVE PERCENT: 0.4 % (ref 0.1–1.2)
EOSINOPHILS ABSOLUTE: 0.28 K/UL (ref 0.04–0.54)
EOSINOPHILS RELATIVE PERCENT: 1.7 % (ref 0.7–7)
HCT VFR BLD CALC: 29 % (ref 34.1–44.9)
HEMOGLOBIN: 8.7 G/DL (ref 11.2–15.7)
LYMPHOCYTES ABSOLUTE: 0.9 K/UL (ref 1.18–3.74)
LYMPHOCYTES RELATIVE PERCENT: 5.5 % (ref 19.3–53.1)
MCH RBC QN AUTO: 35.1 PG (ref 25.6–32.2)
MCHC RBC AUTO-ENTMCNC: 30 G/DL (ref 32.3–35.5)
MCV RBC AUTO: 116.9 FL (ref 79.4–94.8)
MONOCYTES ABSOLUTE: 2.07 K/UL (ref 0.24–0.82)
MONOCYTES RELATIVE PERCENT: 12.7 % (ref 4.7–12.5)
NEUTROPHILS ABSOLUTE: 12.97 K/UL (ref 1.56–6.13)
NEUTROPHILS RELATIVE PERCENT: 79.7 % (ref 34–71.1)
PDW BLD-RTO: 25.7 % (ref 11.7–14.4)
PLATELET # BLD: 253 K/UL (ref 182–369)
PMV BLD AUTO: 11.4 FL (ref 7.4–10.4)
RBC # BLD: 2.48 M/UL (ref 3.93–5.22)
WBC # BLD: 16.29 K/UL (ref 3.98–10.04)

## 2021-06-18 PROCEDURE — 99211 OFF/OP EST MAY X REQ PHY/QHP: CPT

## 2021-06-18 PROCEDURE — 1111F DSCHRG MED/CURRENT MED MERGE: CPT | Performed by: NURSE PRACTITIONER

## 2021-06-18 PROCEDURE — 96372 THER/PROPH/DIAG INJ SC/IM: CPT

## 2021-06-18 PROCEDURE — 99214 OFFICE O/P EST MOD 30 MIN: CPT | Performed by: NURSE PRACTITIONER

## 2021-06-18 PROCEDURE — 85025 COMPLETE CBC W/AUTO DIFF WBC: CPT

## 2021-06-18 PROCEDURE — G8417 CALC BMI ABV UP PARAM F/U: HCPCS | Performed by: NURSE PRACTITIONER

## 2021-06-18 PROCEDURE — 2580000003 HC RX 258: Performed by: NURSE PRACTITIONER

## 2021-06-18 PROCEDURE — 1036F TOBACCO NON-USER: CPT | Performed by: NURSE PRACTITIONER

## 2021-06-18 PROCEDURE — 1090F PRES/ABSN URINE INCON ASSESS: CPT | Performed by: NURSE PRACTITIONER

## 2021-06-18 PROCEDURE — 1123F ACP DISCUSS/DSCN MKR DOCD: CPT | Performed by: NURSE PRACTITIONER

## 2021-06-18 PROCEDURE — 4040F PNEUMOC VAC/ADMIN/RCVD: CPT | Performed by: NURSE PRACTITIONER

## 2021-06-18 PROCEDURE — G8428 CUR MEDS NOT DOCUMENT: HCPCS | Performed by: NURSE PRACTITIONER

## 2021-06-18 PROCEDURE — 96523 IRRIG DRUG DELIVERY DEVICE: CPT

## 2021-06-18 PROCEDURE — 6360000002 HC RX W HCPCS: Performed by: NURSE PRACTITIONER

## 2021-06-18 RX ORDER — SODIUM CHLORIDE 9 MG/ML
25 INJECTION, SOLUTION INTRAVENOUS PRN
Status: CANCELLED | OUTPATIENT
Start: 2021-06-18

## 2021-06-18 RX ORDER — SODIUM CHLORIDE 0.9 % (FLUSH) 0.9 %
5-40 SYRINGE (ML) INJECTION PRN
Status: CANCELLED | OUTPATIENT
Start: 2021-06-18

## 2021-06-18 RX ORDER — HEPARIN SODIUM (PORCINE) LOCK FLUSH IV SOLN 100 UNIT/ML 100 UNIT/ML
500 SOLUTION INTRAVENOUS PRN
Status: DISCONTINUED | OUTPATIENT
Start: 2021-06-18 | End: 2021-06-19 | Stop reason: HOSPADM

## 2021-06-18 RX ORDER — HEPARIN SODIUM (PORCINE) LOCK FLUSH IV SOLN 100 UNIT/ML 100 UNIT/ML
500 SOLUTION INTRAVENOUS PRN
Status: CANCELLED | OUTPATIENT
Start: 2021-06-18

## 2021-06-18 RX ORDER — SODIUM CHLORIDE 0.9 % (FLUSH) 0.9 %
5-40 SYRINGE (ML) INJECTION PRN
Status: DISCONTINUED | OUTPATIENT
Start: 2021-06-18 | End: 2021-06-19 | Stop reason: HOSPADM

## 2021-06-18 RX ORDER — SODIUM CHLORIDE 9 MG/ML
25 INJECTION, SOLUTION INTRAVENOUS PRN
Status: DISCONTINUED | OUTPATIENT
Start: 2021-06-18 | End: 2021-06-19 | Stop reason: HOSPADM

## 2021-06-18 RX ADMIN — EPOETIN ALFA-EPBX 40000 UNITS: 40000 INJECTION, SOLUTION INTRAVENOUS; SUBCUTANEOUS at 15:24

## 2021-06-18 RX ADMIN — SODIUM CHLORIDE, PRESERVATIVE FREE 20 ML: 5 INJECTION INTRAVENOUS at 15:24

## 2021-06-18 RX ADMIN — HEPARIN 500 UNITS: 100 SYRINGE at 15:24

## 2021-06-18 ASSESSMENT — ENCOUNTER SYMPTOMS
CONSTIPATION: 0
EYE PAIN: 0
WHEEZING: 0
COUGH: 0
GASTROINTESTINAL NEGATIVE: 1
DIARRHEA: 0
EYES NEGATIVE: 1
BACK PAIN: 0
BLOOD IN STOOL: 0
EYE REDNESS: 0
SORE THROAT: 0
VOMITING: 0
EYE DISCHARGE: 0
ABDOMINAL PAIN: 0

## 2021-06-18 NOTE — PROGRESS NOTES
Progress Note      Pt Name: Yvan Garcia  YOB: 1934  MRN: [id number]    Date of evaluation: 6/18/2021  History Obtained From:  patient, electronic medical record    CHIEF COMPLAINT:    Chief Complaint   Patient presents with    Follow-up     Pancreatic carcinoma ([address]    Anemia     Consider Retacrit    Fatigue    Discuss Labs     HISTORY OF PRESENT ILLNESS:    Yvan Garcia is a 80 y.o.  female who is currently followed for pancreatic adenocarcinoma and multifactoral anemia to include iron deficiency and chronic kidney disease stage IV. She has been receiving palliative chemotherapy with Gemzar 750 mg/m² and Abraxane 90 mg/m² every 2 weeks (this is a 25% dose reduction), last dose given on 5/18/2021 (cycle 4-day #1 given an additional 25% dose reduction due to nausea and fatigue). CT scan of the abdomen and pelvis on 4/13/2021 and tumor markers suggested a positive response to treatment. Unfortunately after receiving treatment, Andrey Ruiz began experiencing worsening dyspnea with hypoxemia and O2 desaturation. A chest x-ray revealed pulmonary congestion and due to her respiratory symptoms she was sent to the emergency room which was followed by an admission to [facility] from 5/18/2021-5/26/2020. She was diagnosed with decompensated systolic heart failure with a 2D echocardiogram on 5/19/2021 reporting left ventricular EF estimated at 15% and Stacey Boyer on 5/21/2021 reported a left ventricular systolic function of 21%, prior to this admission no known history of heart failure. At discharge, Andrey Ruiz desire to go home with home health and possible transition to hospice care at a later time. Unfortunately she is currently not a candidate for further chemotherapy for her pancreatic adenocarcinoma. She returns today in 3-week hospital follow-up for evaluation, late side effect monitoring and further treatment recommendations.     Andrey Ruiz presents today indicating that she is feeling mass measuring 3.3 x 3.7 cm in size and associated, bile duct dilatation above the level of the ampulla.  Associated moderate pancreatic ductal dilatation. · 12/2/2020-CA 19-9 133  · 12/03/2020ERCP with FNA biopsy Positive for high-grade adenocarcinoma.  Unfortunately, stent could not be placed. · 12/5/2020-transferred from [facility]) to [address] [place] [address] and discharged home on 12/8/2020  · 12/07/2020- Cholangipancreatography Retrograde Endo ERCP with sphincterotomy, balloon sweep and placement of 10x60 PC BS metal stent at [facility] in [place]. · 12/21/2020- CT chest with contrast documented no evidence of intrathoracic neoplastic process/metastatic disease. Evidence of pneumobilia. The ectasia of the pancreatic duct, similar to the previous study. An incompletely visualized and evaluated heterogeneous mass in the head of the pancreas measuring 3.3 x 3.7cm. A biliary stent in place. Moderate persistent dilatation of the proximal common bile duct. A stable small low-density nodule in the left adrenal gland  · 12/22/2020Dr. Lambert discussed the results of CT chest and pathology that suggest advanced pancreatic cancer, unfortunately it is not amenable to surgery. She was quite weak and had poor performance, appeared very frail. Discussion was made about palliative chemotherapy but the decision to hold was made until improvement of her physical conditioning occurred. Recommended physical therapy as outpatient and reassess fitness for palliative chemotherapy in approximately 4 weeks. 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 · 12/7/2020 ERCP at [address] [place] [address] Dr by Dr. Pauly Vilella revealed a single severe biliary stricture in the lower third of the main bile duct with severe upstream biliary dilation, stricture was malignant appearing. Biliary sphincterectomy was performed.   1 partially covered metal stent was placed into the common bile duct. · 2/8/2021-initiated palliative chemotherapy with Gemzar 750 mg/m² and Abraxane 90 mg/m² every 2 weeks, this is a dose reduction by 25%  · 3/23/2021- CA 199 108, improved compared to pretreatment value of 236 on 1/20/2021. · 4/13/2021 CT Abdomen/Pelvis with contrast documented a poorly defined complex mass in the head of the pancreas. It appears to have decreased in size when remeasured at the same level and in same dimension as in the previous study (3 cm x 2.5 cm, compared to 3.7 cm x 3.3 cm). Persistent moderate dilatation of the pancreatic duct. There are 2 additional small cystic nodules in the body of the pancreas measuring 11 mm and 9 mm. A biliary stent in place and decompression of the intrahepatic biliary ducts since the previous study. 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 The diverticulosis of the distal colon with no evidence for diverticulitis. · 5/4/2021- dose reduction by 25% due to severe fatigue and nausea, starting with cycle 4 will receive Gemzar 600 mg/m² and Abraxane 75 mg/m². HEMATOLOGY HISTORY:  Debria Romberg was seen in initial hematology consultation on 7/20/2018 for further evaluation and treatment recommendations for anemia. Debria Romberg was referred from Dr. Baltazar Martines. Debria Romberg presented with no significant complaints other than chronic fatigue and constipation. She had been taking ferrous sulfate 325 mg daily under the direction of Dr. Milly Bacon. Debria Romberg reported significant constipation and some GI upset with the oral iron. She denied any bleeding tendencies to include hematuria, melena, hematochezia and epistaxis. Debria Romberg had a colonoscopy on 4/26/2016 by Dr. Philip Barboza for further evaluation of iron deficiency. The only abnormal finding was diverticulosis. Debria Romberg is routinely followed by Dr. Paige Lewis for her chronic kidney disease stage IV.  CMP on 6/11/2018 documented a creatinine of 2.2 and GFR 21. CBC review from 2/2/2018- 6/11/2018 documented hemoglobin ranging from 9.4- 10.1, RBC 2.91- 3.22, MCV 94- 100 with a normal WBC and platelet count  CBC at initial consultation on 7/20/2018 documented a WBC of 7.33, ANC 4.74, RBC 3.04, hemoglobin 10.3, .6 and a platelet count of 593,625. Serology studies on 7/20/2018:  Creatinine 1.98   GFR 23   Calcium 10.3   IgG 700, IgA 180, and IgM 52   M spike not observed   Immunofixation: No monoclonality detected. Iron 74   Iron saturation 27%   TIBC 278   Vitamin B12 437   Folate 15.7   Ferritin 299   Reticulocyte count 1.5%   Erythropoietin 11.6      Beta-2 microglobulin 6.2     Occult stool positive 1 of 3 samples on 7/25/2018. Colonoscopy on 10/4/2018 by Dr. Fredo Gamez was documented as removal of 2 polyps with benign pathology. No evidence of bleeding. Serology studies on 10/15/2018:  Iron 76   TIBC 287   Iron saturation 26%   Ferritin 321   Hemoglobin 10.1   Creatinine 2.47   GFR 17    11/26/2018 - Initiated Procrit 20,000 units for chronic kidney disease stage IV, to be given every 2 weeks ×4 and then monthly, dose to be titrated appropriately. Hemoglobin 9.6. All has anemia of chronic disease to include chronic kidney disease stage IV. She will began receiving growth factor support and will need to maintain a ferritin level greater than 200 and an iron saturation of 25% for the Procrit to be beneficial. Hold Procrit dose for hemoglobin >11. Indications/rationale for Procrit was discussed with Naman Bloom. Risks, benefits and expectations were outlined. Risks including, but not limited to hypertension, stroke, MI, death were discussed and acknowledged by Naman Bloom.     Serology studies on 3/8/2019:  Creatinine 1.7/GFR 30.4   Iron 80   Iron saturation 26.5%   TIBC 302   Ferritin 144   Vitamin B12 501   Folate 13.9    Serology studies on 3/5/2020:  · Creatinine 1.3/GFR 41.4  · Iron 84  · TIBC 390  · Iron saturation 21.5%  · Ferritin 342    Iron substrates on 6/25/2020  · Ferritin 311  · Iron 76  · Iron saturation 23%  · TIBC 326  · Creatinine 1.3/GFR 39    Labs on 12/2/2020 and 12/3/2020:  · Iron 35  · TIBC 213  · Iron saturation 16%  · Vitamin B12 483  · Folate 10.6  · Ferritin 480    Labs on 3/23/2021  · Iron 54  · TIBC 366  · Iron saturation 15  · Ferritin 530  · Reticulocytes 2.1    Labs on 4/6/2021  · WBC 14.40  · RBC 2.46  · HGB 8.6  · HCT 26.4  · .3  · MCH 35  · MCHC 32.6  · ANC 12.03  · ,000  · Iron 43   · TIBC 226  · Iron saturation 19%   · Ferritin 1088.0  · B-12= 781  · GFR 39  · BUN 21  · Creatinine 1.3  · Phosphorus 2.4  · Magnesium 1.5      Age-appropriate health screening:  · Colonoscopy on 10/4/2018 by Dr. Tim Victoria was documented as removal of 2 polyps with benign pathology. No evidence of bleeding. · 9/20/2019 [facility] mamm[facility] at [facility] documented no mammographic evidence of malignancy.   · No bone mineral density on file     Past Medical History:    Past Medical History:   Diagnosis Date    Abdominal aortic [facility] (AAA) ([facility]     Anemia     Arthritis     Cancer ([facility] 12/01/2020    pancreatic    CHF (congestive heart failure) (HCC)     Chronic kidney disease     DVT of lower extremity (deep venous thrombosis) (HCC)     twice 2010 and 2017    Hyperlipidemia     Hypertension     Kidney stones     Osteoarthritis     Palliative care patient 12/02/2020    Thyroid disease     Thyroid disorder        Past Surgical History:    Past Surgical History:   Procedure Laterality Date    COLONOSCOPY  2016    Dr Gill Drilling problems    ENDOSCOPIC ULTRASOUND (LOWER) N/A 12/03/2020    Dr JONNY Grijalva/unsuccessful biliary cannulation despite attempts at 2-wire cannulation and proph pancreatic stenting with cannulation around pancreatic stenting-Positive for high-grade adenocarcinoma, pancreatic head    ERCP N/A 12/03/2020    Dr JONNY Grijalva/unsuccessful biliary cannulation despite attempts at 2-wire cannulation and proph pancreatic stenting with cannulation around pancreatic stenting-Positive for high-grade adenocarcinoma, pancreatic head    ERCP  12/07/2020    Dr Rima Hernandez-w/sphincterotomy, placement of a 10 x 60 partially covered biliary stent    PORT SURGERY Right 2/5/2021    SINGLE LUMEN PORT PLACEMENT WITH ULTRASOUND AND FLUORO performed by Usha Spicer MD at [facility] TOE AMPUTATION Bilateral     pinky toes removed    TOTAL KNEE ARTHROPLASTY Right 01/03/2020    RIGHT TOTAL KNEE REPLACEMENT performed by Hema Doran MD at [facility] OR       Current Medications:    Current Outpatient Medications   Medication Sig Dispense Refill    aspirin 81 MG EC tablet Take 1 tablet by mouth daily 30 tablet 5    carvedilol (COREG) 3.125 MG tablet Take 1 tablet by mouth 2 times daily (with meals) 60 tablet 5    midodrine (PROAMATINE) 10 MG tablet Take 1 tablet by mouth 3 times daily (with meals) 90 tablet 5    nitroGLYCERIN (NITROSTAT) 0.4 MG SL tablet up to max of 3 total doses.  If no relief after 1 dose, call 911. 25 tablet 0    sodium bicarbonate 325 MG tablet Take 325 mg by mouth 2 times daily      vitamin D (CHOLECALCIFEROL) 25 MCG (1000 UT) TABS tablet Take 1,000 Units by mouth daily      ondansetron (ZOFRAN ODT) 4 MG disintegrating tablet Take 2 tablets by mouth every 8 hours as needed for Nausea or Vomiting 60 tablet 3    lidocaine-prilocaine (EMLA) 2.5-2.5 % cream APPLY TO PORT AREA AND COVERWITH PLASTIC WRAP ONE HOUR PRIOR TO TREATMENT 30 g 1    promethazine (PHENERGAN) 25 MG tablet TAKE 1 TABLET BY MOUTH FOUR TIMES DAILY AS NEEDED FOR NAUSEA 20 tablet 1    apixaban (ELIQUIS) 2.5 MG TABS tablet Take 2.5 mg by mouth 2 times daily      docusate sodium (COLACE) 100 MG capsule Take 1 capsule by mouth 2 times daily 60 capsule 1    allopurinol (ZYLOPRIM) 100 MG tablet Take 100 mg by mouth 2 times daily       levothyroxine (SYNTHROID) 112 MCG tablet Take 112 mcg by mouth Daily      simvastatin (ZOCOR) 20 MG tablet Take 20 mg by mouth nightly      bumetanide (BUMEX) 1 MG tablet Take 1 tablet by mouth 2 times daily (Patient not taking: Reported on 6/17/2021) 30 tablet 0     No current facility-administered medications for this visit. Allergies: Allergies   Allergen Reactions    Penicillins Rash     Childhood        Social History:    Social History     Tobacco Use    Smoking status: Never Smoker    Smokeless tobacco: Never Used   Vaping Use    Vaping Use: Never used   Substance Use Topics    Alcohol use: No    Drug use: No       Family History:   Family History   Problem Relation Age of Onset    Colon Cancer Brother     Cancer Mother         Breast Cancer    Heart Attack Father     Colon Polyps Neg Hx     Esophageal Cancer Neg Hx     Liver Cancer Neg Hx     Liver Disease Neg Hx     Rectal Cancer Neg Hx     Stomach Cancer Neg Hx        Vitals:  Vitals:    06/18/21 1417   BP: 130/86   Pulse: 85   SpO2: 95%   Height: 5' 3\" (1.6 m)        Subjective   REVIEW OF SYSTEMS:   Review of Systems   Constitutional: Positive for activity change (Poor performance status), appetite change (Poor appetite), fatigue and unexpected weight change (10 pound weight loss). Negative for chills, diaphoresis and fever. HENT: Negative. Negative for congestion, ear pain, hearing loss, nosebleeds, sore throat and tinnitus. Eyes: Negative. Negative for pain, discharge and redness. Respiratory: Positive for shortness of breath (At rest and with exertion). Negative for cough and wheezing. Cardiovascular: Negative. Negative for chest pain, palpitations and leg swelling. Gastrointestinal: Negative. Negative for abdominal pain, blood in stool, constipation, diarrhea, nausea and vomiting. Endocrine: Negative for polydipsia. Genitourinary: Negative for dysuria, flank pain, frequency, hematuria and urgency. Musculoskeletal: Negative.   Negative for back pain, myalgias and neck pain. Skin: Negative. Negative for rash. Neurological: Positive for weakness (Generalized). Negative for dizziness, tremors, seizures and headaches. Hematological: Does not bruise/bleed easily. Psychiatric/Behavioral: Negative. The patient is not nervous/anxious. Objective   PHYSICAL EXAM:  Physical Exam  Vitals reviewed. Constitutional:       General: She is not in acute distress. Appearance: She is well-developed. She is ill-appearing. She is not diaphoretic. HENT:      Head: Normocephalic and atraumatic. Mouth/Throat:      Pharynx: Uvula midline. Tonsils: No tonsillar exudate. Eyes:      General: Lids are normal. No scleral icterus. Right eye: No discharge. Left eye: No discharge. Conjunctiva/sclera: Conjunctivae normal.      Pupils: Pupils are equal, round, and reactive to light. Neck:      Thyroid: No thyroid mass or thyromegaly. Vascular: No JVD. Trachea: Trachea normal. No tracheal deviation. Cardiovascular:      Rate and Rhythm: Normal rate and regular rhythm. Heart sounds: Normal heart sounds. No murmur heard. No friction rub. No gallop. Pulmonary:      Effort: Pulmonary effort is normal. No respiratory distress. Breath sounds: Decreased breath sounds present. No wheezing or rales. Chest:      Chest wall: No tenderness. Abdominal:      General: Bowel sounds are normal. There is no distension. Palpations: Abdomen is soft. There is no mass. Tenderness: There is no abdominal tenderness. There is no guarding or rebound. Hernia: No hernia is present. Musculoskeletal:         General: No tenderness or deformity. Cervical back: Normal range of motion and neck supple. Comments: Range of motion within normal limits x4 extremities   Skin:     General: Skin is warm. Coloration: Skin is not pale. Findings: No erythema or rash.    Neurological:      Mental Status: She is alert and oriented to person, place, and time. Cranial Nerves: No cranial nerve deficit. Motor: Weakness present. Coordination: Coordination normal.      Gait: Gait abnormal (Requiring a wheelchair due to significant fatigue and muscle weakness). Psychiatric:         Behavior: Behavior normal.         Thought Content: Thought content normal.         Labs:  Lab Results   Component Value Date    WBC 16.29 (H) 06/18/2021    HGB 8.7 (L) 06/18/2021    HCT 29.0 (L) 06/18/2021    .9 (H) 06/18/2021     06/18/2021     Lab Results   Component Value Date    NEUTROABS 12.97 (H) 06/18/2021     Lab Results   Component Value Date     38 05/18/2021     ASSESSMENT/PLAN:      1. Locally advanced pancreatic adenocarcinoma, has received palliative chemotherapy with Gemzar 750 mg/m² and Abraxane 90 mg/m² every 2 weeks (this is a 25% dose reduction), last dose given on 5/18/2021 (cycle 4-day #1 given an additional 25% dose reduction due to nausea and fatigue). CT scan of the abdomen and pelvis on 4/13/2021 and tumor markers suggested a positive response to treatment. Unfortunately after receiving treatment, Lashawn Parsons began experiencing worsening dyspnea with hypoxemia and O2 desaturation. A chest x-ray revealed pulmonary congestion and due to her respiratory symptoms she was sent to the emergency room which was followed by an admission to [facility] from 5/18/2021-5/26/2020. She was diagnosed with decompensated systolic heart failure with a 2D echocardiogram on 5/19/2021 reporting left ventricular EF estimated at 15% and Madeline Dalal on 5/21/2021 reported a left ventricular systolic function of 52%, prior to this admission no known history of heart failure. At discharge, Lashawn Parsons desire to go home with home health and possible transition to hospice care at a later time. Unfortunately she is currently not a candidate for further chemotherapy for her pancreatic adenocarcinoma.   Again today we discussed quality versus quantity of life, she would like to continue with home health at this time and see if she can regain any further strength. - Return in 4 weeks for revaluation and further treatment recommendations  - CMP, magnesium and phos today    2. Management of chemotherapy side effects:  -Nausea- currenly resolved  -Diarrhea-resolved  -Fatigue- grade 2, treatment on HOLD due to severe declined performance status.   -Weight loss with decreased appetite, additional 10 lbs. Encouraged increasing appetite    3. Extra hepatic/intrahepatic biliary dilation, status post ERCP with metal stent placement on 12/7/2020 at [facility]. Liver enzymes beginning to increase, will continue to monitor. Astmptomatic  -CMP today  -Virtual visit with Dr. Ellen Urban on 2/16/2021 and documented that a stent adjustment may be needed if LFTs continue to increase, follow-up with Dr. Gibson Botello if between visits began having abdominal symptoms    4. Anemia of chronic kidney failure, stage 3B. Receives BARBARA therapy with Retacrit intermittently for hemoglobin <11, last dose of 40,000 units given on 4/6/2021. She continues to take ferrous sulfate she continues to take the ferrous sulfate without difficulty once daily. Hemoglobin 8.7 with an MCV of 116.9, denies any bleeding tendencies. Labs on 4/6/2021  · Iron 43   · TIBC 226  · Iron saturation 19%   · Ferritin 1088.0  · B-12= 781        -Retacrit 40,000 units will be given today for hemoglobin of 8.7    5. Hypothyroidism presently being managed by Dr. Hansel Colin and is taking Synthroid 112 µg daily. TSH 4.710 on 5/18/2021  -Follow-up with Dr. Hansel Colin for monitoring of TSH and Synthroid dosing    I discussed all of the above findings included in the assessment and plan with the patient and the patient is in agreement to move forward with current recommendations/treatment. I have addressed all of their questions and concerns that were verbalized. FOLLOW UP:  1.  Follow-up appointment given for 4 weeks, sooner if needed  2. Continue to follow with other medical providers as recommended    Discussed precautions related to [address] and being at increased risk. Discussed proper handwashing to be done frequently, limit exposure to other individuals and maintain social distancing of 6 feet. Recommend contacting primary care provider if having respiratory symptoms for further recommendations and consideration for testing. EMR Dragon/Transcription disclaimer:   Much of this encounter note is an electronic transcription/translation of spoken language to printed text. The electronic translation of spoken language may permit erroneous, or at times, nonsensical words or phrases to be inadvertently transcribed; although attempts have made to review the note for such errors, some may still exist. Also, portions of this note have been copied forward, however, changed to reflect the most current clinical status of this patient.     Electronically signed by ALBERTO Troncoso on 7/12/2021 at 11:42 AM

## 2021-07-12 ASSESSMENT — ENCOUNTER SYMPTOMS
SHORTNESS OF BREATH: 1
NAUSEA: 0

## 2021-07-15 ENCOUNTER — OFFICE VISIT (OUTPATIENT)
Dept: HEMATOLOGY | Age: 86
End: 2021-07-15
Payer: MEDICARE

## 2021-07-15 ENCOUNTER — HOSPITAL ENCOUNTER (OUTPATIENT)
Dept: INFUSION THERAPY | Age: 86
Discharge: HOME | End: 2021-07-15
Payer: MEDICARE

## 2021-07-15 VITALS
SYSTOLIC BLOOD PRESSURE: 140 MMHG | OXYGEN SATURATION: 94 % | HEART RATE: 81 BPM | WEIGHT: 153 LBS | BODY MASS INDEX: 27.11 KG/M2 | HEIGHT: 63 IN | DIASTOLIC BLOOD PRESSURE: 80 MMHG

## 2021-07-15 DIAGNOSIS — C25.9 PANCREATIC CARCINOMA (HCC): Primary | ICD-10-CM

## 2021-07-15 DIAGNOSIS — N18.9 CHRONIC KIDNEY DISEASE, UNSPECIFIED CKD STAGE: ICD-10-CM

## 2021-07-15 DIAGNOSIS — R11.0 CHEMOTHERAPY-INDUCED NAUSEA: ICD-10-CM

## 2021-07-15 DIAGNOSIS — D64.9 ENCOUNTER FOR ESA (ERYTHROPOIETIN STIMULATING AGENT) ANEMIA MANAGEMENT: ICD-10-CM

## 2021-07-15 DIAGNOSIS — Z79.899 ENCOUNTER FOR ESA (ERYTHROPOIETIN STIMULATING AGENT) ANEMIA MANAGEMENT: ICD-10-CM

## 2021-07-15 DIAGNOSIS — Z71.89 CARE PLAN DISCUSSED WITH PATIENT: ICD-10-CM

## 2021-07-15 DIAGNOSIS — T45.1X5A CHEMOTHERAPY-INDUCED NAUSEA: ICD-10-CM

## 2021-07-15 DIAGNOSIS — R53.81 PHYSICAL DECONDITIONING: ICD-10-CM

## 2021-07-15 DIAGNOSIS — R53.1 WEAKNESS GENERALIZED: ICD-10-CM

## 2021-07-15 DIAGNOSIS — D63.1 ANEMIA OF CHRONIC KIDNEY FAILURE, STAGE 4 (SEVERE) (HCC): ICD-10-CM

## 2021-07-15 DIAGNOSIS — N18.4 ANEMIA OF CHRONIC KIDNEY FAILURE, STAGE 4 (SEVERE) (HCC): ICD-10-CM

## 2021-07-15 DIAGNOSIS — D64.9 ANEMIA, UNSPECIFIED TYPE: ICD-10-CM

## 2021-07-15 LAB
BASOPHILS ABSOLUTE: 0.06 K/UL (ref 0.01–0.08)
BASOPHILS RELATIVE PERCENT: 0.6 % (ref 0.1–1.2)
EOSINOPHILS ABSOLUTE: 0.47 K/UL (ref 0.04–0.54)
EOSINOPHILS RELATIVE PERCENT: 4.8 % (ref 0.7–7)
HCT VFR BLD CALC: 26.2 % (ref 34.1–44.9)
HEMOGLOBIN: 8.3 G/DL (ref 11.2–15.7)
LYMPHOCYTES ABSOLUTE: 0.77 K/UL (ref 1.18–3.74)
LYMPHOCYTES RELATIVE PERCENT: 7.8 % (ref 19.3–53.1)
MCH RBC QN AUTO: 34.9 PG (ref 25.6–32.2)
MCHC RBC AUTO-ENTMCNC: 31.7 G/DL (ref 32.3–35.5)
MCV RBC AUTO: 110.1 FL (ref 79.4–94.8)
MONOCYTES ABSOLUTE: 0.93 K/UL (ref 0.24–0.82)
MONOCYTES RELATIVE PERCENT: 9.5 % (ref 4.7–12.5)
NEUTROPHILS ABSOLUTE: 7.61 K/UL (ref 1.56–6.13)
NEUTROPHILS RELATIVE PERCENT: 77.3 % (ref 34–71.1)
PDW BLD-RTO: 19.9 % (ref 11.7–14.4)
PLATELET # BLD: 470 K/UL (ref 182–369)
PMV BLD AUTO: 9.4 FL (ref 7.4–10.4)
RBC # BLD: 2.38 M/UL (ref 3.93–5.22)
WBC # BLD: 9.84 K/UL (ref 3.98–10.04)

## 2021-07-15 PROCEDURE — 2580000003 HC RX 258: Performed by: NURSE PRACTITIONER

## 2021-07-15 PROCEDURE — 6360000002 HC RX W HCPCS: Performed by: NURSE PRACTITIONER

## 2021-07-15 PROCEDURE — 99212 OFFICE O/P EST SF 10 MIN: CPT

## 2021-07-15 PROCEDURE — 1123F ACP DISCUSS/DSCN MKR DOCD: CPT | Performed by: NURSE PRACTITIONER

## 2021-07-15 PROCEDURE — 1036F TOBACCO NON-USER: CPT | Performed by: NURSE PRACTITIONER

## 2021-07-15 PROCEDURE — G8417 CALC BMI ABV UP PARAM F/U: HCPCS | Performed by: NURSE PRACTITIONER

## 2021-07-15 PROCEDURE — 96372 THER/PROPH/DIAG INJ SC/IM: CPT

## 2021-07-15 PROCEDURE — 85025 COMPLETE CBC W/AUTO DIFF WBC: CPT

## 2021-07-15 PROCEDURE — 99214 OFFICE O/P EST MOD 30 MIN: CPT | Performed by: NURSE PRACTITIONER

## 2021-07-15 PROCEDURE — G8427 DOCREV CUR MEDS BY ELIG CLIN: HCPCS | Performed by: NURSE PRACTITIONER

## 2021-07-15 PROCEDURE — 1090F PRES/ABSN URINE INCON ASSESS: CPT | Performed by: NURSE PRACTITIONER

## 2021-07-15 PROCEDURE — 96523 IRRIG DRUG DELIVERY DEVICE: CPT

## 2021-07-15 PROCEDURE — 4040F PNEUMOC VAC/ADMIN/RCVD: CPT | Performed by: NURSE PRACTITIONER

## 2021-07-15 RX ORDER — SODIUM CHLORIDE 0.9 % (FLUSH) 0.9 %
5-40 SYRINGE (ML) INJECTION PRN
Status: CANCELLED | OUTPATIENT
Start: 2021-07-15

## 2021-07-15 RX ORDER — HEPARIN SODIUM (PORCINE) LOCK FLUSH IV SOLN 100 UNIT/ML 100 UNIT/ML
500 SOLUTION INTRAVENOUS PRN
Status: DISCONTINUED | OUTPATIENT
Start: 2021-07-15 | End: 2021-07-16 | Stop reason: HOSPADM

## 2021-07-15 RX ORDER — PRAMIPEXOLE DIHYDROCHLORIDE 0.12 MG/1
TABLET ORAL DAILY PRN
COMMUNITY
Start: 2021-07-13

## 2021-07-15 RX ORDER — HEPARIN SODIUM (PORCINE) LOCK FLUSH IV SOLN 100 UNIT/ML 100 UNIT/ML
500 SOLUTION INTRAVENOUS PRN
Status: CANCELLED | OUTPATIENT
Start: 2021-07-15

## 2021-07-15 RX ORDER — SODIUM CHLORIDE 9 MG/ML
25 INJECTION, SOLUTION INTRAVENOUS PRN
Status: CANCELLED | OUTPATIENT
Start: 2021-07-15

## 2021-07-15 RX ORDER — HYDROCODONE BITARTRATE AND ACETAMINOPHEN 7.5; 325 MG/1; MG/1
TABLET ORAL
COMMUNITY
Start: 2021-07-13 | End: 2021-09-30

## 2021-07-15 RX ORDER — SODIUM CHLORIDE 0.9 % (FLUSH) 0.9 %
5-40 SYRINGE (ML) INJECTION PRN
Status: DISCONTINUED | OUTPATIENT
Start: 2021-07-15 | End: 2021-07-16 | Stop reason: HOSPADM

## 2021-07-15 RX ORDER — ESOMEPRAZOLE MAGNESIUM 40 MG/1
CAPSULE, DELAYED RELEASE ORAL
COMMUNITY
Start: 2021-06-21 | End: 2021-09-30

## 2021-07-15 RX ADMIN — EPOETIN ALFA-EPBX 40000 UNITS: 40000 INJECTION, SOLUTION INTRAVENOUS; SUBCUTANEOUS at 16:02

## 2021-07-15 RX ADMIN — HEPARIN 500 UNITS: 100 SYRINGE at 14:50

## 2021-07-15 RX ADMIN — SODIUM CHLORIDE, PRESERVATIVE FREE 20 ML: 5 INJECTION INTRAVENOUS at 14:50

## 2021-07-15 ASSESSMENT — ENCOUNTER SYMPTOMS
EYE PAIN: 0
GASTROINTESTINAL NEGATIVE: 1
VOMITING: 0
SORE THROAT: 0
EYES NEGATIVE: 1
WHEEZING: 0
BACK PAIN: 0
ABDOMINAL PAIN: 0
EYE DISCHARGE: 0
BLOOD IN STOOL: 0
CONSTIPATION: 0
COUGH: 0
EYE REDNESS: 0
NAUSEA: 0
SHORTNESS OF BREATH: 1
DIARRHEA: 0

## 2021-07-15 NOTE — PROGRESS NOTES
Progress Note      Pt Name: Shalini Sanchez  YOB: 1934  MRN: [id number]    Date of evaluation: 7/15/2021  History Obtained From:  patient, electronic medical record    CHIEF COMPLAINT:    Chief Complaint   Patient presents with    Follow-up     Pancreatic carcinoma ([facility] [address]    Anemia     Retacrit     Fatigue     HISTORY OF PRESENT ILLNESS:    Shalini Sanchez is a 80 y.o.  female who is currently followed for pancreatic adenocarcinoma and multifactoral anemia to include iron deficiency and chronic kidney disease stage 3B. She received palliative chemotherapy with dose reduced Gemzar and Abraxane for total of 4 cycles, last received treatment on 5/18/2021. Unfortunately she had poor tolerance to treatment with GI symptoms, decline performance status and with cycle #4 developed worsening dyspnea with hypoxemia and O2 desaturation. She required hospitalization to [facility] from 5/18/2021-5/26/2020 and diagnosed with decompensated systolic heart failure with a 2D echocardiogram on 5/19/2021 reporting left ventricular EF estimated at 15% and [place] on 5/21/2021 reported a left ventricular systolic function of 42%, with no known prior history of heart failure. Unfortunately she is currently not a candidate for further chemotherapy for her pancreatic adenocarcinoma. She returns today in follow-up for evaluation, late side effect monitoring and further treatment recommendations. [facility] presents today indicating that she is feeling somewhat better although remains significantly fatigued and is sleep quite often. She denies any significant shortness of air at rest although with exertion she does experience some shortness of breath. She is in a wheelchair for mobility again today although indicates that she is using a walker at home. She has been released from home health this time.   After a lengthy discussion related to her poor performance status, multiple comorbidities to include chronic kidney disease stage IV and systolic heart failure and continues to not be a candidate for chemotherapy, Denilson Mills is in agreement for a consultation with hospice to see if there services are appropriate for her at this time. CBC today reveals a hemoglobin of 8.3 with an MCV of 110.1, she will go ahead and receive a dose of Retacrit 40,000 units. ONCOLOGIC/HEMATOLOGIC HISTORY:   Diagnosis:   Anemia of chronic kidney disease   Chronic kidney disease stage lll  Pancreatic adenocarcinoma  Chronic anticoagulation due to prior DVTs    Treatment summary:  Ferrous sulfate 325 mg daily   11/26/2018 - Procrit 20,000 units for chronic kidney disease stage IV. Procrit to be given every 2 weeks ×4 and then monthly, dose to be titrated appropriately. Hold dose for hemoglobin >11   Currently receiving Retacrit 40,000 units every 4 weeks  2/8/2021 palliative chemotherapy on 2/8/2021 with Gemzar 750 mg/m² and Abraxane 90 mg/m² every 2 weeks, this is a dose reduction by 25%. Last dose given on 5/18/2021 (cycle 4-day #1), held due to severely declined performance status. Tumor monitoring:  · 12/2/2020-CA 19-9 of 133  · 12/23/2020-CA 19-9 of 217  · 1/20/2021-CA 19-9 of 236  · 3/23/2021- CA 19-9 of 108  · 5/18/2021-CA 19-9 of 38    CANCER HISTORY  Flakita Arevalo was seen by Dr. Sylvester Lowery on 12/2/2020 as an inpatient consultation at [facility]) of [facility] for findings of a pancreatic mass and bile duct obstruction. She presented to the ER department with complaints of hematuria that began a few days prior to presentation. She denied any dysuria, back pain, no nausea or vomiting.  She also reported easily bruising and ecchymotic areas in her back, abdomen and flank.  Initial laboratory work-up showed INR above 18.  She was given vitamin K subcutaneously.  She was found to have elevated LFTs and the following are events that occurred.     · 12/01/2020-CT abdomen pelvis without contrast showed a pancreatic head mass measuring 3.3 x 3.7 cm in size and associated, bile duct dilatation above the level of the ampulla.  Associated moderate pancreatic ductal dilatation. · 12/2/2020-CA 19-9 133  · 12/03/2020-ERCP with FNA biopsy Positive for high-grade adenocarcinoma.  Unfortunately, stent could not be placed. · 12/5/2020-transferred from [facility] to [address] [place] [address] and discharged home on 12/8/2020  · 12/07/2020- Cholangipancreatography Retrograde Endo ERCP with sphincterotomy, balloon sweep and placement of 10x60 PC BS metal stent at [facility] in [place]. · 12/21/2020- CT chest with contrast documented no evidence of intrathoracic neoplastic process/metastatic disease. Evidence of pneumobilia. The ectasia of the pancreatic duct, similar to the previous study. An incompletely visualized and evaluated heterogeneous mass in the head of the pancreas measuring 3.3 x 3.7cm. A biliary stent in place. Moderate persistent dilatation of the proximal common bile duct. A stable small low-density nodule in the left adrenal gland  · 12/22/2020-Dr. Lambert discussed the results of CT chest and pathology that suggest advanced pancreatic cancer, unfortunately it is not amenable to surgery. She was quite weak and had poor performance, appeared very frail. Discussion was made about palliative chemotherapy but the decision to hold was made until improvement of her physical conditioning occurred. Recommended physical therapy as outpatient and reassess fitness for palliative chemotherapy in approximately 4 weeks. 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 · 12/7/2020 ERCP at [address] [place] [address] Dr by Dr. Joao Enriquez revealed a single severe biliary stricture in the lower third of the main bile duct with severe upstream biliary dilation, stricture was malignant appearing. Biliary sphincterectomy was performed.   1 partially covered metal stent was placed into the common bile duct. · 2/8/2021-initiated palliative chemotherapy with Gemzar 750 mg/m² and Abraxane 90 mg/m² every 2 weeks, this is a dose reduction by 25%  · 3/23/2021- CA 19-9 108, improved compared to pretreatment value of 236 on 1/20/2021. · 4/13/2021 CT Abdomen/Pelvis with contrast documented a poorly defined complex mass in the head of the pancreas. It appears to have decreased in size when remeasured at the same level and in same dimension as in the previous study (3 cm x 2.5 cm, compared to 3.7 cm x 3.3 cm). Persistent moderate dilatation of the pancreatic duct. There are 2 additional small cystic nodules in the body of the pancreas measuring 11 mm and 9 mm. A biliary stent in place and decompression of the intrahepatic biliary ducts since the previous study. 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 The diverticulosis of the distal colon with no evidence for diverticulitis. · 5/4/2021- dose reduction by 25% due to severe fatigue and nausea, starting with cycle 4 will receive Gemzar 600 mg/m² and Abraxane 75 mg/m². HEMATOLOGY HISTORY:  Debria Romberg was seen in initial hematology consultation on 7/20/2018 for further evaluation and treatment recommendations for anemia. Debria Romberg was referred from Dr. Baltazar Martines. Debria Romberg presented with no significant complaints other than chronic fatigue and constipation. She had been taking ferrous sulfate 325 mg daily under the direction of Dr. Milly Bacon. Debria Romberg reported significant constipation and some GI upset with the oral iron. She denied any bleeding tendencies to include hematuria, melena, hematochezia and epistaxis. Debria Romberg had a colonoscopy on 4/26/2016 by Dr. Philip Barboza for further evaluation of iron deficiency. The only abnormal finding was diverticulosis. Debria Romberg is routinely followed by Dr. Paige Lewis for her chronic kidney disease stage IV.  CMP on 6/11/2018 documented a creatinine of 2.2 and GFR 21. CBC review from 2/2/2018- 6/11/2018 documented hemoglobin ranging from 9.4- 10.1, RBC 2.91- 3.22, MCV 94- 100 with a normal WBC and platelet count  CBC at initial consultation on 7/20/2018 documented a WBC of 7.33, ANC 4.74, RBC 3.04, hemoglobin 10.3, .6 and a platelet count of 719,357. Serology studies on 7/20/2018:  Creatinine 1.98   GFR 23   Calcium 10.3   IgG 700, IgA 180, and IgM 52   M spike not observed   Immunofixation: No monoclonality detected. Iron 74   Iron saturation 27%   TIBC 278   Vitamin B12 437   Folate 15.7   Ferritin 299   Reticulocyte count 1.5%   Erythropoietin 11.6      Beta-2 microglobulin 6.2     Occult stool positive 1 of 3 samples on 7/25/2018. Colonoscopy on 10/4/2018 by Dr. Bishop Patel was documented as removal of 2 polyps with benign pathology. No evidence of bleeding. Serology studies on 10/15/2018:  Iron 76   TIBC 287   Iron saturation 26%   Ferritin 321   Hemoglobin 10.1   Creatinine 2.47   GFR 17    11/26/2018 - Initiated Procrit 20,000 units for chronic kidney disease stage IV, to be given every 2 weeks ×4 and then monthly, dose to be titrated appropriately. Hemoglobin 9.6. All has anemia of chronic disease to include chronic kidney disease stage IV. She will began receiving growth factor support and will need to maintain a ferritin level greater than 200 and an iron saturation of 25% for the Procrit to be beneficial. Hold Procrit dose for hemoglobin >11. Indications/rationale for Procrit was discussed with [place]. Risks, benefits and expectations were outlined. Risks including, but not limited to hypertension, stroke, MI, death were discussed and acknowledged by [place].     Serology studies on 3/8/2019:  Creatinine 1.7/GFR 30.4   Iron 80   Iron saturation 26.5%   TIBC 302   Ferritin 144   Vitamin B12 501   Folate 13.9    Serology studies on 3/5/2020:  · Creatinine 1.3/GFR 41.4  · Iron cannulation despite attempts at 2-wire cannulation and proph pancreatic stenting with cannulation around pancreatic stenting-Positive for high-grade adenocarcinoma, pancreatic head    ERCP  12/07/2020    Dr Lazarus Harvest Thomas-w/sphincterotomy, placement of a 10 x 60 partially covered biliary stent    PORT SURGERY Right 2/5/2021    SINGLE LUMEN PORT PLACEMENT WITH ULTRASOUND AND FLUORO performed by Matteo Ely MD at [address] TOE AMPUTATION Bilateral     pinky toes removed    TOTAL KNEE ARTHROPLASTY Right 01/03/2020    RIGHT TOTAL KNEE REPLACEMENT performed by Price Miller MD at [facility] [place]       Current Medications:    Current Outpatient Medications   Medication Sig Dispense Refill    esomeprazole (NEXIUM) 40 MG delayed release capsule       HYDROcodone-acetaminophen (NORCO) 7.5-325 MG per tablet       pramipexole (MIRAPEX) 0.125 MG tablet       aspirin 81 MG EC tablet Take 1 tablet by mouth daily 30 tablet 5    carvedilol (COREG) 3.125 MG tablet Take 1 tablet by mouth 2 times daily (with meals) 60 tablet 5    midodrine (PROAMATINE) 10 MG tablet Take 1 tablet by mouth 3 times daily (with meals) 90 tablet 5    nitroGLYCERIN (NITROSTAT) 0.4 MG SL tablet up to max of 3 total doses.  If no relief after 1 dose, call 911. 25 tablet 0    bumetanide (BUMEX) 1 MG tablet Take 1 tablet by mouth 2 times daily 30 tablet 0    sodium bicarbonate 325 MG tablet Take 325 mg by mouth 2 times daily      vitamin D (CHOLECALCIFEROL) 25 MCG (1000 UT) TABS tablet Take 1,000 Units by mouth daily      ondansetron (ZOFRAN ODT) 4 MG disintegrating tablet Take 2 tablets by mouth every 8 hours as needed for Nausea or Vomiting 60 tablet 3    lidocaine-prilocaine (EMLA) 2.5-2.5 % cream APPLY TO PORT AREA AND COVERWITH PLASTIC WRAP ONE HOUR PRIOR TO TREATMENT 30 g 1    promethazine (PHENERGAN) 25 MG tablet TAKE 1 TABLET BY MOUTH FOUR TIMES DAILY AS NEEDED FOR NAUSEA 20 tablet 1    apixaban (ELIQUIS) 2.5 MG TABS tablet Take 2.5 mg by mouth 2 times daily      docusate sodium (COLACE) 100 MG capsule Take 1 capsule by mouth 2 times daily 60 capsule 1    allopurinol (ZYLOPRIM) 100 MG tablet Take 100 mg by mouth 2 times daily       levothyroxine (SYNTHROID) 112 MCG tablet Take 112 mcg by mouth Daily      simvastatin (ZOCOR) 20 MG tablet Take 20 mg by mouth nightly       No current facility-administered medications for this visit. Facility-Administered Medications Ordered in Other Visits   Medication Dose Route Frequency Provider Last Rate Last Admin    sodium chloride flush 0.9 % injection 5-40 mL  5-40 mL Intravenous PRN Shanell Dukes APRN   20 mL at 07/15/21 1450    heparin flush 100 UNIT/ML injection 500 Units  500 Units Intracatheter PRN Aragustina Lockwood APRN   500 Units at 07/15/21 1450    epoetin rachel-epbx (RETACRIT) injection 40,000 Units  40,000 Units Subcutaneous Once Shanell Dukes APRN            Allergies: Allergies   Allergen Reactions    Penicillins Rash     Childhood        Social History:    Social History     Tobacco Use    Smoking status: Never Smoker    Smokeless tobacco: Never Used   Vaping Use    Vaping Use: Never used   Substance Use Topics    Alcohol use: No    Drug use: No       Family History:   Family History   Problem Relation Age of Onset    Colon Cancer Brother     Cancer Mother         Breast Cancer    Heart Attack Father     Colon Polyps Neg Hx     Esophageal Cancer Neg Hx     Liver Cancer Neg Hx     Liver Disease Neg Hx     Rectal Cancer Neg Hx     Stomach Cancer Neg Hx        Vitals:  Vitals:    07/15/21 1414   BP: (!) 140/80   Pulse: 81   SpO2: 94%   Weight: 153 lb (69.4 kg)   Height: 5' 3\" (1.6 m)        Subjective   REVIEW OF SYSTEMS:   Review of Systems   Constitutional: Positive for activity change (Poor performance status) and fatigue (Severe ). Negative for chills, diaphoresis and fever. HENT: Negative.   Negative for congestion, ear pain, hearing loss, nosebleeds, sore throat and tinnitus. Eyes: Negative. Negative for pain, discharge and redness. Respiratory: Positive for shortness of breath (With exertion). Negative for cough and wheezing. Cardiovascular: Negative. Negative for chest pain, palpitations and leg swelling. Gastrointestinal: Negative. Negative for abdominal pain, blood in stool, constipation, diarrhea, nausea and vomiting. Endocrine: Negative for polydipsia. Genitourinary: Negative for dysuria, flank pain, frequency, hematuria and urgency. Musculoskeletal: Negative. Negative for back pain, myalgias and neck pain. Skin: Negative. Negative for rash. Neurological: Positive for weakness (Generalized). Negative for dizziness, tremors, seizures and headaches. Hematological: Does not bruise/bleed easily. Psychiatric/Behavioral: Negative. The patient is not nervous/anxious. Objective   PHYSICAL EXAM:  Physical Exam  Vitals reviewed. Constitutional:       General: She is not in acute distress. Appearance: She is well-developed. She is ill-appearing. She is not diaphoretic. HENT:      Head: Normocephalic and atraumatic. Mouth/Throat:      Pharynx: Uvula midline. Tonsils: No tonsillar exudate. Eyes:      General: Lids are normal. No scleral icterus. Right eye: No discharge. Left eye: No discharge. Conjunctiva/sclera: Conjunctivae normal.      Pupils: Pupils are equal, round, and reactive to light. Neck:      Thyroid: No thyroid mass or thyromegaly. Vascular: No JVD. Trachea: Trachea normal. No tracheal deviation. Cardiovascular:      Rate and Rhythm: Normal rate and regular rhythm. Heart sounds: Normal heart sounds. No murmur heard. No friction rub. No gallop. Pulmonary:      Effort: Pulmonary effort is normal. No respiratory distress. Breath sounds: Decreased breath sounds present. No wheezing or rales. Chest:      Chest wall: No tenderness.    Abdominal:      General: Bowel sounds are normal. There is no distension. Palpations: Abdomen is soft. There is no mass. Tenderness: There is no abdominal tenderness. There is no guarding or rebound. Hernia: No hernia is present. Musculoskeletal:         General: No tenderness or deformity. Cervical back: Normal range of motion and neck supple. Comments: Range of motion within normal limits x4 extremities   Skin:     General: Skin is warm. Coloration: Skin is not pale. Findings: No erythema or rash. Neurological:      Mental Status: She is alert and oriented to person, place, and time. Cranial Nerves: No cranial nerve deficit. Motor: Weakness present. Coordination: Coordination normal.   Psychiatric:         Behavior: Behavior normal.         Thought Content: Thought content normal.         Labs:  Lab Results   Component Value Date    WBC 9.01 07/15/2021    HGB 8.9 (L) 07/15/2021    HCT 25.4 (LL) 07/15/2021    .5 (H) 07/15/2021    PLT 87 (L) 07/15/2021     Lab Results   Component Value Date    NEUTROABS 7.11 (H) 07/15/2021         ASSESSMENT/PLAN:      1. Locally advanced pancreatic adenocarcinoma, has received palliative chemotherapy with dose reduced Gemzar and Abraxane for total of 4 cycles, last received treatment on 5/18/2021. CT scan of the abdomen and pelvis on 4/13/2021 and tumor markers suggested a positive response to treatment. Unfortunately she is currently not a candidate for further chemotherapy for her pancreatic adenocarcinoma. Again today we discussed quality versus quantity of life. After a lengthy discussion related to her poor performance status, multiple comorbidities to include chronic kidney disease stage IV and systolic heart failure and continues to not be a candidate for chemotherapy, Conchis Buddy is in agreement for a consultation with hospice to see if there services are appropriate for her at this time.       - Consult hospice for a consultation appointment to discuss services that could assist with quality of life  - Return 6 weeks if chooses not to move forward with hospice  - CMP and CA 19-9 today    2. Management of chemotherapy side effects:  -Nausea-significantly improved generally requiring antiemetic once a day  -Diarrhea-resolved  -Fatigue- grade 2-3, significantly declined performance status.   -Weight loss with decreased appetite, improving and weight is stable    3. Extra hepatic/intrahepatic biliary dilation, status post ERCP with metal stent placement on 12/7/2020 at [facility]. Liver enzymes beginning to increase, will continue to monitor. Astmptomatic  -CMP today  -Virtual visit with Dr. Queta Sewell on 2/16/2021 and documented that a stent adjustment may be needed if LFTs continue to increase, follow-up with Dr. Dung Rios if between visits began having abdominal symptoms    4. Anemia of chronic kidney failure, stage 3B. Receives BARBARA therapy with Retacrit intermittently for hemoglobin <11, last dose of 40,000 units given on 6/18/2021. She continues to take ferrous sulfate she continues to take the ferrous sulfate without difficulty once daily. Hemoglobin 8.3 with an MCV of 110.1, denies any bleeding tendencies. Labs on 4/6/2021  · Iron 43   · TIBC 226  · Iron saturation 19%   · Ferritin 1088.0  · B-12= 781        -Retacrit 40,000 units will be given today for hemoglobin of 8.3    5. Hypothyroidism presently being managed by Dr. Leighton Brush and is taking Synthroid 112 µg daily. TSH 4.710 on 5/18/2021  -Follow-up with Dr. Leighton Brush for monitoring of TSH and Synthroid dosing    I discussed all of the above findings included in the assessment and plan with the patient and the patient is in agreement to move forward with current recommendations/treatment. I have addressed all of their questions and concerns that were verbalized. FOLLOW UP:  1. Follow-up appointment given for 6 weeks, sooner if needed  2.  Continue to follow with other medical providers as recommended    Discussed precautions related to [address] and being at increased risk. Discussed proper handwashing to be done frequently, limit exposure to other individuals and maintain social distancing of 6 feet. Recommend contacting primary care provider if having respiratory symptoms for further recommendations and consideration for testing. EMR Dragon/Transcription disclaimer:   Much of this encounter note is an electronic transcription/translation of spoken language to printed text. The electronic translation of spoken language may permit erroneous, or at times, nonsensical words or phrases to be inadvertently transcribed; although attempts have made to review the note for such errors, some may still exist. Also, portions of this note have been copied forward, however, changed to reflect the most current clinical status of this patient.     Electronically signed by ALBERTO Baird on 7/15/2021 at 3:43 PM

## 2021-07-22 ENCOUNTER — OFFICE VISIT (OUTPATIENT)
Dept: PALLATIVE CARE | Age: 86
End: 2021-07-22
Payer: MEDICARE

## 2021-07-22 VITALS
RESPIRATION RATE: 12 BRPM | DIASTOLIC BLOOD PRESSURE: 88 MMHG | OXYGEN SATURATION: 94 % | HEART RATE: 78 BPM | SYSTOLIC BLOOD PRESSURE: 168 MMHG | TEMPERATURE: 98 F

## 2021-07-22 DIAGNOSIS — R26.89 DECREASED MOBILITY: ICD-10-CM

## 2021-07-22 DIAGNOSIS — I50.21: ICD-10-CM

## 2021-07-22 DIAGNOSIS — I10 HYPERTENSION, UNSPECIFIED TYPE: ICD-10-CM

## 2021-07-22 DIAGNOSIS — K59.00 CONSTIPATION, UNSPECIFIED CONSTIPATION TYPE: ICD-10-CM

## 2021-07-22 DIAGNOSIS — R11.0 NAUSEA: ICD-10-CM

## 2021-07-22 DIAGNOSIS — G25.81 RESTLESS LEGS: ICD-10-CM

## 2021-07-22 DIAGNOSIS — C25.7 MALIGNANT NEOPLASM OF OTHER PARTS OF PANCREAS (HCC): Primary | ICD-10-CM

## 2021-07-22 DIAGNOSIS — Z51.5 PALLIATIVE CARE PATIENT: ICD-10-CM

## 2021-07-22 PROCEDURE — 99345 HOME/RES VST NEW HIGH MDM 75: CPT | Performed by: PHYSICIAN ASSISTANT

## 2021-07-22 RX ORDER — ONDANSETRON 4 MG/1
4 TABLET, FILM COATED ORAL EVERY 6 HOURS PRN
Qty: 30 TABLET | Refills: 3 | Status: SHIPPED | OUTPATIENT
Start: 2021-07-22 | End: 2021-08-17

## 2021-07-22 RX ORDER — LISINOPRIL 10 MG/1
10 TABLET ORAL DAILY
COMMUNITY
Start: 2021-06-11

## 2021-07-22 RX ORDER — MECLIZINE HYDROCHLORIDE 25 MG/1
25 TABLET ORAL PRN
COMMUNITY
Start: 2021-04-19

## 2021-07-22 RX ORDER — ONDANSETRON 4 MG/1
4 TABLET, FILM COATED ORAL EVERY 6 HOURS PRN
COMMUNITY
Start: 2021-07-05 | End: 2021-08-17

## 2021-07-22 NOTE — PROGRESS NOTES
appetite, reflux w/ occasional nausea and constipation. She was released from home health in June. Her family reports she was doing wonderfully with Physical therapy at the time. Family is mainly concerned about safety and falls at home. Patient uses a walker for ambulation but does occasionally forget to use it. We discussed reminders/cues such as signs to help patient remember to use her walker. She states she takes colace twice daily for constipation. Occasionally uses Miralax. Has used Norco just a few times but states she has little to no pain and doesn't want to use it again as it contributes to constipation. We discussed addition of Miralax daily instead of prn especially if constipation worsens or if she does start requiring Norco for worsening pain. She has reflux and nausea frequently. Takes Esomeprazole each morning about 30 minutes before breakfast which helps. Patient states she sleeps frequently. Has no issue sleeping at night although her family reports she is frequently tossing/turning, talking, appears uncomfortable in her sleep. Seems to have done well with taking 2 Mirapex 0.125 mg tablets before bed. We discussed addition of Melatonin OTC should she start having increase in sleep disturbance. We discussed her oral intake. States family has assisted with lowering/eliminating salt intake in her diet. She has been trying to increase her protein intake. We discussed protein shakes/powders. She enjoys sitting on the porch to read as well as going on car rides. Occasionally goes to [employer] which is her favorite restaurant. Her blood pressure was elevated today during our visit and family reports it has been trending upward. Ms. Cain Shah does take Midodrine three times daily. Ms. Cain Shah states that she chose to make herself a DO NOT RESUSCITATE during her last hospitalization. She does have a living will on file.  Encouraged her and her children to speak openly and frequently about her wishes as she progresses.      ADVANCED CARE PLANNING:                                            Surrogate Decision Maker:yes-Nieves Gonzalez, daughter    Durable Power of :No    Advanced Directives/Living Sanchez: Yes    Out of hospital medical orders in place to reflect resuscitation status: No      FUNCTIONAL ASSESSMENT:     Palliative Performance Scale:  [] 80% Full ambulation  Some disease: Normal activity w/ some effort  Full self-care  Normal/reduced intake  Full LOC  [] 70% Reduced ambulation  Some disease: Can't do normal job or work  Full self-care  Normal/reduced intake  Full LOC  [] 60% Reduced ambulation  Significant disease: Can't do hobbies/housework  Occasional assistance  Normal/reduced intake  LOC full/confusion  [x] 50% Mainly sit/lie  Extensive disease: Can't do any work  Considerable assistance  Normal/reduced intake  LOC full/confusion  [] 40% Mainly in bed  Extensive disease  Mainly assistance  Normal/reduced intake  LOC full/confusion  [] 30% Bed Bound  Extensive disease  Total care  Reduced Intake  LOC full/confusion  [] 20% Bed Bound  Extensive disease  Total care  Minimal intake  Drowsy/coma  [] 10% Bed Bound  Extensive disease  Total care  Mouth care only  Drowsy/coma  [] 0% Death    PSYCHOSOCIAL / SPIRITUAL SCREENING:     No spiritual distress identified     Caregiver Burnout:  []  Yes / [x] No / [] No Caregiver present    Anticipatory grief assessment:  [x] Normal / [] Maladaptive    HISTORY:   Past Medical History:        Diagnosis Date    [facility]     Anemia     Arthritis     Cancer ([facility] 12/01/2020    pancreatic    CHF (congestive heart failure) (HCC)     Chronic kidney disease     DVT of lower extremity (deep venous thrombosis) ([facility]     twice 2010 and 2017    Hyperlipidemia     Hypertension     Kidney stones     Osteoarthritis     Palliative care patient 12/02/2020    Thyroid disease     Thyroid disorder Past Surgical History:        Procedure Laterality Date    COLONOSCOPY  2016    Dr Lilia Fagan problems    ENDOSCOPIC ULTRASOUND (LOWER) N/A 12/03/2020    Dr JONNY Grijalva/unsuccessful biliary cannulation despite attempts at 2-wire cannulation and proph pancreatic stenting with cannulation around pancreatic stenting-Positive for high-grade adenocarcinoma, pancreatic head    ERCP N/A 12/03/2020    Dr JONNY Grijalva/unsuccessful biliary cannulation despite attempts at 2-wire cannulation and proph pancreatic stenting with cannulation around pancreatic stenting-Positive for high-grade adenocarcinoma, pancreatic head    ERCP  12/07/2020    Dr Jeremy Hernandez-becka/sphincterotomy, placement of a 10 x 60 partially covered biliary stent    PORT SURGERY Right 2/5/2021    SINGLE LUMEN PORT PLACEMENT WITH ULTRASOUND AND FLUORO performed by Calli Davis MD at [address] [place] [address] TOE AMPUTATION Bilateral     pinky toes removed    TOTAL KNEE ARTHROPLASTY Right 01/03/2020    RIGHT TOTAL KNEE REPLACEMENT performed by Deena Nava MD at [facility] OR     Current Medications:      Current Outpatient Medications:     lisinopril (PRINIVIL;ZESTRIL) 10 MG tablet, Take 10 mg by mouth daily, Disp: , Rfl:     meclizine (ANTIVERT) 25 MG tablet, Take 25 mg by mouth as needed, Disp: , Rfl:     ondansetron (ZOFRAN) 4 MG tablet, Take 4 mg by mouth every 6 hours as needed, Disp: , Rfl:     esomeprazole (NEXIUM) 40 MG delayed release capsule, , Disp: , Rfl:     HYDROcodone-acetaminophen (NORCO) 7.5-325 MG per tablet, , Disp: , Rfl:     pramipexole (MIRAPEX) 0.125 MG tablet, , Disp: , Rfl:     aspirin 81 MG EC tablet, Take 1 tablet by mouth daily, Disp: 30 tablet, Rfl: 5    carvedilol (COREG) 3.125 MG tablet, Take 1 tablet by mouth 2 times daily (with meals), Disp: 60 tablet, Rfl: 5    midodrine (PROAMATINE) 10 MG tablet, Take 1 tablet by mouth 3 times daily (with meals), Disp: 90 tablet, Rfl: 5    nitroGLYCERIN (NITROSTAT) 0.4 MG SL tablet, up to max of 3 total doses. If no relief after 1 dose, call 911., Disp: 25 tablet, Rfl: 0    sodium bicarbonate 325 MG tablet, Take 325 mg by mouth 2 times daily, Disp: , Rfl:     vitamin D (CHOLECALCIFEROL) 25 MCG (1000 UT) TABS tablet, Take 1,000 Units by mouth daily, Disp: , Rfl:     lidocaine-prilocaine (EMLA) 2.5-2.5 % cream, APPLY TO PORT AREA AND COVERWITH PLASTIC WRAP ONE HOUR PRIOR TO TREATMENT, Disp: 30 g, Rfl: 1    apixaban (ELIQUIS) 2.5 MG TABS tablet, Take 2.5 mg by mouth 2 times daily, Disp: , Rfl:     docusate sodium (COLACE) 100 MG capsule, Take 1 capsule by mouth 2 times daily, Disp: 60 capsule, Rfl: 1    allopurinol (ZYLOPRIM) 100 MG tablet, Take 100 mg by mouth 2 times daily , Disp: , Rfl:     levothyroxine (SYNTHROID) 112 MCG tablet, Take 112 mcg by mouth Daily, Disp: , Rfl:     simvastatin (ZOCOR) 20 MG tablet, Take 20 mg by mouth nightly, Disp: , Rfl:     bumetanide (BUMEX) 1 MG tablet, Take 1 tablet by mouth 2 times daily (Patient not taking: Reported on 7/22/2021), Disp: 30 tablet, Rfl: 0    ondansetron (ZOFRAN ODT) 4 MG disintegrating tablet, Take 2 tablets by mouth every 8 hours as needed for Nausea or Vomiting (Patient not taking: Reported on 7/22/2021), Disp: 60 tablet, Rfl: 3    promethazine (PHENERGAN) 25 MG tablet, TAKE 1 TABLET BY MOUTH FOUR TIMES DAILY AS NEEDED FOR NAUSEA (Patient not taking: Reported on 7/22/2021), Disp: 20 tablet, Rfl: 1     Allergies:    Penicillins    Social History:    The patient currently lives at home. Tobacco:   reports that she has never smoked. She has never used smokeless tobacco.  Alcohol:   reports no history of alcohol use.   Illicit Drugs: denies    Family History:      Problem Relation Age of Onset    Colon Cancer Brother     Cancer Mother         Breast Cancer    Heart Attack Father     Colon Polyps Neg Hx     Esophageal Cancer Neg Hx     Liver Cancer Neg Hx     Liver Disease Neg Hx     Rectal Cancer Neg Hx     Stomach Cancer Neg Hx      Review of Systems:   Constitutional / general:  Denies fever / chills / sweats +fatigue  Head:  + headache / neck stiffness / trauma / visual change  Eyes:  Denies blurry vision / acute visual change or loss / itching / redness  ENT: Denies sore throat / hoarseness / nasal drainage / ear pain  CV:  Denies chest pain / palpitations/ orthopnea   Respiratory:  Denies cough / shortness of breath / sputum / hemoptysis  GI: + nausea +reflux/denies vomiting / abdominal pain / diarrhea / constipation  :  Denies dysuria / hesitancy / urgency / hematuria   Neuro: Denies paralysis / syncope / seizure / dysphagia / headache / paresthesias  Musculoskeletal:  +muscle weakness /joint stiffness / pain +unstable gait  Vascular: Denies edema / claudication / varicosities  Heme / endocrine: Denies easy bruising / bleeding / excessive sweating / heat or cold intolerance  Psychiatric:  Denies depression / anxiety / insomnia / mood changes  Skin:  Denies new rashes / lesions / skin hair or nail changes    14 point review of systems is negative except as specifically addressed above.       OBJECTIVE:     Vitals:    07/22/21 1256   BP: (!) 168/88   Pulse: 78   Resp: 12   Temp: 98 °F (36.7 °C)   SpO2: 94%     General appearance:  alert and cooperative with exam, vital signs stable, well nourished, well developed, 79 yo F, frail appearance  HEENT: atraumatic, sclera clear, lids normal, pupils and irises normal, external ears and nose are normal, lips normal. Slightly hard of hearing  Neck: without masses, lymphadenopathy, supple  Lungs: diminished air entry throughout,equal bilateral expansion, clear to auscultation bilaterally, no cough, no dyspnea  Heart: regular rate and rhythm, S1 S2 normal, faint murmur  Abdomen:  soft, non-tender, bowel sounds active  Genitourinary: no bladder fullness, masses, or tenderness  Extremities:  no cyanosis, clubbing, trace edema, pulses palpable  Neurologic: no focal neurologic deficits, normal sensation, no tremor, alert and oriented, generalized weakness  Psychiatric: alert and oriented, no recent or remote memory deficits, good judgement, mood and affect appropriate  Skin: warm, dry    LAB DATA REVIEWED:      No visits with results within 7 Day(s) from this visit. Latest known visit with results is:   Hospital Outpatient Visit on 07/15/2021   Component Date Value    WBC 07/15/2021 9.84     RBC 07/15/2021 2.38*    Hemoglobin 07/15/2021 8.3*    Hematocrit 07/15/2021 26.2*    MCV 07/15/2021 110.1*    MCH 07/15/2021 34.9*    MCHC 07/15/2021 31.7*    RDW 07/15/2021 19.9*    Platelets 29/11/6712 470*    MPV 07/15/2021 9.4     Neutrophils % 07/15/2021 77.3*    Lymphocytes % 07/15/2021 7.8*    Monocytes % 07/15/2021 9.5     Eosinophils % 07/15/2021 4.8     Basophils % 07/15/2021 0.6     Neutrophils Absolute 07/15/2021 7.61*    Lymphocytes Absolute 07/15/2021 0.77*    Monocytes Absolute 07/15/2021 0.93*    Eosinophils Absolute 07/15/2021 0.47     Basophils Absolute 07/15/2021 0.06       Most recent labs reviewed     PALLIATIVE DIAGNOSES AND ORDERS/RECOMMENDATIONS/PLAN:   Rai Rowland was seen today for referral - general and fall. Diagnoses and all orders for this visit:    Malignant neoplasm of other parts of pancreas [facility])    Palliative care patient    Constipation, unspecified constipation type    Restless legs    Nausea    Decreased mobility    CHF NYHA class III, acute, systolic (HCC)    Hypertension, unspecified type      1. Decreased mobility/falls-discussed use of walker, cues/reminders to use walker when family is not in the same room. They have already taken up all cords/rugs. Discussed cushioning sharp edges. She has been released from home health. She has gone to outpatient therapy in the past and did well. 2. Hypertension-takes Midodrine three times daily. Discussed holding that medication this evening as well as tomorrow and to continue to closely monitor blood pressure.  If blood pressure improves/stable may need to discontinue that medication all together. They have called Dr. Evelin Siegel office and have an appointment next week, however, if blood pressure improves with stopping Midodrine they would like to cancel that appointment. Attempted to call Dr. Evelin Siegel office but was unable to speak to anyone today. 3. Reflux/Nausea-continue taking Esomeprazole. Patient states Zofran helps but needs a refill. Will send this to [facility] DR SUSHMA SELLERS    4. Constipation-Continue Colace twice daily, use Miralax daily instead of prn. Could also consider a medication specifically for opioid induced constipation should she start needing Norco more often    5. Pancreatic adenocarcinoma-Followed by Oncology, no longer a candidate for chemotherapy. Patient is considering hospice care but they have not called to set up an appointment. Palliative care will continue to follow. Encouraged patient and family to call if anything changes or if they have any further questions regarding moving forward with hospice care. Patient confirms she is a DNR and has a living will. 6. Chronic systolic CHF-watching sodium intake, no concerns for dyspnea/edema or signs of exacerbation at this time     Greater than 50% of the time in this visit was spent counseling and coordinating care of this patient.      Visit Start Time:11:00 pm  Visit End Time: 12:15 pm    Drive time: 40 minutes total to/from [facility]    Electronically signed by Alvaro Lynch PA-C on 7/22/2021 at 1:41 PM

## 2021-08-17 ENCOUNTER — OFFICE VISIT (OUTPATIENT)
Dept: PALLATIVE CARE | Age: 86
End: 2021-08-17
Payer: MEDICARE

## 2021-08-17 VITALS
OXYGEN SATURATION: 93 % | DIASTOLIC BLOOD PRESSURE: 78 MMHG | SYSTOLIC BLOOD PRESSURE: 148 MMHG | TEMPERATURE: 98.5 F | HEART RATE: 64 BPM

## 2021-08-17 DIAGNOSIS — R11.0 NAUSEA: ICD-10-CM

## 2021-08-17 DIAGNOSIS — Z51.5 PALLIATIVE CARE PATIENT: ICD-10-CM

## 2021-08-17 DIAGNOSIS — K59.00 CONSTIPATION, UNSPECIFIED CONSTIPATION TYPE: ICD-10-CM

## 2021-08-17 DIAGNOSIS — C25.7 MALIGNANT NEOPLASM OF OTHER PARTS OF PANCREAS (HCC): ICD-10-CM

## 2021-08-17 DIAGNOSIS — R26.89 DECREASED MOBILITY: Primary | ICD-10-CM

## 2021-08-17 DIAGNOSIS — I50.21: ICD-10-CM

## 2021-08-17 DIAGNOSIS — I10 HYPERTENSION, UNSPECIFIED TYPE: ICD-10-CM

## 2021-08-17 PROCEDURE — 99349 HOME/RES VST EST MOD MDM 40: CPT | Performed by: PHYSICIAN ASSISTANT

## 2021-08-17 NOTE — PROGRESS NOTES
Supportive Care/Community Based Palliative Care  Follow Up Note          8/17/2021 3:42 PM    Patient:  Sonia Lewis  YOB: 1934  Primary Care Physician: Desi Valencia MD  Advance Directive: DNR  Medical Record Number:  [id number]    Portions of this note have been copied forward, however, changed to reflect the most current clinical status of this patient. Date of Visit: 8/17/2021  Location of Visit:  [x]  Home    []  Other:      Patient Care Team:  Desi Valencia MD as PCP - General (Family Medicine)  ALBERTO Kelley as Nurse Practitioner (Clinical Nurse Specialist Adult Health)    History obtained from:  Patient, daughter     CHIEF COMPLAINT:     Chief Complaint   Patient presents with    Follow-up     CLINICAL SUMMARY:         Sonia Lewis is a 80 y.o. 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 She is followed by [facility] DOMINGUE[facility] as well as Dr. Bernard Blanc. She received reduced dose Gemzar and Abraxane for total of 4 cycles with last treatment on 05/18/2021. [facility]05/26/2020 and was diagnosed then with decompensated heart failure. Patient is no longer a candidate to receive chemotherapy for pancreatic adenocarcinoma.      HPI AND VISIT SUMMARY:     Present and participated:    Patient and her daughter, Epi Morales saw Sonia Lewis in Her home. Upon my arrival patient was noted to be awake, alert, oriented, and in no distress. Ms. Josy Marroquin states she has been doing well since our last visit. Her daughter states patient has fallen \"only once\" since initial visit with Palliative care. They have bought a pressure sensing floor mat for patient's bedside that alerts family anytime Ms. Josy Marroquin starts to get out of bed. They state this has been extremely helpful. Ms. Josy Marroquin complains of nausea and dry heaving. Occasional vomiting. Denies abdominal pain. States it is worse after breakfast. She does take medication for reflux. Takes Zofran as well and feels this helps when she takes it. Discussed taking Zofran each morning and evening to prevent nausea/vomiting and use of Phenergan when she becomes severely nauseated. Ms. Mittal Riding daughter states Miralax has helped resolve constipation. Patient recently had an appointment with her Nephrologist. Renal function has improved to 25% based on most recent lab work. She enjoys going for rides in the car. States overall she feels she is doing well. ADVANCED CARE PLANNING:                                            Surrogate Decision Maker:Yes-Nieves Myers    Durable Power of :No    Advanced Directives/Living Sanchez: Yes    Out of hospital medical orders in place to reflect resuscitation status: NO      FUNCTIONAL ASSESSMENT:     Palliative Performance Scale:  [] 60% Ambulation reduced; Significant disease; Can't do hobbies/housework; intake normal or reduced; occasional assist; LOC full/confusion  [x] 50% Mainly sit/lie; Extensive disease; Can't do any work; Considerable assist; intake normal  Or reduced; LOC full/confusion  [] 40% Mainly in bed; Extensive disease; Mainly assist; intake normal or reduced; occasional assist; LOC full/confusion  [] 30% Bed Bound; Extensive disease; Total care; intake reduced; LOC full/confusion  [] 20% Bed Bound; Extensive disease; Total care; intake minimal; Drowsy/coma  [] 10% Bed Bound; Extensive disease;  Total care; Mouth care only; Drowsy/coma  [] 0% Death    PSYCHOSOCIAL / SPIRITUAL SCREENING:     Any spiritual / Sikhism concerns:  []  Yes /  [x] No    Caregiver Burnout:  []  Yes / [] No / [] No Caregiver present    Anticipatory grief assessment:  [x] Normal / [] Maladaptive    HISTORY:     Past medical history, surgical history, family history, social history unchanged    Current Medications:      Current Outpatient Medications:     lisinopril (PRINIVIL;ZESTRIL) 10 MG tablet, Take 10 mg by mouth daily, Disp: , Rfl:     meclizine (ANTIVERT) 25 MG tablet, Take 25 mg by mouth as needed, Disp: , Rfl:     esomeprazole (NEXIUM) 40 MG delayed release capsule, , Disp: , Rfl:     pramipexole (MIRAPEX) 0.125 MG tablet, , Disp: , Rfl:     aspirin 81 MG EC tablet, Take 1 tablet by mouth daily, Disp: 30 tablet, Rfl: 5    carvedilol (COREG) 3.125 MG tablet, Take 1 tablet by mouth 2 times daily (with meals), Disp: 60 tablet, Rfl: 5    sodium bicarbonate 325 MG tablet, Take 325 mg by mouth 2 times daily, Disp: , Rfl:     ondansetron (ZOFRAN ODT) 4 MG disintegrating tablet, Take 2 tablets by mouth every 8 hours as needed for Nausea or Vomiting, Disp: 60 tablet, Rfl: 3    apixaban (ELIQUIS) 2.5 MG TABS tablet, Take 2.5 mg by mouth 2 times daily, Disp: , Rfl:     docusate sodium (COLACE) 100 MG capsule, Take 1 capsule by mouth 2 times daily, Disp: 60 capsule, Rfl: 1    allopurinol (ZYLOPRIM) 100 MG tablet, Take 100 mg by mouth daily , Disp: , Rfl:     levothyroxine (SYNTHROID) 112 MCG tablet, Take 112 mcg by mouth Daily, Disp: , Rfl:     simvastatin (ZOCOR) 20 MG tablet, Take 20 mg by mouth nightly, Disp: , Rfl:     HYDROcodone-acetaminophen (NORCO) 7.5-325 MG per tablet, , Disp: , Rfl:     midodrine (PROAMATINE) 10 MG tablet, Take 1 tablet by mouth 3 times daily (with meals) (Patient not taking: Reported on 8/17/2021), Disp: 90 tablet, Rfl: 5    nitroGLYCERIN (NITROSTAT) 0.4 MG SL tablet, up to max of 3 total doses. If no relief after 1 dose, call 911.  (Patient not taking: Reported on 8/17/2021), Disp: 25 tablet, Rfl: 0    bumetanide (BUMEX) 1 MG tablet, Take 1 tablet by mouth 2 times daily (Patient not taking: Reported on 8/17/2021), Disp: 30 tablet, Rfl: 0    vitamin D (CHOLECALCIFEROL) 25 MCG (1000 UT) TABS tablet, Take 1,000 Units by mouth daily (Patient not taking: Reported on 8/17/2021), Disp: , Rfl:    lidocaine-prilocaine (EMLA) 2.5-2.5 % cream, APPLY TO PORT AREA AND COVERWITH PLASTIC WRAP ONE HOUR PRIOR TO TREATMENT (Patient not taking: Reported on 8/17/2021), Disp: 30 g, Rfl: 1    promethazine (PHENERGAN) 25 MG tablet, TAKE 1 TABLET BY MOUTH FOUR TIMES DAILY AS NEEDED FOR NAUSEA (Patient not taking: Reported on 7/22/2021), Disp: 20 tablet, Rfl: 1     Allergies:  Penicillins    Review of Systems:   14 point review of systems is negative except as specifically addressed above. OBJECTIVE:     Vitals:    08/17/21 1518   BP: (!) 148/78   Pulse: 64   Temp: 98.5 °F (36.9 °C)   SpO2: 93%     General appearance: alert and cooperative with exam, vital signs stable, well nourished, well developed  HEENT: atraumatic,sclear clear, lids normal, pupils and irises normal, external ears and nose are normal, lips normal.  Neck: without masses, lymphadenopathy, supple  Lungs: diminished at bases otherwise equal bilateral expansion, clear to auscultation bilaterally, no cough, no dyspnea  Heart: regular rate and rhythm, S1 S2 normal, no murmur  Abdomen:  soft, non-tender, bowel sounds active  Genitourinary: no bladder fullness, masses, or tenderness  Extremities:  no cyanosis, clubbing, or edema, pulses palpable  Neurologic: no focal neurologic deficits, normal sensation, no tremor, alert and oriented, generalized weakness  Psychiatric: alert and oriented, no recent or remote memory deficits, good judgement, mood and affect appropriate  Skin: warm, dry    LAB DATA REVIEWED:     No visits with results within 7 Day(s) from this visit.    Latest known visit with results is:   Hospital Outpatient Visit on 07/15/2021   Component Date Value    WBC 07/15/2021 9.84     RBC 07/15/2021 2.38*    Hemoglobin 07/15/2021 8.3*    Hematocrit 07/15/2021 26.2*    MCV 07/15/2021 110.1*    MCH 07/15/2021 34.9*    MCHC 07/15/2021 31.7*    RDW 07/15/2021 19.9*    Platelets 48/61/5233 470*    MPV 07/15/2021 9.4     Neutrophils % 07/15/2021 8/17/2021 at 3:42 PM

## 2021-08-26 ENCOUNTER — OFFICE VISIT (OUTPATIENT)
Dept: HEMATOLOGY | Age: 86
End: 2021-08-26
Payer: MEDICARE

## 2021-08-26 ENCOUNTER — HOSPITAL ENCOUNTER (OUTPATIENT)
Dept: INFUSION THERAPY | Age: 86
Discharge: HOME | End: 2021-08-26
Payer: MEDICARE

## 2021-08-26 VITALS
HEART RATE: 75 BPM | BODY MASS INDEX: 25.37 KG/M2 | SYSTOLIC BLOOD PRESSURE: 100 MMHG | DIASTOLIC BLOOD PRESSURE: 60 MMHG | HEIGHT: 63 IN | WEIGHT: 143.2 LBS | OXYGEN SATURATION: 97 %

## 2021-08-26 DIAGNOSIS — C25.9 PANCREATIC CARCINOMA (HCC): Primary | ICD-10-CM

## 2021-08-26 DIAGNOSIS — D63.1 ANEMIA OF CHRONIC KIDNEY FAILURE, STAGE 4 (SEVERE) (HCC): ICD-10-CM

## 2021-08-26 DIAGNOSIS — D64.9 ANEMIA, UNSPECIFIED TYPE: ICD-10-CM

## 2021-08-26 DIAGNOSIS — R53.1 WEAKNESS GENERALIZED: ICD-10-CM

## 2021-08-26 DIAGNOSIS — Z79.899 ENCOUNTER FOR ESA (ERYTHROPOIETIN STIMULATING AGENT) ANEMIA MANAGEMENT: ICD-10-CM

## 2021-08-26 DIAGNOSIS — C25.7 MALIGNANT NEOPLASM OF OTHER PARTS OF PANCREAS (HCC): Primary | ICD-10-CM

## 2021-08-26 DIAGNOSIS — K83.8 DILATION OF BILIARY TRACT: ICD-10-CM

## 2021-08-26 DIAGNOSIS — D64.9 ENCOUNTER FOR ESA (ERYTHROPOIETIN STIMULATING AGENT) ANEMIA MANAGEMENT: ICD-10-CM

## 2021-08-26 DIAGNOSIS — N18.9 CHRONIC KIDNEY DISEASE, UNSPECIFIED CKD STAGE: ICD-10-CM

## 2021-08-26 DIAGNOSIS — R53.81 PHYSICAL DECONDITIONING: ICD-10-CM

## 2021-08-26 DIAGNOSIS — Z71.89 CARE PLAN DISCUSSED WITH PATIENT: ICD-10-CM

## 2021-08-26 DIAGNOSIS — N18.4 ANEMIA OF CHRONIC KIDNEY FAILURE, STAGE 4 (SEVERE) (HCC): ICD-10-CM

## 2021-08-26 DIAGNOSIS — D50.8 IRON DEFICIENCY ANEMIA SECONDARY TO INADEQUATE DIETARY IRON INTAKE: ICD-10-CM

## 2021-08-26 DIAGNOSIS — C25.9 PANCREATIC CARCINOMA (HCC): ICD-10-CM

## 2021-08-26 LAB
BASOPHILS ABSOLUTE: 0.02 K/UL (ref 0.01–0.08)
BASOPHILS RELATIVE PERCENT: 0.2 % (ref 0.1–1.2)
EOSINOPHILS ABSOLUTE: 0.14 K/UL (ref 0.04–0.54)
EOSINOPHILS RELATIVE PERCENT: 1.7 % (ref 0.7–7)
HCT VFR BLD CALC: 35 % (ref 34.1–44.9)
HEMOGLOBIN: 10.7 G/DL (ref 11.2–15.7)
LYMPHOCYTES ABSOLUTE: 0.52 K/UL (ref 1.18–3.74)
LYMPHOCYTES RELATIVE PERCENT: 6.5 % (ref 19.3–53.1)
MCH RBC QN AUTO: 34.3 PG (ref 25.6–32.2)
MCHC RBC AUTO-ENTMCNC: 30.6 G/DL (ref 32.3–35.5)
MCV RBC AUTO: 112.2 FL (ref 79.4–94.8)
MONOCYTES ABSOLUTE: 0.72 K/UL (ref 0.24–0.82)
MONOCYTES RELATIVE PERCENT: 9 % (ref 4.7–12.5)
NEUTROPHILS ABSOLUTE: 6.64 K/UL (ref 1.56–6.13)
NEUTROPHILS RELATIVE PERCENT: 82.6 % (ref 34–71.1)
PDW BLD-RTO: 14.9 % (ref 11.7–14.4)
PLATELET # BLD: 437 K/UL (ref 182–369)
PMV BLD AUTO: 9.9 FL (ref 7.4–10.4)
RBC # BLD: 3.12 M/UL (ref 3.93–5.22)
WBC # BLD: 8.04 K/UL (ref 3.98–10.04)

## 2021-08-26 PROCEDURE — 99214 OFFICE O/P EST MOD 30 MIN: CPT | Performed by: NURSE PRACTITIONER

## 2021-08-26 PROCEDURE — 96523 IRRIG DRUG DELIVERY DEVICE: CPT

## 2021-08-26 PROCEDURE — 1090F PRES/ABSN URINE INCON ASSESS: CPT | Performed by: NURSE PRACTITIONER

## 2021-08-26 PROCEDURE — G8428 CUR MEDS NOT DOCUMENT: HCPCS | Performed by: NURSE PRACTITIONER

## 2021-08-26 PROCEDURE — 4040F PNEUMOC VAC/ADMIN/RCVD: CPT | Performed by: NURSE PRACTITIONER

## 2021-08-26 PROCEDURE — 96372 THER/PROPH/DIAG INJ SC/IM: CPT

## 2021-08-26 PROCEDURE — 99212 OFFICE O/P EST SF 10 MIN: CPT

## 2021-08-26 PROCEDURE — G8417 CALC BMI ABV UP PARAM F/U: HCPCS | Performed by: NURSE PRACTITIONER

## 2021-08-26 PROCEDURE — 6360000002 HC RX W HCPCS: Performed by: NURSE PRACTITIONER

## 2021-08-26 PROCEDURE — 1123F ACP DISCUSS/DSCN MKR DOCD: CPT | Performed by: NURSE PRACTITIONER

## 2021-08-26 PROCEDURE — 85025 COMPLETE CBC W/AUTO DIFF WBC: CPT

## 2021-08-26 PROCEDURE — 2580000003 HC RX 258: Performed by: NURSE PRACTITIONER

## 2021-08-26 PROCEDURE — 1036F TOBACCO NON-USER: CPT | Performed by: NURSE PRACTITIONER

## 2021-08-26 RX ORDER — HEPARIN SODIUM (PORCINE) LOCK FLUSH IV SOLN 100 UNIT/ML 100 UNIT/ML
500 SOLUTION INTRAVENOUS PRN
Status: DISCONTINUED | OUTPATIENT
Start: 2021-08-26 | End: 2021-08-27 | Stop reason: HOSPADM

## 2021-08-26 RX ORDER — SODIUM CHLORIDE 9 MG/ML
25 INJECTION, SOLUTION INTRAVENOUS PRN
Status: CANCELLED | OUTPATIENT
Start: 2021-08-26

## 2021-08-26 RX ORDER — SODIUM CHLORIDE 0.9 % (FLUSH) 0.9 %
5-40 SYRINGE (ML) INJECTION PRN
Status: CANCELLED | OUTPATIENT
Start: 2021-08-26

## 2021-08-26 RX ORDER — HEPARIN SODIUM (PORCINE) LOCK FLUSH IV SOLN 100 UNIT/ML 100 UNIT/ML
500 SOLUTION INTRAVENOUS PRN
Status: CANCELLED | OUTPATIENT
Start: 2021-08-26

## 2021-08-26 RX ORDER — SODIUM CHLORIDE 0.9 % (FLUSH) 0.9 %
5-40 SYRINGE (ML) INJECTION PRN
Status: DISCONTINUED | OUTPATIENT
Start: 2021-08-26 | End: 2021-08-27 | Stop reason: HOSPADM

## 2021-08-26 RX ADMIN — EPOETIN ALFA-EPBX 40000 UNITS: 40000 INJECTION, SOLUTION INTRAVENOUS; SUBCUTANEOUS at 14:53

## 2021-08-26 RX ADMIN — SODIUM CHLORIDE, PRESERVATIVE FREE 20 ML: 5 INJECTION INTRAVENOUS at 14:55

## 2021-08-26 RX ADMIN — HEPARIN 500 UNITS: 100 SYRINGE at 14:56

## 2021-08-26 ASSESSMENT — ENCOUNTER SYMPTOMS
EYES NEGATIVE: 1
EYE PAIN: 0
DIARRHEA: 0
EYE DISCHARGE: 0
ABDOMINAL PAIN: 0
SORE THROAT: 0
WHEEZING: 0
BLOOD IN STOOL: 0
COUGH: 0
BACK PAIN: 0
EYE REDNESS: 0
VOMITING: 0

## 2021-08-26 NOTE — PROGRESS NOTES
Progress Note      Pt Name: Shalini Sanchez  YOB: 1934  MRN: [id number]    Date of evaluation: 8/26/2021  History Obtained From:  patient, electronic medical record    CHIEF COMPLAINT:    Chief Complaint   Patient presents with    Follow-up     Pancreatic carcinoma ([facility] [address]    Discuss Labs    Fatigue     Poor performance status    Other     Weight loss     HISTORY OF PRESENT ILLNESS:    Shalini Sanchez is a 80 y.o.  female who is currently followed for pancreatic adenocarcinoma and multifactoral anemia to include iron deficiency and chronic kidney disease stage 3B. She received palliative chemotherapy with dose reduced Gemzar and Abraxane for total of 4 cycles, last received treatment on 5/18/2021. Unfortunately she had poor tolerance to treatment with GI symptoms, decline performance status and with cycle #4 developed worsening dyspnea with hypoxemia, requiring hospitalization to [facility] from 5/18/2021-5/26/2020. During hospitalization she was diagnosed with decompensated systolic heart failure with a 2D echocardiogram on 5/19/2021 reporting left ventricular EF estimated at 15% and Stacey Marcelino on 5/21/2021 reported a left ventricular systolic function of 45%, with no known prior history of heart failure. Unfortunately she is currently not a candidate for further chemotherapy for her pancreatic adenocarcinoma. She returns today in follow-up for evaluation, late side effect monitoring, consideration for Retacrit for her anemia of chronic kidney disease and further treatment recommendations. Dave Pugh presents today indicating that overall she is doing fairly well, she continues to have significant fatigue and shortness of air with exertion. She reports her diarrhea has resolved although she continues to have persistent nausea. She has lost an additional 10 pounds over the last 6 weeks.     Again today we discussed that currently she is still not a candidate to move forward with any occurred. · 12/01/2020CT abdomen pelvis without contrast showed a pancreatic head mass measuring 3.3 x 3.7 cm in size and associated, bile duct dilatation above the level of the ampulla.  Associated moderate pancreatic ductal dilatation. · 12/2/2020-CA 19-9 133  · 12/03/2020ERCP with FNA biopsy Positive for high-grade adenocarcinoma.  Unfortunately, stent could not be placed. · 12/5/2020-transferred from [facility]) to [address] [place] [address] and discharged home on 12/8/2020  · 12/07/2020- Cholangipancreatography Retrograde Endo ERCP with sphincterotomy, balloon sweep and placement of 10x60 PC BS metal stent at [facility] in [place]. · 12/21/2020- CT chest with contrast documented no evidence of intrathoracic neoplastic process/metastatic disease. Evidence of pneumobilia. The ectasia of the pancreatic duct, similar to the previous study. An incompletely visualized and evaluated heterogeneous mass in the head of the pancreas measuring 3.3 x 3.7cm. A biliary stent in place. Moderate persistent dilatation of the proximal common bile duct. A stable small low-density nodule in the left adrenal gland  · 12/22/2020Dr. Lambert discussed the results of CT chest and pathology that suggest advanced pancreatic cancer, unfortunately it is not amenable to surgery. She was quite weak and had poor performance, appeared very frail. Discussion was made about palliative chemotherapy but the decision to hold was made until improvement of her physical conditioning occurred. Recommended physical therapy as outpatient and reassess fitness for palliative chemotherapy in approximately 4 weeks. 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   · 12/7/2020 ERCP at [address] [place] [address] Dr by Dr. Rolo Preston revealed a single severe biliary stricture in the lower third of the main bile duct with severe upstream biliary dilation, stricture was malignant appearing. Biliary sphincterectomy was performed. 1 partially covered metal stent was placed into the common bile duct. · 2/8/2021-initiated palliative chemotherapy with Gemzar 750 mg/m² and Abraxane 90 mg/m² every 2 weeks, this is a dose reduction by 25%  · 3/23/2021- CA 199 108, improved compared to pretreatment value of 236 on 1/20/2021. · 4/13/2021 CT Abdomen/Pelvis with contrast documented a poorly defined complex mass in the head of the pancreas. It appears to have decreased in size when remeasured at the same level and in same dimension as in the previous study (3 cm x 2.5 cm, compared to 3.7 cm x 3.3 cm). Persistent moderate dilatation of the pancreatic duct. There are 2 additional small cystic nodules in the body of the pancreas measuring 11 mm and 9 mm. A biliary stent in place and decompression of the intrahepatic biliary ducts since the previous study. 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 The diverticulosis of the distal colon with no evidence for diverticulitis. · 5/4/2021- dose reduction by 25% due to severe fatigue and nausea, starting with cycle 4 will receive Gemzar 600 mg/m² and Abraxane 75 mg/m². HEMATOLOGY HISTORY:  Naima Bal was seen in initial hematology consultation on 7/20/2018 for further evaluation and treatment recommendations for anemia. Naima Bal was referred from Dr. Yoselyn Bills. Naima Bal presented with no significant complaints other than chronic fatigue and constipation. She had been taking ferrous sulfate 325 mg daily under the direction of Dr. Chaim Pro. Naima Bal reported significant constipation and some GI upset with the oral iron. She denied any bleeding tendencies to include hematuria, melena, hematochezia and epistaxis. Naima Bal had a colonoscopy on 4/26/2016 by Dr. Jose Salcido for further evaluation of iron deficiency. The only abnormal finding was diverticulosis.   Naima Bal is routinely followed by Dr. Sosa Reason for her chronic kidney disease stage IV. CMP on 6/11/2018 documented a creatinine of 2.2 and GFR 21. CBC review from 2/2/2018- 6/11/2018 documented hemoglobin ranging from 9.4- 10.1, RBC 2.91- 3.22, MCV 94- 100 with a normal WBC and platelet count  CBC at initial consultation on 7/20/2018 documented a WBC of 7.33, ANC 4.74, RBC 3.04, hemoglobin 10.3, .6 and a platelet count of 710,677. Serology studies on 7/20/2018:  Creatinine 1.98   GFR 23   Calcium 10.3   IgG 700, IgA 180, and IgM 52   M spike not observed   Immunofixation: No monoclonality detected. Iron 74   Iron saturation 27%   TIBC 278   Vitamin B12 437   Folate 15.7   Ferritin 299   Reticulocyte count 1.5%   Erythropoietin 11.6      Beta-2 microglobulin 6.2     Occult stool positive 1 of 3 samples on 7/25/2018. Colonoscopy on 10/4/2018 by Dr. Juan Diego Nice was documented as removal of 2 polyps with benign pathology. No evidence of bleeding. Serology studies on 10/15/2018:  Iron 76   TIBC 287   Iron saturation 26%   Ferritin 321   Hemoglobin 10.1   Creatinine 2.47   GFR 17    11/26/2018 - Initiated Procrit 20,000 units for chronic kidney disease stage IV, to be given every 2 weeks ×4 and then monthly, dose to be titrated appropriately. Hemoglobin 9.6. All has anemia of chronic disease to include chronic kidney disease stage IV. She will began receiving growth factor support and will need to maintain a ferritin level greater than 200 and an iron saturation of 25% for the Procrit to be beneficial. Hold Procrit dose for hemoglobin >11. Indications/rationale for Procrit was discussed with [place]. Risks, benefits and expectations were outlined. Risks including, but not limited to hypertension, stroke, MI, death were discussed and acknowledged by [place].     Serology studies on 3/8/2019:  Creatinine 1.7/GFR 30.4   Iron 80   Iron saturation 26.5%   TIBC 302   Ferritin 144   Vitamin B12 501 Folate 13.9    Serology studies on 3/5/2020:  · Creatinine 1.3/GFR 41.4  · Iron 84  · TIBC 390  · Iron saturation 21.5%  · Ferritin 342    Iron substrates on 6/25/2020  · Ferritin 311  · Iron 76  · Iron saturation 23%  · TIBC 326  · Creatinine 1.3/GFR 39    Labs on 12/2/2020 and 12/3/2020:  · Iron 35  · TIBC 213  · Iron saturation 16%  · Vitamin B12 483  · Folate 10.6  · Ferritin 480    Labs on 3/23/2021  · Iron 54  · TIBC 366  · Iron saturation 15  · Ferritin 530  · Reticulocytes 2.1    Labs on 4/6/2021  · WBC 14.40  · RBC 2.46  · HGB 8.6  · HCT 26.4  · .3  · MCH 35  · MCHC 32.6  · ANC 12.03  · ,000  · Iron 43   · TIBC 226  · Iron saturation 19%   · Ferritin 1088.0  · B-12= 781  · GFR 39  · BUN 21  · Creatinine 1.3  · Phosphorus 2.4  · Magnesium 1.5      Age-appropriate health screening:  · Colonoscopy on 10/4/2018 by Dr. Kee Marie was documented as removal of 2 polyps with benign pathology. No evidence of bleeding. · 9/20/2019 Bilateral mammogram at [facility] documented no mammographic evidence of malignancy.   · No bone mineral density on file     Past Medical History:    Past Medical History:   Diagnosis Date    Abdominal aortic aneurysm (AAA) ([facility] [address]     Anemia     Arthritis     Cancer ([facility] [address] 12/01/2020    pancreatic    CHF (congestive heart failure) (HCC)     Chronic kidney disease     DVT of lower extremity (deep venous thrombosis) (HCC)     twice 2010 and 2017    Hyperlipidemia     Hypertension     Kidney stones     Osteoarthritis     Palliative care patient 12/02/2020    Thyroid disease     Thyroid disorder        Past Surgical History:    Past Surgical History:   Procedure Laterality Date    COLONOSCOPY  2016    Dr Lela Tyler problems    ENDOSCOPIC ULTRASOUND (LOWER) N/A 12/03/2020    Dr JONNY Howe-w/unsuccessful biliary cannulation despite attempts at 2-wire cannulation and proph pancreatic stenting with cannulation around pancreatic stenting-Positive for high-grade adenocarcinoma, pancreatic head    ERCP N/A 12/03/2020    Dr JONNY Howe-w/unsuccessful biliary cannulation despite attempts at 2-wire cannulation and proph pancreatic stenting with cannulation around pancreatic stenting-Positive for high-grade adenocarcinoma, pancreatic head    ERCP  12/07/2020    Dr Monty Hernandez-w/sphincterotomy, placement of a 10 x 60 partially covered biliary stent    PORT SURGERY Right 2/5/2021    SINGLE LUMEN PORT PLACEMENT WITH ULTRASOUND AND FLUORO performed by Dev Alfred MD at [address] [place] [address] TOE AMPUTATION Bilateral     pinky toes removed    TOTAL KNEE ARTHROPLASTY Right 01/03/2020    RIGHT TOTAL KNEE REPLACEMENT performed by Brigitte Childs MD at [facility] OR       Current Medications:    Current Outpatient Medications   Medication Sig Dispense Refill    lisinopril (PRINIVIL;ZESTRIL) 10 MG tablet Take 10 mg by mouth daily      meclizine (ANTIVERT) 25 MG tablet Take 25 mg by mouth as needed      esomeprazole (NEXIUM) 40 MG delayed release capsule       HYDROcodone-acetaminophen (NORCO) 7.5-325 MG per tablet       pramipexole (MIRAPEX) 0.125 MG tablet       aspirin 81 MG EC tablet Take 1 tablet by mouth daily 30 tablet 5    carvedilol (COREG) 3.125 MG tablet Take 1 tablet by mouth 2 times daily (with meals) 60 tablet 5    midodrine (PROAMATINE) 10 MG tablet Take 1 tablet by mouth 3 times daily (with meals) 90 tablet 5    nitroGLYCERIN (NITROSTAT) 0.4 MG SL tablet up to max of 3 total doses.  If no relief after 1 dose, call 911. 25 tablet 0    bumetanide (BUMEX) 1 MG tablet Take 1 tablet by mouth 2 times daily 30 tablet 0    sodium bicarbonate 325 MG tablet Take 325 mg by mouth 2 times daily      vitamin D (CHOLECALCIFEROL) 25 MCG (1000 UT) TABS tablet Take 1,000 Units by mouth daily       ondansetron (ZOFRAN ODT) 4 MG disintegrating tablet Take 2 tablets by mouth every 8 hours as needed for Nausea or Vomiting 60 tablet 3    lidocaine-prilocaine (EMLA) 2.5-2.5 % cream APPLY TO PORT AREA AND COVERWITH PLASTIC WRAP ONE HOUR PRIOR TO TREATMENT 30 g 1    promethazine (PHENERGAN) 25 MG tablet TAKE 1 TABLET BY MOUTH FOUR TIMES DAILY AS NEEDED FOR NAUSEA 20 tablet 1    apixaban (ELIQUIS) 2.5 MG TABS tablet Take 2.5 mg by mouth 2 times daily      docusate sodium (COLACE) 100 MG capsule Take 1 capsule by mouth 2 times daily 60 capsule 1    allopurinol (ZYLOPRIM) 100 MG tablet Take 100 mg by mouth daily Taken 1 time a day now      levothyroxine (SYNTHROID) 112 MCG tablet Take 112 mcg by mouth Daily      simvastatin (ZOCOR) 20 MG tablet Take 20 mg by mouth nightly       No current facility-administered medications for this visit. Allergies: Allergies   Allergen Reactions    Penicillins Rash     Childhood        Social History:    Social History     Tobacco Use    Smoking status: Never Smoker    Smokeless tobacco: Never Used   Vaping Use    Vaping Use: Never used   Substance Use Topics    Alcohol use: No    Drug use: No       Family History:   Family History   Problem Relation Age of Onset    Colon Cancer Brother     Cancer Mother         Breast Cancer    Heart Attack Father     Colon Polyps Neg Hx     Esophageal Cancer Neg Hx     Liver Cancer Neg Hx     Liver Disease Neg Hx     Rectal Cancer Neg Hx     Stomach Cancer Neg Hx        Vitals:  Vitals:    08/26/21 1409   BP: 100/60   Pulse: 75   SpO2: 97%   Weight: 143 lb 3.2 oz (65 kg)   Height: 5' 3\" (1.6 m)        Subjective   REVIEW OF SYSTEMS:   Review of Systems   Constitutional: Positive for activity change (Poor performance status), fatigue (Severe) and unexpected weight change (10 pounds over the past 6 weeks). Negative for chills, diaphoresis and fever. HENT: Negative. Negative for congestion, ear pain, hearing loss, nosebleeds, sore throat and tinnitus. Eyes: Negative. Negative for pain, discharge and redness. Respiratory: Negative. Negative for cough, shortness of breath (With exertion) and wheezing. Cardiovascular: Negative. Negative for chest pain, palpitations and leg swelling. Gastrointestinal: Positive for constipation (Occasional) and nausea. Negative for abdominal pain, blood in stool, diarrhea and vomiting. Endocrine: Negative for polydipsia. Genitourinary: Negative for dysuria, flank pain, frequency, hematuria and urgency. Musculoskeletal: Negative. Negative for back pain, myalgias and neck pain. Skin: Negative. Negative for rash. Neurological: Positive for weakness (Generalized). Negative for dizziness, tremors, seizures and headaches. Hematological: Does not bruise/bleed easily. Psychiatric/Behavioral: Negative. The patient is not nervous/anxious. Objective   PHYSICAL EXAM:  Physical Exam  Vitals reviewed. Constitutional:       General: She is not in acute distress. Appearance: She is well-developed. She is ill-appearing. She is not diaphoretic. HENT:      Head: Normocephalic and atraumatic. Mouth/Throat:      Pharynx: Uvula midline. Tonsils: No tonsillar exudate. Eyes:      General: Lids are normal. No scleral icterus. Right eye: No discharge. Left eye: No discharge. Conjunctiva/sclera: Conjunctivae normal.      Pupils: Pupils are equal, round, and reactive to light. Neck:      Thyroid: No thyroid mass or thyromegaly. Vascular: No JVD. Trachea: Trachea normal. No tracheal deviation. Cardiovascular:      Rate and Rhythm: Normal rate and regular rhythm. Heart sounds: Normal heart sounds. No murmur heard. No friction rub. No gallop. Pulmonary:      Effort: Pulmonary effort is normal. No respiratory distress. Breath sounds: Normal breath sounds. No wheezing or rales. Chest:      Chest wall: No tenderness. Abdominal:      General: Bowel sounds are normal. There is no distension. Palpations: Abdomen is soft. There is no mass. Tenderness: There is no abdominal tenderness.  There is no guarding or weeks if chooses not to move forward with hospice  - CMP and CA 19-9 today    2. Management of chemotherapy side effects:  -Nausea-improved although still requires daily antiemetic  -Diarrhea-resolved, now with constipation  -Fatigue-improved to a grade 2 although declined/poor performance status.   -Weight loss with poor oral intake, loss of 10 pounds over the past 6 weeks     Wt Readings from Last 3 Encounters:   08/26/21 143 lb 3.2 oz (65 kg)   07/15/21 153 lb (69.4 kg)   06/17/21 153 lb 11.2 oz (69.7 kg)         3. Extra hepatic/intrahepatic biliary dilation, status post ERCP with metal stent placement on 12/7/2020 at [facility]. Liver enzymes beginning to increase, will continue to monitor. Remains asymptomatic denying any abdominal pain    -CMP today  -Virtual visit with Dr. Prescott Him on 2/16/2021 and documented that a stent adjustment may be needed if LFTs continue to increase, follow-up with Dr. Brionna Lancaster if between visits began having abdominal symptoms    4. Anemia of chronic kidney failure, stage 3B. Receives BARBARA therapy with Retacrit intermittently for hemoglobin <11, last dose of 40,000 units given on 7/15/2021. She continues to take ferrous sulfate she continues to take the ferrous sulfate without difficulty once daily. Hemoglobin improved to 10.7 with an MCV of 112.2 compared to 8.3 with an MCV of 110.1 on 7/15/2021. Labs on 4/6/2021  · Iron 43   · TIBC 226  · Iron saturation 19%   · Ferritin 1088.0  · B-12= 781        -Retacrit 40,000 units will be given today for hemoglobin of 10.7 in the setting of anemia and chronic kidney disease  -Will repeat iron substrates upon return    5. Hypothyroidism presently being managed by Dr. Carole Cook and is taking Synthroid 112 µg daily.   TSH 4.710 on 5/18/2021  -Follow-up with Dr. Carole Cook for monitoring of TSH and Synthroid dosing    I discussed all of the above findings included in the assessment and plan with the patient and the patient is in agreement to move forward with current recommendations/treatment. I have addressed all of their questions and concerns that were verbalized. FOLLOW UP:  1. Follow-up appointment given for 6 weeks, sooner if needed  2. Continue to follow with other medical providers as recommended    Discussed precautions related to [address] and being at increased risk. Discussed proper handwashing to be done frequently, limit exposure to other individuals and maintain social distancing of 6 feet. Recommend contacting primary care provider if having respiratory symptoms for further recommendations and consideration for testing. EMR Dragon/Transcription disclaimer:   Much of this encounter note is an electronic transcription/translation of spoken language to printed text. The electronic translation of spoken language may permit erroneous, or at times, nonsensical words or phrases to be inadvertently transcribed; although attempts have made to review the note for such errors, some may still exist. Also, portions of this note have been copied forward, however, changed to reflect the most current clinical status of this patient. Alejandra Rose am scribing for ALBERTO Nick. Electronically signed by Jaylon Miranda RN on 8/26/2021 at 1320. I, Rosa Meigs, APRN personally performed the services described in this documentation as scribed by Jaylon Miranda RN in my presence and is both accurate and complete.     Electronically signed by ALBERTO Nick on 9/6/2021 at 7:10 PM

## 2021-09-06 ASSESSMENT — ENCOUNTER SYMPTOMS
SHORTNESS OF BREATH: 0
RESPIRATORY NEGATIVE: 1
NAUSEA: 1
CONSTIPATION: 1

## 2021-09-20 ENCOUNTER — OFFICE VISIT (OUTPATIENT)
Dept: CARDIOLOGY CLINIC | Age: 86
End: 2021-09-20
Payer: MEDICARE

## 2021-09-20 VITALS
HEIGHT: 63 IN | BODY MASS INDEX: 25.16 KG/M2 | WEIGHT: 142 LBS | DIASTOLIC BLOOD PRESSURE: 60 MMHG | SYSTOLIC BLOOD PRESSURE: 102 MMHG | HEART RATE: 80 BPM

## 2021-09-20 DIAGNOSIS — I42.0 DILATED CARDIOMYOPATHY (HCC): ICD-10-CM

## 2021-09-20 DIAGNOSIS — R94.39 ABNORMAL STRESS TEST: ICD-10-CM

## 2021-09-20 DIAGNOSIS — I10 ESSENTIAL HYPERTENSION: Primary | ICD-10-CM

## 2021-09-20 PROCEDURE — G8427 DOCREV CUR MEDS BY ELIG CLIN: HCPCS | Performed by: INTERNAL MEDICINE

## 2021-09-20 PROCEDURE — G8417 CALC BMI ABV UP PARAM F/U: HCPCS | Performed by: INTERNAL MEDICINE

## 2021-09-20 PROCEDURE — 1090F PRES/ABSN URINE INCON ASSESS: CPT | Performed by: INTERNAL MEDICINE

## 2021-09-20 PROCEDURE — 99213 OFFICE O/P EST LOW 20 MIN: CPT | Performed by: INTERNAL MEDICINE

## 2021-09-20 PROCEDURE — 1036F TOBACCO NON-USER: CPT | Performed by: INTERNAL MEDICINE

## 2021-09-20 PROCEDURE — 1123F ACP DISCUSS/DSCN MKR DOCD: CPT | Performed by: INTERNAL MEDICINE

## 2021-09-20 PROCEDURE — 4040F PNEUMOC VAC/ADMIN/RCVD: CPT | Performed by: INTERNAL MEDICINE

## 2021-09-20 RX ORDER — POLYETHYLENE GLYCOL 3350 17 G/17G
17 POWDER, FOR SOLUTION ORAL DAILY
COMMUNITY

## 2021-09-20 ASSESSMENT — ENCOUNTER SYMPTOMS
EYES NEGATIVE: 1
DIARRHEA: 0
SHORTNESS OF BREATH: 0
NAUSEA: 0
GASTROINTESTINAL NEGATIVE: 1
RESPIRATORY NEGATIVE: 1
VOMITING: 0

## 2021-09-20 NOTE — PROGRESS NOTES
[facility] Progress Note                            Date:  9/20/2021  Patient: Suma Browning  Age:  80 y.o., 1934      Reason for evaluation:         SUBJECTIVE:    Returns today follow-up assessment. Apparently her other physicians have stopped chemotherapy for her pancreatic carcinoma due to generalized weakness. She can get up and only walk from the wheelchair to the bathroom and that is about it. Denies chest pain dyspnea stable no new complaints. Blood pressure 102/60 heart 80. Review of Systems   Constitutional: Negative. Negative for chills, fever and unexpected weight change. HENT: Negative. Eyes: Negative. Respiratory: Negative. Negative for shortness of breath. Cardiovascular: Negative. Negative for chest pain. Gastrointestinal: Negative. Negative for diarrhea, nausea and vomiting. Endocrine: Negative. Genitourinary: Negative. Musculoskeletal: Negative. Skin: Negative. Neurological: Negative. OBJECTIVE:     /60   Pulse 80   Ht 5' 3\" (1.6 m)   Wt 142 lb (64.4 kg)   BMI 25.15 kg/m²     Labs:   CBC: No results for input(s): WBC, HGB, HCT, PLT in the last 72 hours. BMP:No results for input(s): NA, K, CO2, BUN, CREATININE, LABGLOM, GLUCOSE in the last 72 hours. BNP: No results for input(s): BNP in the last 72 hours. PT/INR: No results for input(s): PROTIME, INR in the last 72 hours. APTT:No results for input(s): APTT in the last 72 hours. CARDIAC ENZYMES:No results for input(s): CKTOTAL, CKMB, CKMBINDEX, TROPONINI in the last 72 hours. FASTING LIPID PANEL:  Lab Results   Component Value Date    HDL 54 05/18/2021    LDLCALC 79 05/18/2021    TRIG 108 05/18/2021     LIVER PROFILE:No results for input(s): AST, ALT, LABALBU in the last 72 hours.         Past Medical History:   Diagnosis Date    Abdominal aortic aneurysm (AAA) [facility] [address]     Anemia     Arthritis     Cancer ([facility] [address] 12/01/2020    pancreatic    [facility]est[facility] failure[facility] [address]     Chronic kidney disease     DVT of lower extremity (deep venous thrombosis) (HCC)     twice 2010 and 2017    Hyperlipidemia     Hypertension     Kidney stones     Osteoarthritis     Palliative care patient 12/02/2020    Thyroid disease     Thyroid disorder      Past Surgical History:   Procedure Laterality Date    COLONOSCOPY  2016    Dr Yang Ku problems    ENDOSCOPIC ULTRASOUND (LOWER) N/A 12/03/2020    Dr JONNY Grijalva/unsuccessful biliary cannulation despite attempts at 2-wire cannulation and proph pancreatic stenting with cannulation around pancreatic stenting-Positive for high-grade adenocarcinoma, pancreatic head    ERCP N/A 12/03/2020    Dr JONNY Grijalva/unsuccessful biliary cannulation despite attempts at 2-wire cannulation and proph pancreatic stenting with cannulation around pancreatic stenting-Positive for high-grade adenocarcinoma, pancreatic head    ERCP  12/07/2020    Dr Valdene Oppenheim Thomas-w/sphincterotomy, placement of a 10 x 60 partially covered biliary stent    PORT SURGERY Right 2/5/2021    SINGLE LUMEN PORT PLACEMENT WITH ULTRASOUND AND FLUORO performed by Viktor Chakraborty MD at [facility] TOE AMPUTATION Bilateral     pinky toes removed    TOTAL KNEE ARTHROPLASTY Right 01/03/2020    RIGHT TOTAL KNEE REPLACEMENT performed by Jo Ann Contreras MD at [facility] OR     Family History   Problem Relation Age of Onset    Colon Cancer Brother     Cancer Mother         Breast Cancer    Heart Attack Father     Colon Polyps Neg Hx     Esophageal Cancer Neg Hx     Liver Cancer Neg Hx     Liver Disease Neg Hx     Rectal Cancer Neg Hx     Stomach Cancer Neg Hx      Allergies   Allergen Reactions    Penicillins Rash     Childhood      Current Outpatient Medications   Medication Sig Dispense Refill    polyethylene glycol (GLYCOLAX) 17 GM/SCOOP powder Take 17 g by mouth daily      lisinopril (PRINIVIL;ZESTRIL) 10 MG tablet Take 10 mg by mouth daily      meclizine (ANTIVERT) 25 MG tablet Take 25 mg by mouth as needed      HYDROcodone-acetaminophen (NORCO) 7.5-325 MG per tablet       pramipexole (MIRAPEX) 0.125 MG tablet       aspirin 81 MG EC tablet Take 1 tablet by mouth daily 30 tablet 5    carvedilol (COREG) 3.125 MG tablet Take 1 tablet by mouth 2 times daily (with meals) (Patient taking differently: Take 3.125 mg by mouth 3 times daily ) 60 tablet 5    nitroGLYCERIN (NITROSTAT) 0.4 MG SL tablet up to max of 3 total doses.  If no relief after 1 dose, call 911. 25 tablet 0    sodium bicarbonate 325 MG tablet Take 325 mg by mouth 2 times daily      ondansetron (ZOFRAN ODT) 4 MG disintegrating tablet Take 2 tablets by mouth every 8 hours as needed for Nausea or Vomiting 60 tablet 3    apixaban (ELIQUIS) 2.5 MG TABS tablet Take 2.5 mg by mouth 2 times daily      docusate sodium (COLACE) 100 MG capsule Take 1 capsule by mouth 2 times daily 60 capsule 1    allopurinol (ZYLOPRIM) 100 MG tablet Take 100 mg by mouth daily Taken 1 time a day now      levothyroxine (SYNTHROID) 112 MCG tablet Take 112 mcg by mouth Daily      simvastatin (ZOCOR) 20 MG tablet Take 20 mg by mouth nightly      esomeprazole (NEXIUM) 40 MG delayed release capsule  (Patient not taking: Reported on 9/20/2021)      midodrine (PROAMATINE) 10 MG tablet Take 1 tablet by mouth 3 times daily (with meals) (Patient not taking: Reported on 9/20/2021) 90 tablet 5    bumetanide (BUMEX) 1 MG tablet Take 1 tablet by mouth 2 times daily (Patient not taking: Reported on 9/20/2021) 30 tablet 0    vitamin D (CHOLECALCIFEROL) 25 MCG (1000 UT) TABS tablet Take 1,000 Units by mouth daily  (Patient not taking: Reported on 9/20/2021)      lidocaine-prilocaine (EMLA) 2.5-2.5 % cream APPLY TO PORT AREA AND COVERWITH PLASTIC WRAP ONE HOUR PRIOR TO TREATMENT (Patient not taking: Reported on 9/20/2021) 30 g 1    promethazine (PHENERGAN) 25 MG tablet TAKE 1 TABLET BY MOUTH FOUR TIMES DAILY AS NEEDED FOR NAUSEA (Patient not taking: Reported on 9/20/2021) 20 tablet 1     No current facility-administered medications for this visit. Social History     Socioeconomic History    Marital status:      Spouse name: Not on file    Number of children: Not on file    Years of education: Not on file    Highest education level: Not on file   Occupational History    Not on file   Tobacco Use    Smoking status: Never Smoker    Smokeless tobacco: Never Used   Vaping Use    Vaping Use: Never used   Substance and Sexual Activity    Alcohol use: No    Drug use: No    Sexual activity: Not on file   Other Topics Concern    Not on file   Social History Narrative    Not on file     Social Determinants of Health     Financial Resource Strain:     Difficulty of Paying Living Expenses:    Food Insecurity:     Worried About Running Out of Food in the Last Year:     [address] in the Last Year:    Transportation Needs:     Lack of Transportation (Medical):  Lack of Transportation (Non-Medical):    Physical Activity:     Days of Exercise per Week:     Minutes of Exercise per Session:    Stress:     Feeling of Stress :    Social Connections:     Frequency of Communication with Friends and Family:     Frequency of Social Gatherings with Friends and Family:     Attends [employer] Services:     Active Member of Clubs or Organizations:     Attends Club or Organization Meetings:     Marital Status:    Intimate Partner Violence:     Fear of Current or Ex-Partner:     Emotionally Abused:     Physically Abused:     Sexually Abused:        Physical Examination:  /60   Pulse 80   Ht 5' 3\" (1.6 m)   Wt 142 lb (64.4 kg)   BMI 25.15 kg/m²   Physical Exam  Vitals reviewed. Constitutional:       Appearance: She is well-developed. Neck:      Vascular: No carotid bruit or JVD. Cardiovascular:      Rate and Rhythm: Normal rate and regular rhythm. Heart sounds: Normal heart sounds. No murmur heard. No friction rub.  No gallop. Pulmonary:      Effort: Pulmonary effort is normal. No respiratory distress. Breath sounds: Normal breath sounds. No wheezing or rales. Abdominal:      General: There is no distension. Tenderness: There is no abdominal tenderness. Lymphadenopathy:      Cervical: No cervical adenopathy. Skin:     General: Skin is warm and dry. ASSESSMENT:     Diagnosis Orders   1. Essential hypertension     2. Abnormal stress test     3. Dilated cardiomyopathy ([address]         PLAN:  No orders of the defined types were placed in this encounter. No orders of the defined types were placed in this encounter. 1. Continue present medications  2. Recommend follow-up assessment in 6 months    Return in about 6 months (around 3/20/2022) for return to Dr. Prasanna Barry only. Pallavi Burnette MD 9/20/2021 2:34 PM CDT    [facility]      Thisdictation was generated by voice recognition computer software. Although all attempts are made to edit the dictation for accuracy, there may be errors in the transcription that are not intended.

## 2021-09-30 ENCOUNTER — ANESTHESIA EVENT (OUTPATIENT)
Dept: OPERATING ROOM | Age: 86
DRG: 522 | End: 2021-09-30
Payer: MEDICARE

## 2021-09-30 ENCOUNTER — APPOINTMENT (OUTPATIENT)
Dept: MRI IMAGING | Age: 86
DRG: 522 | End: 2021-09-30
Payer: MEDICARE

## 2021-09-30 ENCOUNTER — APPOINTMENT (OUTPATIENT)
Dept: GENERAL RADIOLOGY | Age: 86
DRG: 522 | End: 2021-09-30
Payer: MEDICARE

## 2021-09-30 ENCOUNTER — ANESTHESIA (OUTPATIENT)
Dept: OPERATING ROOM | Age: 86
DRG: 522 | End: 2021-09-30
Payer: MEDICARE

## 2021-09-30 ENCOUNTER — APPOINTMENT (OUTPATIENT)
Dept: CT IMAGING | Age: 86
DRG: 522 | End: 2021-09-30
Payer: MEDICARE

## 2021-09-30 ENCOUNTER — HOSPITAL ENCOUNTER (INPATIENT)
Age: 86
LOS: 4 days | Discharge: NURSING, REHAB OR LONG-TERM CARE | DRG: 522 | End: 2021-10-04
Attending: EMERGENCY MEDICINE | Admitting: INTERNAL MEDICINE
Payer: MEDICARE

## 2021-09-30 DIAGNOSIS — S72.001A CLOSED FRACTURE OF RIGHT HIP, INITIAL ENCOUNTER (HCC): Primary | ICD-10-CM

## 2021-09-30 DIAGNOSIS — N30.00 ACUTE CYSTITIS WITHOUT HEMATURIA: ICD-10-CM

## 2021-09-30 DIAGNOSIS — K92.2 GASTROINTESTINAL HEMORRHAGE, UNSPECIFIED GASTROINTESTINAL HEMORRHAGE TYPE: ICD-10-CM

## 2021-09-30 DIAGNOSIS — S09.90XA CLOSED HEAD INJURY, INITIAL ENCOUNTER: ICD-10-CM

## 2021-09-30 PROBLEM — N39.0 UTI (URINARY TRACT INFECTION): Status: ACTIVE | Noted: 2021-09-30

## 2021-09-30 PROBLEM — D72.829 LEUKOCYTOSIS: Status: ACTIVE | Noted: 2021-09-30

## 2021-09-30 LAB
ABO/RH: NORMAL
ALBUMIN SERPL-MCNC: 2 G/DL (ref 3.5–5.2)
ALP BLD-CCNC: 318 U/L (ref 35–104)
ALT SERPL-CCNC: 9 U/L (ref 5–33)
ANION GAP SERPL CALCULATED.3IONS-SCNC: 10 MMOL/L (ref 7–19)
ANTIBODY SCREEN: NORMAL
APTT: 39.8 SEC (ref 26–36.2)
AST SERPL-CCNC: 14 U/L (ref 5–32)
BACTERIA: ABNORMAL /HPF
BASOPHILS ABSOLUTE: 0.1 K/UL (ref 0–0.2)
BASOPHILS RELATIVE PERCENT: 0.4 % (ref 0–1)
BILIRUB SERPL-MCNC: 0.5 MG/DL (ref 0.2–1.2)
BILIRUBIN URINE: NEGATIVE
BLOOD BANK DISPENSE STATUS: NORMAL
BLOOD BANK PRODUCT CODE: NORMAL
BLOOD, URINE: ABNORMAL
BPU ID: NORMAL
BUN BLDV-MCNC: 32 MG/DL (ref 8–23)
CALCIUM SERPL-MCNC: 8.8 MG/DL (ref 8.8–10.2)
CHLORIDE BLD-SCNC: 104 MMOL/L (ref 98–111)
CLARITY: ABNORMAL
CO2: 24 MMOL/L (ref 22–29)
COLOR: YELLOW
CREAT SERPL-MCNC: 2 MG/DL (ref 0.5–0.9)
DESCRIPTION BLOOD BANK: NORMAL
EOSINOPHILS ABSOLUTE: 0.2 K/UL (ref 0–0.6)
EOSINOPHILS RELATIVE PERCENT: 0.6 % (ref 0–5)
EPITHELIAL CELLS, UA: ABNORMAL /HPF
GFR AFRICAN AMERICAN: 29
GFR NON-AFRICAN AMERICAN: 24
GLUCOSE BLD-MCNC: 106 MG/DL (ref 70–99)
GLUCOSE BLD-MCNC: 139 MG/DL (ref 74–109)
GLUCOSE URINE: NEGATIVE MG/DL
HCT VFR BLD CALC: 24.2 % (ref 37–47)
HCT VFR BLD CALC: 29.4 % (ref 37–47)
HEMOGLOBIN: 7.4 G/DL (ref 12–16)
IMMATURE GRANULOCYTES #: 0.4 K/UL
INR BLD: 1.52 (ref 0.88–1.18)
KETONES, URINE: NEGATIVE MG/DL
LEUKOCYTE ESTERASE, URINE: ABNORMAL
LYMPHOCYTES ABSOLUTE: 0.6 K/UL (ref 1.1–4.5)
LYMPHOCYTES RELATIVE PERCENT: 2.3 % (ref 20–40)
MCH RBC QN AUTO: 32 PG (ref 27–31)
MCHC RBC AUTO-ENTMCNC: 30.6 G/DL (ref 33–37)
MCV RBC AUTO: 104.8 FL (ref 81–99)
MONOCYTES ABSOLUTE: 1.4 K/UL (ref 0–0.9)
MONOCYTES RELATIVE PERCENT: 5.8 % (ref 0–10)
NEUTROPHILS ABSOLUTE: 21.4 K/UL (ref 1.5–7.5)
NEUTROPHILS RELATIVE PERCENT: 89.3 % (ref 50–65)
NITRITE, URINE: NEGATIVE
PDW BLD-RTO: 15.5 % (ref 11.5–14.5)
PERFORMED ON: ABNORMAL
PH UA: 5.5 (ref 5–8)
PLATELET # BLD: 529 K/UL (ref 130–400)
PMV BLD AUTO: 10 FL (ref 9.4–12.3)
POTASSIUM SERPL-SCNC: 3.6 MMOL/L (ref 3.5–5)
PROTEIN UA: 30 MG/DL
PROTHROMBIN TIME: 18.3 SEC (ref 12–14.6)
RBC # BLD: 2.31 M/UL (ref 4.2–5.4)
RBC UA: ABNORMAL /HPF (ref 0–2)
REASON FOR REJECTION: NORMAL
REJECTED TEST: NORMAL
RETICULOCYTE ABSOLUTE COUNT: 0.06 M/UL (ref 0.03–0.12)
RETICULOCYTE COUNT PCT: 2.07 % (ref 0.5–1.5)
SARS-COV-2, NAAT: NOT DETECTED
SODIUM BLD-SCNC: 138 MMOL/L (ref 136–145)
SPECIFIC GRAVITY UA: 1.02 (ref 1–1.03)
TOTAL PROTEIN: 5.6 G/DL (ref 6.6–8.7)
UROBILINOGEN, URINE: 1 E.U./DL
WBC # BLD: 24 K/UL (ref 4.8–10.8)
WBC UA: ABNORMAL /HPF (ref 0–5)

## 2021-09-30 PROCEDURE — 87086 URINE CULTURE/COLONY COUNT: CPT

## 2021-09-30 PROCEDURE — 72170 X-RAY EXAM OF PELVIS: CPT

## 2021-09-30 PROCEDURE — 36415 COLL VENOUS BLD VENIPUNCTURE: CPT

## 2021-09-30 PROCEDURE — P9016 RBC LEUKOCYTES REDUCED: HCPCS

## 2021-09-30 PROCEDURE — 86923 COMPATIBILITY TEST ELECTRIC: CPT

## 2021-09-30 PROCEDURE — 70450 CT HEAD/BRAIN W/O DYE: CPT

## 2021-09-30 PROCEDURE — 85025 COMPLETE CBC W/AUTO DIFF WBC: CPT

## 2021-09-30 PROCEDURE — 99222 1ST HOSP IP/OBS MODERATE 55: CPT | Performed by: INTERNAL MEDICINE

## 2021-09-30 PROCEDURE — C9113 INJ PANTOPRAZOLE SODIUM, VIA: HCPCS | Performed by: EMERGENCY MEDICINE

## 2021-09-30 PROCEDURE — 6360000002 HC RX W HCPCS: Performed by: EMERGENCY MEDICINE

## 2021-09-30 PROCEDURE — 82947 ASSAY GLUCOSE BLOOD QUANT: CPT

## 2021-09-30 PROCEDURE — 85730 THROMBOPLASTIN TIME PARTIAL: CPT

## 2021-09-30 PROCEDURE — 86900 BLOOD TYPING SEROLOGIC ABO: CPT

## 2021-09-30 PROCEDURE — 82746 ASSAY OF FOLIC ACID SERUM: CPT

## 2021-09-30 PROCEDURE — 2580000003 HC RX 258: Performed by: EMERGENCY MEDICINE

## 2021-09-30 PROCEDURE — 93005 ELECTROCARDIOGRAM TRACING: CPT | Performed by: EMERGENCY MEDICINE

## 2021-09-30 PROCEDURE — 99222 1ST HOSP IP/OBS MODERATE 55: CPT | Performed by: PHYSICIAN ASSISTANT

## 2021-09-30 PROCEDURE — 6370000000 HC RX 637 (ALT 250 FOR IP): Performed by: NURSE PRACTITIONER

## 2021-09-30 PROCEDURE — 72141 MRI NECK SPINE W/O DYE: CPT

## 2021-09-30 PROCEDURE — 85045 AUTOMATED RETICULOCYTE COUNT: CPT

## 2021-09-30 PROCEDURE — 99222 1ST HOSP IP/OBS MODERATE 55: CPT | Performed by: NEUROLOGICAL SURGERY

## 2021-09-30 PROCEDURE — 72125 CT NECK SPINE W/O DYE: CPT

## 2021-09-30 PROCEDURE — 36430 TRANSFUSION BLD/BLD COMPNT: CPT

## 2021-09-30 PROCEDURE — 99284 EMERGENCY DEPT VISIT MOD MDM: CPT

## 2021-09-30 PROCEDURE — 82607 VITAMIN B-12: CPT

## 2021-09-30 PROCEDURE — 80053 COMPREHEN METABOLIC PANEL: CPT

## 2021-09-30 PROCEDURE — 81001 URINALYSIS AUTO W/SCOPE: CPT

## 2021-09-30 PROCEDURE — 85610 PROTHROMBIN TIME: CPT

## 2021-09-30 PROCEDURE — 87635 SARS-COV-2 COVID-19 AMP PRB: CPT

## 2021-09-30 PROCEDURE — 86901 BLOOD TYPING SEROLOGIC RH(D): CPT

## 2021-09-30 PROCEDURE — 86850 RBC ANTIBODY SCREEN: CPT

## 2021-09-30 PROCEDURE — 71045 X-RAY EXAM CHEST 1 VIEW: CPT

## 2021-09-30 PROCEDURE — 96374 THER/PROPH/DIAG INJ IV PUSH: CPT

## 2021-09-30 PROCEDURE — 87040 BLOOD CULTURE FOR BACTERIA: CPT

## 2021-09-30 PROCEDURE — 87186 SC STD MICRODIL/AGAR DIL: CPT

## 2021-09-30 PROCEDURE — 1210000000 HC MED SURG R&B

## 2021-09-30 PROCEDURE — 96375 TX/PRO/DX INJ NEW DRUG ADDON: CPT

## 2021-09-30 PROCEDURE — 73552 X-RAY EXAM OF FEMUR 2/>: CPT

## 2021-09-30 RX ORDER — CARVEDILOL 3.12 MG/1
3.12 TABLET ORAL DAILY
Status: DISCONTINUED | OUTPATIENT
Start: 2021-09-30 | End: 2021-10-04 | Stop reason: HOSPADM

## 2021-09-30 RX ORDER — MORPHINE SULFATE 2 MG/ML
2 INJECTION, SOLUTION INTRAMUSCULAR; INTRAVENOUS ONCE
Status: COMPLETED | OUTPATIENT
Start: 2021-09-30 | End: 2021-09-30

## 2021-09-30 RX ORDER — SODIUM CHLORIDE 9 MG/ML
25 INJECTION, SOLUTION INTRAVENOUS PRN
Status: DISCONTINUED | OUTPATIENT
Start: 2021-09-30 | End: 2021-09-30 | Stop reason: SDUPTHER

## 2021-09-30 RX ORDER — BISACODYL 10 MG
10 SUPPOSITORY, RECTAL RECTAL DAILY PRN
Status: DISCONTINUED | OUTPATIENT
Start: 2021-09-30 | End: 2021-10-04 | Stop reason: HOSPADM

## 2021-09-30 RX ORDER — PANTOPRAZOLE SODIUM 40 MG/10ML
40 INJECTION, POWDER, LYOPHILIZED, FOR SOLUTION INTRAVENOUS ONCE
Status: COMPLETED | OUTPATIENT
Start: 2021-09-30 | End: 2021-09-30

## 2021-09-30 RX ORDER — SIMVASTATIN 10 MG
20 TABLET ORAL NIGHTLY
Status: DISCONTINUED | OUTPATIENT
Start: 2021-09-30 | End: 2021-10-04 | Stop reason: HOSPADM

## 2021-09-30 RX ORDER — LISINOPRIL 10 MG/1
10 TABLET ORAL DAILY
Status: DISCONTINUED | OUTPATIENT
Start: 2021-09-30 | End: 2021-10-04 | Stop reason: HOSPADM

## 2021-09-30 RX ORDER — ONDANSETRON 4 MG/1
8 TABLET, ORALLY DISINTEGRATING ORAL EVERY 8 HOURS PRN
Status: DISCONTINUED | OUTPATIENT
Start: 2021-09-30 | End: 2021-10-04 | Stop reason: HOSPADM

## 2021-09-30 RX ORDER — ALLOPURINOL 100 MG/1
100 TABLET ORAL DAILY
Status: DISCONTINUED | OUTPATIENT
Start: 2021-09-30 | End: 2021-10-04 | Stop reason: HOSPADM

## 2021-09-30 RX ORDER — TRAMADOL HYDROCHLORIDE 50 MG/1
25 TABLET ORAL EVERY 6 HOURS PRN
Status: DISCONTINUED | OUTPATIENT
Start: 2021-09-30 | End: 2021-10-04

## 2021-09-30 RX ORDER — ONDANSETRON 2 MG/ML
4 INJECTION INTRAMUSCULAR; INTRAVENOUS ONCE
Status: COMPLETED | OUTPATIENT
Start: 2021-09-30 | End: 2021-09-30

## 2021-09-30 RX ORDER — LEVOTHYROXINE SODIUM 112 UG/1
112 TABLET ORAL DAILY
Status: DISCONTINUED | OUTPATIENT
Start: 2021-09-30 | End: 2021-10-04 | Stop reason: HOSPADM

## 2021-09-30 RX ORDER — DOCUSATE SODIUM 100 MG/1
100 CAPSULE, LIQUID FILLED ORAL 2 TIMES DAILY
Status: DISCONTINUED | OUTPATIENT
Start: 2021-09-30 | End: 2021-10-04 | Stop reason: HOSPADM

## 2021-09-30 RX ORDER — SODIUM CHLORIDE 9 MG/ML
INJECTION, SOLUTION INTRAVENOUS PRN
Status: DISCONTINUED | OUTPATIENT
Start: 2021-09-30 | End: 2021-10-04 | Stop reason: HOSPADM

## 2021-09-30 RX ORDER — SODIUM BICARBONATE 650 MG/1
325 TABLET ORAL 2 TIMES DAILY
Status: DISCONTINUED | OUTPATIENT
Start: 2021-09-30 | End: 2021-10-04 | Stop reason: HOSPADM

## 2021-09-30 RX ORDER — POLYETHYLENE GLYCOL 3350 17 G/17G
17 POWDER, FOR SOLUTION ORAL DAILY
Status: DISCONTINUED | OUTPATIENT
Start: 2021-09-30 | End: 2021-09-30

## 2021-09-30 RX ORDER — MORPHINE SULFATE 2 MG/ML
0.5 INJECTION, SOLUTION INTRAMUSCULAR; INTRAVENOUS EVERY 4 HOURS PRN
Status: DISCONTINUED | OUTPATIENT
Start: 2021-09-30 | End: 2021-10-04

## 2021-09-30 RX ORDER — SODIUM CHLORIDE 0.9 % (FLUSH) 0.9 %
5-40 SYRINGE (ML) INJECTION PRN
Status: DISCONTINUED | OUTPATIENT
Start: 2021-09-30 | End: 2021-10-01 | Stop reason: SDUPTHER

## 2021-09-30 RX ORDER — MORPHINE SULFATE 2 MG/ML
2 INJECTION, SOLUTION INTRAMUSCULAR; INTRAVENOUS
Status: DISCONTINUED | OUTPATIENT
Start: 2021-09-30 | End: 2021-09-30

## 2021-09-30 RX ORDER — TRAMADOL HYDROCHLORIDE 50 MG/1
50 TABLET ORAL EVERY 6 HOURS PRN
Status: DISCONTINUED | OUTPATIENT
Start: 2021-09-30 | End: 2021-10-04

## 2021-09-30 RX ORDER — ASPIRIN 81 MG/1
81 TABLET ORAL DAILY
Status: DISCONTINUED | OUTPATIENT
Start: 2021-09-30 | End: 2021-10-01

## 2021-09-30 RX ORDER — POLYETHYLENE GLYCOL 3350 17 G/17G
17 POWDER, FOR SOLUTION ORAL 2 TIMES DAILY
Status: DISCONTINUED | OUTPATIENT
Start: 2021-09-30 | End: 2021-10-04 | Stop reason: HOSPADM

## 2021-09-30 RX ORDER — SODIUM CHLORIDE 0.9 % (FLUSH) 0.9 %
5-40 SYRINGE (ML) INJECTION EVERY 12 HOURS SCHEDULED
Status: DISCONTINUED | OUTPATIENT
Start: 2021-09-30 | End: 2021-10-01 | Stop reason: SDUPTHER

## 2021-09-30 RX ORDER — MECLIZINE HYDROCHLORIDE 25 MG/1
25 TABLET ORAL 3 TIMES DAILY PRN
Status: DISCONTINUED | OUTPATIENT
Start: 2021-09-30 | End: 2021-10-04 | Stop reason: HOSPADM

## 2021-09-30 RX ORDER — MORPHINE SULFATE 2 MG/ML
1 INJECTION, SOLUTION INTRAMUSCULAR; INTRAVENOUS
Status: DISCONTINUED | OUTPATIENT
Start: 2021-09-30 | End: 2021-09-30

## 2021-09-30 RX ADMIN — CEFTRIAXONE 1000 MG: 1 INJECTION, POWDER, FOR SOLUTION INTRAMUSCULAR; INTRAVENOUS at 09:49

## 2021-09-30 RX ADMIN — MORPHINE SULFATE 2 MG: 2 INJECTION, SOLUTION INTRAMUSCULAR; INTRAVENOUS at 07:30

## 2021-09-30 RX ADMIN — SODIUM BICARBONATE 325 MG: 650 TABLET ORAL at 23:21

## 2021-09-30 RX ADMIN — ONDANSETRON 4 MG: 2 INJECTION INTRAMUSCULAR; INTRAVENOUS at 07:31

## 2021-09-30 RX ADMIN — SIMVASTATIN 20 MG: 10 TABLET, FILM COATED ORAL at 23:21

## 2021-09-30 RX ADMIN — PANTOPRAZOLE SODIUM 40 MG: 40 INJECTION, POWDER, FOR SOLUTION INTRAVENOUS at 09:49

## 2021-09-30 ASSESSMENT — ENCOUNTER SYMPTOMS
NAUSEA: 0
BACK PAIN: 0
RHINORRHEA: 0
VOMITING: 0
COUGH: 0
ABDOMINAL PAIN: 0
DIARRHEA: 0
SORE THROAT: 0
SHORTNESS OF BREATH: 0

## 2021-09-30 ASSESSMENT — PAIN DESCRIPTION - LOCATION: LOCATION: HIP

## 2021-09-30 ASSESSMENT — PAIN SCALES - GENERAL
PAINLEVEL_OUTOF10: 0
PAINLEVEL_OUTOF10: 8

## 2021-09-30 ASSESSMENT — PAIN DESCRIPTION - ORIENTATION: ORIENTATION: RIGHT

## 2021-09-30 NOTE — CONSULTS
[facility] B[facility] [address] [facility]                                  CONSULTATION    PATIENT NAME: Lisa Swift                  :        1934  MED REC NO:   [id number]                              ROOM:       [facility]  ACCOUNT NO:   [de-identified]                           ADMIT DATE: 2021  PROVIDER:     Elizabeth Ochoa MD    CONSULT DATE:  2021    ASSESSMENT:  1.  Macrocytic anemia and chronic anemia of renal disease with additional fall in hemoglobin from 10.7 g  down to 7.4 gm, query possible GI blood loss while maintained on Eliquis  status post right hip repair. 2.  Inoperable cancer in the head of the pancreas initially diagnosed on  2020, and then placement of both biliary stent and pancreatic  stent. 2.  History of prior GI bleed in 2018, with findings of diverticulosis  and a benign polyp in the sigmoid colon, but no source of bleeding  identified apparently done at [facility].    RECOMMENDATIONS:  1. Monitor H and H, check retic count, serum B12, folate, and stool  Hemoccult. 2.  Protonix for treatment or prevention of any stress ulcers. 3.  Consider EGD and/or colonoscopy if GI bleeding is documented. HISTORY OF PRESENT ILLNESS:  This chronically ill 80-year-old white  female has a background history of CA of the head of the pancreas  confirmed on ERCP and EUS done on 2020, by Dr. Corinne Dyke. He was  able to do fine-needle aspirate and pancreatic stent placement. The  patient then was referred for biliary stent placement as well. The  patient is currently seen in GI consultation because of anemia status  post right hip repair after a fall. The recent hemoglobin of 10.7 has  fallen to 7.4, so Eliquis has been withheld. Nursing informs me that  she has not had any bowel movement and therefore, there is no suspicion  of any active GI bleed or melena.   She is having no specific upper or  lower patients  macrocytosis and then monitor stool Hemoccults to see whether or not she  will need an endoscopy or colonoscopy.         Angelo Ramires MD    D: 09/30/2021 16:52:18      T: 09/30/2021 16:59:58     [email or web address]  Job#: [id number]     Doc#: [id number]    CC:

## 2021-09-30 NOTE — PROGRESS NOTES
Palliative care patient 12/02/2020    Thyroid disease     Thyroid disorder        Past Surgical History:        Procedure Laterality Date    COLONOSCOPY  2016    Dr Torrey Ibarra problems    ENDOSCOPIC ULTRASOUND (LOWER) N/A 12/03/2020    Dr JONNY Grijalva/unsuccessful biliary cannulation despite attempts at 2-wire cannulation and proph pancreatic stenting with cannulation around pancreatic stenting-Positive for high-grade adenocarcinoma, pancreatic head    ERCP N/A 12/03/2020    Dr JONNY Grijalva/unsuccessful biliary cannulation despite attempts at 2-wire cannulation and proph pancreatic stenting with cannulation around pancreatic stenting-Positive for high-grade adenocarcinoma, pancreatic head    ERCP  12/07/2020    Dr Momo Kelly/sphincterotomy, placement of a 10 x 60 partially covered biliary stent    PORT SURGERY Right 2/5/2021    SINGLE LUMEN PORT PLACEMENT WITH ULTRASOUND AND FLUORO performed by Pauly Pool MD at [address] TOE AMPUTATION Bilateral     pinky toes removed    TOTAL KNEE ARTHROPLASTY Right 01/03/2020    RIGHT TOTAL KNEE REPLACEMENT performed by Rama Fernandez MD at [facility] Medications:  Prior to Admission medications    Medication Sig Start Date End Date Taking?  Authorizing Provider   lisinopril (PRINIVIL;ZESTRIL) 10 MG tablet Take 10 mg by mouth daily 6/11/21  Yes Historical Provider, MD   meclizine (ANTIVERT) 25 MG tablet Take 25 mg by mouth as needed 4/19/21  Yes Historical Provider, MD   pramipexole (MIRAPEX) 0.125 MG tablet  7/13/21  Yes Historical Provider, MD   carvedilol (COREG) 3.125 MG tablet Take 1 tablet by mouth 2 times daily (with meals)  Patient taking differently: Take 3.125 mg by mouth daily  6/17/21  Yes Emily Rose MD   sodium bicarbonate 325 MG tablet Take 325 mg by mouth 2 times daily   Yes Historical Provider, MD   apixaban (ELIQUIS) 2.5 MG TABS tablet Take 2.5 mg by mouth 2 times daily   Yes Historical Provider, MD   docusate sodium (COLACE) point review of systems is negative except as specifically addressed above. Physical Examination:  BP (!) 156/72   Pulse 59   Temp 94.5 °F (34.7 °C)   Resp 14   Ht 5' 3\" (1.6 m)   Wt 142 lb (64.4 kg)   SpO2 95%   BMI 25.15 kg/m²   Physical Exam  HENT:      Head: Normocephalic. Mouth/Throat:      Mouth: Mucous membranes are dry. Pharynx: Oropharynx is clear. Eyes:      Pupils: Pupils are equal, round, and reactive to light. Cardiovascular:      Rate and Rhythm: Normal rate and regular rhythm. Pulses: Normal pulses. Heart sounds: Murmur heard. Pulmonary:      Effort: Pulmonary effort is normal.      Breath sounds: Normal breath sounds. Abdominal:      General: Bowel sounds are normal. There is no distension. Palpations: Abdomen is soft. Tenderness: There is no abdominal tenderness. There is no guarding. Musculoskeletal:         General: Tenderness, deformity and signs of injury present. Skin:     General: Skin is warm and dry. Capillary Refill: Capillary refill takes 2 to 3 seconds. Coloration: Skin is pale. Neurological:      Mental Status: She is lethargic and disoriented. Gait: Abnormal gait: not assessed at this time, r/t injury. Comments: Oriented to person, place, and situation, unable to give Date, including year          Diagnostic Data:  CBC:  Recent Labs     09/30/21  0717   WBC 24.0*   HGB 7.4*   HCT 24.2*   *     BMP:  Recent Labs     09/30/21  0755      K 3.6      CO2 24   BUN 32*   CREATININE 2.0*   CALCIUM 8.8     Recent Labs     09/30/21  0755   AST 14   ALT 9   BILITOT 0.5   ALKPHOS 318*     Coag Panel:   Recent Labs     09/30/21  0717   INR 1.52*   PROTIME 18.3*   APTT 39.8*     Cardiac Enzymes: No results for input(s): CKTOTAL, TROPONINI in the last 72 hours.   ABGs:  Lab Results   Component Value Date    PHART 7.390 05/18/2021    PO2ART 52.0 05/18/2021    LCS3WTK 42.0 05/18/2021     Urinalysis:  Lab Results   Component Value Date    NITRU Negative 09/30/2021    WBCUA 16-20 09/30/2021    BACTERIA 4+ 09/30/2021    RBCUA 3-5 09/30/2021    BLOODU LARGE 09/30/2021    SPECGRAV 1.019 09/30/2021    GLUCOSEU Negative 09/30/2021     A1C: No results for input(s): LABA1C in the last 72 hours. ABG:No results for input(s): PHART, SND1NGR, PO2ART, UCS9MNF, BEART, HGBAE, A4MKNQJW, CARBOXHGBART in the last 72 hours. XR PELVIS (1-2 VIEWS)    Result Date: 9/30/2021  Examination. XR PELVIS (1-2 VIEWS) 9/30/2021 6:36 AM History: Pain. Frontal projection of the pelvis is obtained. There is no previous study for comparison. There is a subcapital transcervical fracture of the right proximal femur. There is mild  varus displacement The hip articulation is intact. There is moderate diffuse osteopenia. The sacroiliac joints bilaterally, left hip joint and symphysis pubis are intact. A subcapital transcervical fracture of the right proximal femur and a mild varus displacement. Signed by Dr Haily Cotter (MIN 2 VIEWS)    Result Date: 9/30/2021  Examination. XR FEMUR RIGHT (MIN 2 VIEWS) 9/30/2021 6:36 AM History: The frontal and crosstable lateral views of the right femur are obtained. There is no previous study for comparison. There is a subcapital transcervical fracture of the right proximal femur with a mild varus and anterior displacement. The hip articulations intact. There is moderate diffuse osteopenia. The right hip arthroplasty is noted which is incompletely visualized and evaluated. A subcapital transcervical fracture of the right proximal femur. Signed by Dr Matias Herron    Result Date: 9/30/2021  EXAM: CT HEAD WO CONTRAST -- 9/30/2021 7:05 AM HISTORY: 87 years, Female, fall, hit head, on anticoagulation COMPARISON: 12/1/2020 DLP: 876 mGy cm. Automated exposure control was utilized to minimize patient radiation dose.  TECHNIQUE: Unenhanced axial CT images obtained from [facility] to skull base with coronal and sagittal reformats. FINDINGS: No acute intracranial hemorrhage, midline shift, mass effect or large territory cortical infarct. Gray-white matter differentiation maintained. Proportional prominence of the ventricles, sulci and basilar cisterns suggesting volume loss. Thinning of the ocular lenses may be postoperative or age-related. Paranasal sinuses and mastoid air cells normally aerated. External auditory canals within normal limits. Calvarium appears intact. Subcutaneous tissues within normal limits. 1. No acute intracranial findings. 2. Similar volume loss. Signed by Dr Angelica Marquez    Result Date: 9/30/2021  Examination. CT CERVICAL SPINE WO CONTRAST 9/30/2021 7:05 AM History: The patient fell. DLP: 501 mGycm. The CT scan of the cervical spine is performed without intravenous or intrathecal contrast enhancement. The images are acquired in axial plane and subsequent reconstruction in coronal and sagittal planes. There is no previous study for comparison. There is a radiolucent line across the right lateral mass of C2, better visualized in axial plane images #22, which probably represent a vascular groove. This is not visualized in any other sequence. No displaced fracture. No compression. No displacement/malalignment. The curve and alignment are normal. The vertebral body heights are normal. Chronic degenerative changes are seen in the form of osteophytes, loss of disc heights representing degenerative disc disease and facetal arthropathy throughout the cervical spine. There is a resultant moderate neural foraminal stenosis at C5-6 bilaterally and left neural foraminal stenosis at C6-7 and C4-5. No significant spinal stenosis at any level. Severe arthropathy of the atlantoaxial joint is seen. The posterior processes are intact. The prevertebral soft tissues are normal. The limited visualized lungs are unremarkable.     A radiolucent line across the right lateral mass of C2 may represent a vascular groove, an artifact or a nondisplaced fracture? . If symptomatic a follow-up examination or MR imaging of the cervical spine may be obtained. No other displaced fracture or malalignment. Chronic degenerative changes. Signed by Dr Cristo Mann    Result Date: 9/30/2021  EXAM: MRI CERVICAL SPINE WO CONTRAST -- 9/30/2021 9:00 AM HISTORY: 87 years, Female, fall, history of pancreatic cancer COMPARISON: 9/30/2021 TECHNIQUE:  Routine pulse sequences were obtained without intravenous contrast. FINDINGS: C1 through mid T2 visualized. 7 cervical vertebral bodies. Normal alignment. Bone marrow signal within normal limits. No evidence of acute compression fracture. Cord signal normal. Craniocervical junction within normal limits. Disc heights are within normal limits. Small fluid at left C1-C2 joint space, which appears to have slight bony offset of left lateral C1-2 interface. Possible right C1 fracture better demonstrated on prior CT. Exam is not optimized for evaluation of ligamentous structures. C2-C3:  No evidence of disc bulge, spinal canal or foraminal stenosis. C3-C4:  Disc osteophyte complex. No spinal canal stenosis. Moderate right and moderate to severe left foraminal stenosis. C4-C5:  Disc osteophyte complex. No spinal canal or right foraminal stenosis. Mild left foraminal stenosis. C5-C6:  Disc osteophyte complex. Moderate bilateral foraminal stenosis. C6-C7:  Disc osteophyte complex. No spinal canal stenosis. Mild right and moderate to severe left foraminal stenosis. C7-T1:  No evidence of disc bulge, spinal canal or foraminal stenosis. Otherwise overlying soft tissues appear within normal limits. 1. Small fluid at the C1-2 joint space, which appears to have slight bony offset of the left lateral C1-2 interface. Possible right C1 fracture better demonstrated on prior CT. 2. No severe spinal canal stenosis.  Level by level findings as above. Signed by Dr Ayush Huber    XR CHEST PORTABLE    Result Date: 9/30/2021  Examination. XR CHEST PORTABLE 9/30/2021 6:46 AM History: The patient fell. The presurgical evaluation. A single frontal supine view of the chest is compared with the previous study dated 5/18/2021. The lungs are moderately expanded and clear. There is moderate cardiomegaly. Atheromatous changes thoracic aorta are noted. A right-sided internal jugular approach [employer] system is in a stable position. There is no acute bony abnormality. Deformity of the right clavicle representing a previous healed malunited fracture and is similar to the previous study. No active cardiopulmonary disease. Signed by Dr Micah Evans      Assessment/Plan:  Principal Problem:    Fracture of right hip, closed, initial encounter ([facility] [address]   -admit   -ortho following    -plan for right hemiarthroplasty today if ok with neurosurgery    -bedrest until surgery, PT/OT post-op   -pain control   -iqbal   -encourage coughing and deep breathing   -SCDs    Active Problems:    CKD (chronic kidney disease)   -noted   -monitor BMP   -gentle IV hydration as pt also has CHF      UTI (urinary tract infection)   -rocephin daily   -monitor urine culture   -monitor I&O      Palliative care patient   -consulted      Anemia   -hemoccult + stool in ED   -GI consulted   -protonix IV   -monitor HH   -transfuse 1 Unit PRBC before surgery      Malignant neoplasm of other parts of pancreas ([facility] [address]   -noted   -not currently canidate for treatment   -last treatment was in May 2021      Leukocytosis     -urine culture in progress   -blood cultures   -rocephin daily   -monitor CBC     CHF   -continue Coreg and lisinopril   -Strict I&O   -daily weight    Resolved Problems:    * No resolved hospital problems.  *       Signed:  ALBERTO Thomas - CNP, 9/30/2021 12:28 PM

## 2021-09-30 NOTE — ED NOTES
C-Collar placed per orders.  Pt has intact CSM to all 4 extremities prior to and after application      Nav Roberts RN  09/30/21 [id number]

## 2021-09-30 NOTE — ED PROVIDER NOTES
[facility]  eMERGENCY dEPARTMENT eNCOUnter      Pt Name: Charla Madrid  MRN: [id number]  [place] 1934  Date of evaluation: 9/30/2021  Provider: Janet Smith MD    [address] [place] [address]       Chief Complaint   Patient presents with   Hi Bolder    Hip Pain     right         HISTORY OF PRESENT ILLNESS   (Location/Symptom, Timing/Onset,Context/Setting, Quality, Duration, Modifying Factors, Severity)  Note limiting factors. Charla Madrid is a 80 y.o. female who presents to the emergency department after a fall this morning. Patient states that she excellently got tripped up and fell on her right side. Admits that she did hit her head but denies losing consciousness. She is anticoagulated on Eliquis for history of DVT it appears. She denies any headache or neck pain. She has not been able to ambulate since the fall. Complains of isolated right hip pain. HPI    NursingNotes were reviewed. REVIEW OF SYSTEMS    (2-9 systems for level 4, 10 or more for level 5)     Review of Systems   Constitutional: Negative for chills and fever. HENT: Negative for rhinorrhea and sore throat. Respiratory: Negative for cough and shortness of breath. Cardiovascular: Negative for chest pain. Gastrointestinal: Negative for abdominal pain, diarrhea, nausea and vomiting. Genitourinary: Negative for dysuria and frequency. Musculoskeletal: Positive for gait problem. Negative for back pain and neck pain. Neurological: Negative for dizziness, syncope and headaches. All other systems reviewed and are negative.            PAST MEDICALHISTORY     Past Medical History:   Diagnosis Date    Abdominal aortic aneurysm (AAA) ([facility] [facility]     Anemia     Arthritis     Cancer ([facility] [address] 12/01/2020    pancreatic    CHF (congestive heart failure) (HCC)     Chronic kidney disease     DVT of lower extremity (deep venous thrombosis) ([address]     twice 2010 and 2017    Hyperlipidemia     Hypertension     Kidney stones     Osteoarthritis     Palliative care patient 12/02/2020    Thyroid disease     Thyroid disorder          SURGICAL HISTORY       Past Surgical History:   Procedure Laterality Date    COLONOSCOPY  2016    Dr Tiago Jean problems    ENDOSCOPIC ULTRASOUND (LOWER) N/A 12/03/2020    Dr JONNY Grijalva/unsuccessful biliary cannulation despite attempts at 2-wire cannulation and proph pancreatic stenting with cannulation around pancreatic stenting-Positive for high-grade adenocarcinoma, pancreatic head    ERCP N/A 12/03/2020    Dr JONNY Grijalva/unsuccessful biliary cannulation despite attempts at 2-wire cannulation and proph pancreatic stenting with cannulation around pancreatic stenting-Positive for high-grade adenocarcinoma, pancreatic head    ERCP  12/07/2020    Dr Bradford Scheuermann Thomas-w/sphincterotomy, placement of a 10 x 60 partially covered biliary stent    PORT SURGERY Right 2/5/2021    SINGLE LUMEN PORT PLACEMENT WITH ULTRASOUND AND FLUORO performed by Neale Runner, MD at [facility] TOE AMPUTATION Bilateral     pinky toes removed    TOTAL KNEE ARTHROPLASTY Right 01/03/2020    RIGHT TOTAL KNEE REPLACEMENT performed by Brook Lal MD at [facility]     Previous Medications    ALLOPURINOL (ZYLOPRIM) 100 MG TABLET    Take 100 mg by mouth daily Taken 1 time a day now    APIXABAN (ELIQUIS) 2.5 MG TABS TABLET    Take 2.5 mg by mouth 2 times daily    ASPIRIN 81 MG EC TABLET    Take 1 tablet by mouth daily    CARVEDILOL (COREG) 3.125 MG TABLET    Take 1 tablet by mouth 2 times daily (with meals)    DOCUSATE SODIUM (COLACE) 100 MG CAPSULE    Take 1 capsule by mouth 2 times daily    LEVOTHYROXINE (SYNTHROID) 112 MCG TABLET    Take 112 mcg by mouth Daily    LIDOCAINE-PRILOCAINE (EMLA) 2.5-2.5 % CREAM    APPLY TO PORT AREA AND COVERWITH PLASTIC WRAP ONE HOUR PRIOR TO TREATMENT    LISINOPRIL (PRINIVIL;ZESTRIL) 10 MG TABLET    Take 10 mg by mouth daily    MECLIZINE (ANTIVERT) 25 MG TABLET    Take 25 mg by mouth as needed    MIDODRINE (PROAMATINE) 10 MG TABLET    Take 1 tablet by mouth 3 times daily (with meals)    NITROGLYCERIN (NITROSTAT) 0.4 MG SL TABLET    up to max of 3 total doses. If no relief after 1 dose, call 911. ONDANSETRON (ZOFRAN ODT) 4 MG DISINTEGRATING TABLET    Take 2 tablets by mouth every 8 hours as needed for Nausea or Vomiting    POLYETHYLENE GLYCOL (GLYCOLAX) 17 GM/SCOOP POWDER    Take 17 g by mouth daily    PRAMIPEXOLE (MIRAPEX) 0.125 MG TABLET        SIMVASTATIN (ZOCOR) 20 MG TABLET    Take 20 mg by mouth nightly    SODIUM BICARBONATE 325 MG TABLET    Take 325 mg by mouth 2 times daily       ALLERGIES     Penicillins    FAMILY HISTORY       Family History   Problem Relation Age of Onset    Colon Cancer Brother     Cancer Mother         Breast Cancer    Heart Attack Father     Colon Polyps Neg Hx     Esophageal Cancer Neg Hx     Liver Cancer Neg Hx     Liver Disease Neg Hx     Rectal Cancer Neg Hx     Stomach Cancer Neg Hx           SOCIAL HISTORY       Social History     Socioeconomic History    Marital status:      Spouse name: Not on file    Number of children: Not on file    Years of education: Not on file    Highest education level: Not on file   Occupational History    Not on file   Tobacco Use    Smoking status: Never Smoker    Smokeless tobacco: Never Used   Vaping Use    Vaping Use: Never used   Substance and Sexual Activity    Alcohol use: No    Drug use: No    Sexual activity: Not on file   Other Topics Concern    Not on file   Social History Narrative    Not on file     Social Determinants of Health     Financial Resource Strain:     Difficulty of Paying Living Expenses:    Food Insecurity:     Worried About Running Out of Food in the Last Year:     [address] in the Last Year:    Transportation Needs:     Lack of Transportation (Medical):      Lack of Transportation (Non-Medical):    Physical Activity:     Days of Exercise per Week:     Minutes of Exercise per Session:    Stress:     Feeling of Stress :    Social Connections:     Frequency of Communication with Friends and Family:     Frequency of Social Gatherings with Friends and Family:     Attends [employer] Services:     Active Member of Clubs or Organizations:     Attends Club or Organization Meetings:     Marital Status:    Intimate Partner Violence:     Fear of Current or Ex-Partner:     Emotionally Abused:     Physically Abused:     Sexually Abused:        SCREENINGS             PHYSICAL EXAM    (up to 7 for level 4, 8 or more for level 5)     ED Triage Vitals   BP Temp Temp Source Pulse Resp SpO2 Height Weight   09/30/21 0624 09/30/21 0624 09/30/21 0617 09/30/21 0624 09/30/21 0624 09/30/21 0624 09/30/21 0617 09/30/21 0617   (!) 157/69 98 °F (36.7 °C) Oral 69 17 95 % 5' 3\" (1.6 m) 142 lb (64.4 kg)       Physical Exam  Vitals and nursing note reviewed. Constitutional:       General: She is not in acute distress. Appearance: Normal appearance. She is well-developed. She is not ill-appearing or diaphoretic. HENT:      Head: Normocephalic and atraumatic. Right Ear: External ear normal.      Left Ear: External ear normal.   Eyes:      Conjunctiva/sclera: Conjunctivae normal.   Neck:      Trachea: No tracheal deviation. Comments: No midline tenderness  Cardiovascular:      Rate and Rhythm: Normal rate and regular rhythm. Pulses: Normal pulses. Heart sounds: Normal heart sounds. No murmur heard. Pulmonary:      Effort: Pulmonary effort is normal. No respiratory distress. Breath sounds: Normal breath sounds. No wheezing or rales. Abdominal:      Palpations: Abdomen is soft. There is no mass. Tenderness: There is no abdominal tenderness. Genitourinary:     Rectum: Guaiac result positive. Comments: Brown stool hemoccult positive  Musculoskeletal:      Cervical back: Normal range of motion. No tenderness.       Comments: Normal range of motion of bilateral upper and left lower extremity  Right lower extremity is shortened and externally rotated but does have normal sensation she wiggles her toes without any difficulty unable to lift the leg off the bed    No midline thoracic or lumbar pain   Skin:     General: Skin is warm and dry. Neurological:      Mental Status: She is alert and oriented to person, place, and time. GCS: GCS eye subscore is 4. GCS verbal subscore is 5. GCS motor subscore is 6. Sensory: No sensory deficit. Motor: No weakness. DIAGNOSTIC RESULTS     EKG: All EKG's areinterpreted by the Emergency Department Physician who either signs or Co-signs this chart in the absence of a cardiologist.    66 NSR no st changes non diagnostic ekg    RADIOLOGY:  Non-plain film images such as CT, Ultrasound and MRI are read by the radiologist. Plain radiographic images are visualized and preliminarily interpreted bythe emergency physician with the below findings:          CT CERVICAL SPINE WO CONTRAST   Final Result   A radiolucent line across the right lateral mass of C2 may   represent a vascular groove, an artifact or a nondisplaced fracture? .   If symptomatic a follow-up examination or MR imaging of the cervical   spine may be obtained. No other displaced fracture or malalignment. Chronic degenerative   changes. Signed by Dr Jayy Rodriguez   Final Result   1. No acute intracranial findings. 2. Similar volume loss. Signed by Dr Atwood Areas      XR PELVIS (1-2 VIEWS)   Final Result   A subcapital transcervical fracture of the right proximal   femur and a mild varus displacement. Signed by Dr Eddie Lyons (MIN 2 VIEWS)   Final Result   A subcapital transcervical fracture of the right proximal   femur. Signed by Dr Oanh Coleman   Final Result   No active cardiopulmonary disease.    Signed by Dr Aroldo Vaughn CONTRAST    (Results Pending)           LABS:  Labs Reviewed   APTT - Abnormal; Notable for the following components:       Result Value    aPTT 39.8 (*)     All other components within normal limits   CBC WITH AUTO DIFFERENTIAL - Abnormal; Notable for the following components:    WBC 24.0 (*)     RBC 2.31 (*)     Hemoglobin 7.4 (*)     Hematocrit 24.2 (*)     .8 (*)     MCH 32.0 (*)     MCHC 30.6 (*)     RDW 15.5 (*)     Platelets 994 (*)     Neutrophils % 89.3 (*)     Lymphocytes % 2.3 (*)     Neutrophils Absolute 21.4 (*)     Lymphocytes Absolute 0.6 (*)     Monocytes Absolute 1.40 (*)     All other components within normal limits   PROTIME-INR - Abnormal; Notable for the following components:    Protime 18.3 (*)     INR 1.52 (*)     All other components within normal limits   URINE RT REFLEX TO CULTURE - Abnormal; Notable for the following components:    Clarity, UA CLOUDY (*)     Blood, Urine LARGE (*)     Protein, UA 30 (*)     Leukocyte Esterase, Urine MODERATE (*)     All other components within normal limits   COMPREHENSIVE METABOLIC PANEL - Abnormal; Notable for the following components:    Glucose 139 (*)     BUN 32 (*)     CREATININE 2.0 (*)     GFR Non- 24 (*)     GFR  29 (*)     Total Protein 5.6 (*)     Albumin 2.0 (*)     Alkaline Phosphatase 318 (*)     All other components within normal limits    Narrative:     please recollect   MICROSCOPIC URINALYSIS - Abnormal; Notable for the following components:    WBC, UA 16-20 (*)     RBC, UA 3-5 (*)     Bacteria, UA 4+ (*)     All other components within normal limits   COVID-19, RAPID   CULTURE, URINE   SPECIMEN REJECTION   TYPE AND SCREEN   PREPARE RBC (CROSSMATCH)       All other labs were within normal range or not returned as of this dictation.     EMERGENCY DEPARTMENT COURSE and DIFFERENTIAL DIAGNOSIS/MDM:   Vitals:    Vitals:    09/30/21 0617 09/30/21 0624   BP:  (!) 157/69   Pulse:  69   Resp:  17   Temp:  98

## 2021-09-30 NOTE — CONSULTS
Palliative Care Consult Note    10/1/2021 10:55 AM  Subjective:  Admit Date: 9/30/2021  PCP: Audree Rubinstein, MD    Date of Service: 10/1/2021    Reason for Consultation:  Goals of Care, Code Status, Family Support     History Obtained From: EMR/Patient and their Family    History Of Present Illness: The patient is a 80 y.o. female with PMH HTN, pancreatic adenocarcinoma, CKD IV, CHF  W/ EF 31% on last Lexiscan, multifactorial anemia, recurrent DVT on Eliquis who presented to [address] [place] [address] ED on 09/30/2021 complaining of a fall at home. She does use a walker at home and is assisted by her children who are her full time caregivers. Ms. Ann Carty decided to get up without assistance and fell to her right side. She did hit her head and experienced immediate right hip pain and was unable to stand at that point. She was brought to ED where work up revealed a subcapital transcervical fracture of the right proximal femur in addition to an injury at \"C1/2 with a non-displaced right sided lucency through anterior C1 arch on the right and minimal posterior subluxation at the C1/2 facet on the left. \" She was seen by Neurosurgery with recommendations for immobilization in [place] cervical collar. Orthopedic surgery recommended a right hip hemiarthroplasty. An occult blood stool was obtain which was hemoccult positive in the setting of anemia. GI was consulted with recommendations to monitor H/H and consider EGD or colonoscopy. UA revealed 4+ bacteria. She has been continued on Rocephin. Palliative care was consulted for family support.     Past Medical History:        Diagnosis Date    Abdominal aortic aneurysm (AAA) ([address]     Anemia     Arthritis     Cancer ([address] 12/01/2020    pancreatic    CHF (congestive heart failure) (HCC)     Chronic kidney disease     DVT of lower extremity (deep venous thrombosis) ([address]     twice 2010 and 2017    Hyperlipidemia     Hypertension     Kidney stones     Osteoarthritis     Palliative care patient 12/02/2020    Thyroid disease     Thyroid disorder      Past Surgical History:        Procedure Laterality Date    COLONOSCOPY  2016    Dr Nichole Favors problems    ENDOSCOPIC ULTRASOUND (LOWER) N/A 12/03/2020    Dr JONNY Grijalva/unsuccessful biliary cannulation despite attempts at 2-wire cannulation and proph pancreatic stenting with cannulation around pancreatic stenting-Positive for high-grade adenocarcinoma, pancreatic head    ERCP N/A 12/03/2020    Dr JONNY Grijalva/unsuccessful biliary cannulation despite attempts at 2-wire cannulation and proph pancreatic stenting with cannulation around pancreatic stenting-Positive for high-grade adenocarcinoma, pancreatic head    ERCP  12/07/2020    Dr Yael Hernandez-becka/sphincterotomy, placement of a 10 x 60 partially covered biliary stent    PORT SURGERY Right 2/5/2021    SINGLE LUMEN PORT PLACEMENT WITH ULTRASOUND AND FLUORO performed by Lana Durand MD at [facility] TOE AMPUTATION Bilateral     pinky toes removed    TOTAL KNEE ARTHROPLASTY Right 01/03/2020    RIGHT TOTAL KNEE REPLACEMENT performed by Dalton Rey MD at [facility] Medications:  Prior to Admission medications    Medication Sig Start Date End Date Taking?  Authorizing Provider   lisinopril (PRINIVIL;ZESTRIL) 10 MG tablet Take 10 mg by mouth daily 6/11/21  Yes Historical Provider, MD   meclizine (ANTIVERT) 25 MG tablet Take 25 mg by mouth as needed 4/19/21  Yes Historical Provider, MD   pramipexole (MIRAPEX) 0.125 MG tablet  7/13/21  Yes Historical Provider, MD   carvedilol (COREG) 3.125 MG tablet Take 1 tablet by mouth 2 times daily (with meals)  Patient taking differently: Take 3.125 mg by mouth daily  6/17/21  Yes Larry Siegel MD   sodium bicarbonate 325 MG tablet Take 325 mg by mouth 2 times daily   Yes Historical Provider, MD   apixaban (ELIQUIS) 2.5 MG TABS tablet Take 2.5 mg by mouth 2 times daily   Yes Historical Provider, MD   docusate sodium (COLACE) 100 MG capsule Take 1 capsule by mouth 2 times daily 1/3/20  Yes Steff Ellis MD   allopurinol (ZYLOPRIM) 100 MG tablet Take 100 mg by mouth daily Taken 1 time a day now   Yes Historical Provider, MD   levothyroxine (SYNTHROID) 112 MCG tablet Take 112 mcg by mouth Daily   Yes Historical Provider, MD   simvastatin (ZOCOR) 20 MG tablet Take 20 mg by mouth nightly   Yes Historical Provider, MD   polyethylene glycol (GLYCOLAX) 17 GM/SCOOP powder Take 17 g by mouth daily    Historical Provider, MD   aspirin 81 MG EC tablet Take 1 tablet by mouth daily 6/17/21   Sybil Mcmillan MD   midodrine (PROAMATINE) 10 MG tablet Take 1 tablet by mouth 3 times daily (with meals)  Patient not taking: Reported on 9/20/2021 6/17/21   Sybil Mcmillan MD   nitroGLYCERIN (NITROSTAT) 0.4 MG SL tablet up to max of 3 total doses. If no relief after 1 dose, call 911. 5/26/21   ALBERTO Austin - CNP   ondansetron (ZOFRAN ODT) 4 MG disintegrating tablet Take 2 tablets by mouth every 8 hours as needed for Nausea or Vomiting 5/13/21   ALBERTO Patel   lidocaine-prilocaine (EMLA) 2.5-2.5 % cream APPLY TO PORT AREA AND COVERWITH PLASTIC WRAP ONE HOUR PRIOR TO TREATMENT  Patient not taking: Reported on 9/20/2021 4/19/21   ALBERTO Betancur     Allergies:    Penicillins    Social History:    The patient currently lives at home. Tobacco:   reports that she has never smoked. She has never used smokeless tobacco.  Alcohol:   reports no history of alcohol use.   Illicit Drugs: None    Family History:      Problem Relation Age of Onset    Colon Cancer Brother     Cancer Mother         Breast Cancer    Heart Attack Father     Colon Polyps Neg Hx     Esophageal Cancer Neg Hx     Liver Cancer Neg Hx     Liver Disease Neg Hx     Rectal Cancer Neg Hx     Stomach Cancer Neg Hx      Review of Systems:   Constitutional / general:  Denies fever / chills / sweats +fatigue  Head:  Denies headache / neck stiffness / trauma / visual change  Eyes:  Denies blurry vision / acute visual change or loss / itching / redness  ENT: Denies sore throat / hoarseness / nasal drainage / ear pain  CV:  Denies chest pain / palpitations/ orthopnea   Respiratory:  Denies cough / shortness of breath / sputum / hemoptysis  GI: + nausea / vomiting / abdominal pain / diarrhea / constipation  :  Denies dysuria / hesitancy / urgency / hematuria   Neuro: Denies paralysis / syncope / seizure / dysphagia / headache / paresthesias  Musculoskeletal: + muscle weakness /joint stiffness /+ pain  Vascular: +edema / claudication / varicosities  Heme / endocrine: Denies easy bruising / bleeding / excessive sweating / heat or cold intolerance  Psychiatric:  Denies depression / anxiety / insomnia / mood changes  Skin:  Denies new rashes / lesions / skin hair or nail changes    14 point review of systems is negative except as specifically addressed above. Physical Examination:  /75   Pulse 81   Temp 96.1 °F (35.6 °C) (Temporal)   Resp 16   Ht 5' 3\" (1.6 m)   Wt 142 lb (64.4 kg)   SpO2 94%   BMI 25.15 kg/m²   General appearance: 81 yo female, ill appearing, no acute distress   Head: Normocephalic, without obvious abnormality  Eyes: conjunctivae/corneas clear. PERRL, EOM's intact. Ears: normal external ears and nose, throat without exudate  Neck: no adenopathy, no carotid bruit, no JVD, supple, symmetrical, trachea midline, C collar in place  Lungs: diminished air entry throughout otherwise clear to auscultation bilaterally,no rales or wheezes   Heart: regular rate and rhythm, S1, S2 normal, no murmur  Abdomen:soft, non-tender; non-distended, normal bowel sounds no masses, no organomegaly  Extremities:1+ bilateral lower extremity edema,  No erythema, there is TTP right hip  Skin: Skin color, texture, turgor normal.   Lymphatic: No palpable lymph node enlargment  Neurologic: Somnolent, easily awakened, answers few questions appropriately, follows a few simple commands.  No new focal deficits   Psychiatric: Calm, appropriate     Diagnostic Data:  CBC:  Recent Labs     09/30/21  0717 09/30/21  2120 10/01/21  0344   WBC 24.0*  --  22.7*   HGB 7.4*  --  9.8*   HCT 24.2* 29.4* 32.8*   *  --  598*     BMP:  Recent Labs     09/30/21  0755 10/01/21  0344    140   K 3.6 4.4    102   CO2 24 24   BUN 32* 34*   CREATININE 2.0* 1.9*   CALCIUM 8.8 9.0     Recent Labs     09/30/21  0755 10/01/21  0344   AST 14 17   ALT 9 11   BILITOT 0.5 0.6   ALKPHOS 318* 351*     Coag Panel:   Recent Labs     09/30/21 0717   INR 1.52*   PROTIME 18.3*   APTT 39.8*     Urinalysis:  Lab Results   Component Value Date    NITRU Negative 09/30/2021    WBCUA 16-20 09/30/2021    BACTERIA 4+ 09/30/2021    RBCUA 3-5 09/30/2021    BLOODU LARGE 09/30/2021    SPECGRAV 1.019 09/30/2021    GLUCOSEU Negative 09/30/2021     XR PELVIS (1-2 VIEWS)  A subcapital transcervical fracture of the right proximal femur and a mild varus displacement. Signed by Dr Eleazar Davis (MIN 2 VIEWS)  A subcapital transcervical fracture of the right proximal femur. Signed by Dr Rhys Mike  1. No acute intracranial findings. 2. Similar volume loss. Signed by Dr Cynthia Gan  A radiolucent line across the right lateral mass of C2 may represent a vascular groove, an artifact or a nondisplaced fracture? . If symptomatic a follow-up examination or MR imaging of the cervical spine may be obtained. No other displaced fracture or malalignment. Chronic degenerative changes. Signed by Dr Kareem Winston  1. Small fluid at the C1-2 joint space, which appears to have slight bony offset of the left lateral C1-2 interface. Possible right C1 fracture better demonstrated on prior CT. 2. No severe spinal canal stenosis. Level by level findings as above. Signed by Dr Yan Beltran    XR CHEST PORTABLE  No active cardiopulmonary disease.  Signed by Dr Wandy Valle    Palliative Performance Scale:  [] 80% Full ambulation  Some disease: Normal activity w/ some effort  Full self-care  Normal/reduced intake  Full LOC  [] 70% Reduced ambulation  Some disease: Can't do normal job or work  Full self-care  Normal/reduced intake  Full LOC  [] 60% Reduced ambulation  Significant disease: Can't do hobbies/housework  Occasional assistance  Normal/reduced intake  LOC full/confusion  [x] 50% Mainly sit/lie  Extensive disease: Can't do any work  Considerable assistance  Normal/reduced intake  LOC full/confusion  [] 40% Mainly in bed  Extensive disease  Mainly assistance  Normal/reduced intake  LOC full/confusion  [] 30% Bed Bound  Extensive disease  Total care  Reduced Intake  LOC full/confusion  [] 20% Bed Bound  Extensive disease  Total care  Minimal intake  Drowsy/coma  [] 10% Bed Bound  Extensive disease  Total care  Mouth care only  Drowsy/coma  [] 0% Death    Palliative Review of Advance Directives:     Surrogate Decision Maker:Yes Nieves Myers    Durable Power of :No    Advanced Directives/Living [place]: Yes    Out of hospital medical orders in place to reflect resuscitation status (MOLST/POLST): No    Information Sharing:  Patient's awareness of illness:  [] Terminal [] Life-Threatening [x] Serious [] Non life-threatening [] Not serious   [] Not discussed    Family awareness of illness:   [] Terminal [] Life-Threatening [x] Serious [] Nonlife-threatening [] Not serious   [] Not discussed    Assessment/Plan:  Principal Problem:    Fracture of right hip, closed, initial encounter ([facility]  Active Problems:    CKD (chronic kidney disease)    Palliative care patient    Anemia    Malignant neoplasm of other parts of pancreas ([facility]    Leukocytosis    UTI (urinary tract infection)    Closed fracture of right hip (HCC)  Resolved Problems:    * No resolved hospital problems.  *     Visit Summary:  Chart reviewed, patient discussed with consulting service and nursing staff. Reviewed health issues, work up and treatment plan as well as factors that lead to hospitalization. I saw Ms. Cain Casper at her bedside. Requested RN presence in room as patient was initially difficult to awaken. Vitals assessed and stable. Patient became more awake. Was able to answer a few questions but overall did not participate much with conversation. She falls asleep quickly. I called her daughter, Cathleen Washington, who confirms patient's fall. Usually patient calls for assistance when she needs to ambulate throughout the home/transfer from bed to wheelchair, etc, however she did not yesterday. Cathleen Washington states that otherwise patient has been doing well at home. They have a mat in place by patient's bed that alarms family if she is attempting to stand without assistance. They have safe guarded the home as well. Goals will be to undergo hip repair with hopes of inpatient rehab or high level home health. Will continue to follow     Recommendations:     1. Palliative Care-GOC receive surgical repair, DC to inpatient rehab vs SNF/High level home health once medically stable. Code status: DNR  2. Pain 2/2 Right subcapital femur fx s/p fall- Patient extremely sensitive to pain medications and does not utilize them at home, treat/monitor closely  3. Closed head injury with Possible right C1 fracture -Neurosurgery consulted, currently in C collar  4. Pancreatic cancer-patient is no longer receiving chemotherapy. Has been followed by outpatient supportive care and managing well at home up to this point. She has considered hospice care in the past, however, has done well at home with outpatient supportive care/home health at times. 5. Multifactorial anemia with hemoccult positive stool-will further discuss w/ patient    Thank you for consulting palliative care and allowing us to participate in the care of the patient.       CounselingTopics: Goals of care, Code Status, Disease process education, pt/family support    Time Spent Counseling > 50%:  YES                                   Total Time Spent with patient/family counseling, workup/treatment review, counseling and placement of orders/preparation of this note: 50 minutes    Electronically signed by Anahy Marte PA-C on 10/1/2021 at 10:55 AM    (Please note that portions of this note were completed with a voice recognition program.  Efforts were made to edit the dictations but occasionally words are mis-transcribed.)

## 2021-09-30 NOTE — CONSULTS
Orthopaedic Surgery  Inpatient Consultation    Danielito Barnett (1934)  9/30/2021    Requesting Physician: DR KENNY [facility] [place]  Consulting Physician: DR Abida Raphael  9/30/2021  6:17 AM    CHIEF COMPLAINT:  right SUBCAPITAL FEMUR FX S/P FALL    History Obtained From:  Patient/CHART    HISTORY OF PRESENT ILLNESS:                The patient is a 80 y.o. female who presents with above chief complaint. Danielito Barnett is a 80 y.o. female who presents to the emergency department after a fall this morning. Patient states that she excellently got tripped up and fell on her right side. Admits that she did hit her head but denies losing consciousness. She is anticoagulated on Eliquis for history of DVT it appears. She denies any headache or neck pain. She has not been able to ambulate since the fall. .     RIGHT HIP PAIN IS CONSTANT/MODERATE/PIERCING. NO SIMILAR C/O. PAIN IS WORSE WITH MOVEMENT, BETTER WITH REST/MEDS. XRAYS REVEALED A SUBCAPITAL FEMORAL NECK FX, THERE IS ALSO A CONCERN FOR A POSSIBLE C2 FX ON CT SCAN. +GI BLEED/ANEMIA AND APPEARS TO BE UROSEPTIC. + H/O PANCREATIC CANCER IN PALLIATIVE CARE.     Past Medical History:        Diagnosis Date    Abdominal aortic aneurysm (AAA) ([address]     Anemia     Arthritis     Cancer ([address] 12/01/2020    pancreatic    CHF (congestive heart failure) (HCC)     Chronic kidney disease     DVT of lower extremity (deep venous thrombosis) (HCC)     twice 2010 and 2017    Hyperlipidemia     Hypertension     Kidney stones     Osteoarthritis     Palliative care patient 12/02/2020    Thyroid disease     Thyroid disorder        Past Surgical History:        Procedure Laterality Date    COLONOSCOPY  2016    Dr Luisa Petty problems    ENDOSCOPIC ULTRASOUND (LOWER) N/A 12/03/2020    Dr JONNY Howe-w/unsuccessful biliary cannulation despite attempts at 2-wire cannulation and proph pancreatic stenting with cannulation around pancreatic stenting-Positive for high-grade adenocarcinoma, pancreatic head    ERCP N/A 12/03/2020    Dr JONNY Howe-w/unsuccessful biliary cannulation despite attempts at 2-wire cannulation and proph pancreatic stenting with cannulation around pancreatic stenting-Positive for high-grade adenocarcinoma, pancreatic head    ERCP  12/07/2020    Dr Tina Hernanedz-becka/sphincterotomy, placement of a 10 x 60 partially covered biliary stent    PORT SURGERY Right 2/5/2021    SINGLE LUMEN PORT PLACEMENT WITH ULTRASOUND AND FLUORO performed by Papo Savage MD at [address] TOE AMPUTATION Bilateral     pinky toes removed    TOTAL KNEE ARTHROPLASTY Right 01/03/2020    RIGHT TOTAL KNEE REPLACEMENT performed by Antony Mayers MD at [facility] OR       Current Medications:   Current Facility-Administered Medications: pantoprazole (PROTONIX) injection 40 mg, 40 mg, IntraVENous, Once  0.9 % sodium chloride infusion, , IntraVENous, PRN  cefTRIAXone (ROCEPHIN) 1,000 mg in sterile water 10 mL IV syringe, 1,000 mg, IntraVENous, Once  Prior to Admission medications    Medication Sig Start Date End Date Taking?  Authorizing Provider   lisinopril (PRINIVIL;ZESTRIL) 10 MG tablet Take 10 mg by mouth daily 6/11/21  Yes Historical Provider, MD   meclizine (ANTIVERT) 25 MG tablet Take 25 mg by mouth as needed 4/19/21  Yes Historical Provider, MD   pramipexole (MIRAPEX) 0.125 MG tablet  7/13/21  Yes Historical Provider, MD   carvedilol (COREG) 3.125 MG tablet Take 1 tablet by mouth 2 times daily (with meals)  Patient taking differently: Take 3.125 mg by mouth daily  6/17/21  Yes Maddie Rodriguez MD   sodium bicarbonate 325 MG tablet Take 325 mg by mouth 2 times daily   Yes Historical Provider, MD   apixaban (ELIQUIS) 2.5 MG TABS tablet Take 2.5 mg by mouth 2 times daily   Yes Historical Provider, MD   docusate sodium (COLACE) 100 MG capsule Take 1 capsule by mouth 2 times daily 1/3/20  Yes Antony Mayers MD   allopurinol (ZYLOPRIM) 100 MG tablet Take 100 mg by mouth daily Taken 1 time a day now   Yes Historical Provider, MD   levothyroxine (SYNTHROID) 112 MCG tablet Take 112 mcg by mouth Daily   Yes Historical Provider, MD   simvastatin (ZOCOR) 20 MG tablet Take 20 mg by mouth nightly   Yes Historical Provider, MD   polyethylene glycol (GLYCOLAX) 17 GM/SCOOP powder Take 17 g by mouth daily    Historical Provider, MD   aspirin 81 MG EC tablet Take 1 tablet by mouth daily 6/17/21   Adriel Kearns MD   midodrine (PROAMATINE) 10 MG tablet Take 1 tablet by mouth 3 times daily (with meals)  Patient not taking: Reported on 9/20/2021 6/17/21   Adriel Kearns MD   nitroGLYCERIN (NITROSTAT) 0.4 MG SL tablet up to max of 3 total doses. If no relief after 1 dose, call 911. 5/26/21   ALBERTO Cheng - CNP   ondansetron (ZOFRAN ODT) 4 MG disintegrating tablet Take 2 tablets by mouth every 8 hours as needed for Nausea or Vomiting 5/13/21   ALBERTO Patel   lidocaine-prilocaine (EMLA) 2.5-2.5 % cream APPLY TO PORT AREA AND COVERWITH PLASTIC WRAP ONE HOUR PRIOR TO TREATMENT  Patient not taking: Reported on 9/20/2021 4/19/21   ALBERTO Soriano       Allergies:  Penicillins    Social History:   Social History     Socioeconomic History    Marital status:       Spouse name: Not on file    Number of children: Not on file    Years of education: Not on file    Highest education level: Not on file   Occupational History    Not on file   Tobacco Use    Smoking status: Never Smoker    Smokeless tobacco: Never Used   Vaping Use    Vaping Use: Never used   Substance and Sexual Activity    Alcohol use: No    Drug use: No    Sexual activity: Not on file   Other Topics Concern    Not on file   Social History Narrative    Not on file     Social Determinants of Health     Financial Resource Strain:     Difficulty of Paying Living Expenses:    Food Insecurity:     Worried About Running Out of Food in the Last Year:     [address] in the Last Year:    Transportation Needs:     Lack of Transportation (Medical):      Lack of Transportation (Non-Medical):    Physical Activity:     Days of Exercise per Week:     Minutes of Exercise per Session:    Stress:     Feeling of Stress :    Social Connections:     Frequency of Communication with Friends and Family:     Frequency of Social Gatherings with Friends and Family:     Attends [employer] Services:     Active Member of Clubs or Organizations:     Attends Club or Organization Meetings:     Marital Status:    Intimate Partner Violence:     Fear of Current or Ex-Partner:     Emotionally Abused:     Physically Abused:     Sexually Abused:        Family History:   Family History   Problem Relation Age of Onset    Colon Cancer Brother     Cancer Mother         Breast Cancer    Heart Attack Father     Colon Polyps Neg Hx     Esophageal Cancer Neg Hx     Liver Cancer Neg Hx     Liver Disease Neg Hx     Rectal Cancer Neg Hx     Stomach Cancer Neg Hx        REVIEW OF SYSTEMS:    CONSTITUTIONAL:  negative for  fevers, chills, sweats,   EYES:  negative for  double vision, blurred vision and visual disturbance  HEENT:  negative for  hearing loss, tinnitus, ear drainage, earaches, nasal congestion, epistaxis, snoring, sore mouth, sore throat, hoarseness and voice change  RESPIRATORY:  negative for  dry cough, cough with sputum, dyspnea, wheezing, hemoptysis, chest pain, pleuritic pain and cyanosis  CARDIOVASCULAR:  negative for  chest pain, palpitations, orthopnea, exertional chest pressure/discomfort, edema  GASTROINTESTINAL:  negative for nausea, vomiting, change in bowel habits, diarrhea, constipation, abdominal pain, jaundice, dysphagia, regurgitation, hematemesis and hemtochezia  GENITOURINARY:  negative for frequency, dysuria, nocturia, hesitancy and hematuria  INTEGUMENT/BREAST:  negative for rash, skin lesion(s), dryness, skin color change, pruritus, changes in hair and changes in nails  HEMATOLOGIC/LYMPHATIC:  negative for easy bruising, bleeding, lymphadenopathy, petechiae and swelling/edema  ALLERGIC/IMMUNOLOGIC:  negative for recurrent infections and urticaria  ENDOCRINE:  negative for heat intolerance, cold intolerance, tremor, weight changes, hair loss and diabetic symptoms including neither polyuria nor polydipsia  MUSCULOSKELETAL:  AS ABOVE  NEUROLOGICAL:  negative for headaches, dizziness, seizures, memory problems, speech problems, visual disturbance, coordination problems, tremor, syncope and near syncope  BEHAVIOR/PSYCH:  negative for depressed mood, elated mood, increased agitation and anxiety    PHYSICAL EXAM:    Vitals:   Vitals:    09/30/21 0617 09/30/21 0624   BP:  (!) 157/69   Pulse:  69   Resp:  17   Temp:  98 °F (36.7 °C)   TempSrc: Oral    SpO2:  95%   Weight: 142 lb (64.4 kg)    Height: 5' 3\" (1.6 m)      General:  Appears stated age, no distress. Orientation:  Alert and oriented to time, place, and person. Mood and Affect:  Cooperative and pleasant. Gait:  Resting comfortably in bed. Cardiovascular:  Symmetric 1-2 plus pulses in upper and lower extremities. Lymph:  No cervical or inguinal lymphadenopathy noted. Sensation:  Grossly intact to light touch. DTR:  Normal, no pathologic reflexes. Coordination/balance:  NOT TESTED    Musculoskeletal:  Right upper extremity exam:  There is no tenderness to palpation about the shoulder, elbow, wrist or hand. Unrestricted full function motion is present. Stability is normal with provocative tests, 5/5 strength, and skin is normal.      Left upper extremity exam:  There is no tenderness to palpation about the shoulder, elbow, wrist or hand. Unrestricted full function motion is present. Stability is normal with provocative tests, 5/5 strength, and skin is normal.     Right lower extremity exam:  SHORTENED/EXTERANLLY ROTATED, TTP GROIN. NVI DISTALLY    Left lower extremity exam:  There is no tenderness to palpation about the hip, knee, ankle or foot.   Unrestricted full function motion is present. Stability is normal with provocative tests, 5/5 strength, and skin is normal.      DATA:    CBC with Differential:    Lab Results   Component Value Date    WBC 24.0 09/30/2021    RBC 2.31 09/30/2021    HGB 7.4 09/30/2021    HCT 24.2 09/30/2021     09/30/2021    .8 09/30/2021    MCH 32.0 09/30/2021    MCHC 30.6 09/30/2021    RDW 15.5 09/30/2021    BANDSPCT 3 06/11/2018    LYMPHOPCT 2.3 09/30/2021    MONOPCT 5.8 09/30/2021    BASOPCT 0.4 09/30/2021    MONOSABS 1.40 09/30/2021    LYMPHSABS 0.6 09/30/2021    EOSABS 0.20 09/30/2021    BASOSABS 0.10 09/30/2021     CMP:    Lab Results   Component Value Date     09/30/2021    K 3.6 09/30/2021    K 3.7 05/26/2021     09/30/2021    CO2 24 09/30/2021    BUN 32 09/30/2021    CREATININE 2.0 09/30/2021    GFRAA 29 09/30/2021    AGRATIO 1.0 08/26/2021    LABGLOM 24 09/30/2021    GLUCOSE 139 09/30/2021    PROT 5.6 09/30/2021    PROT 7.2 10/25/2012    CALCIUM 8.8 09/30/2021    BILITOT 0.5 09/30/2021    ALKPHOS 318 09/30/2021    AST 14 09/30/2021    ALT 9 09/30/2021     BMP:    Lab Results   Component Value Date     09/30/2021    K 3.6 09/30/2021    K 3.7 05/26/2021     09/30/2021    CO2 24 09/30/2021    BUN 32 09/30/2021    CREATININE 2.0 09/30/2021    CALCIUM 8.8 09/30/2021    GFRAA 29 09/30/2021    LABGLOM 24 09/30/2021    GLUCOSE 139 09/30/2021     COVID-19, Rapid [[id number]]    Collected: 09/30/21 0733    Updated: 09/30/21 0804    Specimen Source: Nasopharyngeal Swab     SARS-CoV-2, NAAT Not Detected    Comment: Rapid NAAT:   Negative results should be treated as presumptive and,   if inconsistent with clinical signs and symptoms or necessary for   patient management, should be tested with an alternative molecular   assay. Negative results do not preclude SARS-CoV-2 infection and   should not be used as the sole basis for patient management decisions.    This test has been authorized by the FDA under an Emergency disc heights representing degenerative disc disease and facetal   arthropathy throughout the cervical spine. There is a resultant   moderate neural foraminal stenosis at C5-6 bilaterally and left neural   foraminal stenosis at C6-7 and C4-5. No significant spinal stenosis at   any level. Severe arthropathy of the atlantoaxial joint is seen. The posterior processes are intact. The prevertebral soft tissues are normal. The limited visualized lungs   are unremarkable. Impression:     A radiolucent line across the right lateral mass of C2 may   represent a vascular groove, an artifact or a nondisplaced fracture? .   If symptomatic a follow-up examination or MR imaging of the cervical   spine may be obtained. No other displaced fracture or malalignment. Chronic degenerative   changes. Signed by Dr Sheyla Bowen [[id number]]    Resulted: 09/30/21 0718    Updated: 09/30/21 0720    Narrative:     EXAM: CT HEAD WO CONTRAST -- 9/30/2021 7:05 AM   HISTORY: 87 years, Female, fall, hit head, on anticoagulation   COMPARISON: 12/1/2020   DLP: 876 mGy cm. Automated exposure control was utilized to minimize   patient radiation dose. TECHNIQUE: Unenhanced axial CT images obtained from vertex to skull   base with coronal and sagittal reformats. FINDINGS:   No acute intracranial hemorrhage, midline shift, mass effect or large   territory cortical infarct. Gray-white matter differentiation   maintained. Proportional prominence of the ventricles, sulci and   basilar cisterns suggesting volume loss. Thinning of the ocular lenses may be postoperative or age-related. Paranasal sinuses and mastoid air cells normally aerated. External   auditory canals within normal limits. Calvarium appears intact. Subcutaneous tissues within normal limits. Impression:     1. No acute intracranial findings. 2. Similar volume loss.    Signed by Dr Bhavna Johnson [[id number]]    Resulted: 09/30/21 1025    Updated: 09/30/21 0716    Narrative:     Examination. XR CHEST PORTABLE 9/30/2021 6:46 AM   History: The patient fell. The presurgical evaluation. A single frontal supine view of the chest is compared with the   previous study dated 5/18/2021. The lungs are moderately expanded and clear. There is moderate   cardiomegaly. Atheromatous changes thoracic aorta are noted. A   right-sided internal jugular approach MediPort system is in a stable   position. There is no acute bony abnormality. Deformity of the right   clavicle representing a previous healed malunited fracture and is   similar to the previous study. Impression:     No active cardiopulmonary disease. Signed by Dr Radu Benton (MIN 2 VIEWS) [[id number]]    Resulted: 09/30/21 6695    Updated: 09/30/21 0704    Narrative:     Examination. XR FEMUR RIGHT (MIN 2 VIEWS) 9/30/2021 6:36 AM   History: The frontal and crosstable lateral views of the right femur   are obtained. There is no previous study for comparison. There is a subcapital transcervical fracture of the right proximal   femur with a mild varus and anterior displacement. The hip   articulations intact. There is moderate diffuse osteopenia. The right hip arthroplasty is noted which is incompletely visualized   and evaluated. Impression:     A subcapital transcervical fracture of the right proximal   femur. Signed by Dr Daisy Bean (1-2 VIEWS) [[id number]]    Resulted: 09/30/21 0701    Updated: 09/30/21 0703    Narrative:     Examination. XR PELVIS (1-2 VIEWS) 9/30/2021 6:36 AM   History: Pain. Frontal projection of the pelvis is obtained. There is no previous   study for comparison. There is a subcapital transcervical fracture of the right proximal   femur. There is mild  varus displacement The hip articulation is   intact. There is moderate diffuse osteopenia.    The sacroiliac joints bilaterally, left hip joint and symphysis pubis are intact. Impression:     A subcapital transcervical fracture of the right proximal   femur and a mild varus displacement. Signed by Dr Ruth Ayala [[id number]]    Resulted: 09/30/21 1039    Updated: 09/30/21 1042    Narrative:     EXAM: MRI CERVICAL SPINE WO CONTRAST -- 9/30/2021 9:00 AM   HISTORY: 87 years, Female, fall, history of pancreatic cancer   COMPARISON: 9/30/2021   TECHNIQUE:  Routine pulse sequences were obtained without intravenous   contrast.   FINDINGS: C1 through mid T2 visualized. 7 cervical vertebral bodies. Normal alignment. Bone marrow signal within normal limits. No evidence   of acute compression fracture. Cord signal normal. Craniocervical   junction within normal limits. Disc heights are within normal limits. Small fluid at left C1-C2 joint space, which appears to have slight   bony offset of left lateral C1-2 interface. Possible right C1 fracture   better demonstrated on prior CT. Exam is not optimized for evaluation   of ligamentous structures. C2-C3:  No evidence of disc bulge, spinal canal or foraminal stenosis. C3-C4:  Disc osteophyte complex. No spinal canal stenosis. Moderate   right and moderate to severe left foraminal stenosis. C4-C5:  Disc osteophyte complex. No spinal canal or right foraminal   stenosis. Mild left foraminal stenosis. C5-C6:  Disc osteophyte complex. Moderate bilateral foraminal   stenosis. C6-C7:  Disc osteophyte complex. No spinal canal stenosis. Mild right   and moderate to severe left foraminal stenosis. C7-T1:  No evidence of disc bulge, spinal canal or foraminal stenosis. Otherwise overlying soft tissues appear within normal limits. Impression:     1. Small fluid at the C1-2 joint space, which appears to have slight   bony offset of the left lateral C1-2 interface. Possible right C1   fracture better demonstrated on prior CT. 2. No severe spinal canal stenosis.  Level by level findings as above.   Signed by Dr Leobardo Montes           IMPRESSION/RECOMMENDATIONS:    Assessment:   RIGHT SUBCAPITAL FEMUR FX S/P FALL  GI BLEED/ANEMIA  UROSEPSIS  PANCREATIC CA  ? C2 FX ON CT , SAME FINDINGS ON MRI   RENAL INSUFF. HYPERTROPHIC CMP  H/O DVT ON ELIQUIS      Plan:  1. IF WE CAN GET CLEARANCE, PLAN FOR A RIGHT HIP TOÑO THIS AFTERNOON. FURTHER RECS PENDING. THANKS FOR THE CONSULT. SPOKE WITH DAUGHTER  SHARMAINE MONSIVAIS. SHE UNDERSTANDS THE RISKS OF SURGERY TO INCLUDE BUT NOT LIMITED TO POST ANESTHESIA DELIRIUM, BLEEDING/INFECTION/DVT/PE/DEATH. THEY WISH TO PROCEED WITH SURGERY. Approximately 50 minutes was spent face to face with the patient discussing the current condition. All risks and benefits of treatment options were discussed at great length and all expressed understanding and agreement with medial reasoning.     Alexander Licona PA-C 09/30/21 9:11 AM

## 2021-09-30 NOTE — CONSULTS
[id number] [facility] Neurosurgery Consultation        REASON FOR CONSULTATION:  Fall, concern for c-spine fracture      HISTORY OF PRESENT ILLNESS:      The patient is a 80 y.o. female who presented to the [address] [place] [address] ED this AM complaining of hip pain after a fall. She is quite sedated after receiving pain medication and is only minimally-able to corroborate her history. She states that she tripped when trying to get out of bed. She has a history of pancreatic cancer and oncology notes state that palliative chemotherapy was discontinued in 5/2021 due to intolerance and poor performance status. According to notes she is nearly bed-bound at baseline but does get out of bed on occasion and does most of her mobility in a wheelchair. Today, when standing to get out of bed she fell, twisted sideways and landed on her hip. She noted immediate hip pain and was brought to the ED. On arrival her only complaint was hip pain, but a CT of her head and cervical spine were obtained due to her mechanism of injury. The CT of her cervical spine revealed a linear lucency through the anterior arch of C1 near the C1/2 facet joint and a subsequent MRI was obtained that did show edema in this area and edema in the C1/2 facet capsule. Though she is quite sedated, she will arouse and answer questions. She currently denies neck pain. She denies any upper or lower extremity radicular pain or paresthesia but does have pain in her right hip with movement.           MEDICAL HISTORY:             Past Medical History:   Diagnosis Date    Abdominal [facility] ([facility]) ([facility]     Anemia     Arthritis     Cancer ([facility] 12/01/2020    pancreatic    CHF (congestive heart failure) (HCC)     Chronic kidney disease     DVT of lower extremity (deep venous thrombosis) (HCC)     twice 2010 and 2017    Hyperlipidemia     Hypertension     Kidney stones     Osteoarthritis     Palliative care patient 12/02/2020    Thyroid disease     Thyroid disorder BUN 32 (H) 09/30/2021    CREATININE 2.0 (H) 09/30/2021    GLUCOSE 139 (H) 09/30/2021    CALCIUM 8.8 09/30/2021    PROT 5.6 (L) 09/30/2021    LABALBU 2.0 (L) 09/30/2021    BILITOT 0.5 09/30/2021    ALKPHOS 318 (H) 09/30/2021    AST 14 09/30/2021    ALT 9 09/30/2021    LABGLOM 24 (A) 09/30/2021    GFRAA 29 (L) 09/30/2021    AGRATIO 1.0 (L) 08/26/2021    GLOB 3.2 08/26/2021     Lab Results   Component Value Date    INR 1.52 (H) 09/30/2021    INR 1.03 12/05/2020    INR 1.04 12/04/2020    PROTIME 18.3 (H) 09/30/2021    PROTIME 13.4 12/05/2020    PROTIME 13.5 12/04/2020       XR PELVIS (1-2 VIEWS)    Result Date: 9/30/2021  A subcapital transcervical fracture of the right proximal femur and a mild varus displacement. Signed by Dr Simon Essex (MIN 2 VIEWS)    Result Date: 9/30/2021  A subcapital transcervical fracture of the right proximal femur. Signed by Dr Devon Gaspar    Result Date: 9/30/2021  1. No acute intracranial findings. 2. Similar volume loss. Signed by Dr Irving Min    Result Date: 9/30/2021  A radiolucent line across the right lateral mass of C2 may represent a vascular groove, an artifact or a nondisplaced fracture? . If symptomatic a follow-up examination or MR imaging of the cervical spine may be obtained. No other displaced fracture or malalignment. Chronic degenerative changes. Signed by Dr Royal Starks    Result Date: 9/30/2021  1. Small fluid at the C1-2 joint space, which appears to have slight bony offset of the left lateral C1-2 interface. Possible right C1 fracture better demonstrated on prior CT. 2. No severe spinal canal stenosis. Level by level findings as above. Signed by Dr Ellen Delgado    XR CHEST PORTABLE    Result Date: 9/30/2021  No active cardiopulmonary disease. Signed by Dr Em Holloway:    My interpretation of imaging studies:   There appears to be a non-displaced linear lucency the lateral RIGHT C1 arch. There is edema and mild posterior-subluxation at the C1/2 facet joint on the LEFT, though the facet capsule and surrounding ligaments appear to be intact. ASSESSMENT AND PLAN:  This is an 80 y.o. female who presented to the [facility] ED after a fall complaining of right hip pain and a right subcapital hip/femur fracture. Subsequent imaging revealed a possible injury at C1/2 with a non-displaced right sided lucency through that anterior C1 arch on the RIGHT and minimal posterior subluxation at the C1/2 facet on the LEFT. She is currently sedated from pain medication but denies neck pain. She has minimal c-spine tenderness on exam.  For now, I recommend that she be immobilized in an Aspen cervical collar. I see no neurosurgical contraindications to surgical repair of her right hip fracture if she can be maintained in the cervical collar. Overall, her prognosis appears to be quite poor given her known pancreatic cancer, poor performance status and other medical comorbidities.              Sowmya Trinidad MD

## 2021-10-01 VITALS — DIASTOLIC BLOOD PRESSURE: 50 MMHG | SYSTOLIC BLOOD PRESSURE: 86 MMHG | OXYGEN SATURATION: 95 % | TEMPERATURE: 97.2 F

## 2021-10-01 LAB
ALBUMIN SERPL-MCNC: 2.4 G/DL (ref 3.5–5.2)
ALP BLD-CCNC: 351 U/L (ref 35–104)
ALT SERPL-CCNC: 11 U/L (ref 5–33)
ANION GAP SERPL CALCULATED.3IONS-SCNC: 14 MMOL/L (ref 7–19)
AST SERPL-CCNC: 17 U/L (ref 5–32)
BASOPHILS ABSOLUTE: 0.1 K/UL (ref 0–0.2)
BASOPHILS RELATIVE PERCENT: 0.4 % (ref 0–1)
BILIRUB SERPL-MCNC: 0.6 MG/DL (ref 0.2–1.2)
BUN BLDV-MCNC: 34 MG/DL (ref 8–23)
CALCIUM SERPL-MCNC: 9 MG/DL (ref 8.8–10.2)
CHLORIDE BLD-SCNC: 102 MMOL/L (ref 98–111)
CO2: 24 MMOL/L (ref 22–29)
CREAT SERPL-MCNC: 1.9 MG/DL (ref 0.5–0.9)
EKG P AXIS: 29 DEGREES
EKG P-R INTERVAL: 206 MS
EKG Q-T INTERVAL: 472 MS
EKG QRS DURATION: 134 MS
EKG QTC CALCULATION (BAZETT): 472 MS
EKG T AXIS: 86 DEGREES
EOSINOPHILS ABSOLUTE: 0.1 K/UL (ref 0–0.6)
EOSINOPHILS RELATIVE PERCENT: 0.4 % (ref 0–5)
FOLATE: 4.8 NG/ML (ref 4.8–37.3)
GFR AFRICAN AMERICAN: 30
GFR NON-AFRICAN AMERICAN: 25
GLUCOSE BLD-MCNC: 106 MG/DL (ref 70–99)
GLUCOSE BLD-MCNC: 94 MG/DL (ref 74–109)
GLUCOSE BLD-MCNC: 99 MG/DL (ref 70–99)
HCT VFR BLD CALC: 32.8 % (ref 37–47)
HEMOGLOBIN: 9.8 G/DL (ref 12–16)
IMMATURE GRANULOCYTES #: 0.2 K/UL
LYMPHOCYTES ABSOLUTE: 0.5 K/UL (ref 1.1–4.5)
LYMPHOCYTES RELATIVE PERCENT: 2.1 % (ref 20–40)
MCH RBC QN AUTO: 30.5 PG (ref 27–31)
MCHC RBC AUTO-ENTMCNC: 29.9 G/DL (ref 33–37)
MCV RBC AUTO: 102.2 FL (ref 81–99)
MONOCYTES ABSOLUTE: 1 K/UL (ref 0–0.9)
MONOCYTES RELATIVE PERCENT: 4.3 % (ref 0–10)
NEUTROPHILS ABSOLUTE: 20.9 K/UL (ref 1.5–7.5)
NEUTROPHILS RELATIVE PERCENT: 91.8 % (ref 50–65)
PDW BLD-RTO: 16.8 % (ref 11.5–14.5)
PERFORMED ON: ABNORMAL
PERFORMED ON: NORMAL
PLATELET # BLD: 598 K/UL (ref 130–400)
PMV BLD AUTO: 10.2 FL (ref 9.4–12.3)
POTASSIUM REFLEX MAGNESIUM: 4.4 MMOL/L (ref 3.5–5)
RBC # BLD: 3.21 M/UL (ref 4.2–5.4)
SODIUM BLD-SCNC: 140 MMOL/L (ref 136–145)
TOTAL PROTEIN: 5.6 G/DL (ref 6.6–8.7)
VITAMIN B-12: >2000 PG/ML (ref 211–946)
WBC # BLD: 22.7 K/UL (ref 4.8–10.8)

## 2021-10-01 PROCEDURE — 2709999900 HC NON-CHARGEABLE SUPPLY: Performed by: ORTHOPAEDIC SURGERY

## 2021-10-01 PROCEDURE — 3600000015 HC SURGERY LEVEL 5 ADDTL 15MIN: Performed by: ORTHOPAEDIC SURGERY

## 2021-10-01 PROCEDURE — 2580000003 HC RX 258: Performed by: ORTHOPAEDIC SURGERY

## 2021-10-01 PROCEDURE — 36415 COLL VENOUS BLD VENIPUNCTURE: CPT

## 2021-10-01 PROCEDURE — 6370000000 HC RX 637 (ALT 250 FOR IP): Performed by: INTERNAL MEDICINE

## 2021-10-01 PROCEDURE — 2500000003 HC RX 250 WO HCPCS: Performed by: ANESTHESIOLOGY

## 2021-10-01 PROCEDURE — 6360000002 HC RX W HCPCS: Performed by: NURSE PRACTITIONER

## 2021-10-01 PROCEDURE — 99233 SBSQ HOSP IP/OBS HIGH 50: CPT | Performed by: PHYSICIAN ASSISTANT

## 2021-10-01 PROCEDURE — 2580000003 HC RX 258: Performed by: NURSE PRACTITIONER

## 2021-10-01 PROCEDURE — 99232 SBSQ HOSP IP/OBS MODERATE 35: CPT | Performed by: INTERNAL MEDICINE

## 2021-10-01 PROCEDURE — 3700000000 HC ANESTHESIA ATTENDED CARE: Performed by: ORTHOPAEDIC SURGERY

## 2021-10-01 PROCEDURE — 6360000002 HC RX W HCPCS: Performed by: ORTHOPAEDIC SURGERY

## 2021-10-01 PROCEDURE — 6370000000 HC RX 637 (ALT 250 FOR IP): Performed by: NURSE PRACTITIONER

## 2021-10-01 PROCEDURE — 2580000003 HC RX 258: Performed by: ANESTHESIOLOGY

## 2021-10-01 PROCEDURE — 2720000010 HC SURG SUPPLY STERILE: Performed by: ORTHOPAEDIC SURGERY

## 2021-10-01 PROCEDURE — 7100000000 HC PACU RECOVERY - FIRST 15 MIN: Performed by: ORTHOPAEDIC SURGERY

## 2021-10-01 PROCEDURE — 99232 SBSQ HOSP IP/OBS MODERATE 35: CPT | Performed by: NEUROLOGICAL SURGERY

## 2021-10-01 PROCEDURE — 80053 COMPREHEN METABOLIC PANEL: CPT

## 2021-10-01 PROCEDURE — 2700000000 HC OXYGEN THERAPY PER DAY

## 2021-10-01 PROCEDURE — 6360000002 HC RX W HCPCS: Performed by: ANESTHESIOLOGY

## 2021-10-01 PROCEDURE — 85025 COMPLETE CBC W/AUTO DIFF WBC: CPT

## 2021-10-01 PROCEDURE — 6370000000 HC RX 637 (ALT 250 FOR IP): Performed by: ORTHOPAEDIC SURGERY

## 2021-10-01 PROCEDURE — 82947 ASSAY GLUCOSE BLOOD QUANT: CPT

## 2021-10-01 PROCEDURE — C1776 JOINT DEVICE (IMPLANTABLE): HCPCS | Performed by: ORTHOPAEDIC SURGERY

## 2021-10-01 PROCEDURE — 6360000002 HC RX W HCPCS

## 2021-10-01 PROCEDURE — 2500000003 HC RX 250 WO HCPCS

## 2021-10-01 PROCEDURE — 3700000001 HC ADD 15 MINUTES (ANESTHESIA): Performed by: ORTHOPAEDIC SURGERY

## 2021-10-01 PROCEDURE — 1210000000 HC MED SURG R&B

## 2021-10-01 PROCEDURE — 7100000001 HC PACU RECOVERY - ADDTL 15 MIN: Performed by: ORTHOPAEDIC SURGERY

## 2021-10-01 PROCEDURE — 3600000005 HC SURGERY LEVEL 5 BASE: Performed by: ORTHOPAEDIC SURGERY

## 2021-10-01 PROCEDURE — 0SRR0JZ REPLACEMENT OF RIGHT HIP JOINT, FEMORAL SURFACE WITH SYNTHETIC SUBSTITUTE, OPEN APPROACH: ICD-10-PCS | Performed by: ORTHOPAEDIC SURGERY

## 2021-10-01 PROCEDURE — 93010 ELECTROCARDIOGRAM REPORT: CPT | Performed by: INTERNAL MEDICINE

## 2021-10-01 DEVICE — HEAD BPLR OD46MM ID28MM FEM HIP ECC SELF CNTR: Type: IMPLANTABLE DEVICE | Site: HIP | Status: FUNCTIONAL

## 2021-10-01 DEVICE — STEM FEM SZ 4 L103MM 130DEG CALCAR HIP GRIPTION 12/14 TAPR: Type: IMPLANTABLE DEVICE | Site: HIP | Status: FUNCTIONAL

## 2021-10-01 DEVICE — HEAD FEM DIA28MM NK L+1.5MM HIP MTL ON MTL 12/14 TAPR: Type: IMPLANTABLE DEVICE | Site: HIP | Status: FUNCTIONAL

## 2021-10-01 RX ORDER — LIDOCAINE HYDROCHLORIDE 10 MG/ML
INJECTION, SOLUTION INFILTRATION; PERINEURAL PRN
Status: DISCONTINUED | OUTPATIENT
Start: 2021-10-01 | End: 2021-10-01 | Stop reason: SDUPTHER

## 2021-10-01 RX ORDER — ONDANSETRON 2 MG/ML
4 INJECTION INTRAMUSCULAR; INTRAVENOUS EVERY 6 HOURS PRN
Status: DISCONTINUED | OUTPATIENT
Start: 2021-10-01 | End: 2021-10-04 | Stop reason: HOSPADM

## 2021-10-01 RX ORDER — DEXAMETHASONE SODIUM PHOSPHATE 10 MG/ML
INJECTION, SOLUTION INTRAMUSCULAR; INTRAVENOUS PRN
Status: DISCONTINUED | OUTPATIENT
Start: 2021-10-01 | End: 2021-10-01 | Stop reason: SDUPTHER

## 2021-10-01 RX ORDER — PROPOFOL 10 MG/ML
INJECTION, EMULSION INTRAVENOUS PRN
Status: DISCONTINUED | OUTPATIENT
Start: 2021-10-01 | End: 2021-10-01 | Stop reason: SDUPTHER

## 2021-10-01 RX ORDER — MIDAZOLAM HYDROCHLORIDE 1 MG/ML
2 INJECTION INTRAMUSCULAR; INTRAVENOUS
Status: DISCONTINUED | OUTPATIENT
Start: 2021-10-01 | End: 2021-10-01 | Stop reason: HOSPADM

## 2021-10-01 RX ORDER — SODIUM CHLORIDE 9 MG/ML
INJECTION, SOLUTION INTRAVENOUS CONTINUOUS PRN
Status: DISCONTINUED | OUTPATIENT
Start: 2021-10-01 | End: 2021-10-01 | Stop reason: SDUPTHER

## 2021-10-01 RX ORDER — LIDOCAINE HYDROCHLORIDE 10 MG/ML
1 INJECTION, SOLUTION EPIDURAL; INFILTRATION; INTRACAUDAL; PERINEURAL
Status: DISCONTINUED | OUTPATIENT
Start: 2021-10-01 | End: 2021-10-01 | Stop reason: HOSPADM

## 2021-10-01 RX ORDER — SODIUM CHLORIDE 0.9 % (FLUSH) 0.9 %
5-40 SYRINGE (ML) INJECTION EVERY 12 HOURS SCHEDULED
Status: DISCONTINUED | OUTPATIENT
Start: 2021-10-01 | End: 2021-10-01 | Stop reason: HOSPADM

## 2021-10-01 RX ORDER — ASPIRIN 81 MG/1
81 TABLET ORAL DAILY
Status: DISCONTINUED | OUTPATIENT
Start: 2021-10-02 | End: 2021-10-04 | Stop reason: HOSPADM

## 2021-10-01 RX ORDER — METOCLOPRAMIDE HYDROCHLORIDE 5 MG/ML
10 INJECTION INTRAMUSCULAR; INTRAVENOUS
Status: DISCONTINUED | OUTPATIENT
Start: 2021-10-01 | End: 2021-10-01 | Stop reason: HOSPADM

## 2021-10-01 RX ORDER — FOLIC ACID 1 MG/1
1 TABLET ORAL DAILY
Status: DISCONTINUED | OUTPATIENT
Start: 2021-10-01 | End: 2021-10-04 | Stop reason: HOSPADM

## 2021-10-01 RX ORDER — ENALAPRILAT 2.5 MG/2ML
1.25 INJECTION INTRAVENOUS
Status: DISCONTINUED | OUTPATIENT
Start: 2021-10-01 | End: 2021-10-01 | Stop reason: HOSPADM

## 2021-10-01 RX ORDER — SODIUM CHLORIDE 9 MG/ML
INJECTION, SOLUTION INTRAVENOUS CONTINUOUS
Status: DISCONTINUED | OUTPATIENT
Start: 2021-10-01 | End: 2021-10-01

## 2021-10-01 RX ORDER — MEPERIDINE HYDROCHLORIDE 25 MG/ML
12.5 INJECTION INTRAMUSCULAR; INTRAVENOUS; SUBCUTANEOUS EVERY 5 MIN PRN
Status: DISCONTINUED | OUTPATIENT
Start: 2021-10-01 | End: 2021-10-01 | Stop reason: HOSPADM

## 2021-10-01 RX ORDER — SODIUM CHLORIDE 9 MG/ML
25 INJECTION, SOLUTION INTRAVENOUS PRN
Status: DISCONTINUED | OUTPATIENT
Start: 2021-10-01 | End: 2021-10-01 | Stop reason: HOSPADM

## 2021-10-01 RX ORDER — ONDANSETRON 2 MG/ML
INJECTION INTRAMUSCULAR; INTRAVENOUS PRN
Status: DISCONTINUED | OUTPATIENT
Start: 2021-10-01 | End: 2021-10-01 | Stop reason: SDUPTHER

## 2021-10-01 RX ORDER — DIPHENHYDRAMINE HYDROCHLORIDE 50 MG/ML
12.5 INJECTION INTRAMUSCULAR; INTRAVENOUS
Status: DISCONTINUED | OUTPATIENT
Start: 2021-10-01 | End: 2021-10-01 | Stop reason: HOSPADM

## 2021-10-01 RX ORDER — SODIUM CHLORIDE 9 MG/ML
25 INJECTION, SOLUTION INTRAVENOUS PRN
Status: DISCONTINUED | OUTPATIENT
Start: 2021-10-01 | End: 2021-10-04 | Stop reason: HOSPADM

## 2021-10-01 RX ORDER — ROCURONIUM BROMIDE 10 MG/ML
INJECTION, SOLUTION INTRAVENOUS PRN
Status: DISCONTINUED | OUTPATIENT
Start: 2021-10-01 | End: 2021-10-01 | Stop reason: SDUPTHER

## 2021-10-01 RX ORDER — LABETALOL HYDROCHLORIDE 5 MG/ML
5 INJECTION, SOLUTION INTRAVENOUS EVERY 10 MIN PRN
Status: DISCONTINUED | OUTPATIENT
Start: 2021-10-01 | End: 2021-10-01 | Stop reason: HOSPADM

## 2021-10-01 RX ORDER — HYDROMORPHONE HYDROCHLORIDE 1 MG/ML
0.5 INJECTION, SOLUTION INTRAMUSCULAR; INTRAVENOUS; SUBCUTANEOUS EVERY 5 MIN PRN
Status: DISCONTINUED | OUTPATIENT
Start: 2021-10-01 | End: 2021-10-01 | Stop reason: HOSPADM

## 2021-10-01 RX ORDER — SODIUM CHLORIDE 0.9 % (FLUSH) 0.9 %
5-40 SYRINGE (ML) INJECTION PRN
Status: DISCONTINUED | OUTPATIENT
Start: 2021-10-01 | End: 2021-10-01 | Stop reason: HOSPADM

## 2021-10-01 RX ORDER — ONDANSETRON 4 MG/1
4 TABLET, ORALLY DISINTEGRATING ORAL EVERY 8 HOURS PRN
Status: DISCONTINUED | OUTPATIENT
Start: 2021-10-01 | End: 2021-10-04 | Stop reason: HOSPADM

## 2021-10-01 RX ORDER — SODIUM CHLORIDE, SODIUM LACTATE, POTASSIUM CHLORIDE, CALCIUM CHLORIDE 600; 310; 30; 20 MG/100ML; MG/100ML; MG/100ML; MG/100ML
INJECTION, SOLUTION INTRAVENOUS CONTINUOUS
Status: DISCONTINUED | OUTPATIENT
Start: 2021-10-01 | End: 2021-10-01

## 2021-10-01 RX ORDER — PROMETHAZINE HYDROCHLORIDE 25 MG/ML
6.25 INJECTION, SOLUTION INTRAMUSCULAR; INTRAVENOUS
Status: DISCONTINUED | OUTPATIENT
Start: 2021-10-01 | End: 2021-10-01 | Stop reason: HOSPADM

## 2021-10-01 RX ORDER — SODIUM CHLORIDE 0.9 % (FLUSH) 0.9 %
5-40 SYRINGE (ML) INJECTION PRN
Status: DISCONTINUED | OUTPATIENT
Start: 2021-10-01 | End: 2021-10-04 | Stop reason: HOSPADM

## 2021-10-01 RX ORDER — MORPHINE SULFATE 2 MG/ML
2 INJECTION, SOLUTION INTRAMUSCULAR; INTRAVENOUS EVERY 5 MIN PRN
Status: DISCONTINUED | OUTPATIENT
Start: 2021-10-01 | End: 2021-10-01 | Stop reason: HOSPADM

## 2021-10-01 RX ORDER — FENTANYL CITRATE 50 UG/ML
50 INJECTION, SOLUTION INTRAMUSCULAR; INTRAVENOUS
Status: DISCONTINUED | OUTPATIENT
Start: 2021-10-01 | End: 2021-10-01 | Stop reason: HOSPADM

## 2021-10-01 RX ORDER — FENTANYL CITRATE 50 UG/ML
INJECTION, SOLUTION INTRAMUSCULAR; INTRAVENOUS PRN
Status: DISCONTINUED | OUTPATIENT
Start: 2021-10-01 | End: 2021-10-01 | Stop reason: SDUPTHER

## 2021-10-01 RX ORDER — MORPHINE SULFATE 4 MG/ML
4 INJECTION, SOLUTION INTRAMUSCULAR; INTRAVENOUS EVERY 5 MIN PRN
Status: DISCONTINUED | OUTPATIENT
Start: 2021-10-01 | End: 2021-10-01 | Stop reason: HOSPADM

## 2021-10-01 RX ORDER — FENTANYL CITRATE 50 UG/ML
25 INJECTION, SOLUTION INTRAMUSCULAR; INTRAVENOUS
Status: DISCONTINUED | OUTPATIENT
Start: 2021-10-01 | End: 2021-10-01 | Stop reason: HOSPADM

## 2021-10-01 RX ORDER — HYDROMORPHONE HYDROCHLORIDE 1 MG/ML
0.25 INJECTION, SOLUTION INTRAMUSCULAR; INTRAVENOUS; SUBCUTANEOUS EVERY 5 MIN PRN
Status: DISCONTINUED | OUTPATIENT
Start: 2021-10-01 | End: 2021-10-01 | Stop reason: HOSPADM

## 2021-10-01 RX ORDER — OXYCODONE HYDROCHLORIDE 5 MG/1
10 TABLET ORAL EVERY 4 HOURS PRN
Status: DISCONTINUED | OUTPATIENT
Start: 2021-10-01 | End: 2021-10-04

## 2021-10-01 RX ORDER — POLYETHYLENE GLYCOL 3350 17 G/17G
34 POWDER, FOR SOLUTION ORAL ONCE
Status: COMPLETED | OUTPATIENT
Start: 2021-10-01 | End: 2021-10-01

## 2021-10-01 RX ORDER — SODIUM CHLORIDE 9 MG/ML
INJECTION, SOLUTION INTRAVENOUS CONTINUOUS
Status: DISCONTINUED | OUTPATIENT
Start: 2021-10-01 | End: 2021-10-04 | Stop reason: HOSPADM

## 2021-10-01 RX ORDER — HYDRALAZINE HYDROCHLORIDE 20 MG/ML
5 INJECTION INTRAMUSCULAR; INTRAVENOUS EVERY 10 MIN PRN
Status: DISCONTINUED | OUTPATIENT
Start: 2021-10-01 | End: 2021-10-01 | Stop reason: HOSPADM

## 2021-10-01 RX ORDER — EPHEDRINE SULFATE 50 MG/ML
INJECTION, SOLUTION INTRAVENOUS PRN
Status: DISCONTINUED | OUTPATIENT
Start: 2021-10-01 | End: 2021-10-01 | Stop reason: SDUPTHER

## 2021-10-01 RX ORDER — OXYCODONE HYDROCHLORIDE 5 MG/1
5 TABLET ORAL EVERY 4 HOURS PRN
Status: DISCONTINUED | OUTPATIENT
Start: 2021-10-01 | End: 2021-10-04

## 2021-10-01 RX ORDER — SODIUM CHLORIDE 0.9 % (FLUSH) 0.9 %
5-40 SYRINGE (ML) INJECTION EVERY 12 HOURS SCHEDULED
Status: DISCONTINUED | OUTPATIENT
Start: 2021-10-01 | End: 2021-10-04 | Stop reason: HOSPADM

## 2021-10-01 RX ADMIN — POLYETHYLENE GLYCOL 3350 34 G: 17 POWDER, FOR SOLUTION ORAL at 11:09

## 2021-10-01 RX ADMIN — SODIUM CHLORIDE: 9 INJECTION, SOLUTION INTRAVENOUS at 15:02

## 2021-10-01 RX ADMIN — FOLIC ACID 1 MG: 1 TABLET ORAL at 11:11

## 2021-10-01 RX ADMIN — EPHEDRINE SULFATE 20 MG: 50 INJECTION INTRAMUSCULAR; INTRAVENOUS; SUBCUTANEOUS at 15:16

## 2021-10-01 RX ADMIN — ONDANSETRON HYDROCHLORIDE 4 MG: 2 INJECTION, SOLUTION INTRAMUSCULAR; INTRAVENOUS at 16:13

## 2021-10-01 RX ADMIN — ALLOPURINOL 100 MG: 100 TABLET ORAL at 09:10

## 2021-10-01 RX ADMIN — SIMVASTATIN 20 MG: 10 TABLET, FILM COATED ORAL at 22:01

## 2021-10-01 RX ADMIN — FENTANYL CITRATE 50 MCG: 50 INJECTION, SOLUTION INTRAMUSCULAR; INTRAVENOUS at 16:49

## 2021-10-01 RX ADMIN — SODIUM BICARBONATE 325 MG: 650 TABLET ORAL at 09:09

## 2021-10-01 RX ADMIN — SUGAMMADEX 300 MG: 100 INJECTION, SOLUTION INTRAVENOUS at 16:13

## 2021-10-01 RX ADMIN — FENTANYL CITRATE 50 MCG: 50 INJECTION, SOLUTION INTRAMUSCULAR; INTRAVENOUS at 15:08

## 2021-10-01 RX ADMIN — PHENYLEPHRINE HYDROCHLORIDE 200 MCG: 10 INJECTION INTRAVENOUS at 15:12

## 2021-10-01 RX ADMIN — LEVOTHYROXINE SODIUM 112 MCG: 112 TABLET ORAL at 05:59

## 2021-10-01 RX ADMIN — POLYETHYLENE GLYCOL 3350 17 G: 17 POWDER, FOR SOLUTION ORAL at 09:10

## 2021-10-01 RX ADMIN — Medication 1000 MG: at 09:10

## 2021-10-01 RX ADMIN — SODIUM BICARBONATE 325 MG: 650 TABLET ORAL at 22:01

## 2021-10-01 RX ADMIN — POLYETHYLENE GLYCOL 3350 17 G: 17 POWDER, FOR SOLUTION ORAL at 22:01

## 2021-10-01 RX ADMIN — LIDOCAINE HYDROCHLORIDE 50 MG: 10 INJECTION, SOLUTION INFILTRATION; PERINEURAL at 15:08

## 2021-10-01 RX ADMIN — SODIUM CHLORIDE: 9 INJECTION, SOLUTION INTRAVENOUS at 22:02

## 2021-10-01 RX ADMIN — PHENYLEPHRINE HYDROCHLORIDE 200 MCG: 10 INJECTION INTRAVENOUS at 15:08

## 2021-10-01 RX ADMIN — CARVEDILOL 3.12 MG: 3.12 TABLET, FILM COATED ORAL at 09:10

## 2021-10-01 RX ADMIN — DOCUSATE SODIUM 100 MG: 100 CAPSULE, LIQUID FILLED ORAL at 22:01

## 2021-10-01 RX ADMIN — Medication 10 ML: at 09:15

## 2021-10-01 RX ADMIN — PHENYLEPHRINE HYDROCHLORIDE 100 MCG: 10 INJECTION INTRAVENOUS at 15:16

## 2021-10-01 RX ADMIN — PROPOFOL 100 MG: 10 INJECTION, EMULSION INTRAVENOUS at 15:08

## 2021-10-01 RX ADMIN — DEXAMETHASONE SODIUM PHOSPHATE 10 MG: 10 INJECTION, SOLUTION INTRAMUSCULAR; INTRAVENOUS at 15:21

## 2021-10-01 RX ADMIN — Medication 2000 MG: at 20:00

## 2021-10-01 RX ADMIN — LISINOPRIL 10 MG: 10 TABLET ORAL at 09:10

## 2021-10-01 RX ADMIN — ROCURONIUM BROMIDE 50 MG: 10 INJECTION, SOLUTION INTRAVENOUS at 15:08

## 2021-10-01 RX ADMIN — DOCUSATE SODIUM 100 MG: 100 CAPSULE, LIQUID FILLED ORAL at 09:10

## 2021-10-01 ASSESSMENT — ENCOUNTER SYMPTOMS
ABDOMINAL PAIN: 0
SORE THROAT: 0
CHEST TIGHTNESS: 0
NAUSEA: 0
SHORTNESS OF BREATH: 0
CONSTIPATION: 1
DIARRHEA: 0
PHOTOPHOBIA: 0
TROUBLE SWALLOWING: 0
COLOR CHANGE: 0
BACK PAIN: 0
SHORTNESS OF BREATH: 0

## 2021-10-01 ASSESSMENT — LIFESTYLE VARIABLES: SMOKING_STATUS: 0

## 2021-10-01 NOTE — PROGRESS NOTES
Palliative Care Progress Note  10/1/2021 11:13 AM    Patient:  Denys Guerra  YOB: 1934  Primary Care Physician: Amy Wong MD  Advance Directive: [facility]  Admit Date: 9/30/2021       Hospital Day: 1  Portions of this note have been copied forward, however, changed to reflect the most current clinical status of this patient. CHIEF COMPLAINT/REASON FOR CONSULTATION Goals of care, family support    SUBJECTIVE: Ms. Lesly Hammond is more awake and talkative today. She states pain is well controlled at present. Mildly nauseated this morning. Hopeful for surgery soon. Neurosurgery with recommendations for conservative management for now with C collar. Received PRBC w/ improvement in hemoglobin overnight     Review of Systems:   14 point review of systems is negative except as specifically addressed above. Objective:   VITALS:  /75   Pulse 81   Temp 96.1 °F (35.6 °C) (Temporal)   Resp 16   Ht 5' 3\" (1.6 m)   Wt 142 lb (64.4 kg)   SpO2 94%   BMI 25.15 kg/m²   24HR INTAKE/OUTPUT:      Intake/Output Summary (Last 24 hours) at 10/1/2021 1113  Last data filed at 10/1/2021 4354  Gross per 24 hour   Intake 0 ml   Output 300 ml   Net -300 ml     General appearance: 79 yo female, no acute distress, resting comfortably in bed, chronically ill appearing  Head: Normocephalic, without obvious abnormality, atraumatic  Eyes: conjunctivae/corneas clear. PERRL, EOM's intact.    Ears: normal external ears and nose, throat without exudate  Neck: no adenopathy, no carotid bruit, no JVD, supple, symmetrical, trachea midline, c collar in place  Lungs: clear to auscultation bilaterally,no rales or wheezes   Heart: regular rate and rhythm, S1, S2 normal, no murmur  Abdomen:soft, non-tender; non-distended, bowel sounds present    Extremities:trace lower extremity edema,  No erythema, no tenderness to palpation  Skin: Skin color, texture, turgor normal. No rashes or lesions  Lymphatic: No palpable lymph node enlargment  Neurologic: Alert and oriented X 3, generalized weakness and normal tone.  No focal deficits  Psychiatric: Calm, appropriate mood and affect     Medications:      sodium chloride        folic acid  1 mg Oral Daily    polyethylene glycol  34 g Oral Once    allopurinol  100 mg Oral Daily    [Held by provider] apixaban  2.5 mg Oral BID    [Held by provider] aspirin  81 mg Oral Daily    carvedilol  3.125 mg Oral Daily    docusate sodium  100 mg Oral BID    levothyroxine  112 mcg Oral Daily    lisinopril  10 mg Oral Daily    simvastatin  20 mg Oral Nightly    sodium bicarbonate  325 mg Oral BID    sodium chloride flush  5-40 mL IntraVENous 2 times per day    cefTRIAXone (ROCEPHIN) IV  1,000 mg IntraVENous Q24H    polyethylene glycol  17 g Oral BID     sodium chloride, meclizine, ondansetron, sodium chloride flush, traMADol **OR** traMADol, bisacodyl, morphine **OR** [DISCONTINUED] morphine  Diet NPO     Lab and other Data:     Recent Labs     09/30/21  0717 10/01/21  0344   WBC 24.0* 22.7*   HGB 7.4* 9.8*   * 598*     Recent Labs     09/30/21  0755 10/01/21  0344    140   K 3.6 4.4    102   CO2 24 24   BUN 32* 34*   CREATININE 2.0* 1.9*   GLUCOSE 139* 94     Recent Labs     09/30/21  0755 10/01/21  0344   AST 14 17   ALT 9 11   BILITOT 0.5 0.6   ALKPHOS 318* 351*   INR:   Recent Labs     09/30/21  0717   INR 1.52*     UA:  Recent Labs     09/30/21  0755   COLORU YELLOW   PHUR 5.5   WBCUA 16-20*   RBCUA 3-5*   BACTERIA 4+*   CLARITYU CLOUDY*   SPECGRAV 1.019   LEUKOCYTESUR MODERATE*   UROBILINOGEN 1.0   BILIRUBINUR Negative   BLOODU LARGE*   GLUCOSEU Negative     Assessment/Plan   Principal Problem:    Fracture of right hip, closed, initial encounter ([facility] [address]  Active Problems:    CKD (chronic kidney disease)    Palliative care patient    Anemia    Malignant neoplasm of other parts of pancreas (HCC)    Leukocytosis    UTI (urinary tract infection)    Closed fracture of right hip [facility])  Resolved Problems:    * No resolved hospital problems. *    Visit Summary:  Chart reviewed, patient discussed with consulting service and nursing staff. Reviewed health issues, work up and treatment plan as well as factors that lead to hospitalization. Ms. Nita Vail is more alert today and able to actively participate in conversation. States pain well controlled at present. She is hopeful to undergo hip repair this afternoon. She states nausea has been well controlled at home with improvement in appetite for the last few weeks. Does have waxing/waning constipation for which she uses miralax. Denies blood stools at home. She feels she would not want to undergo colonoscopy or EGD. Denies abdominal pain. Would like consideration for inpatient rehab post operatively. Recommendations:   1. Palliative care: [address] surgical repair right hip/consideration for inpatient rehab. Code status: DNR    2. Pain 2/2 Right subcapital femur fx s/p mechanical fall-well controlled at present. Patient extremely sensitive to pain medications and does not take them at home    3. C1/C2 injury-No plans for neurosurgical intervention. Continue C-collar-may remove for hygiene and meals    4. Multifactorial anemia with hemoccult positive stools-Denies abdominal pain, vomiting. Denies melena or hematochezia at home. GI following with recommendations to resume Eliquis-will place on hold. Defer to Ortho timing of resumption and monitor H/H closely     5. Pancreatic adenocarcinoma-no longer a candidate for palliative chemotherapy. Followed closely by outpatient supportive care ([facility]). Has not been ready for hospice up to this point. Remains as active as possible within her home w/ assistance from family. Will continue to follow    Thank you for consulting Palliative Care and allowing us to participate in the care of this patient.    Time Spent Counseling > 50%:  YES                                   Total Time Spent with patient/family counseling, workup/treatment review, counseling and placement of orders/preparation of this note: 40 minutes    Electronically signed by Jayden Vann PA-C on 10/1/2021 at 11:13 AM    (Please note that portions of this note were completed with a voice recognition program.  Vel Angelo made to edit the dictations but occasionally words are mis-transcribed.)

## 2021-10-01 NOTE — PROGRESS NOTES
Physician Progress Note      PATIENTJoaquin So Brookie Jeans  [facility]:                  [id number]  :                       1934  ADMIT DATE:       2021 6:17 AM  DISCH DATE:  RESPONDING  PROVIDER #:        Ashleigh Carey MD          QUERY TEXT:    Patient admitted with right hip fracture, noted to have creatinine 2.0  GFR 25   with history of CKD. If possible, please document in progress notes and   discharge summary if you are evaluating and/or treating any of the following: The medical record reflects the following:  Risk Factors: HTN, DM, Hx of CHF and CK  Clinical Indicators: creatinine 2.0  GFR 25  Treatment: labs,  ml/hr    Thank you    Nina Frances RN, BSN, [facility]  [phone]  Options provided:  -- CKD Stage 3b GFR 30-44  -- CKD Stage 4 GFR 15-29  -- CKD Stage 5 GFR<15  -- NIKHIL  -- Other - I will add my own diagnosis  -- Disagree - Not applicable / Not valid  -- Disagree - Clinically unable to determine / Unknown  -- Refer to Clinical Documentation Reviewer    PROVIDER RESPONSE TEXT:    This patient has CKD Stage 4.     Query created by: Alexandria Sorensen on 10/1/2021 9:33 AM      Electronically signed by:  Ashleigh Carey MD 10/1/2021 1:13 PM

## 2021-10-01 NOTE — ANESTHESIA POSTPROCEDURE EVALUATION
Department of Anesthesiology  Postprocedure Note    Patient: Antonio Sapp  MRN: [id number]  [place]: 1934  Date of evaluation: 10/1/2021  Time:  4:52 PM     Procedure Summary     Date: 10/01/21 Room / Location: [address]    Anesthesia Start: 6421 Anesthesia Stop: 1652    Procedure: RIGHT HIP HEMIARTHROPLASTY (Right Hip) Diagnosis: (HIP TOÑO)    Surgeons: João Horvath MD Responsible Provider: ALBERTO Huang CRNA    Anesthesia Type: general ASA Status: 3          Anesthesia Type: general    Annie Phase I:      Annie Phase II:      Last vitals: Reviewed and per EMR flowsheets.        Anesthesia Post Evaluation

## 2021-10-01 NOTE — OP NOTE
Operative Note      Patient: Bob Hooker  YOB: 1934  MRN: [id number]    Date of Procedure: 10/1/2021    Pt Name: Bob Hooker  MRN: [id number]  YOB: 1934  Date: 10/1/2021    PREOPERATIVE DIAGNOSIS:  Right subcapital femoral neck fracture    POSTOPERATIVE DIAGNOSIS:  Right subcapital femoral neck fracture    PROCEDURE:  Procedure(s): Right Hip Hemiarthroplasty      SURGEON:  Paul Acuna MD    ASSISTANT:  Marie Farrar PA-C  The assistant aided in limb position as well as retractor placement. The assistant was also critical in implantation of the prosthesis. In addition to this, the assistant was responsible for wound closure. It was necessary to have the assistant present in order to complete the procedure. ANESTHESIA:  General    ESTIMATED BLOOD LOSS:  Minimal.    COMPLICATIONS:  None. CONDITION:  Stable. IMPLANTS:  Depuy Trilock size 4 stem, +1.5head, 46 outer bipolar cup    BRIEF HISTORY:  This is an 41-year-old female who fell at home. The patient presented  to the Emergency Department with hip pain where x-rays demonstrated a femoral  neck fracture at the mid portion of the femoral neck. This was a displaced,  closed fracture. Based on this, decision made to take the patient to the  operating room for the above-mentioned procedure. After risks and benefits  had been discussed with the patient, she agreed to proceed. DESCRIPTION OF PROCEDURE:  The patient was interviewed in the preanesthesia  area where her   operative hip was marked with a marking pen. She was then taken to the  operative suite where general endotracheal anesthesia was performed by the  anesthesia team.  A timeout was then called confirming the patient, the  operative site as well as the planned procedure and administration of  antibiotics. The patient had been positioned in lateral decubitus position  over an axillary roll on a peg board.   She was then prepped and draped in  standard sterile fashion using ChloraPrep. Once fully prepped and draped, a standard posterior approach was carried out  to the hip. The IT band and gluteal femoral fascia were divided in line with  its fibers. The Charnley retractor was placed, the anterior blade beneath the  gluteus medius and the posterior blade retracting the gluteus enmanuel. Short  external rotators were then identified. The short external rotators and  capsules were then taken down in a T-type fashion and tagged with a #2 Vicryl  sutures. The femoral head was then removed. It sized on the back table for a size 4. Attention is then turned to the femur. With the femur in an exposed position  with the foot facing the ceiling and a proximal femoral elevator placed behind  the femur, we began initially with a box osteotome and then a canal finding  reamer. We then broached up to a size 4 broach. We did use the calcar planer  to make our neck resection about a fingerbreadth above the lesser trochanter. We did use the lateralizing rasp as well to lateralize. We got excellent  fill with the trial stem matching the patient's own anteversion. A trial reduction was then performed with a +1.5 mm head and a 46 mm outer  bipolar cup. Limb lengths felt symmetric at the level of the patella and  heel. The leg was also stable up to about 75 degrees of internal rotation. It is also stable in the sleeper position. All trial components were removed at this point in time. The hip was  copiously irrigated. We then placed a size 4 DePuy Tri-Lock stem with a +1.5  mm head and a 46 mm outer bipolar cup. The hip was then reduced. Once again,  it was taken through an arc of motion and found to be very stable with  symmetric limb lengths. Attention was then turned to meticulous posterior capsular closure. A drill  bit was used to drill through the trochanter and a Sidhu suture passer were  used to pass our sutures.   The capsule and short external rotators were then  tied over a bone bridge. We then reinforced the posterior capsule repair with  a #2 Vicryl figure-of-eight sutures. The iliotibial band was then closed with  a #2 Vicryl suture. The deep tissue was approximated with Vicryl. The skin  was then approximated with 3-0 Vicryl and closed with a Prineo wound closure  system. The patient was placed in an abductor pillow. The patient awoke from  anesthesia without difficulty and transferred to the PACU in stable condition. All sponge, needle, and instrument counts were correct from procedure.         Isabella Borja MD

## 2021-10-01 NOTE — CARE COORDINATION
Patient has been asleep and very drowsy when aroused. Unable to assess patient.   Electronically signed by Marquis Nelson RN, BSN on 10/1/2021 at 2:57 PM

## 2021-10-01 NOTE — PROGRESS NOTES
NEUROSURGERY PROGRESS NOTE      Chief Complaint:   Chief Complaint   Patient presents with    Fall    Hip Pain     right         Interval Update:  No overnight events. She is more lucid this AM and denies neck pain. She denies any upper extremity radicular pain, paresthesias or weakness. She continue to endorse right hip pain. HPI: The patient is a 80 y.o. female who presented to the [facility] ED on 9/30/2021 complaining of hip pain after a fall. She is quite sedated after receiving pain medication and is only minimally-able to corroborate her history. She states that she tripped when trying to get out of bed. She has a history of pancreatic cancer and oncology notes state that palliative chemotherapy was discontinued in 5/2021 due to intolerance and poor performance status. According to notes she is nearly bed-bound at baseline but does get out of bed on occasion and does most of her mobility in a wheelchair. Today, when standing to get out of bed she fell, twisted sideways and landed on her hip. She noted immediate hip pain and was brought to the ED. On arrival her only complaint was hip pain, but a CT of her head and cervical spine were obtained due to her mechanism of injury. The CT of her cervical spine revealed a linear lucency through the anterior arch of C1 near the C1/2 facet joint and a subsequent MRI was obtained that did show edema in this area and edema in the C1/2 facet capsule. Though she is quite sedated, she will arouse and answer questions. She currently denies neck pain. She denies any upper or lower extremity radicular pain or paresthesia but does have pain in her right hip with movement.           Objective:    /75   Pulse 81   Temp 96.1 °F (35.6 °C) (Temporal)   Resp 16   Ht 5' 3\" (1.6 m)   Wt 142 lb (64.4 kg)   SpO2 94%   BMI 25.15 kg/m²       Intake/Output Summary (Last 24 hours) at 10/1/2021 0916  Last data filed at 10/1/2021 7175  Gross per 24 hour   Intake 0 ml   Output 300 ml   Net -300 ml           Physical Exam:    General: alert, cooperative, no distress  Cardiorespiratory: unlabored breathing  Abdomen: soft and nondistended  Spine: C-collar in place with appropriate fit      Neurologic Exam:    Mental Status: Awake, alert, appropriate  Cranial Nerves: PERRL, EOMI, symmetric facies, tongue midline  Motor: 5/5 BUE, LLE, limited exam of RLE due to hip fx  Somatosensory: normal light touch sensation          Pertinent Labs:  Lab Results   Component Value Date    WBC 22.7 (H) 10/01/2021    HGB 9.8 (L) 10/01/2021    HCT 32.8 (L) 10/01/2021    .2 (H) 10/01/2021     (H) 10/01/2021     Lab Results   Component Value Date     10/01/2021    K 4.4 10/01/2021     10/01/2021    CO2 24 10/01/2021    BUN 34 10/01/2021    CREATININE 1.9 10/01/2021    GLUCOSE 94 10/01/2021    CALCIUM 9.0 10/01/2021              Assessment and Plan: This is an 80 y.o. female who presented to the [facility] ED after a fall complaining of right hip pain and a right subcapital hip/femur fracture. Subsequent imaging revealed a possible injury at C1/2 with a non-displaced right sided lucency through that anterior C1 arch on the RIGHT and minimal posterior subluxation at the C1/2 facet on the LEFT. She is more lucid this AM and she denies neck pain.   She appears to be neurologically intact    -No plans for neurosurgical intervention  -Continue c-collar, may remove for hygeine and meals  -No neurosurgical contraindication to repair of her right hip fx if inline-stabilization can be maintained during inductions of anesthesia and she can be maintained in the cervical collar during surgery  -Will continue to follow along    Electronically signed by Nabila Wong MD on 10/1/2021 at 9:16 AM

## 2021-10-01 NOTE — PROGRESS NOTES
[facility] Hospitalists      Patient:  Gabby Ibanez  YOB: 1934  Date of Service: 10/1/2021  MRN: [id number]   Acct: [de-identified]   Primary Care Physician: Lincoln Sosa MD  Advance Directive: [facility]  Admit Date: 9/30/2021       Hospital Day: 1  Portions of this note have been copied forward, however, changed to reflect the most current clinical status of this patient. CHIEF COMPLAINT fall, right hip pain    SUBJECTIVE:  Patient states she is feeling somewhat better today. She only reports pain with movement. She is much more awake and talkative today. She is oriented to person, year, and place today. CUMULATIVE HOSPITAL COURSE:  The patient is a 80 y.o. female with a history of CHF, pancreatic cancer, CKD, HTN, and hyperlipidemia who presented to [facility] ED complaining of right hip pain s/p fall. The patient was very sleepy at the time of admission related to pain medication. HPI was received from daughter over the phone, chart, and some but little from the patient. The daughter stated since May the patient had gradually become weaker and has experienced a fall about every 2-3 weeks. The daughter stated that patient had been receiving 24-hour care at home from family. They recently purchased a pressure sensing mat for the floor at bedside to help prevent falls. The daughter also stated that most days the patient can only take a few steps at most before \"her knees give out. \" The patient had been using a wheelchair frequently. Today the patient had a fall and the patient stated she did hit her head but did not lose consciousness (the daughter confirms). She reported her only pain in the right hip. She stated that at rest and after pain medication the pain is only 1/10 but with movement she stated that it was 8/10.  ED work up revealed a subcapital transcervical fracture of the right proximal femur via xray, a radiolucent line across the right lateral mass of C2 on xray that may indicate fracture, elevated WBC, and 4+ bacteria in her urine. Neurosurgery was consulted for evaluation of C2 fracture, no surgical intervention at this time, recommends C collar except for hygiene and eating. Orthopedic consulted for right femur fracture, plans for right hemiarthroplasty today. GI consulted for possible GI bleeding, no signs of acute bleeding. On Rocephin for UTI and monitoring culture. Eliquis to be restarted when ok with orthopedics. Case management to assist in DC planning. Palliative care following as well. Review of Systems:   Review of Systems   Constitutional: Positive for activity change and fatigue (daughter states ptdaija has been sleeping about 75% of the time). HENT: Negative for sore throat and trouble swallowing. Eyes: Negative for photophobia and visual disturbance. Respiratory: Negative for chest tightness and shortness of breath. Cardiovascular: Negative for chest pain, palpitations and leg swelling. Gastrointestinal: Positive for constipation. Negative for abdominal pain, diarrhea and nausea. Genitourinary: Positive for frequency and urgency. Negative for difficulty urinating and dysuria. Musculoskeletal: Positive for arthralgias (right hip) and gait problem. Negative for back pain and neck pain. Skin: Negative for color change and wound. Neurological: Positive for weakness (generalized and right leg). Negative for dizziness and light-headedness. Psychiatric/Behavioral: Negative for agitation and confusion. 14 point review of systems is negative except as specifically addressed above.       Objective:   VITALS:  /77   Pulse 81   Temp 96.1 °F (35.6 °C) (Temporal)   Resp 16   Ht 5' 3\" (1.6 m)   Wt 142 lb (64.4 kg)   SpO2 94%   BMI 25.15 kg/m²   24HR INTAKE/OUTPUT:    Intake/Output Summary (Last 24 hours) at 10/1/2021 1141  Last data filed at 10/1/2021 0312  Gross per 24 hour   Intake 0 ml   Output 300 ml   Net -300 ml       Physical Exam  HENT:      Head: Normocephalic. Mouth/Throat:      Mouth: Mucous membranes are dry. Pharynx: Oropharynx is clear. Eyes:      Pupils: Pupils are equal, round, and reactive to light. Cardiovascular:      Rate and Rhythm: Normal rate and regular rhythm. Pulses: Normal pulses. Heart sounds: Murmur heard. Pulmonary:      Effort: Pulmonary effort is normal.      Breath sounds: Normal breath sounds. Abdominal:      General: Bowel sounds are normal. There is no distension. Palpations: Abdomen is soft. Tenderness: There is no abdominal tenderness. There is no guarding. Musculoskeletal:         General: Tenderness, deformity and signs of injury present. Right lower leg: Edema (+2) present. Skin:     General: Skin is warm and dry. Capillary Refill: Capillary refill takes 2 to 3 seconds. Coloration: Skin is pale. Neurological:      General: No focal deficit present. Mental Status: She is alert and oriented to person, place, and time. Gait: Abnormal gait: not assessed at this time, r/t injury.              Medications:      sodium chloride        folic acid  1 mg Oral Daily    allopurinol  100 mg Oral Daily    [Held by provider] apixaban  2.5 mg Oral BID    [Held by provider] aspirin  81 mg Oral Daily    carvedilol  3.125 mg Oral Daily    docusate sodium  100 mg Oral BID    levothyroxine  112 mcg Oral Daily    lisinopril  10 mg Oral Daily    simvastatin  20 mg Oral Nightly    sodium bicarbonate  325 mg Oral BID    sodium chloride flush  5-40 mL IntraVENous 2 times per day    cefTRIAXone (ROCEPHIN) IV  1,000 mg IntraVENous Q24H    polyethylene glycol  17 g Oral BID     sodium chloride, meclizine, ondansetron, sodium chloride flush, traMADol **OR** traMADol, bisacodyl, morphine **OR** [DISCONTINUED] morphine  Diet NPO     Lab and other Data:     Recent Labs     09/30/21  0717 10/01/21  0344   WBC 24.0* 22.7*   HGB 7.4* 9.8*   * 598*     Recent Labs 09/30/21  0755 10/01/21  0344    140   K 3.6 4.4    102   CO2 24 24   BUN 32* 34*   CREATININE 2.0* 1.9*   GLUCOSE 139* 94     Recent Labs     09/30/21  0755 10/01/21  0344   AST 14 17   ALT 9 11   BILITOT 0.5 0.6   ALKPHOS 318* 351*     Troponin T: No results for input(s): TROPONINI in the last 72 hours. Pro-BNP: No results for input(s): BNP in the last 72 hours. INR:   Recent Labs     09/30/21  0717   INR 1.52*     UA:  Recent Labs     09/30/21 0755   COLORU YELLOW   PHUR 5.5   WBCUA 16-20*   RBCUA 3-5*   BACTERIA 4+*   CLARITYU CLOUDY*   SPECGRAV 1.019   LEUKOCYTESUR MODERATE*   UROBILINOGEN 1.0   BILIRUBINUR Negative   BLOODU LARGE*   GLUCOSEU Negative     A1C: No results for input(s): LABA1C in the last 72 hours. ABG:No results for input(s): PHART, MBN4MRE, PO2ART, JAU4FEC, BEART, HGBAE, I6TVNOZB, CARBOXHGBART in the last 72 hours. RAD:   XR PELVIS (1-2 VIEWS)    Result Date: 9/30/2021  A subcapital transcervical fracture of the right proximal femur and a mild varus displacement. Signed by Dr Trupti Garza (MIN 2 VIEWS)    Result Date: 9/30/2021  A subcapital transcervical fracture of the right proximal femur. Signed by Dr Enid Browne    Result Date: 9/30/2021  1. No acute intracranial findings. 2. Similar volume loss. Signed by Dr Anson Dakin    Result Date: 9/30/2021  A radiolucent line across the right lateral mass of C2 may represent a vascular groove, an artifact or a nondisplaced fracture? . If symptomatic a follow-up examination or MR imaging of the cervical spine may be obtained. No other displaced fracture or malalignment. Chronic degenerative changes. Signed by Dr Shruthi De Jesus    Result Date: 9/30/2021  1. Small fluid at the C1-2 joint space, which appears to have slight bony offset of the left lateral C1-2 interface.  Possible right C1 fracture better demonstrated on 11:41 AM

## 2021-10-01 NOTE — H&P
[facility] Hospitalists      Hospitalist - History & Physical      PCP: Jenifer Dooley MD    Date of Admission: 9/30/2021    Date of Service: 09/30/2021    Chief Complaint:  Fall, right hip pain    History Of Present Illness: The patient is a 80 y.o. female with a history of CHF, pancreatic cancer, CKD, HTN, and hyperlipidemia who presented to [facility] ED complaining of right hip pain s/p fall. The patient is very sleepy at this time related to pain medication. HPI was received from daughter over the phone, chart, and some but little from the patient. The daughter states since May the patient has gradually become weaker and has experienced a fall about every 2-3 weeks. The daughter states that patient has been receiving 24-hour care at home from family. They recently purchase a pressure sensing mat for the floor at bedside to help prevent falls. The daughter also states that most days the patient can only take a few steps at most before \"her knees give out. \" The patient has been using a wheelchair frequently. Today the patient had a fall and the patient states she did hit her head but did not lose consciousness (the daughter confirms). She reports her only pain in the right hip. She states that currently at rest and after pain medication the pain is only 1/10 but with movement she states that it is 8/10. ED work up revealed a subcapital transcervical fracture of the right proximal femur via xray, a radiolucent line across the right lateral mass of C2 on xray that may indicate fracture, elevated WBC, and 4+ bacteria in her urine.     Past Medical History:        Diagnosis Date    Abdominal aortic aneurysm (AAA) ([address]     Anemia     Arthritis     Cancer ([address] 12/01/2020    pancreatic    CHF (congestive heart failure) (HCC)     Chronic kidney disease     DVT of lower extremity (deep venous thrombosis) ([address]     twice 2010 and 2017    Hyperlipidemia     Hypertension     Kidney stones     Osteoarthritis     Palliative care patient 12/02/2020    Thyroid disease     Thyroid disorder        Past Surgical History:        Procedure Laterality Date    COLONOSCOPY  2016    Dr Lionel Hernandez problems    ENDOSCOPIC ULTRASOUND (LOWER) N/A 12/03/2020    Dr JONNY Grijalva/unsuccessful biliary cannulation despite attempts at 2-wire cannulation and proph pancreatic stenting with cannulation around pancreatic stenting-Positive for high-grade adenocarcinoma, pancreatic head    ERCP N/A 12/03/2020    Dr JONNY Grijalva/unsuccessful biliary cannulation despite attempts at 2-wire cannulation and proph pancreatic stenting with cannulation around pancreatic stenting-Positive for high-grade adenocarcinoma, pancreatic head    ERCP  12/07/2020    Dr Yo Kelly/sphincterotomy, placement of a 10 x 60 partially covered biliary stent    PORT SURGERY Right 2/5/2021    SINGLE LUMEN PORT PLACEMENT WITH ULTRASOUND AND FLUORO performed by Sera Licona MD at [facility] TOE AMPUTATION Bilateral     pinky toes removed    TOTAL KNEE ARTHROPLASTY Right 01/03/2020    RIGHT TOTAL KNEE REPLACEMENT performed by Steff Ellis MD at [facility] Medications:  Prior to Admission medications    Medication Sig Start Date End Date Taking?  Authorizing Provider   lisinopril (PRINIVIL;ZESTRIL) 10 MG tablet Take 10 mg by mouth daily 6/11/21  Yes Historical Provider, MD   meclizine (ANTIVERT) 25 MG tablet Take 25 mg by mouth as needed 4/19/21  Yes Historical Provider, MD   pramipexole (MIRAPEX) 0.125 MG tablet  7/13/21  Yes Historical Provider, MD   carvedilol (COREG) 3.125 MG tablet Take 1 tablet by mouth 2 times daily (with meals)  Patient taking differently: Take 3.125 mg by mouth daily  6/17/21  Yes Sybil Mcmillan MD   sodium bicarbonate 325 MG tablet Take 325 mg by mouth 2 times daily   Yes Historical Provider, MD   apixaban (ELIQUIS) 2.5 MG TABS tablet Take 2.5 mg by mouth 2 times daily   Yes Historical Provider, MD   docusate sodium (COLACE) 100 MG capsule Take 1 capsule by mouth 2 times daily 1/3/20  Yes Laurice Aase, MD   allopurinol (ZYLOPRIM) 100 MG tablet Take 100 mg by mouth daily Taken 1 time a day now   Yes Historical Provider, MD   levothyroxine (SYNTHROID) 112 MCG tablet Take 112 mcg by mouth Daily   Yes Historical Provider, MD   simvastatin (ZOCOR) 20 MG tablet Take 20 mg by mouth nightly   Yes Historical Provider, MD   polyethylene glycol (GLYCOLAX) 17 GM/SCOOP powder Take 17 g by mouth daily    Historical Provider, MD   aspirin 81 MG EC tablet Take 1 tablet by mouth daily 6/17/21   Jonatan Gonzalez MD   midodrine (PROAMATINE) 10 MG tablet Take 1 tablet by mouth 3 times daily (with meals)  Patient not taking: Reported on 9/20/2021 6/17/21   Jonatan Gonzalez MD   nitroGLYCERIN (NITROSTAT) 0.4 MG SL tablet up to max of 3 total doses. If no relief after 1 dose, call 911. 5/26/21   ALBERTO Ritchie - CNP   ondansetron (ZOFRAN ODT) 4 MG disintegrating tablet Take 2 tablets by mouth every 8 hours as needed for Nausea or Vomiting 5/13/21   ALBERTO Patel   lidocaine-prilocaine (EMLA) 2.5-2.5 % cream APPLY TO PORT AREA AND COVERWITH PLASTIC WRAP ONE HOUR PRIOR TO TREATMENT  Patient not taking: Reported on 9/20/2021 4/19/21   ALBERTO Patel       Allergies:    Penicillins    Social History:    The patient currently lives home with 24 hour family care  Tobacco:   reports that she has never smoked. She has never used smokeless tobacco.  Alcohol:   reports no history of alcohol use.   Illicit Drugs: Never    Family History:      Problem Relation Age of Onset    Colon Cancer Brother     Cancer Mother         Breast Cancer    Heart Attack Father     Colon Polyps Neg Hx     Esophageal Cancer Neg Hx     Liver Cancer Neg Hx     Liver Disease Neg Hx     Rectal Cancer Neg Hx     Stomach Cancer Neg Hx        Review of Systems:   Review of Systems   Unable to perform ROS: Acuity of condition (very sleepy from medication)        14 point review of systems is negative except as specifically addressed above. Physical Examination:  /61   Pulse 71   Temp 97.3 °F (36.3 °C) (Temporal)   Resp 16   Ht 5' 3\" (1.6 m)   Wt 142 lb (64.4 kg)   SpO2 90%   BMI 25.15 kg/m²   Physical Exam  Vitals reviewed. HENT:      Head: Normocephalic. Mouth/Throat:      Mouth: Mucous membranes are dry. Pharynx: Oropharynx is clear. Eyes:      Pupils: Pupils are equal, round, and reactive to light. Cardiovascular:      Rate and Rhythm: Normal rate and regular rhythm. Pulses: Normal pulses. Heart sounds: Murmur heard. Pulmonary:      Effort: Pulmonary effort is normal.      Breath sounds: Normal breath sounds. Abdominal:      General: Bowel sounds are normal. There is no distension. Palpations: Abdomen is soft. Tenderness: There is no abdominal tenderness. There is no guarding. Musculoskeletal:         General: Tenderness, deformity and signs of injury present. Skin:     General: Skin is warm and dry. Capillary Refill: Capillary refill takes 2 to 3 seconds. Coloration: Skin is pale. Neurological:      Mental Status: She is lethargic and disoriented. Gait: Abnormal gait: not assessed at this time, r/t injury.       Comments: Oriented to person, place, and situation, unable to give Date, including year          Diagnostic Data:  CBC:  Recent Labs     09/30/21  0717 09/30/21  2120 10/01/21  0344   WBC 24.0*  --  22.7*   HGB 7.4*  --  9.8*   HCT 24.2* 29.4* 32.8*   *  --  598*     BMP:  Recent Labs     09/30/21  0755 10/01/21  0344    140   K 3.6 4.4    102   CO2 24 24   BUN 32* 34*   CREATININE 2.0* 1.9*   CALCIUM 8.8 9.0     Recent Labs     09/30/21  0755 10/01/21  0344   AST 14 17   ALT 9 11   BILITOT 0.5 0.6   ALKPHOS 318* 351*     Coag Panel:   Recent Labs     09/30/21 0717   INR 1.52*   PROTIME 18.3*   APTT 39.8*     Cardiac Enzymes: No results for input(s): Patrisha Meigs in the last 72 hours. ABGs:  Lab Results   Component Value Date    PHART 7.390 05/18/2021    PO2ART 52.0 05/18/2021    MEO5EHL 42.0 05/18/2021     Urinalysis:  Lab Results   Component Value Date    NITRU Negative 09/30/2021    WBCUA 16-20 09/30/2021    BACTERIA 4+ 09/30/2021    RBCUA 3-5 09/30/2021    BLOODU LARGE 09/30/2021    SPECGRAV 1.019 09/30/2021    GLUCOSEU Negative 09/30/2021     A1C: No results for input(s): LABA1C in the last 72 hours. ABG:No results for input(s): PHART, OMF5BDJ, PO2ART, VDI6CLP, BEART, HGBAE, J4PAILOV, CARBOXHGBART in the last 72 hours. XR PELVIS (1-2 VIEWS)    Result Date: 9/30/2021  Examination. XR PELVIS (1-2 VIEWS) 9/30/2021 6:36 AM History: Pain. Frontal projection of the pelvis is obtained. There is no previous study for comparison. There is a subcapital transcervical fracture of the right proximal femur. There is mild  varus displacement The hip articulation is intact. There is moderate diffuse osteopenia. The sacroiliac joints bilaterally, left hip joint and symphysis pubis are intact. A subcapital transcervical fracture of the right proximal femur and a mild varus displacement. Signed by Dr Mandy Chung (MIN 2 VIEWS)    Result Date: 9/30/2021  Examination. XR FEMUR RIGHT (MIN 2 VIEWS) 9/30/2021 6:36 AM History: The frontal and crosstable lateral views of the right femur are obtained. There is no previous study for comparison. There is a subcapital transcervical fracture of the right proximal femur with a mild varus and anterior displacement. The hip articulations intact. There is moderate diffuse osteopenia. The right hip arthroplasty is noted which is incompletely visualized and evaluated. A subcapital transcervical fracture of the right proximal femur.  Signed by Dr Brandon Reeder    Result Date: 9/30/2021  EXAM: CT HEAD WO CONTRAST -- 9/30/2021 7:05 AM HISTORY: 87 years, Female, fall, hit head, on anticoagulation COMPARISON: 12/1/2020 DLP: 876 mGy cm. Automated exposure control was utilized to minimize patient radiation dose. TECHNIQUE: Unenhanced axial CT images obtained from vertex to skull base with coronal and sagittal reformats. FINDINGS: No acute intracranial hemorrhage, midline shift, mass effect or large territory cortical infarct. Gray-white matter differentiation maintained. Proportional prominence of the ventricles, sulci and basilar cisterns suggesting volume loss. Thinning of the ocular lenses may be postoperative or age-related. Paranasal sinuses and mastoid air cells normally aerated. External auditory canals within normal limits. Calvarium appears intact. Subcutaneous tissues within normal limits. 1. No acute intracranial findings. 2. Similar volume loss. Signed by Dr Ten Wallis    Result Date: 9/30/2021  Examination. CT CERVICAL SPINE WO CONTRAST 9/30/2021 7:05 AM History: The patient fell. DLP: 501 mGycm. The CT scan of the cervical spine is performed without intravenous or intrathecal contrast enhancement. The images are acquired in axial plane and subsequent reconstruction in coronal and sagittal planes. There is no previous study for comparison. There is a radiolucent line across the right lateral mass of C2, better visualized in axial plane images #22, which probably represent a vascular groove. This is not visualized in any other sequence. No displaced fracture. No compression. No displacement/malalignment. The curve and alignment are normal. The vertebral body heights are normal. Chronic degenerative changes are seen in the form of osteophytes, loss of disc heights representing degenerative disc disease and facetal arthropathy throughout the cervical spine. There is a resultant moderate neural foraminal stenosis at C5-6 bilaterally and left neural foraminal stenosis at C6-7 and C4-5. No significant spinal stenosis at any level.  Severe arthropathy of the atlantoaxial joint is seen. The posterior processes are intact. The prevertebral soft tissues are normal. The limited visualized lungs are unremarkable. A radiolucent line across the right lateral mass of C2 may represent a vascular groove, an artifact or a nondisplaced fracture? . If symptomatic a follow-up examination or MR imaging of the cervical spine may be obtained. No other displaced fracture or malalignment. Chronic degenerative changes. Signed by Dr Kareem Winston    Result Date: 9/30/2021  EXAM: MRI CERVICAL SPINE WO CONTRAST -- 9/30/2021 9:00 AM HISTORY: 87 years, Female, fall, history of pancreatic cancer COMPARISON: 9/30/2021 TECHNIQUE:  Routine pulse sequences were obtained without intravenous contrast. FINDINGS: C1 through mid T2 visualized. 7 cervical vertebral bodies. Normal alignment. Bone marrow signal within normal limits. No evidence of acute compression fracture. Cord signal normal. Craniocervical junction within normal limits. Disc heights are within normal limits. Small fluid at left C1-C2 joint space, which appears to have slight bony offset of left lateral C1-2 interface. Possible right C1 fracture better demonstrated on prior CT. Exam is not optimized for evaluation of ligamentous structures. C2-C3:  No evidence of disc bulge, spinal canal or foraminal stenosis. C3-C4:  Disc osteophyte complex. No spinal canal stenosis. Moderate right and moderate to severe left foraminal stenosis. C4-C5:  Disc osteophyte complex. No spinal canal or right foraminal stenosis. Mild left foraminal stenosis. C5-C6:  Disc osteophyte complex. Moderate bilateral foraminal stenosis. C6-C7:  Disc osteophyte complex. No spinal canal stenosis. Mild right and moderate to severe left foraminal stenosis. C7-T1:  No evidence of disc bulge, spinal canal or foraminal stenosis. Otherwise overlying soft tissues appear within normal limits.     1. Small fluid at the C1-2 joint space, which appears to have slight bony offset of the left lateral C1-2 interface. Possible right C1 fracture better demonstrated on prior CT. 2. No severe spinal canal stenosis. Level by level findings as above. Signed by Dr Prabha Cruz    XR CHEST PORTABLE    Result Date: 9/30/2021  Examination. XR CHEST PORTABLE 9/30/2021 6:46 AM History: The patient fell. The presurgical evaluation. A single frontal supine view of the chest is compared with the previous study dated 5/18/2021. The lungs are moderately expanded and clear. There is moderate cardiomegaly. Atheromatous changes thoracic aorta are noted. A right-sided internal jugular approach Med[employer]Port system is in a stable position. There is no acute bony abnormality. Deformity of the right clavicle representing a previous healed malunited fracture and is similar to the previous study. No active cardiopulmonary disease.  Signed by Dr Lewis Bodily      Assessment/Plan:  Principal Problem:    Fracture of right hip, closed, initial encounter ([facility]   -admit   -ortho following    -plan for right hemiarthroplasty today if ok with neurosurgery    -bedrest until surgery, PT/OT post-op   -pain control   -iqbal   -encourage coughing and deep breathing   -SCDs    Active Problems:    CKD (chronic kidney disease)   -noted   -monitor BMP   -gentle IV hydration as pt also has CHF      UTI (urinary tract infection)   -rocephin daily   -monitor urine culture   -monitor I&O      Palliative care patient   -consulted      Anemia   -hemoccult + stool in ED   -GI consulted   -protonix IV   -monitor HH   -transfuse 1 Unit PRBC before surgery      Malignant neoplasm of other parts of pancreas ([facility] [address]   -noted   -not currently canidate for treatment   -last treatment was in May 2021      Leukocytosis     -urine culture in progress   -blood cultures   -rocephin daily   -monitor CBC     CHF   -continue Coreg and lisinopril   -Strict I&O   -daily weight    Resolved Problems:    * No resolved hospital problems.  *       Signed:  ALBERTO Ayers CNP, 09/30/2021 12:27 PM

## 2021-10-01 NOTE — ANESTHESIA PRE PROCEDURE
Department of Anesthesiology  Preprocedure Note       Name:  Bob Hooker   Age:  80 y.o.  :  1934                                          MRN:  [id number]         Date:  10/1/2021      Surgeon: Laura Monae):  Paul Acuna MD    Procedure: Procedure(s):  RIGHT HIP HEMIARTHROPLASTY    Medications prior to admission:   Prior to Admission medications    Medication Sig Start Date End Date Taking?  Authorizing Provider   lisinopril (PRINIVIL;ZESTRIL) 10 MG tablet Take 10 mg by mouth daily 21  Yes Historical Provider, MD   meclizine (ANTIVERT) 25 MG tablet Take 25 mg by mouth as needed 21  Yes Historical Provider, MD   pramipexole (MIRAPEX) 0.125 MG tablet  21  Yes Historical Provider, MD   carvedilol (COREG) 3.125 MG tablet Take 1 tablet by mouth 2 times daily (with meals)  Patient taking differently: Take 3.125 mg by mouth daily  21  Yes Rain Angelo MD   sodium bicarbonate 325 MG tablet Take 325 mg by mouth 2 times daily   Yes Historical Provider, MD   apixaban (ELIQUIS) 2.5 MG TABS tablet Take 2.5 mg by mouth 2 times daily   Yes Historical Provider, MD   docusate sodium (COLACE) 100 MG capsule Take 1 capsule by mouth 2 times daily 1/3/20  Yes Gwen Urban MD   allopurinol (ZYLOPRIM) 100 MG tablet Take 100 mg by mouth daily Taken 1 time a day now   Yes Historical Provider, MD   levothyroxine (SYNTHROID) 112 MCG tablet Take 112 mcg by mouth Daily   Yes Historical Provider, MD   simvastatin (ZOCOR) 20 MG tablet Take 20 mg by mouth nightly   Yes Historical Provider, MD   polyethylene glycol (GLYCOLAX) 17 GM/SCOOP powder Take 17 g by mouth daily    Historical Provider, MD   aspirin 81 MG EC tablet Take 1 tablet by mouth daily 21   Rain Angelo MD   midodrine (PROAMATINE) 10 MG tablet Take 1 tablet by mouth 3 times daily (with meals)  Patient not taking: Reported on 2021   Rain Angelo MD   nitroGLYCERIN (NITROSTAT) 0.4 MG SL tablet up to max of 3 total doses. If no relief after 1 dose, call 911. 5/26/21   ALBERTO Collins CNP   ondansetron (ZOFRAN ODT) 4 MG disintegrating tablet Take 2 tablets by mouth every 8 hours as needed for Nausea or Vomiting 5/13/21   ALBERTO Patel   lidocaine-prilocaine (EMLA) 2.5-2.5 % cream APPLY TO PORT AREA AND COVERWITH PLASTIC WRAP ONE HOUR PRIOR TO TREATMENT  Patient not taking: Reported on 9/20/2021 4/19/21   ALBERTO Ferris       Current medications:    Current Facility-Administered Medications   Medication Dose Route Frequency Provider Last Rate Last Admin    [MAR Hold] folic acid (FOLVITE) tablet 1 mg  1 mg Oral Daily Wero Aragon MD   1 mg at 10/01/21 1111    [MAR Hold] 0.9 % sodium chloride infusion   IntraVENous PRN Yumi Grimaldo MD        [facility] Hold] allopurinol (ZYLOPRIM) tablet 100 mg  100 mg Oral Daily Lelo Later, APRN - CNP   100 mg at 10/01/21 0910    [Held by provider] apixaban (ELIQUIS) tablet 2.5 mg  2.5 mg Oral BID Lelo Later, APRN - CNP        [Held by provider] aspirin EC tablet 81 mg  81 mg Oral Daily Lelo Later, APRN - CNP        [MAR Hold] carvedilol (COREG) tablet 3.125 mg  3.125 mg Oral Daily Lelo Later, APRN - CNP   3.125 mg at 10/01/21 0910    [MAR Hold] docusate sodium (COLACE) capsule 100 mg  100 mg Oral BID Lelo Later, APRN - CNP   100 mg at 10/01/21 0910    [MAR Hold] levothyroxine (SYNTHROID) tablet 112 mcg  112 mcg Oral Daily Lelo Later, APRN - CNP   112 mcg at 10/01/21 0559    [MAR Hold] lisinopril (PRINIVIL;ZESTRIL) tablet 10 mg  10 mg Oral Daily Lelo Later, APRN - CNP   10 mg at 10/01/21 0910    [MAR Hold] meclizine (ANTIVERT) tablet 25 mg  25 mg Oral TID PRN Lelo Later, APRTHU - CNP        [MAR Hold] ondansetron (ZOFRAN-ODT) disintegrating tablet 8 mg  8 mg Oral Q8H PRN Franny Armstrong, APRN - CNP        [MAR Hold] simvastatin (ZOCOR) tablet 20 mg  20 mg Oral Nightly Lelo Later, APRN - CNP   20 mg at 09/30/21 2321    [MAR Hold] sodium bicarbonate tablet 325 mg  325 mg Oral BID Lylia Brian, APRN - CNP   325 mg at 10/01/21 0909    [MAR Hold] sodium chloride flush 0.9 % injection 5-40 mL  5-40 mL IntraVENous 2 times per day Lylia Brian, APRN - CNP   10 mL at 10/01/21 0915    [MAR Hold] sodium chloride flush 0.9 % injection 5-40 mL  5-40 mL IntraVENous PRN Lylia Brian, APRN - CNP        [MAR Hold] traMADol (ULTRAM) tablet 25 mg  25 mg Oral Q6H PRN Lylia [place], APRN - CNP        Or    [MAR Hold] traMADol (ULTRAM) tablet 50 mg  50 mg Oral Q6H PRN Lylia Brian, APRN - CNP        [MAR Hold] bisacodyl (DULCOLAX) suppository 10 mg  10 mg Rectal Daily PRN Lylia Brian, APRN - CNP        [MAR Hold] cefTRIAXone (ROCEPHIN) 1,000 mg in sodium chloride (PF) 10 mL IV syringe  1,000 mg IntraVENous Q24H Lylia Brian, APRN - CNP   1,000 mg at 10/01/21 0910    [MAR Hold] polyethylene glycol (GLYCOLAX) packet 17 g  17 g Oral BID Queen Kamari Carrillo MD   17 g at 10/01/21 0910    [MAR Hold] morphine (PF) injection 0.5 mg  0.5 mg IntraVENous Q4H PRN Moo Fox MD           Allergies:     Allergies   Allergen Reactions    Penicillins Rash     Childhood        Problem List:    Patient Active Problem List   Diagnosis Code    Heme positive stool R19.5    Anemia of chronic kidney failure, stage 4 (severe) (HCC) N18.4, D63.1    Iron deficiency anemia secondary to inadequate dietary iron intake D50.8    H/O abnormal mammogram Z87.898    Primary osteoarthritis of right knee M17.11    Status post total right knee replacement Z96.651    Unilateral primary osteoarthritis, right knee M17.11    Other specified hypothyroidism E03.8    Encounter for BARBARA (erythropoietin stimulating agent) anemia management D64.9, Z79.899    Elevated INR R79.1    Hematuria R31.9    Acute blood loss anemia D62    CKD (chronic kidney disease) N18.9    Palliative care patient Z51.5    Pancreatic mass K86.89    Fatigue R53.83    Elevated liver enzymes R74.8    Biliary obstruction K83.1    Anemia D64.9    Malignant neoplasm of other parts of pancreas (HCC) C25.7    New onset of congestive heart failure (HCC) I50.9    Acute respiratory failure with hypoxia (HCC) J96.01    CHF NYHA class III, acute, systolic (HCC) E09.86    Constipation K59.00    Nausea R11.0    Restless legs G25.81    Decreased mobility R26.89    Hypertension I10    Fracture of right hip, closed, initial encounter ([facility] [address] S72.001A    Leukocytosis D72.829    UTI (urinary tract infection) N39.0    Closed fracture of right hip (HCC) S72.001A       Past Medical History:        Diagnosis Date    Abdominal aortic aneurysm (AAA) (HCC)     Anemia     Arthritis     [facility]2020    pancreatic    CHF (congestive heart failure) (HCC)     Chronic kidney disease     DVT of lower extremity (deep venous thrombosis) (HCC)     twice 2010 and 2017    Hyperlipidemia     Hypertension     Kidney stones     Osteoarthritis     Palliative care patient 12/02/2020    Thyroid disease     Thyroid disorder        Past Surgical History:        Procedure Laterality Date    COLONOSCOPY  2016    Dr Rodriguez Men problems    ENDOSCOPIC ULTRASOUND (LOWER) N/A 12/03/2020    Dr JONNY Grijalva/unsuccessful biliary cannulation despite attempts at 2-wire cannulation and proph pancreatic stenting with cannulation around pancreatic stenting-Positive for high-grade adenocarcinoma, pancreatic head    ERCP N/A 12/03/2020    Dr JONNY Grijalva/unsuccessful biliary cannulation despite attempts at 2-wire cannulation and proph pancreatic stenting with cannulation around pancreatic stenting-Positive for high-grade adenocarcinoma, pancreatic head    ERCP  12/07/2020    Dr Javier Kelly/sphincterotomy, placement of a 10 x 60 partially covered biliary stent    PORT SURGERY Right 2/5/2021    SINGLE LUMEN PORT PLACEMENT WITH ULTRASOUND AND 351 10/01/2021    AST 17 10/01/2021    ALT 11 10/01/2021       POC Tests:   Recent Labs     10/01/21  1201   POCGLU 99       Coags:   Lab Results   Component Value Date    PROTIME 18.3 09/30/2021    INR 1.52 09/30/2021    APTT 39.8 09/30/2021       HCG (If Applicable): No results found for: PREGTESTUR, PREGSERUM, HCG, HCGQUANT     ABGs:   Lab Results   Component Value Date    PHART 7.390 05/18/2021    PO2ART 52.0 05/18/2021    ONC9UXX 42.0 05/18/2021    DYM8DFR 25.4 05/18/2021    BEART 0.3 05/18/2021    B4SBTDUR 87.9 05/18/2021        Type & Screen (If Applicable):  No results found for: LABABO, LABRH    Drug/Infectious Status (If Applicable):  No results found for: HIV, HEPCAB    COVID-19 Screening (If Applicable):   Lab Results   Component Value Date    COVID19 Not Detected 09/30/2021    COVID19 NOT DETECTED 02/01/2021           Anesthesia Evaluation  Patient summary reviewed and Nursing notes reviewed no history of anesthetic complications:   Airway: Mallampati: II  TM distance: >3 FB   Neck ROM: full  Mouth opening: > = 3 FB Dental: normal exam         Pulmonary:       (-) shortness of breath, sleep apnea and not a current smoker                           Cardiovascular:  Exercise tolerance: poor (<4 METS),   (+) hypertension:, CHF:,       ECG reviewed        Stress test reviewed       Beta Blocker:  Dose within 24 Hrs      ROS comment: Stress test 5/21:  Review of rest and stress images obtained utilizing a gated  SPECT  acquisition protocol along with review of the polar plot revealed:  1. Ejection fraction 31%  2. Wall motion moderately severely impaired with inferoapical akinesis  3. Myocardial perfusion imaging demonstrated diminished uptake in the  distal anteroseptal apical and inferior wall with visually suggestion  of some reversibility present. Summary impressions:   Moderately severely impaired left ventricular systolic function of 37%  wall motion underbodies noted suggestion of defects distal  anteroseptal apical and inferior wall with some reversibility present  correlate clinically and/or angiographically if clinically appropriate         Neuro/Psych:      (-) seizures and CVA            ROS comment: MRI cspine:  Impression  1. Small fluid at the C1-2 joint space, which appears to have slight  bony offset of the left lateral C1-2 interface. Possible right C1  fracture better demonstrated on prior CT. 2. No severe spinal canal stenosis. Level by level findings as above. CT cspine:  A radiolucent line across the right lateral mass of C2 may  represent a vascular groove, an artifact or a nondisplaced fracture? .  If symptomatic a follow-up examination or MR imaging of the cervical  spine may be obtained. No other displaced fracture or malalignment. Chronic degenerative  changes. CT head:  1. No acute intracranial findings. 2. Similar volume loss. GI/Hepatic/Renal:   (+) renal disease: CRI and ARF,      (-) GERD and liver disease      ROS comment: UTI vs kidney stone on urine labs. Endo/Other:    (+) hypothyroidism, blood dyscrasia (anemia): arthritis:., malignancy/cancer (pancreatic cancer currently not undergoing treatment). Abdominal:             Vascular:     - DVT and PE. Other Findings: C collar in place         Department of Anesthesiology  Preprocedure Note       Name:  Charla Madrid   Age:  80 y.o.  :  1934                                          MRN:  [id number]         Date:  10/1/2021      Surgeon: Lonna Frankel):  Jose Ko MD    Procedure: Procedure(s):  RIGHT HIP HEMIARTHROPLASTY    Medications prior to admission:   Prior to Admission medications    Medication Sig Start Date End Date Taking?  Authorizing Provider   polyethylene glycol (GLYCOLAX) 17 GM/SCOOP powder Take 17 g by mouth daily    Historical Provider, MD   lisinopril (PRINIVIL;ZESTRIL) 10 MG tablet Take 10 mg by mouth daily 21   Historical Provider, MD   meclizine (ANTIVERT) 25 MG tablet Take 25 mg by mouth as needed 4/19/21   Historical Provider, MD   pramipexole (MIRAPEX) 0.125 MG tablet  7/13/21   Historical Provider, MD   aspirin 81 MG EC tablet Take 1 tablet by mouth daily 6/17/21   Mckenzie Reece MD   carvedilol (COREG) 3.125 MG tablet Take 1 tablet by mouth 2 times daily (with meals)  Patient taking differently: Take 3.125 mg by mouth daily  6/17/21   Mckenzie Reece MD   midodrine (PROAMATINE) 10 MG tablet Take 1 tablet by mouth 3 times daily (with meals)  Patient not taking: Reported on 9/20/2021 6/17/21   Mckenzie Reece MD   nitroGLYCERIN (NITROSTAT) 0.4 MG SL tablet up to max of 3 total doses. If no relief after 1 dose, call 911. 5/26/21   ALBERTO De La Garza - CNP   sodium bicarbonate 325 MG tablet Take 325 mg by mouth 2 times daily    Historical Provider, MD   ondansetron (ZOFRAN ODT) 4 MG disintegrating tablet Take 2 tablets by mouth every 8 hours as needed for Nausea or Vomiting 5/13/21   ALBERTO Patel   lidocaine-prilocaine (EMLA) 2.5-2.5 % cream APPLY TO PORT AREA AND COVERWITH PLASTIC WRAP ONE HOUR PRIOR TO TREATMENT  Patient not taking: Reported on 9/20/2021 4/19/21   ALBERTO Patel   apixaban (ELIQUIS) 2.5 MG TABS tablet Take 2.5 mg by mouth 2 times daily    Historical Provider, MD   docusate sodium (COLACE) 100 MG capsule Take 1 capsule by mouth 2 times daily 1/3/20   Main Dumont MD   allopurinol (ZYLOPRIM) 100 MG tablet Take 100 mg by mouth daily Taken 1 time a day now    Historical Provider, MD   levothyroxine (SYNTHROID) 112 MCG tablet Take 112 mcg by mouth Daily    Historical Provider, MD   simvastatin (ZOCOR) 20 MG tablet Take 20 mg by mouth nightly    Historical Provider, MD       Current medications:    No current facility-administered medications for this visit. No current outpatient medications on file.      Facility-Administered Medications Ordered in Other Visits   Medication Dose Route Frequency Provider Last Rate Last Admin  [MAR Hold] folic acid (FOLVITE) tablet 1 mg  1 mg Oral Daily Dorene Sanabria MD   1 mg at 10/01/21 1111    [MAR Hold] 0.9 % sodium chloride infusion   IntraVENous PRN Gabriella Reese MD        [facility] Hold] allopurinol (ZYLOPRIM) tablet 100 mg  100 mg Oral Daily Latoya Aparicioer, APRN - CNP   100 mg at 10/01/21 0910    [Held by provider] apixaban (ELIQUIS) tablet 2.5 mg  2.5 mg Oral BID Latoyajosh Carlos APRN - CNP        [Held by provider] aspirin EC tablet 81 mg  81 mg Oral Daily Latoya Terrance, APRN - CNP        [MAR Hold] carvedilol (COREG) tablet 3.125 mg  3.125 mg Oral Daily Latoya Aparicioer, APRN - CNP   3.125 mg at 10/01/21 0910    [MAR Hold] docusate sodium (COLACE) capsule 100 mg  100 mg Oral BID Latoyajosh Carlos APRN - CNP   100 mg at 10/01/21 0910    [MAR Hold] levothyroxine (SYNTHROID) tablet 112 mcg  112 mcg Oral Daily Latoya Aparicioer, APRN - CNP   112 mcg at 10/01/21 0559    [MAR Hold] lisinopril (PRINIVIL;ZESTRIL) tablet 10 mg  10 mg Oral Daily Latoya Aparicioer, APRN - CNP   10 mg at 10/01/21 0910    [MAR Hold] meclizine (ANTIVERT) tablet 25 mg  25 mg Oral TID PRN Latoya Carlos APRN - CNP        [MAR Hold] ondansetron (ZOFRAN-ODT) disintegrating tablet 8 mg  8 mg Oral Q8H PRN Latoyajosh Carlos, APRN - CNP        [MAR Hold] simvastatin (ZOCOR) tablet 20 mg  20 mg Oral Nightly Latoya Carlos APRN - CNP   20 mg at 09/30/21 2321    [MAR Hold] sodium bicarbonate tablet 325 mg  325 mg Oral BID Latoya Carlos, APRN - CNP   325 mg at 10/01/21 0909    [MAR Hold] sodium chloride flush 0.9 % injection 5-40 mL  5-40 mL IntraVENous 2 times per day Latoya Carlos APRN - CNP   10 mL at 10/01/21 0915    [MAR Hold] sodium chloride flush 0.9 % injection 5-40 mL  5-40 mL IntraVENous PRN ALBERTO Wright CNP        [MAR Hold] traMADol (ULTRAM) tablet 25 mg  25 mg Oral Q6H PRN ALBERTO Wright CNP        Or    [MAR Hold] traMADol (ULTRAM) tablet 50 mg  50 mg Oral Q6H PRN Kimberley Sparta, APRN - CNP        [MAR Hold] bisacodyl (DULCOLAX) suppository 10 mg  10 mg Rectal Daily PRN Kimberley Sparta, APRN - CNP        [MAR Hold] cefTRIAXone (ROCEPHIN) 1,000 mg in sodium chloride (PF) 10 mL IV syringe  1,000 mg IntraVENous Q24H Kimberley Sparta, APRN - CNP   1,000 mg at 10/01/21 0910    [MAR Hold] polyethylene glycol (GLYCOLAX) packet 17 g  17 g Oral BID Josep Carrillo MD   17 g at 10/01/21 0910    [MAR Hold] morphine (PF) injection 0.5 mg  0.5 mg IntraVENous Q4H PRN Chyna Webster MD           Allergies:     Allergies   Allergen Reactions    Penicillins Rash     Childhood        Problem List:    Patient Active Problem List   Diagnosis Code    Heme positive stool R19.5    Anemia of chronic kidney failure, stage 4 (severe) (HCC) N18.4, D63.1    Iron deficiency anemia secondary to inadequate dietary iron intake D50.8    H/O abnormal mammogram Z87.898    Primary osteoarthritis of right knee M17.11    Status post total right knee replacement Z96.651    Unilateral primary osteoarthritis, right knee M17.11    Other specified hypothyroidism E03.8    Encounter for BARBARA (erythropoietin stimulating agent) anemia management D64.9, Z79.899    Elevated INR R79.1    Hematuria R31.9    Acute blood loss anemia D62    CKD (chronic kidney disease) N18.9    Palliative care patient Z51.5    Pancreatic mass K86.89    Fatigue R53.83    Elevated liver enzymes R74.8    Biliary obstruction K83.1    Anemia D64.9    Malignant neoplasm of other parts of pancreas (HCC) C25.7    New onset of congestive heart failure (HCC) I50.9    Acute respiratory failure with hypoxia (HCC) J96.01    CHF NYHA class III, acute, systolic (HCC) V07.79    Constipation K59.00    Nausea R11.0    Restless legs G25.81    Decreased mobility R26.89    Hypertension I10    Fracture of right hip, closed, initial encounter ([facility] [address] S72.001A    Leukocytosis D72.829    UTI (urinary tract infection) N39.0    Closed fracture of right hip ([address] S72.001A       Past Medical History:        Diagnosis Date    Ab[facility] (AAA) ([facility] [address]     Anemia     Arthritis     Cancer ([facility] [address] 12/01/2020    pancreatic    CHF (congestive heart failure) (HCC)     Chronic kidney disease     DVT of lower extremity (deep venous thrombosis) (HCC)     twice 2010 and 2017    Hyperlipidemia     Hypertension     Kidney stones     Osteoarthritis     Palliative care patient 12/02/2020    Thyroid disease     Thyroid disorder        Past Surgical History:        Procedure Laterality Date    COLONOSCOPY  2016    Dr Jin Lunsford problems    ENDOSCOPIC ULTRASOUND (LOWER) N/A 12/03/2020    Dr JONNY Grijalva/unsuccessful biliary cannulation despite attempts at 2-wire cannulation and proph pancreatic stenting with cannulation around pancreatic stenting-Positive for high-grade adenocarcinoma, pancreatic head    ERCP N/A 12/03/2020    Dr JONNY Grijalva/unsuccessful biliary cannulation despite attempts at 2-wire cannulation and proph pancreatic stenting with cannulation around pancreatic stenting-Positive for high-grade adenocarcinoma, pancreatic head    ERCP  12/07/2020    Dr Qi Kelly/sphincterotomy, placement of a 10 x 60 partially covered biliary stent    PORT SURGERY Right 2/5/2021    SINGLE LUMEN PORT PLACEMENT WITH ULTRASOUND AND FLUORO performed by Victor M Zendejas MD at [address] TOE AMPUTATION Bilateral     pinky toes removed    TOTAL KNEE ARTHROPLASTY Right 01/03/2020    RIGHT TOTAL KNEE REPLACEMENT performed by Stacey Atkins MD at [place] [address] History:    Social History     Tobacco Use    Smoking status: Never Smoker    Smokeless tobacco: Never Used   Substance Use Topics    Alcohol use: No                                Counseling given: Not Answered      Vital Signs (Current): There were no vitals filed for this visit.                                            BP Readings from Last 3 Encounters:   10/01/21 136/77   21 102/60   21 100/60       NPO Status:                                                                                 BMI:   Wt Readings from Last 3 Encounters:   21 142 lb (64.4 kg)   21 142 lb (64.4 kg)   21 143 lb 3.2 oz (65 kg)     There is no height or weight on file to calculate BMI.    CBC:   Lab Results   Component Value Date    WBC 22.7 10/01/2021    RBC 3.21 10/01/2021    HGB 9.8 10/01/2021    HCT 32.8 10/01/2021    .2 10/01/2021    RDW 16.8 10/01/2021     10/01/2021       CMP:   Lab Results   Component Value Date     10/01/2021    K 4.4 10/01/2021     10/01/2021    CO2 24 10/01/2021    BUN 34 10/01/2021    CREATININE 1.9 10/01/2021    GFRAA 30 10/01/2021    AGRATIO 1.0 2021    LABGLOM 25 10/01/2021    GLUCOSE 94 10/01/2021    PROT 5.6 10/01/2021    PROT 7.2 10/25/2012    CALCIUM 9.0 10/01/2021    BILITOT 0.6 10/01/2021    ALKPHOS 351 10/01/2021    AST 17 10/01/2021    ALT 11 10/01/2021       POC Tests:   Recent Labs     10/01/21  1201   POCGLU 99       Coags:   Lab Results   Component Value Date    PROTIME 18.3 2021    INR 1.52 2021    APTT 39.8 2021       HCG (If Applicable): No results found for: PREGTESTUR, PREGSERUM, HCG, HCGQUANT     ABGs:   Lab Results   Component Value Date    PHART 7.390 2021    PO2ART 52.0 2021    RRE8WJT 42.0 2021    PZM6GHD 25.4 2021    BEART 0.3 2021    Y8JIRHOJ 87.9 2021        Type & Screen (If Applicable):  No results found for: LABABO, LABRH    Drug/Infectious Status (If Applicable):  No results found for: HIV, HEPCAB    COVID-19 Screening (If Applicable):   Lab Results   Component Value Date    COVID19 Not Detected 2021    COVID19 NOT DETECTED 2021         Anesthesia Evaluation    Department of Anesthesiology  Preprocedure Note       Name:  Madelyn Mcdaniels   Age:  80 y.o.  :  1934 MRN:  [id number]         Date:  10/1/2021      Surgeon: Surgeon(s):  Killian Amaral MD    Procedure: Procedure(s):  RIGHT HIP HEMIARTHROPLASTY    Medications prior to admission:   Prior to Admission medications    Medication Sig Start Date End Date Taking? Authorizing Provider   polyethylene glycol (GLYCOLAX) 17 GM/SCOOP powder Take 17 g by mouth daily    Historical Provider, MD   lisinopril (PRINIVIL;ZESTRIL) 10 MG tablet Take 10 mg by mouth daily 6/11/21   Historical Provider, MD   meclizine (ANTIVERT) 25 MG tablet Take 25 mg by mouth as needed 4/19/21   Historical Provider, MD   pramipexole (MIRAPEX) 0.125 MG tablet  7/13/21   Historical Provider, MD   aspirin 81 MG EC tablet Take 1 tablet by mouth daily 6/17/21   Jersey Rutledge MD   carvedilol (COREG) 3.125 MG tablet Take 1 tablet by mouth 2 times daily (with meals)  Patient taking differently: Take 3.125 mg by mouth daily  6/17/21   Jersey Rutledge MD   midodrine (PROAMATINE) 10 MG tablet Take 1 tablet by mouth 3 times daily (with meals)  Patient not taking: Reported on 9/20/2021 6/17/21   Jersey Rutledeg MD   nitroGLYCERIN (NITROSTAT) 0.4 MG SL tablet up to max of 3 total doses.  If no relief after 1 dose, call 911. 5/26/21   ALBERTO Bruner - CNP   sodium bicarbonate 325 MG tablet Take 325 mg by mouth 2 times daily    Historical Provider, MD   ondansetron (ZOFRAN ODT) 4 MG disintegrating tablet Take 2 tablets by mouth every 8 hours as needed for Nausea or Vomiting 5/13/21   ALBERTO Patel   lidocaine-prilocaine (EMLA) 2.5-2.5 % cream APPLY TO PORT AREA AND COVERWITH PLASTIC WRAP ONE HOUR PRIOR TO TREATMENT  Patient not taking: Reported on 9/20/2021 4/19/21   ALBERTO Patel   apixaban (ELIQUIS) 2.5 MG TABS tablet Take 2.5 mg by mouth 2 times daily    Historical Provider, MD   docusate sodium (COLACE) 100 MG capsule Take 1 capsule by mouth 2 times daily 1/3/20   Pepito Leach MD   allopurinol ([address] [facility]) 100 MG tablet Take 100 mg by mouth daily Taken 1 time a day now    Historical Provider, MD   levothyroxine (SYNTHROID) 112 MCG tablet Take 112 mcg by mouth Daily    Historical Provider, MD   simvastatin (ZOCOR) 20 MG tablet Take 20 mg by mouth nightly    Historical Provider, MD       Current medications:    No current facility-administered medications for this visit. No current outpatient medications on file.      Facility-Administered Medications Ordered in Other Visits   Medication Dose Route Frequency Provider Last Rate Last Admin    [MAR Hold] folic acid (FOLVITE) tablet 1 mg  1 mg Oral Daily Ritta Felty, MD   1 mg at 10/01/21 1111    [MAR Hold] 0.9 % sodium chloride infusion   IntraVENous PRN Kieran Habermann, MD        [facility] Hold] allopurinol (ZYLOPRIM) tablet 100 mg  100 mg Oral Daily ALBERTO Stauffer CNP   100 mg at 10/01/21 0910    [Held by provider] apixaban (ELIQUIS) tablet 2.5 mg  2.5 mg Oral BID ALBERTO Stauffer CNP        [Held by provider] aspirin EC tablet 81 mg  81 mg Oral Daily ALBERTO Stauffer CNP        [MAR Hold] carvedilol (COREG) tablet 3.125 mg  3.125 mg Oral Daily ALBERTO Stauffer - CNP   3.125 mg at 10/01/21 0910    [MAR Hold] docusate sodium (COLACE) capsule 100 mg  100 mg Oral BID ALBERTO Stauffer - CNP   100 mg at 10/01/21 0910    [MAR Hold] levothyroxine (SYNTHROID) tablet 112 mcg  112 mcg Oral Daily ALBERTO Stauffer - CNP   112 mcg at 10/01/21 0559    [MAR Hold] lisinopril (PRINIVIL;ZESTRIL) tablet 10 mg  10 mg Oral Daily ALBERTO Stauffer - CNP   10 mg at 10/01/21 0910    [MAR Hold] meclizine (ANTIVERT) tablet 25 mg  25 mg Oral TID PRN ALBERTO Stauffer CNP        [MAR Hold] ondansetron (ZOFRAN-ODT) disintegrating tablet 8 mg  8 mg Oral Q8H PRN ALBERTO Stauffer CNP        [MAR Hold] simvastatin (ZOCOR) tablet 20 mg  20 mg Oral Nightly ALBERTO Stauffer CNP   20 mg at 09/30/21 2366  [MAR Hold] sodium bicarbonate tablet 325 mg  325 mg Oral BID Ulysses Lush, APRN - CNP   325 mg at 10/01/21 0909    [MAR Hold] sodium chloride flush 0.9 % injection 5-40 mL  5-40 mL IntraVENous 2 times per day Ulysses Lush, APRN - CNP   10 mL at 10/01/21 0915    [MAR Hold] sodium chloride flush 0.9 % injection 5-40 mL  5-40 mL IntraVENous PRN Ulysses Lush, APRN - CNP        [MAR Hold] traMADol (ULTRAM) tablet 25 mg  25 mg Oral Q6H PRN Ulysses Lush, APRN - CNP        Or    [MAR Hold] traMADol (ULTRAM) tablet 50 mg  50 mg Oral Q6H PRN Ulysses Lush, APRN - CNP        [MAR Hold] bisacodyl (DULCOLAX) suppository 10 mg  10 mg Rectal Daily PRN Ulysses Lush, APRN - CNP        [MAR Hold] cefTRIAXone (ROCEPHIN) 1,000 mg in sodium chloride (PF) 10 mL IV syringe  1,000 mg IntraVENous Q24H Ulysses Lush, APRN - CNP   1,000 mg at 10/01/21 0910    [MAR Hold] polyethylene glycol (GLYCOLAX) packet 17 g  17 g Oral BID Carmine Carrillo MD   17 g at 10/01/21 0910    [MAR Hold] morphine (PF) injection 0.5 mg  0.5 mg IntraVENous Q4H PRN Delfino Ibarra MD           Allergies:     Allergies   Allergen Reactions    Penicillins Rash     Childhood        Problem List:    Patient Active Problem List   Diagnosis Code    Heme positive stool R19.5    Anemia of chronic kidney failure, stage 4 (severe) (HCC) N18.4, D63.1    Iron deficiency anemia secondary to inadequate dietary iron intake D50.8    H/O abnormal mammogram Z87.898    Primary osteoarthritis of right knee M17.11    Status post total right knee replacement Z96.651    Unilateral primary osteoarthritis, right knee M17.11    Other specified hypothyroidism E03.8    Encounter for BARBARA (erythropoietin stimulating agent) anemia management D64.9, Z79.899    Elevated INR R79.1    Hematuria R31.9    Acute blood loss anemia D62    CKD (chronic kidney disease) N18.9    Palliative care patient Z51.5    Pancreatic mass K86.89  Fatigue R53.83    Elevated liver enzymes R74.8    Biliary obstruction K83.1    Anemia D64.9    Malignant neoplasm of other parts of pancreas (HCC) C25.7    New onset of congestive heart failure (HCC) I50.9    Acute respiratory failure with hypoxia (HCC) J96.01    CHF NYHA class III, acute, systolic (HCC) X55.36    Constipation K59.00    Nausea R11.0    Restless legs G25.81    Decreased mobility R26.89    Hypertension I10    [facility] S72.001A    Leukocytosis D72.829    UTI (urinary tract infection) N39.0    Closed fracture of right hip (HCC) S72.001A       Past Medical History:        Diagnosis Date    Abdominal aortic aneurysm (AAA) (HCC)     Anemia     Arthritis     Cancer ([facility] 12/01/2020    pancreatic    CHF (congestive heart failure) (HCC)     Chronic kidney disease     DVT of lower extremity (deep venous thrombosis) (HCC)     twice 2010 and 2017    Hyperlipidemia     Hypertension     Kidney stones     Osteoarthritis     Palliative care patient 12/02/2020    Thyroid disease     Thyroid disorder        Past Surgical History:        Procedure Laterality Date    COLONOSCOPY  2016    Dr Lila Pittman problems    ENDOSCOPIC ULTRASOUND (LOWER) N/A 12/03/2020    Dr JONNY Grijalva/unsuccessful biliary cannulation despite attempts at 2-wire cannulation and proph pancreatic stenting with cannulation around pancreatic stenting-Positive for high-grade adenocarcinoma, pancreatic head    ERCP N/A 12/03/2020    Dr JONNY Grijalva/unsuccessful biliary cannulation despite attempts at 2-wire cannulation and proph pancreatic stenting with cannulation around pancreatic stenting-Positive for high-grade adenocarcinoma, pancreatic head    ERCP  12/07/2020    Dr Ubaldo Kelly/sphincterotomy, placement of a 10 x 60 partially covered biliary stent    PORT SURGERY Right 2/5/2021    SINGLE LUMEN PORT PLACEMENT WITH ULTRASOUND AND FLUORO performed by Grady Ray Gabrielle Krueger MD at [address] [facility] TOE AMPUTATION Bilateral     pinky toes removed    TOTAL KNEE ARTHROPLASTY Right 01/03/2020    RIGHT TOTAL KNEE REPLACEMENT performed by Laurice Aase, MD at [place] [address] History:    Social History     Tobacco Use    Smoking status: Never Smoker    Smokeless tobacco: Never Used   Substance Use Topics    Alcohol use: No                                Counseling given: Not Answered      Vital Signs (Current): There were no vitals filed for this visit.                                            BP Readings from Last 3 Encounters:   10/01/21 136/77   09/20/21 102/60   08/26/21 100/60       NPO Status:                                                                                 BMI:   Wt Readings from Last 3 Encounters:   09/30/21 142 lb (64.4 kg)   09/20/21 142 lb (64.4 kg)   08/26/21 143 lb 3.2 oz (65 kg)     There is no height or weight on file to calculate BMI.    CBC:   Lab Results   Component Value Date    WBC 22.7 10/01/2021    RBC 3.21 10/01/2021    HGB 9.8 10/01/2021    HCT 32.8 10/01/2021    .2 10/01/2021    RDW 16.8 10/01/2021     10/01/2021       CMP:   Lab Results   Component Value Date     10/01/2021    K 4.4 10/01/2021     10/01/2021    CO2 24 10/01/2021    BUN 34 10/01/2021    CREATININE 1.9 10/01/2021    GFRAA 30 10/01/2021    AGRATIO 1.0 08/26/2021    LABGLOM 25 10/01/2021    GLUCOSE 94 10/01/2021    PROT 5.6 10/01/2021    PROT 7.2 10/25/2012    CALCIUM 9.0 10/01/2021    BILITOT 0.6 10/01/2021    ALKPHOS 351 10/01/2021    AST 17 10/01/2021    ALT 11 10/01/2021       POC Tests:   Recent Labs     10/01/21  1201   POCGLU 99       Coags:   Lab Results   Component Value Date    PROTIME 18.3 09/30/2021    INR 1.52 09/30/2021    APTT 39.8 09/30/2021       HCG (If Applicable): No results found for: PREGTESTUR, PREGSERUM, HCG, HCGQUANT     ABGs:   Lab Results   Component Value Date    PHART 7.390 05/18/2021    PO2ART 52.0 05/18/2021    NZX8AZR 42.0 05/18/2021    KNO2SIC 25.4 05/18/2021    BEART 0.3 05/18/2021    Z6TMRGQU 87.9 05/18/2021        Type & Screen (If Applicable):  No results found for: LABABO, LABRH    Drug/Infectious Status (If Applicable):  No results found for: HIV, HEPCAB    COVID-19 Screening (If Applicable):   Lab Results   Component Value Date    COVID19 Not Detected 09/30/2021    COVID19 NOT DETECTED 02/01/2021         Anesthesia Evaluation  Patient summary reviewed and Nursing notes reviewed no history of anesthetic complications:   Airway: Mallampati: II  TM distance: >3 FB   Neck ROM: full  Mouth opening: > = 3 FB Dental: normal exam         Pulmonary:Negative Pulmonary ROS and normal exam  breath sounds clear to auscultation      (-) shortness of breath and not a current smoker          Patient did not smoke on day of surgery. Cardiovascular:    (+) hypertension:,     (-) CAD,  angina and  CHF    NYHA Classification: I  ECG reviewed  Rhythm: regular  Rate: normal           Beta Blocker:  Not on Beta Blocker         Neuro/Psych:   Negative Neuro/Psych ROS     (-) seizures, CVA and depression/anxiety            GI/Hepatic/Renal: Neg GI/Hepatic/Renal ROS  (+) renal disease: kidney stones and CRI,      (-) hiatal hernia and GERD       Endo/Other: Negative Endo/Other ROS   (+) hypothyroidism, blood dyscrasia: anemia:., malignancy/cancer (pancreatic ca ). Pt had PAT visit. Abdominal:       Abdomen: soft. Vascular:                                        Anesthesia Plan      MAC     ASA 3     (Iv zofran within 30 min of closing )  Induction: intravenous. BIS  MIPS: Postoperative opioids intended and Prophylactic antiemetics administered. Anesthetic plan and risks discussed with patient. Use of blood products discussed with patient whom. Plan discussed with CRNA.     Attending anesthesiologist reviewed and agrees with Pre Eval content              Denita Del Cid DO   10/1/2021        Department of Anesthesiology  Preprocedure Note       Name:  Kathleen Pina   Age:  80 y.o.  :  1934                                          MRN:  [id number]         Date:  10/1/2021      Surgeon: Chantal Borrego):  Daryl Christianson MD    Procedure: Procedure(s):  RIGHT HIP HEMIARTHROPLASTY    Medications prior to admission:   Prior to Admission medications    Medication Sig Start Date End Date Taking? Authorizing Provider   polyethylene glycol (GLYCOLAX) 17 GM/SCOOP powder Take 17 g by mouth daily    Historical Provider, MD   lisinopril (PRINIVIL;ZESTRIL) 10 MG tablet Take 10 mg by mouth daily 21   Historical Provider, MD   meclizine (ANTIVERT) 25 MG tablet Take 25 mg by mouth as needed 21   Historical Provider, MD   pramipexole (MIRAPEX) 0.125 MG tablet  21   Historical Provider, MD   aspirin 81 MG EC tablet Take 1 tablet by mouth daily 21   Yasmin Sommer MD   carvedilol (COREG) 3.125 MG tablet Take 1 tablet by mouth 2 times daily (with meals)  Patient taking differently: Take 3.125 mg by mouth daily  21   Yasmin Sommer MD   midodrine (PROAMATINE) 10 MG tablet Take 1 tablet by mouth 3 times daily (with meals)  Patient not taking: Reported on 2021   Yasmin Sommer MD   nitroGLYCERIN (NITROSTAT) 0.4 MG SL tablet up to max of 3 total doses.  If no relief after 1 dose, call 911. 21   ALBERTO Hernandez - TORO   sodium bicarbonate 325 MG tablet Take 325 mg by mouth 2 times daily    Historical Provider, MD   ondansetron (ZOFRAN ODT) 4 MG disintegrating tablet Take 2 tablets by mouth every 8 hours as needed for Nausea or Vomiting 21   ALBERTO Patel   lidocaine-prilocaine (EMLA) 2.5-2.5 % cream APPLY TO PORT AREA AND COVERWITH PLASTIC WRAP ONE HOUR PRIOR TO TREATMENT  Patient not taking: Reported on 2021   ALBERTO Patel   apixaban (ELIQUIS) 2.5 MG TABS tablet Take 2.5 mg by mouth 2 times daily    Historical Provider, MD   docusate sodium (COLACE) simvastatin (ZOCOR) tablet 20 mg  20 mg Oral Nightly Brown Can, APRN - CNP   20 mg at 09/30/21 2321    [MAR Hold] sodium bicarbonate tablet 325 mg  325 mg Oral BID Brown Can, APRN - CNP   325 mg at 10/01/21 0909    [MAR Hold] sodium chloride flush 0.9 % injection 5-40 mL  5-40 mL IntraVENous 2 times per day Brown Can, APRN - CNP   10 mL at 10/01/21 0915    [MAR Hold] sodium chloride flush 0.9 % injection 5-40 mL  5-40 mL IntraVENous PRN Brown Can, APRN - CNP        [MAR Hold] traMADol (ULTRAM) tablet 25 mg  25 mg Oral Q6H PRN Brown Can, APRN - CNP        Or    [MAR Hold] traMADol (ULTRAM) tablet 50 mg  50 mg Oral Q6H PRN Brown Can, APRN - CNP        [MAR Hold] bisacodyl (DULCOLAX) suppository 10 mg  10 mg Rectal Daily PRN Brown Can, APRN - CNP        [MAR Hold] cefTRIAXone (ROCEPHIN) 1,000 mg in sodium chloride (PF) 10 mL IV syringe  1,000 mg IntraVENous Q24H Brown Yakov, APRN - CNP   1,000 mg at 10/01/21 0910    [MAR Hold] polyethylene glycol (GLYCOLAX) packet 17 g  17 g Oral BID Kevin Carrillo MD   17 g at 10/01/21 0910    [MAR Hold] morphine (PF) injection 0.5 mg  0.5 mg IntraVENous Q4H PRN Katie Flores MD           Allergies:     Allergies   Allergen Reactions    Penicillins Rash     Childhood        Problem List:    Patient Active Problem List   Diagnosis Code    Heme positive stool R19.5    Anemia of chronic kidney failure, stage 4 (severe) (HCC) N18.4, D63.1    Iron deficiency anemia secondary to inadequate dietary iron intake D50.8    H/O abnormal mammogram Z87.898    Primary osteoarthritis of right knee M17.11    Status post total right knee replacement Z96.651    Unilateral primary osteoarthritis, right knee M17.11    Other specified hypothyroidism E03.8    Encounter for BARBARA (erythropoietin stimulating agent) anemia management D64.9, Z79.899    Elevated INR R79.1    Hematuria R31.9    Acute blood loss anemia D62    CKD (chronic kidney disease) N18.9    Palliative care patient Z51.5    Pancreatic mass K86.89    Fatigue R53.83    Elevated liver enzymes R74.8    Biliary obstruction K83.1    Anemia D64.9    Malignant neoplasm of other parts of pancreas (HCC) C25.7    New onset of congestive heart failure (HCC) I50.9    Acute respiratory failure with hypoxia (HCC) J96.01    CHF NYHA class III, acute, systolic (HCC) S73.19    Constipation K59.00    Nausea R11.0    Restless legs G25.81    Decreased mobility R26.89    Hypertension I10    Fracture of right hip, closed, initial encounter ([facility] S72.001A    Leukocytosis D72.829    UTI (urinary tract infection) N39.0    Closed fracture of right hip (HCC) S72.001A       Past Medical History:        Diagnosis Date    Abdominal aortic aneurysm (AAA) (HCC)     Anemia     Arthritis     Cancer ([facility] 12/01/2020    pancreatic    CHF (congestive heart failure) (HCC)     Chronic kidney disease     DVT of lower extremity (deep venous thrombosis) (HCC)     twice 2010 and 2017    Hyperlipidemia     Hypertension     Kidney stones     Osteoarthritis     Palliative care patient 12/02/2020    Thyroid disease     Thyroid disorder        Past Surgical History:        Procedure Laterality Date    COLONOSCOPY  2016    Dr Susan Bardales problems    ENDOSCOPIC ULTRASOUND (LOWER) N/A 12/03/2020    Dr JONNY Grijalva/unsuccessful biliary cannulation despite attempts at 2-wire cannulation and proph pancreatic stenting with cannulation around pancreatic stenting-Positive for high-grade adenocarcinoma, pancreatic head    ERCP N/A 12/03/2020    Dr JONNY Grijalva/unsuccessful biliary cannulation despite attempts at 2-wire cannulation and proph pancreatic stenting with cannulation around pancreatic stenting-Positive for high-grade adenocarcinoma, pancreatic head    ERCP  12/07/2020    Dr Amisha Kelly/sphincterotomy, placement of a 10 x 60 partially covered biliary stent  PORT SURGERY Right 2/5/2021    SINGLE LUMEN PORT PLACEMENT WITH ULTRASOUND AND FLUORO performed by Ally Edmond MD at [address] TOE AMPUTATION Bilateral     pinky toes removed    TOTAL KNEE ARTHROPLASTY Right 01/03/2020    RIGHT TOTAL KNEE REPLACEMENT performed by Thao Grewal MD at [place] [address] History:    Social History     Tobacco Use    Smoking status: Never Smoker    Smokeless tobacco: Never Used   Substance Use Topics    Alcohol use: No                                Counseling given: Not Answered      Vital Signs (Current): There were no vitals filed for this visit.                                            BP Readings from Last 3 Encounters:   10/01/21 136/77   09/20/21 102/60   08/26/21 100/60       NPO Status:                                                                                 BMI:   Wt Readings from Last 3 Encounters:   09/30/21 142 lb (64.4 kg)   09/20/21 142 lb (64.4 kg)   08/26/21 143 lb 3.2 oz (65 kg)     There is no height or weight on file to calculate BMI.    CBC:   Lab Results   Component Value Date    WBC 22.7 10/01/2021    RBC 3.21 10/01/2021    HGB 9.8 10/01/2021    HCT 32.8 10/01/2021    .2 10/01/2021    RDW 16.8 10/01/2021     10/01/2021       CMP:   Lab Results   Component Value Date     10/01/2021    K 4.4 10/01/2021     10/01/2021    CO2 24 10/01/2021    BUN 34 10/01/2021    CREATININE 1.9 10/01/2021    GFRAA 30 10/01/2021    AGRATIO 1.0 08/26/2021    LABGLOM 25 10/01/2021    GLUCOSE 94 10/01/2021    PROT 5.6 10/01/2021    PROT 7.2 10/25/2012    CALCIUM 9.0 10/01/2021    BILITOT 0.6 10/01/2021    ALKPHOS 351 10/01/2021    AST 17 10/01/2021    ALT 11 10/01/2021       POC Tests:   Recent Labs     10/01/21  1201   POCGLU 99       Coags:   Lab Results   Component Value Date    PROTIME 18.3 09/30/2021    INR 1.52 09/30/2021    APTT 39.8 09/30/2021       HCG (If Applicable): No results found for: PREGTESTUR, PREGSERUM, HCG, HCGQUANT ABGs:   Lab Results   Component Value Date    PHART 7.390 05/18/2021    PO2ART 52.0 05/18/2021    RMB4HXM 42.0 05/18/2021    RVF8ZNS 25.4 05/18/2021    BEART 0.3 05/18/2021    O1JITNCQ 87.9 05/18/2021        Type & Screen (If Applicable):  No results found for: LABABO, LABRH    Drug/Infectious Status (If Applicable):  No results found for: HIV, HEPCAB    COVID-19 Screening (If Applicable):   Lab Results   Component Value Date    COVID19 Not Detected 09/30/2021    COVID19 NOT DETECTED 02/01/2021         Anesthesia Evaluation  Patient summary reviewed and Nursing notes reviewed no history of anesthetic complications:   Airway: Mallampati: II  TM distance: >3 FB   Neck ROM: full  Mouth opening: > = 3 FB Dental: normal exam         Pulmonary:Negative Pulmonary ROS and normal exam  breath sounds clear to auscultation      (-) shortness of breath and not a current smoker          Patient did not smoke on day of surgery. Cardiovascular:    (+) hypertension:,     (-) CAD,  angina and  CHF    NYHA Classification: I  ECG reviewed  Rhythm: regular  Rate: normal           Beta Blocker:  Not on Beta Blocker         Neuro/Psych:   Negative Neuro/Psych ROS     (-) seizures, CVA and depression/anxiety            GI/Hepatic/Renal: Neg GI/Hepatic/Renal ROS  (+) renal disease: kidney stones and CRI,      (-) hiatal hernia and GERD       Endo/Other: Negative Endo/Other ROS   (+) hypothyroidism, blood dyscrasia: anemia:., malignancy/cancer (pancreatic ca ). Pt had PAT visit. Abdominal:       Abdomen: soft. Vascular:                                        Anesthesia Plan      MAC     ASA 3     (Iv zofran within 30 min of closing )  Induction: intravenous. BIS  MIPS: Postoperative opioids intended and Prophylactic antiemetics administered. Anesthetic plan and risks discussed with patient. Use of blood products discussed with patient whom. Plan discussed with CRNA.     Attending anesthesiologist reviewed and agrees with Pre Eval content              Dwain Arellano DO   10/1/2021        Patient summary reviewed and Nursing notes reviewed no history of anesthetic complications:   Airway: Mallampati: II  TM distance: >3 FB   Neck ROM: full  Mouth opening: > = 3 FB Dental: normal exam         Pulmonary:Negative Pulmonary ROS and normal exam  breath sounds clear to auscultation      (-) shortness of breath and not a current smoker          Patient did not smoke on day of surgery. Cardiovascular:    (+) hypertension:,     (-) CAD,  angina and  CHF    NYHA Classification: I  ECG reviewed  Rhythm: regular  Rate: normal           Beta Blocker:  Not on Beta Blocker         Neuro/Psych:   Negative Neuro/Psych ROS     (-) seizures, CVA and depression/anxiety            GI/Hepatic/Renal: Neg GI/Hepatic/Renal ROS  (+) renal disease: kidney stones and CRI,      (-) hiatal hernia and GERD       Endo/Other: Negative Endo/Other ROS   (+) hypothyroidism, blood dyscrasia: anemia:., malignancy/cancer (pancreatic ca ). Pt had PAT visit. Abdominal:       Abdomen: soft. Vascular:                                        Anesthesia Plan      MAC     ASA 3     (Iv zofran within 30 min of closing )  Induction: intravenous. BIS  MIPS: Postoperative opioids intended and Prophylactic antiemetics administered. Anesthetic plan and risks discussed with patient. Use of blood products discussed with patient whom. Plan discussed with CRNA.     Attending anesthesiologist reviewed and agrees with Pre Eval content              Dwain Arellano DO   10/1/2021             Anesthesia Plan      general     ASA 3     (With regards to cspine NSGY note with the following \"Continue c-collar, may remove for hygeine and meals  -No neurosurgical contraindication to repair of her right hip fx if inline-stabilization can be maintained during inductions of anesthesia and she can be maintained in the cervical collar during surgery\"    We will proceed with geta with inline stabiliztion and glidescope to maintain head and neck in neutral position.)  Induction: intravenous. MIPS: Postoperative opioids intended and Prophylactic antiemetics administered. Anesthetic plan and risks discussed with patient. Use of blood products discussed with patient whom consented to blood products.                    Josemanuel Hsieh DO   10/1/2021

## 2021-10-02 LAB
ALBUMIN SERPL-MCNC: 2.3 G/DL (ref 3.5–5.2)
ALP BLD-CCNC: 336 U/L (ref 35–104)
ALT SERPL-CCNC: 10 U/L (ref 5–33)
ANION GAP SERPL CALCULATED.3IONS-SCNC: 12 MMOL/L (ref 7–19)
AST SERPL-CCNC: 20 U/L (ref 5–32)
BASOPHILS ABSOLUTE: 0.1 K/UL (ref 0–0.2)
BASOPHILS RELATIVE PERCENT: 0.2 % (ref 0–1)
BILIRUB SERPL-MCNC: <0.2 MG/DL (ref 0.2–1.2)
BUN BLDV-MCNC: 47 MG/DL (ref 8–23)
CALCIUM SERPL-MCNC: 9.8 MG/DL (ref 8.8–10.2)
CHLORIDE BLD-SCNC: 103 MMOL/L (ref 98–111)
CO2: 25 MMOL/L (ref 22–29)
CREAT SERPL-MCNC: 2.4 MG/DL (ref 0.5–0.9)
EOSINOPHILS ABSOLUTE: 0 K/UL (ref 0–0.6)
EOSINOPHILS RELATIVE PERCENT: 0 % (ref 0–5)
GFR AFRICAN AMERICAN: 23
GFR NON-AFRICAN AMERICAN: 19
GLUCOSE BLD-MCNC: 195 MG/DL (ref 74–109)
HCT VFR BLD CALC: 28.5 % (ref 37–47)
HEMOGLOBIN: 8.5 G/DL (ref 12–16)
IMMATURE GRANULOCYTES #: 0.2 K/UL
LYMPHOCYTES ABSOLUTE: 0.4 K/UL (ref 1.1–4.5)
LYMPHOCYTES RELATIVE PERCENT: 2.1 % (ref 20–40)
MCH RBC QN AUTO: 31.3 PG (ref 27–31)
MCHC RBC AUTO-ENTMCNC: 29.8 G/DL (ref 33–37)
MCV RBC AUTO: 104.8 FL (ref 81–99)
MONOCYTES ABSOLUTE: 0.4 K/UL (ref 0–0.9)
MONOCYTES RELATIVE PERCENT: 1.7 % (ref 0–10)
NEUTROPHILS ABSOLUTE: 19.9 K/UL (ref 1.5–7.5)
NEUTROPHILS RELATIVE PERCENT: 95.1 % (ref 50–65)
ORGANISM: ABNORMAL
PDW BLD-RTO: 16.8 % (ref 11.5–14.5)
PLATELET # BLD: 486 K/UL (ref 130–400)
PMV BLD AUTO: 10.5 FL (ref 9.4–12.3)
POTASSIUM REFLEX MAGNESIUM: 4.3 MMOL/L (ref 3.5–5)
RBC # BLD: 2.72 M/UL (ref 4.2–5.4)
SODIUM BLD-SCNC: 140 MMOL/L (ref 136–145)
TOTAL PROTEIN: 5.2 G/DL (ref 6.6–8.7)
URINE CULTURE, ROUTINE: ABNORMAL
URINE CULTURE, ROUTINE: ABNORMAL
WBC # BLD: 20.9 K/UL (ref 4.8–10.8)

## 2021-10-02 PROCEDURE — 2580000003 HC RX 258: Performed by: ORTHOPAEDIC SURGERY

## 2021-10-02 PROCEDURE — 6370000000 HC RX 637 (ALT 250 FOR IP): Performed by: ORTHOPAEDIC SURGERY

## 2021-10-02 PROCEDURE — 97162 PT EVAL MOD COMPLEX 30 MIN: CPT

## 2021-10-02 PROCEDURE — 2700000000 HC OXYGEN THERAPY PER DAY

## 2021-10-02 PROCEDURE — 6360000002 HC RX W HCPCS: Performed by: ORTHOPAEDIC SURGERY

## 2021-10-02 PROCEDURE — 97530 THERAPEUTIC ACTIVITIES: CPT

## 2021-10-02 PROCEDURE — 80053 COMPREHEN METABOLIC PANEL: CPT

## 2021-10-02 PROCEDURE — 99232 SBSQ HOSP IP/OBS MODERATE 35: CPT | Performed by: NEUROLOGICAL SURGERY

## 2021-10-02 PROCEDURE — 97110 THERAPEUTIC EXERCISES: CPT

## 2021-10-02 PROCEDURE — 99232 SBSQ HOSP IP/OBS MODERATE 35: CPT | Performed by: INTERNAL MEDICINE

## 2021-10-02 PROCEDURE — 85025 COMPLETE CBC W/AUTO DIFF WBC: CPT

## 2021-10-02 PROCEDURE — 1210000000 HC MED SURG R&B

## 2021-10-02 RX ORDER — PANTOPRAZOLE SODIUM 40 MG/1
40 TABLET, DELAYED RELEASE ORAL
Status: DISCONTINUED | OUTPATIENT
Start: 2021-10-02 | End: 2021-10-04 | Stop reason: HOSPADM

## 2021-10-02 RX ADMIN — SIMVASTATIN 20 MG: 10 TABLET, FILM COATED ORAL at 22:17

## 2021-10-02 RX ADMIN — APIXABAN 2.5 MG: 2.5 TABLET, FILM COATED ORAL at 22:17

## 2021-10-02 RX ADMIN — LEVOTHYROXINE SODIUM 112 MCG: 112 TABLET ORAL at 04:05

## 2021-10-02 RX ADMIN — ALLOPURINOL 100 MG: 100 TABLET ORAL at 11:14

## 2021-10-02 RX ADMIN — APIXABAN 2.5 MG: 2.5 TABLET, FILM COATED ORAL at 04:05

## 2021-10-02 RX ADMIN — CARVEDILOL 3.12 MG: 3.12 TABLET, FILM COATED ORAL at 11:14

## 2021-10-02 RX ADMIN — FOLIC ACID 1 MG: 1 TABLET ORAL at 11:14

## 2021-10-02 RX ADMIN — DOCUSATE SODIUM 100 MG: 100 CAPSULE, LIQUID FILLED ORAL at 11:14

## 2021-10-02 RX ADMIN — Medication 10 ML: at 22:18

## 2021-10-02 RX ADMIN — DOCUSATE SODIUM 100 MG: 100 CAPSULE, LIQUID FILLED ORAL at 22:18

## 2021-10-02 RX ADMIN — TRAMADOL HYDROCHLORIDE 25 MG: 50 TABLET, FILM COATED ORAL at 11:15

## 2021-10-02 RX ADMIN — POLYETHYLENE GLYCOL 3350 17 G: 17 POWDER, FOR SOLUTION ORAL at 11:16

## 2021-10-02 RX ADMIN — Medication 2000 MG: at 04:04

## 2021-10-02 RX ADMIN — SODIUM BICARBONATE 325 MG: 650 TABLET ORAL at 22:18

## 2021-10-02 RX ADMIN — LISINOPRIL 10 MG: 10 TABLET ORAL at 11:15

## 2021-10-02 RX ADMIN — APIXABAN 2.5 MG: 2.5 TABLET, FILM COATED ORAL at 11:14

## 2021-10-02 RX ADMIN — Medication 1000 MG: at 11:16

## 2021-10-02 RX ADMIN — ASPIRIN 81 MG: 81 TABLET, COATED ORAL at 11:13

## 2021-10-02 RX ADMIN — SODIUM BICARBONATE 325 MG: 650 TABLET ORAL at 11:15

## 2021-10-02 RX ADMIN — POLYETHYLENE GLYCOL 3350 17 G: 17 POWDER, FOR SOLUTION ORAL at 22:18

## 2021-10-02 ASSESSMENT — PAIN - FUNCTIONAL ASSESSMENT: PAIN_FUNCTIONAL_ASSESSMENT: PREVENTS OR INTERFERES SOME ACTIVE ACTIVITIES AND ADLS

## 2021-10-02 ASSESSMENT — ENCOUNTER SYMPTOMS
SHORTNESS OF BREATH: 0
COUGH: 0
ABDOMINAL PAIN: 0
WHEEZING: 0
CONSTIPATION: 1
COLOR CHANGE: 0
TROUBLE SWALLOWING: 0
SORE THROAT: 0
DIARRHEA: 0
NAUSEA: 0

## 2021-10-02 ASSESSMENT — PAIN SCALES - GENERAL
PAINLEVEL_OUTOF10: 0
PAINLEVEL_OUTOF10: 8
PAINLEVEL_OUTOF10: 5

## 2021-10-02 ASSESSMENT — PAIN DESCRIPTION - DESCRIPTORS: DESCRIPTORS: SORE

## 2021-10-02 ASSESSMENT — PAIN DESCRIPTION - LOCATION: LOCATION: HIP

## 2021-10-02 ASSESSMENT — PAIN DESCRIPTION - ORIENTATION: ORIENTATION: RIGHT

## 2021-10-02 NOTE — PROGRESS NOTES
Orthopedic Surgery Progress Note    Barberton Mercury  10/2/2021    Subjective:     Post-Operative Day # 1 s/p right hip hemiarthroplasty    Systemic or Specific Complaints: No Complaints    Objective:     /76   Pulse 70   Temp 96 °F (35.6 °C) (Temporal)   Resp 14   Ht 5' 3\" (1.6 m)   Wt 142 lb (64.4 kg)   SpO2 96%   BMI 25.15 kg/m²     General: alert, appears stated age and cooperative   Wound: clean, dry, intact              Dressing: clean, dry, and intact   Extremity: Distal NVI            DVT Exam: No evidence of DVT seen on physical exam.                   Data Review  CBC:   Lab Results   Component Value Date    WBC 20.9 10/02/2021    RBC 2.72 10/02/2021    HGB 8.5 10/02/2021    HCT 28.5 10/02/2021     10/02/2021       Assessment:     Status Post right hip surgery, doing well     Plan:      1:  DVT prophylaxis  2:  Continue pain control  3:  Physical therapy as per protocol  4:  Anticipate discharge when medically stable  5: Weight bearing as tolerated   6: f/u fabiana kenny pa-c 2 weeks , ok to dc by ortho. Call if needed. Thanks.     Dave Bingham PA-C

## 2021-10-02 NOTE — PROGRESS NOTES
No GI symptoms except constipation, no GI bleeding symptoms with Eliquis restarted, abdomen soft, macrocytic anemia due to folate deficiency, Hgb 8.5 gm, continue to monitor H&H daily and hemoccults, EGD and colonoscopy deferred unless active bleeding. Re-consult me as needed.

## 2021-10-02 NOTE — PROGRESS NOTES
Physical Therapy    Facility/Department: [facility] SURG SERVICES  Initial Assessment    NAME: aWyne Chapa  : 1934  MRN: [id number]    Date of Service: 10/2/2021    Discharge Recommendations:  Continue to assess pending progress, 24 hour supervision or assist, Patient would benefit from continued therapy after discharge        Assessment   Body structures, Functions, Activity limitations: Decreased functional mobility ; Decreased ROM; Decreased strength;Decreased safe awareness;Decreased balance;Decreased coordination; Increased pain;Decreased posture;Decreased endurance  Assessment: Pt. will benefit from cont. PT to decrease impairments. Pt. a fall risk and should not attempt mobility on her own at this time. Anticipate pt will benefit from cont. PT to decrease impairments. Pt. needs to use SS for transfers at this time. Pt. most likely would need additional therapy prior to d/c home. Pt. needs 24 hr care. Treatment Diagnosis: impaired gait and mobility  Prognosis: Good  Decision Making: Medium Complexity  PT Education: PT Role;General Safety;Weight-bearing Education;Gait Training;Equipment;Transfer Training;Functional Mobility Training  Patient Education: posterior hip prec  Barriers to Learning: pain  REQUIRES PT FOLLOW UP: Yes  Activity Tolerance  Activity Tolerance: Patient limited by fatigue;Patient limited by pain       Patient Diagnosis(es): The primary encounter diagnosis was Closed fracture of right hip, initial encounter ([facility] [address] Diagnoses of Gastrointestinal hemorrhage, unspecified gastrointestinal hemorrhage type, Acute cystitis without hematuria, and Closed head injury, initial encounter were also pertinent to this visit.      has a past medical history of Abdominal aortic aneurysm (AAA) ([address] Anemia, Arthritis, Cancer ([address] CHF (congestive heart failure) ([address] Chronic kidney disease, DVT of lower extremity (deep venous thrombosis) ([address] Hyperlipidemia, Hypertension, Kidney stones, Osteoarthritis, Palliative care patient, Thyroid disease, and Thyroid disorder. has a past surgical history that includes Toe amputation (Bilateral); Colonoscopy (2016); Total knee arthroplasty (Right, 01/03/2020); Endoscopic ultrasonography, GI (N/A, 12/03/2020); ERCP (N/A, 12/03/2020); ERCP (12/07/2020); and Port Surgery (Right, 2/5/2021). Restrictions  Restrictions/Precautions  Restrictions/Precautions: Fall Risk, Surgical Protocols, ROM Restrictions  Required Braces or Orthoses?: Yes, C collar  Position Activity Restriction  Other position/activity restrictions: hip abd pillow  Vision/Hearing  Hearing: Within functional limits     Subjective  General  Chart Reviewed: Yes  Patient assessed for rehabilitation services?: Yes  Response To Previous Treatment: Not applicable  Family / Caregiver Present: No  Referring Practitioner: Paul Acuna MD  Referral Date : 10/01/21  Diagnosis: Right subcapital femoral neck fracture, GIB, UTI, CHI, non-displaced linear lucency the lateral RIGHT C1 arch  Follows Commands: Impaired  Other (Comment): needs extended time and v. cues  General Comment  Comments: RN, Stan Barajas PT. Subjective  Subjective: Pt. willing to work with therapy. Thinks she needs to have a BM. Pain Screening  Patient Currently in Pain: Yes  Pain Assessment  Pain Assessment: 0-10  Pain Level: 8  Pain Location: Hip  Pain Orientation: Right  Pain Descriptors: Sore  Functional Pain Assessment: Prevents or interferes some active activities and ADLs  Non-Pharmaceutical Pain Intervention(s): Repositioned; Ambulation/Increased Activity  Response to Pain Intervention: Patient Satisfied  Vital Signs  Patient Currently in Pain: Yes  Pre Treatment Pain Screening  Intervention List: Patient able to continue with treatment    Orientation  Orientation  Overall Orientation Status: Within Functional Limits  Social/Functional History  Social/Functional History  Lives With: Son, Daughter  Type of Home: House  Home Layout: One level  Home Equipment: Rolling walker  Receives Help From: Family  Cognition   Cognition  Overall Cognitive Status: Exceptions  Arousal/Alertness: Appropriate responses to stimuli  Following Commands: Follows one step commands with repetition; Follows one step commands with increased time  Attention Span: Difficulty attending to directions; Difficulty dividing attention  Memory: Decreased recall of precautions;Decreased recall of recent events  Safety Judgement: Decreased awareness of need for assistance;Decreased awareness of need for safety  Problem Solving: Assistance required to generate solutions;Assistance required to implement solutions  Insights: Decreased awareness of deficits  Initiation: Requires cues for some  Sequencing: Requires cues for some    Objective     Observation/Palpation  Posture: Poor  Observation: hip abd pillow, O2, IV, iqbal    AROM RLE (degrees)  RLE AROM: Exceptions  RLE General AROM: ankle WFLs, knee and hip flex to 90 with sitting  AROM LLE (degrees)  LLE AROM : WFL  Strength RLE  Strength RLE: Exception  Comment: 2/5  Strength LLE  Strength LLE: WFL  Comment: 3+/5     Sensation  Overall Sensation Status: Impaired (altered sensation RLE per pt report)  Bed mobility  Rolling to Right: Moderate assistance  Supine to Sit: Maximum assistance  Sit to Supine: Unable to assess  Scooting: Maximal assistance  Comment: pt's son assisted with transfers. Pt. sat on EOB x 15 mins initially, SBA  Transfers  Sit to Stand: Moderate Assistance;Maximum Assistance;2 Person Assistance  Stand to sit: Moderate Assistance;Maximum Assistance;2 Person Assistance  Bed to Chair: Dependent/Total  Comment: pt used SS to get to [facility] and then SS to get to chair, unable to fully stand, difficulty tucking pelvis in. Ambulation  Ambulation?: No (Pt. able to move RLE, but LLE was hyperextended at knee and pt leaning on bed.  Unable to step to [facility].)  WB Status: FWBAT RLE     Balance  Posture: Good  Sitting - Static: +;Fair  Sitting - Dynamic: -;Fair  Standing - Static: Poor  Standing - Dynamic: Poor;-        Plan   Plan  Times per week: 3-7  Times per day: Daily  Plan weeks: 2  Current Treatment Recommendations: Strengthening, ROM, Balance Training, Functional Mobility Training, Transfer Training, Endurance Training, Gait Training, Safety Education & Training, Positioning, Equipment Evaluation, Education, & procurement, Patient/Caregiver Education & Training  Plan Comment: cont. PT per POC.   Safety Devices  Type of devices: Gait belt, Patient at risk for falls, Nurse notified, Call light within reach, Left in chair, Chair alarm in place    G-Code       OutComes Score                                                  AM-PAC Score             Goals  Short term goals  Time Frame for Short term goals: 2 wks  Short term goal 1: supine to sit indep  Short term goal 2: sit to stand indep  Short term goal 3: amb. 10' with RW CGA  Patient Goals   Patient goals : go home       Therapy Time   Individual Concurrent Group Co-treatment   Time In           Time Out           Minutes                   Angeles Rivas PT     Electronically signed by Angeles Rivas PT on 10/2/2021 at 11:21 AM

## 2021-10-02 NOTE — PROGRESS NOTES
NEUROSURGERY PROGRESS NOTE      Chief Complaint:   Chief Complaint   Patient presents with    Fall    Hip Pain     right         Interval Update:  No overnight events. She is now s/p R hip hemiarthroplasty with orthopedics. She is up in a chair this AM and denies neck pain. She denies any numbness, tingling or weakness in her extremities. Cervical collar in place. HPI: The patient is a 80 y.o. female who presented to the [facility] ED on 9/30/2021 complaining of hip pain after a fall. She is quite sedated after receiving pain medication and is only minimally-able to corroborate her history. She states that she tripped when trying to get out of bed. She has a history of pancreatic cancer and oncology notes state that palliative chemotherapy was discontinued in 5/2021 due to intolerance and poor performance status. According to notes she is nearly bed-bound at baseline but does get out of bed on occasion and does most of her mobility in a wheelchair. Today, when standing to get out of bed she fell, twisted sideways and landed on her hip. She noted immediate hip pain and was brought to the ED. On arrival her only complaint was hip pain, but a CT of her head and cervical spine were obtained due to her mechanism of injury. The CT of her cervical spine revealed a linear lucency through the anterior arch of C1 near the C1/2 facet joint and a subsequent MRI was obtained that did show edema in this area and edema in the C1/2 facet capsule. Though she is quite sedated, she will arouse and answer questions. She currently denies neck pain. She denies any upper or lower extremity radicular pain or paresthesia but does have pain in her right hip with movement.           Objective:    /76   Pulse 70   Temp 96 °F (35.6 °C) (Temporal)   Resp 14   Ht 5' 3\" (1.6 m)   Wt 142 lb (64.4 kg)   SpO2 96%   BMI 25.15 kg/m²       Intake/Output Summary (Last 24 hours) at 10/2/2021 1018  Last data filed at 10/2/2021 0407  Gross per 24 hour   Intake 1381 ml   Output 1205 ml   Net 176 ml           Physical Exam:    General: alert, cooperative, no distress  Cardiorespiratory: unlabored breathing  Abdomen: soft and nondistended  Spine: C-collar in place with appropriate fit      Neurologic Exam:    Mental Status: Awake, alert, appropriate  Cranial Nerves: PERRL, EOMI, symmetric facies, tongue midline  Motor: 5/5 BUE, LLE, limited exam of RLE due to hip fx  Somatosensory: normal light touch sensation          Pertinent Labs:  Lab Results   Component Value Date    WBC 20.9 (H) 10/02/2021    HGB 8.5 (L) 10/02/2021    HCT 28.5 (L) 10/02/2021    .8 (H) 10/02/2021     (H) 10/02/2021     Lab Results   Component Value Date     10/02/2021    K 4.3 10/02/2021     10/02/2021    CO2 25 10/02/2021    BUN 47 10/02/2021    CREATININE 2.4 10/02/2021    GLUCOSE 195 10/02/2021    CALCIUM 9.8 10/02/2021              Assessment and Plan: This is an 80 y.o. female who presented to the [facility] ED after a fall complaining of right hip pain and a right subcapital hip/femur fracture. Subsequent imaging revealed a possible injury at C1/2 with a non-displaced right sided lucency through that anterior C1 arch on the RIGHT and minimal posterior subluxation at the C1/2 facet on the LEFT. She is now s/p R hip hemiarthroplasty.   She remains neurologically intact and denies neck pain.    -No plans for neurosurgical intervention  -Continue c-collar, anticipate 6-weeks of immobilization, may remove for hygeine and meals  -Advance activity per orthopedics  -Follow up as outpatient in 6 weeks with C-spine x-rays  -Available as-needed, please call with questions    Electronically signed by Buzz Cabrera MD on 10/2/2021 at 10:18 AM

## 2021-10-02 NOTE — PROGRESS NOTES
[facility] Hospitalists      Patient:  Scarlet Braswell  YOB: 1934  Date of Service: 10/2/2021  MRN: [id number]   Acct: [de-identified]   Primary Care Physician: Eva Jarquin MD  Advance Directive: [facility]  Admit Date: 9/30/2021       Hospital Day: 2  Portions of this note have been copied forward, however, changed to reflect the most current clinical status of this patient. CHIEF COMPLAINT right hip pain, fall    SUBJECTIVE:  Patient denies pain today. She is awake and alert. She is currently eating breakfast without difficulty. Son at bedside states she has been very talkative this morning. CUMULATIVE HOSPITAL COURSE:  The patient is a 80 y. o. female with a history of CHF, pancreatic cancer, CKD, HTN, and hyperlipidemia who presented to [facility] ED complaining of right hip pain s/p fall. The patient was very sleepy at the time of admission related to pain medication. HPI was received from daughter over the phone, chart, and some but little from the patient. The daughter stated since May the patient had gradually become weaker and has experienced a fall about every 2-3 weeks. The daughter stated that patient had been receiving 24-hour care at home from family. They recently purchased a pressure sensing mat for the floor at bedside to help prevent falls. The daughter also stated that most days the patient can only take a few steps at most before \"her knees give out. \" The patient had been using a wheelchair frequently. Today the patient had a fall and the patient stated she did hit her head but did not lose consciousness (the daughter confirms). She reported her only pain in the right hip. She stated that at rest and after pain medication the pain is only 1/10 but with movement she stated that it was 8/10.  ED work up revealed a subcapital transcervical fracture of the right proximal femur via xray, a radiolucent line across the right lateral mass of C2 on xray that may indicate fracture, elevated WBC, and 4+ bacteria in her urine. Neurosurgery was consulted for evaluation of C2 fracture, no surgical intervention at this time, recommends C collar except for hygiene and eating. Orthopedic consulted for right femur fracture, right hemiarthroplasty 10/1/21. GI consulted for possible GI bleeding, no signs of acute bleeding. On Rocephin for UTI, Klebsiella pneumoniae noted and sensitive to Rocephin. Eliquis to be restarted post-op. Case management to assist in DC planning. Palliative care following as well. Slight increase in creatinine post-op, IV fluids continued, will monitor I&O and for signs of over load closely    Review of Systems:   Review of Systems   Constitutional: Positive for fatigue (family reports increase in fatigue recently, daughter states patient sleeps more than 14 hours per day). Negative for chills and fever. HENT: Negative for sore throat and trouble swallowing. Respiratory: Negative for cough, shortness of breath and wheezing. Cardiovascular: Positive for leg swelling (right leg, after her fall). Negative for chest pain. Gastrointestinal: Positive for constipation. Negative for abdominal pain, diarrhea and nausea. Genitourinary:        Endorse urgency and frequency before admission, no complaints at this time, iqbal still in place     Musculoskeletal: Positive for arthralgias (right hip, improved since surgery). Skin: Positive for wound (surgical incision). Negative for color change. Neurological: Negative for dizziness, weakness and light-headedness. Psychiatric/Behavioral: Negative for confusion. 14 point review of systems is negative except as specifically addressed above.       Objective:   VITALS:  /76   Pulse 70   Temp 96 °F (35.6 °C) (Temporal)   Resp 14   Ht 5' 3\" (1.6 m)   Wt 142 lb (64.4 kg)   SpO2 96%   BMI 25.15 kg/m²   24HR INTAKE/OUTPUT:    Intake/Output Summary (Last 24 hours) at 10/2/2021 0975  Last data filed at 10/2/2021 0407  Gross per 24 hour Intake 1381 ml   Output 1205 ml   Net 176 ml       Physical Exam  HENT:      Head: Normocephalic. Mouth/Throat:      Mouth: Mucous membranes are dry. Pharynx: Oropharynx is clear. Eyes:      Pupils: Pupils are equal, round, and reactive to light. Cardiovascular:      Rate and Rhythm: Normal rate and regular rhythm. Pulses: Normal pulses. Heart sounds: Murmur heard. Pulmonary:      Effort: Pulmonary effort is normal.      Breath sounds: Normal breath sounds. Abdominal:      General: Bowel sounds are normal. There is no distension. Palpations: Abdomen is soft. Tenderness: There is no abdominal tenderness. There is no guarding. Musculoskeletal:         General: Tenderness present. No deformity or signs of injury. Right lower leg: Edema (+2) present. Skin:     General: Skin is warm and dry. Capillary Refill: Capillary refill takes 2 to 3 seconds. Coloration: Skin is pale. Comments: Right hip surgical incision, dry is dry and intact, no drainage noted   Neurological:      General: No focal deficit present. Mental Status: She is alert and oriented to person, place, and time. Gait: Abnormal gait: not assessed at this time, r/t injury.    Psychiatric:         Mood and Affect: Mood normal.         Behavior: Behavior normal.             Medications:      sodium chloride      sodium chloride 125 mL/hr at 10/01/21 2202    sodium chloride        folic acid  1 mg Oral Daily    apixaban  2.5 mg Oral BID    aspirin  81 mg Oral Daily    sodium chloride flush  5-40 mL IntraVENous 2 times per day    allopurinol  100 mg Oral Daily    carvedilol  3.125 mg Oral Daily    docusate sodium  100 mg Oral BID    levothyroxine  112 mcg Oral Daily    lisinopril  10 mg Oral Daily    simvastatin  20 mg Oral Nightly    sodium bicarbonate  325 mg Oral BID    cefTRIAXone (ROCEPHIN) IV  1,000 mg IntraVENous Q24H    polyethylene glycol  17 g Oral BID     sodium chloride flush, sodium chloride, ondansetron **OR** ondansetron, oxyCODONE **OR** oxyCODONE, sodium chloride, meclizine, ondansetron, traMADol **OR** traMADol, bisacodyl, morphine **OR** [DISCONTINUED] morphine  ADULT DIET; Regular     Lab and other Data:     Recent Labs     09/30/21  0717 10/01/21  0344 10/02/21  0400   WBC 24.0* 22.7* 20.9*   HGB 7.4* 9.8* 8.5*   * 598* 486*     Recent Labs     09/30/21  0755 10/01/21  0344 10/02/21  0400    140 140   K 3.6 4.4 4.3    102 103   CO2 24 24 25   BUN 32* 34* 47*   CREATININE 2.0* 1.9* 2.4*   GLUCOSE 139* 94 195*     Recent Labs     09/30/21  0755 10/01/21  0344 10/02/21  0400   AST 14 17 20   ALT 9 11 10   BILITOT 0.5 0.6 <0.2   ALKPHOS 318* 351* 336*     Troponin T: No results for input(s): TROPONINI in the last 72 hours. Pro-BNP: No results for input(s): BNP in the last 72 hours. INR:   Recent Labs     09/30/21  0717   INR 1.52*     UA:  Recent Labs     09/30/21  0755   COLORU YELLOW   PHUR 5.5   WBCUA 16-20*   RBCUA 3-5*   BACTERIA 4+*   CLARITYU CLOUDY*   SPECGRAV 1.019   LEUKOCYTESUR MODERATE*   UROBILINOGEN 1.0   BILIRUBINUR Negative   BLOODU LARGE*   GLUCOSEU Negative     A1C: No results for input(s): LABA1C in the last 72 hours. ABG:No results for input(s): PHART, DCT7HEE, PO2ART, WCT7QWF, BEART, HGBAE, G7OHMQHT, CARBOXHGBART in the last 72 hours. RAD:   XR PELVIS (1-2 VIEWS)    Result Date: 9/30/2021  A subcapital transcervical fracture of the right proximal femur and a mild varus displacement. Signed by Dr Luis A Galvin (MIN 2 VIEWS)    Result Date: 9/30/2021  A subcapital transcervical fracture of the right proximal femur. Signed by Dr King Nelson    Result Date: 9/30/2021  1. No acute intracranial findings. 2. Similar volume loss.  Signed by Dr Osvaldo Vallecillo    Result Date: 9/30/2021  A radiolucent line across the right lateral mass of C2 may represent a vascular groove, an artifact or a nondisplaced fracture? . If symptomatic a follow-up examination or MR imaging of the cervical spine may be obtained. No other displaced fracture or malalignment. Chronic degenerative changes. Signed by Dr Larry Emanuel    Result Date: 9/30/2021  1. Small fluid at the C1-2 joint space, which appears to have slight bony offset of the left lateral C1-2 interface. Possible right C1 fracture better demonstrated on prior CT. 2. No severe spinal canal stenosis. Level by level findings as above. Signed by Dr Maciel Kay    XR CHEST PORTABLE    Result Date: 9/30/2021  No active cardiopulmonary disease. Signed by Dr Gerda Daugherty:   10/2/2021  7:08 AM - Olga Mehean Incoming Lab Results From [facility]    Specimen Information: Urine, clean catch        Component Collected Lab   Urine Culture, Routine Abnormal  09/30/2021  7:55 AM [facility]   >100,000 CFU/ml    Organism Abnormal  09/30/2021  7:55 AM [facility]   Klebsiella pneumoniae    Urine Culture, Routine 09/30/2021  7:55 AM [facility]   Heavy growth    Testing Performed By    Lab - Abbreviation Name Director Address Valid Date Range   461[facility] - [address] LAB Danie Lala M.D. 1 [facility] [address] 09/14/21 1056-Present   Narrative  Performed by: [address] [place] [address] Lab  ORDER#: [id number]                          ORDERED BY: DEBRA Thomas   SOURCE: Urine Clean Catch                  COLLECTED:  09/30/21 07:55   ANTIBIOTICS AT [facility]. :                      RECEIVED :  09/30/21 08:01   Susceptibility    Klebsiella pneumoniae (1)    Antibiotic Interpretation BETTE Status    ampicillin Resistant >=32 mcg/mL     aztreonam Sensitive <=1 mcg/mL     ceFAZolin Sensitive <=4 mcg/mL     cefepime Sensitive <=1 mcg/mL     cefTRIAXone Sensitive <=1 mcg/mL     ertapenem Sensitive <=0.5 mcg/mL     gentamicin Sensitive <=1 mcg/mL     levofloxacin Sensitive <=0.12 mcg/mL     meropenem Sensitive <=0.25 mcg/mL     nitrofurantoin Resistant 128 mcg/mL     piperacillin-tazobactam Sensitive <=4 mcg/mL     trimethoprim-sulfamethoxazole Resistant >=320 mcg/mL           Assessment/Plan   Principal Problem:    Fracture of right hip, closed, initial encounter ([facility] [address]              -FNCAB following                          -WOUOR hemiarthroplasty POD#1                          - PT/OT    -weight bearing as tolerated              -VFBT control, conservative as patient is very sensitve              -UKMZK, continue until tomorrow AM              -encourage coughing and deep breathing              -SCDs when in bed              -case management to assist with DC planning     Active Problems:    CKD (chronic kidney disease)              -noted, slight increase in Cr overnight              -monitor BMP              -gentle IV hydration as pt also has CHF   -strict I&O   -daily weight       UTI (urinary tract infection) / Leukocytosis              -continue rocephin daily              -urine culture, complete              -monitor I&O     -blood cultures, no growth to date   -monitor CBC      Palliative care patient              -URFSEOXVL       Anemia              -hemoccult + stool in ED              -GI consulted, no further recommendations at this time              -protonix IV              -monitor HH, no drop after receiving blood, no obvious signs of bleeding              -transfused 1 Unit PRBC 9/30       Malignant neoplasm of other parts of pancreas ([facility]              -IEGUC              -not currently canidate for treatment              -last treatment was in May 2021                CHF              -continue Coreg and lisinopril              -Strict I&O              -daily weight     Resolved Problems:    * No resolved hospital problems.  *        Antibiotic: rocephin     DVT Prophylaxis: Eliquis      GI prophylaxis: fabiana Nagy, APRN - CNP, 10/2/2021 9:55 AM

## 2021-10-03 PROBLEM — N17.9 AKI (ACUTE KIDNEY INJURY) (HCC): Status: ACTIVE | Noted: 2021-10-03

## 2021-10-03 LAB
ALBUMIN SERPL-MCNC: 2.3 G/DL (ref 3.5–5.2)
ALP BLD-CCNC: 321 U/L (ref 35–104)
ALT SERPL-CCNC: <5 U/L (ref 5–33)
ANION GAP SERPL CALCULATED.3IONS-SCNC: 11 MMOL/L (ref 7–19)
AST SERPL-CCNC: 22 U/L (ref 5–32)
BASOPHILS ABSOLUTE: 0.1 K/UL (ref 0–0.2)
BASOPHILS RELATIVE PERCENT: 0.2 % (ref 0–1)
BILIRUB SERPL-MCNC: <0.2 MG/DL (ref 0.2–1.2)
BUN BLDV-MCNC: 56 MG/DL (ref 8–23)
CALCIUM SERPL-MCNC: 9 MG/DL (ref 8.8–10.2)
CHLORIDE BLD-SCNC: 102 MMOL/L (ref 98–111)
CO2: 23 MMOL/L (ref 22–29)
CREAT SERPL-MCNC: 2.6 MG/DL (ref 0.5–0.9)
EOSINOPHILS ABSOLUTE: 0 K/UL (ref 0–0.6)
EOSINOPHILS RELATIVE PERCENT: 0.2 % (ref 0–5)
GFR AFRICAN AMERICAN: 21
GFR NON-AFRICAN AMERICAN: 17
GLUCOSE BLD-MCNC: 150 MG/DL (ref 74–109)
HCT VFR BLD CALC: 26.5 % (ref 37–47)
HEMOGLOBIN: 7.9 G/DL (ref 12–16)
IMMATURE GRANULOCYTES #: 0.2 K/UL
LYMPHOCYTES ABSOLUTE: 0.8 K/UL (ref 1.1–4.5)
LYMPHOCYTES RELATIVE PERCENT: 3.6 % (ref 20–40)
MCH RBC QN AUTO: 31.6 PG (ref 27–31)
MCHC RBC AUTO-ENTMCNC: 29.8 G/DL (ref 33–37)
MCV RBC AUTO: 106 FL (ref 81–99)
MONOCYTES ABSOLUTE: 1.1 K/UL (ref 0–0.9)
MONOCYTES RELATIVE PERCENT: 4.9 % (ref 0–10)
NEUTROPHILS ABSOLUTE: 20.4 K/UL (ref 1.5–7.5)
NEUTROPHILS RELATIVE PERCENT: 90 % (ref 50–65)
PDW BLD-RTO: 16.7 % (ref 11.5–14.5)
PLATELET # BLD: 420 K/UL (ref 130–400)
PMV BLD AUTO: 10.1 FL (ref 9.4–12.3)
POTASSIUM REFLEX MAGNESIUM: 4.2 MMOL/L (ref 3.5–5)
RBC # BLD: 2.5 M/UL (ref 4.2–5.4)
SODIUM BLD-SCNC: 136 MMOL/L (ref 136–145)
TOTAL PROTEIN: 5.1 G/DL (ref 6.6–8.7)
WBC # BLD: 22.7 K/UL (ref 4.8–10.8)

## 2021-10-03 PROCEDURE — 97530 THERAPEUTIC ACTIVITIES: CPT

## 2021-10-03 PROCEDURE — 2580000003 HC RX 258: Performed by: ORTHOPAEDIC SURGERY

## 2021-10-03 PROCEDURE — 85025 COMPLETE CBC W/AUTO DIFF WBC: CPT

## 2021-10-03 PROCEDURE — 6370000000 HC RX 637 (ALT 250 FOR IP): Performed by: INTERNAL MEDICINE

## 2021-10-03 PROCEDURE — 80053 COMPREHEN METABOLIC PANEL: CPT

## 2021-10-03 PROCEDURE — 6370000000 HC RX 637 (ALT 250 FOR IP): Performed by: ORTHOPAEDIC SURGERY

## 2021-10-03 PROCEDURE — 1210000000 HC MED SURG R&B

## 2021-10-03 PROCEDURE — 6360000002 HC RX W HCPCS: Performed by: ORTHOPAEDIC SURGERY

## 2021-10-03 RX ADMIN — POLYETHYLENE GLYCOL 3350 17 G: 17 POWDER, FOR SOLUTION ORAL at 09:31

## 2021-10-03 RX ADMIN — Medication 1000 MG: at 09:27

## 2021-10-03 RX ADMIN — LEVOTHYROXINE SODIUM 112 MCG: 112 TABLET ORAL at 05:44

## 2021-10-03 RX ADMIN — Medication 10 ML: at 09:31

## 2021-10-03 RX ADMIN — ASPIRIN 81 MG: 81 TABLET, COATED ORAL at 09:26

## 2021-10-03 RX ADMIN — SIMVASTATIN 20 MG: 10 TABLET, FILM COATED ORAL at 19:42

## 2021-10-03 RX ADMIN — SODIUM BICARBONATE 325 MG: 650 TABLET ORAL at 09:26

## 2021-10-03 RX ADMIN — Medication 10 ML: at 19:42

## 2021-10-03 RX ADMIN — DOCUSATE SODIUM 100 MG: 100 CAPSULE, LIQUID FILLED ORAL at 19:42

## 2021-10-03 RX ADMIN — ALLOPURINOL 100 MG: 100 TABLET ORAL at 09:27

## 2021-10-03 RX ADMIN — SODIUM BICARBONATE 325 MG: 650 TABLET ORAL at 19:42

## 2021-10-03 RX ADMIN — FOLIC ACID 1 MG: 1 TABLET ORAL at 09:26

## 2021-10-03 RX ADMIN — POLYETHYLENE GLYCOL 3350 17 G: 17 POWDER, FOR SOLUTION ORAL at 19:42

## 2021-10-03 RX ADMIN — APIXABAN 2.5 MG: 2.5 TABLET, FILM COATED ORAL at 09:27

## 2021-10-03 RX ADMIN — DOCUSATE SODIUM 100 MG: 100 CAPSULE, LIQUID FILLED ORAL at 09:26

## 2021-10-03 RX ADMIN — CARVEDILOL 3.12 MG: 3.12 TABLET, FILM COATED ORAL at 09:26

## 2021-10-03 RX ADMIN — LISINOPRIL 10 MG: 10 TABLET ORAL at 09:27

## 2021-10-03 RX ADMIN — APIXABAN 2.5 MG: 2.5 TABLET, FILM COATED ORAL at 19:42

## 2021-10-03 RX ADMIN — PANTOPRAZOLE SODIUM 40 MG: 40 TABLET, DELAYED RELEASE ORAL at 05:44

## 2021-10-03 ASSESSMENT — ENCOUNTER SYMPTOMS
NAUSEA: 0
CONSTIPATION: 1
SORE THROAT: 0
COUGH: 0
COLOR CHANGE: 0
SHORTNESS OF BREATH: 0
DIARRHEA: 0
ABDOMINAL PAIN: 0
WHEEZING: 0
TROUBLE SWALLOWING: 0

## 2021-10-03 NOTE — CARE COORDINATION
Pt was provided choice list of SNF facilities in Pt's area. Pt has selected [facility] for short term rehab placement.  SW has notified the facility of the referral. Awaiting approval/denial.  Emile  [phone]  Electronically signed by Kwaku Cuevas on 10/3/2021 at 1:41 PM

## 2021-10-03 NOTE — PROGRESS NOTES
10/03/21 1514   Restrictions/Precautions   Restrictions/Precautions Fall Risk;Surgical Protocols;ROM Restrictions   Required Braces or Orthoses? Yes   Required Braces or Orthoses   Cervical c-collar   Position Activity Restriction   Hip Precautions Posterior hip precautions   Other position/activity restrictions hip abd pillow   General   Chart Reviewed Yes   Subjective   Subjective Patient in bed agrees to sit EOB   Pain Screening   Patient Currently in Pain Denies   Vital Signs   Level of Consciousness Alert (0)   Oxygen Therapy   O2 Device None (Room air)   Patient Observation   Observations Has ABD. Pillow between legs   Orientation   Overall Orientation Status X   Orientation Level Oriented to person;Oriented to place; Disoriented to time   Bed Mobility   Supine to Sit Dependent/Total   Sit to Supine Maximal assistance   Scooting Dependent/Total   Comment Patient sat EOB  x 10 minutes CGA/SBA   Ambulation   Ambulation? No   Balance   Sitting - Static +;Fair   Sitting - Dynamic Fair;+   Exercises   Heelslides 20   Knee Long Arc Quad 20   Comments AROM  L LE ,  AAROM R LE   Other Activities   Comment Patient in bed Abduction pillow  in place attached to patient   Activity Tolerance   Activity Tolerance Patient Tolerated treatment well   Safety Devices   Type of devices Left in bed;Call light within reach; Bed alarm in place   Physical Therapy    Electronically signed by Gio Zaragoza PTA on 10/3/2021 at 3:26 PM

## 2021-10-03 NOTE — PROGRESS NOTES
[address] [facility]      Patient:  Madelyn Mcdaniels  YOB: 1934  Date of Service: 10/3/2021  MRN: [id number]   Acct: [de-identified]   Primary Care Physician: Ally Fonseca MD  Advance Directive: [facility]  Admit Date: 9/30/2021       Hospital Day: 3  Portions of this note have been copied forward, however, changed to reflect the most current clinical status of this patient. CHIEF COMPLAINT right hip pain, fall    SUBJECTIVE:  Patient continues to deny pain. Son at bedside is concerned about discharging home. CUMULATIVE HOSPITAL COURSE:  The patient is a 80 y. o. female with a history of CHF, pancreatic cancer, CKD, HTN, and hyperlipidemia who presented to [facility] ED complaining of right hip pain s/p fall. The patient was very sleepy at the time of admission related to pain medication. HPI was received from daughter over the phone, chart, and some but little from the patient. The daughter stated since May the patient had gradually become weaker and has experienced a fall about every 2-3 weeks. The daughter stated that patient had been receiving 24-hour care at home from family. They recently purchased a pressure sensing mat for the floor at bedside to help prevent falls. The daughter also stated that most days the patient can only take a few steps at most before \"her knees give out. \" The patient had been using a wheelchair frequently. Today the patient had a fall and the patient stated she did hit her head but did not lose consciousness (the daughter confirms). She reported her only pain in the right hip. She stated that at rest and after pain medication the pain is only 1/10 but with movement she stated that it was 8/10. ED work up revealed a subcapital transcervical fracture of the right proximal femur via xray, a radiolucent line across the right lateral mass of C2 on xray that may indicate fracture, elevated WBC, and 4+ bacteria in her urine.  Neurosurgery was consulted for evaluation of C2 fracture, no surgical intervention at this time, recommends C collar except for hygiene and eating. Orthopedic consulted for right femur fracture, right hemiarthroplasty 10/1/21. GI consulted for possible GI bleeding, no signs of acute bleeding. On Rocephin for UTI, Klebsiella pneumoniae noted and sensitive to Rocephin. Eliquis to be restarted post-op, HH stable. Case management to assist in DC planning. Palliative care following as well. Slight increase in creatinine post-op, IV fluids continued, will monitor I&O and for signs of over load closely. Cr continues to increase. Will continue IV hydration, fluids were not on at time on assessment, discussed with nursing staff. Review of Systems:   Review of Systems   Constitutional: Positive for fatigue (family reports increase in fatigue recently, daughter states patient sleeps more than 14 hours per day). Negative for chills and fever. HENT: Negative for sore throat and trouble swallowing. Respiratory: Negative for cough, shortness of breath and wheezing. Cardiovascular: Positive for leg swelling (right leg, after her fall). Negative for chest pain. Gastrointestinal: Positive for constipation. Negative for abdominal pain, diarrhea and nausea. Genitourinary:        Endorse urgency and frequency before admission, no complaints at this time, iqbal still in place     Musculoskeletal: Positive for arthralgias (right hip, improved since surgery). Skin: Positive for wound (surgical incision). Negative for color change. Neurological: Negative for dizziness, weakness and light-headedness. Psychiatric/Behavioral: Negative for confusion. 14 point review of systems is negative except as specifically addressed above.       Objective:   VITALS:  /67   Pulse 72   Temp 96.1 °F (35.6 °C) (Temporal)   Resp 16   Ht 5' 3\" (1.6 m)   Wt 142 lb (64.4 kg)   SpO2 96%   BMI 25.15 kg/m²   24HR INTAKE/OUTPUT:      Intake/Output Summary (Last 24 hours) at 10/3/2021 1033  Last data filed at 10/3/2021 0800  Gross per 24 hour   Intake 910 ml   Output 575 ml   Net 335 ml       Physical Exam  HENT:      Head: Normocephalic. Mouth/Throat:      Mouth: Mucous membranes are dry. Pharynx: Oropharynx is clear. Eyes:      Pupils: Pupils are equal, round, and reactive to light. Cardiovascular:      Rate and Rhythm: Normal rate and regular rhythm. Pulses: Normal pulses. Heart sounds: Murmur heard. Pulmonary:      Effort: Pulmonary effort is normal.      Breath sounds: Normal breath sounds. Abdominal:      General: Bowel sounds are normal. There is no distension. Palpations: Abdomen is soft. Tenderness: There is no abdominal tenderness. There is no guarding. Musculoskeletal:         General: Tenderness present. No deformity or signs of injury. Right lower leg: Edema (+2) present. Skin:     General: Skin is warm and dry. Capillary Refill: Capillary refill takes 2 to 3 seconds. Coloration: Skin is pale. Comments: Right hip surgical incision, dry is dry and intact, no drainage noted   Neurological:      General: No focal deficit present. Mental Status: She is alert and oriented to person, place, and time. Gait: Abnormal gait: not assessed at this time, r/t injury.    Psychiatric:         Mood and Affect: Mood normal.         Behavior: Behavior normal.             Medications:      sodium chloride      sodium chloride 125 mL/hr at 10/01/21 2202    sodium chloride        pantoprazole  40 mg Oral QAM AC    folic acid  1 mg Oral Daily    apixaban  2.5 mg Oral BID    aspirin  81 mg Oral Daily    sodium chloride flush  5-40 mL IntraVENous 2 times per day    allopurinol  100 mg Oral Daily    carvedilol  3.125 mg Oral Daily    docusate sodium  100 mg Oral BID    levothyroxine  112 mcg Oral Daily    lisinopril  10 mg Oral Daily    simvastatin  20 mg Oral Nightly    sodium bicarbonate  325 mg Oral BID    cefTRIAXone (ROCEPHIN) IV  1,000 mg IntraVENous Q24H    polyethylene glycol  17 g Oral BID     sodium chloride flush, sodium chloride, ondansetron **OR** ondansetron, oxyCODONE **OR** oxyCODONE, sodium chloride, meclizine, ondansetron, traMADol **OR** traMADol, bisacodyl, morphine **OR** [DISCONTINUED] morphine  ADULT DIET; Regular     Lab and other Data:     Recent Labs     10/01/21  0344 10/02/21  0400 10/03/21  0500   WBC 22.7* 20.9* 22.7*   HGB 9.8* 8.5* 7.9*   * 486* 420*     Recent Labs     10/01/21  0344 10/02/21  0400 10/03/21  0500    140 136   K 4.4 4.3 4.2    103 102   CO2 24 25 23   BUN 34* 47* 56*   CREATININE 1.9* 2.4* 2.6*   GLUCOSE 94 195* 150*     Recent Labs     10/01/21  0344 10/02/21  0400 10/03/21  0500   AST 17 20 22   ALT 11 10 <5*   BILITOT 0.6 <0.2 <0.2   ALKPHOS 351* 336* 321*     Troponin T: No results for input(s): TROPONINI in the last 72 hours. Pro-BNP: No results for input(s): BNP in the last 72 hours. INR:   No results for input(s): INR in the last 72 hours. UA:  No results for input(s): NITRITE, COLORU, PHUR, LABCAST, WBCUA, RBCUA, MUCUS, TRICHOMONAS, YEAST, BACTERIA, CLARITYU, SPECGRAV, LEUKOCYTESUR, UROBILINOGEN, BILIRUBINUR, BLOODU, GLUCOSEU, AMORPHOUS in the last 72 hours. Invalid input(s): Olivia Dirk  A1C: No results for input(s): LABA1C in the last 72 hours. ABG:No results for input(s): PHART, BRL3DDZ, PO2ART, VHF8ZFK, BEART, HGBAE, C4JYQYRT, CARBOXHGBART in the last 72 hours. RAD:   XR PELVIS (1-2 VIEWS)    Result Date: 9/30/2021  A subcapital transcervical fracture of the right proximal femur and a mild varus displacement. Signed by Dr Kathy Arnold (MIN 2 VIEWS)    Result Date: 9/30/2021  A subcapital transcervical fracture of the right proximal femur. Signed by Dr Elma Turk    Result Date: 9/30/2021  1. No acute intracranial findings. 2. Similar volume loss.  Signed by Dr Clifford Ramirez CONTRAST    Result Date: 9/30/2021  A radiolucent line across the right lateral mass of C2 may represent a vascular groove, an artifact or a nondisplaced fracture? . If symptomatic a follow-up examination or MR imaging of the cervical spine may be obtained. No other displaced fracture or malalignment. Chronic degenerative changes. Signed by Dr Anaya Fuentes    Result Date: 9/30/2021  1. Small fluid at the C1-2 joint space, which appears to have slight bony offset of the left lateral C1-2 interface. Possible right C1 fracture better demonstrated on prior CT. 2. No severe spinal canal stenosis. Level by level findings as above. Signed by Dr Gerry Hernández    XR CHEST PORTABLE    Result Date: 9/30/2021  No active cardiopulmonary disease. Signed by Dr Gildardo Briceno:   10/2/2021  7:08 AM - Olga Meehan Incoming Lab Results From [facility]    Specimen Information: Urine, clean catch        Component Collected Lab   Urine Culture, Routine Abnormal  09/30/2021  7:55 AM [facility] - [address]  Lab   >100,000 CFU/ml    Organism Abnormal  09/30/2021  7:55 AM [facility] - [address] Lab   Klebsiella pneumoniae    Urine Culture, Routine 09/30/2021  7:55 AM [facility] - [address] Lab   Heavy growth    Testing Performed By    Lab - Abbreviation Name Director Address Valid Date Range   [facility] - [address] LAB Diana Son M.D. [address] [place] [address] 09/14/21 1056-Present   Narrative  Performed by: [address] [place] [address] Lab  ORDER#: [id number]                          ORDERED BY: DEBRA Yousif   SOURCE: Urine Clean Catch                  COLLECTED:  09/30/21 07:55   ANTIBIOTICS AT [facility]. :                      RECEIVED :  09/30/21 08:01   Susceptibility    Klebsiella pneumoniae (1)    Antibiotic Interpretation BETTE Status    ampicillin Resistant >=32 mcg/mL     aztreonam Sensitive <=1 mcg/mL     ceFAZolin Sensitive <=4 mcg/mL     cefepime Sensitive <=1 mcg/mL     cefTRIAXone Sensitive <=1 mcg/mL     ertapenem Sensitive <=0.5 mcg/mL     gentamicin Sensitive <=1 mcg/mL     levofloxacin Sensitive <=0.12 mcg/mL     meropenem Sensitive <=0.25 mcg/mL     nitrofurantoin Resistant 128 mcg/mL     piperacillin-tazobactam Sensitive <=4 mcg/mL     trimethoprim-sulfamethoxazole Resistant >=320 mcg/mL           Assessment/Plan   Principal Problem:    Fracture of right hip, closed, [facility] ([facility]              -[facility] following                          -VZXCX hemiarthroplasty POD#2                          - PT/OT    -weight bearing as tolerated              -OOEV control, conservative as patient is very sensitve              -iqbal              -encourage coughing and deep breathing              -SCDs when in bed              -case management to assist with DC planning     Active Problems:   NIKHIL on CKD (chronic kidney disease)              -monitor BMP              -continue gentle IV hydration as pt also has CHF   -strict I&O   -daily weight   -monitor closely for signs of overload   -continue PO bicarb       UTI (urinary tract infection) / Leukocytosis              -continue rocephin daily              -urine culture, complete              -monitor I&O     -blood cultures, no growth to date   -monitor CBC      Palliative care patient              -ZSWRLJMXA       Anemia              -hemoccult + stool in ED              -GI consulted, no further recommendations at this time              -protonix IV              -monitor HH, no drop after receiving blood, no obvious signs of bleeding              -transfused 1 Unit PRBC 9/30       Malignant neoplasm of other parts of pancreas ([facility]              -OQIPV              -not currently canidate for treatment              -last treatment was in May 2021                CHF              -continue Coreg and lisinopril              -Strict I&O              -daily weight     Resolved Problems:    * No resolved hospital problems.  *        Antibiotic: rocephin     DVT Prophylaxis: Eliquis      GI prophylaxis:  protonix    Sreedhar Moses, ALBERTO - CNP, 10/3/2021 10:33 AM

## 2021-10-04 VITALS
DIASTOLIC BLOOD PRESSURE: 76 MMHG | BODY MASS INDEX: 25.16 KG/M2 | OXYGEN SATURATION: 96 % | HEART RATE: 79 BPM | RESPIRATION RATE: 16 BRPM | HEIGHT: 63 IN | TEMPERATURE: 97.6 F | WEIGHT: 142 LBS | SYSTOLIC BLOOD PRESSURE: 135 MMHG

## 2021-10-04 LAB
ALBUMIN SERPL-MCNC: 2.3 G/DL (ref 3.5–5.2)
ALBUMIN SERPL-MCNC: 2.3 G/DL (ref 3.5–5.2)
ALP BLD-CCNC: 546 U/L (ref 35–104)
ALP BLD-CCNC: 628 U/L (ref 35–104)
ALT SERPL-CCNC: <5 U/L (ref 5–33)
ALT SERPL-CCNC: <5 U/L (ref 5–33)
ANION GAP SERPL CALCULATED.3IONS-SCNC: 12 MMOL/L (ref 7–19)
ANION GAP SERPL CALCULATED.3IONS-SCNC: 14 MMOL/L (ref 7–19)
AST SERPL-CCNC: 54 U/L (ref 5–32)
AST SERPL-CCNC: 82 U/L (ref 5–32)
BASOPHILS ABSOLUTE: 0.1 K/UL (ref 0–0.2)
BASOPHILS RELATIVE PERCENT: 0.3 % (ref 0–1)
BILIRUB SERPL-MCNC: <0.2 MG/DL (ref 0.2–1.2)
BILIRUB SERPL-MCNC: <0.2 MG/DL (ref 0.2–1.2)
BUN BLDV-MCNC: 55 MG/DL (ref 8–23)
BUN BLDV-MCNC: 56 MG/DL (ref 8–23)
CALCIUM SERPL-MCNC: 8.5 MG/DL (ref 8.8–10.2)
CALCIUM SERPL-MCNC: 8.7 MG/DL (ref 8.8–10.2)
CHLORIDE BLD-SCNC: 101 MMOL/L (ref 98–111)
CHLORIDE BLD-SCNC: 102 MMOL/L (ref 98–111)
CO2: 20 MMOL/L (ref 22–29)
CO2: 21 MMOL/L (ref 22–29)
CREAT SERPL-MCNC: 2.3 MG/DL (ref 0.5–0.9)
CREAT SERPL-MCNC: 2.3 MG/DL (ref 0.5–0.9)
EOSINOPHILS ABSOLUTE: 0.1 K/UL (ref 0–0.6)
EOSINOPHILS RELATIVE PERCENT: 0.4 % (ref 0–5)
GFR AFRICAN AMERICAN: 24
GFR AFRICAN AMERICAN: 24
GFR NON-AFRICAN AMERICAN: 20
GFR NON-AFRICAN AMERICAN: 20
GLUCOSE BLD-MCNC: 145 MG/DL (ref 74–109)
GLUCOSE BLD-MCNC: 268 MG/DL (ref 74–109)
HCT VFR BLD CALC: 26.7 % (ref 37–47)
HEMOGLOBIN: 8 G/DL (ref 12–16)
IMMATURE GRANULOCYTES #: 0.4 K/UL
LYMPHOCYTES ABSOLUTE: 0.6 K/UL (ref 1.1–4.5)
LYMPHOCYTES RELATIVE PERCENT: 2.6 % (ref 20–40)
MCH RBC QN AUTO: 31 PG (ref 27–31)
MCHC RBC AUTO-ENTMCNC: 30 G/DL (ref 33–37)
MCV RBC AUTO: 103.5 FL (ref 81–99)
MONOCYTES ABSOLUTE: 1 K/UL (ref 0–0.9)
MONOCYTES RELATIVE PERCENT: 4.9 % (ref 0–10)
NEUTROPHILS ABSOLUTE: 19 K/UL (ref 1.5–7.5)
NEUTROPHILS RELATIVE PERCENT: 89.7 % (ref 50–65)
PDW BLD-RTO: 16.6 % (ref 11.5–14.5)
PLATELET # BLD: 388 K/UL (ref 130–400)
PMV BLD AUTO: 10.3 FL (ref 9.4–12.3)
POTASSIUM REFLEX MAGNESIUM: 4.4 MMOL/L (ref 3.5–5)
POTASSIUM SERPL-SCNC: 4 MMOL/L (ref 3.5–5)
RBC # BLD: 2.58 M/UL (ref 4.2–5.4)
SARS-COV-2, NAAT: NOT DETECTED
SODIUM BLD-SCNC: 135 MMOL/L (ref 136–145)
SODIUM BLD-SCNC: 135 MMOL/L (ref 136–145)
TOTAL PROTEIN: 5 G/DL (ref 6.6–8.7)
TOTAL PROTEIN: 5.5 G/DL (ref 6.6–8.7)
WBC # BLD: 21.2 K/UL (ref 4.8–10.8)

## 2021-10-04 PROCEDURE — 99232 SBSQ HOSP IP/OBS MODERATE 35: CPT | Performed by: PHYSICIAN ASSISTANT

## 2021-10-04 PROCEDURE — 6370000000 HC RX 637 (ALT 250 FOR IP): Performed by: ORTHOPAEDIC SURGERY

## 2021-10-04 PROCEDURE — 80053 COMPREHEN METABOLIC PANEL: CPT

## 2021-10-04 PROCEDURE — 85025 COMPLETE CBC W/AUTO DIFF WBC: CPT

## 2021-10-04 PROCEDURE — 2580000003 HC RX 258: Performed by: ORTHOPAEDIC SURGERY

## 2021-10-04 PROCEDURE — 6360000002 HC RX W HCPCS: Performed by: ORTHOPAEDIC SURGERY

## 2021-10-04 PROCEDURE — 97166 OT EVAL MOD COMPLEX 45 MIN: CPT

## 2021-10-04 PROCEDURE — 6370000000 HC RX 637 (ALT 250 FOR IP): Performed by: INTERNAL MEDICINE

## 2021-10-04 PROCEDURE — 87635 SARS-COV-2 COVID-19 AMP PRB: CPT

## 2021-10-04 PROCEDURE — 97530 THERAPEUTIC ACTIVITIES: CPT

## 2021-10-04 PROCEDURE — 6360000002 HC RX W HCPCS: Performed by: INTERNAL MEDICINE

## 2021-10-04 PROCEDURE — 97535 SELF CARE MNGMENT TRAINING: CPT

## 2021-10-04 RX ORDER — HEPARIN SODIUM (PORCINE) LOCK FLUSH IV SOLN 100 UNIT/ML 100 UNIT/ML
300 SOLUTION INTRAVENOUS PRN
Status: DISCONTINUED | OUTPATIENT
Start: 2021-10-04 | End: 2021-10-04 | Stop reason: HOSPADM

## 2021-10-04 RX ORDER — OXYCODONE HYDROCHLORIDE AND ACETAMINOPHEN 5; 325 MG/1; MG/1
1 TABLET ORAL EVERY 6 HOURS PRN
Qty: 12 TABLET | Refills: 0 | Status: SHIPPED | OUTPATIENT
Start: 2021-10-04 | End: 2021-10-07

## 2021-10-04 RX ORDER — PANTOPRAZOLE SODIUM 40 MG/1
40 TABLET, DELAYED RELEASE ORAL
Qty: 30 TABLET | Refills: 0 | Status: SHIPPED | OUTPATIENT
Start: 2021-10-04

## 2021-10-04 RX ORDER — OXYCODONE HYDROCHLORIDE AND ACETAMINOPHEN 5; 325 MG/1; MG/1
1 TABLET ORAL EVERY 6 HOURS PRN
Status: DISCONTINUED | OUTPATIENT
Start: 2021-10-04 | End: 2021-10-04 | Stop reason: HOSPADM

## 2021-10-04 RX ORDER — FOLIC ACID 1 MG/1
1 TABLET ORAL DAILY
Qty: 30 TABLET | Refills: 0 | Status: SHIPPED | OUTPATIENT
Start: 2021-10-05

## 2021-10-04 RX ORDER — OXYCODONE HYDROCHLORIDE AND ACETAMINOPHEN 5; 325 MG/1; MG/1
1 TABLET ORAL EVERY 6 HOURS PRN
Qty: 12 TABLET | Refills: 0 | Status: CANCELLED | OUTPATIENT
Start: 2021-10-04 | End: 2021-10-07

## 2021-10-04 RX ADMIN — Medication 1000 MG: at 08:42

## 2021-10-04 RX ADMIN — ALLOPURINOL 100 MG: 100 TABLET ORAL at 08:43

## 2021-10-04 RX ADMIN — CARVEDILOL 3.12 MG: 3.12 TABLET, FILM COATED ORAL at 08:42

## 2021-10-04 RX ADMIN — DOCUSATE SODIUM 100 MG: 100 CAPSULE, LIQUID FILLED ORAL at 08:42

## 2021-10-04 RX ADMIN — PANTOPRAZOLE SODIUM 40 MG: 40 TABLET, DELAYED RELEASE ORAL at 04:23

## 2021-10-04 RX ADMIN — LISINOPRIL 10 MG: 10 TABLET ORAL at 08:43

## 2021-10-04 RX ADMIN — APIXABAN 2.5 MG: 2.5 TABLET, FILM COATED ORAL at 08:43

## 2021-10-04 RX ADMIN — LEVOTHYROXINE SODIUM 112 MCG: 112 TABLET ORAL at 04:23

## 2021-10-04 RX ADMIN — HEPARIN 300 UNITS: 100 SYRINGE at 17:10

## 2021-10-04 RX ADMIN — FOLIC ACID 1 MG: 1 TABLET ORAL at 08:43

## 2021-10-04 RX ADMIN — POLYETHYLENE GLYCOL 3350 17 G: 17 POWDER, FOR SOLUTION ORAL at 08:42

## 2021-10-04 RX ADMIN — ASPIRIN 81 MG: 81 TABLET, COATED ORAL at 08:43

## 2021-10-04 RX ADMIN — SODIUM BICARBONATE 325 MG: 650 TABLET ORAL at 08:42

## 2021-10-04 ASSESSMENT — PAIN DESCRIPTION - LOCATION: LOCATION: HIP

## 2021-10-04 ASSESSMENT — PAIN SCALES - GENERAL
PAINLEVEL_OUTOF10: 0
PAINLEVEL_OUTOF10: 3

## 2021-10-04 ASSESSMENT — PAIN DESCRIPTION - DESCRIPTORS: DESCRIPTORS: SORE

## 2021-10-04 ASSESSMENT — PAIN DESCRIPTION - PAIN TYPE: TYPE: SURGICAL PAIN

## 2021-10-04 ASSESSMENT — PAIN - FUNCTIONAL ASSESSMENT: PAIN_FUNCTIONAL_ASSESSMENT: PREVENTS OR INTERFERES SOME ACTIVE ACTIVITIES AND ADLS

## 2021-10-04 ASSESSMENT — PAIN DESCRIPTION - ORIENTATION: ORIENTATION: RIGHT

## 2021-10-04 NOTE — DISCHARGE SUMMARY
[address] [facility]    Discharge Summary      Wayne Chapa  :  1934  MRN:  [id number]    Admit date:  2021  Discharge date:    10/4/2021    Discharging Physician:  Dr. Bar Bejarano     Advance Directive: DNR-CC    Consults: GI, orthopedic surgery, Neurosurgery, and palliative care     Primary Care Physician:  Lakhwinder Hoyt MD    Discharge Diagnoses:  Principal Problem:    Fracture of right hip, closed, initial encounter [facility])  Active Problems:    CKD (chronic kidney disease)    Palliative care patient    Anemia    Malignant neoplasm of other parts of pancreas ([facility]    Leukocytosis    UTI (urinary tract infection)    Closed fracture of right hip ([facility]    NIKHIL (acute kidney injury) ([email or web address]  Resolved Problems:    * No resolved hospital problems. *      Portions of this note have been copied forward, however, changed to reflect the most current clinical status of this patient. Hospital Course:    [Copied from previous provider]   The patient is a 80 y. o. female with a history of CHF, pancreatic cancer, CKD, HTN, and hyperlipidemia who presented to [facility] ED complaining of right hip pain s/p fall. The patient was very sleepy at the time of admission related to pain medication. HPI was received from daughter over the phone, chart, and some but little from the patient. The daughter stated since May the patient had gradually become weaker and has experienced a fall about every 2-3 weeks. The daughter stated that patient had been receiving 24-hour care at home from family. They recently purchased a pressure sensing mat for the floor at bedside to help prevent falls. The daughter also stated that most days the patient can only take a few steps at most before \"her knees give out. \" The patient had been using a wheelchair frequently. Today the patient had a fall and the patient stated she did hit her head but did not lose consciousness (the daughter confirms). She reported her only pain in the right hip.  She stated that at rest and after pain medication the pain is only 1/10 but with movement she stated that it was 8/10. ED work up revealed a subcapital transcervical fracture of the right proximal femur via xray, a radiolucent line across the right lateral mass of C2 on xray that may indicate fracture, elevated WBC, and 4+ bacteria in her urine. Neurosurgery was consulted for evaluation of C2 fracture, no surgical intervention at this time, recommends C collar except for hygiene and eating. Orthopedic consulted for right femur fracture, right hemiarthroplasty 10/1/21. GI consulted for possible GI bleeding, no signs of acute bleeding. On Rocephin for UTI, Klebsiella pneumoniae noted and sensitive to Rocephin. Eliquis to be restarted post-op, HH stable. Case management to assist in DC planning. Palliative care following as well. Slight increase in creatinine post-op, IV fluids continued, will monitor I&O and for signs of over load closely. Cr continues to increase. Will continue IV hydration, fluids were not on at time on assessment, discussed with nursing staff. [End of copied section]     10/4/2021: Patient accepted to [facility]. Patient is being discharged on Percocet as needed, folic acid, and Protonix. He has been instructed to follow-up with PCP, GI, oncology, orthopedic surgery, and neurosurgery. Patient is currently in stable condition to be discharged to [facility]. Significant Diagnostic Studies:     XR PELVIS (1-2 VIEWS)  Result Date: 9/30/2021    A subcapital transcervical fracture of the right proximal femur and a mild varus displacement. Signed by Dr Daniel Lofton (MIN 2 VIEWS)  Result Date: 9/30/2021    A subcapital transcervical fracture of the right proximal femur. Signed by Dr Sylvester Hernandez  Result Date: 9/30/2021    1. No acute intracranial findings. 2. Similar volume loss.  Signed by Dr Balbir Shipley CONTRAST  Result Date: 9/30/2021    A radiolucent line across the right lateral mass of C2 may represent a vascular groove, an artifact or a nondisplaced fracture? . If symptomatic a follow-up examination or MR imaging of the cervical spine may be obtained. No other displaced fracture or malalignment. Chronic degenerative changes. Signed by Dr Migdalia Dumont  Result Date: 9/30/2021    1. Small fluid at the C1-2 joint space, which appears to have slight bony offset of the left lateral C1-2 interface. Possible right C1 fracture better demonstrated on prior CT. 2. No severe spinal canal stenosis. Level by level findings as above. Signed by Dr Abbie Colon      XR CHEST PORTABLE  Result Date: 9/30/2021    No active cardiopulmonary disease. Signed by Dr Alon Hoover:   CBC:   Recent Labs     10/02/21  0400 10/03/21  0500 10/04/21  0227   WBC 20.9* 22.7* 21.2*   HGB 8.5* 7.9* 8.0*   * 420* 388     BMP:    Recent Labs     10/03/21  0500 10/04/21  0227 10/04/21  1135    135* 135*   K 4.2 4.4 4.0    102 101   CO2 23 21* 20*   BUN 56* 55* 56*   CREATININE 2.6* 2.3* 2.3*   GLUCOSE 150* 145* 268*       Physical Exam:   Vital Signs: /76   Pulse 79   Temp 97.6 °F (36.4 °C) (Temporal)   Resp 16   Ht 5' 3\" (1.6 m)   Wt 142 lb (64.4 kg)   SpO2 96%   BMI 25.15 kg/m²   General appearance:. Alert and Cooperative   HEENT: Normocephalic. Chest: Lung sounds clear bilaterally without wheezes or rhonchi. Cardiac: RRR, S1, S2 normal.  Murmur noted. No gallops, or rubs auscultated. Abdomen: soft, non-tender; non-distended normal bowel sounds no masses, no organomegaly. Extremities: No clubbing or cyanosis. No peripheral edema. Peripheral pulses palpable. Right hip surgical incision, clean, dry, and intact  Neurologic: Grossly intact.         Discharge Medications:          Medication List      START taking these medications    folic acid 1 MG tablet  Commonly known as: FOLVITE  Take 1 tablet by mouth daily  Start taking on: October 5, 2021     oxyCODONE-acetaminophen 5-325 MG per tablet  Commonly known as: PERCOCET  Take 1 tablet by mouth every 6 hours as needed for Pain for up to 3 days. pantoprazole 40 MG tablet  Commonly known as: PROTONIX  Take 1 tablet by mouth every morning (before breakfast)        CHANGE how you take these medications    carvedilol 3.125 MG tablet  Commonly known as: COREG  Take 1 tablet by mouth 2 times daily (with meals)  What changed: when to take this        CONTINUE taking these medications    allopurinol 100 MG tablet  Commonly known as: ZYLOPRIM     aspirin 81 MG EC tablet  Take 1 tablet by mouth daily     docusate sodium 100 MG capsule  Commonly known as: Colace  Take 1 capsule by mouth 2 times daily     Eliquis 2.5 MG Tabs tablet  Generic drug: apixaban     lidocaine-prilocaine 2.5-2.5 % cream  Commonly known as: EMLA  APPLY TO PORT AREA AND COVERWITH PLASTIC WRAP ONE HOUR PRIOR TO TREATMENT     lisinopril 10 MG tablet  Commonly known as: PRINIVIL;ZESTRIL     meclizine 25 MG tablet  Commonly known as: ANTIVERT     midodrine 10 MG tablet  Commonly known as: PROAMATINE  Take 1 tablet by mouth 3 times daily (with meals)     nitroGLYCERIN 0.4 MG SL tablet  Commonly known as: NITROSTAT  up to max of 3 total doses. If no relief after 1 dose, call 911.      ondansetron 4 MG disintegrating tablet  Commonly known as: Zofran ODT  Take 2 tablets by mouth every 8 hours as needed for Nausea or Vomiting     polyethylene glycol 17 GM/SCOOP powder  Commonly known as: GLYCOLAX     pramipexole 0.125 MG tablet  Commonly known as: MIRAPEX     simvastatin 20 MG tablet  Commonly known as: ZOCOR     sodium bicarbonate 325 MG tablet     Synthroid 112 MCG tablet  Generic drug: levothyroxine           Where to Get Your Medications      These medications were sent to [facility] [address] [facility], [address] [facility] - [facility] [phone]  [address] [place] [address]    Phone: [phone]   · folic acid 1 MG tablet  · pantoprazole 40 MG tablet     You can get these medications from any pharmacy    Bring a paper prescription for each of these medications  · oxyCODONE-acetaminophen 5-325 MG per tablet            Discharge Instructions: Follow up with Darin Wang MD within 2 business days of Discharge. Follow-up with GI, oncology, orthopedic surgery and neurosurgery. Take medications as directed. Resume activity as tolerated. Diet: ADULT DIET; Regular     Disposition: Patient is medically stable and will be discharged to [facility]. Time spent on discharge 40 minutes spent in assessing patient, reviewing medications, discussion with nursing, confirming safe discharge plan and preparation of discharge summary. Signed:  Electronically signed by ALBERTO Cuenca CNP on 10/4/21 at 2:06 PM CDT         EMR Dragon/Transcription disclaimer:   Much of this encounter note is an electronic transcription/translation of spoken language to printed text.  The electronic translation of spoken language may permit erroneous, or at times, nonsensical words or phrases to be inadvertently transcribed; although attempts have made to review the note for such errors, some may still exist.

## 2021-10-04 NOTE — PROGRESS NOTES
Palliative Care Progress Note  10/4/2021 12:48 PM    Patient:  Yung Garrett  YOB: 1934  Primary Care Physician: Dante Fritz MD  Advance Directive: [facility]  Admit Date: 9/30/2021       Hospital Day: 4  Portions of this note have been copied forward, however, changed to reflect the most current clinical status of this patient. CHIEF COMPLAINT/REASON FOR CONSULTATION Goals of care, family support    SUBJECTIVE: Ms. Thaddeus Dalal has no new complaints today. Pain is controlled at present. Plans to discharge to [facility]. Interval History: Underwent right hemiarthroplasty 10/01/2021. Accepted to [facility] on 10/04/2021. Will need follow up with Orthopedic surgery as well as GI in regards to positive hemoccult. Follow up with Neurosurgery for C1/C2 injury with recommendations to continue c-collar for 6 weeks. Review of Systems:   14 point review of systems is negative except as specifically addressed above. Objective:   VITALS:  /76   Pulse 79   Temp 97.6 °F (36.4 °C) (Temporal)   Resp 16   Ht 5' 3\" (1.6 m)   Wt 142 lb (64.4 kg)   SpO2 96%   BMI 25.15 kg/m²   24HR INTAKE/OUTPUT:      Intake/Output Summary (Last 24 hours) at 10/4/2021 1248  Last data filed at 10/4/2021 0737  Gross per 24 hour   Intake 2274 ml   Output 375 ml   Net 1899 ml     General appearance: 79 yo female no acute distress resting comfortably in bed  Head: Normocephalic, without obvious abnormality, atraumatic  Eyes: conjunctivae/corneas clear. PERRL, EOM's intact.    Ears: normal external ears and nose, throat without exudate  Neck: no adenopathy, no carotid bruit, no JVD, supple, symmetrical, trachea midline, c collar in place  Lungs: diminished at bases otherwise no wheezing rales or rhonchi   Heart: RRR, S1, S2 normal, SOFT murmur  Abdomen:soft, non-tender; non-distended, bowel sounds present    Extremities:TRACE lower extremity edema,  No erythema, right hip incision C/D/I  Skin: Skin color, texture, turgor normal. No rashes or lesions  Lymphatic: No palpable lymph node enlargment  Neurologic: Alert and oriented X self, place, generalized weakness and normal tone. No focal deficits  Psychiatric: Calm, appropriate mood and affect     Medications:      sodium chloride      sodium chloride 75 mL/hr at 10/03/21 1552    sodium chloride        pantoprazole  40 mg Oral QAM AC    folic acid  1 mg Oral Daily    apixaban  2.5 mg Oral BID    aspirin  81 mg Oral Daily    sodium chloride flush  5-40 mL IntraVENous 2 times per day    allopurinol  100 mg Oral Daily    carvedilol  3.125 mg Oral Daily    docusate sodium  100 mg Oral BID    levothyroxine  112 mcg Oral Daily    lisinopril  10 mg Oral Daily    simvastatin  20 mg Oral Nightly    sodium bicarbonate  325 mg Oral BID    cefTRIAXone (ROCEPHIN) IV  1,000 mg IntraVENous Q24H    polyethylene glycol  17 g Oral BID     oxyCODONE-acetaminophen, sodium chloride flush, sodium chloride, ondansetron **OR** ondansetron, sodium chloride, meclizine, ondansetron, bisacodyl  ADULT DIET;  Regular     Lab and other Data:     Recent Labs     10/02/21  0400 10/03/21  0500 10/04/21  0227   WBC 20.9* 22.7* 21.2*   HGB 8.5* 7.9* 8.0*   * 420* 388     Recent Labs     10/02/21  0400 10/03/21  0500 10/04/21  0227    136 135*   K 4.3 4.2 4.4    102 102   CO2 25 23 21*   BUN 47* 56* 55*   CREATININE 2.4* 2.6* 2.3*   GLUCOSE 195* 150* 145*     Recent Labs     10/02/21  0400 10/03/21  0500 10/04/21  0227   AST 20 22 82*   ALT 10 <5* <5*   BILITOT <0.2 <0.2 <0.2   ALKPHOS 336* 321* 628*     Assessment/Plan   Principal Problem:    Fracture of right hip, closed, initial encounter ([facility]  Active Problems:    CKD (chronic kidney disease)    Palliative care patient    Anemia    Malignant neoplasm of other parts of pancreas (HCC)    Leukocytosis    UTI (urinary tract infection)    Closed fracture of right hip (HCC)    NIKHIL (acute kidney injury) ([facility]  Resolved Problems:

## 2021-10-04 NOTE — PROGRESS NOTES
Occupational Therapy   Occupational Therapy Initial Assessment  Date: 10/4/2021   Patient Name: Oswaldo Macedo  MRN: [id number]     : 1934    Date of Service: 10/4/2021    Discharge Recommendations:  Patient would benefit from continued therapy after discharge       Assessment   Assessment: Evaluation completed and tx initiated. The patient would benefit from further therapy to upgrade skills. Shows potential for functional gains and is able to meaningfully participate in therapeutic activity. Treatment Diagnosis: Fall, Right Hip fx s/p hemiarthroplasty, C1-C2 neck injury, GIB, UTI, CHI  REQUIRES OT FOLLOW UP: Yes  Activity Tolerance  Activity Tolerance: Patient Tolerated treatment well  Safety Devices  Safety Devices in place: Yes  Type of devices: Call light within reach; Chair alarm in place (aide notified)           Patient Diagnosis(es): The primary encounter diagnosis was Closed fracture of right hip, initial encounter ([facility] Diagnoses of Gastrointestinal hemorrhage, unspecified gastrointestinal hemorrhage type, Acute cystitis without hematuria, and Closed head injury, initial encounter were also pertinent to this visit. has a past medical history of Abdominal aortic aneurysm (AAA) ([address] Anemia, Arthritis, Cancer ([address] CHF (congestive heart failure) ([address] Chronic kidney disease, DVT of lower extremity (deep venous thrombosis) ([address] Hyperlipidemia, Hypertension, Kidney stones, Osteoarthritis, Palliative care patient, Thyroid disease, and Thyroid disorder. has a past surgical history that includes Toe amputation (Bilateral); Colonoscopy (2016); Total knee arthroplasty (Right, 2020); Endoscopic ultrasonography, GI (N/A, 2020); ERCP (N/A, 2020); ERCP (2020); and Port Surgery (Right, 2021).     Treatment Diagnosis: Fall, Right Hip fx s/p hemiarthroplasty, C1-C2 neck injury, GIB, UTI, CHI      Restrictions  Restrictions/Precautions  Restrictions/Precautions: Fall Risk, Surgical Protocols, ROM Restrictions  Required Braces or Orthoses?: Yes  Required Braces or Orthoses  Cervical: c-collar  Position Activity Restriction  Hip Precautions: Posterior hip precautions  Other position/activity restrictions: hip abd pillow    Subjective   General  Chart Reviewed: Yes  Patient assessed for rehabilitation services?: Yes  Family / Caregiver Present: No  General Comment  Comments: Nurse Trenton Castellanos therapy  Patient Currently in Pain: No  Pain Assessment  Pain Assessment: 0-10  Pain Level: 0  Pain Type: Surgical pain  Pain Location: Hip  Pain Orientation: Right  Pain Descriptors: Sore  Functional Pain Assessment: Prevents or interferes some active activities and ADLs  Non-Pharmaceutical Pain Intervention(s): Ambulation/Increased Activity; Elevation;Repositioned  Response to Pain Intervention: Patient Satisfied  Pre Treatment Pain Screening  Pain at present: 0  Scale Used: Numeric Score  Intervention List: Patient able to continue with treatment  Vital Signs  Temp: 97.6 °F (36.4 °C)  Temp Source: Temporal  Pulse: 79  Heart Rate Source: Monitor  Resp: 16  BP: 135/76  Level of Consciousness: Alert (0)  Patient Currently in Pain: No  Oxygen Therapy  SpO2: 96 %  O2 Device: None (Room air)  Patient Observation  Observations: Has ABD. Pillow between legs  Social/Functional History  Social/Functional History  Lives With: Son, Daughter  Type of Home: House  Home Layout: One level  Home Equipment: Rolling walker  Receives Help From: Family  Additional Comments: Per chart, uses wheelchair a great deal       Objective        Orientation  Orientation Level: Oriented to person;Oriented to place;Oriented to time (but unable to state her )     Balance  Sitting Balance: Contact guard assistance (cues to sit up to 90, tends to lean backward)  Standing Balance  Activity: Attemted to stand from slightly raised EOB to walker--unable to achieve upright balanced stance due to posterior lean.   Improved standing with the safety bar of the Doug Calzada assist of two  Toilet Transfers  Toilet Transfers Comments: Doene Anatoliy with moderate assist of two  ADL  Feeding: Supervision  Grooming: Minimal assistance  UE Bathing: Moderate assistance  LE Bathing: Maximum assistance (bed bath)  UE Dressing: Minimal assistance  LE Dressing: Maximum assistance (assist of two using safety bar for standing aspects, needs AE training as appropriate)  Toileting: Maximum assistance (assist of two for standing aspects for clothing management)        Bed mobility  Supine to Sit: Maximum assistance;2 Person assistance  Transfers  Stand Step Transfers:  (unable to perform with walker due to not sufficiently able to shift weight to move feet and take steps)     Cognition  Cognition Comment: Awake but mildly drowsy, requires extra cues and demo for simple commands                 LUE AROM (degrees)  LUE General AROM: shoulder flexion 0-70 while reclined in bed, distally WFL  RUE AROM (degrees)  RUE General AROM: shoulder flexion 0-90, distally WFL                   Tx initiated:   Mobility strategies, posterior precautions training (30 mins)     Plan   Plan  Times per week: 3-5    G-Code     OutComes Score                                                  AM-PAC Score             Goals  Short term goals  Short term goal 1: Upgrade transfers to stand step with a walker with assist of two as needed  Short term goal 2: Upgrade status to static standing with walker with assist of one to promote standing level ADL  Short term goal 3: Patient will demo ability to grossly state posterior hip precautions  Short term goal 4: Supervision for seated level therapeutic activity recommendations       Therapy Time   Individual Concurrent Group Co-treatment   Time In           Time Out           Minutes                   Lillian Mcclelland OT Electronically signed by Lillian Mcclelland OT on 10/4/2021 at 10:13 AM

## 2021-10-04 NOTE — PROGRESS NOTES
Physical Therapy     10/04/21 1702   Restrictions/Precautions   Restrictions/Precautions Fall Risk;Surgical Protocols;ROM Restrictions   Required Braces or Orthoses? Yes   Required Braces or Orthoses   Cervical c-collar   Position Activity Restriction   Hip Precautions Posterior hip precautions   Other position/activity restrictions hip abd pillow   General   Chart Reviewed Yes   Subjective   Subjective Patient in recliner, agreed to going back to bed   General Comment   Comments Patient appeared less alert and very drowsy. Pain Screening   Patient Currently in Pain Denies   Intervention List Patient able to continue with treatment   Oxygen Therapy   O2 Device None (Room air)   Bed Mobility   Sit to Supine Maximal assistance  (with UE and LE )   Scooting Dependent/Total  (used pad to scoot)   Transfers   Sit to Stand Dependent/Total;2 Person Assistance  (with SS)   Stand to sit Dependent/Total;2 Person Assistance  (with SS)   Bed to Chair Dependent/Total;2 Person Assistance  (with SS)   Comment Patient was able to stand x1 min Dependent x 2 for clean up in SS    Ambulation   Ambulation? No   Other Activities   Comment PLaced ABD. pillow between legs in bed   Activity Tolerance   Activity Tolerance Patient limited by fatigue   Safety Devices   Type of devices Call light within reach; Bed alarm in place; Left in bed   Electronically signed by Nina Wiley on 10/4/2021 at 5:09 PM

## 2021-10-04 NOTE — PROGRESS NOTES
Paperwork faxed to [facility] with report called. EMS called for pt transport. Dhaliwal and peripheral IV removed. R port de-accessed. Pt son, Janell Austin, notified of pt DC to [facility].   Electronically signed by Vianca Wood RN on 10/4/2021 at 5:47 PM

## 2021-10-04 NOTE — CARE COORDINATION
Pt has been accepted at [facility].  Pt is able to DC to the facility once medically cleared from the hospital. Pt will need an updated negative covid test before DC from the hospital.   Emile  [phone] F  Electronically signed by Henri Skaggs on 10/4/2021 at 10:03 AM

## 2021-10-04 NOTE — PROGRESS NOTES
Physical Therapy  Physical Therapy       Daily Note    Date of service:10/4/2021    Name:Flakita Sellers    LDI:[id number]         10/04/21 5569   Restrictions/Precautions   Restrictions/Precautions Fall Risk;Surgical Protocols;ROM Restrictions   Required Braces or Orthoses? Yes   Required Braces or Orthoses   Cervical c-collar   Position Activity Restriction   Hip Precautions Posterior hip precautions   Other position/activity restrictions hip abd pillow   General   Chart Reviewed Yes   Subjective   Subjective Patient in bed, agreed to therapy. General Comment   Comments Completed treatment with Rey Reyes OT    Pain Screening   Patient Currently in Pain No   Intervention List Patient able to continue with treatment   Pain Assessment   Pain Assessment 0-10   Pain Level 0   Vital Signs   Level of Consciousness Alert (0)   Oxygen Therapy   O2 Device None (Room air)   Patient Observation   Observations Has ABD. Pillow between legs   Bed Mobility   Supine to Sit Moderate assistance  (Assist with LE)   Scooting Maximal assistance;2 Person assistance  (Used pad to maneuver )   Comment Patient sat EOB x15 min x Min. A. Presented posterior lean which needed corrected by Verbal and tacitle cues    Transfers   Sit to Stand Moderate Assistance;2 Person Assistance  (w/ SS)   Stand to sit Moderate Assistance;2 Person Assistance  (w/ SS)   Bed to Chair Dependent/Total;2 Person Assistance  (Bed to recliner. With SS )   Comment Attempted standing without SS but patient was unable to fully stand with errect posture. Patient TR with SS from bed to recliner. Was unable to get to full standing. Ambulation   Ambulation? No   Balance   Sitting - Static +;Fair   Exercises   Knee Long Arc Quad 10   Ankle Pumps 10   Comments AROM BLE in sitting EOB   Other Activities   Comment Patient in recliner with Pillow under LE and towel roll in between to knees to prevent add.     Activity Tolerance   Activity Tolerance Patient Tolerated treatment well   Safety Devices   Type of devices Left in chair;Call light within reach; Chair alarm in place           Electronically signed by Pam Oliva, PTA Student, on 10/4/2021 at 9:56 AM

## 2021-10-05 LAB — BLOOD CULTURE, ROUTINE: NORMAL

## 2021-10-06 LAB — CULTURE, BLOOD 2: NORMAL

## 2021-10-13 ENCOUNTER — HOSPITAL ENCOUNTER (OUTPATIENT)
Dept: INFUSION THERAPY | Age: 86
Discharge: HOME | End: 2021-10-13
Payer: MEDICARE

## 2021-10-13 ENCOUNTER — OFFICE VISIT (OUTPATIENT)
Dept: HEMATOLOGY | Age: 86
End: 2021-10-13
Payer: MEDICARE

## 2021-10-13 VITALS
HEART RATE: 61 BPM | WEIGHT: 142 LBS | HEIGHT: 63 IN | OXYGEN SATURATION: 95 % | DIASTOLIC BLOOD PRESSURE: 62 MMHG | SYSTOLIC BLOOD PRESSURE: 130 MMHG | BODY MASS INDEX: 25.16 KG/M2

## 2021-10-13 DIAGNOSIS — R53.81 PHYSICAL DECONDITIONING: ICD-10-CM

## 2021-10-13 DIAGNOSIS — D63.1 ANEMIA OF CHRONIC KIDNEY FAILURE, STAGE 4 (SEVERE) (HCC): ICD-10-CM

## 2021-10-13 DIAGNOSIS — D50.8 IRON DEFICIENCY ANEMIA SECONDARY TO INADEQUATE DIETARY IRON INTAKE: ICD-10-CM

## 2021-10-13 DIAGNOSIS — N18.4 ANEMIA OF CHRONIC KIDNEY FAILURE, STAGE 4 (SEVERE) (HCC): ICD-10-CM

## 2021-10-13 DIAGNOSIS — Z71.89 CARE PLAN DISCUSSED WITH PATIENT: ICD-10-CM

## 2021-10-13 DIAGNOSIS — D64.9 ANEMIA, UNSPECIFIED TYPE: ICD-10-CM

## 2021-10-13 DIAGNOSIS — R53.1 WEAKNESS GENERALIZED: ICD-10-CM

## 2021-10-13 DIAGNOSIS — D64.9 ENCOUNTER FOR ESA (ERYTHROPOIETIN STIMULATING AGENT) ANEMIA MANAGEMENT: ICD-10-CM

## 2021-10-13 DIAGNOSIS — N18.9 CHRONIC KIDNEY DISEASE, UNSPECIFIED CKD STAGE: ICD-10-CM

## 2021-10-13 DIAGNOSIS — C25.9 PANCREATIC CARCINOMA (HCC): Primary | ICD-10-CM

## 2021-10-13 DIAGNOSIS — Z79.899 ENCOUNTER FOR ESA (ERYTHROPOIETIN STIMULATING AGENT) ANEMIA MANAGEMENT: ICD-10-CM

## 2021-10-13 DIAGNOSIS — D64.9 ANEMIA, UNSPECIFIED TYPE: Primary | ICD-10-CM

## 2021-10-13 DIAGNOSIS — C25.7 MALIGNANT NEOPLASM OF OTHER PARTS OF PANCREAS (HCC): Primary | ICD-10-CM

## 2021-10-13 LAB
ALBUMIN SERPL-MCNC: 2.2 G/DL (ref 3.5–5.2)
ALP BLD-CCNC: 619 U/L (ref 35–104)
ALT SERPL-CCNC: 17 U/L (ref 9–52)
ANION GAP SERPL CALCULATED.3IONS-SCNC: 9 MMOL/L (ref 7–19)
AST SERPL-CCNC: 45 U/L (ref 14–36)
BASOPHILS ABSOLUTE: 0.04 K/UL (ref 0.01–0.08)
BASOPHILS RELATIVE PERCENT: 0.2 % (ref 0.1–1.2)
BILIRUB SERPL-MCNC: 0.9 MG/DL (ref 0.2–1.3)
BUN BLDV-MCNC: 76 MG/DL (ref 7–17)
CALCIUM SERPL-MCNC: 8.8 MG/DL (ref 8.4–10.2)
CHLORIDE BLD-SCNC: 107 MMOL/L (ref 98–111)
CO2: 24 MMOL/L (ref 22–29)
CREAT SERPL-MCNC: 2.3 MG/DL (ref 0.5–1)
EOSINOPHILS ABSOLUTE: 0.1 K/UL (ref 0.04–0.54)
EOSINOPHILS RELATIVE PERCENT: 0.4 % (ref 0.7–7)
FERRITIN: 1010 NG/ML (ref 11.1–264)
GFR NON-AFRICAN AMERICAN: 20
GLOBULIN: 2.5 G/DL
GLUCOSE BLD-MCNC: 127 MG/DL (ref 74–106)
HCT VFR BLD CALC: 20.8 % (ref 34.1–44.9)
HEMOGLOBIN: 6.8 G/DL (ref 11.2–15.7)
IRON SATURATION: 11 % (ref 14–50)
IRON: 22 UG/DL (ref 37–170)
LYMPHOCYTES ABSOLUTE: 0.53 K/UL (ref 1.18–3.74)
LYMPHOCYTES RELATIVE PERCENT: 2.2 % (ref 19.3–53.1)
MCH RBC QN AUTO: 32.2 PG (ref 25.6–32.2)
MCHC RBC AUTO-ENTMCNC: 32.7 G/DL (ref 32.3–35.5)
MCV RBC AUTO: 98.6 FL (ref 79.4–94.8)
MONOCYTES ABSOLUTE: 0.99 K/UL (ref 0.24–0.82)
MONOCYTES RELATIVE PERCENT: 4.2 % (ref 4.7–12.5)
NEUTROPHILS ABSOLUTE: 22.14 K/UL (ref 1.56–6.13)
NEUTROPHILS RELATIVE PERCENT: 93 % (ref 34–71.1)
PDW BLD-RTO: 18.9 % (ref 11.7–14.4)
PLATELET # BLD: 470 K/UL (ref 182–369)
PMV BLD AUTO: 10.4 FL (ref 7.4–10.4)
POTASSIUM SERPL-SCNC: 4.5 MMOL/L (ref 3.5–5.1)
RBC # BLD: 2.11 M/UL (ref 3.93–5.22)
SODIUM BLD-SCNC: 140 MMOL/L (ref 137–145)
TOTAL IRON BINDING CAPACITY: 205 UG/DL (ref 265–497)
TOTAL PROTEIN: 4.7 G/DL (ref 6.3–8.2)
WBC # BLD: 23.8 K/UL (ref 3.98–10.04)

## 2021-10-13 PROCEDURE — 85025 COMPLETE CBC W/AUTO DIFF WBC: CPT

## 2021-10-13 PROCEDURE — 2580000003 HC RX 258: Performed by: NURSE PRACTITIONER

## 2021-10-13 PROCEDURE — 96372 THER/PROPH/DIAG INJ SC/IM: CPT

## 2021-10-13 PROCEDURE — 96523 IRRIG DRUG DELIVERY DEVICE: CPT

## 2021-10-13 PROCEDURE — 1090F PRES/ABSN URINE INCON ASSESS: CPT | Performed by: NURSE PRACTITIONER

## 2021-10-13 PROCEDURE — 83550 IRON BINDING TEST: CPT

## 2021-10-13 PROCEDURE — 1111F DSCHRG MED/CURRENT MED MERGE: CPT | Performed by: NURSE PRACTITIONER

## 2021-10-13 PROCEDURE — 99212 OFFICE O/P EST SF 10 MIN: CPT

## 2021-10-13 PROCEDURE — G8417 CALC BMI ABV UP PARAM F/U: HCPCS | Performed by: NURSE PRACTITIONER

## 2021-10-13 PROCEDURE — 82728 ASSAY OF FERRITIN: CPT

## 2021-10-13 PROCEDURE — 4040F PNEUMOC VAC/ADMIN/RCVD: CPT | Performed by: NURSE PRACTITIONER

## 2021-10-13 PROCEDURE — G8427 DOCREV CUR MEDS BY ELIG CLIN: HCPCS | Performed by: NURSE PRACTITIONER

## 2021-10-13 PROCEDURE — 80053 COMPREHEN METABOLIC PANEL: CPT

## 2021-10-13 PROCEDURE — 1123F ACP DISCUSS/DSCN MKR DOCD: CPT | Performed by: NURSE PRACTITIONER

## 2021-10-13 PROCEDURE — 1036F TOBACCO NON-USER: CPT | Performed by: NURSE PRACTITIONER

## 2021-10-13 PROCEDURE — 6360000002 HC RX W HCPCS: Performed by: NURSE PRACTITIONER

## 2021-10-13 PROCEDURE — 83540 ASSAY OF IRON: CPT

## 2021-10-13 PROCEDURE — 99214 OFFICE O/P EST MOD 30 MIN: CPT | Performed by: NURSE PRACTITIONER

## 2021-10-13 PROCEDURE — G8484 FLU IMMUNIZE NO ADMIN: HCPCS | Performed by: NURSE PRACTITIONER

## 2021-10-13 RX ORDER — SODIUM CHLORIDE 9 MG/ML
20 INJECTION, SOLUTION INTRAVENOUS CONTINUOUS
Status: CANCELLED | OUTPATIENT
Start: 2021-10-15

## 2021-10-13 RX ORDER — ACETAMINOPHEN 325 MG/1
650 TABLET ORAL ONCE
Status: CANCELLED | OUTPATIENT
Start: 2021-10-15 | End: 2021-10-15

## 2021-10-13 RX ORDER — HEPARIN SODIUM (PORCINE) LOCK FLUSH IV SOLN 100 UNIT/ML 100 UNIT/ML
500 SOLUTION INTRAVENOUS PRN
Status: CANCELLED | OUTPATIENT
Start: 2021-10-13

## 2021-10-13 RX ORDER — EPINEPHRINE 1 MG/ML
0.3 INJECTION, SOLUTION, CONCENTRATE INTRAVENOUS PRN
Status: CANCELLED | OUTPATIENT
Start: 2021-10-15

## 2021-10-13 RX ORDER — SODIUM CHLORIDE 9 MG/ML
INJECTION, SOLUTION INTRAVENOUS CONTINUOUS
Status: CANCELLED | OUTPATIENT
Start: 2021-10-15

## 2021-10-13 RX ORDER — SODIUM CHLORIDE 0.9 % (FLUSH) 0.9 %
5-40 SYRINGE (ML) INJECTION PRN
Status: CANCELLED | OUTPATIENT
Start: 2021-10-15

## 2021-10-13 RX ORDER — SODIUM CHLORIDE 9 MG/ML
25 INJECTION, SOLUTION INTRAVENOUS PRN
Status: CANCELLED | OUTPATIENT
Start: 2021-10-13

## 2021-10-13 RX ORDER — METHYLPREDNISOLONE SODIUM SUCCINATE 125 MG/2ML
125 INJECTION, POWDER, LYOPHILIZED, FOR SOLUTION INTRAMUSCULAR; INTRAVENOUS ONCE
Status: CANCELLED | OUTPATIENT
Start: 2021-10-15 | End: 2021-10-15

## 2021-10-13 RX ORDER — SODIUM CHLORIDE 9 MG/ML
25 INJECTION, SOLUTION INTRAVENOUS PRN
Status: CANCELLED | OUTPATIENT
Start: 2021-10-15

## 2021-10-13 RX ORDER — HEPARIN SODIUM (PORCINE) LOCK FLUSH IV SOLN 100 UNIT/ML 100 UNIT/ML
500 SOLUTION INTRAVENOUS PRN
Status: DISCONTINUED | OUTPATIENT
Start: 2021-10-13 | End: 2021-10-14 | Stop reason: HOSPADM

## 2021-10-13 RX ORDER — SODIUM CHLORIDE 0.9 % (FLUSH) 0.9 %
5-40 SYRINGE (ML) INJECTION PRN
Status: CANCELLED | OUTPATIENT
Start: 2021-10-13

## 2021-10-13 RX ORDER — DIPHENHYDRAMINE HCL 25 MG
25 TABLET ORAL ONCE
Status: CANCELLED | OUTPATIENT
Start: 2021-10-15 | End: 2021-10-15

## 2021-10-13 RX ORDER — SODIUM CHLORIDE 0.9 % (FLUSH) 0.9 %
5-40 SYRINGE (ML) INJECTION PRN
Status: DISCONTINUED | OUTPATIENT
Start: 2021-10-13 | End: 2021-10-14 | Stop reason: HOSPADM

## 2021-10-13 RX ORDER — FUROSEMIDE 10 MG/ML
20 INJECTION INTRAMUSCULAR; INTRAVENOUS ONCE
Status: CANCELLED | OUTPATIENT
Start: 2021-10-15 | End: 2021-10-15

## 2021-10-13 RX ORDER — DIPHENHYDRAMINE HYDROCHLORIDE 50 MG/ML
50 INJECTION INTRAMUSCULAR; INTRAVENOUS ONCE
Status: CANCELLED | OUTPATIENT
Start: 2021-10-15 | End: 2021-10-15

## 2021-10-13 RX ADMIN — EPOETIN ALFA-EPBX 40000 UNITS: 40000 INJECTION, SOLUTION INTRAVENOUS; SUBCUTANEOUS at 13:47

## 2021-10-13 RX ADMIN — Medication 10 ML: at 13:48

## 2021-10-13 RX ADMIN — HEPARIN 500 UNITS: 100 SYRINGE at 13:47

## 2021-10-13 ASSESSMENT — ENCOUNTER SYMPTOMS
VOMITING: 0
EYE REDNESS: 0
BLOOD IN STOOL: 0
COUGH: 0
EYES NEGATIVE: 1
BACK PAIN: 0
EYE PAIN: 0
DIARRHEA: 0
SORE THROAT: 0
ABDOMINAL PAIN: 0
EYE DISCHARGE: 0
WHEEZING: 0

## 2021-10-13 NOTE — PROGRESS NOTES
Lab notified of critical BUN 76. Notified ALBERTO Soria, she is aware, states that pt will receive 1 unit PRBC's Friday, 10/15 for Hgb 6.8.

## 2021-10-13 NOTE — PROGRESS NOTES
Progress Note      Pt Name: Charla Madrid  YOB: 1934  MRN: [id number]    Date of evaluation: 10/13/2021  History Obtained From:  patient, electronic medical record    CHIEF COMPLAINT:    Chief Complaint   Patient presents with    Follow-up     Pancreatic carcinoma ([address]    Anemia    Fatigue    Treatment     Retacrit     HISTORY OF PRESENT ILLNESS:    Charla Madrid is a 80 y.o.  female who is currently followed for pancreatic adenocarcinoma and multifactoral anemia to include iron deficiency and chronic kidney disease stage 3B. She received palliative chemotherapy with dose reduced Gemzar and Abraxane for total of 4 cycles, last received treatment on 5/18/2021. Unfortunately she had poor tolerance to treatment with GI symptoms, decline performance status and with cycle #4 developed worsening dyspnea with hypoxemia, requiring hospitalization to [facility] from 5/18/2021-5/26/2020. During hospitalization she was diagnosed with decompensated systolic heart failure with a 2D echocardiogram on 5/19/2021 reporting left ventricular EF estimated at 15% and [place] on 5/21/2021 reported a left ventricular systolic function of 44%, with no known prior history of heart failure. Unfortunately she is currently not a candidate for further chemotherapy for her pancreatic adenocarcinoma. She is currently receiving erythropoietin stimulating agent with Procrit 40,000 units every 4 weeks for hemoglobin <11, she last received dose on 08/26/2021 (she missed September's dosing due to hospitalization). Pawel Morgan returns today in scheduled follow-up for evaluation, lab monitoring, consideration to receive Retacrit for her anemia of chronic kidney disease and further treatment recommendations. She is currently a resident at [facility] and rehabilitation [place] after she experienced a right hip fracture.   She requires transportation by EMS on a stretcher today and she is also accompanied by her daughter. Maik Adamson indicates that her pain is overall controlled, she continues to have significant weakness. She reports no known significant diarrhea. Again today we discussed she is currently not a candidate to move forward with any palliative treatment due to her poor performance status, recent fracture and multiple comorbidities. CBC today shows a significant decline with a hemoglobin of 6.8, hematocrit 20.8 and MCV 98.6. Her platelet count is slightly elevated at 470,000, suspect this is reactive process from her recent hip fracture. She denies any bleeding to include melena, epistaxis, hematuria or hematochezia. Cristela Lam also denies any chest pain although has noted some increase in her chronic shortness of air that is stable. ONCOLOGIC/HEMATOLOGIC HISTORY:   Diagnosis:   Anemia of chronic kidney disease   Chronic kidney disease stage lll  Pancreatic adenocarcinoma  Chronic anticoagulation due to prior DVTs    Treatment summary:  Ferrous sulfate 325 mg daily   11/26/2018 - Procrit 20,000 units for chronic kidney disease stage IV. Procrit to be given every 2 weeks ×4 and then monthly, dose to be titrated appropriately. Hold dose for hemoglobin >11   Currently receiving Retacrit 40,000 units every 4 weeks  2/8/2021 palliative chemotherapy on 2/8/2021 with Gemzar 750 mg/m² and Abraxane 90 mg/m² every 2 weeks, this is a dose reduction by 25%. Last dose given on 5/18/2021 (cycle 4-day #1), held due to severely declined performance status. Tumor monitoring:  · 12/2/2020-CA 19-9 of 133  · 12/23/2020-CA 19-9 of 217  · 1/20/2021-CA 19-9 of 236  · 3/23/2021- CA 19-9 of 108  · 5/18/2021-CA 199 of 38  · 7/15/2021 CA 19-9 of 73  · 08/26/2021 CA 19-9 of 144    CANCER HISTORY  Flakita Arevalo was seen by Dr. Allen Durham on 12/2/2020 as an inpatient consultation at [facility]) of [facility] for findings of a pancreatic mass and bile duct obstruction.   She presented to the ER department with for palliative chemotherapy in approximately 4 weeks. 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 · 12/7/2020 ERCP at [address] [place] [address] Dr by Dr. Navarro Matute revealed a single severe biliary stricture in the lower third of the main bile duct with severe upstream biliary dilation, stricture was malignant appearing. Biliary sphincterectomy was performed. 1 partially covered metal stent was placed into the common bile duct. · 2/8/2021-initiated palliative chemotherapy with Gemzar 750 mg/m² and Abraxane 90 mg/m² every 2 weeks, this is a dose reduction by 25%  · 3/23/2021- CA 199 108, improved compared to pretreatment value of 236 on 1/20/2021. · 4/13/2021 CT Abdomen/Pelvis with contrast documented a poorly defined complex mass in the head of the pancreas. It appears to have decreased in size when remeasured at the same level and in same dimension as in the previous study (3 cm x 2.5 cm, compared to 3.7 cm x 3.3 cm). Persistent moderate dilatation of the pancreatic duct. There are 2 additional small cystic nodules in the body of the pancreas measuring 11 mm and 9 mm. A biliary stent in place and decompression of the intrahepatic biliary ducts since the previous study. 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 The diverticulosis of the distal colon with no evidence for diverticulitis. · 5/4/2021- dose reduction by 25% due to severe fatigue and nausea, starting with cycle 4 will receive Gemzar 600 mg/m² and Abraxane 75 mg/m². HEMATOLOGY HISTORY:  Jay Llanos was seen in initial hematology consultation on 7/20/2018 for further evaluation and treatment recommendations for anemia. Jay Llanos was referred from Dr. Iliana Cano. Jay Llanos presented with no significant complaints other than chronic fatigue and constipation.  She had been taking ferrous sulfate 325 mg daily under the direction of Dr. Waqas Vallecillo. Shanika Baumann reported significant constipation and some GI upset with the oral iron. She denied any bleeding tendencies to include hematuria, melena, hematochezia and epistaxis. Shanika Baumann had a colonoscopy on 4/26/2016 by Dr. Elmer Kimble for further evaluation of iron deficiency. The only abnormal finding was diverticulosis. Shanika Baumann is routinely followed by Dr. Citlaly Gonzales for her chronic kidney disease stage IV. CMP on 6/11/2018 documented a creatinine of 2.2 and GFR 21. CBC review from 2/2/2018- 6/11/2018 documented hemoglobin ranging from 9.4- 10.1, RBC 2.91- 3.22, MCV 94- 100 with a normal WBC and platelet count  CBC at initial consultation on 7/20/2018 documented a WBC of 7.33, ANC 4.74, RBC 3.04, hemoglobin 10.3, .6 and a platelet count of 617,487. Serology studies on 7/20/2018:  Creatinine 1.98   GFR 23   Calcium 10.3   IgG 700, IgA 180, and IgM 52   M spike not observed   Immunofixation: No monoclonality detected. Iron 74   Iron saturation 27%   TIBC 278   Vitamin B12 437   Folate 15.7   Ferritin 299   Reticulocyte count 1.5%   Erythropoietin 11.6      Beta-2 microglobulin 6.2     Occult stool positive 1 of 3 samples on 7/25/2018. Colonoscopy on 10/4/2018 by Dr. Elmer Kimble was documented as removal of 2 polyps with benign pathology. No evidence of bleeding. Serology studies on 10/15/2018:  Iron 76   TIBC 287   Iron saturation 26%   Ferritin 321   Hemoglobin 10.1   Creatinine 2.47   GFR 17    11/26/2018 - Initiated Procrit 20,000 units for chronic kidney disease stage IV, to be given every 2 weeks ×4 and then monthly, dose to be titrated appropriately. Hemoglobin 9.6. All has anemia of chronic disease to include chronic kidney disease stage IV.  She will began receiving growth factor support and will need to maintain a ferritin level greater than 200 and an iron saturation of 25% for the Procrit to be beneficial. Hold Procrit dose for hemoglobin >11. Indications/rationale for Procrit was discussed with Enrique Marr. Risks, benefits and expectations were outlined. Risks including, but not limited to hypertension, stroke, MI, death were discussed and acknowledged by Enrique Marr. Serology studies on 3/8/2019:  Creatinine 1.7/GFR 30.4   Iron 80   Iron saturation 26.5%   TIBC 302   Ferritin 144   Vitamin B12 501   Folate 13.9    Serology studies on 3/5/2020:  · Creatinine 1.3/GFR 41.4  · Iron 84  · TIBC 390  · Iron saturation 21.5%  · Ferritin 342    Iron substrates on 6/25/2020  · Ferritin 311  · Iron 76  · Iron saturation 23%  · TIBC 326  · Creatinine 1.3/GFR 39    Labs on 12/2/2020 and 12/3/2020:  · Iron 35  · TIBC 213  · Iron saturation 16%  · Vitamin B12 483  · Folate 10.6  · Ferritin 480    Labs on 3/23/2021  · Iron 54  · TIBC 366  · Iron saturation 15  · Ferritin 530  · Reticulocytes 2.1    Labs on 4/6/2021  · WBC 14.40  · RBC 2.46  · HGB 8.6  · HCT 26.4  · .3  · MCH 35  · MCHC 32.6  · ANC 12.03  · ,000  · Iron 43   · TIBC 226  · Iron saturation 19%   · Ferritin 1088.0  · B-12= 781  · GFR 39  · BUN 21  · Creatinine 1.3  · Phosphorus 2.4  · Magnesium 1.5      Age-appropriate health screening:  · Colonoscopy on 10/4/2018 by Dr. Kavya Gutierrez was documented as removal of 2 polyps with benign pathology. No evidence of bleeding. · 9/20/2019 Bilateral mammogram at [facility] documented no mammographic evidence of malignancy.   · No bone mineral density on file     Past Medical History:    Past Medical History:   Diagnosis Date    Abdominal aortic [facility] (AAA) [facility] [address]     Anemia     Arthritis     Cancer ([facility] [address] 12/01/2020    pancreatic    CHF (congestive heart failure) (HCC)     Chronic kidney disease     DVT of lower extremity (deep venous thrombosis) (HCC)     twice 2010 and 2017    Hyperlipidemia     Hypertension     Kidney stones     Osteoarthritis     Palliative care patient 12/02/2020    Thyroid disease     Thyroid disorder Past Surgical History:    Past Surgical History:   Procedure Laterality Date    COLONOSCOPY  2016    Dr Mac Agent- diverticulosis    COLONOSCOPY  10/2018    Bubba Agent:  AP    ENDOSCOPIC ULTRASOUND (LOWER) N/A 12/03/2020    Dr JONNY Grijalva/unsuccessful biliary cannulation despite attempts at 2-wire cannulation and proph pancreatic stenting with cannulation around pancreatic stenting-Positive for high-grade adenocarcinoma, pancreatic head    ERCP N/A 12/03/2020    Dr JONNY Grijalva/unsuccessful biliary cannulation despite attempts at 2-wire cannulation and proph pancreatic stenting with cannulation around pancreatic stenting-Positive for high-grade adenocarcinoma, pancreatic head    ERCP  12/07/2020    Dr Ousmane Kelly/sphincterotomy, placement of a 10 x 60 partially covered biliary stent    HIP SURGERY Right 10/01/2021    RIGHT HIP HEMIARTHROPLASTY performed by Hernán Jiménez MD at [address] Right 02/05/2021    SINGLE LUMEN PORT PLACEMENT WITH ULTRASOUND AND FLUORO performed by Tremaine Ahmadi MD at [facility] TOE AMPUTATION Bilateral     pinky toes removed    TOTAL KNEE ARTHROPLASTY Right 01/03/2020    RIGHT TOTAL KNEE REPLACEMENT performed by Fernando Dong MD at [address]  10/2018    Dr Mac Agent- Chronic esophagogastritis with intestinal metaplasia. No evidence of dysplasia.        Current Medications:    Current Outpatient Medications   Medication Sig Dispense Refill    folic acid (FOLVITE) 1 MG tablet Take 1 tablet by mouth daily 30 tablet 0    lisinopril (PRINIVIL;ZESTRIL) 10 MG tablet Take 10 mg by mouth daily      meclizine (ANTIVERT) 25 MG tablet Take 25 mg by mouth as needed      aspirin 81 MG EC tablet Take 1 tablet by mouth daily 30 tablet 5    carvedilol (COREG) 3.125 MG tablet Take 1 tablet by mouth 2 times daily (with meals) (Patient taking differently: Take 3.125 mg by mouth daily Hold if sbp <100 or dbp <60) 60 tablet 5    midodrine (PROAMATINE) 10 MG tablet Take 1 tablet by mouth 3 times daily (with meals) 90 tablet 5    nitroGLYCERIN (NITROSTAT) 0.4 MG SL tablet up to max of 3 total doses.  If no relief after 1 dose, call 911. 25 tablet 0    sodium bicarbonate 325 MG tablet Take 325 mg by mouth 2 times daily      ondansetron (ZOFRAN ODT) 4 MG disintegrating tablet Take 2 tablets by mouth every 8 hours as needed for Nausea or Vomiting 60 tablet 3    apixaban (ELIQUIS) 2.5 MG TABS tablet Take 2.5 mg by mouth 2 times daily      docusate sodium (COLACE) 100 MG capsule Take 1 capsule by mouth 2 times daily 60 capsule 1    allopurinol (ZYLOPRIM) 100 MG tablet Take 100 mg by mouth daily Taken 1 time a day now      levothyroxine (SYNTHROID) 112 MCG tablet Take 112 mcg by mouth Daily      simvastatin (ZOCOR) 20 MG tablet Take 20 mg by mouth nightly      furosemide (LASIX) 20 MG tablet Take 1 tablet by mouth daily 30 tablet 0    [START ON 10/25/2021] epoetin rachel-epbx (RETACRIT) 3000 UNIT/ML SOLN injection Inject 1 mL into the skin three times a week 12 mL 0    calcitRIOL (ROCALTROL) 0.25 MCG capsule Take 1 capsule by mouth daily 30 capsule 0    cefdinir (OMNICEF) 300 MG capsule Take 1 capsule by mouth 2 times daily for 7 days 14 capsule 0    Melatonin 5 MG CAPS Take 1 capsule by mouth nightly      acetaminophen (TYLENOL) 325 MG tablet Take 650 mg by mouth every 6 hours as needed for Pain      pantoprazole (PROTONIX) 40 MG tablet Take 1 tablet by mouth every morning (before breakfast) (Patient not taking: Reported on 10/13/2021) 30 tablet 0    polyethylene glycol (GLYCOLAX) 17 GM/SCOOP powder Take 17 g by mouth daily (Patient not taking: Reported on 10/13/2021)      pramipexole (MIRAPEX) 0.125 MG tablet Take by mouth daily as needed Restless legs      lidocaine-prilocaine (EMLA) 2.5-2.5 % cream APPLY TO PORT AREA AND COVERWITH PLASTIC WRAP ONE HOUR PRIOR TO TREATMENT (Patient not taking: Reported on 9/20/2021) 30 g 1     No current facility-administered medications for this visit. Allergies: Allergies   Allergen Reactions    Penicillins Rash     Childhood        Social History:    Social History     Tobacco Use    Smoking status: Never Smoker    Smokeless tobacco: Never Used   Vaping Use    Vaping Use: Never used   Substance Use Topics    Alcohol use: No    Drug use: No       Family History:   Family History   Problem Relation Age of Onset    Colon Cancer Brother     Cancer Mother         Breast Cancer    Heart Attack Father     Colon Polyps Neg Hx     Esophageal Cancer Neg Hx     Liver Cancer Neg Hx     Liver Disease Neg Hx     Rectal Cancer Neg Hx     Stomach Cancer Neg Hx        Vitals:  Vitals:    10/13/21 1315   BP: 130/62   Pulse: 61   SpO2: 95%   Weight: 142 lb (64.4 kg)   Height: 5' 3\" (1.6 m)        Subjective   REVIEW OF SYSTEMS:   Review of Systems   Constitutional: Positive for activity change and fatigue. Negative for chills, diaphoresis and fever. HENT: Negative. Negative for congestion, ear pain, hearing loss, nosebleeds, sore throat and tinnitus. Eyes: Negative. Negative for pain, discharge and redness. Respiratory: Positive for shortness of breath. Negative for cough and wheezing. Cardiovascular: Negative. Negative for chest pain, palpitations and leg swelling. Gastrointestinal: Negative. Negative for abdominal pain, blood in stool, constipation, diarrhea, nausea and vomiting. Endocrine: Negative for polydipsia. Genitourinary: Negative for dysuria, flank pain, frequency, hematuria and urgency. Musculoskeletal: Positive for arthralgias and gait problem (Right hip fracture). Negative for back pain, myalgias and neck pain. Skin: Negative. Negative for rash. Neurological: Positive for weakness. Negative for dizziness, tremors, seizures and headaches. Hematological: Does not bruise/bleed easily. Psychiatric/Behavioral: Negative. The patient is not nervous/anxious. Objective   PHYSICAL EXAM:  Physical Exam  Vitals reviewed. Constitutional:       General: She is not in acute distress. Appearance: She is well-developed. She is ill-appearing. She is not diaphoretic. HENT:      Head: Normocephalic and atraumatic. Mouth/Throat:      Pharynx: Uvula midline. Tonsils: No tonsillar exudate. Eyes:      General: Lids are normal. No scleral icterus. Right eye: No discharge. Left eye: No discharge. Conjunctiva/sclera: Conjunctivae normal.      Pupils: Pupils are equal, round, and reactive to light. Neck:      Thyroid: No thyroid mass or thyromegaly. Vascular: No JVD. Trachea: Trachea normal. No tracheal deviation. Cardiovascular:      Rate and Rhythm: Normal rate and regular rhythm. Heart sounds: Normal heart sounds. No murmur heard. No friction rub. No gallop. Pulmonary:      Effort: Pulmonary effort is normal. No respiratory distress. Breath sounds: Decreased breath sounds present. No wheezing or rales. Chest:      Chest wall: No tenderness. Abdominal:      General: Bowel sounds are normal. There is no distension. Palpations: Abdomen is soft. There is no mass. Tenderness: There is no abdominal tenderness. There is no guarding or rebound. Hernia: No hernia is present. Musculoskeletal:         General: No tenderness or deformity. Cervical back: Normal range of motion and neck supple. Comments: Range of motion within normal limits x4 extremities   Skin:     General: Skin is warm. Coloration: Skin is not pale. Findings: No erythema or rash. Neurological:      Mental Status: She is alert and oriented to person, place, and time. Cranial Nerves: No cranial nerve deficit. Motor: Weakness present. Coordination: Coordination normal.      Gait: Gait abnormal (Currently on ambulance stretcher).    Psychiatric:         Behavior: Behavior normal.         Thought Content: Thought content normal.         Labs:    BC 23.80, ANC 22.14, hemoglobin 6.8, hematocrit 20.8 and a platelet count of 492,902  Lab Results   Component Value Date     144 08/26/2021       ASSESSMENT/PLAN:      1. Locally advanced pancreatic adenocarcinoma, has received palliative chemotherapy with dose reduced Gemzar and Abraxane for total of 4 cycles, last received treatment on 5/18/2021. CT scan of the abdomen and pelvis on 4/13/2021 and tumor markers suggested a positive response to treatment. Unfortunately she is currently not a candidate for further chemotherapy for her pancreatic adenocarcinoma due to her declining performance status. Her tumor marker has began increasing again. Again today we discussed that currently she is still not a candidate to move forward with any palliative treatment due to her poor performance status and multiple comorbidities. She is currently being followed by palliative care, I reviewed the progress note from 10/18/2021 completed by ALBERTO Ferrera and current plan is to return home with family support after she regains some strength and is able to ambulate. She is a DNR.    -Continued with supportive care, palliative care is following  - Return 4 weeks if chooses not to move forward with hospice  - CMP today  -We will repeat CA 19-9 upon return    2. Management of chemotherapy side effects:  -Nausea-improved, reported occasional use of antiemetic  -Fatigue-declined due to recent hip fracture.   -Weight loss with poor oral intake, l weight stable over the past 4 weeks      3. Extra hepatic/intrahepatic biliary dilation, status post ERCP with metal stent placement on 12/7/2020 at [facility]. Liver enzymes beginning to increase, will continue to monitor.    Remains asymptomatic and denies any abdominal pain    -CMP today  -Virtual visit with Dr. Belle Hernandez on 2/16/2021 and documented that a stent adjustment may be needed if LFTs continue to increase, follow-up with Dr. Janine Maxwell if between visits began having abdominal symptoms    4. Anemia of chronic kidney failure, stage 3B. Currently receiving erythropoietin stimulating agent with Procrit 40,000 units every 4 weeks for hemoglobin <11, she last received dose on 08/26/2021 (she missed September's dosing due to hospitalization). Recommendation was for ferrous sulfate 325 mg daily, and review of her medication list at [facility] she is currently not taking ferrous sulfate. CBC today shows a significant decline with a hemoglobin of 6.8, hematocrit 20.8 and MCV 98.6. Her platelet count is slightly elevated at 470,000, suspect this is reactive process from her recent hip fracture. She denies any bleeding to include melena, epistaxis, hematuria or hematochezia. -Retacrit 40,000 units will be given today for hemoglobin of 6.8 in the setting of anemia and chronic kidney disease  -Will repeat iron substrates today  -Arrangements will be made to transfuse 1 unit of PRBCs  -CBC weekly to begin on Monday with results faxed to clinic for review    5. Hypothyroidism presently being managed by Dr. Dasha Calzada and is taking Synthroid 112 µg daily. TSH 4.710 on 5/18/2021  -Follow-up with Dr. Dasha Calzada for monitoring of TSH and Synthroid dosing    I discussed all of the above findings included in the assessment and plan with the patient and the patient is in agreement to move forward with current recommendations/treatment. I have addressed all of their questions and concerns that were verbalized. FOLLOW UP:  1. Follow-up appointment given for 4 weeks, sooner if needed  2. Continue to follow with other medical providers as recommended    EMR Dragon/Transcription disclaimer:   Much of this encounter note is an electronic transcription/translation of spoken language to printed text.  The electronic translation of spoken language may permit erroneous, or at times, nonsensical words or phrases to be inadvertently transcribed; although attempts have made to review the note for such errors, some may still exist.  Please excuse any unrecognized transcription errors and contact us if the air is unintelligible or needs documented correction. Also, portions of this note have been copied forward, however, changed to reflect the most current clinical status of this patient.     Electronically signed by ALBERTO Mcguire on 10/24/2021 at 6:02 PM

## 2021-10-15 ENCOUNTER — HOSPITAL ENCOUNTER (OUTPATIENT)
Dept: INFUSION THERAPY | Age: 86
Setting detail: INFUSION SERIES
Discharge: HOME | DRG: 871 | End: 2021-10-15
Payer: MEDICARE

## 2021-10-15 VITALS
HEART RATE: 56 BPM | RESPIRATION RATE: 16 BRPM | SYSTOLIC BLOOD PRESSURE: 113 MMHG | OXYGEN SATURATION: 93 % | TEMPERATURE: 97.6 F | DIASTOLIC BLOOD PRESSURE: 61 MMHG

## 2021-10-15 DIAGNOSIS — D62 ACUTE BLOOD LOSS ANEMIA: Primary | ICD-10-CM

## 2021-10-15 DIAGNOSIS — C25.7 MALIGNANT NEOPLASM OF OTHER PARTS OF PANCREAS (HCC): ICD-10-CM

## 2021-10-15 LAB
ABO/RH: NORMAL
ANTIBODY SCREEN: NORMAL
BLOOD BANK DISPENSE STATUS: NORMAL
BLOOD BANK PRODUCT CODE: NORMAL
BPU ID: NORMAL
DESCRIPTION BLOOD BANK: NORMAL

## 2021-10-15 PROCEDURE — 86900 BLOOD TYPING SEROLOGIC ABO: CPT

## 2021-10-15 PROCEDURE — 36430 TRANSFUSION BLD/BLD COMPNT: CPT

## 2021-10-15 PROCEDURE — 6360000002 HC RX W HCPCS: Performed by: NURSE PRACTITIONER

## 2021-10-15 PROCEDURE — 2580000003 HC RX 258: Performed by: NURSE PRACTITIONER

## 2021-10-15 PROCEDURE — 86901 BLOOD TYPING SEROLOGIC RH(D): CPT

## 2021-10-15 PROCEDURE — 6370000000 HC RX 637 (ALT 250 FOR IP): Performed by: NURSE PRACTITIONER

## 2021-10-15 PROCEDURE — P9016 RBC LEUKOCYTES REDUCED: HCPCS

## 2021-10-15 PROCEDURE — 86923 COMPATIBILITY TEST ELECTRIC: CPT

## 2021-10-15 PROCEDURE — 86850 RBC ANTIBODY SCREEN: CPT

## 2021-10-15 RX ORDER — SODIUM CHLORIDE 0.9 % (FLUSH) 0.9 %
5-40 SYRINGE (ML) INJECTION PRN
Status: CANCELLED | OUTPATIENT
Start: 2021-10-15

## 2021-10-15 RX ORDER — HEPARIN SODIUM (PORCINE) LOCK FLUSH IV SOLN 100 UNIT/ML 100 UNIT/ML
500 SOLUTION INTRAVENOUS ONCE
Status: DISCONTINUED | OUTPATIENT
Start: 2021-10-15 | End: 2021-10-17 | Stop reason: HOSPADM

## 2021-10-15 RX ORDER — ACETAMINOPHEN 325 MG/1
650 TABLET ORAL ONCE
Status: COMPLETED | OUTPATIENT
Start: 2021-10-15 | End: 2021-10-15

## 2021-10-15 RX ORDER — METHYLPREDNISOLONE SODIUM SUCCINATE 125 MG/2ML
125 INJECTION, POWDER, LYOPHILIZED, FOR SOLUTION INTRAMUSCULAR; INTRAVENOUS ONCE
Status: CANCELLED | OUTPATIENT
Start: 2021-10-15 | End: 2021-10-15

## 2021-10-15 RX ORDER — SODIUM CHLORIDE 9 MG/ML
20 INJECTION, SOLUTION INTRAVENOUS CONTINUOUS
Status: CANCELLED | OUTPATIENT
Start: 2021-10-15

## 2021-10-15 RX ORDER — DIPHENHYDRAMINE HCL 25 MG
25 TABLET ORAL ONCE
Status: COMPLETED | OUTPATIENT
Start: 2021-10-15 | End: 2021-10-15

## 2021-10-15 RX ORDER — DIPHENHYDRAMINE HYDROCHLORIDE 50 MG/ML
50 INJECTION INTRAMUSCULAR; INTRAVENOUS ONCE
Status: CANCELLED | OUTPATIENT
Start: 2021-10-15 | End: 2021-10-15

## 2021-10-15 RX ORDER — FUROSEMIDE 10 MG/ML
20 INJECTION INTRAMUSCULAR; INTRAVENOUS ONCE
Status: COMPLETED | OUTPATIENT
Start: 2021-10-15 | End: 2021-10-15

## 2021-10-15 RX ORDER — EPINEPHRINE 1 MG/ML
0.3 INJECTION, SOLUTION, CONCENTRATE INTRAVENOUS PRN
Status: CANCELLED | OUTPATIENT
Start: 2021-10-15

## 2021-10-15 RX ORDER — SODIUM CHLORIDE 0.9 % (FLUSH) 0.9 %
5-40 SYRINGE (ML) INJECTION PRN
Status: DISCONTINUED | OUTPATIENT
Start: 2021-10-15 | End: 2021-10-16 | Stop reason: HOSPADM

## 2021-10-15 RX ORDER — SODIUM CHLORIDE 9 MG/ML
20 INJECTION, SOLUTION INTRAVENOUS CONTINUOUS
Status: DISCONTINUED | OUTPATIENT
Start: 2021-10-15 | End: 2021-10-17 | Stop reason: HOSPADM

## 2021-10-15 RX ORDER — ACETAMINOPHEN 325 MG/1
650 TABLET ORAL ONCE
Status: CANCELLED | OUTPATIENT
Start: 2021-10-15 | End: 2021-10-15

## 2021-10-15 RX ORDER — SODIUM CHLORIDE 9 MG/ML
INJECTION, SOLUTION INTRAVENOUS CONTINUOUS
Status: CANCELLED | OUTPATIENT
Start: 2021-10-15

## 2021-10-15 RX ORDER — FUROSEMIDE 10 MG/ML
20 INJECTION INTRAMUSCULAR; INTRAVENOUS ONCE
Status: CANCELLED | OUTPATIENT
Start: 2021-10-15 | End: 2021-10-15

## 2021-10-15 RX ORDER — DIPHENHYDRAMINE HCL 25 MG
25 TABLET ORAL ONCE
Status: CANCELLED | OUTPATIENT
Start: 2021-10-15 | End: 2021-10-15

## 2021-10-15 RX ORDER — SODIUM CHLORIDE 9 MG/ML
25 INJECTION, SOLUTION INTRAVENOUS PRN
Status: CANCELLED | OUTPATIENT
Start: 2021-10-15

## 2021-10-15 RX ADMIN — FUROSEMIDE 20 MG: 10 INJECTION, SOLUTION INTRAMUSCULAR; INTRAVENOUS at 10:56

## 2021-10-15 RX ADMIN — HYDROCORTISONE SODIUM SUCCINATE 100 MG: 100 INJECTION, POWDER, FOR SOLUTION INTRAMUSCULAR; INTRAVENOUS at 08:39

## 2021-10-15 RX ADMIN — SODIUM CHLORIDE 20 ML/HR: 9 INJECTION, SOLUTION INTRAVENOUS at 08:38

## 2021-10-15 RX ADMIN — ACETAMINOPHEN 650 MG: 325 TABLET ORAL at 08:39

## 2021-10-15 RX ADMIN — DIPHENHYDRAMINE HYDROCHLORIDE 25 MG: 25 TABLET ORAL at 08:39

## 2021-10-15 ASSESSMENT — PAIN SCALES - GENERAL: PAINLEVEL_OUTOF10: 0

## 2021-10-15 NOTE — PROGRESS NOTES
Patient arrived via ambulance [facility] on stretcher. Patient has C collar on ambulance stated, \" [facility]\". Patient unable to sit in chair per EMS due to arm of chair doesn't raise, per [facility] she has recent  Fx Femur. Patient unable to sign consent for blood. Verbal consent obtained via 2 RN's. Patient remains on stretcher. 0 family or PCA with patient. 4+ Pitting edema to BLE abduction pillow in place between legs.

## 2021-10-18 ENCOUNTER — APPOINTMENT (OUTPATIENT)
Dept: GENERAL RADIOLOGY | Age: 86
DRG: 871 | End: 2021-10-18
Payer: MEDICARE

## 2021-10-18 ENCOUNTER — APPOINTMENT (OUTPATIENT)
Dept: CT IMAGING | Age: 86
DRG: 871 | End: 2021-10-18
Payer: MEDICARE

## 2021-10-18 ENCOUNTER — HOSPITAL ENCOUNTER (INPATIENT)
Age: 86
LOS: 4 days | Discharge: HOME | DRG: 871 | End: 2021-10-22
Attending: HOSPITALIST | Admitting: HOSPITALIST
Payer: MEDICARE

## 2021-10-18 DIAGNOSIS — R77.8 ELEVATED TROPONIN: ICD-10-CM

## 2021-10-18 DIAGNOSIS — E87.5 HYPERKALEMIA: ICD-10-CM

## 2021-10-18 DIAGNOSIS — N18.9 ACUTE RENAL FAILURE SUPERIMPOSED ON CHRONIC KIDNEY DISEASE, UNSPECIFIED CKD STAGE, UNSPECIFIED ACUTE RENAL FAILURE TYPE (HCC): Primary | ICD-10-CM

## 2021-10-18 DIAGNOSIS — R79.89 ELEVATED BRAIN NATRIURETIC PEPTIDE (BNP) LEVEL: ICD-10-CM

## 2021-10-18 DIAGNOSIS — J18.9 PNEUMONIA OF LEFT LOWER LOBE DUE TO INFECTIOUS ORGANISM: ICD-10-CM

## 2021-10-18 DIAGNOSIS — G93.40 ENCEPHALOPATHY ACUTE: ICD-10-CM

## 2021-10-18 DIAGNOSIS — N17.9 ACUTE RENAL FAILURE SUPERIMPOSED ON CHRONIC KIDNEY DISEASE, UNSPECIFIED CKD STAGE, UNSPECIFIED ACUTE RENAL FAILURE TYPE (HCC): Primary | ICD-10-CM

## 2021-10-18 PROBLEM — G93.41 ACUTE METABOLIC ENCEPHALOPATHY: Status: ACTIVE | Noted: 2021-10-18

## 2021-10-18 LAB
ALBUMIN SERPL-MCNC: 2.2 G/DL (ref 3.5–5.2)
ALP BLD-CCNC: 892 U/L (ref 35–104)
ALT SERPL-CCNC: 12 U/L (ref 5–33)
ANION GAP SERPL CALCULATED.3IONS-SCNC: 14 MMOL/L (ref 7–19)
ANISOCYTOSIS: ABNORMAL
AST SERPL-CCNC: 30 U/L (ref 5–32)
BACTERIA: NEGATIVE /HPF
BASOPHILS ABSOLUTE: 0.1 K/UL (ref 0–0.2)
BASOPHILS RELATIVE PERCENT: 0.2 % (ref 0–1)
BILIRUB SERPL-MCNC: 0.5 MG/DL (ref 0.2–1.2)
BILIRUBIN URINE: NEGATIVE
BLOOD, URINE: NEGATIVE
BUN BLDV-MCNC: 102 MG/DL (ref 8–23)
CALCIUM SERPL-MCNC: 9.6 MG/DL (ref 8.8–10.2)
CHLORIDE BLD-SCNC: 106 MMOL/L (ref 98–111)
CLARITY: CLEAR
CO2: 23 MMOL/L (ref 22–29)
COLOR: YELLOW
CREAT SERPL-MCNC: 3.4 MG/DL (ref 0.5–0.9)
CRYSTALS, UA: ABNORMAL /HPF
EOSINOPHIL,URINE: NORMAL
EOSINOPHILS ABSOLUTE: 0.1 K/UL (ref 0–0.6)
EOSINOPHILS RELATIVE PERCENT: 0.4 % (ref 0–5)
EPITHELIAL CELLS, UA: 1 /HPF (ref 0–5)
GFR AFRICAN AMERICAN: 15
GFR NON-AFRICAN AMERICAN: 13
GLUCOSE BLD-MCNC: 156 MG/DL (ref 74–109)
GLUCOSE URINE: NEGATIVE MG/DL
HCT VFR BLD CALC: 31.8 % (ref 37–47)
HEMOGLOBIN: 9.9 G/DL (ref 12–16)
HYALINE CASTS: 3 /HPF (ref 0–8)
HYPOCHROMIA: ABNORMAL
IMMATURE GRANULOCYTES #: 0.4 K/UL
KETONES, URINE: NEGATIVE MG/DL
LACTIC ACID: 2.8 MMOL/L (ref 0.5–1.9)
LEUKOCYTE ESTERASE, URINE: ABNORMAL
LYMPHOCYTES ABSOLUTE: 0.6 K/UL (ref 1.1–4.5)
LYMPHOCYTES RELATIVE PERCENT: 2.8 % (ref 20–40)
MACROCYTES: ABNORMAL
MCH RBC QN AUTO: 31.4 PG (ref 27–31)
MCHC RBC AUTO-ENTMCNC: 31.1 G/DL (ref 33–37)
MCV RBC AUTO: 101 FL (ref 81–99)
MONOCYTES ABSOLUTE: 1 K/UL (ref 0–0.9)
MONOCYTES RELATIVE PERCENT: 4.4 % (ref 0–10)
NEUTROPHILS ABSOLUTE: 21 K/UL (ref 1.5–7.5)
NEUTROPHILS RELATIVE PERCENT: 90.6 % (ref 50–65)
NITRITE, URINE: NEGATIVE
OSMOLALITY URINE: 406 MOSM/KG (ref 250–1200)
PDW BLD-RTO: 22.1 % (ref 11.5–14.5)
PH UA: 8 (ref 5–8)
PLATELET # BLD: 437 K/UL (ref 130–400)
PLATELET SLIDE REVIEW: ABNORMAL
PMV BLD AUTO: 11.8 FL (ref 9.4–12.3)
POLYCHROMASIA: ABNORMAL
POTASSIUM REFLEX MAGNESIUM: 5.1 MMOL/L (ref 3.5–5)
PRO-BNP: 7050 PG/ML (ref 0–1800)
PROTEIN UA: 30 MG/DL
RBC # BLD: 3.15 M/UL (ref 4.2–5.4)
RBC UA: 1 /HPF (ref 0–4)
SARS-COV-2, NAAT: NOT DETECTED
SODIUM BLD-SCNC: 143 MMOL/L (ref 136–145)
SODIUM URINE: 70 MMOL/L
SPECIFIC GRAVITY UA: 1.01 (ref 1–1.03)
TOTAL PROTEIN: 6.3 G/DL (ref 6.6–8.7)
TROPONIN: 0.03 NG/ML (ref 0–0.03)
TROPONIN: 0.04 NG/ML (ref 0–0.03)
UROBILINOGEN, URINE: 1 E.U./DL
WBC # BLD: 23.2 K/UL (ref 4.8–10.8)
WBC UA: 1 /HPF (ref 0–5)

## 2021-10-18 PROCEDURE — 82570 ASSAY OF URINE CREATININE: CPT

## 2021-10-18 PROCEDURE — 6360000002 HC RX W HCPCS: Performed by: NURSE PRACTITIONER

## 2021-10-18 PROCEDURE — 83880 ASSAY OF NATRIURETIC PEPTIDE: CPT

## 2021-10-18 PROCEDURE — 1210000000 HC MED SURG R&B

## 2021-10-18 PROCEDURE — 80053 COMPREHEN METABOLIC PANEL: CPT

## 2021-10-18 PROCEDURE — 96365 THER/PROPH/DIAG IV INF INIT: CPT

## 2021-10-18 PROCEDURE — 83605 ASSAY OF LACTIC ACID: CPT

## 2021-10-18 PROCEDURE — 70450 CT HEAD/BRAIN W/O DYE: CPT

## 2021-10-18 PROCEDURE — 87205 SMEAR GRAM STAIN: CPT

## 2021-10-18 PROCEDURE — 87635 SARS-COV-2 COVID-19 AMP PRB: CPT

## 2021-10-18 PROCEDURE — 71045 X-RAY EXAM CHEST 1 VIEW: CPT

## 2021-10-18 PROCEDURE — 93005 ELECTROCARDIOGRAM TRACING: CPT | Performed by: NURSE PRACTITIONER

## 2021-10-18 PROCEDURE — 99285 EMERGENCY DEPT VISIT HI MDM: CPT

## 2021-10-18 PROCEDURE — 85025 COMPLETE CBC W/AUTO DIFF WBC: CPT

## 2021-10-18 PROCEDURE — 84484 ASSAY OF TROPONIN QUANT: CPT

## 2021-10-18 PROCEDURE — 84300 ASSAY OF URINE SODIUM: CPT

## 2021-10-18 PROCEDURE — 96375 TX/PRO/DX INJ NEW DRUG ADDON: CPT

## 2021-10-18 PROCEDURE — 36415 COLL VENOUS BLD VENIPUNCTURE: CPT

## 2021-10-18 PROCEDURE — 81001 URINALYSIS AUTO W/SCOPE: CPT

## 2021-10-18 PROCEDURE — 83935 ASSAY OF URINE OSMOLALITY: CPT

## 2021-10-18 PROCEDURE — 87040 BLOOD CULTURE FOR BACTERIA: CPT

## 2021-10-18 PROCEDURE — 2580000003 HC RX 258: Performed by: NURSE PRACTITIONER

## 2021-10-18 RX ORDER — MECLIZINE HCL 12.5 MG/1
25 TABLET ORAL 3 TIMES DAILY PRN
Status: DISCONTINUED | OUTPATIENT
Start: 2021-10-18 | End: 2021-10-23 | Stop reason: HOSPADM

## 2021-10-18 RX ORDER — CARVEDILOL 3.12 MG/1
3.12 TABLET ORAL 2 TIMES DAILY WITH MEALS
Status: DISCONTINUED | OUTPATIENT
Start: 2021-10-19 | End: 2021-10-23 | Stop reason: HOSPADM

## 2021-10-18 RX ORDER — MIDODRINE HYDROCHLORIDE 10 MG/1
10 TABLET ORAL
Status: DISCONTINUED | OUTPATIENT
Start: 2021-10-19 | End: 2021-10-23 | Stop reason: HOSPADM

## 2021-10-18 RX ORDER — DOCUSATE SODIUM 100 MG/1
100 CAPSULE, LIQUID FILLED ORAL 2 TIMES DAILY
Status: DISCONTINUED | OUTPATIENT
Start: 2021-10-19 | End: 2021-10-23 | Stop reason: HOSPADM

## 2021-10-18 RX ORDER — HEPARIN SODIUM 5000 [USP'U]/ML
5000 INJECTION, SOLUTION INTRAVENOUS; SUBCUTANEOUS EVERY 8 HOURS SCHEDULED
Status: CANCELLED | OUTPATIENT
Start: 2021-10-18

## 2021-10-18 RX ORDER — NITROGLYCERIN 0.4 MG/1
0.4 TABLET SUBLINGUAL EVERY 5 MIN PRN
Status: DISCONTINUED | OUTPATIENT
Start: 2021-10-18 | End: 2021-10-23 | Stop reason: HOSPADM

## 2021-10-18 RX ORDER — PRAMIPEXOLE DIHYDROCHLORIDE 0.12 MG/1
0.12 TABLET ORAL NIGHTLY
Status: DISCONTINUED | OUTPATIENT
Start: 2021-10-18 | End: 2021-10-23 | Stop reason: HOSPADM

## 2021-10-18 RX ORDER — FOLIC ACID 1 MG/1
1 TABLET ORAL DAILY
Status: DISCONTINUED | OUTPATIENT
Start: 2021-10-19 | End: 2021-10-23 | Stop reason: HOSPADM

## 2021-10-18 RX ORDER — SODIUM BICARBONATE 325 MG/1
325 TABLET ORAL 2 TIMES DAILY
Status: DISCONTINUED | OUTPATIENT
Start: 2021-10-19 | End: 2021-10-20

## 2021-10-18 RX ORDER — LEVOTHYROXINE SODIUM 112 UG/1
112 TABLET ORAL DAILY
Status: DISCONTINUED | OUTPATIENT
Start: 2021-10-19 | End: 2021-10-23 | Stop reason: HOSPADM

## 2021-10-18 RX ORDER — ATORVASTATIN CALCIUM 10 MG/1
10 TABLET, FILM COATED ORAL NIGHTLY
Status: DISCONTINUED | OUTPATIENT
Start: 2021-10-18 | End: 2021-10-23 | Stop reason: HOSPADM

## 2021-10-18 RX ORDER — ALLOPURINOL 100 MG/1
100 TABLET ORAL DAILY
Status: DISCONTINUED | OUTPATIENT
Start: 2021-10-19 | End: 2021-10-23 | Stop reason: HOSPADM

## 2021-10-18 RX ORDER — 0.9 % SODIUM CHLORIDE 0.9 %
250 INTRAVENOUS SOLUTION INTRAVENOUS ONCE
Status: COMPLETED | OUTPATIENT
Start: 2021-10-18 | End: 2021-10-18

## 2021-10-18 RX ADMIN — SODIUM CHLORIDE 250 ML: 9 INJECTION, SOLUTION INTRAVENOUS at 18:09

## 2021-10-18 RX ADMIN — CEFEPIME HYDROCHLORIDE 2000 MG: 2 INJECTION, POWDER, FOR SOLUTION INTRAVENOUS at 19:11

## 2021-10-18 RX ADMIN — VANCOMYCIN HYDROCHLORIDE 1750 MG: 1 INJECTION, POWDER, LYOPHILIZED, FOR SOLUTION INTRAVENOUS at 20:00

## 2021-10-18 NOTE — ED NOTES
Bed: 02-A  Expected date:   Expected time:   Means of arrival:   Comments:  EMS: Bhumika Crespo RN  10/18/21 [id number]

## 2021-10-18 NOTE — ED PROVIDER NOTES
[facility] EMERGENCY DEPT  eMERGENCY dEPARTMENT eNCOUnter      Pt Name: Gabby Ibanez  MRN: [id number]  Allagfbrenda 1934  Date of evaluation: 10/18/2021  Provider: Cristiana Campoverde, [address]       Chief Complaint   Patient presents with    Other     Abnormal Creatine and BUN, new pitting edema to bilateral legs. HISTORY OF PRESENT ILLNESS   (Location/Symptom, Timing/Onset,Context/Setting, Quality, Duration, Modifying Factors, Severity)  Note limiting factors. Lurdes Paz a 80 y.o. female who presents to the emergency department for evaluation of abnormal labs and peripheral edema. Pt is a palliative care patient from [address] [place] [address] who present with acute on chronic renal failure and edema of bilateral lower extremities. Pt opens eyes briefly when spoken to however doesn't respond to questions. Review of EMR shows patient with CKD, anemia, pancreatic cancer and CHF. She was hospitalized here end of last month having had right hip hemiarthroplasty and evaluation for neck injury. She is wearing an [place] collar having had neurosurgery evaluation. She had received unit of PRBCs 10/15. HPI    Nursing Notes were reviewed. REVIEW OF SYSTEMS    (2-9 systems for level 4, 10 or more for level 5)     Review of Systems   Unable to perform ROS: Mental status change       A complete review of systems was performed and is negative except as noted above in the HPI.        PAST MEDICAL HISTORY     Past Medical History:   Diagnosis Date    Abdominal aortic aneurysm (AAA) ([address]     Anemia     Arthritis     Cancer ([facility] 12/01/2020    pancreatic    CHF (congestive heart failure) (HCC)     Chronic kidney disease     DVT of lower extremity (deep venous thrombosis) (HCC)     twice 2010 and 2017    Hyperlipidemia     Hypertension     Kidney stones     Osteoarthritis     Palliative care patient 12/02/2020    Thyroid disease     Thyroid disorder          SURGICAL HISTORY       Past Surgical History:   Procedure Laterality Date    COLONOSCOPY  2016    Dr Jamil Flanagan- diverticulosis    COLONOSCOPY  10/2018    Jamil Flanagan:  AP    ENDOSCOPIC ULTRASOUND (LOWER) N/A 12/03/2020    Dr JONNY Grijalva/unsuccessful biliary cannulation despite attempts at 2-wire cannulation and proph pancreatic stenting with cannulation around pancreatic stenting-Positive for high-grade adenocarcinoma, pancreatic head    ERCP N/A 12/03/2020    Dr JONNY Grijalva/unsuccessful biliary cannulation despite attempts at 2-wire cannulation and proph pancreatic stenting with cannulation around pancreatic stenting-Positive for high-grade adenocarcinoma, pancreatic head    ERCP  12/07/2020    Dr Ubaldo Hernandez-becka/sphincterotomy, placement of a 10 x 60 partially covered biliary stent    HIP SURGERY Right 10/01/2021    RIGHT HIP HEMIARTHROPLASTY performed by Farooq Hay MD at [facility] Right 02/05/2021    SINGLE LUMEN PORT PLACEMENT WITH ULTRASOUND AND FLUORO performed by Joe Quiroga MD at [facility] TOE AMPUTATION Bilateral     pinky toes removed    TOTAL KNEE ARTHROPLASTY Right 01/03/2020    RIGHT TOTAL KNEE REPLACEMENT performed by Jerry Sneed MD at [address]  10/2018    Dr Jamil Flanagan- Chronic esophagogastritis with intestinal metaplasia. No evidence of dysplasia.          CURRENT MEDICATIONS       Previous Medications    ALLOPURINOL (ZYLOPRIM) 100 MG TABLET    Take 100 mg by mouth daily Taken 1 time a day now    APIXABAN (ELIQUIS) 2.5 MG TABS TABLET    Take 2.5 mg by mouth 2 times daily    ASPIRIN 81 MG EC TABLET    Take 1 tablet by mouth daily    CARVEDILOL (COREG) 3.125 MG TABLET    Take 1 tablet by mouth 2 times daily (with meals)    DOCUSATE SODIUM (COLACE) 100 MG CAPSULE    Take 1 capsule by mouth 2 times daily    FOLIC ACID (FOLVITE) 1 MG TABLET    Take 1 tablet by mouth daily    LEVOTHYROXINE (SYNTHROID) 112 MCG TABLET    Take 112 mcg by mouth Daily    LIDOCAINE-PRILOCAINE (EMLA) 2.5-2.5 Worried About [facility] in the Last Year:    [address] in the Last Year:    Transportation Needs:     Lack of Transportation (Medical):  Lack of Transportation (Non-Medical):    Physical Activity:     Days of Exercise per Week:     Minutes of Exercise per Session:    Stress:     Feeling of Stress :    Social Connections:     Frequency of Communication with Friends and Family:     Frequency of Social Gatherings with Friends and Family:     Attends Temple Services:     Active Member of Clubs or Organizations:     Attends Club or Organization Meetings:     Marital Status:    Intimate Partner Violence:     Fear of Current or Ex-Partner:     Emotionally Abused:     Physically Abused:     Sexually Abused:        SCREENINGS    [place] Coma Scale  Eye Opening: Spontaneous  Best Verbal Response: Oriented  Best Motor Response: Obeys commands  Amelia Coma Scale Score: 15        PHYSICAL EXAM    (up to 7 for level 4, 8 or more for level 5)     ED Triage Vitals [10/18/21 1734]   BP Temp Temp Source Pulse Resp SpO2 Height Weight   108/65 96 °F (35.6 °C) Axillary 67 18 95 % 5' 3\" (1.6 m) 157 lb (71.2 kg)       Physical Exam  Vitals reviewed. HENT:      Head: Normocephalic. Right Ear: External ear normal.      Left Ear: External ear normal.   Eyes:      Conjunctiva/sclera: Conjunctivae normal.      Pupils: Pupils are equal, round, and reactive to light. Cardiovascular:      Rate and Rhythm: Normal rate and regular rhythm. Heart sounds: Normal heart sounds. Pulmonary:      Effort: Pulmonary effort is normal.      Breath sounds: Normal breath sounds. Abdominal:      General: Bowel sounds are normal.      Palpations: Abdomen is soft. Musculoskeletal:         General: Normal range of motion. Cervical back: Normal range of motion. Comments: Pretibial edema bilateral lower extremities   Skin:     General: Skin is warm and dry.    Neurological:      Mental Status: She is alert. Comments: Opens eyes briefly when spoken to         DIAGNOSTIC RESULTS     EKG: All EKG's are interpreted by the Emergency Department Physician who either signs or Co-signs this chart in the absence of acardiologist.    There is a regular rate and rhythm. SR hr 68b/min w/LBBB and bigeminal pvcs Normal HI interval and normal P waves. Normal QRS interval. Normal QT interval. No obvious ST elevation or ST depression. RADIOLOGY:   Non-plain film images such as CT, Ultrasound andMRI are read by the radiologist. Plain radiographic images are visualized and preliminarily interpreted by the emergency physician with the below findings:        Interpretation per the Radiologist below, if available at the time of this note:    [address]   Final Result   1. No acute intracranial findings. 2. Similar volume loss. 3. Calcified atherosclerosis. Signed by Dr Elbert Chand   Final Result   1. Left lower lung opacity, could represent infection, atelectasis or   small effusion.    Signed by Dr Melania Olguin RENAL COMPLETE    (Results Pending)         ED BEDSIDE ULTRASOUND:   Performed by ED Physician - none    LABS:  Labs Reviewed   CBC WITH AUTO DIFFERENTIAL - Abnormal; Notable for the following components:       Result Value    WBC 23.2 (*)     RBC 3.15 (*)     Hemoglobin 9.9 (*)     Hematocrit 31.8 (*)     .0 (*)     MCH 31.4 (*)     MCHC 31.1 (*)     RDW 22.1 (*)     Platelets 909 (*)     Neutrophils % 90.6 (*)     Lymphocytes % 2.8 (*)     Neutrophils Absolute 21.0 (*)     Lymphocytes Absolute 0.6 (*)     Monocytes Absolute 1.00 (*)     Anisocytosis 2+ (*)     Macrocytes 1+ (*)     Polychromasia 1+ (*)     Hypochromia 1+ (*)     All other components within normal limits   COMPREHENSIVE METABOLIC PANEL W/ REFLEX TO MG FOR LOW K - Abnormal; Notable for the following components:    Potassium reflex Magnesium 5.1 (*)     Glucose 156 (*)      (*) CREATININE 3.4 (*)     GFR Non- 13 (*)     GFR  15 (*)     Total Protein 6.3 (*)     Albumin 2.2 (*)     Alkaline Phosphatase 892 (*)     All other components within normal limits   LACTIC ACID, PLASMA - Abnormal; Notable for the following components:    Lactic Acid 2.8 (*)     All other components within normal limits    Narrative:     CALL  [place] tel. ,  Chemistry results called to and read back by [facility] RN/ER, 10/18/2021 18:39,  by Bar Almeida   TROPONIN - Abnormal; Notable for the following components:    Troponin 0.04 (*)     All other components within normal limits   URINE RT REFLEX TO CULTURE - Abnormal; Notable for the following components:    Protein, UA 30 (*)     Leukocyte Esterase, Urine TRACE (*)     All other components within normal limits   BRAIN NATRIURETIC PEPTIDE - Abnormal; Notable for the following components:    Pro-BNP 7,050 (*)     All other components within normal limits   MICROSCOPIC URINALYSIS - Abnormal; Notable for the following components:    Bacteria, UA NEGATIVE (*)     Crystals, UA NEG (*)     All other components within normal limits   COVID-19, RAPID   CULTURE, BLOOD 1   CULTURE, BLOOD 2   SODIUM, URINE, RANDOM   OSMOLALITY, URINE   EOSINOPHIL SMEAR URINE   TROPONIN   CREATININE, RANDOM URINE       All other labs were within normal range or not returned as of this dictation. RE-ASSESSMENT       BMP result from today  Glucose 65 - 99 mg/dL 153High         BUN 8 - 23 mg/dL 96High         Creatinine 0.57 - 1.00 mg/dL 3. 28High         Sodium 136 - 145 mmol/L 142        Potassium 3.5 - 5.2 mmol/L 5.3High     Comment: Slight hemolysis detected by analyzer. Results may be affected. Chloride 98 - 107 mmol/L 107        CO2 22.0 - 29.0 mmol/L 19.0Low         Calcium 8.6 - 10.5 mg/dL 9.1        eGFR   Amer      Comment: <15 Indicative of kidney failure.        eGFR Non African Amer >60 mL/min/1.73 13Low     Comment: <15 Indicative of kidney failure. BUN/Creatinine Ratio 7.0 - 25.0  29.3High         Anion Gap 5.0 - 15.0 mmol/L 16.0High         Resulting [address] [place]   Narrative  Performed by [facility]  GFR Normal >60   Chronic Kidney Disease <60   Kidney Failure <15  Discussed with Dr. Lea Licona who will admit patient to hospitalist service. EMERGENCY DEPARTMENT COURSE and DIFFERENTIALDIAGNOSIS/MDM:   Vitals:    Vitals:    10/18/21 1839 10/18/21 1950 10/18/21 2030 10/18/21 2230   BP: 135/63 (!) 129/95 111/62 120/68   Pulse: 72 64 66 68   Resp: 18 16 16 16   Temp:       TempSrc:       SpO2: 95% 97% 94% 98%   Weight:       Height:           MDM      CONSULTS:  PHARMACY TO DOSE VANCOMYCIN  PALLIATIVE CARE EVAL  IP CONSULT TO NEPHROLOGY    PROCEDURES:  Unless otherwise notedbelow, none     Procedures    FINAL IMPRESSION     1. Acute renal failure superimposed on chronic kidney disease, unspecified CKD stage, unspecified acute renal failure type ([facility] [address]    2. Encephalopathy acute    3. Pneumonia of left lower lobe due to infectious organism    4. Elevated troponin    5. Hyperkalemia    6. Elevated brain natriuretic peptide (BNP) level          DISPOSITION/PLAN   DISPOSITION Admitted 10/18/2021 08:17:12 PM      PATIENT REFERRED TO:  No follow-up provider specified.     DISCHARGE MEDICATIONS:       Current Discharge Medication List          (Pleasenote that portions of this note were completed with a voice recognition program.  Efforts were made to edit the dictations but occasionally words are mis-transcribed.)              Anuj Triana, APRN  10/19/21 5912

## 2021-10-18 NOTE — ED TRIAGE NOTES
Patient via ems for abnormal labs and pitting edema. patietn alert to verbal stimuli is able to tell her name and that is all unsure what baseline mental status is. Patient not able to follow commands.  C-collar in place

## 2021-10-19 ENCOUNTER — APPOINTMENT (OUTPATIENT)
Dept: ULTRASOUND IMAGING | Age: 86
DRG: 871 | End: 2021-10-19
Payer: MEDICARE

## 2021-10-19 LAB
ANION GAP SERPL CALCULATED.3IONS-SCNC: 14 MMOL/L (ref 7–19)
ANISOCYTOSIS: ABNORMAL
BASOPHILS ABSOLUTE: 0 K/UL (ref 0–0.2)
BASOPHILS RELATIVE PERCENT: 0.2 % (ref 0–1)
BUN BLDV-MCNC: 100 MG/DL (ref 8–23)
CALCIUM SERPL-MCNC: 9.2 MG/DL (ref 8.8–10.2)
CHLORIDE BLD-SCNC: 106 MMOL/L (ref 98–111)
CO2: 21 MMOL/L (ref 22–29)
CREAT SERPL-MCNC: 3.4 MG/DL (ref 0.5–0.9)
CREATININE URINE: 42.3 MG/DL (ref 4.2–622)
EKG P AXIS: 60 DEGREES
EKG P-R INTERVAL: 220 MS
EKG Q-T INTERVAL: 468 MS
EKG QRS DURATION: 122 MS
EKG QTC CALCULATION (BAZETT): 475 MS
EKG T AXIS: 153 DEGREES
EOSINOPHILS ABSOLUTE: 0.1 K/UL (ref 0–0.6)
EOSINOPHILS RELATIVE PERCENT: 0.5 % (ref 0–5)
FERRITIN: >2000 NG/ML (ref 13–150)
GFR AFRICAN AMERICAN: 15
GFR NON-AFRICAN AMERICAN: 13
GLUCOSE BLD-MCNC: 135 MG/DL (ref 74–109)
HCT VFR BLD CALC: 31.7 % (ref 37–47)
HEMOGLOBIN: 9.9 G/DL (ref 12–16)
HYPOCHROMIA: ABNORMAL
IMMATURE GRANULOCYTES #: 0.4 K/UL
IRON SATURATION: 26 % (ref 14–50)
IRON: 23 UG/DL (ref 37–145)
LACTIC ACID: 2.2 MMOL/L (ref 0.5–1.9)
LYMPHOCYTES ABSOLUTE: 0.6 K/UL (ref 1.1–4.5)
LYMPHOCYTES RELATIVE PERCENT: 2.4 % (ref 20–40)
MCH RBC QN AUTO: 30.9 PG (ref 27–31)
MCHC RBC AUTO-ENTMCNC: 31.2 G/DL (ref 33–37)
MCV RBC AUTO: 99.1 FL (ref 81–99)
MONOCYTES ABSOLUTE: 1 K/UL (ref 0–0.9)
MONOCYTES RELATIVE PERCENT: 4 % (ref 0–10)
NEUTROPHILS ABSOLUTE: 23.3 K/UL (ref 1.5–7.5)
NEUTROPHILS RELATIVE PERCENT: 91.2 % (ref 50–65)
PARATHYROID HORMONE INTACT: 440.1 PG/ML (ref 15–65)
PDW BLD-RTO: 22.3 % (ref 11.5–14.5)
PLATELET # BLD: 417 K/UL (ref 130–400)
PLATELET SLIDE REVIEW: ABNORMAL
PMV BLD AUTO: 11.5 FL (ref 9.4–12.3)
POIKILOCYTES: ABNORMAL
POLYCHROMASIA: ABNORMAL
POTASSIUM REFLEX MAGNESIUM: 4.6 MMOL/L (ref 3.5–5)
RBC # BLD: 3.2 M/UL (ref 4.2–5.4)
REASON FOR REJECTION: NORMAL
REJECTED TEST: NORMAL
SODIUM BLD-SCNC: 141 MMOL/L (ref 136–145)
TOTAL IRON BINDING CAPACITY: 87 UG/DL (ref 250–400)
VANCOMYCIN RANDOM: 15.6 UG/ML
VITAMIN D 25-HYDROXY: 37.5 NG/ML
WBC # BLD: 25.5 K/UL (ref 4.8–10.8)

## 2021-10-19 PROCEDURE — 80048 BASIC METABOLIC PNL TOTAL CA: CPT

## 2021-10-19 PROCEDURE — 93010 ELECTROCARDIOGRAM REPORT: CPT | Performed by: INTERNAL MEDICINE

## 2021-10-19 PROCEDURE — 36591 DRAW BLOOD OFF VENOUS DEVICE: CPT

## 2021-10-19 PROCEDURE — 76770 US EXAM ABDO BACK WALL COMP: CPT

## 2021-10-19 PROCEDURE — 6370000000 HC RX 637 (ALT 250 FOR IP): Performed by: HOSPITALIST

## 2021-10-19 PROCEDURE — 82306 VITAMIN D 25 HYDROXY: CPT

## 2021-10-19 PROCEDURE — 2580000003 HC RX 258: Performed by: HOSPITALIST

## 2021-10-19 PROCEDURE — 2580000003 HC RX 258: Performed by: INTERNAL MEDICINE

## 2021-10-19 PROCEDURE — 83550 IRON BINDING TEST: CPT

## 2021-10-19 PROCEDURE — 83540 ASSAY OF IRON: CPT

## 2021-10-19 PROCEDURE — 36415 COLL VENOUS BLD VENIPUNCTURE: CPT

## 2021-10-19 PROCEDURE — 1210000000 HC MED SURG R&B

## 2021-10-19 PROCEDURE — 6360000002 HC RX W HCPCS: Performed by: HOSPITALIST

## 2021-10-19 PROCEDURE — 85025 COMPLETE CBC W/AUTO DIFF WBC: CPT

## 2021-10-19 PROCEDURE — 83605 ASSAY OF LACTIC ACID: CPT

## 2021-10-19 PROCEDURE — 92522 EVALUATE SPEECH PRODUCTION: CPT

## 2021-10-19 PROCEDURE — 80202 ASSAY OF VANCOMYCIN: CPT

## 2021-10-19 PROCEDURE — 83970 ASSAY OF PARATHORMONE: CPT

## 2021-10-19 PROCEDURE — 92610 EVALUATE SWALLOWING FUNCTION: CPT

## 2021-10-19 PROCEDURE — 82728 ASSAY OF FERRITIN: CPT

## 2021-10-19 RX ORDER — ONDANSETRON 4 MG/1
4 TABLET, ORALLY DISINTEGRATING ORAL EVERY 8 HOURS PRN
Status: DISCONTINUED | OUTPATIENT
Start: 2021-10-19 | End: 2021-10-23 | Stop reason: HOSPADM

## 2021-10-19 RX ORDER — SODIUM CHLORIDE 9 MG/ML
25 INJECTION, SOLUTION INTRAVENOUS PRN
Status: DISCONTINUED | OUTPATIENT
Start: 2021-10-19 | End: 2021-10-23 | Stop reason: HOSPADM

## 2021-10-19 RX ORDER — ONDANSETRON 2 MG/ML
4 INJECTION INTRAMUSCULAR; INTRAVENOUS EVERY 6 HOURS PRN
Status: DISCONTINUED | OUTPATIENT
Start: 2021-10-19 | End: 2021-10-23 | Stop reason: HOSPADM

## 2021-10-19 RX ORDER — ACETAMINOPHEN 650 MG/1
650 SUPPOSITORY RECTAL EVERY 6 HOURS PRN
Status: DISCONTINUED | OUTPATIENT
Start: 2021-10-19 | End: 2021-10-23 | Stop reason: HOSPADM

## 2021-10-19 RX ORDER — SODIUM CHLORIDE 0.9 % (FLUSH) 0.9 %
5-40 SYRINGE (ML) INJECTION EVERY 12 HOURS SCHEDULED
Status: DISCONTINUED | OUTPATIENT
Start: 2021-10-19 | End: 2021-10-23 | Stop reason: HOSPADM

## 2021-10-19 RX ORDER — SODIUM CHLORIDE 9 MG/ML
INJECTION, SOLUTION INTRAVENOUS CONTINUOUS
Status: DISCONTINUED | OUTPATIENT
Start: 2021-10-19 | End: 2021-10-22

## 2021-10-19 RX ORDER — ACETAMINOPHEN 325 MG/1
650 TABLET ORAL EVERY 6 HOURS PRN
Status: DISCONTINUED | OUTPATIENT
Start: 2021-10-19 | End: 2021-10-23 | Stop reason: HOSPADM

## 2021-10-19 RX ORDER — SODIUM CHLORIDE, SODIUM LACTATE, POTASSIUM CHLORIDE, CALCIUM CHLORIDE 600; 310; 30; 20 MG/100ML; MG/100ML; MG/100ML; MG/100ML
INJECTION, SOLUTION INTRAVENOUS CONTINUOUS
Status: DISCONTINUED | OUTPATIENT
Start: 2021-10-19 | End: 2021-10-19

## 2021-10-19 RX ORDER — BUMETANIDE 1 MG/1
1 TABLET ORAL DAILY PRN
Status: ON HOLD | COMMUNITY
End: 2021-10-22 | Stop reason: HOSPADM

## 2021-10-19 RX ORDER — ACETAMINOPHEN 325 MG/1
650 TABLET ORAL EVERY 6 HOURS PRN
COMMUNITY

## 2021-10-19 RX ORDER — SODIUM CHLORIDE 0.9 % (FLUSH) 0.9 %
5-40 SYRINGE (ML) INJECTION PRN
Status: DISCONTINUED | OUTPATIENT
Start: 2021-10-19 | End: 2021-10-23 | Stop reason: HOSPADM

## 2021-10-19 RX ADMIN — APIXABAN 2.5 MG: 5 TABLET, FILM COATED ORAL at 20:47

## 2021-10-19 RX ADMIN — ATORVASTATIN CALCIUM 10 MG: 10 TABLET, FILM COATED ORAL at 04:09

## 2021-10-19 RX ADMIN — SODIUM BICARBONATE 325 MG: 325 TABLET ORAL at 20:47

## 2021-10-19 RX ADMIN — SODIUM CHLORIDE, PRESERVATIVE FREE 10 ML: 5 INJECTION INTRAVENOUS at 20:47

## 2021-10-19 RX ADMIN — PRAMIPEXOLE DIHYDROCHLORIDE 0.12 MG: 0.12 TABLET ORAL at 04:09

## 2021-10-19 RX ADMIN — SODIUM BICARBONATE 325 MG: 325 TABLET ORAL at 06:12

## 2021-10-19 RX ADMIN — MIDODRINE HYDROCHLORIDE 10 MG: 10 TABLET ORAL at 12:17

## 2021-10-19 RX ADMIN — SODIUM CHLORIDE: 9 INJECTION, SOLUTION INTRAVENOUS at 12:31

## 2021-10-19 RX ADMIN — MIDODRINE HYDROCHLORIDE 10 MG: 10 TABLET ORAL at 16:21

## 2021-10-19 RX ADMIN — APIXABAN 2.5 MG: 5 TABLET, FILM COATED ORAL at 04:09

## 2021-10-19 RX ADMIN — DOCUSATE SODIUM 100 MG: 100 CAPSULE, LIQUID FILLED ORAL at 20:47

## 2021-10-19 RX ADMIN — SODIUM CHLORIDE, PRESERVATIVE FREE 10 ML: 5 INJECTION INTRAVENOUS at 08:20

## 2021-10-19 RX ADMIN — LEVOTHYROXINE SODIUM 112 MCG: 112 TABLET ORAL at 06:16

## 2021-10-19 RX ADMIN — CARVEDILOL 3.12 MG: 3.12 TABLET, FILM COATED ORAL at 16:21

## 2021-10-19 RX ADMIN — SODIUM CHLORIDE: 9 INJECTION, SOLUTION INTRAVENOUS at 20:47

## 2021-10-19 RX ADMIN — ATORVASTATIN CALCIUM 10 MG: 10 TABLET, FILM COATED ORAL at 20:47

## 2021-10-19 RX ADMIN — SODIUM CHLORIDE, PRESERVATIVE FREE 2000 MG: 5 INJECTION INTRAVENOUS at 16:20

## 2021-10-19 RX ADMIN — PRAMIPEXOLE DIHYDROCHLORIDE 0.12 MG: 0.12 TABLET ORAL at 20:47

## 2021-10-19 RX ADMIN — SODIUM CHLORIDE, PRESERVATIVE FREE 10 ML: 5 INJECTION INTRAVENOUS at 08:19

## 2021-10-19 ASSESSMENT — PAIN SCALES - GENERAL
PAINLEVEL_OUTOF10: 0

## 2021-10-19 ASSESSMENT — PAIN SCALES - WONG BAKER
WONGBAKER_NUMERICALRESPONSE: 0
WONGBAKER_NUMERICALRESPONSE: 0

## 2021-10-19 NOTE — PLAN OF CARE
EP Sign Out:    multiple recent visits  Is a palliative care pt  This time has NIKHIL on CKD  Sent in for abnormal BUN and Cr  NEW Edema of lower extremities  Recently had right hip arthroplasty  Has an aspen collar  Got a unit PRBC on 15th  also has a PNA today, in [place]      Preliminary Assessments & Plans: (ordered)    [facility] HCAP:  Admit to [facility] [place] under hospitalist team  Pharmacokinetics to dose Vancomycin  Cefepime 2g in ED at 18:49, will continue coverage, PK asked to renally adjust as needed    NIKHIL (Cr baseline 2.3 now at 3.4) on CKD stage 4 (baseline GFR 20) with minimal Hyperkalemia (Potassium 5.1 PoA):   Admit to [address] [place], Nephrology area preferred if available  Strict Is and Os  Daily Weights  Added on to UA Urine OSM/EOS/Na/Cr  Avoid Nephrotoxins as able: NSAIDs/ACEi/ARB/Diuretic/Aminoglycosides/IodinatedContrast etc - deferring on ASA until know why she is on it   Renal US in AM   Nephrology Consultation, both as per the NIKHIL as well as per the CKD stage 4  IVF will be deferred to Nephrology as she is fluid volumed overloaded at this time as per the edema  Elevate HoB & Legs (Legs to prevent sliding down in bed)     Chronic Medical Problems:  Continue Home regimen as able  Bed alarm  Fall precautions    Supportive and Prophylactic Txx:  DVT PPx: on NOAC as per home  GI (PUD) PPx: not indicated as such acutely  PT: indicated as per age >=65 with admitted status to prevent deconditioning

## 2021-10-19 NOTE — PLAN OF CARE
Problem: Discharge Planning:  Goal: Participates in care planning  Description: Participates in care planning  Outcome: Ongoing  Goal: Discharged to appropriate level of care  Description: Discharged to appropriate level of care  Outcome: Ongoing     Problem: Activity Intolerance:  Goal: Ability to tolerate increased activity will improve  Description: Ability to tolerate increased activity will improve  Outcome: Ongoing     Problem: Anxiety/Stress:  Goal: Level of anxiety will decrease  Description: Level of anxiety will decrease  Outcome: Ongoing     Problem:  Bowel Function - Altered:  Goal: Bowel elimination is within specified parameters  Description: Bowel elimination is within specified parameters  Outcome: Ongoing     Problem: Fluid Volume - Deficit:  Goal: Absence of fluid volume deficit signs and symptoms  Description: Absence of fluid volume deficit signs and symptoms  Outcome: Ongoing  Goal: Electrolytes within specified parameters  Description: Electrolytes within specified parameters  Outcome: Ongoing     Problem: Mental Status - Impaired:  Goal: Absence of continued neurological deterioration signs and symptoms  Description: Absence of continued neurological deterioration signs and symptoms  Outcome: Ongoing  Goal: Absence of physical injury  Description: Absence of physical injury  Outcome: Ongoing  Goal: Mental status will be restored to baseline  Description: Mental status will be restored to baseline  Outcome: Ongoing     Problem: Mobility - Impaired:  Goal: Mobility will improve to maximum level  Description: Mobility will improve to maximum level  Outcome: Ongoing     Problem: Nutrition Deficit:  Goal: Ability to achieve adequate nutritional intake will improve  Description: Ability to achieve adequate nutritional intake will improve  Outcome: Ongoing     Problem: Pain:  Goal: Pain level will decrease  Description: Pain level will decrease  Outcome: Ongoing  Goal: Ability to notify healthcare provider of pain before it becomes unmanageable or unbearable will improve  Description: Ability to notify healthcare provider of pain before it becomes unmanageable or unbearable will improve  Outcome: Ongoing  Goal: Control of acute pain  Description: Control of acute pain  Outcome: Ongoing  Goal: Control of chronic pain  Description: Control of chronic pain  Outcome: Ongoing     Problem: Serum Glucose Level - Abnormal:  Goal: Ability to maintain appropriate glucose levels will improve to within specified parameters  Description: Ability to maintain appropriate glucose levels will improve to within specified parameters  Outcome: Ongoing     Problem: Skin Integrity - Impaired:  Goal: Will show no infection signs and symptoms  Description: Will show no infection signs and symptoms  Outcome: Ongoing  Goal: Absence of new skin breakdown  Description: Absence of new skin breakdown  Outcome: Ongoing     Problem: Sleep Pattern Disturbance:  Goal: Appears well-rested  Description: Appears well-rested  Outcome: Ongoing     Problem: Skin Integrity:  Goal: Will show no infection signs and symptoms  Description: Will show no infection signs and symptoms  Outcome: Ongoing  Goal: Absence of new skin breakdown  Description: Absence of new skin breakdown  Outcome: Ongoing     Problem: Falls - Risk of:  Goal: Absence of physical injury  Description: Absence of physical injury  Outcome: Ongoing  Goal: Will remain free from falls  Description: Will remain free from falls  Outcome: Ongoing

## 2021-10-19 NOTE — ED NOTES
2 attempts at this time to cath patient for urine. Unable to obtain sample at this time.       Landy Ortiz RN  10/18/21 1932

## 2021-10-19 NOTE — PROGRESS NOTES
[place], [place], [address]    DEPARTMENT OF HOSPITALIST MEDICINE      PLAN  OF  CARE  NOTE:      Patient was admitted earlier today by our nocturnist.  Please see the history and physical for details. I saw and examined the patient at the bedside today. Very unfortunate, pleasantly confused female brought in from the nursing home with left lower lobe pneumonia. Patient has [place] collar secondary to recent C2 fracture. Neurosurgery recommended c-collar except hygiene and eating but no surgical intervention. Patient started on IV antibiotics and is being monitored closely. Nephrology consult for NIKHIL and started patient on IV fluids. Repeat labs in a.m. Electrolyte replacement as per protocol. Patient will be monitored very closely on the floor. Further recommendations as per the hospital course. Patient  is on DVT prophylaxis  Current medications reviewed  Lab work reviewed  Radiology/Chest x-ray films reviewed  Treatment recommendations from suspecialities reviewed, appreciated and agreed with  Discussed with the nurse and addressed all questions/concerns  Discussed with Patient and/or Family at the bedside in detail . .. they understand and agree with the management plan.         Bernardo Almanzar MD  10/19/2021, 5:27 PM

## 2021-10-19 NOTE — CONSULTS
sodium chloride infusion  25 mL IntraVENous PRN Desi Noble MD        ondansetron (ZOFRAN-ODT) disintegrating tablet 4 mg  4 mg Oral Q8H PRN Desi Noble MD        Or    ondansetron [facility]) injection 4 mg  4 mg IntraVENous Q6H PRN Desi Noble MD        acetaminophen (TYLENOL) tablet 650 mg  650 mg Oral Q6H PRN Desi Noble MD        Or    acetaminophen (TYLENOL) suppository 650 mg  650 mg Rectal Q6H PRN Desi Noble MD        lactated ringers infusion   IntraVENous Continuous Bernardo Bing Burris MD        vancomycin (VANCOCIN) intermittent dosing (placeholder)   Other RX Placeholder ALBERTO Mcintosh   Given at 10/18/21 2000    ceFEPIme (MAXIPIME) 2,000 mg in sodium chloride (PF) 20 mL IV syringe  2,000 mg IntraVENous Q24H Desi Noble MD        allopurinol (ZYLOPRIM) tablet 100 mg  100 mg Oral Daily Desi Noble MD        apixaban Jt Phalen) tablet 2.5 mg  2.5 mg Oral BID Desi Noble MD   2.5 mg at 10/19/21 0409    carvedilol (COREG) tablet 3.125 mg  3.125 mg Oral BID WC Desi Noble MD        docusate sodium (COLACE) capsule 100 mg  100 mg Oral BID Desi Noble MD        folic acid (FOLVITE) tablet 1 mg  1 mg Oral Daily Desi Noble MD        levothyroxine (SYNTHROID) tablet 112 mcg  112 mcg Oral Daily Desi Noble MD   112 mcg at 10/19/21 7881    meclizine (ANTIVERT) tablet 25 mg  25 mg Oral TID PRN Desi Noble MD        atorvastatin (LIPITOR) tablet 10 mg  10 mg Oral Nightly Desi Noble MD   10 mg at 10/19/21 0409    sodium bicarbonate tablet 325 mg  325 mg Oral BID Desi Noble MD   325 mg at 10/19/21 0612    pramipexole (MIRAPEX) tablet 0.125 mg  0.125 mg Oral Nightly Desi Noble MD   0.125 mg at 10/19/21 0409    nitroGLYCERIN (NITROSTAT) SL tablet 0.4 mg  0.4 mg SubLINGual Q5 Min PRN Desi Noble MD        midodrine (PROAMATINE) tablet 10 mg  10 mg Oral TID  Desi Noble MD           Past Medical History:  Past Medical History:   Diagnosis Date    [facility] (AAA) [facility]     Anemia     Arthritis     Cancer ([facility] 12/01/2020    pancreatic    CHF (congestive heart failure) (HCC)     Chronic kidney disease     DVT of lower extremity (deep venous thrombosis) ([facility]     twice 2010 and 2017    Hyperlipidemia     Hypertension     Kidney stones     Osteoarthritis     Palliative care patient 12/02/2020    Thyroid disease     Thyroid disorder        Past Surgical History:  Past Surgical History:   Procedure Laterality Date    COLONOSCOPY  2016    Dr Cathi Rios- diverticulosis    COLONOSCOPY  10/2018    Cathi Rios:  AP    ENDOSCOPIC ULTRASOUND (LOWER) N/A 12/03/2020    Dr JONNY Howe-w/unsuccessful biliary cannulation despite attempts at 2-wire cannulation and proph pancreatic stenting with cannulation around pancreatic stenting-Positive for high-grade adenocarcinoma, pancreatic head    ERCP N/A 12/03/2020    Dr JONNY Grijalva/unsuccessful biliary cannulation despite attempts at 2-wire cannulation and proph pancreatic stenting with cannulation around pancreatic stenting-Positive for high-grade adenocarcinoma, pancreatic head    ERCP  12/07/2020    Dr Josh Hernandez-becka/sphincterotomy, placement of a 10 x 60 partially covered biliary stent    HIP SURGERY Right 10/01/2021    RIGHT HIP HEMIARTHROPLASTY performed by Selvin Frazier MD at [place] [address] [place] [place] Right 02/05/2021    SINGLE LUMEN PORT PLACEMENT WITH ULTRASOUND AND FLUORO performed by Codi Luciano MD at [address] TOE AMPUTATION Bilateral     pinky toes removed    TOTAL KNEE ARTHROPLASTY Right 01/03/2020    RIGHT TOTAL KNEE REPLACEMENT performed by Verdia Lesch, MD at [address]  10/2018    Dr Cathi Rios- Chronic esophagogastritis with intestinal metaplasia. No evidence of dysplasia.        Family History  Family History   Problem Relation Age of Onset    Colon Cancer Brother     Cancer Mother         Breast Cancer    Heart Attack Father     Colon Polyps Neg Hx     Esophageal Cancer Neg Hx     Liver Cancer Neg Hx     Liver Disease Neg Hx     Rectal Cancer Neg Hx     Stomach Cancer Neg Hx        Social History  Social History     Socioeconomic History    Marital status:      Spouse name: Not on file    Number of children: 2    Years of education: Not on file    Highest education level: Not on file   Occupational History    Not on file   Tobacco Use    Smoking status: Never Smoker    Smokeless tobacco: Never Used   Vaping Use    Vaping Use: Never used   Substance and Sexual Activity    Alcohol use: No    Drug use: No    Sexual activity: Not on file   Other Topics Concern    Not on file   Social History Narrative    Not on file     Social Determinants of Health     Financial Resource Strain:     Difficulty of Paying Living Expenses:    Food Insecurity:     Worried About Running Out of Food in the Last Year:     [address] in the Last Year:    Transportation Needs:     Lack of Transportation (Medical):      Lack of Transportation (Non-Medical):    Physical Activity:     Days of Exercise per Week:     Minutes of Exercise per Session:    Stress:     Feeling of Stress :    Social Connections:     Frequency of Communication with Friends and Family:     Frequency of Social Gatherings with Friends and Family:     Attends Worship Services:     Active Member of Clubs or Organizations:     Attends Club or Organization Meetings:     Marital Status:    Intimate Partner Violence:     Fear of Current or Ex-Partner:     Emotionally Abused:     Physically Abused:     Sexually Abused:          Review of Systems:  History obtained from chart review and the patient  General ROS: Weak with mild fever  Respiratory ROS: positive for - shortness of breath and sputum changes  Cardiovascular ROS: No chest pain or palpitations  Gastrointestinal ROS: No abdominal pain or melena  Genito-Urinary ROS: No dysuria or hematuria  Musculoskeletal ROS: No joint pain or swelling   14 point ROS reviewed with the patient and negative except as noted above and in the HPI unless unable to obtain. Objective:  Patient Vitals for the past 24 hrs:   BP Temp Temp src Pulse Resp SpO2 Height Weight   10/19/21 0352 (!) 150/54 96.4 °F (35.8 °C) Temporal 69 18 96 % -- --   10/19/21 0300 -- -- -- -- -- -- 5' 3\" (1.6 m) 156 lb 9.6 oz (71 kg)   10/18/21 2230 120/68 -- -- 68 16 98 % -- --   10/18/21 2030 111/62 -- -- 66 16 94 % -- --   10/18/21 1950 (!) 129/95 -- -- 64 16 97 % -- --   10/18/21 1839 135/63 -- -- 72 18 95 % -- --   10/18/21 1734 108/65 96 °F (35.6 °C) Axillary 67 18 95 % 5' 3\" (1.6 m) 157 lb (71.2 kg)     No intake or output data in the 24 hours ending 10/19/21 0904  General: awake/alert   HEENT: Normocephalic atraumatic head  Neck: Supple with no JVD or carotid bruits. Chest:  clear to auscultation bilaterally  CVS: regular rate and rhythm  Abdominal: soft, nontender, normal bowel sounds  Extremities: no cyanosis or edema  Skin: warm and dry without rash      Labs:  BMP:   Recent Labs     10/18/21  1747 10/19/21  0421    141   K 5.1* 4.6    106   CO2 23 21*   * 100*   CREATININE 3.4* 3.4*   CALCIUM 9.6 9.2     CBC:   Recent Labs     10/18/21  1747 10/19/21  0506   WBC 23.2* 25.5*   HGB 9.9* 9.9*   HCT 31.8* 31.7*   .0* 99.1*   * 417*     LIVER PROFILE:   Recent Labs     10/18/21  1747   AST 30   ALT 12   BILITOT 0.5   ALKPHOS 892*     PT/INR: No results for input(s): PROTIME, INR in the last 72 hours. APTT: No results for input(s): APTT in the last 72 hours. BNP:  No results for input(s): BNP in the last 72 hours. Ionized Calcium:No results for input(s): IONCA in the last 72 hours. Magnesium:No results for input(s): MG in the last 72 hours. Phosphorus:No results for input(s): PHOS in the last 72 hours. HgbA1C: No results for input(s): LABA1C in the last 72 hours.   Hepatic:   Recent Labs     10/18/21  1747   ALKPHOS 892*   ALT 12   AST 30   PROT 6.3*   BILITOT 0.5   LABALBU 2.2*     Lactic Acid:   Recent Labs     10/18/21  1808   LACTA 2.8*     Troponin: No results for input(s): CKTOTAL, CKMB, TROPONINT in the last 72 hours. ABGs: No results for input(s): PH, PCO2, PO2, HCO3, O2SAT in the last 72 hours. CRP:  No results for input(s): CRP in the last 72 hours. Sed Rate:  No results for input(s): SEDRATE in the last 72 hours. Cultures:   No results for input(s): CULTURE in the last 72 hours. No results for input(s): BC, Donold Mooring in the last 72 hours. No results for input(s): CXSURG in the last 72 hours. Radiology reports as per the Radiologist  Radiology: CT HEAD WO CONTRAST    Result Date: 10/18/2021  EXAM: CT HEAD WO CONTRAST -- 10/18/2021 6:30 PM HISTORY: 87 years, Female, altered mental status COMPARISON: 9/30/2021 DLP: 718 mGy cm. Automated exposure control was utilized to minimize patient radiation dose. TECHNIQUE: Unenhanced axial CT images obtained from vertex to skull base with coronal and sagittal reformats. FINDINGS: No acute intracranial hemorrhage, midline shift, mass effect or large territory cortical infarct. Gray-white matter differentiation maintained. Ventricles, sulci and basilar cisterns with proportional prominence suggesting volume loss. Density of the left vertebral artery appears similar to prior 12/1/2020 and is favored to be due to calcified atherosclerosis. Thinning of the ocular lenses may be postoperative or age-related. Paranasal sinuses and mastoid air cells normally aerated. External auditory canals within normal limits. Calvarium appears intact. Subcutaneous tissues within normal limits. 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 1. No acute intracranial findings. 2. Similar volume loss. 3. Calcified atherosclerosis. Signed by Dr Felicia Licona RENAL COMPLETE    Result Date: 10/19/2021  Examination.   RENAL COMPLETE 10/19/2021 6:50 AM History: Acute renal injury on chronic renal electrolyte. 3.  Baseline iron studies. 4.  IV antibiotics. 5.  Serum PTH. Thank you for the consult, we appreciate the opportunity to provide care to your patients. Feel free to contact me if I can be of any further assistance.       Constantine Sanchez MD  10/19/21  9:04 AM

## 2021-10-19 NOTE — PROGRESS NOTES
Speech Language Pathology  Facility/Department: [facility]ONTSE/VAS/MED   CLINICAL BEDSIDE SWALLOW EVALUATION  SPEECH PRODUCTION EVALUATION     NAME: Oswaldo Macedo  : 1934  MRN: [id number]    ADMISSION DATE: 10/18/2021  ADMITTING DIAGNOSIS: has Heme positive stool; Anemia of chronic kidney failure, stage 4 (severe) ([address] Iron deficiency anemia secondary to inadequate dietary iron intake; H/O abnormal mammogram; Primary osteoarthritis of right knee; Status post total right knee replacement; Unilateral primary osteoarthritis, right knee; Other specified hypothyroidism; Encounter for BARBARA (erythropoietin stimulating agent) anemia management; Elevated INR; Hematuria; Acute blood loss anemia; CKD (chronic kidney disease); Palliative care patient; Pancreatic mass; Fatigue; Elevated liver enzymes; Biliary obstruction; Anemia; Malignant neoplasm of other parts of pancreas ([address] New onset of congestive heart failure ([address] Acute respiratory failure with hypoxia ([address] CHF NYHA class III, acute, systolic ([address] Constipation; Nausea; Restless legs; Decreased mobility; Hypertension; Fracture of right hip, closed, initial encounter ([address] Leukocytosis; UTI (urinary tract infection); Closed fracture of right hip ([address] NIKHIL (acute kidney injury) ([address] Community acquired pneumonia of left lower lobe of lung; Acute kidney injury superimposed on chronic kidney disease ([address] Hyperkalemia; Acute metabolic encephalopathy; and Acute kidney injury superimposed on CKD [facility]) on their problem list.    Date of Eval: 10/19/2021  Evaluating Therapist: SHERRY Curry    Current Diet level:  NPO    Reason for Referral  Oswaldo Macedo was referred for a bedside swallow evaluation to assess the efficiency of her swallow function, identify signs and symptoms of aspiration and make recommendations regarding safe dietary consistencies, effective compensatory strategies, and safe eating environment.     Impression  Assessed patient's None (Room air)  Communication Observation: (Assessed patient's speech production. Patient exhibited decreased volume of speech and slow, decreased lingual movements during verbalizations. SLP ranked functional intelligibility of speech for unfamiliar listeners at 70-80% in utterances with background noise present.)  Follows Directions: Patient demonstrated ability to follow and model simple 1 step commands. Dentition: Some missing dentition  Patient Positioning: Upright in bed  Consistencies Administered: Ice chips;Dysphagia Pureed (Dysphagia I); Nectar - cup; Thin - cup      Oral Motor Examination   Labial ROM: (Decreased, bilaterally, during labial retraction trials and labial protrusion trials.)  Labial Strength: (Adequate during labial compression trials.)  Labial Coordination: (Slowed volitional movements were noted.)  Lingual ROM: (Decreased during lingual extension trials with no full point achieved; decreased during lingual elevation trials without use of accessory jaw movement; adequate movements noted bilaterally.)  Lingual Strength: (Decreased)  Lingual Coordination: (Slowed movements were noted.)     Assessed patient's swallowing function with the following observations noted:     Oral Phase  Mastication: Ice chips;Puree (Patient exhibited slow vertical jaw movement at the front of the mouth during oral prep of ice chip trials and puree consistency trials all presented by SLP.)  Suspected Premature Bolus Loss: Thin - cup (Patient exhibited fast oral transit of thin H2O trials presented via cup by SLP.)  Oral Phase - Comment: Oral transit of ice chip trials primarily measured 1-2 seconds in length. Oral transit of puree consistency trials, presented by SLP, primarily measured 1-2 seconds in length and no oral cavity residue was noted post swallows. Oral transit of nectar thick liquid trials, presented via cup by SLP, primarily measured 1-2 seconds in length.     Pharyngeal Phase  Suspected Swallow Delay: Thin - cup (Suspect secondary to oral transit times.)  Laryngeal Elevation: (Patient exhibited sluggish, moderate-severely decreased laryngeal elevation for swallow airway protection.)  Pharyngeal Phase - Comment: No outward S/S penetration/aspiration was noted with any ice chip trial, puree consistency trial, mildly thick/nectar thick liquid trial, or thin H2O trial presented during assessment this date. At this time, would trial puree consistency with mildly thick/nectar thick liquids. TOTAL FEED. Recommend meds crushed in pudding/applesauce. If patient receives mouth care prior to intake, okay for ice chips and small sips thin H2O IN BETWEEN MEALS for comfort. Will continue to follow.     Electronically signed by SHERRY Corrales on 10/19/2021 at 10:30 AM

## 2021-10-19 NOTE — H&P
[facility]      Hospitalist - History & Physical      PCP: Salena Vallecillo MD    Date of Admission: 10/18/2021    Date of Service: 10/19/2021    Chief Complaint:  Abnormal labs     History Of Present Illness: The patient is a 80 y.o. female who presented to [facility] ER via EMS with PMH of CHF, CKD, DVT, HTN, kidney stones, OA, AAA, and pallliative care complaining of acute on chronic renal failure and bilateral lower extremity edema. Patient is a poor historian, information retrieved from chart. Patient is a palliative care patient from [address] [place] [address], she is able to open eyes when spoken to, and is alert to self only. Patient was previously admitted on 9/30 and DC 10/4 due to fall right proximal femur fracture, orthopedic surgery was consulted right hemiarthroplasty 10/1, C2 fracture (neurosurgery recommended no surgical intervention at this time and recommend c-collar except for hygiene and eating), GI was consulted for possible GI bleeding, no signs of acute bleeding. She was previously on Rocephin for UTI, Klebsiella pneumonia noted. Patient received 1 unit PRBC on 10/15 for Hgb 6.8. She presents today due to abnormal labs and bilateral lower extremity edema. Work-up in ER reveals CXR left lower lung opacity, CT head no acute intracranial findings, WBC 23, H/H 9.9/31.8, K 5.1, Creatitine 3.4, GFR 13, Alkaline phosphatase 892, troponin 0.04, BNP 7,050, lactic acid 2.8, and urinalysis with culture negative for bacteria. Received Cefepime 2g in ER. Patient is to be admitted to hospitalist service for left lower lobe pneumonia, NIKHIL on CKD, elevated troponin, and metabolic encephalopathy.     Past Medical History:        Diagnosis Date    Abdominal aortic aneurysm (AAA) ([address]     Anemia     Arthritis     Cancer ([address] 12/01/2020    pancreatic    CHF (congestive heart failure) (HCC)     Chronic kidney disease     DVT of lower extremity (deep venous thrombosis) ([address]     twice 2010 and 2017  Hyperlipidemia     Hypertension     Kidney stones     Osteoarthritis     Palliative care patient 12/02/2020    Thyroid disease     Thyroid disorder        Past Surgical History:        Procedure Laterality Date    COLONOSCOPY  2016    Dr Joseph Jones- diverticulosis    COLONOSCOPY  10/2018    Joseph Jones:  AP    ENDOSCOPIC ULTRASOUND (LOWER) N/A 12/03/2020    Dr JONNY Howe-w/unsuccessful biliary cannulation despite attempts at 2-wire cannulation and proph pancreatic stenting with cannulation around pancreatic stenting-Positive for high-grade adenocarcinoma, pancreatic head    ERCP N/A 12/03/2020    Dr JONNY Howe-w/unsuccessful biliary cannulation despite attempts at 2-wire cannulation and proph pancreatic stenting with cannulation around pancreatic stenting-Positive for high-grade adenocarcinoma, pancreatic head    ERCP  12/07/2020    Dr Javier Hernandez-w/sphincterotomy, placement of a 10 x 60 partially covered biliary stent    HIP SURGERY Right 10/01/2021    RIGHT HIP HEMIARTHROPLASTY performed by Esau Patel MD at [address] Right 02/05/2021    SINGLE LUMEN PORT PLACEMENT WITH ULTRASOUND AND FLUORO performed by Kimberley Fields MD at [address] TOE AMPUTATION Bilateral     pinky toes removed    TOTAL KNEE ARTHROPLASTY Right 01/03/2020    RIGHT TOTAL KNEE REPLACEMENT performed by Toñito Youssef MD at [place] [address]  10/2018    Dr Joseph Jones- Chronic esophagogastritis with intestinal metaplasia. No evidence of dysplasia. Home Medications:  Prior to Admission medications    Medication Sig Start Date End Date Taking?  Authorizing Provider   folic acid (FOLVITE) 1 MG tablet Take 1 tablet by mouth daily 10/5/21   Salima Sheets MD   pantoprazole (PROTONIX) 40 MG tablet Take 1 tablet by mouth every morning (before breakfast)  Patient not taking: Reported on 10/13/2021 10/4/21   Salima Sheets MD   polyethylene glycol (GLYCOLAX) 17 GM/SCOOP powder Take 17 g by mouth daily  Patient not taking: Reported on 10/13/2021    Historical Provider, MD   lisinopril (PRINIVIL;ZESTRIL) 10 MG tablet Take 10 mg by mouth daily 6/11/21   Historical Provider, MD   meclizine (ANTIVERT) 25 MG tablet Take 25 mg by mouth as needed 4/19/21   Historical Provider, MD   pramipexole (MIRAPEX) 0.125 MG tablet  7/13/21   Historical Provider, MD   aspirin 81 MG EC tablet Take 1 tablet by mouth daily 6/17/21   Bianca Juarez MD   carvedilol (COREG) 3.125 MG tablet Take 1 tablet by mouth 2 times daily (with meals)  Patient taking differently: Take 3.125 mg by mouth daily  6/17/21   Bianca Juarez MD   midodrine (PROAMATINE) 10 MG tablet Take 1 tablet by mouth 3 times daily (with meals) 6/17/21   Bianca Juarez MD   nitroGLYCERIN (NITROSTAT) 0.4 MG SL tablet up to max of 3 total doses.  If no relief after 1 dose, call 911. 5/26/21   ALBERTO Gonzales - CNP   sodium bicarbonate 325 MG tablet Take 325 mg by mouth 2 times daily    Historical Provider, MD   ondansetron (ZOFRAN ODT) 4 MG disintegrating tablet Take 2 tablets by mouth every 8 hours as needed for Nausea or Vomiting 5/13/21   ALBERTO Patel   lidocaine-prilocaine (EMLA) 2.5-2.5 % cream APPLY TO PORT AREA AND COVERWITH PLASTIC WRAP ONE HOUR PRIOR TO TREATMENT  Patient not taking: Reported on 9/20/2021 4/19/21   ALBERTO Patel   apixaban (ELIQUIS) 2.5 MG TABS tablet Take 2.5 mg by mouth 2 times daily    Historical Provider, MD   docusate sodium (COLACE) 100 MG capsule Take 1 capsule by mouth 2 times daily 1/3/20   Jerry Sneed MD   allopurinol (ZYLOPRIM) 100 MG tablet Take 100 mg by mouth daily Taken 1 time a day now    Historical Provider, MD   levothyroxine (SYNTHROID) 112 MCG tablet Take 112 mcg by mouth Daily    Historical Provider, MD   simvastatin (ZOCOR) 20 MG tablet Take 20 mg by mouth nightly    Historical Provider, MD       Allergies:    Penicillins    Social History:    The patient currently lives [facility] Tobacco:   reports that she has never smoked. She has never used smokeless tobacco.  Alcohol:   reports no history of alcohol use. Illicit Drugs: unable to provide history, according to record no history     Family History:      Problem Relation Age of Onset    Colon Cancer Brother     Cancer Mother         Breast Cancer    Heart Attack Father     Colon Polyps Neg Hx     Esophageal Cancer Neg Hx     Liver Cancer Neg Hx     Liver Disease Neg Hx     Rectal Cancer Neg Hx     Stomach Cancer Neg Hx        Review of Systems:   Review of Systems   Unable to perform ROS: Mental status change        14 point review of systems is negative except as specifically addressed above. Physical Examination:  /68   Pulse 68   Temp 96 °F (35.6 °C) (Axillary)   Resp 16   Ht 5' 3\" (1.6 m)   Wt 157 lb (71.2 kg)   SpO2 98%   BMI 27.81 kg/m²   Physical Exam  Constitutional:       General: She is not in acute distress. HENT:      Head: Normocephalic. Comments: C-Collar in place      Nose: No congestion or rhinorrhea. Mouth/Throat:      Mouth: Mucous membranes are dry. Eyes:      Conjunctiva/sclera: Conjunctivae normal.      Pupils: Pupils are equal, round, and reactive to light. Neck:      Comments: C2 fracture   Cardiovascular:      Rate and Rhythm: Normal rate and regular rhythm. Pulses: Normal pulses. Heart sounds: Normal heart sounds. No murmur heard. Pulmonary:      Effort: Pulmonary effort is normal. No respiratory distress. Breath sounds: Normal breath sounds. Chest:      Chest wall: No tenderness. Abdominal:      General: Bowel sounds are normal. There is no distension. Palpations: Abdomen is soft. Tenderness: There is no abdominal tenderness. There is no guarding or rebound. Musculoskeletal:         General: Swelling present. Cervical back: Signs of trauma present. Decreased range of motion. Right lower le+ Pitting Edema present.       Left lower le+ Pitting Edema present. Skin:     General: Skin is warm and dry. Capillary Refill: Capillary refill takes less than 2 seconds. Coloration: Skin is pale. Neurological:      Mental Status: She is alert. She is disoriented and confused. GCS: GCS eye subscore is 3. GCS verbal subscore is 4. GCS motor subscore is 5. Comments: Unable to follow commands   Garbled speech     Psychiatric:         Mood and Affect: Mood normal.          Diagnostic Data:  CBC:  Recent Labs     10/18/21  1747   WBC 23.2*   HGB 9.9*   HCT 31.8*   *     BMP:  Recent Labs     10/18/21  1747      K 5.1*      CO2 23   *   CREATININE 3.4*   CALCIUM 9.6     Recent Labs     10/18/21  1747   AST 30   ALT 12   BILITOT 0.5   ALKPHOS 892*     Coag Panel: No results for input(s): INR, PROTIME, APTT in the last 72 hours. Cardiac Enzymes:   Recent Labs     10/18/21  1747 10/18/21  2036   TROPONINI 0.04* 0.03     ABGs:  Lab Results   Component Value Date    PHART 7.390 2021    PO2ART 52.0 2021    CNW5QHO 42.0 2021     Urinalysis:  Lab Results   Component Value Date    NITRU Negative 10/18/2021    WBCUA 1 10/18/2021    BACTERIA NEGATIVE 10/18/2021    RBCUA 1 10/18/2021    BLOODU Negative 10/18/2021    SPECGRAV 1.015 10/18/2021    GLUCOSEU Negative 10/18/2021       CT HEAD WO CONTRAST    Result Date: 10/18/2021  EXAM: CT HEAD WO CONTRAST -- 10/18/2021 6:30 PM HISTORY: 87 years, Female, altered mental status COMPARISON: 2021 DLP: 718 mGy cm. Automated exposure control was utilized to minimize patient radiation dose. TECHNIQUE: Unenhanced axial CT images obtained from vertex to skull base with coronal and sagittal reformats. FINDINGS: No acute intracranial hemorrhage, midline shift, mass effect or large territory cortical infarct. Gray-white matter differentiation maintained. Ventricles, sulci and basilar cisterns with proportional prominence suggesting volume loss.  Density of the left vertebral artery appears similar to prior 12/1/2020 and is favored to be due to calcified atherosclerosis. Thinning of the ocular lenses may be postoperative or age-related. Paranasal sinuses and mastoid air cells normally aerated. External auditory canals within normal limits. Calvarium appears intact. Subcutaneous tissues within normal limits. 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 1. No acute intracranial findings. 2. Similar volume loss. 3. Calcified atherosclerosis. Signed by Dr Saida Fitch    XR CHEST PORTABLE    Result Date: 10/18/2021  EXAM: XR CHEST PORTABLE -- 10/18/2021 5:55 PM HISTORY: 87 years, Female, altered mental status COMPARISON: 9/30/2021 TECHNIQUE:  1 images. Frontal view of the chest. FINDINGS:  Right chest port with grossly intact catheter, tip projects at mid SVC. No pneumothorax. Left basilar opacity. Borderline cardiac silhouette. Calcified aortic atherosclerosis. Upper mediastinum within normal limits. Deformity of the right clavicle appears chronic. Degenerative changes of the bilateral shoulders with left inferior recess joint body. No acute bony finding. 1. Left lower lung opacity, could represent infection, atelectasis or small effusion.  Signed by Dr Saida Fitch      Assessment/Plan:  Active Problems:    Community acquired pneumonia of left lower lobe of lung  -- IV abx               - Supplemental oxygen as needed              - Follow blood cultures    -elevate head of bed     Acute kidney injury superimposed on chronic kidney disease ([address]   -urine studies    -urinalysis & culture   -renal ultrasound    - Nephrology consultation and recommendations appreciated              - Monitor I's and O's closely              - Monitor labs closely              - Avoid hypotension              - Avoid nephrotoxic agents   -daily weight    Hyperkalemia   -noted   -daily labs     Acute metabolic encephalopathy   -fall precautions   -bed alarm    -PT evaluation -nursing swallow evaluation prior to diet initiation   Palliative care    -noted   -consultation, known to palliative      DVT prophylactic: Eliquis     Signed:  ALBERTO Cohen - CNP, 10/19/2021 12:23 AM

## 2021-10-19 NOTE — ED NOTES
Report called to 3rd floor at this time. Once room is clean Patti Esteban will send patient upstairs.      Cookie Jerry RN  10/18/21 [id number]

## 2021-10-19 NOTE — PROGRESS NOTES
[facility] staff updated on pt admitted to [facility] and has consult for nephrology w/ renal ultrasound scheduled (also has pneumonia);  Staff mentioned pt had 2nd covid vaccine dose on March 22, 2021 (unknown which brand since pt was not a resident @ that time)

## 2021-10-19 NOTE — CARE COORDINATION
10/19/21: Pt is from [facility]; waiting on bedhold status  [facility]  [phone]  Electronically signed by LEXY Farah on 10/19/2021 at 4:00 PM

## 2021-10-19 NOTE — PROGRESS NOTES
Pharmacy Note  Vancomycin Consult    Treivn Stewart is a 80 y.o. female started on Vancomycin for HAP; consult received from Julia Rush to manage therapy. Active Problems:    * No active hospital problems. *  Resolved Problems:    * No resolved hospital problems. *      Allergies:  Penicillins     Temp max: 96    Recent Labs     10/18/21  1747   *       Recent Labs     10/18/21  1747   CREATININE 3.4*       Recent Labs     10/18/21  1747   WBC 23.2*       No intake or output data in the 24 hours ending 10/18/21 1909    Culture Date Source Results   10/18/21 Blood x 2 Ordered       Ht Readings from Last 1 Encounters:   10/18/21 5' 3\" (1.6 m)        Wt Readings from Last 1 Encounters:   10/18/21 157 lb (71.2 kg)         Body mass index is 27.81 kg/m². Estimated Creatinine Clearance: 11 mL/min (A) (based on SCr of 3.4 mg/dL (H)). Assessment/Plan:  Will initiate vancomycin pulse dosing. Timing of trough level will be determined based on culture results, renal function, and clinical response. Thank you for the consult. Will continue to follow.     Electronically signed by Yuli Benton, [facility] on 10/18/2021 at 7:09 PM

## 2021-10-19 NOTE — CONSULTS
Palliative Care:   Pt is known to palliative care team.  Attempted earlier visit. Pt not answering questions, seemed confused. Pt was resting in bed, c collar in place. Call made to dtr for updated information. Pt presents to ed from [facility] d/t abn labs. Elevated creatinine and BUN. Pt has hx of CHF as well as stage 4 kidney disease. Recent admission 9/30-10/4 d/t fall causing fx hip and c-2 fx. Pt was dc's do ividence at that time for Skilled care           Past Medical History:        Past Medical History:   Diagnosis Date    Ab[facility] (AAA) ([facility]     Anemia     Arthritis     Cancer ([facility] 12/01/2020    pancreatic    CHF (congestive heart failure) (HCC)     Chronic kidney disease     DVT of lower extremity (deep venous thrombosis) (HCC)     twice 2010 and 2017    Hyperlipidemia     Hypertension     Kidney stones     Osteoarthritis     Palliative care patient 12/02/2020    Thyroid disease     Thyroid disorder        Advance Directives:  DNR-CCA  AD on file. Reviewed with dtr. No changes. Pain/Other Symptoms:    Dtr tells me she is with pt at present time and she seem uncomfortable d/t back pain. Encouraged her to ask for Tylenol which is ordered. States she will ask pt nurse. Activity:  As ilene           Psychological/Spiritual:   Dtr tells me pt lives with her son and this dtr and family also are in the home to assist with pt care. States good spiritual support. Plan:  Medical management, nephrology cons      Patient/family discussion r/t goals:  When pt is not in [facility] she is seen by [facility] team.  Dtr tells me she is hopeful pt will rehab soon and come home. SHe states \"They told me they would get her back on her walker\", Dtr tells me \"when that happens we got the rest\". Family reports that prior to fall pt was able to give herself a shower with only assist of set up. Pt does need help with getting dressed.   She also reports pt was seen by Orthopaedic rececently and does not have to return unless she is having pain or problems. Palliative will continue to follow for support. Goal per dtr is to complete rehab, get pt back on her walker and home.         Electronically signed by Kalyani Mayen RN on 10/19/2021 at 1:00 PM

## 2021-10-20 ENCOUNTER — APPOINTMENT (OUTPATIENT)
Dept: CT IMAGING | Age: 86
DRG: 871 | End: 2021-10-20
Payer: MEDICARE

## 2021-10-20 PROBLEM — R41.0 DISORIENTATION: Status: ACTIVE | Noted: 2021-10-20

## 2021-10-20 LAB
ALBUMIN SERPL-MCNC: 2 G/DL (ref 3.5–5.2)
ALP BLD-CCNC: 698 U/L (ref 35–104)
ALT SERPL-CCNC: 9 U/L (ref 5–33)
ANION GAP SERPL CALCULATED.3IONS-SCNC: 15 MMOL/L (ref 7–19)
AST SERPL-CCNC: 18 U/L (ref 5–32)
BASOPHILS ABSOLUTE: 0.1 K/UL (ref 0–0.2)
BASOPHILS RELATIVE PERCENT: 0.2 % (ref 0–1)
BILIRUB SERPL-MCNC: 0.5 MG/DL (ref 0.2–1.2)
BUN BLDV-MCNC: 99 MG/DL (ref 8–23)
CALCIUM SERPL-MCNC: 8.6 MG/DL (ref 8.8–10.2)
CHLORIDE BLD-SCNC: 106 MMOL/L (ref 98–111)
CO2: 20 MMOL/L (ref 22–29)
CREAT SERPL-MCNC: 3 MG/DL (ref 0.5–0.9)
EOSINOPHILS ABSOLUTE: 0.1 K/UL (ref 0–0.6)
EOSINOPHILS RELATIVE PERCENT: 0.4 % (ref 0–5)
GFR AFRICAN AMERICAN: 18
GFR NON-AFRICAN AMERICAN: 15
GLUCOSE BLD-MCNC: 165 MG/DL (ref 74–109)
HCT VFR BLD CALC: 27.9 % (ref 37–47)
HEMOGLOBIN: 8.9 G/DL (ref 12–16)
IMMATURE GRANULOCYTES #: 0.4 K/UL
LACTIC ACID: 2.1 MMOL/L (ref 0.5–1.9)
LYMPHOCYTES ABSOLUTE: 0.6 K/UL (ref 1.1–4.5)
LYMPHOCYTES RELATIVE PERCENT: 2.7 % (ref 20–40)
MAGNESIUM: 1.9 MG/DL (ref 1.6–2.4)
MCH RBC QN AUTO: 31.8 PG (ref 27–31)
MCHC RBC AUTO-ENTMCNC: 31.9 G/DL (ref 33–37)
MCV RBC AUTO: 99.6 FL (ref 81–99)
MONOCYTES ABSOLUTE: 1.1 K/UL (ref 0–0.9)
MONOCYTES RELATIVE PERCENT: 4.5 % (ref 0–10)
NEUTROPHILS ABSOLUTE: 21.2 K/UL (ref 1.5–7.5)
NEUTROPHILS RELATIVE PERCENT: 90.6 % (ref 50–65)
PDW BLD-RTO: 22.5 % (ref 11.5–14.5)
PHOSPHORUS: 5.8 MG/DL (ref 2.5–4.5)
PLATELET # BLD: 371 K/UL (ref 130–400)
PMV BLD AUTO: 11.5 FL (ref 9.4–12.3)
POTASSIUM REFLEX MAGNESIUM: 4.5 MMOL/L (ref 3.5–5)
RBC # BLD: 2.8 M/UL (ref 4.2–5.4)
SODIUM BLD-SCNC: 141 MMOL/L (ref 136–145)
TOTAL PROTEIN: 5.5 G/DL (ref 6.6–8.7)
WBC # BLD: 23.4 K/UL (ref 4.8–10.8)

## 2021-10-20 PROCEDURE — 83605 ASSAY OF LACTIC ACID: CPT

## 2021-10-20 PROCEDURE — 99222 1ST HOSP IP/OBS MODERATE 55: CPT | Performed by: PHYSICIAN ASSISTANT

## 2021-10-20 PROCEDURE — 80053 COMPREHEN METABOLIC PANEL: CPT

## 2021-10-20 PROCEDURE — 92507 TX SP LANG VOICE COMM INDIV: CPT

## 2021-10-20 PROCEDURE — 87641 MR-STAPH DNA AMP PROBE: CPT

## 2021-10-20 PROCEDURE — 85025 COMPLETE CBC W/AUTO DIFF WBC: CPT

## 2021-10-20 PROCEDURE — 6370000000 HC RX 637 (ALT 250 FOR IP): Performed by: INTERNAL MEDICINE

## 2021-10-20 PROCEDURE — 6370000000 HC RX 637 (ALT 250 FOR IP): Performed by: HOSPITALIST

## 2021-10-20 PROCEDURE — 70450 CT HEAD/BRAIN W/O DYE: CPT

## 2021-10-20 PROCEDURE — 6360000002 HC RX W HCPCS: Performed by: NURSE PRACTITIONER

## 2021-10-20 PROCEDURE — 92526 ORAL FUNCTION THERAPY: CPT

## 2021-10-20 PROCEDURE — 1210000000 HC MED SURG R&B

## 2021-10-20 PROCEDURE — 74176 CT ABD & PELVIS W/O CONTRAST: CPT

## 2021-10-20 PROCEDURE — 83735 ASSAY OF MAGNESIUM: CPT

## 2021-10-20 PROCEDURE — 84100 ASSAY OF PHOSPHORUS: CPT

## 2021-10-20 PROCEDURE — 2580000003 HC RX 258: Performed by: NURSE PRACTITIONER

## 2021-10-20 PROCEDURE — 2580000003 HC RX 258: Performed by: INTERNAL MEDICINE

## 2021-10-20 PROCEDURE — 2580000003 HC RX 258: Performed by: HOSPITALIST

## 2021-10-20 PROCEDURE — 6360000002 HC RX W HCPCS: Performed by: HOSPITALIST

## 2021-10-20 PROCEDURE — 6360000002 HC RX W HCPCS: Performed by: INTERNAL MEDICINE

## 2021-10-20 RX ORDER — SODIUM BICARBONATE 650 MG/1
650 TABLET ORAL 3 TIMES DAILY
Status: DISCONTINUED | OUTPATIENT
Start: 2021-10-20 | End: 2021-10-23 | Stop reason: HOSPADM

## 2021-10-20 RX ORDER — CALCITRIOL 0.25 UG/1
0.25 CAPSULE, LIQUID FILLED ORAL DAILY
Status: DISCONTINUED | OUTPATIENT
Start: 2021-10-20 | End: 2021-10-23 | Stop reason: HOSPADM

## 2021-10-20 RX ADMIN — ATORVASTATIN CALCIUM 10 MG: 10 TABLET, FILM COATED ORAL at 21:06

## 2021-10-20 RX ADMIN — SODIUM CHLORIDE: 9 INJECTION, SOLUTION INTRAVENOUS at 10:46

## 2021-10-20 RX ADMIN — MIDODRINE HYDROCHLORIDE 10 MG: 10 TABLET ORAL at 10:46

## 2021-10-20 RX ADMIN — APIXABAN 2.5 MG: 5 TABLET, FILM COATED ORAL at 21:06

## 2021-10-20 RX ADMIN — SODIUM CHLORIDE, PRESERVATIVE FREE 10 ML: 5 INJECTION INTRAVENOUS at 21:07

## 2021-10-20 RX ADMIN — SODIUM BICARBONATE 650 MG: 650 TABLET ORAL at 21:06

## 2021-10-20 RX ADMIN — CARVEDILOL 3.12 MG: 3.12 TABLET, FILM COATED ORAL at 10:46

## 2021-10-20 RX ADMIN — PRAMIPEXOLE DIHYDROCHLORIDE 0.12 MG: 0.12 TABLET ORAL at 21:06

## 2021-10-20 RX ADMIN — LEVOTHYROXINE SODIUM 112 MCG: 112 TABLET ORAL at 10:47

## 2021-10-20 RX ADMIN — SODIUM BICARBONATE 650 MG: 650 TABLET ORAL at 18:30

## 2021-10-20 RX ADMIN — CARVEDILOL 3.12 MG: 3.12 TABLET, FILM COATED ORAL at 18:30

## 2021-10-20 RX ADMIN — MIDODRINE HYDROCHLORIDE 10 MG: 10 TABLET ORAL at 18:30

## 2021-10-20 RX ADMIN — VANCOMYCIN HYDROCHLORIDE 1500 MG: 10 INJECTION, POWDER, LYOPHILIZED, FOR SOLUTION INTRAVENOUS at 00:20

## 2021-10-20 RX ADMIN — APIXABAN 2.5 MG: 5 TABLET, FILM COATED ORAL at 10:46

## 2021-10-20 RX ADMIN — EPOETIN ALFA-EPBX 3000 UNITS: 3000 INJECTION, SOLUTION INTRAVENOUS; SUBCUTANEOUS at 10:47

## 2021-10-20 RX ADMIN — SODIUM CHLORIDE, PRESERVATIVE FREE 2000 MG: 5 INJECTION INTRAVENOUS at 18:30

## 2021-10-20 RX ADMIN — FOLIC ACID 1 MG: 1 TABLET ORAL at 10:46

## 2021-10-20 RX ADMIN — SODIUM BICARBONATE 650 MG: 650 TABLET ORAL at 10:46

## 2021-10-20 RX ADMIN — ALLOPURINOL 100 MG: 100 TABLET ORAL at 10:46

## 2021-10-20 RX ADMIN — DOCUSATE SODIUM 100 MG: 100 CAPSULE, LIQUID FILLED ORAL at 21:06

## 2021-10-20 ASSESSMENT — ENCOUNTER SYMPTOMS
SORE THROAT: 0
WHEEZING: 0
ABDOMINAL DISTENTION: 1
SINUS PAIN: 0
DIARRHEA: 0
SHORTNESS OF BREATH: 1
NAUSEA: 0
CONSTIPATION: 0
BLOOD IN STOOL: 0
ABDOMINAL PAIN: 0
COUGH: 0
TROUBLE SWALLOWING: 0
VOMITING: 0

## 2021-10-20 NOTE — PROGRESS NOTES
Nephrology ([facility]) Progress Note      Patient:  Riaz Cortes  YOB: 1934  Date of Service: 10/20/2021  MRN: [id number]   Acct: [de-identified]   Primary Care Physician: Bradford Carmen MD  Advance Directive: DNR-CCA  Admit Date: 10/18/2021       Hospital Day: 2  Referring Provider: Cathy Cao MD    Patient independently seen and examined, Chart, Consults, Notes, Operative notes, Labs, Cardiology, and Radiology studies reviewed as available. Chief complaint: Abnormal labs. Subjective:  Riaz Cortes is a 80 y.o. female for whom we were consulted for evaluation and treatment of acute kidney injury. Patient has history of stage IV chronic kidney disease and follows Dr. Marcus Vega in the office. Her last estimated GFR was 25 mL. She has history of chronic systolic CHF, DVT, hypertension, renal stone, osteoarthritis, abdominal aortic aneurysm. Patient is currently a resident of [address] [place] [address] under palliative care. Patient was just discharged on October 4, when she was admitted after a fall, right proximal femur fracture and C2 fracture. She is currently wearing c-collar. Presented yesterday with abnormal labs as her white count was 23,000, severe acidosis and worsening of renal function. Her chest x-ray confirmed left lower lobe pneumonia. She is now admitted with acute kidney injury. Patient is complaining of mild shortness of breath, denies nausea or vomiting. She is poor historian. This morning patient is more alert and awake and was able to answer few questions. He denies any shortness of breath.     Allergies:  Penicillins    Medicines:  Current Facility-Administered Medications   Medication Dose Route Frequency Provider Last Rate Last Admin    sodium chloride flush 0.9 % injection 5-40 mL  5-40 mL IntraVENous 2 times per day Anatoly Simmons MD   10 mL at 10/19/21 2047    sodium chloride flush 0.9 % injection 5-40 mL  5-40 mL IntraVENous PRN evidence of dysplasia. Family History  Family History   Problem Relation Age of Onset    Colon Cancer Brother     Cancer Mother         Breast Cancer    Heart Attack Father     Colon Polyps Neg Hx     Esophageal Cancer Neg Hx     Liver Cancer Neg Hx     Liver Disease Neg Hx     Rectal Cancer Neg Hx     Stomach Cancer Neg Hx        Social History  Social History     Socioeconomic History    Marital status:      Spouse name: Not on file    Number of children: 2    Years of education: Not on file    Highest education level: Not on file   Occupational History    Not on file   Tobacco Use    Smoking status: Never Smoker    Smokeless tobacco: Never Used   Vaping Use    Vaping Use: Never used   Substance and Sexual Activity    Alcohol use: No    Drug use: No    Sexual activity: Not on file   Other Topics Concern    Not on file   Social History Narrative    Not on file     Social Determinants of Health     Financial Resource Strain:     Difficulty of Paying Living Expenses:    Food Insecurity:     Worried About Running Out of Food in the Last Year:     [address] in the Last Year:    Transportation Needs:     Lack of Transportation (Medical):      Lack of Transportation (Non-Medical):    Physical Activity:     Days of Exercise per Week:     Minutes of Exercise per Session:    Stress:     Feeling of Stress :    Social Connections:     Frequency of Communication with Friends and Family:     Frequency of Social Gatherings with Friends and Family:     Attends [employer] Services:     Active Member of Clubs or Organizations:     Attends Club or Organization Meetings:     Marital Status:    Intimate Partner Violence:     Fear of Current or Ex-Partner:     Emotionally Abused:     Physically Abused:     Sexually Abused:          Review of Systems:  History obtained from chart review and the patient  General ROS: Weak with mild fever  Respiratory ROS: positive for - shortness of breath and sputum changes  Cardiovascular ROS: No chest pain or palpitations  Gastrointestinal ROS: No abdominal pain or melena  Genito-Urinary ROS: No dysuria or hematuria  Musculoskeletal ROS: No joint pain or swelling   14 point ROS reviewed with the patient and negative except as noted above and in the HPI unless unable to obtain. Objective:  Patient Vitals for the past 24 hrs:   BP Temp Temp src Pulse Resp SpO2   10/20/21 0631 138/81 96.2 °F (35.7 °C) Temporal 65 16 95 %   10/20/21 0200 -- 96.3 °F (35.7 °C) Temporal -- -- --   10/20/21 0156 121/63 94.5 °F (34.7 °C) Axillary 65 14 95 %   10/19/21 1744 123/67 96.4 °F (35.8 °C) Temporal 70 14 96 %   10/19/21 1209 (!) 106/59 96.6 °F (35.9 °C) Temporal 74 16 94 %       Intake/Output Summary (Last 24 hours) at 10/20/2021 2776  Last data filed at 10/19/2021 1430  Gross per 24 hour   Intake 240 ml   Output --   Net 240 ml     General: awake/alert   HEENT: Normocephalic atraumatic head  Neck: Supple with no JVD or carotid bruits. Chest:  clear to auscultation bilaterally  CVS: regular rate and rhythm  Abdominal: soft, nontender, normal bowel sounds  Extremities: no cyanosis or edema  Skin: warm and dry without rash      Labs:  BMP:   Recent Labs     10/18/21  1747 10/19/21  0421 10/20/21  0435    141 141   K 5.1* 4.6 4.5    106 106   CO2 23 21* 20*   PHOS  --   --  5.8*   * 100* 99*   CREATININE 3.4* 3.4* 3.0*   CALCIUM 9.6 9.2 8.6*     CBC:   Recent Labs     10/18/21  1747 10/19/21  0506 10/20/21  0435   WBC 23.2* 25.5* 23.4*   HGB 9.9* 9.9* 8.9*   HCT 31.8* 31.7* 27.9*   .0* 99.1* 99.6*   * 417* 371     LIVER PROFILE:   Recent Labs     10/18/21  1747 10/20/21  0435   AST 30 18   ALT 12 9   BILITOT 0.5 0.5   ALKPHOS 892* 698*     PT/INR: No results for input(s): PROTIME, INR in the last 72 hours. APTT: No results for input(s): APTT in the last 72 hours. BNP:  No results for input(s): BNP in the last 72 hours.   Ionized Calcium:No results for input(s): IONCA in the last 72 hours. Magnesium:  Recent Labs     10/20/21  0435   MG 1.9     Phosphorus:  Recent Labs     10/20/21  0435   PHOS 5.8*     HgbA1C: No results for input(s): LABA1C in the last 72 hours. Hepatic:   Recent Labs     10/18/21  1747 10/20/21  0435   ALKPHOS 892* 698*   ALT 12 9   AST 30 18   PROT 6.3* 5.5*   BILITOT 0.5 0.5   LABALBU 2.2* 2.0*     Lactic Acid:   Recent Labs     10/20/21  0435   LACTA 2.1*     Troponin: No results for input(s): CKTOTAL, CKMB, TROPONINT in the last 72 hours. ABGs: No results for input(s): PH, PCO2, PO2, HCO3, O2SAT in the last 72 hours. CRP:  No results for input(s): CRP in the last 72 hours. Sed Rate:  No results for input(s): SEDRATE in the last 72 hours. Cultures:   No results for input(s): CULTURE in the last 72 hours. Recent Labs     10/18/21  1747 10/18/21  1808   BC No Growth to date. Any change in status will be called. --    BLOODCULT2  --  No Growth to date. Any change in status will be called. No results for input(s): CXSURG in the last 72 hours. Radiology reports as per the Radiologist  Radiology: CT HEAD WO CONTRAST    Result Date: 10/18/2021  EXAM: CT HEAD WO CONTRAST -- 10/18/2021 6:30 PM HISTORY: 87 years, Female, altered mental status COMPARISON: 9/30/2021 DLP: 718 mGy cm. Automated exposure control was utilized to minimize patient radiation dose. TECHNIQUE: Unenhanced axial CT images obtained from vertex to skull base with coronal and sagittal reformats. FINDINGS: No acute intracranial hemorrhage, midline shift, mass effect or large territory cortical infarct. Gray-white matter differentiation maintained. Ventricles, sulci and basilar cisterns with proportional prominence suggesting volume loss. Density of the left vertebral artery appears similar to prior 12/1/2020 and is favored to be due to calcified atherosclerosis. Thinning of the ocular lenses may be postoperative or age-related.  Paranasal sinuses and mastoid air cells normally aerated. External auditory canals within normal limits. Calvarium appears intact. Subcutaneous tissues within normal limits. 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 1. No acute intracranial findings. 2. Similar volume loss. 3. Calcified atherosclerosis. Signed by Dr Jayden Johnson RENAL COMPLETE    Result Date: 10/19/2021  Examination. US RENAL COMPLETE 10/19/2021 6:50 AM History: Acute renal injury on chronic renal disease. History of pancreatic malignancy The ultrasound examination of the abdomen for the kidneys is performed. There is no previous similar study for comparison. The correlation made with CT scan of the abdomen dated 4/13/2021. The right kidney measuring 9.4 x 3.3 x 4 cm. There is moderate lobulation of renal contour. No discrete mass. The renal cortex measures 1 cm in maximum thickness. The left kidney is not visualized and is obscured by the extensive bowel gas. There is evidence of hepatomegaly with multiple relatively hypoechoic lobulated nodule/masses, the largest one measuring 2.2 cm. These were not noted in the previous CT scan of the abdomen in April 2021. A normal relatively small right kidney. Left kidney is not visualized. Hepatomegaly and hepatic metastatic disease. Signed by Dr All Marie    Result Date: 10/18/2021  EXAM: XR CHEST PORTABLE -- 10/18/2021 5:55 PM HISTORY: 87 years, Female, altered mental status COMPARISON: 9/30/2021 TECHNIQUE:  1 images. Frontal view of the chest. FINDINGS:  Right chest port with grossly intact catheter, tip projects at mid SVC. No pneumothorax. Left basilar opacity. Borderline cardiac silhouette. Calcified aortic atherosclerosis. Upper mediastinum within normal limits. Deformity of the right clavicle appears chronic. Degenerative changes of the bilateral shoulders with left inferior recess joint body. No acute bony finding.     1. Left lower lung opacity, could represent infection, atelectasis or small effusion. Signed by Dr Carmine Lara   1. Acute kidney injury stage II./Improving. 2.  Acute tubular necrosis. 3.  Stage IV chronic kidney disease baseline. 4.  Hypertensive renal disease. 5.  Left lower lobe pneumonia. 6.  Mild hyperkalemia. 7.  Metabolic encephalopathy. 8.  Metabolic acidosis. 9.  Failure to thrive. 10.  Anemia in chronic kidney disease. 11.  Secondary hyperparathyroidism. Plan:  1. Continue hydration. 2.  BARBARA subcu  3.  P.o. calcitriol  4. IV antibiotics.           Gianfranco Stovall MD  10/20/21  9:03 AM

## 2021-10-20 NOTE — PROGRESS NOTES
[facility]      Patient:  Jessica Alegria  YOB: 1934  Date of Service: 10/20/2021  MRN: [id number]   Acct: [de-identified]   Primary Care Physician: Zheng Tapia MD  Advance Directive: DNR-CCA  Admit Date: 10/18/2021       Hospital Day: 2  Portions of this note have been copied forward, however, changed to reflect the most current clinical status of this patient. CHIEF COMPLAINT abnormal labs    SUBJECTIVE:  Less responsive today, which is abnormal from baseline. Mumbling and garbled speech-unable to understand. She does say \"no\" when asked if she's hurting. [address] [place] [address]  Admitted on 10/19/21 for NIKHIL superimposed on CKD. She was also found to have LLL pneumonia. Renal US was performed and had an incidental finding of hepatic metastatic disease and hepatomegaly. Being treated with vancomycin. Nephrology following. She is a palliative care pt due to multiple recent hospital stays and they have been consulted. 10/20/21-BUN-99, Creatinine-3.0. Albumin 2.0 ALK phos 698. Continues to have leukocytosis with a WBC-23.4. Palliative care and family report a change in mental status from baseline. Unsure of known cause. CT of head to r/o CVA CT of abdomen to assess extent of metastatic disease. Review of Systems:   Review of Systems   Constitutional: Positive for activity change and fatigue. Negative for chills and diaphoresis. HENT: Negative for congestion, ear pain, sinus pain, sore throat and trouble swallowing. Eyes: Negative for visual disturbance. Respiratory: Positive for shortness of breath. Negative for cough and wheezing. Cardiovascular: Negative for chest pain, palpitations and leg swelling. Gastrointestinal: Positive for abdominal distention. Negative for abdominal pain, blood in stool, constipation, diarrhea, nausea and vomiting. Endocrine: Negative for cold intolerance and heat intolerance.    Genitourinary: Negative for difficulty urinating, flank pain, frequency and urgency. Musculoskeletal: Negative for arthralgias and myalgias. Neurological: Negative for dizziness, syncope, weakness, light-headedness, numbness and headaches. Hematological: Does not bruise/bleed easily. Psychiatric/Behavioral: Negative for agitation, confusion and dysphoric mood. 14 point review of systems is negative except as specifically addressed above. Objective:   VITALS:  /79   Pulse 69   Temp 97.5 °F (36.4 °C) (Temporal)   Resp 20   Ht 5' 3\" (1.6 m)   Wt 156 lb 9.6 oz (71 kg)   SpO2 96%   BMI 27.74 kg/m²   24HR INTAKE/OUTPUT:    Intake/Output Summary (Last 24 hours) at 10/20/2021 1320  Last data filed at 10/20/2021 0950  Gross per 24 hour   Intake 270 ml   Output --   Net 270 ml       Physical Exam  Constitutional:       General: She is not in acute distress. Appearance: Normal appearance. She is obese. She is ill-appearing (Chronically). She is not toxic-appearing or diaphoretic. HENT:      Head: Normocephalic and atraumatic. Right Ear: External ear normal.      Left Ear: External ear normal.      Nose: Nose normal. No congestion or rhinorrhea. Mouth/Throat:      Mouth: Mucous membranes are moist.      Pharynx: Oropharynx is clear. Eyes:      General: No scleral icterus. Extraocular Movements: Extraocular movements intact. Conjunctiva/sclera: Conjunctivae normal.   Neck:      Comments: C-collar in place due to recent C2 fx  Cardiovascular:      Rate and Rhythm: Normal rate and regular rhythm. Pulses: Normal pulses. Heart sounds: Normal heart sounds. No murmur heard. No friction rub. No gallop. Pulmonary:      Effort: Pulmonary effort is normal. No respiratory distress. Breath sounds: Examination of the left-lower field reveals decreased breath sounds. Decreased breath sounds and rhonchi (Bilateral upper lobes) present. No wheezing or rales.       Comments: Diminished sound to left chest  Abdominal: General: Abdomen is flat. Bowel sounds are normal. There is no distension. Palpations: Abdomen is soft. There is hepatomegaly and splenomegaly. Tenderness: There is no abdominal tenderness. Musculoskeletal:         General: No swelling. Normal range of motion. Cervical back: Normal range of motion and neck supple. Right lower leg: No edema. Left lower leg: No edema. Skin:     General: Skin is warm and dry. Coloration: Skin is not jaundiced. Findings: No erythema, lesion or rash. Neurological:      General: No focal deficit present. Mental Status: She is alert. She is disoriented. Cranial Nerves: No cranial nerve deficit. Sensory: No sensory deficit. Motor: No weakness. Psychiatric:         Mood and Affect: Mood normal.         Behavior: Behavior normal.         Thought Content: Thought content normal.         Judgment: Judgment normal.             Medications:      sodium chloride      sodium chloride 100 mL/hr at 10/20/21 1046      sodium bicarbonate  650 mg Oral TID    epoetin rachel-epbx  3,000 Units SubCUTAneous Once per day on Mon Wed Fri    calcitRIOL  0.25 mcg Oral Daily    sodium chloride flush  5-40 mL IntraVENous 2 times per day    vancomycin (VANCOCIN) intermittent dosing (placeholder)   Other RX Placeholder    cefepime  2,000 mg IntraVENous Q24H    allopurinol  100 mg Oral Daily    apixaban  2.5 mg Oral BID    carvedilol  3.125 mg Oral BID WC    docusate sodium  100 mg Oral BID    folic acid  1 mg Oral Daily    levothyroxine  112 mcg Oral Daily    atorvastatin  10 mg Oral Nightly    pramipexole  0.125 mg Oral Nightly    midodrine  10 mg Oral TID      sodium chloride flush, sodium chloride, ondansetron **OR** ondansetron, acetaminophen **OR** acetaminophen, meclizine, nitroGLYCERIN  ADULT DIET;  Dysphagia - Pureed; Mildly Thick ([place])     Lab and other Data:     Recent Labs     10/18/21  1747 10/19/21  0506 10/20/21  4629 WBC 23.2* 25.5* 23.4*   HGB 9.9* 9.9* 8.9*   * 417* 371     Recent Labs     10/18/21  1747 10/19/21  0421 10/20/21  0435    141 141   K 5.1* 4.6 4.5    106 106   CO2 23 21* 20*   * 100* 99*   CREATININE 3.4* 3.4* 3.0*   GLUCOSE 156* 135* 165*     Recent Labs     10/18/21  1747 10/20/21  0435   AST 30 18   ALT 12 9   BILITOT 0.5 0.5   ALKPHOS 892* 698*     Troponin T:   Recent Labs     10/18/21  1747 10/18/21  2036   TROPONINI 0.04* 0.03     UA:  Recent Labs     10/18/21  1942   COLORU YELLOW   PHUR 8.0   WBCUA 1   RBCUA 1   BACTERIA NEGATIVE*   CLARITYU Clear   SPECGRAV 1.015   LEUKOCYTESUR TRACE*   UROBILINOGEN 1.0   BILIRUBINUR Negative   BLOODU Negative   GLUCOSEU Negative       RAD:   CT HEAD WO CONTRAST    Result Date: 10/18/2021  1. No acute intracranial findings. 2. Similar volume loss. 3. Calcified atherosclerosis. Signed by Dr Lexii Bhatia RENAL COMPLETE    Result Date: 10/19/2021  A normal relatively small right kidney. Left kidney is not visualized. Hepatomegaly and hepatic metastatic disease. Signed by Dr Shannan Serrano    Result Date: 10/18/2021  1. Left lower lung opacity, could represent infection, atelectasis or small effusion.  Signed by Dr Cordelia Gibson         Assessment/Plan   Principal Problem:    Acute kidney injury superimposed on chronic kidney disease ([facility] [address]   -Continue current POC    -Nephrology currently following-tx suggestions appreciated    -UA   -Trend daily labs     Active Problems:    Anemia of chronic kidney failure, stage 4 (severe) ([address]   -Defer to nephrology   -Trend labs   -continue epoetin as ordered      Malignant neoplasm of other parts of pancreas [facility])   -Palliative care consulted   -Will perform further CT to stage disease with abdominal and head CT   -Pending further dx family may consider home hospice per [facility] acquired pneumonia of left lower lobe of lung   -Continue Vanco   -trend labs Hyperkalemia   -Resolved   -Daily labs      Acute metabolic encephalopathy   -Origin unknown at this time.     -???sepsis vs. Metastatic disease vs. Hepatic   -Ongoing testing    To remain in C-collar except for hygiene and eating per Neurosurgery         Antibiotic: Vanco    DVT Prophylaxis: ALBERTO Cordova, 10/20/2021 1:20 PM

## 2021-10-20 NOTE — CONSULTS
Palliative Care Consult Note    10/20/2021 11:34 AM  Subjective:  Admit Date: 10/18/2021  PCP: Bradford Carmen MD    Date of Service: 10/20/2021    Reason for Consultation:  Goals of Care, Code Status, Family Support     History Obtained From: EMR/Patient and their Family    History Of Present Illness: The patient is a 80 y.o. 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 Further work up concerning for left lower lobe pneumonia. Ms. Opal Hernandez was admitted to Hospitalist service, placed on Vancomycin and Cefepime. Nephrology was consulted. Patient is normally alert and oriented to self, place, year and can carry on a conversation. She was encephalopathic upon arrival. CT head unremarkable. SLP has followed with recommendations for puree consistency with mildly thick/nectar thick liquids. Ms. Opal Hernandez remains dysarthric, confused at times. She can state her name. This is new. Palliative care consulted for goals of care, family support, symptom management. Ms. Opal Hernandez was last admitted on 09/30/2021 for a right subcapital transcervical fracture of the proximal right femur as well as an injury at C1/C2. She was evaluated by neurosurgery that admission and there was no need for surgical intervention in regards to her cervical injury. Aspen cervical collar was recommended for 6 weeks. She underwent right hemiarthroplasty on 10/04/2021 and was accepted to [facility] on 10/04/2021.      Past Medical History:        Diagnosis Date    Abdominal aortic aneurysm (AAA) ([facility]     Anemia     Arthritis     Cancer ([facility] 12/01/2020    pancreatic    CHF (congestive heart failure) (HCC)     Chronic kidney disease     DVT of lower extremity (deep venous thrombosis) ([address]     twice 2010 and 2017    Hyperlipidemia     Hypertension     Kidney stones     Osteoarthritis  Palliative care patient 12/02/2020    Thyroid disease     Thyroid disorder      Past Surgical History:        Procedure Laterality Date    COLONOSCOPY  2016    Dr Joshua Huang- diverticulosis    COLONOSCOPY  10/2018    Joshua Huang:  AP    ENDOSCOPIC ULTRASOUND (LOWER) N/A 12/03/2020    Dr JONNY Grijalva/unsuccessful biliary cannulation despite attempts at 2-wire cannulation and proph pancreatic stenting with cannulation around pancreatic stenting-Positive for high-grade adenocarcinoma, pancreatic head    ERCP N/A 12/03/2020    Dr JONNY Grijalva/unsuccessful biliary cannulation despite attempts at 2-wire cannulation and proph pancreatic stenting with cannulation around pancreatic stenting-Positive for high-grade adenocarcinoma, pancreatic head    ERCP  12/07/2020    Dr Nica Hernandez-becka/sphincterotomy, placement of a 10 x 60 partially covered biliary stent    HIP SURGERY Right 10/01/2021    RIGHT HIP HEMIARTHROPLASTY performed by Jeffrey Cheung MD at [address] Right 02/05/2021    SINGLE LUMEN PORT PLACEMENT WITH ULTRASOUND AND FLUORO performed by Aleisha Hwang MD at [address] [place] [address] TOE AMPUTATION Bilateral     pinky toes removed    TOTAL KNEE ARTHROPLASTY Right 01/03/2020    RIGHT TOTAL KNEE REPLACEMENT performed by Patience Lowe MD at [facility]  10/2018    Dr Joshua Huang- Chronic esophagogastritis with intestinal metaplasia. No evidence of dysplasia. Home Medications:  Prior to Admission medications    Medication Sig Start Date End Date Taking?  Authorizing Provider   bumetanide (BUMEX) 1 MG tablet Take 1 mg by mouth daily as needed (edema)   Yes Historical Provider, MD   Melatonin 5 MG CAPS Take 1 capsule by mouth nightly   Yes Historical Provider, MD   acetaminophen (TYLENOL) 325 MG tablet Take 650 mg by mouth every 6 hours as needed for Pain   Yes Historical Provider, MD   folic acid (FOLVITE) 1 MG tablet Take 1 tablet by mouth daily 10/5/21   Abbey Harris MD pantoprazole (PROTONIX) 40 MG tablet Take 1 tablet by mouth every morning (before breakfast)  Patient not taking: Reported on 10/13/2021 10/4/21   Liat Nolasco MD   polyethylene glycol [facility]) 17 GM/SCOOP powder Take 17 g by mouth daily  Patient not taking: Reported on 10/13/2021    Historical Provider, MD   lisinopril (PRINIVIL;ZESTRIL) 10 MG tablet Take 10 mg by mouth daily 6/11/21   Historical Provider, MD   meclizine (ANTIVERT) 25 MG tablet Take 25 mg by mouth as needed 4/19/21   Historical Provider, MD   pramipexole (MIRAPEX) 0.125 MG tablet Take by mouth daily as needed Restless legs 7/13/21   Historical Provider, MD   aspirin 81 MG EC tablet Take 1 tablet by mouth daily 6/17/21   Maddie Rodriguez MD   carvedilol (COREG) 3.125 MG tablet Take 1 tablet by mouth 2 times daily (with meals)  Patient taking differently: Take 3.125 mg by mouth daily Hold if sbp <100 or dbp <60 6/17/21   Maddie Rodriguez MD   midodrine (PROAMATINE) 10 MG tablet Take 1 tablet by mouth 3 times daily (with meals) 6/17/21   Maddie Rodriguez MD   nitroGLYCERIN (NITROSTAT) 0.4 MG SL tablet up to max of 3 total doses.  If no relief after 1 dose, call 911. 5/26/21   ALBERTO Thibodeaux - CNP   sodium bicarbonate 325 MG tablet Take 325 mg by mouth 2 times daily    Historical Provider, MD   ondansetron (ZOFRAN ODT) 4 MG disintegrating tablet Take 2 tablets by mouth every 8 hours as needed for Nausea or Vomiting 5/13/21   ALBERTO Patel   lidocaine-prilocaine (EMLA) 2.5-2.5 % cream APPLY TO PORT AREA AND COVERWITH PLASTIC WRAP ONE HOUR PRIOR TO TREATMENT  Patient not taking: Reported on 9/20/2021 4/19/21   ALBERTO Patel   apixaban (ELIQUIS) 2.5 MG TABS tablet Take 2.5 mg by mouth 2 times daily    Historical Provider, MD   docusate sodium (COLACE) 100 MG capsule Take 1 capsule by mouth 2 times daily 1/3/20   Antony Mayers MD   allopurinol (ZYLOPRIM) 100 MG tablet Take 100 mg by mouth daily Taken 1 time a day now Historical Provider, MD   levothyroxine (SYNTHROID) 112 MCG tablet Take 112 mcg by mouth Daily    Historical Provider, MD   simvastatin (ZOCOR) 20 MG tablet Take 20 mg by mouth nightly    Historical Provider, MD       Allergies:    Penicillins    Social History:    The patient currently lives at home. Discharged to [facility] after last admit with a goal of returning home  Tobacco:   reports that she has never smoked. She has never used smokeless tobacco.  Alcohol:   reports no history of alcohol use. Illicit Drugs: None    Family History:      Problem Relation Age of Onset    Colon Cancer Brother     Cancer Mother         Breast Cancer    Heart Attack Father     Colon Polyps Neg Hx     Esophageal Cancer Neg Hx     Liver Cancer Neg Hx     Liver Disease Neg Hx     Rectal Cancer Neg Hx     Stomach Cancer Neg Hx        Review of Systems:   Unable to obtain 2/2 mental status     Physical Examination:  /81   Pulse 65   Temp 96.2 °F (35.7 °C) (Temporal)   Resp 16   Ht 5' 3\" (1.6 m)   Wt 156 lb 9.6 oz (71 kg)   SpO2 95%   BMI 27.74 kg/m²   General appearance: 79 yo female, cachectic, no acute distress   Head: Normocephalic, without obvious abnormality, atraumatic  Eyes: conjunctivae/corneas clear. PERRL, EOM's intact. Ears: normal external ears and nose, throat without exudate  Neck: cervical collar in place   Lungs: decreased at bases, faint rhonchi LLL   Heart: regular rate and rhythm, S1, S2 normal, no murmur  Abdomen:soft, non-tender; non-distended, normal bowel sounds no masses, no organomegaly  Extremities:1-2+ bilateral lower extremity edema,  No erythema, no tenderness to palpation  Skin: Skin color, texture, turgor normal. No rashes or lesions  Lymphatic: No palpable lymph node enlargment  Neurologic: Lethargic, oriented to self. Mumbling speech, dysarthric at times.  Generalized weakness   Psychiatric: Withdrawn, flat affect     Diagnostic Data:  CBC:  Recent Labs     10/18/21  1747 10/19/21  0506 10/20/21  0435   WBC 23.2* 25.5* 23.4*   HGB 9.9* 9.9* 8.9*   HCT 31.8* 31.7* 27.9*   * 417* 371     BMP:  Recent Labs     10/18/21  1747 10/19/21  0421 10/20/21  0435    141 141   K 5.1* 4.6 4.5    106 106   CO2 23 21* 20*   * 100* 99*   CREATININE 3.4* 3.4* 3.0*   CALCIUM 9.6 9.2 8.6*   PHOS  --   --  5.8*     Recent Labs     10/18/21  1747 10/20/21  0435   AST 30 18   ALT 12 9   BILITOT 0.5 0.5   ALKPHOS 892* 698*     Cardiac Enzymes:   Recent Labs     10/18/21  1747 10/18/21  2036   TROPONINI 0.04* 0.03   Urinalysis:  Lab Results   Component Value Date    NITRU Negative 10/18/2021    WBCUA 1 10/18/2021    BACTERIA NEGATIVE 10/18/2021    RBCUA 1 10/18/2021    BLOODU Negative 10/18/2021    SPECGRAV 1.015 10/18/2021    GLUCOSEU Negative 10/18/2021     CT HEAD WO CONTRAST  1. No acute intracranial findings. 2. Similar volume loss. 3. Calcified atherosclerosis. Signed by Dr Aline Malone RENAL COMPLETE  A normal relatively small right kidney. Left kidney is not visualized. Hepatomegaly and hepatic metastatic disease. Signed by Dr Maxim Pelletier  1. Left lower lung opacity, could represent infection, atelectasis or small effusion.  Signed by Dr Yadi Lewis    Palliative Performance Scale:    [x] 40% Mainly in bed  Extensive disease  Mainly assistance  Normal/reduced intake  LOC full/confusion      Palliative Review of Advance Directives:     Surrogate Decision Maker:Yes    Durable Power of :No    Advanced Directives/Living Sanchez: Yes    Out of hospital medical orders in place to reflect resuscitation status (MOLST/POLST): No    Information Sharing:  Patient's awareness of illness:  [] Terminal [] Life-Threatening [x] Serious [] Non life-threatening [] Not serious   [] Not discussed    Family awareness of illness:   [] Terminal [] Life-Threatening [x] Serious [] Nonlife-threatening [] Not serious   [] Not discussed    Assessment/Plan:  Principal Problem:    Acute kidney injury superimposed on chronic kidney disease ([facility]  Active Problems:    Anemia of chronic kidney failure, stage 4 (severe) (HCC)    Malignant neoplasm of other parts of pancreas ([facility]    Community acquired pneumonia of left lower lobe of lung    Hyperkalemia    Acute metabolic encephalopathy    Acute kidney injury superimposed on CKD ([facility]  Resolved Problems:    * No resolved hospital problems. *     Visit Summary:  Chart reviewed, patient discussed with consulting service and nursing staff. Reviewed health issues, work up and treatment plan as well as factors that lead to hospitalization. I saw Ms. Chaim White at her bedside. She is well known to Palliative care service and is followed as an outpatient by our team. At baseline she is alert, oriented and can carry on a conversation without difficulty. Her daughter confirms confusion is all new. Patient has had increasing weakness/edema at [facility]. At home she took Lasix only as needed for LE edema. Had not received at SNF per patient's daughter with noticeable LE edema increasing. We discussed worsening debility and current clinical concerns. Family would like further work up in regards to mental status. Message sent to Hospitalist. If no improvement in next few days family will consider transitioning to home hospice. Support/comfort provided. Recommendations:     1. Palliative Care-GOC to be determined by Hospital course. Family considering home hospice care if no improvement in mental status/weakness. Code status: DNR  2. Acute kidney injury superimposed on CKD-Nephrology following, improved  3. Acute metabolic encephalopathy-CT head negative, hx of Afib/recent hip repair, ? Stroke work Meredith Herreraid sent and will defer to Hospitalist   4. Pancreatic adenocarcinoma-no longer a candidate for anticancer treatment. Has been followed by [facility] (outpatient supportive care) for this  5.  Chronic systolic CHF-noted 6. LLL pneumonia-broad spectrum abx continued, rapid COVID negative     Thank you for consulting palliative care and allowing us to participate in the care of the patient.     CounselingTopics: Goals of care, Code Status, Disease process education, pt/family support    Time Spent Counseling > 50%:  YES                                   Total Time Spent with patient/family counseling, workup/treatment review, counseling and placement of orders/preparation of this note: 63 minutes    Electronically signed by Peter Mike PA-C on 10/20/2021 at 11:34 AM    (Please note that portions of this note were completed with a voice recognition program.  Efforts were made to edit the dictations but occasionally words are mis-transcribed.)

## 2021-10-20 NOTE — PROGRESS NOTES
Pharmacy Vancomycin Consult     Vancomycin Day: 3  Current Dosing: Pulse dosing    Temp max:  96.6    Recent Labs     10/18/21  1747 10/19/21  0421   * 100*       Recent Labs     10/18/21  1747 10/19/21  0421   CREATININE 3.4* 3.4*       Recent Labs     10/18/21  1747 10/19/21  0506   WBC 23.2* 25.5*         Intake/Output Summary (Last 24 hours) at 10/20/2021 0100  Last data filed at 10/19/2021 1430  Gross per 24 hour   Intake 240 ml   Output --   Net 240 ml       Culture Date Source Results   10/18/21 Blood No growth                 Ht Readings from Last 1 Encounters:   10/19/21 5' 3\" (1.6 m)        Wt Readings from Last 1 Encounters:   10/19/21 156 lb 9.6 oz (71 kg)         Body mass index is 27.74 kg/m². Estimated Creatinine Clearance: 11 mL/min (A) (based on SCr of 3.4 mg/dL (H)).     Trough: 15.6    Assessment/Plan: Give Vancomycin 1500mg x1 dose with random level tomorrow morning    Electronically signed by Gillian Perez RPH on 10/20/2021 at 1:00 AM

## 2021-10-20 NOTE — PLAN OF CARE
provider of pain before it becomes unmanageable or unbearable will improve  Description: Ability to notify healthcare provider of pain before it becomes unmanageable or unbearable will improve  Outcome: Ongoing  Goal: Control of acute pain  Description: Control of acute pain  Outcome: Ongoing  Goal: Control of chronic pain  Description: Control of chronic pain  Outcome: Ongoing     Problem: Serum Glucose Level - Abnormal:  Goal: Ability to maintain appropriate glucose levels will improve to within specified parameters  Description: Ability to maintain appropriate glucose levels will improve to within specified parameters  Outcome: Ongoing     Problem: Skin Integrity - Impaired:  Goal: Will show no infection signs and symptoms  Description: Will show no infection signs and symptoms  Outcome: Ongoing  Goal: Absence of new skin breakdown  Description: Absence of new skin breakdown  Outcome: Ongoing     Problem: Sleep Pattern Disturbance:  Goal: Appears well-rested  Description: Appears well-rested  Outcome: Ongoing     Problem: Skin Integrity:  Goal: Will show no infection signs and symptoms  Description: Will show no infection signs and symptoms  Outcome: Ongoing  Goal: Absence of new skin breakdown  Description: Absence of new skin breakdown  Outcome: Ongoing     Problem: Falls - Risk of:  Goal: Absence of physical injury  Description: Absence of physical injury  Outcome: Ongoing  Goal: Will remain free from falls  Description: Will remain free from falls  Outcome: Ongoing

## 2021-10-20 NOTE — PROGRESS NOTES
Speech Language Pathology  Facility/Department: [facility] MONTSE/VAS/MED  SWALLOW THERAPY  SPEECH THERAPY     NAME: Denys Guerra  : 1934  MRN: [id number]    ADMISSION DATE: 10/18/2021  ADMITTING DIAGNOSIS: has Heme positive stool; Anemia of chronic kidney failure, stage 4 (severe) ([address] Iron deficiency anemia secondary to inadequate dietary iron intake; H/O abnormal mammogram; Primary osteoarthritis of right knee; Status post total right knee replacement; Unilateral primary osteoarthritis, right knee; Other specified hypothyroidism; Encounter for BARBARA (erythropoietin stimulating agent) anemia management; Elevated INR; Hematuria; Acute blood loss anemia; CKD (chronic kidney disease); Palliative care patient; Pancreatic mass; Fatigue; Elevated liver enzymes; Biliary obstruction; Anemia; Malignant neoplasm of other parts of pancreas ([address] New onset of congestive heart failure ([address] Acute respiratory failure with hypoxia ([address] CHF NYHA class III, acute, systolic ([address] Constipation; Nausea; Restless legs; Decreased mobility; Hypertension; Fracture of right hip, closed, initial encounter ([address] Leukocytosis; UTI (urinary tract infection); Closed fracture of right hip ([address] NIKHIL (acute kidney injury) ([address] Community acquired pneumonia of left lower lobe of lung; Acute kidney injury superimposed on chronic kidney disease ([address] Hyperkalemia;  Acute metabolic encephalopathy; and Acute kidney injury superimposed on CKD [facility]) on their problem list.    Date of Treat: 10/20/2021  Evaluating Therapist: SHERRY Flores    Current Diet level:  Puree consistency with mildly thick/nectar thick liquids    Reason for Referral  Denys Guerra was referred for a bedside swallow evaluation to assess the efficiency of her swallow function, identify signs and symptoms of aspiration and make recommendations regarding safe dietary consistencies, effective compensatory strategies, and safe eating environment. Impression  Re-assessed patient's swallowing function. Patient exhibited slow, decreased oral prep of even blended food consistency, fast oral transit and suspected swallow delay with thin liquids, and sluggish, moderate-severely decreased laryngeal elevation for swallow airway protection. Even so, no outward S/S penetration/aspiration was noted with any puree consistency presentation, mildly thick/nectar thick liquid presentation, or thin H2O trial presented during treatment session this date.     At this time, would recommend continuation puree consistency with mildly thick/nectar thick liquids. TOTAL FEED. Recommend meds crushed in pudding/applesauce. If patient receives mouth care prior to intake, okay for ice chips and small sips thin H2O IN BETWEEN MEALS for comfort. Will continue to follow.     Treatment Plan  Requires SLP Intervention: Yes     Recommended Diet and Intervention  Diet Solids Recommendation: Puree   Liquid Consistency Recommendation: Mildly thick (nectar thick)  Recommended Form of Meds: Meds crushed in puree as able  Therapeutic Interventions: Patient/Family education;Diet tolerance monitoring; Therapeutic PO trials with SLP     Compensatory Swallowing Strategies  Compensatory Swallowing Strategies: Upright as possible for all oral intake;TOTAL FEED;Small bites/sips;Eat/Feed slowly; Alternate solids and liquids; Remain upright for 30-45 minutes after meals     Treatment/Goals  Timeframe for Short-term Goals: 1x/day for 3 days   Goal 1: Patient will tolerate puree consistency and mildly thick/nectar thick liquids with min S/S penetration/aspiration during PO intake. Goal 2: Patient staff will follow swallow safety recommendations to decrease risk of penetration/aspiration during PO intake. Goal 3: Re-assessment of swallow function for potential diet upgrade. Goal 4: Monitor speech production.   Goal 5: Cognitive-linguistic eval      General  Chart Reviewed: Yes  Behavior/Cognition: Alert;Pleasant;Confused  O2 Device: None (Room air)  Communication Observation: (Monitored patient's speech production. Patient exhibited decreased volume of speech, decreased labial movements, and slow, decreased lingual movements during verbalizations. SLP ranked functional intelligibility of speech for unfamiliar listeners at 20-30% in utterances with background noise present.)  Follows Directions: Patient demonstrated ability to follow and model simple 1 step commands. Dentition: Some missing dentition  Patient Positioning: Upright in bed  Consistencies Administered: Dysphagia Pureed (Dysphagia I); Nectar - straw; Thin - straw     Oral Motor Examination   Labial ROM: (Decreased, bilaterally, during labial retraction trials and labial protrusion trials.)  Labial Strength: (Decreased during labial compression trials.)  Labial Coordination: (Slowed volitional movements were noted.)  Lingual ROM: (Decreased during lingual extension trials with no full point achieved; decreased during lingual elevation trials without use of accessory jaw movement; decreased movements noted bilaterally.)  Lingual Strength: (Decreased)  Lingual Coordination: (Slowed movements were noted.)     Re-assessed patient's swallowing function with the following observations noted:     Oral Phase  Mastication: Puree (Patient exhibited slow vertical jaw movement at the front of the mouth during oral prep of puree consistency presentations administered by SLP.)  Suspected Premature Bolus Loss: Puree; Thin - straw (Oral transit of puree consistency varied from 2-5 seconds in length. Patient exhibited fast oral transit of thin H2O trials presented via straw by SLP.)  Oral Phase - Comment: No puree consistency residue was noted post swallows. Oral transit of nectar thick liquid presentations, administered via straw by SLP, primarily measured 1-2 seconds in length.     Pharyngeal Phase  Suspected Swallow Delay:  Thin - straw (Suspect secondary to oral transit times.)  Laryngeal Elevation: (Patient exhibited sluggish, moderate-severely decreased laryngeal elevation for swallow airway protection.)  Pharyngeal Phase - Comment: No outward S/S penetration/aspiration was noted with any puree consistency presentation, mildly thick/nectar thick liquid presentation, or thin H2O trial presented during treatment session this date.     At this time, would recommend continuation puree consistency with mildly thick/nectar thick liquids. TOTAL FEED. Recommend meds crushed in pudding/applesauce. If patient receives mouth care prior to intake, okay for ice chips and small sips thin H2O IN BETWEEN MEALS for comfort. Will continue to follow.     Electronically signed by SHERRY Curry on 10/20/2021 at 1:28 PM

## 2021-10-21 LAB
ALBUMIN SERPL-MCNC: 2 G/DL (ref 3.5–5.2)
ALP BLD-CCNC: 749 U/L (ref 35–104)
ALT SERPL-CCNC: 9 U/L (ref 5–33)
AMMONIA: 21 UMOL/L (ref 11–51)
ANION GAP SERPL CALCULATED.3IONS-SCNC: 13 MMOL/L (ref 7–19)
ANISOCYTOSIS: ABNORMAL
AST SERPL-CCNC: 20 U/L (ref 5–32)
BASOPHILS ABSOLUTE: 0 K/UL (ref 0–0.2)
BASOPHILS RELATIVE PERCENT: 0.2 % (ref 0–1)
BILIRUB SERPL-MCNC: 0.6 MG/DL (ref 0.2–1.2)
BUN BLDV-MCNC: 97 MG/DL (ref 8–23)
CALCIUM SERPL-MCNC: 8.6 MG/DL (ref 8.8–10.2)
CHLORIDE BLD-SCNC: 110 MMOL/L (ref 98–111)
CO2: 19 MMOL/L (ref 22–29)
CREAT SERPL-MCNC: 2.8 MG/DL (ref 0.5–0.9)
EOSINOPHILS ABSOLUTE: 0.1 K/UL (ref 0–0.6)
EOSINOPHILS RELATIVE PERCENT: 0.4 % (ref 0–5)
GFR AFRICAN AMERICAN: 19
GFR NON-AFRICAN AMERICAN: 16
GLUCOSE BLD-MCNC: 110 MG/DL (ref 74–109)
HCT VFR BLD CALC: 30.4 % (ref 37–47)
HEMOGLOBIN: 9.5 G/DL (ref 12–16)
HYPOCHROMIA: ABNORMAL
IMMATURE GRANULOCYTES #: 0.4 K/UL
LACTIC ACID: 1.2 MMOL/L (ref 0.5–1.9)
LYMPHOCYTES ABSOLUTE: 0.6 K/UL (ref 1.1–4.5)
LYMPHOCYTES RELATIVE PERCENT: 2.5 % (ref 20–40)
MACROCYTES: ABNORMAL
MCH RBC QN AUTO: 31.3 PG (ref 27–31)
MCHC RBC AUTO-ENTMCNC: 31.3 G/DL (ref 33–37)
MCV RBC AUTO: 100 FL (ref 81–99)
MONOCYTES ABSOLUTE: 1 K/UL (ref 0–0.9)
MONOCYTES RELATIVE PERCENT: 4.2 % (ref 0–10)
MRSA SCREEN RT-PCR: DETECTED
NEUTROPHILS ABSOLUTE: 21.2 K/UL (ref 1.5–7.5)
NEUTROPHILS RELATIVE PERCENT: 91 % (ref 50–65)
PDW BLD-RTO: 23.3 % (ref 11.5–14.5)
PLATELET # BLD: 347 K/UL (ref 130–400)
PLATELET SLIDE REVIEW: ADEQUATE
PMV BLD AUTO: 11.5 FL (ref 9.4–12.3)
POIKILOCYTES: ABNORMAL
POLYCHROMASIA: ABNORMAL
POTASSIUM REFLEX MAGNESIUM: 4.4 MMOL/L (ref 3.5–5)
POTASSIUM SERPL-SCNC: 4.4 MMOL/L (ref 3.5–5)
RBC # BLD: 3.04 M/UL (ref 4.2–5.4)
SODIUM BLD-SCNC: 142 MMOL/L (ref 136–145)
TARGET CELLS: ABNORMAL
TOTAL PROTEIN: 5.5 G/DL (ref 6.6–8.7)
VANCOMYCIN TROUGH: 26.3 UG/ML (ref 10–20)
WBC # BLD: 23.3 K/UL (ref 4.8–10.8)

## 2021-10-21 PROCEDURE — 6370000000 HC RX 637 (ALT 250 FOR IP): Performed by: HOSPITALIST

## 2021-10-21 PROCEDURE — 6360000002 HC RX W HCPCS: Performed by: HOSPITALIST

## 2021-10-21 PROCEDURE — 83605 ASSAY OF LACTIC ACID: CPT

## 2021-10-21 PROCEDURE — 85025 COMPLETE CBC W/AUTO DIFF WBC: CPT

## 2021-10-21 PROCEDURE — 2580000003 HC RX 258: Performed by: HOSPITALIST

## 2021-10-21 PROCEDURE — 1210000000 HC MED SURG R&B

## 2021-10-21 PROCEDURE — 99233 SBSQ HOSP IP/OBS HIGH 50: CPT | Performed by: NURSE PRACTITIONER

## 2021-10-21 PROCEDURE — 82140 ASSAY OF AMMONIA: CPT

## 2021-10-21 PROCEDURE — 80053 COMPREHEN METABOLIC PANEL: CPT

## 2021-10-21 PROCEDURE — 2580000003 HC RX 258: Performed by: INTERNAL MEDICINE

## 2021-10-21 PROCEDURE — 80202 ASSAY OF VANCOMYCIN: CPT

## 2021-10-21 PROCEDURE — 99222 1ST HOSP IP/OBS MODERATE 55: CPT | Performed by: INTERNAL MEDICINE

## 2021-10-21 PROCEDURE — 6370000000 HC RX 637 (ALT 250 FOR IP): Performed by: INTERNAL MEDICINE

## 2021-10-21 RX ORDER — FUROSEMIDE 20 MG/1
20 TABLET ORAL DAILY
Status: DISCONTINUED | OUTPATIENT
Start: 2021-10-21 | End: 2021-10-23 | Stop reason: HOSPADM

## 2021-10-21 RX ADMIN — APIXABAN 2.5 MG: 5 TABLET, FILM COATED ORAL at 09:16

## 2021-10-21 RX ADMIN — SODIUM CHLORIDE, PRESERVATIVE FREE 2000 MG: 5 INJECTION INTRAVENOUS at 18:19

## 2021-10-21 RX ADMIN — SODIUM CHLORIDE: 9 INJECTION, SOLUTION INTRAVENOUS at 15:53

## 2021-10-21 RX ADMIN — MIDODRINE HYDROCHLORIDE 10 MG: 10 TABLET ORAL at 09:16

## 2021-10-21 RX ADMIN — CARVEDILOL 3.12 MG: 3.12 TABLET, FILM COATED ORAL at 18:19

## 2021-10-21 RX ADMIN — MIDODRINE HYDROCHLORIDE 10 MG: 10 TABLET ORAL at 18:21

## 2021-10-21 RX ADMIN — PRAMIPEXOLE DIHYDROCHLORIDE 0.12 MG: 0.12 TABLET ORAL at 21:03

## 2021-10-21 RX ADMIN — SODIUM BICARBONATE 650 MG: 650 TABLET ORAL at 15:49

## 2021-10-21 RX ADMIN — ATORVASTATIN CALCIUM 10 MG: 10 TABLET, FILM COATED ORAL at 21:03

## 2021-10-21 RX ADMIN — LEVOTHYROXINE SODIUM 112 MCG: 112 TABLET ORAL at 06:19

## 2021-10-21 RX ADMIN — SODIUM BICARBONATE 650 MG: 650 TABLET ORAL at 21:03

## 2021-10-21 RX ADMIN — DOCUSATE SODIUM 100 MG: 100 CAPSULE, LIQUID FILLED ORAL at 09:16

## 2021-10-21 RX ADMIN — FUROSEMIDE 20 MG: 20 TABLET ORAL at 12:10

## 2021-10-21 RX ADMIN — FOLIC ACID 1 MG: 1 TABLET ORAL at 09:16

## 2021-10-21 RX ADMIN — DOCUSATE SODIUM 100 MG: 100 CAPSULE, LIQUID FILLED ORAL at 21:03

## 2021-10-21 RX ADMIN — SODIUM BICARBONATE 650 MG: 650 TABLET ORAL at 09:16

## 2021-10-21 RX ADMIN — ALLOPURINOL 100 MG: 100 TABLET ORAL at 09:17

## 2021-10-21 RX ADMIN — CARVEDILOL 3.12 MG: 3.12 TABLET, FILM COATED ORAL at 09:16

## 2021-10-21 RX ADMIN — CALCITRIOL CAPSULES 0.25 MCG 0.25 MCG: 0.25 CAPSULE ORAL at 09:16

## 2021-10-21 RX ADMIN — MIDODRINE HYDROCHLORIDE 10 MG: 10 TABLET ORAL at 12:10

## 2021-10-21 RX ADMIN — APIXABAN 2.5 MG: 5 TABLET, FILM COATED ORAL at 21:03

## 2021-10-21 NOTE — CONSULTS
MEDICAL ONCOLOGY CONSULTATION    Pt Name: Dania Lofton  MRN: [id number]  YOB: 1934  Date of evaluation: 10/21/2021    REASON FOR CONSULTATION: Pancreatic cancer, liver metastasis  REQUESTING PHYSICIAN: Hospitalist    History Obtained From:    electronic medical record, nursing staff    HISTORY OF PRESENT ILLNESS:  The patient is well-known to our clinic. She is followed for diagnosis of pancreatic adenocarcinoma. She has multiple comorbidities to include a history of CKD, heart failure, frailty, prior DVT, hypertension, overweight, AAA. She was readmitted to the hospital on 10/19/2021. Patient was brought from a nursing home. She was brought to the ER for evaluation of abnormal labs and peripheral edema. She was found to have acute kidney injury, moderate anemia, neutrophilic leukocytosis, elevated lactic acid at admission and altered mental status. She has been followed by palliative care as outpatient. Unfortunately, she is not a candidate for further anticancer therapy due to her decline performance status, age and frailty. She has been seen and evaluated by palliative care here in the hospital.  I agree with hospice recommendations. The patient was not able to provide any history due to her altered mental status. Prior cancer history  Dania Lofton is a 80 y.o.  female who is currently followed for pancreatic adenocarcinoma and multifactoral anemia to include iron deficiency and chronic kidney disease stage 3B. She received palliative chemotherapy with dose reduced Gemzar and Abraxane for total of 4 cycles, last received treatment on 5/18/2021. Unfortunately she had poor tolerance to treatment with GI symptoms, decline performance status and with cycle #4 developed worsening dyspnea with hypoxemia and O2 desaturation.   She required hospitalization to [facility] from 5/18/2021-5/26/2020 and diagnosed with decompensated systolic heart failure with a 2D echocardiogram on on 5/18/2021 (cycle 4-day #1), held due to severely declined performance status.          Tumor monitoring:  · 12/2/2020-CA 19-9 of 133  · 12/23/2020-CA 19-9 of 217  · 1/20/2021-CA 19-9 of 236  · 3/23/2021- CA 19-9 of 108  · 5/18/2021-CA 19-9 of 38     CANCER HISTORY  Flakita Arevalo was seen by Dr. Lawrence Mckeon on 12/2/2020 as an inpatient consultation at [facility]) of [facility] for findings of a pancreatic mass and bile duct obstruction. She presented to the ER department with complaints of hematuria that began a few days prior to presentation. She denied any dysuria, back pain, no nausea or vomiting.  She also reported easily bruising and ecchymotic areas in her back, abdomen and flank.  Initial laboratory work-up showed INR above 18.  She was given vitamin K subcutaneously. Jennifer Stein was found to have elevated LFTs and the following are events that occurred.     · 12/01/2020-CT abdomen pelvis without contrast showed a pancreatic head mass measuring 3.3 x 3.7 cm in size and associated, bile duct dilatation above the level of the ampulla.  Associated moderate pancreatic ductal dilatation. · 12/2/2020-CA 19-9 133  · 12/03/2020-ERCP with FNA biopsy Positive for high-grade adenocarcinoma.  Unfortunately, stent could not be placed.   · 12/5/2020-transferred from [facility]) to [address] [place] [address] and discharged home on 12/8/2020  · 12/07/2020- Cholangipancreatography Retrograde Endo ERCP with sphincterotomy, balloon sweep and placement of 10x60 PC BS metal stent at [facility] in [place]. · 12/21/2020- CT chest with contrast documented no evidence of intrathoracic neoplastic process/metastatic disease. Evidence of pneumobilia. The ectasia of the pancreatic duct, similar to the previous study. An incompletely visualized and evaluated heterogeneous mass in the head of the pancreas measuring 3.3 x 3.7cm. A biliary stent in place. Moderate persistent dilatation of the proximal common bile duct.  A stable small low-density nodule in the left adrenal gland  · 12/22/2020-Dr. Lambert discussed the results of CT chest and pathology that suggest advanced pancreatic cancer, unfortunately it is not amenable to surgery. She was quite weak and had poor performance, appeared very frail.    Discussion was made about palliative chemotherapy but the decision to hold was made until improvement of her physical conditioning occurred.  Recommended physical therapy as outpatient and reassess fitness for palliative chemotherapy in approximately 4 weeks. 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 · 12/7/2020 ERCP at [address] [place] [address] Dr by Dr. Janet Lund revealed a single severe biliary stricture in the lower third of the main bile duct with severe upstream biliary dilation, stricture was malignant appearing. Biliary sphincterectomy was performed. 1 partially covered metal stent was placed into the common bile duct. · 2/8/2021-initiated palliative chemotherapy with Gemzar 750 mg/m² and Abraxane 90 mg/m² every 2 weeks, this is a dose reduction by 25%  · 3/23/2021- CA 19-9 108, improved compared to pretreatment value of 236 on 1/20/2021. · 4/13/2021 CT Abdomen/Pelvis with contrast documented a poorly defined complex mass in the head of the pancreas. It appears to have decreased in size when remeasured at the same level and in same dimension as in the previous study (3 cm x 2.5 cm, compared to 3.7 cm x 3.3 cm). Persistent moderate dilatation of the pancreatic duct. There are 2 additional small cystic nodules in the body of the pancreas measuring 11 mm and 9 mm. A biliary stent in place and decompression of the intrahepatic biliary ducts since the previous study. 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  The diverticulosis of the distal colon with no evidence for diverticulitis. · 5/4/2021- dose reduction by 25% due to severe fatigue and nausea, starting with cycle 4 will receive Gemzar 600 mg/m² and Abraxane 75 mg/m².           HEMATOLOGY HISTORY:  Vignesh Kat was seen in initial hematology consultation on 7/20/2018 for further evaluation and treatment recommendations for anemia. Vignesh Kat was referred from Dr. Ness Watts. Vignesh Kat presented with no significant complaints other than chronic fatigue and constipation. She had been taking ferrous sulfate 325 mg daily under the direction of Dr. Annalisa Mckeon. Vignesh Kat reported significant constipation and some GI upset with the oral iron. She denied any bleeding tendencies to include hematuria, melena, hematochezia and epistaxis. Vignesh Kat had a colonoscopy on 4/26/2016 by Dr. Millicent Nava for further evaluation of iron deficiency. The only abnormal finding was diverticulosis. Vignesh Kat is routinely followed by Dr. Tonny Sung for her chronic kidney disease stage IV. CMP on 6/11/2018 documented a creatinine of 2.2 and GFR 21. CBC review from 2/2/2018- 6/11/2018 documented hemoglobin ranging from 9.4- 10.1, RBC 2.91- 3.22, MCV 94- 100 with a normal WBC and platelet count  CBC at initial consultation on 7/20/2018 documented a WBC of 7.33, ANC 4.74, RBC 3.04, hemoglobin 10.3, .6 and a platelet count of 243,026.     Serology studies on 7/20/2018:  · Creatinine 1.98   · GFR 23   · Calcium 10.3   · IgG 700, IgA 180, and IgM 52   · M spike not observed   · Immunofixation: No monoclonality detected. · Iron 74   · Iron saturation 27%   · TIBC 278   · Vitamin B12 437   · Folate 15.7   · Ferritin 299   · Reticulocyte count 1.5%   · Erythropoietin 11.6   ·    · Beta-2 microglobulin 6.2      Occult stool positive 1 of 3 samples on 7/25/2018.     Colonoscopy on 10/4/2018 by Dr. Millicent Nava was documented as removal of 2 polyps with benign pathology.  No evidence of bleeding.     Serology studies on 10/15/2018:  · Iron 76   · TIBC 287   · Iron saturation 26%   · Ferritin 321   · Hemoglobin 10.1   · Creatinine 2.47   · GFR 17     11/26/2018 - Initiated Procrit 20,000 units for chronic kidney disease stage IV, to be given every 2 weeks ×4 and then monthly, dose to be titrated appropriately. Hemoglobin 9.6. All has anemia of chronic disease to include chronic kidney disease stage IV. She will began receiving growth factor support and will need to maintain a ferritin level greater than 200 and an iron saturation of 25% for the Procrit to be beneficial. Hold Procrit dose for hemoglobin >11. Indications/rationale for Procrit was discussed with Martha Pereira. Risks, benefits and expectations were outlined. Risks including, but not limited to hypertension, stroke, MI, death were discussed and acknowledged by Martha Pereira.     Serology studies on 3/8/2019:  · Creatinine 1.7/GFR 30.4   · Iron 80   · Iron saturation 26.5%   · TIBC 302   · Ferritin 144   · Vitamin B12 501   · Folate 13.9     Serology studies on 3/5/2020:  · Creatinine 1.3/GFR 41.4  · Iron 84  · TIBC 390  · Iron saturation 21.5%  · Ferritin 342     Iron substrates on 6/25/2020  · Ferritin 311  · Iron 76  · Iron saturation 23%  · TIBC 326  · Creatinine 1.3/GFR 39     Labs on 12/2/2020 and 12/3/2020:  · Iron 35  · TIBC 213  · Iron saturation 16%  · Vitamin B12 483  · Folate 10.6  · Ferritin 480     Labs on 3/23/2021  · Iron 54  · TIBC 366  · Iron saturation 15  · Ferritin 530  · Reticulocytes 2.1     Labs on 4/6/2021  · WBC 14.40  · RBC 2.46  · HGB 8.6  · HCT 26.4  · .3  · MCH 35  · MCHC 32.6  · ANC 12.03  · ,000  · Iron 43   · TIBC 226  · Iron saturation 19%   · Ferritin 1088.0  · B-12= 781  · GFR 39  · BUN 21  · Creatinine 1.3  · Phosphorus 2.4  · Magnesium 1.5        Age-appropriate health screening:  · Colonoscopy on 10/4/2018 by Dr. Hue Beckwith was documented as removal of 2 polyps with benign pathology. No evidence of bleeding.   · 9/20/2019 B[facility] mamm[facility] at [facility] documented no mammographic evidence of malignancy. · No bone mineral density on file     Past Medical History:    Past Medical History:   Diagnosis Date    Abdominal aortic aneurysm (AAA) ([facility] [address]     Anemia     Arthritis     Cancer ([address] 12/01/2020    pancreatic    CHF (congestive heart failure) (HCC)     Chronic kidney disease     DVT of lower extremity (deep venous thrombosis) (HCC)     twice 2010 and 2017    Hyperlipidemia     Hypertension     Kidney stones     Osteoarthritis     Palliative care patient 12/02/2020    Thyroid disease     Thyroid disorder        Past Surgical History:    Past Surgical History:   Procedure Laterality Date    COLONOSCOPY  2016    Dr Parvin Figueroa- diverticulosis    COLONOSCOPY  10/2018    Parvin Figueroa:  AP    ENDOSCOPIC ULTRASOUND (LOWER) N/A 12/03/2020    Dr JONNY Grijalva/unsuccessful biliary cannulation despite attempts at 2-wire cannulation and proph pancreatic stenting with cannulation around pancreatic stenting-Positive for high-grade adenocarcinoma, pancreatic head    ERCP N/A 12/03/2020    Dr JONNY Grijalva/unsuccessful biliary cannulation despite attempts at 2-wire cannulation and proph pancreatic stenting with cannulation around pancreatic stenting-Positive for high-grade adenocarcinoma, pancreatic head    ERCP  12/07/2020    Dr Balbir Kelly/sphincterotomy, placement of a 10 x 60 partially covered biliary stent    HIP SURGERY Right 10/01/2021    RIGHT HIP HEMIARTHROPLASTY performed by Gab Calderon MD at [address] Right 02/05/2021    SINGLE LUMEN PORT PLACEMENT WITH ULTRASOUND AND FLUORO performed by Tip Rocha MD at [facility] TOE AMPUTATION Bilateral     pinky toes removed    TOTAL KNEE ARTHROPLASTY Right 01/03/2020    RIGHT TOTAL KNEE REPLACEMENT performed by Clement Miller MD at [address]  10/2018    Dr Parvin Figueroa- Chronic esophagogastritis with intestinal metaplasia. No evidence of dysplasia.        Social History:    Marital status:   Smoking status:Never smoker  ETOH status:no  Resides:[place], [place]       Family History:   Family History   Problem Relation Age of Onset    Colon Cancer Brother     Cancer Mother         Breast Cancer    Heart Attack Father     Colon Polyps Neg Hx     Esophageal Cancer Neg Hx     Liver Cancer Neg Hx     Liver Disease Neg Hx     Rectal Cancer Neg Hx     Stomach Cancer Neg Hx        Current Hospital Medications:    Current Facility-Administered Medications   Medication Dose Route Frequency Provider Last Rate Last Admin    sodium bicarbonate tablet 650 mg  650 mg Oral TID Ellie Vallecillo MD   650 mg at 10/20/21 2106    epoetin rachel-epbx (RETACRIT) injection 3,000 Units  3,000 Units SubCUTAneous Once per day on Mon Wed Fri Ellie Vallecillo MD   3,000 Units at 10/20/21 1047    calcitRIOL (ROCALTROL) capsule 0.25 mcg  0.25 mcg Oral Daily Ellie Vallecillo MD        sodium chloride flush 0.9 % injection 5-40 mL  5-40 mL IntraVENous 2 times per day Steve Rice MD   10 mL at 10/20/21 2107    sodium chloride flush 0.9 % injection 5-40 mL  5-40 mL IntraVENous PRN Steve Rice MD   10 mL at 10/19/21 0819    0.9 % sodium chloride infusion  25 mL IntraVENous PRN Steve Rice MD        ondansetron (ZOFRAN-ODT) disintegrating tablet 4 mg  4 mg Oral Q8H PRN Steve Rice MD        Or    ondansetron [facility]) injection 4 mg  4 mg IntraVENous Q6H PRN Steve Rice MD        acetaminophen (TYLENOL) tablet 650 mg  650 mg Oral Q6H PRN Steve Rice MD        Or    acetaminophen (TYLENOL) suppository 650 mg  650 mg Rectal Q6H PRN Steve Rice MD        0.9 % sodium chloride infusion   IntraVENous Continuous Ellie Vallecillo  mL/hr at 10/20/21 1046 New Bag at 10/20/21 1046    vancomycin (VANCOCIN) intermittent dosing (placeholder)   Other RX Placeholder ALBERTO Cardona   Given at 10/18/21 2000    ceFEPIme (MAXIPIME) 2,000 mg in sodium chloride (PF) 20 mL IV syringe  2,000 mg IntraVENous Q24H Elba John MD   2,000 mg at 10/20/21 1830    allopurinol (ZYLOPRIM) tablet 100 mg  100 mg Oral Daily Elba John MD   100 mg at 10/20/21 1046    apixaban (ELIQUIS) tablet 2.5 mg  2.5 mg Oral BID Elba John MD   2.5 mg at 10/20/21 2106    carvedilol (COREG) tablet 3.125 mg  3.125 mg Oral BID  Elba John MD   3.125 mg at 10/20/21 1830    docusate sodium (COLACE) capsule 100 mg  100 mg Oral BID Elba John MD   100 mg at 70/12/85 3907    folic acid (FOLVITE) tablet 1 mg  1 mg Oral Daily Elba John MD   1 mg at 10/20/21 1046    levothyroxine (SYNTHROID) tablet 112 mcg  112 mcg Oral Daily Elba John MD   112 mcg at 10/21/21 3213    meclizine (ANTIVERT) tablet 25 mg  25 mg Oral TID PRN Elba John MD        atorvastatin (LIPITOR) tablet 10 mg  10 mg Oral Nightly Elba John MD   10 mg at 10/20/21 2106    pramipexole (MIRAPEX) tablet 0.125 mg  0.125 mg Oral Nightly Elba John MD   0.125 mg at 10/20/21 2106    nitroGLYCERIN (NITROSTAT) SL tablet 0.4 mg  0.4 mg SubLINGual Q5 Min PRN Elba John MD        midodrine (PROAMATINE) tablet 10 mg  10 mg Oral TID  Elba John MD   10 mg at 10/20/21 1830       Allergies: Allergies   Allergen Reactions    Penicillins Rash     Childhood          Subjective   Not obtainable due to altered mental status    Objective   /86   Pulse 74   Temp 96.5 °F (35.8 °C) (Temporal)   Resp 18   Ht 5' 3\" (1.6 m)   Wt 163 lb 2 oz (74 kg)   SpO2 99%   BMI 28.90 kg/m²     PHYSICAL EXAM:  CONSTITUTIONAL: Alert but not oriented, chronically ill-appearing,   EYES: Non icteric, pupils equal round   NECK:  Cervical collar,  CHEST/LUNGS: Decreased breath sounds bilateral lung bases,  normal respiratory effort   CARDIOVASCULAR: RRR, no murmurs. No lower extremity edema  ABDOMEN: Palpable liver .   EXTREMITIES: warm,   NEUROLOGIC: Confused    LABORATORY RESULTS REVIEWED/ANALYZED BY ME:  Recent Labs     10/21/21  2272 10/20/21  0435 10/19/21  0506   WBC 23.3* 23.4* 25.5*   HGB 9.5* 8.9* 9.9*   HCT 30.4* 27.9* 31.7*   .0* 99.6* 99.1*    371 417*       Lab Results   Component Value Date     10/21/2021    K 4.4 10/21/2021    K 4.4 10/21/2021     10/21/2021    CO2 19 (L) 10/21/2021    BUN 97 (H) 10/21/2021    CREATININE 2.8 (H) 10/21/2021    GLUCOSE 110 (H) 10/21/2021    CALCIUM 8.6 (L) 10/21/2021    PROT 5.5 (L) 10/21/2021    LABALBU 2.0 (L) 10/21/2021    BILITOT 0.6 10/21/2021    ALKPHOS 749 (H) 10/21/2021    AST 20 10/21/2021    ALT 9 10/21/2021    LABGLOM 16 (A) 10/21/2021    GFRAA 19 (L) 10/21/2021    AGRATIO 1.0 (L) 08/26/2021    GLOB 2.5 10/13/2021       Lab Results   Component Value Date    INR 1.52 (H) 09/30/2021    INR 1.03 12/05/2020    INR 1.04 12/04/2020    PROTIME 18.3 (H) 09/30/2021    PROTIME 13.4 12/05/2020    PROTIME 13.5 12/04/2020       RADIOLOGY STUDIES REPORT/REVIEWED AND INTERPRETED BY ME:  CT ABDOMEN PELVIS WO CONTRAST Additional Contrast? None    Result Date: 10/20/2021  EXAM: CT ABDOMEN PELVIS WO CONTRAST -- 10/20/2021 2:20 PM HISTORY: 80 years, Female, new diagnosis liver metastases. History of pancreatic cancer on chart review COMPARISON: 4/13/2021 DLP: 1421 mGy cm. Automated exposure control was utilized to minimize patient radiation dose. TECHNIQUE: Unenhanced axial images of the abdomen and pelvis obtained with coronal and sagittal reformats. FINDINGS: Evaluation limited secondary to lack of intravenous contrast. LUNG BASES: Right middle lobe 4 mm pleural-based pulmonary nodule on axial series 3, image 5, which appears new compared to 4/13/2021. Bibasilar atelectasis. Prominent inferior heart size. No large pleural or pericardial effusion. Coronary artery calcifications. LIVER: Numerous heterogeneous liver lesions, highly suspicious for metastatic disease and new compared to 4/13/2021. GALLBLADDER and BILARY: Common bile duct stent.  Similar appearance of the gallbladder with gas, likely related to stent. PANCREAS: Mass at the head of the pancreas with new soft tissue component surrounding the biliary stent and appears to measure 5.6 x 5.5 cm. Atrophy of the pancreatic body. The portal vein is not optimally evaluated on this unenhanced exam, but appears to pass to the left of the mass. SPLEEN: The spleen is normal in size. ADRENAL: No adrenal mass. GENITOURINARY: Bilateral renal atrophy. No hydronephrosis. No nephrolithiasis. The urinary bladder is normal in appearance. However, is partially obscured by metallic artifact from right hip prosthesis. Normal CT appearance of the uterus. Adnexa appear grossly symmetric, not optimally evaluated on this exam. PERITONEUM: Small ascites. No free air. BOWEL: Stomach and duodenum have normal course and caliber. No abnormally dilated loops of bowel or bowel wall thickening. Normal appendix on axial images 61-66. RETROPERITONEUM:   Enlarged abdominal aorta measures up to 4.8 cm, previously 4.0 cm on 4/13/2021. Enlarged bilateral common iliac arteries measuring 2.2 cm on the right and 2.0 cm on the left no discrete adenopathy identified. ABDOMINAL WALL: Body wall edema. BONES: Right hip prosthesis. No acute bony finding. 1. Numerous heterogeneous liver lesions, highly suspicious for metastatic disease. There appear to be more than appreciated on prior ultrasound 10/19/2021 and new compared to 4/13/2021. 2. Increased size of pancreatic head mass now measuring 5.6 x 5.5 cm. 3. New right middle lobe pulmonary nodule measuring 5 mm compared to 4/13/2021. 4. Abdominal aortic aneurysm measuring 4.8 cm, previously 4.0 cm on 4/13/2021. 5. Body wall edema. Signed by Dr Maciel Kay    XR PELVIS (1-2 VIEWS)    Result Date: 9/30/2021  Examination. XR PELVIS (1-2 VIEWS) 9/30/2021 6:36 AM History: Pain. Frontal projection of the pelvis is obtained. There is no previous study for comparison.  There is a subcapital transcervical fracture of the right proximal femur. There is mild  varus displacement The hip articulation is intact. There is moderate diffuse osteopenia. The sacroiliac joints bilaterally, left hip joint and symphysis pubis are intact. A subcapital transcervical fracture of the right proximal femur and a mild varus displacement. Signed by Dr Luis A Galvin (MIN 2 VIEWS)    Result Date: 9/30/2021  Examination. XR FEMUR RIGHT (MIN 2 VIEWS) 9/30/2021 6:36 AM History: The frontal and crosstable lateral views of the right femur are obtained. There is no previous study for comparison. There is a subcapital transcervical fracture of the right proximal femur with a mild varus and anterior displacement. The hip articulations intact. There is moderate diffuse osteopenia. The right hip arthroplasty is noted which is incompletely visualized and evaluated. A subcapital transcervical fracture of the right proximal femur. Signed by Dr Aurora Jacobs    Result Date: 10/20/2021  CT HEAD WO CONTRAST 10/20/2021 2:17 PM HISTORY: Altered mental status COMPARISON: CT scan dated 10/18/2021 DOSE LENGTH PRODUCT: 2276 mGy cm TECHNIQUE: Helical tomographic images of the brain were obtained without the use of intravenous contrast. Automated exposure control was also utilized to decrease patient radiation dose. FINDINGS: There is no evidence of evolving large vascular territory infarct. Global cortical atrophy. No visualized intra-axial or extra-axial hemorrhage. No mass lesion is identified. Normal size and configuration of the ventricular system. The basal cisterns are symmetric. Posterior fossa structures are unremarkable. The included orbits and their contents are unremarkable. The visualized paranasal sinuses, mastoid air cells and middle ear cavities are clear. The visualized osseous structures and overlying soft tissues of the skull and face are unremarkable. 1. No acute intracranial process.  Signed by Dr Mushtaq Brothers WO CONTRAST    Result Date: 10/18/2021  EXAM: CT HEAD WO CONTRAST -- 10/18/2021 6:30 PM HISTORY: 87 years, Female, altered mental status COMPARISON: 9/30/2021 DLP: 718 mGy cm. Automated exposure control was utilized to minimize patient radiation dose. TECHNIQUE: Unenhanced axial CT images obtained from vertex to skull base with coronal and sagittal reformats. FINDINGS: No acute intracranial hemorrhage, midline shift, mass effect or large territory cortical infarct. Gray-white matter differentiation maintained. Ventricles, sulci and basilar cisterns with proportional prominence suggesting volume loss. Density of the left vertebral artery appears similar to prior 12/1/2020 and is favored to be due to calcified atherosclerosis. Thinning of the ocular lenses may be postoperative or age-related. Paranasal sinuses and mastoid air cells normally aerated. External auditory canals within normal limits. Calvarium appears intact. Subcutaneous tissues within normal limits. 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 1. No acute intracranial findings. 2. Similar volume loss. 3. Calcified atherosclerosis. Signed by Dr Jean Claude Ruelas    Result Date: 9/30/2021  EXAM: CT HEAD WO CONTRAST -- 9/30/2021 7:05 AM HISTORY: 87 years, Female, fall, hit head, on anticoagulation COMPARISON: 12/1/2020 DLP: 876 mGy cm. Automated exposure control was utilized to minimize patient radiation dose. TECHNIQUE: Unenhanced axial CT images obtained from vertex to skull base with coronal and sagittal reformats. FINDINGS: No acute intracranial hemorrhage, midline shift, mass effect or large territory cortical infarct. Gray-white matter differentiation maintained. Proportional prominence of the ventricles, sulci and basilar cisterns suggesting volume loss. Thinning of the ocular lenses may be postoperative or age-related. Paranasal sinuses and mastoid air cells normally aerated. External auditory canals within normal limits. Calvarium appears intact. Subcutaneous tissues within normal limits. 1. No acute intracranial findings. 2. Similar volume loss. Signed by Dr Delvis Vickers    Result Date: 9/30/2021  Examination. CT CERVICAL SPINE WO CONTRAST 9/30/2021 7:05 AM History: The patient fell. DLP: 501 mGycm. The CT scan of the cervical spine is performed without intravenous or intrathecal contrast enhancement. The images are acquired in axial plane and subsequent reconstruction in coronal and sagittal planes. There is no previous study for comparison. There is a radiolucent line across the right lateral mass of C2, better visualized in axial plane images #22, which probably represent a vascular groove. This is not visualized in any other sequence. No displaced fracture. No compression. No displacement/malalignment. The curve and alignment are normal. The vertebral body heights are normal. Chronic degenerative changes are seen in the form of osteophytes, loss of disc heights representing degenerative disc disease and facetal arthropathy throughout the cervical spine. There is a resultant moderate neural foraminal stenosis at C5-6 bilaterally and left neural foraminal stenosis at C6-7 and C4-5. No significant spinal stenosis at any level. Severe arthropathy of the atlantoaxial joint is seen. The posterior processes are intact. The prevertebral soft tissues are normal. The limited visualized lungs are unremarkable. A radiolucent line across the right lateral mass of C2 may represent a vascular groove, an artifact or a nondisplaced fracture? . If symptomatic a follow-up examination or MR imaging of the cervical spine may be obtained. No other displaced fracture or malalignment. Chronic degenerative changes.  Signed by Dr Yamileth Carey    Result Date: 9/30/2021  EXAM: MRI CERVICAL SPINE WO CONTRAST -- 9/30/2021 9:00 AM HISTORY: 80 years, Female, fall, history of pancreatic cancer COMPARISON: 9/30/2021 TECHNIQUE:  Routine pulse sequences were obtained without intravenous contrast. FINDINGS: C1 through mid T2 visualized. 7 cervical vertebral bodies. Normal alignment. Bone marrow signal within normal limits. No evidence of acute compression fracture. Cord signal normal. Craniocervical junction within normal limits. Disc heights are within normal limits. Small fluid at left C1-C2 joint space, which appears to have slight bony offset of left lateral C1-2 interface. Possible right C1 fracture better demonstrated on prior CT. Exam is not optimized for evaluation of ligamentous structures. C2-C3:  No evidence of disc bulge, spinal canal or foraminal stenosis. C3-C4:  Disc osteophyte complex. No spinal canal stenosis. Moderate right and moderate to severe left foraminal stenosis. C4-C5:  Disc osteophyte complex. No spinal canal or right foraminal stenosis. Mild left foraminal stenosis. C5-C6:  Disc osteophyte complex. Moderate bilateral foraminal stenosis. C6-C7:  Disc osteophyte complex. No spinal canal stenosis. Mild right and moderate to severe left foraminal stenosis. C7-T1:  No evidence of disc bulge, spinal canal or foraminal stenosis. Otherwise overlying soft tissues appear within normal limits. 1. Small fluid at the C1-2 joint space, which appears to have slight bony offset of the left lateral C1-2 interface. Possible right C1 fracture better demonstrated on prior CT. 2. No severe spinal canal stenosis. Level by level findings as above. Signed by Dr Edna Ford RENAL COMPLETE    Result Date: 10/19/2021  Examination. US RENAL COMPLETE 10/19/2021 6:50 AM History: Acute renal injury on chronic renal disease. History of pancreatic malignancy The ultrasound examination of the abdomen for the kidneys is performed. There is no previous similar study for comparison. The correlation made with CT scan of the abdomen dated 4/13/2021. The right kidney measuring 9.4 x 3.3 x 4 cm.  There is moderate lobulation of renal contour. No discrete mass. The renal cortex measures 1 cm in maximum thickness. The left kidney is not visualized and is obscured by the extensive bowel gas. There is evidence of hepatomegaly with multiple relatively hypoechoic lobulated nodule/masses, the largest one measuring 2.2 cm. These were not noted in the previous CT scan of the abdomen in April 2021. A normal relatively small right kidney. Left kidney is not visualized. Hepatomegaly and hepatic metastatic disease. Signed by Dr Albania Martinez    Result Date: 10/18/2021  EXAM: XR CHEST PORTABLE -- 10/18/2021 5:55 PM HISTORY: 87 years, Female, altered mental status COMPARISON: 9/30/2021 TECHNIQUE:  1 images. Frontal view of the chest. FINDINGS:  Right chest port with grossly intact catheter, tip projects at mid SVC. No pneumothorax. Left basilar opacity. Borderline cardiac silhouette. Calcified aortic atherosclerosis. Upper mediastinum within normal limits. Deformity of the right clavicle appears chronic. Degenerative changes of the bilateral shoulders with left inferior recess joint body. No acute bony finding. 1. Left lower lung opacity, could represent infection, atelectasis or small effusion. Signed by Dr Ellen Delgado    XR CHEST PORTABLE    Result Date: 9/30/2021  Examination. XR CHEST PORTABLE 9/30/2021 6:46 AM History: The patient fell. The presurgical evaluation. A single frontal supine view of the chest is compared with the previous study dated 5/18/2021. The lungs are moderately expanded and clear. There is moderate cardiomegaly. Atheromatous changes thoracic aorta are noted. A right-sided internal jugular approach MediPort system is in a stable position. There is no acute bony abnormality. Deformity of the right clavicle representing a previous healed malunited fracture and is similar to the previous study. No active cardiopulmonary disease.  Signed by Dr Emily Bustos        My interpretation: Diffuse liver metastasis. ASSESSMENT:  #Advanced pancreatic malignancy with liver metastasis  I agree with palliative care evaluation. The patient is not a candidate for further anticancer therapy due to advanced age, multiple comorbidities, frailty and progression prior line of therapy. I agree with hospice referral.  #Normocytic anemia-hemoglobin 9.5. No intervention. #Neutrophil leukocytosis-likely reactive. Will defer to hospitalist.    PLAN:  Continue current supportive care  Agree with hospice referral  We will sign off. I have seen, examined and reviewed this patient medication list, appropriate labs and imaging studies. I reviewed relevant medical records and others physicians notes. . I have also reviewed the chief complaint (CC) and part of the history (History of Present Illness (HPI), Past Family Social History [facility]), or Review of Systems (ROS) and made changes when appropriated.        (Please note that portions of this note were completed with a voice recognition program. Efforts were made to edit the dictations but occasionally words are mis-transcribed.)        Kristen Villarreal MD    10/21/21  6:28 AM

## 2021-10-21 NOTE — PROGRESS NOTES
[facility]      Patient:  Kiran Leon  YOB: 1934  Date of Service: 10/21/2021  MRN: [id number]   Acct: [de-identified]   Primary Care Physician: Darin Wang MD  Advance Directive: DNR-CCA  Admit Date: 10/18/2021       Hospital Day: 3  Portions of this note have been copied forward, however, changed to reflect the most current clinical status of this patient. CHIEF COMPLAINT Abnormal lab    SUBJECTIVE:  More alter today. No complaints of pain. Nods appropriately. Speech is understandable. Appears for jaundice and diffuse anasarca. [address] [facility]  Admitted on 10/19/21 for NIKHIL superimposed on CKD. She was also found to have LLL pneumonia. Renal US was performed and had an incidental finding of hepatic metastatic disease and hepatomegaly. Being treated with vancomycin. Nephrology following. She is a palliative care pt due to multiple recent hospital stays and they have been consulted.     10/20/21-BUN-99, Creatinine-3.0. Albumin 2.0 ALK phos 698. Continues to have leukocytosis with a WBC-23.4. Palliative care and family report a change in mental status from baseline. Unsure of known cause. CT of head to r/o CVA CT of abdomen to assess extent of metastatic disease. 10/21/21-CT of the abdomen and pelvis show diffuse liver lesions-worsened than US on 10/19/21. Additionally showed increased size of pancreatic mass and new pulmonary nodule to the RML. Body wall edema additionally noted. Creatinine improving at 2.8 and BUN-97. Continues to have leukocytosis at 23.3 with little improvement     Review of Systems:   Review of Systems   Constitutional: Positive for activity change and fatigue. Negative for chills and diaphoresis. HENT: Negative for congestion, ear pain, sinus pain, sore throat and trouble swallowing. Eyes: Negative for visual disturbance. Respiratory: Positive for shortness of breath. Negative for cough and wheezing.     Cardiovascular: Negative for chest pain, Pulmonary:      Effort: Pulmonary effort is normal. No respiratory distress. Breath sounds: Examination of the left-lower field reveals decreased breath sounds. Decreased breath sounds and rhonchi (Bilateral upper lobes) present. No wheezing or rales. Comments: Diminished sound to left chest  Abdominal:      General: Abdomen is flat. Bowel sounds are normal. There is no distension. Palpations: Abdomen is soft. There is hepatomegaly and splenomegaly. Tenderness: There is no abdominal tenderness. Musculoskeletal:         General: No swelling. Normal range of motion. Cervical back: Normal range of motion and neck supple. Right lower leg: No edema. Left lower leg: No edema. Skin:     General: Skin is warm and dry. Coloration: Skin appears jaundiced. Findings: No erythema, lesion or rash. Neurological:      General: No focal deficit present. Mental Status: She is alert. She is disoriented. Cranial Nerves: No cranial nerve deficit. Sensory: No sensory deficit. Motor: No weakness. Psychiatric:         Mood and Affect: Mood normal.         Behavior: Behavior normal.         Thought Content:  Thought content normal.         Judgment: Judgment normal.           Medications:      sodium chloride      sodium chloride 50 mL/hr at 10/21/21 1028      furosemide  20 mg Oral Daily    sodium bicarbonate  650 mg Oral TID    epoetin rachel-epbx  3,000 Units SubCUTAneous Once per day on Mon Wed Fri    calcitRIOL  0.25 mcg Oral Daily    sodium chloride flush  5-40 mL IntraVENous 2 times per day    vancomycin (VANCOCIN) intermittent dosing (placeholder)   Other RX Placeholder    cefepime  2,000 mg IntraVENous Q24H    allopurinol  100 mg Oral Daily    apixaban  2.5 mg Oral BID    carvedilol  3.125 mg Oral BID WC    docusate sodium  100 mg Oral BID    folic acid  1 mg Oral Daily    levothyroxine  112 mcg Oral Daily    atorvastatin  10 mg Oral Nightly    pramipexole  0.125 mg Oral Nightly    midodrine  10 mg Oral TID WC     sodium chloride flush, sodium chloride, ondansetron **OR** ondansetron, acetaminophen **OR** acetaminophen, meclizine, nitroGLYCERIN  ADULT DIET; Dysphagia - Pureed; Mildly Thick (Zuehl)     Lab and other Data:     Recent Labs     10/19/21  0506 10/20/21  0435 10/21/21  0500   WBC 25.5* 23.4* 23.3*   HGB 9.9* 8.9* 9.5*   * 371 347     Recent Labs     10/19/21  0421 10/19/21  0421 10/20/21  0435 10/21/21  0500     --  141 142   K 4.6   < > 4.5 4.4  4.4     --  106 110   CO2 21*  --  20* 19*   *  --  99* 97*   CREATININE 3.4*  --  3.0* 2.8*   GLUCOSE 135*  --  165* 110*    < > = values in this interval not displayed. Recent Labs     10/18/21  1747 10/20/21  0435 10/21/21  0500   AST 30 18 20   ALT 12 9 9   BILITOT 0.5 0.5 0.6   ALKPHOS 892* 698* 749*     Troponin T:   Recent Labs     10/18/21  1747 10/18/21  2036   TROPONINI 0.04* 0.03     Pro-BNP: No results for input(s): BNP in the last 72 hours. INR: No results for input(s): INR in the last 72 hours. UA:  Recent Labs     10/18/21  1942   COLORU YELLOW   PHUR 8.0   WBCUA 1   RBCUA 1   BACTERIA NEGATIVE*   CLARITYU Clear   SPECGRAV 1.015   LEUKOCYTESUR TRACE*   UROBILINOGEN 1.0   BILIRUBINUR Negative   BLOODU Negative   GLUCOSEU Negative       RAD:   CT ABDOMEN PELVIS WO CONTRAST Additional Contrast? None    Result Date: 10/20/2021  1. Numerous heterogeneous liver lesions, highly suspicious for metastatic disease. There appear to be more than appreciated on prior ultrasound 10/19/2021 and new compared to 4/13/2021. 2. Increased size of pancreatic head mass now measuring 5.6 x 5.5 cm. 3. New right middle lobe pulmonary nodule measuring 5 mm compared to 4/13/2021. 4. Abdominal aortic aneurysm measuring 4.8 cm, previously 4.0 cm on 4/13/2021. 5. Body wall edema. Signed by Dr Peter Vasquez    Result Date: 10/20/2021  1.  No acute intracranial process. Signed by Dr Chucho Brito    Result Date: 10/18/2021  1. No acute intracranial findings. 2. Similar volume loss. 3. Calcified atherosclerosis. Signed by Dr John Chicas RENAL COMPLETE    Result Date: 10/19/2021  A normal relatively small right kidney. Left kidney is not visualized. Hepatomegaly and hepatic metastatic disease. Signed by Dr Lubna Gutierrez    Result Date: 10/18/2021  1. Left lower lung opacity, could represent infection, atelectasis or small effusion. Signed by Dr Tye Moore       Assessment/Plan     Principal Problem:    Acute kidney injury superimposed on chronic kidney disease ([facility] [address]              -Continue current POC               -Nephrology currently following     -Trend daily labs                Active Problems:    Anemia of chronic kidney failure, stage 4 (severe) ([address]              -Defer to nephrology              -Trend labs              -continue epoetin as ordered       Malignant neoplasm of other parts of pancreas ([address]   -CT of the abdomen and pelvis show progressive disease no long a candidate for  tx with oncology for pancreatic ca              -Palliative care working with family-hospice consulted              -Hospice/palliative working with family for to get DME to home tomorrow for home  hospice   -Plan to discharge tomorrow with hospice     Community acquired pneumonia of left lower lobe of lung              -Continue Vanco              -trend labs       Acute metabolic encephalopathy              -Origin remains unknown at this time.    -Ammonia-21   -CT of the head negative for acute findings                  To remain in C-collar except for hygiene and eating per Neurosurgery        Antibiotic: Vanco     DVT Prophylaxis: Eliquis       Disposition-Plan to DC home with hospice tomorrow.  Palliative Care's assistance with transition has been greatly appreciated         ALBERTO Sigala, 10/21/2021 12:22 PM

## 2021-10-21 NOTE — PROGRESS NOTES
The needed equipment has been ordered and will be set up tomorrow am.  The dtr will notify this ch and the pts nurse when irt is in place. It will then be ok to dc the pt when medically ready. Hospice will meet the pt at her home for adm to hospice.

## 2021-10-21 NOTE — PROGRESS NOTES
Physician Progress Note      PATIENT:               Ronal Mock  CSN #:                  [id number]  :                       1934  ADMIT DATE:       10/18/2021 5:29 PM  DISCH DATE:  RESPONDING  PROVIDER #:        DAVIS IQBAL MD          QUERY TEXT:    Pt admitted with NIKHIL/ATN and Pneumonia with past medical history of Pancreatic   Cancer. . Pt noted to have elevated WBC, elevated lack and decreased temp. .   If possible, please document in the progress notes and discharge summary if   you are evaluating and /or treating any of the following: The medical record reflects the following:  Risk Factors: Cancer, Pneumonia  Clinical Indicators: WBC: 23.2, Lactic 2.8, T: 94 in the ER, range from 96.0   to 96.9 throughout stay. No tachycardia noted, no tachypnea noted. Progress   noted dated 10/20/21 reflects \"? ??sepsis vs. metastatic disease vs Hepatic\"  Treatment: vancomycin, Cultures, Oncology consults,    Thank you in advance,    Maninder Coleman RN-BSN, [facility]  Clinical   [facility]  [address]  [email or web address]  Options provided:  -- Sepsis, present on admission  -- Sepsis, present on admission, now resolved  -- Pneumonia without Sepsis  -- Sepsis was ruled out  -- Other - I will add my own diagnosis  -- Disagree - Not applicable / Not valid  -- Disagree - Clinically unable to determine / Unknown  -- Refer to Clinical Documentation Reviewer    PROVIDER RESPONSE TEXT:    This patient has sepsis which was present on admission.     Query created by: Delaney Nettles on 10/21/2021 3:39 PM      Electronically signed by:  Jef Blount MD 10/21/2021 7:28 PM

## 2021-10-21 NOTE — PROGRESS NOTES
Palliative Care Progress Note  10/21/2021 7:54 AM  Subjective:   Admit Date: 10/18/2021  PCP: Nahun Donaldson MD    Chief Complaint: AMS    Interval History: No acute overnight events, patient remains lethargic and minimally verbal. PO intake poor. Oncology consulted, no further treatment options, poor performance status. Family in consideration for hospice care. Portions of this note have been copied forward, however, changed to reflect the most current clinical status of this patient. Review of Systems   Difficult to obtain due to lethargy and AMS, denies pain or discomfort    ADULT DIET;  Dysphagia - Pureed; Mildly Thick (Nectar)    Medications:   sodium chloride      sodium chloride 100 mL/hr at 10/20/21 1046     Current Facility-Administered Medications   Medication Dose Route Frequency Provider Last Rate Last Admin    sodium bicarbonate tablet 650 mg  650 mg Oral TID Adonis Stephens MD   650 mg at 10/20/21 2106    epoetin rachel-epbx (RETACRIT) injection 3,000 Units  3,000 Units SubCUTAneous Once per day on Mon Wed Fri Adonis Stephens MD   3,000 Units at 10/20/21 1047    calcitRIOL (ROCALTROL) capsule 0.25 mcg  0.25 mcg Oral Daily Adonis Stephens MD        sodium chloride flush 0.9 % injection 5-40 mL  5-40 mL IntraVENous 2 times per day Horacio Barrow MD   10 mL at 10/20/21 2107    sodium chloride flush 0.9 % injection 5-40 mL  5-40 mL IntraVENous PRN Horacio Barrow MD   10 mL at 10/19/21 0819    0.9 % sodium chloride infusion  25 mL IntraVENous PRN Horacio Barrow MD        ondansetron (ZOFRAN-ODT) disintegrating tablet 4 mg  4 mg Oral Q8H PRN Horacio Barrow MD        Or    ondansetron [facility]) injection 4 mg  4 mg IntraVENous Q6H PRN Horacio Barrow MD        acetaminophen (TYLENOL) tablet 650 mg  650 mg Oral Q6H PRN Horacio Barrow MD        Or    acetaminophen (TYLENOL) suppository 650 mg  650 mg Rectal Q6H PRN Horacio Barrow MD        0.9 % sodium chloride infusion   IntraVENous Continuous Irene España  mL/hr at 10/20/21 1046 New Bag at 10/20/21 1046    vancomycin (VANCOCIN) intermittent dosing (placeholder)   Other RX Placeholder ALBERTO Ramirez   Given at 10/18/21 2000    ceFEPIme (MAXIPIME) 2,000 mg in sodium chloride (PF) 20 mL IV syringe  2,000 mg IntraVENous Q24H King Mccrary MD   2,000 mg at 10/20/21 1830    allopurinol (ZYLOPRIM) tablet 100 mg  100 mg Oral Daily King Mccrary MD   100 mg at 10/20/21 1046    apixaban (ELIQUIS) tablet 2.5 mg  2.5 mg Oral BID King Mccrary MD   2.5 mg at 10/20/21 2106    carvedilol (COREG) tablet 3.125 mg  3.125 mg Oral BID  King Mccrary MD   3.125 mg at 10/20/21 1830    docusate sodium (COLACE) capsule 100 mg  100 mg Oral BID King Mccrary MD   100 mg at 66/95/07 7533    folic acid (FOLVITE) tablet 1 mg  1 mg Oral Daily King Mccrary MD   1 mg at 10/20/21 1046    levothyroxine (SYNTHROID) tablet 112 mcg  112 mcg Oral Daily King Mccrary MD   112 mcg at 10/21/21 3748    meclizine (ANTIVERT) tablet 25 mg  25 mg Oral TID PRN King Mccrary MD        atorvastatin (LIPITOR) tablet 10 mg  10 mg Oral Nightly King Mccrary MD   10 mg at 10/20/21 2106    pramipexole (MIRAPEX) tablet 0.125 mg  0.125 mg Oral Nightly King Mccrary MD   0.125 mg at 10/20/21 2106    nitroGLYCERIN (NITROSTAT) SL tablet 0.4 mg  0.4 mg SubLINGual Q5 Min PRN King Mccrary MD        midodrine (PROAMATINE) tablet 10 mg  10 mg Oral TID WC King Mccrary MD   10 mg at 10/20/21 1830        Labs:     Recent Labs     10/19/21  0506 10/20/21  0435 10/21/21  0500   WBC 25.5* 23.4* 23.3*   RBC 3.20* 2.80* 3.04*   HGB 9.9* 8.9* 9.5*   HCT 31.7* 27.9* 30.4*   MCV 99.1* 99.6* 100.0*   MCH 30.9 31.8* 31.3*   MCHC 31.2* 31.9* 31.3*   * 371 347     Recent Labs     10/19/21  0421 10/19/21  0421 10/20/21  0435 10/21/21  0500     --  141 142   K 4.6   < > 4.5 4.4  4.4   ANIONGAP 14  --  15 13     --  106 110   CO2 21*  --  20* 19*   *  --  99* 97* CREATININE 3.4*  --  3.0* 2.8*   GLUCOSE 135*  --  165* 110*   CALCIUM 9.2  --  8.6* 8.6*    < > = values in this interval not displayed. Recent Labs     10/20/21  0435   MG 1.9   PHOS 5.8*     Recent Labs     10/18/21  1747 10/20/21  0435 10/21/21  0500   AST 30 18 20   ALT 12 9 9   BILITOT 0.5 0.5 0.6   ALKPHOS 892* 698* 749*     ABGs:No results for input(s): PHART, NTC2QRW, PO2ART, KLN4OWR, BEART, HGBAE, T4IYSZHW, CARBOXHGBART, 02THERAPY in the last 72 hours. HgBA1c: No results for input(s): LABA1C in the last 72 hours. FLP:    Lab Results   Component Value Date    TRIG 108 05/18/2021    HDL 54 05/18/2021    LDLCALC 79 05/18/2021     TSH:    Lab Results   Component Value Date    TSH 4.710 05/18/2021     Troponin T:   Recent Labs     10/18/21  1747 10/18/21  2036   TROPONINI 0.04* 0.03     INR: No results for input(s): INR in the last 72 hours. Objective:   Vitals: /86   Pulse 74   Temp 96.5 °F (35.8 °C) (Temporal)   Resp 18   Ht 5' 3\" (1.6 m)   Wt 163 lb 2 oz (74 kg)   SpO2 99%   BMI 28.90 kg/m²   24HR INTAKE/OUTPUT:      Intake/Output Summary (Last 24 hours) at 10/21/2021 0754  Last data filed at 10/20/2021 1417  Gross per 24 hour   Intake 30 ml   Output --   Net 30 ml     Physical Exam  General appearance: 79 yo female, cachectic, no acute distress   Head: Normocephalic, without obvious abnormality, atraumatic  Eyes: conjunctivae/corneas clear. PERRL, EOM's intact. Ears: normal external ears and nose, throat without exudate  Neck: cervical collar in place   Lungs: decreased at bases  Heart: regular rate and rhythm, S1, S2 normal, no murmur  Abdomen:soft, non-tender; non-distended, normal bowel sounds no masses, no organomegaly  Extremities:1-2+ bilateral lower extremity edema,  No erythema, no tenderness to palpation  Skin: Skin color, texture, turgor normal. No rashes or lesions  Lymphatic: No palpable lymph node enlargment  Neurologic: Lethargic, oriented to self.  Mumbling speech, dysarthric at times. Generalized weakness   Psychiatric: Withdrawn, flat affect         Assessment and Plan:   Principal Problem:    Acute kidney injury superimposed on chronic kidney disease ([address]  Active Problems:    Anemia of chronic kidney failure, stage 4 (severe) (HCC)    Malignant neoplasm of other parts of pancreas ([address]    Community acquired pneumonia of left lower lobe of lung    Hyperkalemia    Acute metabolic encephalopathy    Acute kidney injury superimposed on CKD ([address]    Disorientation  Resolved Problems:    * No resolved hospital problems. *        Visit Summary: Chart reviewed, patient seen at the bedside with no family present. She will awaken briefly to voice, does not answer questions appropriately r follow commands. She is profoundly weak with poor P.O. intake. I contacted the patient's daughter, Emanuel Pyle, and provided an update and discussed information from Oncology note as well. Emanuel Pyle would like to pursue home hospice for the patient and verbalizes understanding of patient's decline. Hospice consult order placed and Hospice Chaplain notified of DME needs. Family will need time to prepare space and have DME delivered, hopeful for ability to bring patient home tomorrow. All questions answered and emotional support provided. Tentative plan for home with hospice tomorrow after DME has been delivered. Attending notified of the outcome of my visit. Recommendations:   1. Palliative Care-[facility] to pursue home hospice, consult placed. Code status: DNR  2. Acute kidney injury superimposed on CKD-Nephrology following, improved  3. Acute metabolic encephalopathy-CT head negative, hx of Afib/recent hip repair  4. Pancreatic adenocarcinoma-no longer a candidate for anticancer treatment. Has been followed by [facility] (outpatient supportive care) for this  5. Chronic systolic CHF-noted   6.  LLL pneumonia-broad spectrum abx continued, rapid COVID negative          Thank you for consulting Palliative Care and allowing us to participate in the care of this patient.      Greater than 50% of time spent Counseling: Yes  Total visit time: 37 min    Electronically signed by ALBERTO Betancur on 10/21/2021 at 7:54 AM    (Please note that portions of this note were completed with a voice recognition program.  Tai Byers made to edit the dictations but occasionally words are mis-transcribed.)

## 2021-10-21 NOTE — PROGRESS NOTES
Nephrology ([facility]) Progress Note      Patient:  Lisette Eubanks  YOB: 1934  Date of Service: 10/21/2021  MRN: [id number]   Acct: [de-identified]   Primary Care Physician: Naomi Rivas MD  Advance Directive: DNR-CCA  Admit Date: 10/18/2021       Hospital Day: 3  Referring Provider: Sharron Ford MD    Patient independently seen and examined, Chart, Consults, Notes, Operative notes, Labs, Cardiology, and Radiology studies reviewed as available. Chief complaint: Abnormal labs. Subjective:  Lisette Eubanks is a 80 y.o. female for whom we were consulted for evaluation and treatment of acute kidney injury. Patient has history of stage IV chronic kidney disease and follows Dr. Matt Roberson in the office. Her last estimated GFR was 25 mL. She has history of chronic systolic CHF, DVT, hypertension, renal stone, osteoarthritis, abdominal aortic aneurysm. Patient is currently a resident of [address] [place] [address] under palliative care. Patient was just discharged on October 4, when she was admitted after a fall, right proximal femur fracture and C2 fracture. She is currently wearing c-collar. Presented yesterday with abnormal labs as her white count was 23,000, severe acidosis and worsening of renal function. Her chest x-ray confirmed left lower lobe pneumonia. She is now admitted with acute kidney injury. Patient is complaining of mild shortness of breath, denies nausea or vomiting. She is poor historian. This morning patient still very weak, she is able to answer few questions but very confused as well.     Allergies:  Penicillins    Medicines:  Current Facility-Administered Medications   Medication Dose Route Frequency Provider Last Rate Last Admin    sodium bicarbonate tablet 650 mg  650 mg Oral TID Eliza Alegria MD   650 mg at 10/20/21 2106    epoetin rachel-epbx (RETACRIT) injection 3,000 Units  3,000 Units SubCUTAneous Once per day on Mon Wed Fri Eliza Alegria MD 3,000 Units at 10/20/21 1047    calcitRIOL (ROCALTROL) capsule 0.25 mcg  0.25 mcg Oral Daily Charly Jade MD        sodium chloride flush 0.9 % injection 5-40 mL  5-40 mL IntraVENous 2 times per day Clifford Curran MD   10 mL at 10/20/21 2107    sodium chloride flush 0.9 % injection 5-40 mL  5-40 mL IntraVENous PRN Clifford Curran MD   10 mL at 10/19/21 0819    0.9 % sodium chloride infusion  25 mL IntraVENous PRN Clifford Curran MD        ondansetron (ZOFRAN-ODT) disintegrating tablet 4 mg  4 mg Oral Q8H PRN Clifford Curran MD        Or    ondansetron [facility]) injection 4 mg  4 mg IntraVENous Q6H PRN Clifford Curran MD        acetaminophen (TYLENOL) tablet 650 mg  650 mg Oral Q6H PRN Clifford Curran MD        Or    acetaminophen (TYLENOL) suppository 650 mg  650 mg Rectal Q6H PRN Clifford Curran MD        0.9 % sodium chloride infusion   IntraVENous Continuous Charly Jade  mL/hr at 10/20/21 1046 New Bag at 10/20/21 1046    vancomycin (VANCOCIN) intermittent dosing (placeholder)   Other RX Placeholder ALBERTO Ibarra   Given at 10/18/21 2000    ceFEPIme (MAXIPIME) 2,000 mg in sodium chloride (PF) 20 mL IV syringe  2,000 mg IntraVENous Q24H Clifford Curran MD   2,000 mg at 10/20/21 1830    allopurinol (ZYLOPRIM) tablet 100 mg  100 mg Oral Daily Clifford Curran MD   100 mg at 10/20/21 1046    apixaban (ELIQUIS) tablet 2.5 mg  2.5 mg Oral BID Clifford Curran MD   2.5 mg at 10/20/21 2106    carvedilol (COREG) tablet 3.125 mg  3.125 mg Oral BID WC Clifford Curran MD   3.125 mg at 10/20/21 1830    docusate sodium (COLACE) capsule 100 mg  100 mg Oral BID Clifford Curran MD   100 mg at 45/45/93 5053    folic acid (FOLVITE) tablet 1 mg  1 mg Oral Daily Clifford Curran MD   1 mg at 10/20/21 1046    levothyroxine (SYNTHROID) tablet 112 mcg  112 mcg Oral Daily Clifford Curran MD   112 mcg at 10/21/21 4368    meclizine (ANTIVERT) tablet 25 mg  25 mg Oral TID PRN Clifford Curran MD        atorvastatin (LIPITOR) tablet 10 mg  10 mg Oral Nightly Anatoly Simmons MD   10 mg at 10/20/21 2106    pramipexole (MIRAPEX) tablet 0.125 mg  0.125 mg Oral Nightly Anatoly Simmons MD   0.125 mg at 10/20/21 2106    nitroGLYCERIN (NITROSTAT) SL tablet 0.4 mg  0.4 mg SubLINGual Q5 Min PRN Anatoly Simmons MD        midodrine (PROAMATINE) tablet 10 mg  10 mg Oral TID WC Anatoly Simmons MD   10 mg at 10/20/21 1830       Past Medical History:  Past Medical History:   Diagnosis Date    Ab[facility] ([facility]) [facility]     Anemia     Arthritis     Cancer ([facility] 12/01/2020    pancreatic    CHF (congestive heart failure) (HCC)     Chronic kidney disease     DVT of lower extremity (deep venous thrombosis) ([facility]     twice 2010 and 2017    Hyperlipidemia     Hypertension     Kidney stones     Osteoarthritis     Palliative care patient 12/02/2020    Thyroid disease     Thyroid disorder        Past Surgical History:  Past Surgical History:   Procedure Laterality Date    COLONOSCOPY  2016    Dr Kaz Anaya- diverticulosis    COLONOSCOPY  10/2018    [place]:  AP    ENDOSCOPIC ULTRASOUND (LOWER) N/A 12/03/2020    Dr JONNY rGijalva/unsuccessful biliary cannulation despite attempts at 2-wire cannulation and proph pancreatic stenting with cannulation around pancreatic stenting-Positive for high-grade adenocarcinoma, pancreatic head    ERCP N/A 12/03/2020    Dr JONNY Grijalva/unsuccessful biliary cannulation despite attempts at 2-wire cannulation and proph pancreatic stenting with cannulation around pancreatic stenting-Positive for high-grade adenocarcinoma, pancreatic head    ERCP  12/07/2020    Dr Jaya Kelly/sphincterotomy, placement of a 10 x 60 partially covered biliary stent    HIP SURGERY Right 10/01/2021    RIGHT HIP HEMIARTHROPLASTY performed by Jessie Perez MD at [address] Right 02/05/2021    SINGLE LUMEN PORT PLACEMENT WITH ULTRASOUND AND FLUORO performed by Jovana Alfred MD at [address] [facility] pinky toes removed    TOTAL KNEE ARTHROPLASTY Right 01/03/2020    RIGHT TOTAL KNEE REPLACEMENT performed by Sanjay Thakkar MD at [facility] [facility]  10/2018    Dr Bertram Gray- Chronic esophagogastritis with intestinal metaplasia. No evidence of dysplasia. Family History  Family History   Problem Relation Age of Onset    Colon Cancer Brother     Cancer Mother         Breast Cancer    Heart Attack Father     Colon Polyps Neg Hx     Esophageal Cancer Neg Hx     Liver Cancer Neg Hx     Liver Disease Neg Hx     Rectal Cancer Neg Hx     Stomach Cancer Neg Hx        Social History  Social History     Socioeconomic History    Marital status:      Spouse name: Not on file    Number of children: 2    Years of education: Not on file    Highest education level: Not on file   Occupational History    Not on file   Tobacco Use    Smoking status: Never Smoker    Smokeless tobacco: Never Used   Vaping Use    Vaping Use: Never used   Substance and Sexual Activity    Alcohol use: No    Drug use: No    Sexual activity: Not on file   Other Topics Concern    Not on file   Social History Narrative    Not on file     Social Determinants of Health     Financial Resource Strain:     Difficulty of Paying Living Expenses:    Food Insecurity:     Worried About Running Out of Food in the Last Year:     [address] in the Last Year:    Transportation Needs:     Lack of Transportation (Medical):      Lack of Transportation (Non-Medical):    Physical Activity:     Days of Exercise per Week:     Minutes of Exercise per Session:    Stress:     Feeling of Stress :    Social Connections:     Frequency of Communication with Friends and Family:     Frequency of Social Gatherings with Friends and Family:     Attends [employer] Services:     Active Member of Clubs or Organizations:     Attends Club or Organization Meetings:     Marital Status:    Intimate Partner Violence:     Fear of Current or Ex-Partner:     Emotionally Abused:     Physically Abused:     Sexually Abused:          Review of Systems:  History obtained from chart review and the patient  General ROS: Weak with mild fever  Respiratory ROS: positive for - shortness of breath and sputum changes  Cardiovascular ROS: No chest pain or palpitations  Gastrointestinal ROS: No abdominal pain or melena  Genito-Urinary ROS: No dysuria or hematuria  Musculoskeletal ROS: No joint pain or swelling   14 point ROS reviewed with the patient and negative except as noted above and in the HPI unless unable to obtain. Objective:  Patient Vitals for the past 24 hrs:   BP Temp Temp src Pulse Resp SpO2 Weight   10/21/21 0623 136/86 96.5 °F (35.8 °C) Temporal 74 18 99 % --   10/21/21 0055 134/69 96.4 °F (35.8 °C) Temporal 65 16 96 % 163 lb 2 oz (74 kg)   10/20/21 1727 129/72 97.3 °F (36.3 °C) Temporal 65 20 97 % --   10/20/21 1312 117/79 97.5 °F (36.4 °C) Temporal 69 20 96 % --       Intake/Output Summary (Last 24 hours) at 10/21/2021 0915  Last data filed at 10/20/2021 1417  Gross per 24 hour   Intake 30 ml   Output --   Net 30 ml     General: awake/alert   HEENT: Normocephalic atraumatic head  Neck: Supple with no JVD or carotid bruits. Chest:  clear to auscultation bilaterally  CVS: regular rate and rhythm  Abdominal: soft, nontender, normal bowel sounds  Extremities: no cyanosis or edema  Skin: warm and dry without rash      Labs:  BMP:   Recent Labs     10/19/21  0421 10/19/21  0421 10/20/21  0435 10/21/21  0500     --  141 142   K 4.6   < > 4.5 4.4  4.4     --  106 110   CO2 21*  --  20* 19*   PHOS  --   --  5.8*  --    *  --  99* 97*   CREATININE 3.4*  --  3.0* 2.8*   CALCIUM 9.2  --  8.6* 8.6*    < > = values in this interval not displayed.      CBC:   Recent Labs     10/19/21  0506 10/20/21  0435 10/21/21  0500   WBC 25.5* 23.4* 23.3*   HGB 9.9* 8.9* 9.5*   HCT 31.7* 27.9* 30.4*   MCV 99.1* 99.6* 100.0*   * 371 347     LIVER PROFILE:   Recent Labs     10/18/21  1747 10/20/21  0435 10/21/21  0500   AST 30 18 20   ALT 12 9 9   BILITOT 0.5 0.5 0.6   ALKPHOS 892* 698* 749*     PT/INR: No results for input(s): PROTIME, INR in the last 72 hours. APTT: No results for input(s): APTT in the last 72 hours. BNP:  No results for input(s): BNP in the last 72 hours. Ionized Calcium:No results for input(s): IONCA in the last 72 hours. Magnesium:  Recent Labs     10/20/21  0435   MG 1.9     Phosphorus:  Recent Labs     10/20/21  0435   PHOS 5.8*     HgbA1C: No results for input(s): LABA1C in the last 72 hours. Hepatic:   Recent Labs     10/18/21  1747 10/20/21  0435 10/21/21  0500   ALKPHOS 892* 698* 749*   ALT 12 9 9   AST 30 18 20   PROT 6.3* 5.5* 5.5*   BILITOT 0.5 0.5 0.6   LABALBU 2.2* 2.0* 2.0*     Lactic Acid:   Recent Labs     10/21/21  0500   LACTA 1.2     Troponin: No results for input(s): CKTOTAL, CKMB, TROPONINT in the last 72 hours. ABGs: No results for input(s): PH, PCO2, PO2, HCO3, O2SAT in the last 72 hours. CRP:  No results for input(s): CRP in the last 72 hours. Sed Rate:  No results for input(s): SEDRATE in the last 72 hours. Cultures:   No results for input(s): CULTURE in the last 72 hours. Recent Labs     10/18/21  1747 10/18/21  1808   BC No Growth to date. Any change in status will be called. --    BLOODCULT2  --  No Growth to date. Any change in status will be called. No results for input(s): CXSURG in the last 72 hours. Radiology reports as per the Radiologist  Radiology: CT HEAD WO CONTRAST    Result Date: 10/18/2021  EXAM: CT HEAD WO CONTRAST -- 10/18/2021 6:30 PM HISTORY: 87 years, Female, altered mental status COMPARISON: 9/30/2021 DLP: 718 mGy cm. Automated exposure control was utilized to minimize patient radiation dose. TECHNIQUE: Unenhanced axial CT images obtained from vertex to skull base with coronal and sagittal reformats.  FINDINGS: No acute intracranial hemorrhage, midline shift, mass effect or large territory cortical infarct. Gray-white matter differentiation maintained. Ventricles, sulci and basilar cisterns with proportional prominence suggesting volume loss. Density of the left vertebral artery appears similar to prior 12/1/2020 and is favored to be due to calcified atherosclerosis. Thinning of the ocular lenses may be postoperative or age-related. Paranasal sinuses and mastoid air cells normally aerated. External auditory canals within normal limits. Calvarium appears intact. Subcutaneous tissues within normal limits. 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 1. No acute intracranial findings. 2. Similar volume loss. 3. Calcified atherosclerosis. Signed by Dr Griselda Angelo RENAL COMPLETE    Result Date: 10/19/2021  Examination. US RENAL COMPLETE 10/19/2021 6:50 AM History: Acute renal injury on chronic renal disease. History of pancreatic malignancy The ultrasound examination of the abdomen for the kidneys is performed. There is no previous similar study for comparison. The correlation made with CT scan of the abdomen dated 4/13/2021. The right kidney measuring 9.4 x 3.3 x 4 cm. There is moderate lobulation of renal contour. No discrete mass. The renal cortex measures 1 cm in maximum thickness. The left kidney is not visualized and is obscured by the extensive bowel gas. There is evidence of hepatomegaly with multiple relatively hypoechoic lobulated nodule/masses, the largest one measuring 2.2 cm. These were not noted in the previous CT scan of the abdomen in April 2021. A normal relatively small right kidney. Left kidney is not visualized. Hepatomegaly and hepatic metastatic disease. Signed by Dr Adriana Perales    Result Date: 10/18/2021  EXAM: XR CHEST PORTABLE -- 10/18/2021 5:55 PM HISTORY: 87 years, Female, altered mental status COMPARISON: 9/30/2021 TECHNIQUE:  1 images.   Frontal view of the chest. FINDINGS:  Right chest port with grossly intact catheter, tip projects at mid SVC. No pneumothorax. Left basilar opacity. Borderline cardiac silhouette. Calcified aortic atherosclerosis. Upper mediastinum within normal limits. Deformity of the right clavicle appears chronic. Degenerative changes of the bilateral shoulders with left inferior recess joint body. No acute bony finding. 1. Left lower lung opacity, could represent infection, atelectasis or small effusion. Signed by Dr Polo Mckeon   1. Acute kidney injury stage II./Improving. 2.  Acute tubular necrosis. 3.  Stage IV chronic kidney disease baseline. 4.  Hypertensive renal disease. 5.  Left lower lobe pneumonia. 6.  Mild hyperkalemia. 7.  Metabolic encephalopathy. 8.  Metabolic acidosis. 9.  Failure to thrive. 10.  Anemia in chronic kidney disease. 11.  Secondary hyperparathyroidism. Plan:  1. Intermittent diuretics. 2.  Continue BARBARA. 3.  P.o. calcitriol  4. IV antibiotics.           Indira Bonilla MD  10/21/21  9:15 AM

## 2021-10-22 VITALS
OXYGEN SATURATION: 95 % | RESPIRATION RATE: 16 BRPM | HEIGHT: 63 IN | TEMPERATURE: 97.7 F | DIASTOLIC BLOOD PRESSURE: 48 MMHG | BODY MASS INDEX: 30.12 KG/M2 | HEART RATE: 67 BPM | WEIGHT: 170 LBS | SYSTOLIC BLOOD PRESSURE: 110 MMHG

## 2021-10-22 PROBLEM — Z78.9 POOR PROGNOSIS: Status: ACTIVE | Noted: 2021-10-22

## 2021-10-22 PROBLEM — C25.9 MALIGNANT NEOPLASM OF PANCREAS METASTATIC TO LIVER (HCC): Status: ACTIVE | Noted: 2021-01-20

## 2021-10-22 PROBLEM — Z51.5 HOSPICE CARE PATIENT: Status: ACTIVE | Noted: 2021-10-22

## 2021-10-22 PROBLEM — C78.7 MALIGNANT NEOPLASM OF PANCREAS METASTATIC TO LIVER (HCC): Status: ACTIVE | Noted: 2021-01-20

## 2021-10-22 LAB
ALBUMIN SERPL-MCNC: 1.9 G/DL (ref 3.5–5.2)
ALP BLD-CCNC: 763 U/L (ref 35–104)
ALT SERPL-CCNC: 9 U/L (ref 5–33)
ANION GAP SERPL CALCULATED.3IONS-SCNC: 16 MMOL/L (ref 7–19)
ANISOCYTOSIS: ABNORMAL
AST SERPL-CCNC: 24 U/L (ref 5–32)
BASOPHILS ABSOLUTE: 0 K/UL (ref 0–0.2)
BASOPHILS RELATIVE PERCENT: 0.2 % (ref 0–1)
BILIRUB SERPL-MCNC: 0.6 MG/DL (ref 0.2–1.2)
BUN BLDV-MCNC: 96 MG/DL (ref 8–23)
CALCIUM SERPL-MCNC: 8.4 MG/DL (ref 8.8–10.2)
CHLORIDE BLD-SCNC: 113 MMOL/L (ref 98–111)
CO2: 17 MMOL/L (ref 22–29)
CREAT SERPL-MCNC: 2.9 MG/DL (ref 0.5–0.9)
EOSINOPHILS ABSOLUTE: 0.1 K/UL (ref 0–0.6)
EOSINOPHILS RELATIVE PERCENT: 0.4 % (ref 0–5)
GFR AFRICAN AMERICAN: 19
GFR NON-AFRICAN AMERICAN: 15
GLUCOSE BLD-MCNC: 98 MG/DL (ref 74–109)
HCT VFR BLD CALC: 27.9 % (ref 37–47)
HEMOGLOBIN: 8.8 G/DL (ref 12–16)
HYPOCHROMIA: ABNORMAL
IMMATURE GRANULOCYTES #: 0.4 K/UL
LYMPHOCYTES ABSOLUTE: 0.6 K/UL (ref 1.1–4.5)
LYMPHOCYTES RELATIVE PERCENT: 2.6 % (ref 20–40)
MACROCYTES: ABNORMAL
MCH RBC QN AUTO: 31.7 PG (ref 27–31)
MCHC RBC AUTO-ENTMCNC: 31.5 G/DL (ref 33–37)
MCV RBC AUTO: 100.4 FL (ref 81–99)
MONOCYTES ABSOLUTE: 1.1 K/UL (ref 0–0.9)
MONOCYTES RELATIVE PERCENT: 4.8 % (ref 0–10)
NEUTROPHILS ABSOLUTE: 20.6 K/UL (ref 1.5–7.5)
NEUTROPHILS RELATIVE PERCENT: 90.5 % (ref 50–65)
PDW BLD-RTO: 23.9 % (ref 11.5–14.5)
PLATELET # BLD: 288 K/UL (ref 130–400)
PMV BLD AUTO: 12 FL (ref 9.4–12.3)
POLYCHROMASIA: ABNORMAL
POTASSIUM REFLEX MAGNESIUM: 4.2 MMOL/L (ref 3.5–5)
POTASSIUM SERPL-SCNC: 4.2 MMOL/L (ref 3.5–5)
RBC # BLD: 2.78 M/UL (ref 4.2–5.4)
SODIUM BLD-SCNC: 146 MMOL/L (ref 136–145)
TARGET CELLS: ABNORMAL
TOTAL PROTEIN: 5.2 G/DL (ref 6.6–8.7)
VANCOMYCIN TROUGH: 23.1 UG/ML (ref 10–20)
WBC # BLD: 22.7 K/UL (ref 4.8–10.8)

## 2021-10-22 PROCEDURE — 6370000000 HC RX 637 (ALT 250 FOR IP): Performed by: INTERNAL MEDICINE

## 2021-10-22 PROCEDURE — 80202 ASSAY OF VANCOMYCIN: CPT

## 2021-10-22 PROCEDURE — 80053 COMPREHEN METABOLIC PANEL: CPT

## 2021-10-22 PROCEDURE — 6360000002 HC RX W HCPCS: Performed by: HOSPITALIST

## 2021-10-22 PROCEDURE — 6370000000 HC RX 637 (ALT 250 FOR IP): Performed by: HOSPITALIST

## 2021-10-22 PROCEDURE — 85025 COMPLETE CBC W/AUTO DIFF WBC: CPT

## 2021-10-22 PROCEDURE — 6360000002 HC RX W HCPCS: Performed by: INTERNAL MEDICINE

## 2021-10-22 RX ORDER — CALCITRIOL 0.25 UG/1
0.25 CAPSULE, LIQUID FILLED ORAL DAILY
Qty: 30 CAPSULE | Refills: 0 | Status: SHIPPED | OUTPATIENT
Start: 2021-10-23 | End: 2021-11-22

## 2021-10-22 RX ORDER — HEPARIN SODIUM (PORCINE) LOCK FLUSH IV SOLN 100 UNIT/ML 100 UNIT/ML
300 SOLUTION INTRAVENOUS ONCE
Status: COMPLETED | OUTPATIENT
Start: 2021-10-22 | End: 2021-10-22

## 2021-10-22 RX ORDER — FUROSEMIDE 20 MG/1
20 TABLET ORAL DAILY
Qty: 30 TABLET | Refills: 0 | Status: SHIPPED | OUTPATIENT
Start: 2021-10-23 | End: 2021-11-22

## 2021-10-22 RX ORDER — CEFDINIR 300 MG/1
300 CAPSULE ORAL 2 TIMES DAILY
Qty: 14 CAPSULE | Refills: 0 | Status: SHIPPED | OUTPATIENT
Start: 2021-10-22 | End: 2021-10-29

## 2021-10-22 RX ADMIN — APIXABAN 2.5 MG: 5 TABLET, FILM COATED ORAL at 09:10

## 2021-10-22 RX ADMIN — MIDODRINE HYDROCHLORIDE 10 MG: 10 TABLET ORAL at 13:52

## 2021-10-22 RX ADMIN — CARVEDILOL 3.12 MG: 3.12 TABLET, FILM COATED ORAL at 09:10

## 2021-10-22 RX ADMIN — FUROSEMIDE 20 MG: 20 TABLET ORAL at 09:10

## 2021-10-22 RX ADMIN — MIDODRINE HYDROCHLORIDE 10 MG: 10 TABLET ORAL at 09:10

## 2021-10-22 RX ADMIN — MIDODRINE HYDROCHLORIDE 10 MG: 10 TABLET ORAL at 18:13

## 2021-10-22 RX ADMIN — HEPARIN 300 UNITS: 100 SYRINGE at 16:43

## 2021-10-22 RX ADMIN — SODIUM BICARBONATE 650 MG: 650 TABLET ORAL at 09:10

## 2021-10-22 RX ADMIN — LEVOTHYROXINE SODIUM 112 MCG: 112 TABLET ORAL at 06:53

## 2021-10-22 RX ADMIN — SODIUM BICARBONATE 650 MG: 650 TABLET ORAL at 13:51

## 2021-10-22 RX ADMIN — FOLIC ACID 1 MG: 1 TABLET ORAL at 09:10

## 2021-10-22 RX ADMIN — ALLOPURINOL 100 MG: 100 TABLET ORAL at 09:10

## 2021-10-22 RX ADMIN — EPOETIN ALFA-EPBX 3000 UNITS: 3000 INJECTION, SOLUTION INTRAVENOUS; SUBCUTANEOUS at 09:11

## 2021-10-22 RX ADMIN — CARVEDILOL 3.12 MG: 3.12 TABLET, FILM COATED ORAL at 18:13

## 2021-10-22 NOTE — PROGRESS NOTES
Updated home hospice that ambulance is being called. Updated daughter we will just be waiting on ambulance, educated via telephone on meds crushed in applesauce, nectar thick liquids, pureed diet- and that home hospice would meet at home and assist with further educating on meds and diet, etc.    Requested transportation via [facility].     Electronically signed by Rosa Maria York RN on 10/22/2021 at 5:40 PM

## 2021-10-22 NOTE — PROGRESS NOTES
Nephrology ([facility]) Progress Note      Patient:  Lars Bustamante  YOB: 1934  Date of Service: 10/22/2021  MRN: [id number]   Acct: [de-identified]   Primary Care Physician: Jennifer Ibarra MD  Advance Directive: DNR-CCA  Admit Date: 10/18/2021       Hospital Day: 4  Referring Provider: Viki Hatchet, MD    Patient independently seen and examined, Chart, Consults, Notes, Operative notes, Labs, Cardiology, and Radiology studies reviewed as available. Chief complaint: Abnormal labs. Subjective:  Lars Bustamante is a 80 y.o. female for whom we were consulted for evaluation and treatment of acute kidney injury. Patient has history of stage IV chronic kidney disease and follows Dr. Cesar Watson in the office. Her last estimated GFR was 25 mL. She has history of chronic systolic CHF, DVT, hypertension, renal stone, osteoarthritis, abdominal aortic aneurysm. Patient is currently a resident of [address] [place] [address] under palliative care. Patient was just discharged on October 4, when she was admitted after a fall, right proximal femur fracture and C2 fracture. She is currently wearing c-collar. Presented yesterday with abnormal labs as her white count was 23,000, severe acidosis and worsening of renal function. Her chest x-ray confirmed left lower lobe pneumonia. She is now admitted with acute kidney injury. Patient is complaining of mild shortness of breath, denies nausea or vomiting. She is poor historian. This morning patient still very weak, she is able to answer few questions but very confused as well. She has no significant improvement of renal function.     Allergies:  Penicillins    Medicines:  Current Facility-Administered Medications   Medication Dose Route Frequency Provider Last Rate Last Admin    furosemide (LASIX) tablet 20 mg  20 mg Oral Daily Kalpana Bowen MD   20 mg at 10/22/21 0910    sodium bicarbonate tablet 650 mg  650 mg Oral TID Kalpana Bowen MD 650 mg at 10/22/21 0910    epoetin rachel-epbx (RETACRIT) injection 3,000 Units  3,000 Units SubCUTAneous Once per day on Mon Wed Fri Ellie Vallecillo MD   3,000 Units at 10/22/21 9247    calcitRIOL (ROCALTROL) capsule 0.25 mcg  0.25 mcg Oral Daily Ellie Vallecillo MD   0.25 mcg at 10/21/21 1682    sodium chloride flush 0.9 % injection 5-40 mL  5-40 mL IntraVENous 2 times per day Steve Rice MD   10 mL at 10/20/21 2107    sodium chloride flush 0.9 % injection 5-40 mL  5-40 mL IntraVENous PRN Steve Rice MD   10 mL at 10/19/21 0819    0.9 % sodium chloride infusion  25 mL IntraVENous PRN Steve Rice MD        ondansetron (ZOFRAN-ODT) disintegrating tablet 4 mg  4 mg Oral Q8H PRN Steve Rice MD        Or    [facility]ron [facility] injection 4 mg  4 mg IntraVENous Q6H PRN Steve Rice MD        acetaminophen (TYLENOL) tablet 650 mg  650 mg Oral Q6H PRN Steve Rice MD        Or    acetaminophen (TYLENOL) suppository 650 mg  650 mg Rectal Q6H PRN Steve Rice MD        0.9 % sodium chloride infusion   IntraVENous Continuous Ellie Vallecillo MD 50 mL/hr at 10/21/21 1553 New Bag at 10/21/21 1553    vancomycin (VANCOCIN) intermittent dosing (placeholder)   Other RX Placeholder ALBERTO aCrdona   Given at 10/18/21 2000    ceFEPIme (MAXIPIME) 2,000 mg in sodium chloride (PF) 20 mL IV syringe  2,000 mg IntraVENous Q24H Steve Rice MD   2,000 mg at 10/21/21 1819    allopurinol (ZYLOPRIM) tablet 100 mg  100 mg Oral Daily Steve Rice MD   100 mg at 10/22/21 0910    apixaban (ELIQUIS) tablet 2.5 mg  2.5 mg Oral BID Steve Rice MD   2.5 mg at 10/22/21 0910    carvedilol (COREG) tablet 3.125 mg  3.125 mg Oral BID  Steve Rice MD   3.125 mg at 10/22/21 2441    docusate sodium (COLACE) capsule 100 mg  100 mg Oral BID Steve Rice MD   100 mg at 05/46/86 0223    folic acid (FOLVITE) tablet 1 mg  1 mg Oral Daily Steve Rice MD   1 mg at 10/22/21 0910    levothyroxine (SYNTHROID) tablet 112 mcg 112 mcg Oral Daily Sarkis Cherry MD   112 mcg at 10/22/21 2745    meclizine (ANTIVERT) tablet 25 mg  25 mg Oral TID PRN Sarkis Cherry MD        atorvastatin (LIPITOR) tablet 10 mg  10 mg Oral Nightly Sarkis Cherry MD   10 mg at 10/21/21 2103    pramipexole (MIRAPEX) tablet 0.125 mg  0.125 mg Oral Nightly Sarkis Cherry MD   0.125 mg at 10/21/21 2103    nitroGLYCERIN (NITROSTAT) SL tablet 0.4 mg  0.4 mg SubLINGual Q5 Min PRN Sarkis Cherry MD        midodrine (PROAMATINE) tablet 10 mg  10 mg Oral TID WC Sarkis Cherry MD   10 mg at 10/22/21 5670       Past Medical History:  Past Medical History:   Diagnosis Date    [facility]) [facility]     Anemia     Arthritis     Cancer ([facility] [address] 12/01/2020    pancreatic    CHF (congestive heart failure) (HCC)     Chronic kidney disease     DVT of lower extremity (deep venous thrombosis) ([facility] [address]     twice 2010 and 2017    Hyperlipidemia     Hypertension     Kidney stones     Osteoarthritis     Palliative care patient 12/02/2020    Thyroid disease     Thyroid disorder        Past Surgical History:  Past Surgical History:   Procedure Laterality Date    COLONOSCOPY  2016    Dr Sachi Palacios- diverticulosis    COLONOSCOPY  10/2018    Sachi Palacios:  AP    ENDOSCOPIC ULTRASOUND (LOWER) N/A 12/03/2020    Dr JONNY Grijalva/unsuccessful biliary cannulation despite attempts at 2-wire cannulation and proph pancreatic stenting with cannulation around pancreatic stenting-Positive for high-grade adenocarcinoma, pancreatic head    ERCP N/A 12/03/2020    Dr JONNY Grijalva/unsuccessful biliary cannulation despite attempts at 2-wire cannulation and proph pancreatic stenting with cannulation around pancreatic stenting-Positive for high-grade adenocarcinoma, pancreatic head    ERCP  12/07/2020    Dr Eleanor Kelly/sphincterotomy, placement of a 10 x 60 partially covered biliary stent    HIP SURGERY Right 10/01/2021    RIGHT HIP HEMIARTHROPLASTY performed by Kamran Mcclelland MD at [address] Right 02/05/2021    SINGLE LUMEN PORT PLACEMENT WITH ULTRASOUND AND FLUORO performed by Ally Edmond MD at [facility] TOE AMPUTATION Bilateral     pinky toes removed    TOTAL KNEE ARTHROPLASTY Right 01/03/2020    RIGHT TOTAL KNEE REPLACEMENT performed by Thao Grewal MD at [address]  10/2018    Dr Betsy Del Valle- Chronic esophagogastritis with intestinal metaplasia. No evidence of dysplasia. Family History  Family History   Problem Relation Age of Onset    Colon Cancer Brother     Cancer Mother         Breast Cancer    Heart Attack Father     Colon Polyps Neg Hx     Esophageal Cancer Neg Hx     Liver Cancer Neg Hx     Liver Disease Neg Hx     Rectal Cancer Neg Hx     Stomach Cancer Neg Hx        Social History  Social History     Socioeconomic History    Marital status:      Spouse name: Not on file    Number of children: 2    Years of education: Not on file    Highest education level: Not on file   Occupational History    Not on file   Tobacco Use    Smoking status: Never Smoker    Smokeless tobacco: Never Used   Vaping Use    Vaping Use: Never used   Substance and Sexual Activity    Alcohol use: No    Drug use: No    Sexual activity: Not on file   Other Topics Concern    Not on file   Social History Narrative    Not on file     Social Determinants of Health     Financial Resource Strain:     Difficulty of Paying Living Expenses:    Food Insecurity:     Worried About Running Out of Food in the Last Year:     [address] in the Last Year:    Transportation Needs:     Lack of Transportation (Medical):      Lack of Transportation (Non-Medical):    Physical Activity:     Days of Exercise per Week:     Minutes of Exercise per Session:    Stress:     Feeling of Stress :    Social Connections:     Frequency of Communication with Friends and Family:     Frequency of Social Gatherings with Friends and Family:     Attends Mu-ism Services:     Active Member of Clubs or Organizations:     Attends Club or Organization Meetings:     Marital Status:    Intimate Partner Violence:     Fear of Current or Ex-Partner:     Emotionally Abused:     Physically Abused:     Sexually Abused:          Review of Systems:  History obtained from chart review and the patient  General ROS: Weak with mild fever  Respiratory ROS: positive for - shortness of breath and sputum changes  Cardiovascular ROS: No chest pain or palpitations  Gastrointestinal ROS: No abdominal pain or melena  Genito-Urinary ROS: No dysuria or hematuria  Musculoskeletal ROS: No joint pain or swelling   14 point ROS reviewed with the patient and negative except as noted above and in the HPI unless unable to obtain. Objective:  Patient Vitals for the past 24 hrs:   BP Temp Temp src Pulse Resp SpO2 Weight   10/22/21 0634 111/75 97.5 °F (36.4 °C) Oral 67 16 96 % --   10/22/21 0102 106/61 96.3 °F (35.7 °C) Temporal 65 16 92 % 170 lb (77.1 kg)   10/21/21 1921 (!) 111/49 96.2 °F (35.7 °C) Temporal 70 16 98 % --   10/21/21 1349 120/60 96 °F (35.6 °C) Temporal 66 16 96 % --     No intake or output data in the 24 hours ending 10/22/21 0932  General: awake/alert   HEENT: Normocephalic atraumatic head  Neck: Supple with no JVD or carotid bruits. Chest:  clear to auscultation bilaterally  CVS: regular rate and rhythm  Abdominal: soft, nontender, normal bowel sounds  Extremities: 2+ pedal edema  Skin: warm and dry without rash      Labs:  BMP:   Recent Labs     10/20/21  0435 10/20/21  0435 10/21/21  0500 10/22/21  0545     --  142 146*   K 4.5   < > 4.4  4.4 4.2  4.2     --  110 113*   CO2 20*  --  19* 17*   PHOS 5.8*  --   --   --    BUN 99*  --  97* 96*   CREATININE 3.0*  --  2.8* 2.9*   CALCIUM 8.6*  --  8.6* 8.4*    < > = values in this interval not displayed.      CBC:   Recent Labs     10/20/21  0435 10/21/21  0500 10/22/21  0545   WBC 23.4* 23.3* 22.7* HGB 8.9* 9.5* 8.8*   HCT 27.9* 30.4* 27.9*   MCV 99.6* 100.0* 100.4*    347 288     LIVER PROFILE:   Recent Labs     10/20/21  0435 10/21/21  0500 10/22/21  0545   AST 18 20 24   ALT 9 9 9   BILITOT 0.5 0.6 0.6   ALKPHOS 698* 749* 763*     PT/INR: No results for input(s): PROTIME, INR in the last 72 hours. APTT: No results for input(s): APTT in the last 72 hours. BNP:  No results for input(s): BNP in the last 72 hours. Ionized Calcium:No results for input(s): IONCA in the last 72 hours. Magnesium:  Recent Labs     10/20/21  0435   MG 1.9     Phosphorus:  Recent Labs     10/20/21  0435   PHOS 5.8*     HgbA1C: No results for input(s): LABA1C in the last 72 hours. Hepatic:   Recent Labs     10/20/21  0435 10/21/21  0500 10/22/21  0545   ALKPHOS 698* 749* 763*   ALT 9 9 9   AST 18 20 24   PROT 5.5* 5.5* 5.2*   BILITOT 0.5 0.6 0.6   LABALBU 2.0* 2.0* 1.9*     Lactic Acid:   Recent Labs     10/21/21  0500   LACTA 1.2     Troponin: No results for input(s): CKTOTAL, CKMB, TROPONINT in the last 72 hours. ABGs: No results for input(s): PH, PCO2, PO2, HCO3, O2SAT in the last 72 hours. CRP:  No results for input(s): CRP in the last 72 hours. Sed Rate:  No results for input(s): SEDRATE in the last 72 hours. Cultures:   No results for input(s): CULTURE in the last 72 hours. No results for input(s): BC, Atha Tucker in the last 72 hours. No results for input(s): CXSURG in the last 72 hours. Radiology reports as per the Radiologist  Radiology: CT HEAD WO CONTRAST    Result Date: 10/18/2021  EXAM: CT HEAD WO CONTRAST -- 10/18/2021 6:30 PM HISTORY: 87 years, Female, altered mental status COMPARISON: 9/30/2021 DLP: 718 mGy cm. Automated exposure control was utilized to minimize patient radiation dose. TECHNIQUE: Unenhanced axial CT images obtained from vertex to skull base with coronal and sagittal reformats.  FINDINGS: No acute intracranial hemorrhage, midline shift, mass effect or large territory cortical infarct. Gray-white matter differentiation maintained. Ventricles, sulci and basilar cisterns with proportional prominence suggesting volume loss. Density of the left vertebral artery appears similar to prior 12/1/2020 and is favored to be due to calcified atherosclerosis. Thinning of the ocular lenses may be postoperative or age-related. Paranasal sinuses and mastoid air cells normally aerated. External auditory canals within normal limits. Calvarium appears intact. Subcutaneous tissues within normal limits. 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 1. No acute intracranial findings. 2. Similar volume loss. 3. Calcified atherosclerosis. Signed by Dr Sami Jason RENAL COMPLETE    Result Date: 10/19/2021  Examination. US RENAL COMPLETE 10/19/2021 6:50 AM History: Acute renal injury on chronic renal disease. History of pancreatic malignancy The ultrasound examination of the abdomen for the kidneys is performed. There is no previous similar study for comparison. The correlation made with CT scan of the abdomen dated 4/13/2021. The right kidney measuring 9.4 x 3.3 x 4 cm. There is moderate lobulation of renal contour. No discrete mass. The renal cortex measures 1 cm in maximum thickness. The left kidney is not visualized and is obscured by the extensive bowel gas. There is evidence of hepatomegaly with multiple relatively hypoechoic lobulated nodule/masses, the largest one measuring 2.2 cm. These were not noted in the previous CT scan of the abdomen in April 2021. A normal relatively small right kidney. Left kidney is not visualized. Hepatomegaly and hepatic metastatic disease. Signed by Dr Albania Martinez    Result Date: 10/18/2021  EXAM: XR CHEST PORTABLE -- 10/18/2021 5:55 PM HISTORY: 87 years, Female, altered mental status COMPARISON: 9/30/2021 TECHNIQUE:  1 images. Frontal view of the chest. FINDINGS:  Right chest port with grossly intact catheter, tip projects at mid SVC. No pneumothorax. Left basilar opacity. Borderline cardiac silhouette. Calcified aortic atherosclerosis. Upper mediastinum within normal limits. Deformity of the right clavicle appears chronic. Degenerative changes of the bilateral shoulders with left inferior recess joint body. No acute bony finding. 1. Left lower lung opacity, could represent infection, atelectasis or small effusion. Signed by Dr Osvaldo Cleaning   1. Acute kidney injury stage II  2. Acute tubular necrosis. 3.  Stage IV chronic kidney disease baseline. 4.  Hypertensive renal disease. 5.  Left lower lobe pneumonia. 6.  Mild hyperkalemia. 7.  Metabolic encephalopathy. 8.  Metabolic acidosis. 9.  Failure to thrive. 10.  Anemia in chronic kidney disease. 11.  Secondary hyperparathyroidism. 12. Hypernatremia. Plan:  1. Change IV fluid composition  2. Continue BARBARA. 3.  P.o. calcitriol  4. IV antibiotics. 5. Diuretics as needed.           Júnior Abdul MD  10/22/21  9:32 AM

## 2021-10-22 NOTE — DISCHARGE SUMMARY
[place], [place], [address]    DEPARTMENT OF HOSPITALIST MEDICINE      DISCHARGE SUMMARY:      PATIENT NAME:  Declan Stewart  :  1934  MRN:  [id number]    Admission Date:   10/18/2021  5:29 PM Attending: Moo Castelan MD   Discharge Date:   10/22/2021              PCP: Ana Zimmerman MD  Length of Stay: 4 days     Chief Complaint on Admission:   Chief Complaint   Patient presents with    Other     Abnormal Creatine and BUN, new pitting edema to bilateral legs. 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 [facility]  COURSE  AND  TREATMENT:    10/19/21- Admitted for NIKHIL superimposed on CKD. She was also found to have LLL pneumonia. Renal US was performed and had an incidental finding of hepatic metastatic disease and hepatomegaly. Being treated with vancomycin. Nephrology following. She is a palliative care pt due to multiple recent hospital stays and they have been consulted.     10/20/21-BUN-99, Creatinine-3.0. Albumin 2.0 ALK phos 698. Continues to have leukocytosis with a WBC-23.4. Palliative care and family report a change in mental status from baseline. Unsure of known cause.  CT of head to r/o CVA CT of abdomen to assess extent of metastatic disease.      10/21/21-CT of the abdomen and pelvis show diffuse liver lesions-worsened than US on 10/19/21. Additionally showed increased size of pancreatic mass and new pulmonary nodule to the RML. Body wall edema additionally noted. Creatinine improving at 2.8 and BUN-97. Continues to have leukocytosis at 23.3 with little improvement     10/22/2021  Patient seen and evaluated at the bedside. She is weak and tired but excited to go home. Hospice has already set up DME equipment at home. She has extensive liver mets secondary to ectatic malignancy. Further medical management with total comfort care as per hospice care physician. I will discharge her on 7 days of Omnicef to complete the course of antibiotics for her pneumonia. OBJECTIVE:  BP (!) 101/48   Pulse 74   Temp 96.8 °F (36 °C) (Temporal)   Resp 16   Ht 5' 3\" (1.6 m)   Wt 170 lb (77.1 kg)   SpO2 94%   BMI 30.11 kg/m²       Heart: RRR   Lungs: Bilateral decreased air entry   Abdomen: Soft, non-tender   Extremities: No edema   Neurologic: Alert and awake, + neck collar in place   Skin: Warm and dry          Laboratory Data:  Recent Labs     10/20/21  0435 10/21/21  0500 10/22/21  0545   WBC 23.4* 23.3* 22.7*   HGB 8.9* 9.5* 8.8*    347 288     Recent Labs     10/20/21  0435 10/20/21  0435 10/21/21  0500 10/22/21  0545     --  142 146*   K 4.5   < > 4.4  4.4 4.2  4.2     --  110 113*   CO2 20*  --  19* 17*   BUN 99*  --  97* 96*   CREATININE 3.0*  --  2.8* 2.9*   GLUCOSE 165*  --  110* 98    < > = values in this interval not displayed. Recent Labs     10/20/21  0435 10/21/21  0500 10/22/21  0545   AST 18 20 24   ALT 9 9 9   BILITOT 0.5 0.6 0.6   ALKPHOS 698* 749* 763*     Troponin T: No results for input(s): TROPONINI in the last 72 hours. Pro-BNP: No results for input(s): BNP in the last 72 hours. INR: No results for input(s): INR in the last 72 hours.   UA:No results for input(s): NITRITE, COLORU, PHUR, LABCAST, WBCUA, RBCUA, MUCUS, TRICHOMONAS, YEAST, BACTERIA, CLARITYU, [place], UROBILINOGEN, BILIRUBINUR, BLOODU, GLUCOSEU, AMORPHOUS in the last 72 hours. Invalid input(s): [place] Rboerto  A1C: No results for input(s): LABA1C in the last 72 hours. ABG:No results for input(s): PHART, DOT3UYL, PO2ART, LRU0VCO, BEART, HGBAE, O3FHFFTF, CARBOXHGBART in the last 72 hours. Impressions of imaging performed in 48 hours before discharge:    No results found. DISCHARGE  MEDICATIONS:  To be addressed by the accepting attending physician . .. Please see the printed med rec sheet for current medications. Further medical management to be taken over and patient medications to be addressed by the accepting Hospice attending physician at home. Condition on Discharge: rapidly worsening  Discharge Disposition: Home Hospice    Recommended Follow Up:  [address] [phone]    will meet you at your home to complete admission process    Followup Appointments Scheduled at Time of Discharge:  Future Appointments   Date Time Provider Nano Gonzales   11/10/2021  2:00 PM SCHEDULE, [facility] MED ONC MA [facility] MED ONC [facility]   11/10/2021  2:15 PM ALBERTO Smith PAD HEMONC [facility]   11/15/2021 10:00 AM MD THU Layton LPSNeursur [facility]   3/28/2022  1:30 PM Jason Zelaya MD N LPS Cardio [facility]        Discharge Instructions:   Please see the discharge paperwork. Total time spent during patient evaluation and assessment, discussion with the nurse/family, addressing discharge medications / scripts and coordination of care for safe discharge was in excess of 35 minutes. Detailed discharge directions delivered to the patient and family by myself and my nursing staff, who verbalize understanding and are satisfied with the plan. Patient and family have been advised to follow up hospice care physician recommendations regarding further use of medications and medical management . ..  My prayers go out to the patient and the family at this difficult time!       Signed Electronically:    Fermín Pruitt MD  5:22 PM 10/22/2021

## 2021-10-22 NOTE — PROGRESS NOTES
Pharmacy Vancomycin Consult     Vancomycin Day: 5  Current Dosing: Pulse dosing    Temp max:  97.5    Recent Labs     10/21/21  0500 10/22/21  0545   BUN 97* 96*       Recent Labs     10/21/21  0500 10/22/21  0545   CREATININE 2.8* 2.9*       Recent Labs     10/21/21  0500 10/22/21  0545   WBC 23.3* 22.7*       No intake or output data in the 24 hours ending 10/22/21 2120    Culture Date Source Results   10/18/21 Blood No growth                 Ht Readings from Last 1 Encounters:   10/19/21 5' 3\" (1.6 m)        Wt Readings from Last 1 Encounters:   10/22/21 170 lb (77.1 kg)         Body mass index is 30.11 kg/m². Estimated Creatinine Clearance: 13 mL/min (A) (based on SCr of 2.9 mg/dL (H)).     Trough: 23.1    Assessment/Plan: No Vancomycin needed today, random level tomorrow morning    Electronically signed by Severa Rex, [address] [place] [address] on 10/22/2021 at 6:32 AM

## 2021-10-22 NOTE — PROGRESS NOTES
Patient leaving at this time with [id number] [facility]. Contacted Hospice and daughter Padmini Mcrae and notified of departure.     Electronically signed by Monica Cook RN on 10/22/2021 at 6:54 PM

## 2021-10-22 NOTE — PROGRESS NOTES
The needed equipment has been set up in the home. It is ok to dc the pt when medically ready. Hospice will meet the pt at her home for adm to hospice.

## 2021-10-22 NOTE — PLAN OF CARE
Problem: Discharge Planning:  Goal: Participates in care planning  Description: Participates in care planning  Outcome: Ongoing  Goal: Discharged to appropriate level of care  Description: Discharged to appropriate level of care  Outcome: Ongoing     Problem: Activity Intolerance:  Goal: Ability to tolerate increased activity will improve  Description: Ability to tolerate increased activity will improve  Outcome: Ongoing     Problem: Anxiety/Stress:  Goal: Level of anxiety will decrease  Description: Level of anxiety will decrease  Outcome: Ongoing     Problem:  Bowel Function - Altered:  Goal: Bowel elimination is within specified parameters  Description: Bowel elimination is within specified parameters  Outcome: Ongoing     Problem: Fluid Volume - Deficit:  Goal: Absence of fluid volume deficit signs and symptoms  Description: Absence of fluid volume deficit signs and symptoms  Outcome: Ongoing  Goal: Electrolytes within specified parameters  Description: Electrolytes within specified parameters  Outcome: Ongoing     Problem: Mental Status - Impaired:  Goal: Absence of continued neurological deterioration signs and symptoms  Description: Absence of continued neurological deterioration signs and symptoms  Outcome: Ongoing  Goal: Absence of physical injury  Description: Absence of physical injury  Outcome: Ongoing  Goal: Mental status will be restored to baseline  Description: Mental status will be restored to baseline  Outcome: Ongoing     Problem: Mobility - Impaired:  Goal: Mobility will improve to maximum level  Description: Mobility will improve to maximum level  Outcome: Ongoing     Problem: Nutrition Deficit:  Goal: Ability to achieve adequate nutritional intake will improve  Description: Ability to achieve adequate nutritional intake will improve  Outcome: Ongoing     Problem: Pain:  Goal: Pain level will decrease  Description: Pain level will decrease  Outcome: Ongoing  Goal: Ability to notify healthcare provider of pain before it becomes unmanageable or unbearable will improve  Description: Ability to notify healthcare provider of pain before it becomes unmanageable or unbearable will improve  Outcome: Ongoing  Goal: Control of acute pain  Description: Control of acute pain  Outcome: Ongoing  Goal: Control of chronic pain  Description: Control of chronic pain  Outcome: Ongoing     Problem: Serum Glucose Level - Abnormal:  Goal: Ability to maintain appropriate glucose levels will improve to within specified parameters  Description: Ability to maintain appropriate glucose levels will improve to within specified parameters  Outcome: Ongoing     Problem: Skin Integrity - Impaired:  Goal: Will show no infection signs and symptoms  Description: Will show no infection signs and symptoms  Outcome: Ongoing  Goal: Absence of new skin breakdown  Description: Absence of new skin breakdown  Outcome: Ongoing     Problem: Sleep Pattern Disturbance:  Goal: Appears well-rested  Description: Appears well-rested  Outcome: Ongoing     Problem: Skin Integrity:  Goal: Will show no infection signs and symptoms  Description: Will show no infection signs and symptoms  Outcome: Ongoing  Goal: Absence of new skin breakdown  Description: Absence of new skin breakdown  Outcome: Ongoing     Problem: Falls - Risk of:  Goal: Absence of physical injury  Description: Absence of physical injury  Outcome: Ongoing  Goal: Will remain free from falls  Description: Will remain free from falls  Outcome: Ongoing     Problem: ABCDS Injury Assessment  Goal: Absence of physical injury  Outcome: Ongoing

## 2021-10-23 LAB
BLOOD CULTURE, ROUTINE: NORMAL
CULTURE, BLOOD 2: NORMAL

## 2021-10-23 ASSESSMENT — ENCOUNTER SYMPTOMS
CONSTIPATION: 0
SHORTNESS OF BREATH: 1
NAUSEA: 0
GASTROINTESTINAL NEGATIVE: 1

## 2021-10-30 PROBLEM — N39.0 UTI (URINARY TRACT INFECTION): Status: RESOLVED | Noted: 2021-09-30 | Resolved: 2021-10-30

## 2021-11-02 ENCOUNTER — TELEPHONE (OUTPATIENT)
Dept: INFUSION THERAPY | Age: 86
End: 2021-11-02

## 2021-11-02 ENCOUNTER — TELEPHONE (OUTPATIENT)
Dept: GASTROENTEROLOGY | Age: 86
End: 2021-11-02

## 2021-11-02 NOTE — TELEPHONE ENCOUNTER
[facility] has been advised that patient is . DOD 10/25/21 per Patient Daughter Ryan Gomez.

## 2023-07-20 NOTE — TELEPHONE ENCOUNTER
7/20/23, 1:55 PM EDT    DISCHARGE ON GOING EVALUATION    [facility] day: 2  Location: -12/012-A Reason for admit: Atrial fibrillation [facility]) [I48.91]   Procedure:   7/20 Cardiac cath pending  7/20 DON pending  Barriers to Discharge: Creatinine 1.3, Hgb 12.5, Caediology following, Structural Heart consult, Amiodarone drip, Asa, Lipitor, Lasix, Heparin drip, Cozaar, Zofran prn, Toprol XL. PCP: Veronica Zambrano MD  Readmission Risk Score: 10.9%  Patient Goals/Plan/Treatment Preferences: Plan is to return home with spouse. Denies needs, will follow. Put in 5 year colon recall per dr. gallegos

## 2025-04-10 NOTE — PROGRESS NOTES
Ordered AFP and anatomy  Low risk NIPT, declined carrier screen  Reviewed weight gain thus far has been appropriate (15lbs).  Reviewed normal weight gain in pregnancy, healthy eating.   Vancomycin Day: 4  Current Dosing: Pulse dosing    Temp max:  97.5    Recent Labs     10/20/21  0435 10/21/21  0500   BUN 99* 97*       Recent Labs     10/20/21  0435 10/21/21  0500   CREATININE 3.0* 2.8*       Recent Labs     10/20/21  0435 10/21/21  0500   WBC 23.4* 23.3*         Intake/Output Summary (Last 24 hours) at 10/21/2021 0606  Last data filed at 10/20/2021 1417  Gross per 24 hour   Intake 30 ml   Output --   Net 30 ml       Culture Date Source Results   10/18/21 Blood No growth                 Ht Readings from Last 1 Encounters:   10/19/21 5' 3\" (1.6 m)        Wt Readings from Last 1 Encounters:   10/21/21 163 lb 2 oz (74 kg)         Body mass index is 28.9 kg/m². Estimated Creatinine Clearance: 14 mL/min (A) (based on SCr of 2.8 mg/dL (H)).     Level ordered for: 26.3    Assessment/Plan:  No Vancomycin needed today, random level tomorrow morning    Electronically signed by Kat Gomez, [facility] on 10/21/2021 at 6:06 AM

## (undated) DEVICE — SYSTEM SKIN CLSR 22CM DERMBND PRINEO

## (undated) DEVICE — STANDARD HYPODERMIC NEEDLE,POLYPROPYLENE HUB: Brand: MONOJECT

## (undated) DEVICE — GLOVE SURG SZ 9 L12IN FNGR THK126MIL CRM LTX FREE

## (undated) DEVICE — GLOVE SURG SZ 8 CRM LTX FREE POLYISOPRENE POLYMER BEAD ANTI

## (undated) DEVICE — SUTURE VCRL SZ 0 L27IN ABSRB UD L36MM CT-1 1/2 CIR J260H

## (undated) DEVICE — SUTURE VCRL SZ 1 L27IN ABSRB UD L36MM CP-1 1/2 CIR REV CUT J268H

## (undated) DEVICE — SOLUTION IRRIG 3000ML 0.9% SOD CHL USP UROMATIC PLAS CONT

## (undated) DEVICE — AGENT HEMSTAT 3GM PURIFIED PLNT STARCH PWD ABSRB ARISTA AH

## (undated) DEVICE — ROYAL SILK SURGICAL GOWN, XXL: Brand: CONVERTORS

## (undated) DEVICE — FRCP BX RADJAW4 NDL 2.8 240 STD OG

## (undated) DEVICE — CONTRAST IOTHALAMATE MEGLUMINE 60% 50 ML INJ CONRAY 60

## (undated) DEVICE — DRESSING FOAM W4XL12IN SIL RECT ADH WTRPRF FLM BK W/ BORD

## (undated) DEVICE — ENDOSCOPIC ULTRASOUND FINE NEEDLE BIOPSY (FNB) DEVICE: Brand: ACQUIRE

## (undated) DEVICE — SUTURE VCRL SZ 3-0 L27IN ABSRB UD L26MM SH 1/2 CIR J416H

## (undated) DEVICE — GLOVE SURG SZ 85 L12IN FNGR THK94MIL TRNSLUC YEL LTX

## (undated) DEVICE — C-ARM: Brand: UNBRANDED

## (undated) DEVICE — GLOVE SURG SZ 65 CRM LTX FREE POLYISOPRENE POLYMER BEAD ANTI

## (undated) DEVICE — SUTURE VCRL SZ 2-0 L36IN ABSRB UD L36MM CT-1 1/2 CIR J945H

## (undated) DEVICE — CEMENT MIXING SYSTEM WITH FEMORAL BREAKWAY NOZZLE: Brand: REVOLUTION

## (undated) DEVICE — SUTURE FIBERWIRE SZ 2 L38IN NONABSORBABLE BLU L36.6MM 1/2 AR7202

## (undated) DEVICE — CUFF,BP,DISP,1 TUBE,ADULT,HP: Brand: MEDLINE

## (undated) DEVICE — DRAPE,UTILITY,XL,4/PK,STERILE: Brand: MEDLINE

## (undated) DEVICE — BLADE LARYNSCP HNDL MAC 3 DISP CURAVIEW LED

## (undated) DEVICE — MASK,OXYGEN,MED CONC,ADLT,7' TUB, UC: Brand: PENDING

## (undated) DEVICE — SUTURE PDS II SZ 3-0 L27IN ABSRB CLR SH L26MM 1/2 CIR TAPR Z416H

## (undated) DEVICE — ENDOGATOR AUXILIARY WATER JET CONNECTOR: Brand: ENDOGATOR

## (undated) DEVICE — TBG SMPL FLTR LINE NASL 02/C02 A/ BX/100

## (undated) DEVICE — ENDO KIT,LOURDES HOSPITAL: Brand: MEDLINE INDUSTRIES, INC.

## (undated) DEVICE — SURGICAL PROCEDURE PACK HIP TOT DBD CDS LOURDES HOSP LTX

## (undated) DEVICE — GLOVE SURG SZ 7 CRM LTX FREE POLYISOPRENE POLYMER BEAD ANTI

## (undated) DEVICE — SENSR O2 OXIMAX FNGR A/ 18IN NONSTR

## (undated) DEVICE — BLADE SURG SAW RECIP S STL DBL SIDE 70MM LEN 12.5MM CUT 0277096278] STRYKER INSTRUMENT DIV]

## (undated) DEVICE — UNDERGLOVE SURG SZ 8 FNGR THK0.21MIL GRN LTX BEAD CUF

## (undated) DEVICE — ZIMMER® STERILE DISPOSABLE TOURNIQUET CUFF WITH PLC, DUAL PORT, SINGLE BLADDER, 34 IN. (86 CM)

## (undated) DEVICE — RETRIEVAL BALLOON CATHETER: Brand: EXTRACTOR™ PRO RX

## (undated) DEVICE — Z INACTIVE USE 2660664 SOLUTION IRRIG 3000ML 0.9% SOD CHL USP UROMATIC PLAS CONT

## (undated) DEVICE — SNAR POLYP CAPTIVATOR MICROHEX 13 240CM

## (undated) DEVICE — ADHESIVE SKIN CLSR 0.7ML TOP DERMBND ADV

## (undated) DEVICE — PILLOW POS W15XH6XL22IN RASPBERRY FOAM ABD W/ STRP DISP FOR

## (undated) DEVICE — CONMED SCOPE SAVER BITE BLOCK, 20X27 MM: Brand: SCOPE SAVER

## (undated) DEVICE — BLADE LARYNGOSCOPE MACINTOSH 3

## (undated) DEVICE — PACK,UNIVERSAL,NO GOWNS: Brand: MEDLINE

## (undated) DEVICE — Device: Brand: DEFENDO AIR/WATER/SUCTION AND BIOPSY VALVE

## (undated) DEVICE — MINOR CDS: Brand: MEDLINE INDUSTRIES, INC.

## (undated) DEVICE — BLADE SURG NO11 C STL RETRCT DISPOSABLE

## (undated) DEVICE — BLADE LARYNGOSCOPE LP 3 NS GLIDESCOPE SPECTRUM DISP

## (undated) DEVICE — GUIDEWIRE ENDOSCP L260CM DIA0.018IN TIP L6CM TRITON STR

## (undated) DEVICE — CHLORAPREP 26ML ORANGE

## (undated) DEVICE — RX PUSHER: Brand: NAVIFLEX™ RX PUSHER

## (undated) DEVICE — GUIDEWIRE ENDOSCP ANGLED 5 CM 0.025 INX260 CM RND JAGWIRE

## (undated) DEVICE — SYRINGE, LUER LOCK, 10ML: Brand: MEDLINE

## (undated) DEVICE — SUTURE MCRYL SZ 4-0 L18IN ABSRB UD L19MM PS-2 3/8 CIR PRIM Y496G

## (undated) DEVICE — THE CHANNEL CLEANING BRUSH IS A NYLON FLEXI BRUSH ATTACHED TO A FLEXIBLE PLASTIC SHEATH DESIGNED TO SAFELY REMOVE DEBRIS FROM FLEXIBLE ENDOSCOPES.

## (undated) DEVICE — COVER US PRB W15XL120CM W/ GEL RUBBERBAND TAPE STRP FLD GEN

## (undated) DEVICE — SUTURE RETRIEVER

## (undated) DEVICE — ANESTHESIA CIRCUIT ADULT-LF: Brand: MEDLINE INDUSTRIES, INC.

## (undated) DEVICE — SURGICAL PROCEDURE PACK KNEE TOT DBD CDS LOURDES HOSP LF

## (undated) DEVICE — THE SINGLE USE ETRAP – POLYP TRAP IS USED FOR SUCTION RETRIEVAL OF ENDOSCOPICALLY REMOVED POLYPS.: Brand: ETRAP

## (undated) DEVICE — MESH 9051050 PYRAMESH IMP 10MMX50MM RND: Brand: PYRAMESH® IMPLANT SYSTEM

## (undated) DEVICE — SOLUTION IV IRRIG POUR BRL 0.9% SODIUM CHL 2F7124

## (undated) DEVICE — TUBE ENDOTRACH LASR CUF ORL MURPHY 7MM

## (undated) DEVICE — SYSTEM BX CAP BILI RAP EXCHG CAP LOK DEV COMPATIBLE W/ OLY

## (undated) DEVICE — SPHINCTEROTOME: Brand: DREAMTOME™ RX 44

## (undated) DEVICE — TUBE ET 7MM NSL ORAL BASIC CUF INTMED MURPHY EYE RADPQ MRK

## (undated) DEVICE — DECANTER VI VENT W/ VLV FOR ASEP TRNSF OF FLD

## (undated) DEVICE — DUAL CUT SAGITTAL BLADE